# Patient Record
Sex: MALE | Race: WHITE | NOT HISPANIC OR LATINO | Employment: OTHER | URBAN - METROPOLITAN AREA
[De-identification: names, ages, dates, MRNs, and addresses within clinical notes are randomized per-mention and may not be internally consistent; named-entity substitution may affect disease eponyms.]

---

## 2024-10-26 ENCOUNTER — APPOINTMENT (OUTPATIENT)
Dept: GENERAL RADIOLOGY | Facility: HOSPITAL | Age: 74
DRG: 853 | End: 2024-10-26
Payer: MEDICARE

## 2024-10-26 ENCOUNTER — ANESTHESIA (OUTPATIENT)
Dept: PERIOP | Facility: HOSPITAL | Age: 74
End: 2024-10-26
Payer: MEDICARE

## 2024-10-26 ENCOUNTER — HOSPITAL ENCOUNTER (INPATIENT)
Facility: HOSPITAL | Age: 74
LOS: 35 days | Discharge: NURSING, REHAB OR LONG-TERM CARE | DRG: 853 | End: 2024-11-30
Attending: EMERGENCY MEDICINE | Admitting: INTERNAL MEDICINE
Payer: MEDICARE

## 2024-10-26 ENCOUNTER — APPOINTMENT (OUTPATIENT)
Dept: CT IMAGING | Facility: HOSPITAL | Age: 74
DRG: 853 | End: 2024-10-26
Payer: MEDICARE

## 2024-10-26 ENCOUNTER — ANESTHESIA EVENT (OUTPATIENT)
Dept: PERIOP | Facility: HOSPITAL | Age: 74
End: 2024-10-26
Payer: MEDICARE

## 2024-10-26 DIAGNOSIS — I95.9 HYPOTENSION, UNSPECIFIED HYPOTENSION TYPE: ICD-10-CM

## 2024-10-26 DIAGNOSIS — R79.89 INCREASED LACTIC ACID LEVEL: ICD-10-CM

## 2024-10-26 DIAGNOSIS — K63.1 PERFORATION OF SIGMOID COLON: Primary | ICD-10-CM

## 2024-10-26 DIAGNOSIS — K63.1 BOWEL PERFORATION: ICD-10-CM

## 2024-10-26 LAB
ALBUMIN SERPL-MCNC: 2.5 G/DL (ref 3.5–5.2)
ALBUMIN/GLOB SERPL: 0.7 G/DL
ALP SERPL-CCNC: 131 U/L (ref 39–117)
ALT SERPL W P-5'-P-CCNC: 16 U/L (ref 1–41)
ANION GAP SERPL CALCULATED.3IONS-SCNC: 16 MMOL/L (ref 5–15)
AST SERPL-CCNC: 15 U/L (ref 1–40)
BASOPHILS # BLD MANUAL: 0 10*3/MM3 (ref 0–0.2)
BASOPHILS NFR BLD MANUAL: 0 % (ref 0–1.5)
BILIRUB SERPL-MCNC: 1.2 MG/DL (ref 0–1.2)
BUN SERPL-MCNC: 14 MG/DL (ref 8–23)
BUN/CREAT SERPL: 15.7 (ref 7–25)
CALCIUM SPEC-SCNC: 8.4 MG/DL (ref 8.6–10.5)
CHLORIDE SERPL-SCNC: 98 MMOL/L (ref 98–107)
CO2 SERPL-SCNC: 21 MMOL/L (ref 22–29)
CREAT SERPL-MCNC: 0.89 MG/DL (ref 0.76–1.27)
D-LACTATE SERPL-SCNC: 5.8 MMOL/L (ref 0.5–2)
DEPRECATED RDW RBC AUTO: 59.9 FL (ref 37–54)
EGFRCR SERPLBLD CKD-EPI 2021: 91.1 ML/MIN/1.73
EOSINOPHIL # BLD MANUAL: 0 10*3/MM3 (ref 0–0.4)
EOSINOPHIL NFR BLD MANUAL: 0 % (ref 0.3–6.2)
ERYTHROCYTE [DISTWIDTH] IN BLOOD BY AUTOMATED COUNT: 19.5 % (ref 12.3–15.4)
GLOBULIN UR ELPH-MCNC: 3.5 GM/DL
GLUCOSE BLDC GLUCOMTR-MCNC: 223 MG/DL (ref 70–130)
GLUCOSE SERPL-MCNC: 239 MG/DL (ref 65–99)
HBA1C MFR BLD: 6.6 % (ref 4.8–5.6)
HCT VFR BLD AUTO: 43.2 % (ref 37.5–51)
HGB BLD-MCNC: 13.7 G/DL (ref 13–17.7)
INR PPP: 1.14 (ref 0.9–1.1)
LIPASE SERPL-CCNC: 6 U/L (ref 13–60)
LYMPHOCYTES # BLD MANUAL: 0.5 10*3/MM3 (ref 0.7–3.1)
LYMPHOCYTES NFR BLD MANUAL: 11.1 % (ref 5–12)
MAGNESIUM SERPL-MCNC: 1.8 MG/DL (ref 1.6–2.4)
MCH RBC QN AUTO: 27.5 PG (ref 26.6–33)
MCHC RBC AUTO-ENTMCNC: 31.7 G/DL (ref 31.5–35.7)
MCV RBC AUTO: 86.6 FL (ref 79–97)
MONOCYTES # BLD: 1.08 10*3/MM3 (ref 0.1–0.9)
NEUTROPHILS # BLD AUTO: 8.15 10*3/MM3 (ref 1.7–7)
NEUTROPHILS NFR BLD MANUAL: 83.8 % (ref 42.7–76)
NRBC BLD AUTO-RTO: 0 /100 WBC (ref 0–0.2)
PLAT MORPH BLD: NORMAL
PLATELET # BLD AUTO: 549 10*3/MM3 (ref 140–450)
PMV BLD AUTO: 8.1 FL (ref 6–12)
POLYCHROMASIA BLD QL SMEAR: ABNORMAL
POTASSIUM SERPL-SCNC: 3.8 MMOL/L (ref 3.5–5.2)
PROCALCITONIN SERPL-MCNC: 9.58 NG/ML (ref 0–0.25)
PROT SERPL-MCNC: 6 G/DL (ref 6–8.5)
PROTHROMBIN TIME: 14.9 SECONDS (ref 11.7–14.2)
RBC # BLD AUTO: 4.99 10*6/MM3 (ref 4.14–5.8)
SODIUM SERPL-SCNC: 135 MMOL/L (ref 136–145)
TROPONIN T SERPL HS-MCNC: 18 NG/L
TSH SERPL DL<=0.05 MIU/L-ACNC: 3.27 UIU/ML (ref 0.27–4.2)
VARIANT LYMPHS NFR BLD MANUAL: 5.1 % (ref 19.6–45.3)
WBC MORPH BLD: NORMAL
WBC NRBC COR # BLD AUTO: 9.72 10*3/MM3 (ref 3.4–10.8)

## 2024-10-26 PROCEDURE — 44143 PARTIAL REMOVAL OF COLON: CPT | Performed by: SURGERY

## 2024-10-26 PROCEDURE — 25010000002 LIDOCAINE PF 2% 2 % SOLUTION: Performed by: STUDENT IN AN ORGANIZED HEALTH CARE EDUCATION/TRAINING PROGRAM

## 2024-10-26 PROCEDURE — 84145 PROCALCITONIN (PCT): CPT | Performed by: EMERGENCY MEDICINE

## 2024-10-26 PROCEDURE — 85018 HEMOGLOBIN: CPT

## 2024-10-26 PROCEDURE — 80053 COMPREHEN METABOLIC PANEL: CPT | Performed by: EMERGENCY MEDICINE

## 2024-10-26 PROCEDURE — 87075 CULTR BACTERIA EXCEPT BLOOD: CPT | Performed by: SURGERY

## 2024-10-26 PROCEDURE — 87205 SMEAR GRAM STAIN: CPT | Performed by: SURGERY

## 2024-10-26 PROCEDURE — 25010000002 VASOPRESSIN 20 UNIT/ML SOLUTION: Performed by: STUDENT IN AN ORGANIZED HEALTH CARE EDUCATION/TRAINING PROGRAM

## 2024-10-26 PROCEDURE — 84443 ASSAY THYROID STIM HORMONE: CPT | Performed by: INTERNAL MEDICINE

## 2024-10-26 PROCEDURE — 25810000003 LACTATED RINGERS PER 1000 ML: Performed by: STUDENT IN AN ORGANIZED HEALTH CARE EDUCATION/TRAINING PROGRAM

## 2024-10-26 PROCEDURE — 25510000001 IOPAMIDOL 61 % SOLUTION: Performed by: EMERGENCY MEDICINE

## 2024-10-26 PROCEDURE — 85025 COMPLETE CBC W/AUTO DIFF WBC: CPT | Performed by: EMERGENCY MEDICINE

## 2024-10-26 PROCEDURE — 87102 FUNGUS ISOLATION CULTURE: CPT | Performed by: SURGERY

## 2024-10-26 PROCEDURE — 99223 1ST HOSP IP/OBS HIGH 75: CPT | Performed by: SURGERY

## 2024-10-26 PROCEDURE — 99291 CRITICAL CARE FIRST HOUR: CPT

## 2024-10-26 PROCEDURE — 44139 MOBILIZATION OF COLON: CPT | Performed by: PHYSICIAN ASSISTANT

## 2024-10-26 PROCEDURE — 82803 BLOOD GASES ANY COMBINATION: CPT

## 2024-10-26 PROCEDURE — 82948 REAGENT STRIP/BLOOD GLUCOSE: CPT

## 2024-10-26 PROCEDURE — 87186 SC STD MICRODIL/AGAR DIL: CPT | Performed by: SURGERY

## 2024-10-26 PROCEDURE — 71045 X-RAY EXAM CHEST 1 VIEW: CPT

## 2024-10-26 PROCEDURE — 0D1M0Z4 BYPASS DESCENDING COLON TO CUTANEOUS, OPEN APPROACH: ICD-10-PCS | Performed by: SURGERY

## 2024-10-26 PROCEDURE — 25810000003 SODIUM CHLORIDE 0.9 % SOLUTION: Performed by: EMERGENCY MEDICINE

## 2024-10-26 PROCEDURE — 25010000002 SUCCINYLCHOLINE PER 20 MG: Performed by: STUDENT IN AN ORGANIZED HEALTH CARE EDUCATION/TRAINING PROGRAM

## 2024-10-26 PROCEDURE — 85610 PROTHROMBIN TIME: CPT | Performed by: EMERGENCY MEDICINE

## 2024-10-26 PROCEDURE — 25010000002 ALBUMIN HUMAN 5% PER 50 ML: Performed by: STUDENT IN AN ORGANIZED HEALTH CARE EDUCATION/TRAINING PROGRAM

## 2024-10-26 PROCEDURE — 84484 ASSAY OF TROPONIN QUANT: CPT | Performed by: EMERGENCY MEDICINE

## 2024-10-26 PROCEDURE — 25010000002 PROPOFOL 200 MG/20ML EMULSION: Performed by: STUDENT IN AN ORGANIZED HEALTH CARE EDUCATION/TRAINING PROGRAM

## 2024-10-26 PROCEDURE — P9041 ALBUMIN (HUMAN),5%, 50ML: HCPCS | Performed by: STUDENT IN AN ORGANIZED HEALTH CARE EDUCATION/TRAINING PROGRAM

## 2024-10-26 PROCEDURE — 63710000001 INSULIN LISPRO (HUMAN) PER 5 UNITS: Performed by: SURGERY

## 2024-10-26 PROCEDURE — 87076 CULTURE ANAEROBE IDENT EACH: CPT | Performed by: EMERGENCY MEDICINE

## 2024-10-26 PROCEDURE — 83735 ASSAY OF MAGNESIUM: CPT | Performed by: EMERGENCY MEDICINE

## 2024-10-26 PROCEDURE — 88309 TISSUE EXAM BY PATHOLOGIST: CPT | Performed by: SURGERY

## 2024-10-26 PROCEDURE — 36415 COLL VENOUS BLD VENIPUNCTURE: CPT

## 2024-10-26 PROCEDURE — 83036 HEMOGLOBIN GLYCOSYLATED A1C: CPT | Performed by: INTERNAL MEDICINE

## 2024-10-26 PROCEDURE — 25010000002 PHENYLEPHRINE 10 MG/ML SOLUTION: Performed by: STUDENT IN AN ORGANIZED HEALTH CARE EDUCATION/TRAINING PROGRAM

## 2024-10-26 PROCEDURE — 25010000002 PIPERACILLIN SOD-TAZOBACTAM PER 1 G: Performed by: INTERNAL MEDICINE

## 2024-10-26 PROCEDURE — 44139 MOBILIZATION OF COLON: CPT | Performed by: SURGERY

## 2024-10-26 PROCEDURE — 87040 BLOOD CULTURE FOR BACTERIA: CPT | Performed by: EMERGENCY MEDICINE

## 2024-10-26 PROCEDURE — 87185 SC STD ENZYME DETCJ PER NZM: CPT | Performed by: EMERGENCY MEDICINE

## 2024-10-26 PROCEDURE — 82947 ASSAY GLUCOSE BLOOD QUANT: CPT

## 2024-10-26 PROCEDURE — 83690 ASSAY OF LIPASE: CPT | Performed by: EMERGENCY MEDICINE

## 2024-10-26 PROCEDURE — 83605 ASSAY OF LACTIC ACID: CPT | Performed by: EMERGENCY MEDICINE

## 2024-10-26 PROCEDURE — 25010000002 HYDROMORPHONE 1 MG/ML SOLUTION: Performed by: SURGERY

## 2024-10-26 PROCEDURE — 0DTG0ZZ RESECTION OF LEFT LARGE INTESTINE, OPEN APPROACH: ICD-10-PCS | Performed by: SURGERY

## 2024-10-26 PROCEDURE — 85014 HEMATOCRIT: CPT

## 2024-10-26 PROCEDURE — 87077 CULTURE AEROBIC IDENTIFY: CPT | Performed by: SURGERY

## 2024-10-26 PROCEDURE — 25010000002 PIPERACILLIN SOD-TAZOBACTAM PER 1 G: Performed by: EMERGENCY MEDICINE

## 2024-10-26 PROCEDURE — 93005 ELECTROCARDIOGRAM TRACING: CPT | Performed by: EMERGENCY MEDICINE

## 2024-10-26 PROCEDURE — 93010 ELECTROCARDIOGRAM REPORT: CPT | Performed by: INTERNAL MEDICINE

## 2024-10-26 PROCEDURE — 85007 BL SMEAR W/DIFF WBC COUNT: CPT | Performed by: EMERGENCY MEDICINE

## 2024-10-26 PROCEDURE — 74177 CT ABD & PELVIS W/CONTRAST: CPT

## 2024-10-26 PROCEDURE — 87070 CULTURE OTHR SPECIMN AEROBIC: CPT | Performed by: SURGERY

## 2024-10-26 DEVICE — ARISTA AH ABSORBABLE HEMOSTATIC PARTICLES, 3G BELLOWS CONTAINER
Type: IMPLANTABLE DEVICE | Site: ABDOMEN | Status: FUNCTIONAL
Brand: ARISTA

## 2024-10-26 DEVICE — ENDOPATH ECHELON ENDOSCOPIC LINEAR CUTTER RELOADS, BLUE, 60MM
Type: IMPLANTABLE DEVICE | Site: ABDOMEN | Status: FUNCTIONAL
Brand: ECHELON ENDOPATH

## 2024-10-26 DEVICE — ECHELON CONTOUR W/ GREEN RELOAD
Type: IMPLANTABLE DEVICE | Site: ABDOMEN | Status: FUNCTIONAL
Brand: ECHELON

## 2024-10-26 RX ORDER — ROCURONIUM BROMIDE 10 MG/ML
INJECTION, SOLUTION INTRAVENOUS AS NEEDED
Status: DISCONTINUED | OUTPATIENT
Start: 2024-10-26 | End: 2024-10-27 | Stop reason: SURG

## 2024-10-26 RX ORDER — LORAZEPAM 2 MG/ML
2 INJECTION INTRAMUSCULAR EVERY 6 HOURS
Status: DISPENSED | OUTPATIENT
Start: 2024-10-26 | End: 2024-10-27

## 2024-10-26 RX ORDER — SODIUM CHLORIDE 9 MG/ML
40 INJECTION, SOLUTION INTRAVENOUS AS NEEDED
Status: ACTIVE | OUTPATIENT
Start: 2024-10-26 | End: 2024-11-02

## 2024-10-26 RX ORDER — THIAMINE HYDROCHLORIDE 100 MG/ML
200 INJECTION, SOLUTION INTRAMUSCULAR; INTRAVENOUS EVERY 8 HOURS SCHEDULED
Status: COMPLETED | OUTPATIENT
Start: 2024-10-29 | End: 2024-11-02

## 2024-10-26 RX ORDER — LORAZEPAM 2 MG/ML
2 INJECTION INTRAMUSCULAR
Status: ACTIVE | OUTPATIENT
Start: 2024-10-26 | End: 2024-11-04

## 2024-10-26 RX ORDER — BISACODYL 10 MG
10 SUPPOSITORY, RECTAL RECTAL DAILY PRN
Status: DISCONTINUED | OUTPATIENT
Start: 2024-10-26 | End: 2024-10-26

## 2024-10-26 RX ORDER — POLYETHYLENE GLYCOL 3350 17 G/17G
17 POWDER, FOR SOLUTION ORAL DAILY PRN
Status: DISCONTINUED | OUTPATIENT
Start: 2024-10-26 | End: 2024-10-26

## 2024-10-26 RX ORDER — SODIUM CHLORIDE 0.9 % (FLUSH) 0.9 %
10 SYRINGE (ML) INJECTION EVERY 12 HOURS SCHEDULED
Status: DISCONTINUED | OUTPATIENT
Start: 2024-10-26 | End: 2024-11-30 | Stop reason: HOSPADM

## 2024-10-26 RX ORDER — PHENYLEPHRINE HYDROCHLORIDE 10 MG/ML
INJECTION INTRAVENOUS AS NEEDED
Status: DISCONTINUED | OUTPATIENT
Start: 2024-10-26 | End: 2024-10-27 | Stop reason: SURG

## 2024-10-26 RX ORDER — DEXTROSE MONOHYDRATE 25 G/50ML
25 INJECTION, SOLUTION INTRAVENOUS
Status: DISCONTINUED | OUTPATIENT
Start: 2024-10-26 | End: 2024-11-25

## 2024-10-26 RX ORDER — FENTANYL CITRATE 50 UG/ML
50 INJECTION, SOLUTION INTRAMUSCULAR; INTRAVENOUS ONCE AS NEEDED
Status: DISCONTINUED | OUTPATIENT
Start: 2024-10-26 | End: 2024-10-26 | Stop reason: HOSPADM

## 2024-10-26 RX ORDER — INSULIN LISPRO 100 [IU]/ML
10 INJECTION, SOLUTION INTRAVENOUS; SUBCUTANEOUS ONCE
Status: COMPLETED | OUTPATIENT
Start: 2024-10-26 | End: 2024-10-26

## 2024-10-26 RX ORDER — LORAZEPAM 2 MG/ML
1 INJECTION INTRAMUSCULAR EVERY 6 HOURS
Status: DISPENSED | OUTPATIENT
Start: 2024-10-27 | End: 2024-10-29

## 2024-10-26 RX ORDER — LABETALOL HYDROCHLORIDE 5 MG/ML
20 INJECTION, SOLUTION INTRAVENOUS
Status: DISCONTINUED | OUTPATIENT
Start: 2024-10-26 | End: 2024-11-30 | Stop reason: HOSPADM

## 2024-10-26 RX ORDER — SODIUM CHLORIDE 9 MG/ML
200 INJECTION, SOLUTION INTRAVENOUS CONTINUOUS
Status: ACTIVE | OUTPATIENT
Start: 2024-10-26 | End: 2024-10-28

## 2024-10-26 RX ORDER — HYDROMORPHONE HYDROCHLORIDE 1 MG/ML
0.5 INJECTION, SOLUTION INTRAMUSCULAR; INTRAVENOUS; SUBCUTANEOUS
Status: DISCONTINUED | OUTPATIENT
Start: 2024-10-26 | End: 2024-10-27

## 2024-10-26 RX ORDER — FENTANYL CITRATE 50 UG/ML
50 INJECTION, SOLUTION INTRAMUSCULAR; INTRAVENOUS
Status: DISCONTINUED | OUTPATIENT
Start: 2024-10-26 | End: 2024-10-27

## 2024-10-26 RX ORDER — PROPOFOL 10 MG/ML
INJECTION, EMULSION INTRAVENOUS AS NEEDED
Status: DISCONTINUED | OUTPATIENT
Start: 2024-10-26 | End: 2024-10-27 | Stop reason: SURG

## 2024-10-26 RX ORDER — ALBUMIN, HUMAN INJ 5% 5 %
SOLUTION INTRAVENOUS CONTINUOUS PRN
Status: DISCONTINUED | OUTPATIENT
Start: 2024-10-26 | End: 2024-10-27 | Stop reason: SURG

## 2024-10-26 RX ORDER — ONDANSETRON 2 MG/ML
4 INJECTION INTRAMUSCULAR; INTRAVENOUS EVERY 6 HOURS PRN
Status: DISCONTINUED | OUTPATIENT
Start: 2024-10-26 | End: 2024-11-30 | Stop reason: HOSPADM

## 2024-10-26 RX ORDER — FAMOTIDINE 10 MG/ML
20 INJECTION, SOLUTION INTRAVENOUS ONCE
Status: COMPLETED | OUTPATIENT
Start: 2024-10-26 | End: 2024-10-26

## 2024-10-26 RX ORDER — NICOTINE POLACRILEX 4 MG
15 LOZENGE BUCCAL
Status: DISCONTINUED | OUTPATIENT
Start: 2024-10-26 | End: 2024-11-25

## 2024-10-26 RX ORDER — IOPAMIDOL 612 MG/ML
100 INJECTION, SOLUTION INTRAVASCULAR
Status: COMPLETED | OUTPATIENT
Start: 2024-10-26 | End: 2024-10-26

## 2024-10-26 RX ORDER — LIDOCAINE HYDROCHLORIDE 10 MG/ML
0.5 INJECTION, SOLUTION INFILTRATION; PERINEURAL ONCE AS NEEDED
Status: DISCONTINUED | OUTPATIENT
Start: 2024-10-26 | End: 2024-10-26 | Stop reason: HOSPADM

## 2024-10-26 RX ORDER — NITROGLYCERIN 0.4 MG/1
0.4 TABLET SUBLINGUAL
Status: DISCONTINUED | OUTPATIENT
Start: 2024-10-26 | End: 2024-11-30 | Stop reason: HOSPADM

## 2024-10-26 RX ORDER — LORAZEPAM 1 MG/1
4 TABLET ORAL
Status: ACTIVE | OUTPATIENT
Start: 2024-10-26 | End: 2024-11-04

## 2024-10-26 RX ORDER — LORAZEPAM 1 MG/1
1 TABLET ORAL
Status: ACTIVE | OUTPATIENT
Start: 2024-10-26 | End: 2024-11-04

## 2024-10-26 RX ORDER — LORAZEPAM 2 MG/ML
4 INJECTION INTRAMUSCULAR
Status: ACTIVE | OUTPATIENT
Start: 2024-10-26 | End: 2024-11-04

## 2024-10-26 RX ORDER — ACETAMINOPHEN 325 MG/1
650 TABLET ORAL EVERY 4 HOURS PRN
Status: DISCONTINUED | OUTPATIENT
Start: 2024-10-26 | End: 2024-11-13

## 2024-10-26 RX ORDER — SODIUM CHLORIDE, SODIUM LACTATE, POTASSIUM CHLORIDE, CALCIUM CHLORIDE 600; 310; 30; 20 MG/100ML; MG/100ML; MG/100ML; MG/100ML
9 INJECTION, SOLUTION INTRAVENOUS CONTINUOUS
Status: ACTIVE | OUTPATIENT
Start: 2024-10-26 | End: 2024-10-27

## 2024-10-26 RX ORDER — ACETAMINOPHEN 650 MG/1
650 SUPPOSITORY RECTAL EVERY 4 HOURS PRN
Status: DISCONTINUED | OUTPATIENT
Start: 2024-10-26 | End: 2024-11-13

## 2024-10-26 RX ORDER — NOREPINEPHRINE BITARTRATE 0.03 MG/ML
.02-.3 INJECTION, SOLUTION INTRAVENOUS
Status: DISCONTINUED | OUTPATIENT
Start: 2024-10-26 | End: 2024-10-30

## 2024-10-26 RX ORDER — LORAZEPAM 2 MG/ML
1 INJECTION INTRAMUSCULAR
Status: DISPENSED | OUTPATIENT
Start: 2024-10-26 | End: 2024-11-04

## 2024-10-26 RX ORDER — AMOXICILLIN 250 MG
2 CAPSULE ORAL 2 TIMES DAILY
Status: DISCONTINUED | OUTPATIENT
Start: 2024-10-26 | End: 2024-10-26

## 2024-10-26 RX ORDER — LORAZEPAM 2 MG/ML
2 INJECTION INTRAMUSCULAR
Status: DISPENSED | OUTPATIENT
Start: 2024-10-26 | End: 2024-11-04

## 2024-10-26 RX ORDER — LORAZEPAM 1 MG/1
2 TABLET ORAL
Status: ACTIVE | OUTPATIENT
Start: 2024-10-26 | End: 2024-11-04

## 2024-10-26 RX ORDER — VASOPRESSIN 20 U/ML
INJECTION PARENTERAL AS NEEDED
Status: DISCONTINUED | OUTPATIENT
Start: 2024-10-26 | End: 2024-10-27 | Stop reason: SURG

## 2024-10-26 RX ORDER — SODIUM CHLORIDE 0.9 % (FLUSH) 0.9 %
10 SYRINGE (ML) INJECTION AS NEEDED
Status: DISCONTINUED | OUTPATIENT
Start: 2024-10-26 | End: 2024-11-30 | Stop reason: HOSPADM

## 2024-10-26 RX ORDER — LIDOCAINE HYDROCHLORIDE 20 MG/ML
INJECTION, SOLUTION EPIDURAL; INFILTRATION; INTRACAUDAL; PERINEURAL AS NEEDED
Status: DISCONTINUED | OUTPATIENT
Start: 2024-10-26 | End: 2024-10-27 | Stop reason: SURG

## 2024-10-26 RX ORDER — IBUPROFEN 600 MG/1
1 TABLET ORAL
Status: DISCONTINUED | OUTPATIENT
Start: 2024-10-26 | End: 2024-11-25

## 2024-10-26 RX ORDER — SUCCINYLCHOLINE CHLORIDE 20 MG/ML
INJECTION INTRAMUSCULAR; INTRAVENOUS AS NEEDED
Status: DISCONTINUED | OUTPATIENT
Start: 2024-10-26 | End: 2024-10-27 | Stop reason: SURG

## 2024-10-26 RX ORDER — SODIUM CHLORIDE, SODIUM LACTATE, POTASSIUM CHLORIDE, CALCIUM CHLORIDE 600; 310; 30; 20 MG/100ML; MG/100ML; MG/100ML; MG/100ML
INJECTION, SOLUTION INTRAVENOUS CONTINUOUS PRN
Status: DISCONTINUED | OUTPATIENT
Start: 2024-10-26 | End: 2024-10-27 | Stop reason: SURG

## 2024-10-26 RX ORDER — SODIUM CHLORIDE 0.9 % (FLUSH) 0.9 %
3-10 SYRINGE (ML) INJECTION AS NEEDED
Status: DISCONTINUED | OUTPATIENT
Start: 2024-10-26 | End: 2024-10-26 | Stop reason: HOSPADM

## 2024-10-26 RX ORDER — BISACODYL 5 MG/1
5 TABLET, DELAYED RELEASE ORAL DAILY PRN
Status: DISCONTINUED | OUTPATIENT
Start: 2024-10-26 | End: 2024-10-26

## 2024-10-26 RX ORDER — SODIUM CHLORIDE 0.9 % (FLUSH) 0.9 %
3 SYRINGE (ML) INJECTION EVERY 12 HOURS SCHEDULED
Status: DISCONTINUED | OUTPATIENT
Start: 2024-10-26 | End: 2024-10-26 | Stop reason: HOSPADM

## 2024-10-26 RX ADMIN — SODIUM CHLORIDE, POTASSIUM CHLORIDE, SODIUM LACTATE AND CALCIUM CHLORIDE: 600; 310; 30; 20 INJECTION, SOLUTION INTRAVENOUS at 22:52

## 2024-10-26 RX ADMIN — ALBUMIN (HUMAN): 12.5 INJECTION, SOLUTION INTRAVENOUS at 22:43

## 2024-10-26 RX ADMIN — SODIUM CHLORIDE, POTASSIUM CHLORIDE, SODIUM LACTATE AND CALCIUM CHLORIDE 9 ML/HR: 600; 310; 30; 20 INJECTION, SOLUTION INTRAVENOUS at 22:08

## 2024-10-26 RX ADMIN — PHENYLEPHRINE HYDROCHLORIDE 200 MCG: 10 INJECTION INTRAVENOUS at 22:57

## 2024-10-26 RX ADMIN — PHENYLEPHRINE HYDROCHLORIDE 200 MCG: 10 INJECTION INTRAVENOUS at 22:58

## 2024-10-26 RX ADMIN — VASOPRESSIN 1 UNITS: 20 INJECTION INTRAVENOUS at 23:02

## 2024-10-26 RX ADMIN — PROPOFOL 150 MG: 10 INJECTION, EMULSION INTRAVENOUS at 22:37

## 2024-10-26 RX ADMIN — PHENYLEPHRINE HYDROCHLORIDE 400 MCG: 10 INJECTION INTRAVENOUS at 22:40

## 2024-10-26 RX ADMIN — NOREPINEPHRINE BITARTRATE 0.1 MCG/KG/MIN: 32 SOLUTION INTRAVENOUS at 22:40

## 2024-10-26 RX ADMIN — FAMOTIDINE 20 MG: 10 INJECTION INTRAVENOUS at 22:15

## 2024-10-26 RX ADMIN — PIPERACILLIN AND TAZOBACTAM 3.38 G: 3; .375 INJECTION, POWDER, FOR SOLUTION INTRAVENOUS at 20:40

## 2024-10-26 RX ADMIN — Medication 3 ML: at 22:08

## 2024-10-26 RX ADMIN — NOREPINEPHRINE BITARTRATE 0.04 MCG/KG/MIN: 32 SOLUTION INTRAVENOUS at 22:29

## 2024-10-26 RX ADMIN — PHENYLEPHRINE HYDROCHLORIDE 200 MCG: 10 INJECTION INTRAVENOUS at 22:54

## 2024-10-26 RX ADMIN — INSULIN LISPRO 10 UNITS: 100 INJECTION, SOLUTION INTRAVENOUS; SUBCUTANEOUS at 21:55

## 2024-10-26 RX ADMIN — SODIUM CHLORIDE, POTASSIUM CHLORIDE, SODIUM LACTATE AND CALCIUM CHLORIDE: 600; 310; 30; 20 INJECTION, SOLUTION INTRAVENOUS at 22:29

## 2024-10-26 RX ADMIN — SODIUM CHLORIDE 2490 ML: 9 INJECTION, SOLUTION INTRAVENOUS at 19:46

## 2024-10-26 RX ADMIN — PIPERACILLIN AND TAZOBACTAM 3.38 G: 3; .375 INJECTION, POWDER, FOR SOLUTION INTRAVENOUS at 23:24

## 2024-10-26 RX ADMIN — ALBUMIN (HUMAN): 12.5 INJECTION, SOLUTION INTRAVENOUS at 23:39

## 2024-10-26 RX ADMIN — LIDOCAINE HYDROCHLORIDE 60 MG: 20 INJECTION, SOLUTION EPIDURAL; INFILTRATION; INTRACAUDAL; PERINEURAL at 22:36

## 2024-10-26 RX ADMIN — ROCURONIUM BROMIDE 50 MG: 10 INJECTION, SOLUTION INTRAVENOUS at 22:47

## 2024-10-26 RX ADMIN — ROCURONIUM BROMIDE 15 MG: 10 INJECTION, SOLUTION INTRAVENOUS at 23:38

## 2024-10-26 RX ADMIN — SODIUM CHLORIDE, POTASSIUM CHLORIDE, SODIUM LACTATE AND CALCIUM CHLORIDE: 600; 310; 30; 20 INJECTION, SOLUTION INTRAVENOUS at 23:32

## 2024-10-26 RX ADMIN — IOPAMIDOL 85 ML: 612 INJECTION, SOLUTION INTRAVENOUS at 19:58

## 2024-10-26 RX ADMIN — HYDROMORPHONE HYDROCHLORIDE 0.5 MG: 1 INJECTION, SOLUTION INTRAMUSCULAR; INTRAVENOUS; SUBCUTANEOUS at 21:45

## 2024-10-26 RX ADMIN — PHENYLEPHRINE HYDROCHLORIDE 200 MCG: 10 INJECTION INTRAVENOUS at 23:00

## 2024-10-26 RX ADMIN — PHENYLEPHRINE HYDROCHLORIDE 200 MCG: 10 INJECTION INTRAVENOUS at 22:36

## 2024-10-26 RX ADMIN — SUCCINYLCHOLINE CHLORIDE 160 MG: 20 INJECTION, SOLUTION INTRAMUSCULAR; INTRAVENOUS; PARENTERAL at 22:38

## 2024-10-26 RX ADMIN — ALBUMIN (HUMAN): 12.5 INJECTION, SOLUTION INTRAVENOUS at 22:49

## 2024-10-26 NOTE — ED NOTES
Pt arrived via [facility] ems from home w/ c/o diarrhea x2 months w/ abdominal pain x4 days worsening today. Per ems pt has been hypotensive 80s/40's. No IV access established prior to arrival. Pt reports 7/10 abd. Pain.

## 2024-10-27 ENCOUNTER — APPOINTMENT (OUTPATIENT)
Dept: GENERAL RADIOLOGY | Facility: HOSPITAL | Age: 74
DRG: 853 | End: 2024-10-27
Payer: MEDICARE

## 2024-10-27 PROBLEM — C18.9 COLON CANCER: Status: ACTIVE | Noted: 2024-10-27

## 2024-10-27 PROBLEM — K63.1 PERFORATION OF SIGMOID COLON: Status: RESOLVED | Noted: 2024-10-26 | Resolved: 2024-10-27

## 2024-10-27 LAB
ABO GROUP BLD: NORMAL
ALBUMIN SERPL-MCNC: 2.1 G/DL (ref 3.5–5.2)
ALBUMIN/GLOB SERPL: 1.1 G/DL
ALP SERPL-CCNC: 52 U/L (ref 39–117)
ALT SERPL W P-5'-P-CCNC: 8 U/L (ref 1–41)
ANION GAP SERPL CALCULATED.3IONS-SCNC: 12.3 MMOL/L (ref 5–15)
ANISOCYTOSIS BLD QL: ABNORMAL
ARTERIAL PATENCY WRIST A: ABNORMAL
ARTERIAL PATENCY WRIST A: ABNORMAL
AST SERPL-CCNC: <5 U/L (ref 1–40)
ATMOSPHERIC PRESS: 757 MMHG
ATMOSPHERIC PRESS: 757.6 MMHG
BACTERIA UR QL AUTO: ABNORMAL /HPF
BASE EXCESS BLDA CALC-SCNC: -6.7 MMOL/L (ref 0–2)
BASE EXCESS BLDA CALC-SCNC: -7 MMOL/L (ref 0–2)
BASOPHILS # BLD MANUAL: 0 10*3/MM3 (ref 0–0.2)
BASOPHILS NFR BLD MANUAL: 0 % (ref 0–1.5)
BDY SITE: ABNORMAL
BDY SITE: ABNORMAL
BILIRUB SERPL-MCNC: 1.1 MG/DL (ref 0–1.2)
BILIRUB UR QL STRIP: NEGATIVE
BLD GP AB SCN SERPL QL: NEGATIVE
BUN SERPL-MCNC: 12 MG/DL (ref 8–23)
BUN/CREAT SERPL: 17.1 (ref 7–25)
BURR CELLS BLD QL SMEAR: ABNORMAL
CA-I SERPL ISE-MCNC: 1.18 MMOL/L (ref 1.15–1.35)
CALCIUM SPEC-SCNC: 7 MG/DL (ref 8.6–10.5)
CHLORIDE SERPL-SCNC: 110 MMOL/L (ref 98–107)
CLARITY UR: ABNORMAL
CO2 BLDA-SCNC: 18.8 MMOL/L (ref 23–27)
CO2 BLDA-SCNC: 20.8 MMOL/L (ref 23–27)
CO2 SERPL-SCNC: 17.7 MMOL/L (ref 22–29)
COLOR UR: ABNORMAL
CREAT SERPL-MCNC: 0.7 MG/DL (ref 0.76–1.27)
D-LACTATE SERPL-SCNC: 2.2 MMOL/L (ref 0.5–2)
D-LACTATE SERPL-SCNC: 2.3 MMOL/L (ref 0.5–2)
D-LACTATE SERPL-SCNC: 3.2 MMOL/L (ref 0.5–2)
D-LACTATE SERPL-SCNC: 3.6 MMOL/L (ref 0.5–2)
D-LACTATE SERPL-SCNC: 4 MMOL/L (ref 0.5–2)
DEPRECATED RDW RBC AUTO: 58.5 FL (ref 37–54)
DEPRECATED RDW RBC AUTO: 60.9 FL (ref 37–54)
DEVICE COMMENT: ABNORMAL
DEVICE COMMENT: ABNORMAL
EGFRCR SERPLBLD CKD-EPI 2021: 97.9 ML/MIN/1.73
EOSINOPHIL # BLD MANUAL: 0 10*3/MM3 (ref 0–0.4)
EOSINOPHIL NFR BLD MANUAL: 0 % (ref 0.3–6.2)
ERYTHROCYTE [DISTWIDTH] IN BLOOD BY AUTOMATED COUNT: 18.9 % (ref 12.3–15.4)
ERYTHROCYTE [DISTWIDTH] IN BLOOD BY AUTOMATED COUNT: 19.1 % (ref 12.3–15.4)
ETHANOL BLD-MCNC: <10 MG/DL (ref 0–10)
ETHANOL UR QL: <0.01 %
GEN 5 2HR TROPONIN T REFLEX: 20 NG/L
GLOBULIN UR ELPH-MCNC: 2 GM/DL
GLUCOSE BLDC GLUCOMTR-MCNC: 123 MG/DL (ref 70–130)
GLUCOSE BLDC GLUCOMTR-MCNC: 223 MG/DL (ref 70–130)
GLUCOSE BLDC GLUCOMTR-MCNC: 230 MG/DL (ref 70–130)
GLUCOSE BLDC GLUCOMTR-MCNC: 230 MG/DL (ref 70–130)
GLUCOSE BLDC GLUCOMTR-MCNC: 251 MG/DL (ref 70–130)
GLUCOSE BLDC GLUCOMTR-MCNC: 68 MG/DL (ref 70–130)
GLUCOSE SERPL-MCNC: 251 MG/DL (ref 65–99)
GLUCOSE UR STRIP-MCNC: NEGATIVE MG/DL
HCO3 BLDA-SCNC: 17.8 MMOL/L (ref 22–28)
HCO3 BLDA-SCNC: 19.5 MMOL/L (ref 22–28)
HCT VFR BLD AUTO: 20.4 % (ref 37.5–51)
HCT VFR BLD AUTO: 30.6 % (ref 37.5–51)
HEMODILUTION: NO
HEMODILUTION: NO
HGB BLD-MCNC: 6.5 G/DL (ref 13–17.7)
HGB BLD-MCNC: 9.8 G/DL (ref 13–17.7)
HGB UR QL STRIP.AUTO: ABNORMAL
HYALINE CASTS UR QL AUTO: ABNORMAL /LPF
INHALED O2 CONCENTRATION: 100 %
INHALED O2 CONCENTRATION: 50 %
KETONES UR QL STRIP: NEGATIVE
LEUKOCYTE ESTERASE UR QL STRIP.AUTO: ABNORMAL
LYMPHOCYTES # BLD MANUAL: 0.42 10*3/MM3 (ref 0.7–3.1)
LYMPHOCYTES NFR BLD MANUAL: 6.1 % (ref 5–12)
Lab: ABNORMAL
MAGNESIUM SERPL-MCNC: 1.4 MG/DL (ref 1.6–2.4)
MCH RBC QN AUTO: 27.6 PG (ref 26.6–33)
MCH RBC QN AUTO: 27.9 PG (ref 26.6–33)
MCHC RBC AUTO-ENTMCNC: 31.9 G/DL (ref 31.5–35.7)
MCHC RBC AUTO-ENTMCNC: 32 G/DL (ref 31.5–35.7)
MCV RBC AUTO: 86.2 FL (ref 79–97)
MCV RBC AUTO: 87.6 FL (ref 79–97)
MICROCYTES BLD QL: ABNORMAL
MODALITY: ABNORMAL
MODALITY: ABNORMAL
MONOCYTES # BLD: 0.42 10*3/MM3 (ref 0.1–0.9)
NEUTROPHILS # BLD AUTO: 6.1 10*3/MM3 (ref 1.7–7)
NEUTROPHILS NFR BLD MANUAL: 87.9 % (ref 42.7–76)
NITRITE UR QL STRIP: NEGATIVE
NOTIFIED WHO: ABNORMAL
O2 A-A PPRESDIFF RESPIRATORY: 0.3 MMHG
O2 A-A PPRESDIFF RESPIRATORY: 0.5 MMHG
PCO2 BLDA: 32.4 MM HG (ref 35–45)
PCO2 BLDA: 41 MM HG (ref 35–45)
PEEP RESPIRATORY: 5 CM[H2O]
PEEP RESPIRATORY: 5 CM[H2O]
PH BLDA: 7.29 PH UNITS (ref 7.35–7.45)
PH BLDA: 7.35 PH UNITS (ref 7.35–7.45)
PH UR STRIP.AUTO: <=5 [PH] (ref 5–8)
PHOSPHATE SERPL-MCNC: 2.4 MG/DL (ref 2.5–4.5)
PLAT MORPH BLD: NORMAL
PLATELET # BLD AUTO: 262 10*3/MM3 (ref 140–450)
PLATELET # BLD AUTO: 483 10*3/MM3 (ref 140–450)
PMV BLD AUTO: 8.5 FL (ref 6–12)
PMV BLD AUTO: 8.6 FL (ref 6–12)
PO2 BLD: 178 MM[HG] (ref 0–500)
PO2 BLD: 384 MM[HG] (ref 0–500)
PO2 BLDA: 384.3 MM HG (ref 80–100)
PO2 BLDA: 89 MM HG (ref 80–100)
POIKILOCYTOSIS BLD QL SMEAR: ABNORMAL
POLYCHROMASIA BLD QL SMEAR: ABNORMAL
POTASSIUM SERPL-SCNC: 3 MMOL/L (ref 3.5–5.2)
POTASSIUM SERPL-SCNC: 3.7 MMOL/L (ref 3.5–5.2)
PROT SERPL-MCNC: 4.1 G/DL (ref 6–8.5)
PROT UR QL STRIP: ABNORMAL
QT INTERVAL: 391 MS
QTC INTERVAL: 484 MS
RBC # BLD AUTO: 2.33 10*6/MM3 (ref 4.14–5.8)
RBC # BLD AUTO: 3.55 10*6/MM3 (ref 4.14–5.8)
RBC # UR STRIP: ABNORMAL /HPF
READ BACK: YES
REF LAB TEST METHOD: ABNORMAL
RH BLD: NEGATIVE
SAO2 % BLDCOA: 96.5 % (ref 92–98.5)
SAO2 % BLDCOA: 99.9 % (ref 92–98.5)
SET MECH RESP RATE: 20
SET MECH RESP RATE: 22
SODIUM SERPL-SCNC: 140 MMOL/L (ref 136–145)
SP GR UR STRIP: >1.03 (ref 1–1.03)
SPHEROCYTES BLD QL SMEAR: ABNORMAL
SQUAMOUS #/AREA URNS HPF: ABNORMAL /HPF
T&S EXPIRATION DATE: NORMAL
TOTAL RATE: 20 BREATHS/MINUTE
TOTAL RATE: 22 BREATHS/MINUTE
TROPONIN T DELTA: 2 NG/L
UROBILINOGEN UR QL STRIP: ABNORMAL
VARIANT LYMPHS NFR BLD MANUAL: 6.1 % (ref 19.6–45.3)
VENTILATOR MODE: AC
VENTILATOR MODE: AC
VT ON VENT VENT: 550 ML
VT ON VENT VENT: 550 ML
WBC # UR STRIP: ABNORMAL /HPF
WBC MORPH BLD: NORMAL
WBC NRBC COR # BLD AUTO: 2.92 10*3/MM3 (ref 3.4–10.8)
WBC NRBC COR # BLD AUTO: 6.94 10*3/MM3 (ref 3.4–10.8)

## 2024-10-27 PROCEDURE — 86900 BLOOD TYPING SEROLOGIC ABO: CPT | Performed by: SURGERY

## 2024-10-27 PROCEDURE — 25010000002 FENTANYL CITRATE (PF) 2500 MCG/50ML SOLUTION: Performed by: INTERNAL MEDICINE

## 2024-10-27 PROCEDURE — 44143 PARTIAL REMOVAL OF COLON: CPT | Performed by: PHYSICIAN ASSISTANT

## 2024-10-27 PROCEDURE — 94002 VENT MGMT INPAT INIT DAY: CPT

## 2024-10-27 PROCEDURE — 83735 ASSAY OF MAGNESIUM: CPT | Performed by: SURGERY

## 2024-10-27 PROCEDURE — 84100 ASSAY OF PHOSPHORUS: CPT | Performed by: SURGERY

## 2024-10-27 PROCEDURE — 25010000002 DIGOXIN PER 500 MCG

## 2024-10-27 PROCEDURE — 94799 UNLISTED PULMONARY SVC/PX: CPT

## 2024-10-27 PROCEDURE — 25010000002 FLUCONAZOLE PER 200 MG: Performed by: SURGERY

## 2024-10-27 PROCEDURE — 25010000002 THIAMINE HCL 200 MG/2ML SOLUTION 2 ML VIAL: Performed by: SURGERY

## 2024-10-27 PROCEDURE — 94761 N-INVAS EAR/PLS OXIMETRY MLT: CPT

## 2024-10-27 PROCEDURE — 86901 BLOOD TYPING SEROLOGIC RH(D): CPT | Performed by: SURGERY

## 2024-10-27 PROCEDURE — 25010000002 PIPERACILLIN SOD-TAZOBACTAM PER 1 G: Performed by: SURGERY

## 2024-10-27 PROCEDURE — 63710000001 INSULIN REGULAR HUMAN PER 5 UNITS: Performed by: SURGERY

## 2024-10-27 PROCEDURE — 81001 URINALYSIS AUTO W/SCOPE: CPT | Performed by: SURGERY

## 2024-10-27 PROCEDURE — 25810000003 SODIUM CHLORIDE 0.9 % SOLUTION

## 2024-10-27 PROCEDURE — 25010000002 PIPERACILLIN SOD-TAZOBACTAM PER 1 G: Performed by: INTERNAL MEDICINE

## 2024-10-27 PROCEDURE — 71045 X-RAY EXAM CHEST 1 VIEW: CPT

## 2024-10-27 PROCEDURE — 85027 COMPLETE CBC AUTOMATED: CPT | Performed by: SURGERY

## 2024-10-27 PROCEDURE — 02HV33Z INSERTION OF INFUSION DEVICE INTO SUPERIOR VENA CAVA, PERCUTANEOUS APPROACH: ICD-10-PCS | Performed by: INTERNAL MEDICINE

## 2024-10-27 PROCEDURE — 82803 BLOOD GASES ANY COMBINATION: CPT

## 2024-10-27 PROCEDURE — 83605 ASSAY OF LACTIC ACID: CPT | Performed by: EMERGENCY MEDICINE

## 2024-10-27 PROCEDURE — 85025 COMPLETE CBC W/AUTO DIFF WBC: CPT

## 2024-10-27 PROCEDURE — 85007 BL SMEAR W/DIFF WBC COUNT: CPT

## 2024-10-27 PROCEDURE — 25810000003 SODIUM CHLORIDE 0.9 % SOLUTION: Performed by: INTERNAL MEDICINE

## 2024-10-27 PROCEDURE — 80053 COMPREHEN METABOLIC PANEL: CPT | Performed by: SURGERY

## 2024-10-27 PROCEDURE — 25010000002 FENTANYL CITRATE (PF) 50 MCG/ML SOLUTION: Performed by: SURGERY

## 2024-10-27 PROCEDURE — 84484 ASSAY OF TROPONIN QUANT: CPT | Performed by: SURGERY

## 2024-10-27 PROCEDURE — 25010000002 POTASSIUM CHLORIDE PER 2 MEQ: Performed by: INTERNAL MEDICINE

## 2024-10-27 PROCEDURE — 93010 ELECTROCARDIOGRAM REPORT: CPT | Performed by: INTERNAL MEDICINE

## 2024-10-27 PROCEDURE — 86850 RBC ANTIBODY SCREEN: CPT | Performed by: SURGERY

## 2024-10-27 PROCEDURE — 82948 REAGENT STRIP/BLOOD GLUCOSE: CPT

## 2024-10-27 PROCEDURE — 25010000002 SUGAMMADEX 200 MG/2ML SOLUTION: Performed by: NURSE ANESTHETIST, CERTIFIED REGISTERED

## 2024-10-27 PROCEDURE — 25010000002 VASOPRESSIN 20 UNIT/ML SOLUTION

## 2024-10-27 PROCEDURE — 25010000002 PROPOFOL 10 MG/ML EMULSION: Performed by: NURSE ANESTHETIST, CERTIFIED REGISTERED

## 2024-10-27 PROCEDURE — 25010000002 ENOXAPARIN PER 10 MG: Performed by: INTERNAL MEDICINE

## 2024-10-27 PROCEDURE — 82330 ASSAY OF CALCIUM: CPT | Performed by: SURGERY

## 2024-10-27 PROCEDURE — 25010000002 MAGNESIUM SULFATE 2 GM/50ML SOLUTION: Performed by: INTERNAL MEDICINE

## 2024-10-27 PROCEDURE — 94003 VENT MGMT INPAT SUBQ DAY: CPT

## 2024-10-27 PROCEDURE — 82077 ASSAY SPEC XCP UR&BREATH IA: CPT | Performed by: SURGERY

## 2024-10-27 PROCEDURE — 25010000002 EPINEPHRINE 1 MG/ML SOLUTION 30 ML VIAL

## 2024-10-27 PROCEDURE — 93005 ELECTROCARDIOGRAM TRACING: CPT

## 2024-10-27 PROCEDURE — 25010000002 LORAZEPAM PER 2 MG: Performed by: SURGERY

## 2024-10-27 PROCEDURE — 25810000003 SODIUM CHLORIDE 0.9 % SOLUTION: Performed by: SURGERY

## 2024-10-27 PROCEDURE — 99024 POSTOP FOLLOW-UP VISIT: CPT | Performed by: SURGERY

## 2024-10-27 PROCEDURE — 84132 ASSAY OF SERUM POTASSIUM: CPT | Performed by: INTERNAL MEDICINE

## 2024-10-27 RX ORDER — NALOXONE HCL 0.4 MG/ML
0.2 VIAL (ML) INJECTION AS NEEDED
Status: DISCONTINUED | OUTPATIENT
Start: 2024-10-27 | End: 2024-10-27 | Stop reason: HOSPADM

## 2024-10-27 RX ORDER — DEXMEDETOMIDINE HYDROCHLORIDE 4 UG/ML
.2-1.5 INJECTION, SOLUTION INTRAVENOUS
Status: DISCONTINUED | OUTPATIENT
Start: 2024-10-27 | End: 2024-10-30

## 2024-10-27 RX ORDER — DROPERIDOL 2.5 MG/ML
0.62 INJECTION, SOLUTION INTRAMUSCULAR; INTRAVENOUS
Status: DISCONTINUED | OUTPATIENT
Start: 2024-10-27 | End: 2024-10-27 | Stop reason: HOSPADM

## 2024-10-27 RX ORDER — HYDROCODONE BITARTRATE AND ACETAMINOPHEN 7.5; 325 MG/1; MG/1
1 TABLET ORAL EVERY 4 HOURS PRN
Status: DISCONTINUED | OUTPATIENT
Start: 2024-10-27 | End: 2024-10-27 | Stop reason: HOSPADM

## 2024-10-27 RX ORDER — HYDROMORPHONE HYDROCHLORIDE 1 MG/ML
0.25 INJECTION, SOLUTION INTRAMUSCULAR; INTRAVENOUS; SUBCUTANEOUS
Status: DISCONTINUED | OUTPATIENT
Start: 2024-10-27 | End: 2024-10-27 | Stop reason: HOSPADM

## 2024-10-27 RX ORDER — CHLORHEXIDINE GLUCONATE ORAL RINSE 1.2 MG/ML
15 SOLUTION DENTAL EVERY 12 HOURS SCHEDULED
Status: DISCONTINUED | OUTPATIENT
Start: 2024-10-27 | End: 2024-11-30 | Stop reason: HOSPADM

## 2024-10-27 RX ORDER — HYDROCODONE BITARTRATE AND ACETAMINOPHEN 5; 325 MG/1; MG/1
1 TABLET ORAL ONCE AS NEEDED
Status: DISCONTINUED | OUTPATIENT
Start: 2024-10-27 | End: 2024-10-27 | Stop reason: HOSPADM

## 2024-10-27 RX ORDER — DIGOXIN 0.25 MG/ML
500 INJECTION INTRAMUSCULAR; INTRAVENOUS ONCE
Status: COMPLETED | OUTPATIENT
Start: 2024-10-27 | End: 2024-10-27

## 2024-10-27 RX ORDER — MAGNESIUM SULFATE HEPTAHYDRATE 40 MG/ML
2 INJECTION, SOLUTION INTRAVENOUS
Status: COMPLETED | OUTPATIENT
Start: 2024-10-27 | End: 2024-10-27

## 2024-10-27 RX ORDER — FLUCONAZOLE 2 MG/ML
400 INJECTION, SOLUTION INTRAVENOUS EVERY 24 HOURS
Status: DISCONTINUED | OUTPATIENT
Start: 2024-10-27 | End: 2024-10-29

## 2024-10-27 RX ORDER — PROMETHAZINE HYDROCHLORIDE 25 MG/1
25 SUPPOSITORY RECTAL ONCE AS NEEDED
Status: DISCONTINUED | OUTPATIENT
Start: 2024-10-27 | End: 2024-10-27 | Stop reason: HOSPADM

## 2024-10-27 RX ORDER — FLUMAZENIL 0.1 MG/ML
0.2 INJECTION INTRAVENOUS AS NEEDED
Status: DISCONTINUED | OUTPATIENT
Start: 2024-10-27 | End: 2024-10-27 | Stop reason: HOSPADM

## 2024-10-27 RX ORDER — DIPHENHYDRAMINE HYDROCHLORIDE 50 MG/ML
12.5 INJECTION INTRAMUSCULAR; INTRAVENOUS
Status: DISCONTINUED | OUTPATIENT
Start: 2024-10-27 | End: 2024-10-27 | Stop reason: HOSPADM

## 2024-10-27 RX ORDER — HYDRALAZINE HYDROCHLORIDE 20 MG/ML
5 INJECTION INTRAMUSCULAR; INTRAVENOUS
Status: DISCONTINUED | OUTPATIENT
Start: 2024-10-27 | End: 2024-10-27 | Stop reason: HOSPADM

## 2024-10-27 RX ORDER — SODIUM CHLORIDE 0.9 % (FLUSH) 0.9 %
10 SYRINGE (ML) INJECTION EVERY 12 HOURS SCHEDULED
Status: DISCONTINUED | OUTPATIENT
Start: 2024-10-27 | End: 2024-11-02

## 2024-10-27 RX ORDER — EPHEDRINE SULFATE 50 MG/ML
5 INJECTION, SOLUTION INTRAVENOUS ONCE AS NEEDED
Status: DISCONTINUED | OUTPATIENT
Start: 2024-10-27 | End: 2024-10-27 | Stop reason: HOSPADM

## 2024-10-27 RX ORDER — FENTANYL CITRATE 50 UG/ML
25 INJECTION, SOLUTION INTRAMUSCULAR; INTRAVENOUS
Status: DISCONTINUED | OUTPATIENT
Start: 2024-10-27 | End: 2024-10-27 | Stop reason: HOSPADM

## 2024-10-27 RX ORDER — POTASSIUM CHLORIDE 29.8 MG/ML
20 INJECTION INTRAVENOUS
Status: DISPENSED | OUTPATIENT
Start: 2024-10-27 | End: 2024-10-27

## 2024-10-27 RX ORDER — MAGNESIUM HYDROXIDE 1200 MG/15ML
LIQUID ORAL AS NEEDED
Status: DISCONTINUED | OUTPATIENT
Start: 2024-10-27 | End: 2024-10-27 | Stop reason: HOSPADM

## 2024-10-27 RX ORDER — LABETALOL HYDROCHLORIDE 5 MG/ML
5 INJECTION, SOLUTION INTRAVENOUS
Status: DISCONTINUED | OUTPATIENT
Start: 2024-10-27 | End: 2024-10-27 | Stop reason: HOSPADM

## 2024-10-27 RX ORDER — ONDANSETRON 2 MG/ML
4 INJECTION INTRAMUSCULAR; INTRAVENOUS ONCE AS NEEDED
Status: DISCONTINUED | OUTPATIENT
Start: 2024-10-27 | End: 2024-10-27 | Stop reason: HOSPADM

## 2024-10-27 RX ORDER — SODIUM CHLORIDE 0.9 % (FLUSH) 0.9 %
20 SYRINGE (ML) INJECTION AS NEEDED
Status: DISCONTINUED | OUTPATIENT
Start: 2024-10-27 | End: 2024-11-30 | Stop reason: HOSPADM

## 2024-10-27 RX ORDER — BUPIVACAINE HYDROCHLORIDE AND EPINEPHRINE 5; 5 MG/ML; UG/ML
INJECTION, SOLUTION EPIDURAL; INTRACAUDAL; PERINEURAL AS NEEDED
Status: DISCONTINUED | OUTPATIENT
Start: 2024-10-27 | End: 2024-10-27 | Stop reason: HOSPADM

## 2024-10-27 RX ORDER — SODIUM CHLORIDE 9 MG/ML
125 INJECTION, SOLUTION INTRAVENOUS CONTINUOUS
Status: ACTIVE | OUTPATIENT
Start: 2024-10-27 | End: 2024-10-27

## 2024-10-27 RX ORDER — PANTOPRAZOLE SODIUM 40 MG/10ML
40 INJECTION, POWDER, LYOPHILIZED, FOR SOLUTION INTRAVENOUS
Status: DISCONTINUED | OUTPATIENT
Start: 2024-10-27 | End: 2024-11-01

## 2024-10-27 RX ORDER — PROMETHAZINE HYDROCHLORIDE 25 MG/1
25 TABLET ORAL ONCE AS NEEDED
Status: DISCONTINUED | OUTPATIENT
Start: 2024-10-27 | End: 2024-10-27 | Stop reason: HOSPADM

## 2024-10-27 RX ORDER — ENOXAPARIN SODIUM 100 MG/ML
40 INJECTION SUBCUTANEOUS EVERY 24 HOURS
Status: DISCONTINUED | OUTPATIENT
Start: 2024-10-27 | End: 2024-11-05

## 2024-10-27 RX ORDER — FENTANYL CITRATE-0.9 % NACL/PF 10 MCG/ML
50-300 PLASTIC BAG, INJECTION (ML) INTRAVENOUS
Status: DISCONTINUED | OUTPATIENT
Start: 2024-10-27 | End: 2024-10-29

## 2024-10-27 RX ORDER — IPRATROPIUM BROMIDE AND ALBUTEROL SULFATE 2.5; .5 MG/3ML; MG/3ML
3 SOLUTION RESPIRATORY (INHALATION) ONCE AS NEEDED
Status: DISCONTINUED | OUTPATIENT
Start: 2024-10-27 | End: 2024-10-27 | Stop reason: HOSPADM

## 2024-10-27 RX ORDER — SODIUM CHLORIDE 0.9 % (FLUSH) 0.9 %
10 SYRINGE (ML) INJECTION AS NEEDED
Status: DISCONTINUED | OUTPATIENT
Start: 2024-10-27 | End: 2024-11-30 | Stop reason: HOSPADM

## 2024-10-27 RX ADMIN — SODIUM CHLORIDE 200 ML/HR: 9 INJECTION, SOLUTION INTRAVENOUS at 03:28

## 2024-10-27 RX ADMIN — PIPERACILLIN AND TAZOBACTAM 3.38 G: 3; .375 INJECTION, POWDER, FOR SOLUTION INTRAVENOUS at 03:49

## 2024-10-27 RX ADMIN — THIAMINE HYDROCHLORIDE 500 MG: 100 INJECTION, SOLUTION INTRAMUSCULAR; INTRAVENOUS at 08:42

## 2024-10-27 RX ADMIN — VASOPRESSIN 0.03 UNITS/MIN: 20 INJECTION, SOLUTION INTRAVENOUS at 02:37

## 2024-10-27 RX ADMIN — LORAZEPAM 1 MG: 2 INJECTION INTRAMUSCULAR; INTRAVENOUS at 22:27

## 2024-10-27 RX ADMIN — SUGAMMADEX 200 MG: 100 INJECTION, SOLUTION INTRAVENOUS at 00:53

## 2024-10-27 RX ADMIN — SODIUM CHLORIDE 1000 ML: 9 INJECTION, SOLUTION INTRAVENOUS at 04:43

## 2024-10-27 RX ADMIN — Medication 10 ML: at 10:34

## 2024-10-27 RX ADMIN — MAGNESIUM SULFATE HEPTAHYDRATE 2 G: 40 INJECTION, SOLUTION INTRAVENOUS at 05:35

## 2024-10-27 RX ADMIN — Medication 10 ML: at 20:49

## 2024-10-27 RX ADMIN — THIAMINE HYDROCHLORIDE 500 MG: 100 INJECTION, SOLUTION INTRAMUSCULAR; INTRAVENOUS at 05:21

## 2024-10-27 RX ADMIN — Medication 10 ML: at 08:59

## 2024-10-27 RX ADMIN — SODIUM CHLORIDE 200 ML/HR: 9 INJECTION, SOLUTION INTRAVENOUS at 13:52

## 2024-10-27 RX ADMIN — DEXMEDETOMIDINE HYDROCHLORIDE 0.2 MCG/KG/HR: 4 INJECTION, SOLUTION INTRAVENOUS at 05:10

## 2024-10-27 RX ADMIN — DIGOXIN 500 MCG: 0.25 INJECTION INTRAMUSCULAR; INTRAVENOUS at 22:48

## 2024-10-27 RX ADMIN — EPINEPHRINE 0.12 MCG/KG/MIN: 1 INJECTION INTRAMUSCULAR; INTRAVENOUS; SUBCUTANEOUS at 12:39

## 2024-10-27 RX ADMIN — THIAMINE HYDROCHLORIDE 500 MG: 100 INJECTION, SOLUTION INTRAMUSCULAR; INTRAVENOUS at 13:53

## 2024-10-27 RX ADMIN — DEXMEDETOMIDINE HYDROCHLORIDE 0.7 MCG/KG/HR: 4 INJECTION, SOLUTION INTRAVENOUS at 23:26

## 2024-10-27 RX ADMIN — Medication 10 ML: at 03:19

## 2024-10-27 RX ADMIN — LORAZEPAM 2 MG: 2 INJECTION INTRAMUSCULAR; INTRAVENOUS at 15:59

## 2024-10-27 RX ADMIN — FENTANYL CITRATE 50 MCG/HR: 0.05 INJECTION, SOLUTION INTRAMUSCULAR; INTRAVENOUS at 05:10

## 2024-10-27 RX ADMIN — FENTANYL CITRATE 150 MCG/HR: 0.05 INJECTION, SOLUTION INTRAMUSCULAR; INTRAVENOUS at 11:26

## 2024-10-27 RX ADMIN — Medication 10 ML: at 20:50

## 2024-10-27 RX ADMIN — PANTOPRAZOLE SODIUM 40 MG: 40 INJECTION, POWDER, FOR SOLUTION INTRAVENOUS at 08:46

## 2024-10-27 RX ADMIN — PIPERACILLIN AND TAZOBACTAM 4.5 G: 4; .5 INJECTION, POWDER, FOR SOLUTION INTRAVENOUS at 10:34

## 2024-10-27 RX ADMIN — PIPERACILLIN AND TAZOBACTAM 4.5 G: 4; .5 INJECTION, POWDER, FOR SOLUTION INTRAVENOUS at 18:11

## 2024-10-27 RX ADMIN — NOREPINEPHRINE BITARTRATE 0.3 MCG/KG/MIN: 32 SOLUTION INTRAVENOUS at 15:59

## 2024-10-27 RX ADMIN — CHLORHEXIDINE GLUCONATE 15 ML: 1.2 RINSE ORAL at 08:42

## 2024-10-27 RX ADMIN — NOREPINEPHRINE BITARTRATE 0.3 MCG/KG/MIN: 32 SOLUTION INTRAVENOUS at 10:55

## 2024-10-27 RX ADMIN — MAGNESIUM SULFATE HEPTAHYDRATE 2 G: 40 INJECTION, SOLUTION INTRAVENOUS at 10:25

## 2024-10-27 RX ADMIN — FENTANYL CITRATE 50 MCG: 50 INJECTION, SOLUTION INTRAMUSCULAR; INTRAVENOUS at 03:49

## 2024-10-27 RX ADMIN — INSULIN HUMAN 12 UNITS: 100 INJECTION, SOLUTION PARENTERAL at 12:42

## 2024-10-27 RX ADMIN — LORAZEPAM 2 MG: 2 INJECTION INTRAMUSCULAR; INTRAVENOUS at 04:42

## 2024-10-27 RX ADMIN — NOREPINEPHRINE BITARTRATE 0.3 MCG/KG/MIN: 32 SOLUTION INTRAVENOUS at 21:25

## 2024-10-27 RX ADMIN — VASOPRESSIN 0.03 UNITS/MIN: 20 INJECTION, SOLUTION INTRAVENOUS at 12:39

## 2024-10-27 RX ADMIN — MUPIROCIN 1 APPLICATION: 20 OINTMENT TOPICAL at 05:09

## 2024-10-27 RX ADMIN — SODIUM CHLORIDE 200 ML/HR: 9 INJECTION, SOLUTION INTRAVENOUS at 23:25

## 2024-10-27 RX ADMIN — VASOPRESSIN 0.03 UNITS/MIN: 20 INJECTION, SOLUTION INTRAVENOUS at 23:25

## 2024-10-27 RX ADMIN — INSULIN HUMAN 8 UNITS: 100 INJECTION, SOLUTION PARENTERAL at 08:41

## 2024-10-27 RX ADMIN — THIAMINE HYDROCHLORIDE 500 MG: 100 INJECTION, SOLUTION INTRAMUSCULAR; INTRAVENOUS at 22:26

## 2024-10-27 RX ADMIN — FOLIC ACID 1 MG: 5 INJECTION, SOLUTION INTRAMUSCULAR; INTRAVENOUS; SUBCUTANEOUS at 08:43

## 2024-10-27 RX ADMIN — POTASSIUM CHLORIDE 20 MEQ: 29.8 INJECTION, SOLUTION INTRAVENOUS at 10:24

## 2024-10-27 RX ADMIN — EPINEPHRINE 0.02 MCG/KG/MIN: 1 INJECTION INTRAMUSCULAR; INTRAVENOUS; SUBCUTANEOUS at 03:27

## 2024-10-27 RX ADMIN — DEXMEDETOMIDINE HYDROCHLORIDE 0.5 MCG/KG/HR: 4 INJECTION, SOLUTION INTRAVENOUS at 14:09

## 2024-10-27 RX ADMIN — NOREPINEPHRINE BITARTRATE 0.3 MCG/KG/MIN: 32 SOLUTION INTRAVENOUS at 22:25

## 2024-10-27 RX ADMIN — POTASSIUM CHLORIDE 20 MEQ: 29.8 INJECTION, SOLUTION INTRAVENOUS at 08:41

## 2024-10-27 RX ADMIN — INSULIN HUMAN 8 UNITS: 100 INJECTION, SOLUTION PARENTERAL at 15:59

## 2024-10-27 RX ADMIN — MAGNESIUM SULFATE HEPTAHYDRATE 2 G: 40 INJECTION, SOLUTION INTRAVENOUS at 08:55

## 2024-10-27 RX ADMIN — SODIUM CHLORIDE 200 ML/HR: 9 INJECTION, SOLUTION INTRAVENOUS at 18:30

## 2024-10-27 RX ADMIN — INSULIN HUMAN 8 UNITS: 100 INJECTION, SOLUTION PARENTERAL at 04:33

## 2024-10-27 RX ADMIN — SODIUM CHLORIDE 200 ML/HR: 9 INJECTION, SOLUTION INTRAVENOUS at 09:18

## 2024-10-27 RX ADMIN — LORAZEPAM 2 MG: 2 INJECTION INTRAMUSCULAR; INTRAVENOUS at 10:25

## 2024-10-27 RX ADMIN — FENTANYL CITRATE 100 MCG/HR: 0.05 INJECTION, SOLUTION INTRAMUSCULAR; INTRAVENOUS at 23:26

## 2024-10-27 RX ADMIN — MUPIROCIN 1 APPLICATION: 20 OINTMENT TOPICAL at 20:48

## 2024-10-27 RX ADMIN — ENOXAPARIN SODIUM 40 MG: 100 INJECTION SUBCUTANEOUS at 10:24

## 2024-10-27 RX ADMIN — CHLORHEXIDINE GLUCONATE 15 ML: 1.2 RINSE ORAL at 20:48

## 2024-10-27 RX ADMIN — NOREPINEPHRINE BITARTRATE 0.3 MCG/KG/MIN: 32 SOLUTION INTRAVENOUS at 04:42

## 2024-10-27 RX ADMIN — PROPOFOL INJECTABLE EMULSION 30 MCG/KG/MIN: 10 INJECTION, EMULSION INTRAVENOUS at 01:45

## 2024-10-27 RX ADMIN — FLUCONAZOLE 400 MG: 2 INJECTION, SOLUTION INTRAVENOUS at 05:34

## 2024-10-27 NOTE — ED PROVIDER NOTES
EMERGENCY DEPARTMENT ENCOUNTER    Room Number:  3009/1  Date of encounter:  10/26/2024  PCP: Provider, No Known  Historian: Patient initially.  Spouse just arrived (20:21 EDT)    Relevant information and history provided by sources other than the patient will be included below and in the ED Course.  Review of pertinent past medical records may also be included in record below and ED Course.    HPI:  Chief Complaint: Abdominal pain  A complete HPI/ROS/PMH/PSH/SH/FH are unobtainable due to: Not applicable  Context: Arsh Haynes is a 74 y.o. male who presents to the ED c/o this is a patient whose had abdominal pain for about 4 to 5 days.  Pain then became much worse today.  Started with some fevers and chills today as well.  He has had decreased bowel movements.  He feels as if his belly is distended as well.  He reports no chest pain, shortness of breath.  He has never had any problems like this before.  He feels weak and tired and has no energy.  He has generalized weak with this no focal motor or sensory changes no headache.        Previous Episodes: No  Current Symptoms: See above    MEDICAL HISTORY REVIEWED  He sees a physician once a year.  He has a history of hypertension and is on hypertensive medicine.  He is not on any blood thinning medicine.      PAST MEDICAL HISTORY  Active Ambulatory Problems     Diagnosis Date Noted    No Active Ambulatory Problems     Resolved Ambulatory Problems     Diagnosis Date Noted    No Resolved Ambulatory Problems     No Additional Past Medical History         PAST SURGICAL HISTORY  History reviewed. No pertinent surgical history.      FAMILY HISTORY  History reviewed. No pertinent family history.      SOCIAL HISTORY  Social History     Socioeconomic History    Marital status:          ALLERGIES  Patient has no known allergies.        REVIEW OF SYSTEMS  Review of Systems     All systems reviewed and negative except for those discussed in HPI.       PHYSICAL EXAM    I  have reviewed the triage vital signs and nursing notes.    ED Triage Vitals   Temp Heart Rate Resp BP SpO2   10/26/24 1939 10/26/24 1933 10/26/24 1933 10/26/24 1933 10/26/24 1933   96.2 °F (35.7 °C) 101 18 (!) 74/45 98 %      Temp src Heart Rate Source Patient Position BP Location FiO2 (%)   10/26/24 1939 10/26/24 1933 10/26/24 1933 10/26/24 1933 --   Tympanic Monitor Lying Right arm        GENERAL: This is an elderly male that looks ill.  Appears weak and tired.  He is very ill-appearing.  Vital signs on my initial evaluation he is hypotension with initial blood pressure of 70 systolic.  HENT: nares patent  Head/neck/ face are symmetric without gross deformity, signs of trauma, or swelling  EYES: no scleral icterus, no conjunctival pallor.  NECK: Supple, no meningismus  CV: regular rhythm, regular rate with intact distal pulses.  He is not tachycardic.  RESPIRATORY: normal effort and no respiratory distress.  To auscultation  ABDOMEN: Belly is obese.  It appears distended.  Has pain in his lower abdomen bilaterally left a little bit greater than right.  He has guarding.  Decreased bowel sounds.  He has intact femoral pulses that are equal strong and symmetric.  MUSCULOSKELETAL: no deformity.  He has 1+ pulses to bilateral lower extremities.  No obvious coolness or cyanosis.  Has intact pulses to upper extremities.  NEURO: alert and appropriate, moves all extremities, follows commands.  No focal motor or sensory changes  SKIN: warm, dry    Vital signs and nursing notes reviewed.        LAB RESULTS  Recent Results (from the past 24 hours)   Comprehensive Metabolic Panel    Collection Time: 10/26/24  7:44 PM    Specimen: Blood   Result Value Ref Range    Glucose 239 (H) 65 - 99 mg/dL    BUN 14 8 - 23 mg/dL    Creatinine 0.89 0.76 - 1.27 mg/dL    Sodium 135 (L) 136 - 145 mmol/L    Potassium 3.8 3.5 - 5.2 mmol/L    Chloride 98 98 - 107 mmol/L    CO2 21.0 (L) 22.0 - 29.0 mmol/L    Calcium 8.4 (L) 8.6 - 10.5 mg/dL     Total Protein 6.0 6.0 - 8.5 g/dL    Albumin 2.5 (L) 3.5 - 5.2 g/dL    ALT (SGPT) 16 1 - 41 U/L    AST (SGOT) 15 1 - 40 U/L    Alkaline Phosphatase 131 (H) 39 - 117 U/L    Total Bilirubin 1.2 0.0 - 1.2 mg/dL    Globulin 3.5 gm/dL    A/G Ratio 0.7 g/dL    BUN/Creatinine Ratio 15.7 7.0 - 25.0    Anion Gap 16.0 (H) 5.0 - 15.0 mmol/L    eGFR 91.1 >60.0 mL/min/1.73   Protime-INR    Collection Time: 10/26/24  7:44 PM    Specimen: Blood   Result Value Ref Range    Protime 14.9 (H) 11.7 - 14.2 Seconds    INR 1.14 (H) 0.90 - 1.10   High Sensitivity Troponin T    Collection Time: 10/26/24  7:44 PM    Specimen: Blood   Result Value Ref Range    HS Troponin T 18 <22 ng/L   Lactic Acid, Plasma    Collection Time: 10/26/24  7:44 PM    Specimen: Blood   Result Value Ref Range    Lactate 5.8 (C) 0.5 - 2.0 mmol/L   Procalcitonin    Collection Time: 10/26/24  7:44 PM    Specimen: Blood   Result Value Ref Range    Procalcitonin 9.58 (H) 0.00 - 0.25 ng/mL   Lipase    Collection Time: 10/26/24  7:44 PM    Specimen: Blood   Result Value Ref Range    Lipase 6 (L) 13 - 60 U/L   Magnesium    Collection Time: 10/26/24  7:44 PM    Specimen: Blood   Result Value Ref Range    Magnesium 1.8 1.6 - 2.4 mg/dL   CBC Auto Differential    Collection Time: 10/26/24  7:44 PM    Specimen: Blood   Result Value Ref Range    WBC 9.72 3.40 - 10.80 10*3/mm3    RBC 4.99 4.14 - 5.80 10*6/mm3    Hemoglobin 13.7 13.0 - 17.7 g/dL    Hematocrit 43.2 37.5 - 51.0 %    MCV 86.6 79.0 - 97.0 fL    MCH 27.5 26.6 - 33.0 pg    MCHC 31.7 31.5 - 35.7 g/dL    RDW 19.5 (H) 12.3 - 15.4 %    RDW-SD 59.9 (H) 37.0 - 54.0 fl    MPV 8.1 6.0 - 12.0 fL    Platelets 549 (H) 140 - 450 10*3/mm3    nRBC 0.0 0.0 - 0.2 /100 WBC   Manual Differential    Collection Time: 10/26/24  7:44 PM    Specimen: Blood   Result Value Ref Range    Neutrophil % 83.8 (H) 42.7 - 76.0 %    Lymphocyte % 5.1 (L) 19.6 - 45.3 %    Monocyte % 11.1 5.0 - 12.0 %    Eosinophil % 0.0 (L) 0.3 - 6.2 %    Basophil % 0.0  0.0 - 1.5 %    Neutrophils Absolute 8.15 (H) 1.70 - 7.00 10*3/mm3    Lymphocytes Absolute 0.50 (L) 0.70 - 3.10 10*3/mm3    Monocytes Absolute 1.08 (H) 0.10 - 0.90 10*3/mm3    Eosinophils Absolute 0.00 0.00 - 0.40 10*3/mm3    Basophils Absolute 0.00 0.00 - 0.20 10*3/mm3    Polychromasia Slight/1+ None Seen    WBC Morphology Normal Normal    Platelet Morphology Normal Normal   TSH Rfx On Abnormal To Free T4    Collection Time: 10/26/24  7:44 PM    Specimen: Blood   Result Value Ref Range    TSH 3.270 0.270 - 4.200 uIU/mL   ECG 12 Lead Altered Mental Status    Collection Time: 10/26/24  7:44 PM   Result Value Ref Range    QT Interval 391 ms    QTC Interval 484 ms   POC Glucose Once    Collection Time: 10/26/24  9:19 PM    Specimen: Blood   Result Value Ref Range    Glucose 223 (H) 70 - 130 mg/dL       Ordered the above labs and independently reviewed the results.        RADIOLOGY  XR Chest 1 View    Result Date: 10/26/2024  SINGLE VIEW OF THE CHEST  HISTORY: Hypotension and fever  COMPARISON: None available.  FINDINGS: Heart size is within normal limits, given patient rotation and portable technique. No pneumothorax or pleural effusion is seen. There is some scarring versus atelectasis noted at the lung bases. No focal infiltrates are seen. There are degenerative changes of the shoulders, right greater than left.      Mild scarring versus atelectasis noted at the lung bases.  This report was finalized on 10/26/2024 8:44 PM by Dr. Lori Belle M.D on Workstation: [id number]      CT Abdomen Pelvis With Contrast    Result Date: 10/26/2024  CT OF THE ABDOMEN PELVIS WITH CONTRAST  HISTORY: Abdominal pain  COMPARISON: Lower abdominal  TECHNIQUE: Axial CT imaging was obtained through the abdomen and pelvis. No IV contrast was administered.  FINDINGS: Images through the lung bases demonstrate scarring versus atelectasis. No suspicious hepatic lesions are seen. Stomach appears mildly distended. The duodenum,, spleen, and  gallbladder all appear normal. The pancreas is atrophic. The adrenal glands are mildly nodular in appearance. The kidneys enhance symmetrically. No hydronephrosis is seen. The patient has a heterogeneously enhancing lesion involving the right kidney, which measures up to 3.4 x 3.1 cm. Appearance is highly worrisome for renal malignancy. There is a 5 mm nonobstructing stone within the inferior pole of the left kidney. Tiny low-attenuation lesions are seen within the left kidney, favored to reflect cysts. There are aortoiliac calcifications. Prostate gland contains dystrophic calcifications. There is colonic diverticulosis. There is diffuse wall thickening involving the distal descending colon/proximal sigmoid colon, along with more focal masslike areas associated with the distal descending colon and proximal sigmoid colon. The area within the proximal sigmoid colon measures up to 4.8 x 4.7 cm. The area within the distal descending colon measures at least 3.7 x 3.1 cm. There is widespread pneumoperitoneum. This is favored to be secondary to perforation at the descending colon/sigmoid colon junction. The patient is noted to have some loculated fluid adjacent to the distal descending colon/proximal sigmoid colon. This may represent reactive fluid, although abscess is not excluded. Further free fluid is noted tracking along the paracolic gutters bilaterally, and extending into the pelvis. Urinary bladder appears thick walled. Correlation with urinalysis and urine cultures is recommended. The appendix is normal. No acute osseous abnormalities are seen.       1. The patient has widespread pneumoperitoneum, which is favored to be secondary to a perforation at the junction of the distal descending colon and proximal sigmoid colon. The patient does have diverticular disease, and the appearance may be related to perforated diverticulitis. There is also relatively increased stool burden within the sigmoid colon, and stercoral  colitis would be another consideration. However, there are more masslike areas of wall thickening noted within the distal descending colon and proximal sigmoid colon. The area involving the proximal sigmoid colon in particular is low in attenuation. These areas may reflect areas of phlegmonous involvement and/or retained stool of the wall of the colon. However, neoplasm is not excluded. There is free fluid which is noted tracking along the paracolic gutters, and within the pelvis, with more loculated fluid adjacent to the presumed area of perforation. These areas may reflect reactive fluid, although abscess is not excluded. Urgent General surgical consultation is recommended. 2. Heterogeneously enhancing mass arising from the right kidney, suspicious for renal neoplasm. 3. Thick-walled appearance to the urinary bladder. Correlation with urinalysis and urine cultures is recommended.  Preliminary findings were discussed with Dr. Johnson at 8:13 p.m.  Radiation dose reduction techniques were utilized, including automated exposure control and exposure modulation based on body size.   This report was finalized on 10/26/2024 8:42 PM by Dr. Lori Belle M.D on Workstation: [id number]       I ordered the above noted radiological studies. Reviewed by me and discussed with radiologist.  See dictation for official radiology interpretation.      PROCEDURES    Critical Care    Performed by: James Johnson MD  Authorized by: James Johnson MD    Critical care provider statement:     Critical care time (minutes): 45.    Critical care time was exclusive of:  Separately billable procedures and treating other patients    Critical care was necessary to treat or prevent imminent or life-threatening deterioration of the following conditions:  Shock and circulatory failure (Perforation of sigmoid colon.  Hypotensive)    Critical care was time spent personally by me on the following activities:  Blood draw for specimens,  development of treatment plan with patient or surrogate, discussions with consultants, discussions with primary provider, evaluation of patient's response to treatment, examination of patient, obtaining history from patient or surrogate, pulse oximetry, re-evaluation of patient's condition, review of old charts, ordering and review of radiographic studies, ordering and review of laboratory studies and ordering and performing treatments and interventions    I assumed direction of critical care for this patient from another provider in my specialty: no      Care discussed with: admitting provider          MEDICATIONS GIVEN IN ER    Medications   sodium chloride 0.9 % flush 10 mL (has no administration in time range)   nitroglycerin (NITROSTAT) SL tablet 0.4 mg (has no administration in time range)   sodium chloride 0.9 % flush 10 mL (has no administration in time range)   sodium chloride 0.9 % flush 10 mL (has no administration in time range)   sodium chloride 0.9 % infusion 40 mL (has no administration in time range)   mupirocin (BACTROBAN) 2 % nasal ointment 1 Application (has no administration in time range)   sennosides-docusate (PERICOLACE) 8.6-50 MG per tablet 2 tablet (has no administration in time range)     And   polyethylene glycol (MIRALAX) packet 17 g (has no administration in time range)     And   bisacodyl (DULCOLAX) EC tablet 5 mg (has no administration in time range)     And   bisacodyl (DULCOLAX) suppository 10 mg (has no administration in time range)   dextrose (GLUTOSE) oral gel 15 g (has no administration in time range)   dextrose (D50W) (25 g/50 mL) IV injection 25 g (has no administration in time range)   glucagon (GLUCAGEN) injection 1 mg (has no administration in time range)   insulin regular (humuLIN R,novoLIN R) injection 4-24 Units (has no administration in time range)   Potassium Replacement - Follow Nurse / BPA Driven Protocol (has no administration in time range)   Magnesium Standard Dose  Replacement - Follow Nurse / BPA Driven Protocol (has no administration in time range)   Phosphorus Replacement - Follow Nurse / BPA Driven Protocol (has no administration in time range)   Calcium Replacement - Follow Nurse / BPA Driven Protocol (has no administration in time range)   sodium chloride 0.9 % infusion (has no administration in time range)   acetaminophen (TYLENOL) tablet 650 mg (has no administration in time range)     Or   acetaminophen (TYLENOL) suppository 650 mg (has no administration in time range)   labetalol (NORMODYNE,TRANDATE) injection 20 mg (has no administration in time range)   ondansetron (ZOFRAN) injection 4 mg (has no administration in time range)   fentaNYL citrate (PF) (SUBLIMAZE) injection 50 mcg (has no administration in time range)   Pharmacy to Dose Zosyn (has no administration in time range)   thiamine (B-1) 500 mg in sodium chloride 0.9 % 100 mL IVPB (has no administration in time range)     Followed by   thiamine (B-1) injection 200 mg (has no administration in time range)     Followed by   thiamine (VITAMIN B-1) tablet 100 mg (has no administration in time range)   folic acid 1 mg in sodium chloride 0.9 % 50 mL IVPB (has no administration in time range)   LORazepam (ATIVAN) injection 2 mg (has no administration in time range)     Followed by   LORazepam (ATIVAN) injection 1 mg (has no administration in time range)   LORazepam (ATIVAN) tablet 1 mg (has no administration in time range)     Or   LORazepam (ATIVAN) injection 1 mg (has no administration in time range)     Or   LORazepam (ATIVAN) tablet 2 mg (has no administration in time range)     Or   LORazepam (ATIVAN) injection 2 mg (has no administration in time range)     Or   LORazepam (ATIVAN) injection 2 mg (has no administration in time range)     Or   LORazepam (ATIVAN) injection 2 mg (has no administration in time range)     Or   LORazepam (ATIVAN) tablet 4 mg (has no administration in time range)     Or   LORazepam  (ATIVAN) injection 4 mg (has no administration in time range)   sodium chloride 0.9 % flush 3 mL (has no administration in time range)   sodium chloride 0.9 % flush 3-10 mL (has no administration in time range)   lidocaine (XYLOCAINE) 1 % injection 0.5 mL (has no administration in time range)   lactated ringers infusion (has no administration in time range)   fentaNYL citrate (PF) (SUBLIMAZE) injection 50 mcg (has no administration in time range)   famotidine (PEPCID) injection 20 mg (has no administration in time range)   piperacillin-tazobactam (ZOSYN) 3.375 g IVPB in 100 mL NS MBP (CD) (has no administration in time range)   Insulin Lispro (humaLOG) injection 10 Units (has no administration in time range)   HYDROmorphone (DILAUDID) injection 0.5 mg (has no administration in time range)   norepinephrine (LEVOPHED) 8 mg in 250 mL NS infusion (premix) (has no administration in time range)   sodium chloride 0.9 % bolus 2,490 mL ( Intravenous Currently Infusing 10/26/24 2043)   iopamidol (ISOVUE-300) 61 % injection 100 mL (85 mL Intravenous Given by Other 10/26/24 1958)   piperacillin-tazobactam (ZOSYN) 3.375 g IVPB in 100 mL NS MBP (CD) (3.375 g Intravenous New Bag 10/26/24 2040)         All labs have been independently reviewed by me.  All radiology studies have been reviewed by me and I discussed with radiologist dictating the report when indicated below.  All EKG's independently viewed and interpreted by me.  Discussion below represents my analysis of pertinent findings related to patient's condition, differential diagnosis, treatment plan and final disposition.        PROGRESS, DATA ANALYSIS, CONSULTS, AND MEDICAL DECISION MAKING    Differential diagnosis includes   - hepatobiliary pathology such as cholecystitis, cholangitis, and symptomatic cholelithiasis  -PUD  -Mesenteric ischemia  - Pancreatitis  - Dyspepsia  - Small bowel or large bowel obstruction  - Appendicitis  - Diverticulitis  - UTI including  pyelonephritis  - Ureteral stone  - Zoster  - Colitis, including infectious and ischemic  - Atypical ACS  This is an ill gentleman who is hypertensive with abdominal pain.  Clinically I do not think he has a ruptured aneurysm.  I did do a quick bedside ultrasound.  He had a lot of gas on this bedside ultrasound but I did not see anything definitive about any abdominal aneurysm or dilatation.  We went ahead and did a stat CT scan of his abdomen pelvis.  We started him on some IV fluids.  We did have improvement of his blood pressure to the low 90s systolic.  He is never remained tachycardic he is always had a normal sinus rhythm with a rate 80s to 90s.  Informed him of my concerns and his serious condition and the test that we will order.      ED Course as of 10/26/24 2152   Sat Oct 26, 2024   1956 Started IV fluids blood pressure is up to 93/59.  This gentleman has strong equal femoral pulses.  I do not clinically suspect that he has a ruptured aneurysm.  He has never had a CT scan of his abdomen pelvis before he has no known history of aneurysm. [MM]   2016 Lactate(!!): 5.8 [MM]   2016 I have looked at the CT scan of the abdomen pelvis.  I am concerned that he is got a process in his sigmoid colon with an abnormality may be some inflammation and I am concerned that he has a perforation with some free air.  His aorta looks normal I do not see any signs of ruptured aortic aneurysm. [MM]   2017 I have reevaluated this patient.  His blood pressure is improved it is in the 130s and 140s.  On the monitor it is double counting.  His heart rate his in the 80s to 90s.  It is a sinus rhythm.  He has never been tachycardic so far here. [MM]   2018 I discussed the results of the CT scan of the abdomen pelvis with Dr. Belle the radiologist.  She mentions he has a sigmoid mass and perforation with free air.  She cannot rule out any other inflammatory process.  Please see her complete dictated report. [MM]   2020 With  patient and spouse at length who is arrived inform them of the results of the CT scan and lab work.  He has never had a CT scan is never had a colonoscopy in the past. [MM]   2020 I did discuss the case with Dr. Guillaume on for general surgery.  He would like the patient admitted to either medicine or ICU.  He will come and see the patient and then decide if the patient needs surgery.  He wants IV fluids and antibiotics. [MM]   2036 I did discuss the case with Dr. Lynch who is on for the intensive care unit.  Informed of my conversation with Dr. Guillaume as well as this gentleman's presenting symptoms results of test.  Agrees to admit to the ICU. [MM]   2037 This gentleman currently has 2 IV that are flowing well. [MM]   2041 I did talk with Dr. Belle the radiologist again.  Patient also has the appearance of a renal cell cancer or mass.  There is diverticuli as well.  And again she did clarify that this gentleman has widespread bowel perforation. [MM]   2044 My own independent or potation of the EKG that was done at 7:44 PM reveals a rate of 92 it is sinus rhythm it is narrow complex low voltage in the extremity leads I do not see any definitive acute injury pattern there are some nonspecific ST and T wave changes  No old EKG to compare with [MM]      ED Course User Index  [MM] James Johnson MD       AS OF 21:52 EDT VITALS:    BP - 128/93  HR - 86  TEMP - 96.2 °F (35.7 °C) (Tympanic)  02 SATS - 94%    SOCIAL DETERMINANTS OF HEALTH THAT IMPACT OR LIMIT CARE (For example..Homelessness,safe discharge, inability to obtain care, follow up, or prescriptions):      DIAGNOSIS  Final diagnoses:   Perforation of sigmoid colon   Hypotension, unspecified hypotension type   Increased lactic acid level         DISPOSITION  Patient will be admitted to the intensive care unit.          DICTATED UTILIZING DRAGON DICTATION    Note Disclaimer: At [facility], we believe that sharing information builds trust and better  relationships. You are receiving this note because you recently visited [facility]. It is possible you will see health information before a provider has talked with you about it. This kind of information can be easy to misunderstand. To help you fully understand what it means for your health, we urge you to discuss this note with your provider.       James Johnson MD  10/26/24 6971

## 2024-10-27 NOTE — ED NOTES
Nursing report ED to floor  Arsh Haynes  74 y.o.  male   Arsh Haynes is a 74 y.o. male who presents to the ED c/o this is a patient whose had abdominal pain for about 4 to 5 days.  Pain then became much worse today.  Started with some fevers and chills today as well.  He has had decreased bowel movements.  He feels as if his belly is distended as well.  He reports no chest pain, shortness of breath.  He has never had any problems like this before.  He feels weak and tired and has no energy.  He has generalized weak with this no focal motor or sensory changes no headache.     HPI :  HPI  Stated Reason for Visit: Pt reports abd. pain x4 days w/ increasing pain starting today    Chief Complaint  Chief Complaint   Patient presents with    Abdominal Pain    Hypotension       Admitting doctor:   Arnulfo Lynch Jr., MD    Admitting diagnosis:   The primary encounter diagnosis was Perforation of sigmoid colon. Diagnoses of Hypotension, unspecified hypotension type and Increased lactic acid level were also pertinent to this visit.    Code status:   Current Code Status       Date Active Code Status Order ID Comments User Context       Not on file            Allergies:   Patient has no known allergies.    Isolation:   No active isolations    Intake and Output  No intake or output data in the 24 hours ending 10/26/24 2047    Weight:       10/26/24  1933   Weight: 83 kg (183 lb)       Most recent vitals:   Vitals:    10/26/24 2006 10/26/24 2007 10/26/24 2011 10/26/24 2041   BP: (!) 144/127 (!) 170/125 (!) 152/113 137/87   BP Location:       Patient Position:       Pulse:    86   Resp:       Temp:       TempSrc:       SpO2: 95% 98% 97% 96%   Weight:       Height:           Active LDAs/IV Access:   Lines, Drains & Airways       Active LDAs       Name Placement date Placement time Site Days    Peripheral IV 10/26/24 1938 Left Antecubital 10/26/24  1938  Antecubital  less than 1    Peripheral IV 10/26/24 1938 Right  "Antecubital 10/26/24  1938  Antecubital  less than 1                    Labs (abnormal labs have a star):   Labs Reviewed   COMPREHENSIVE METABOLIC PANEL - Abnormal; Notable for the following components:       Result Value    Glucose 239 (*)     Sodium 135 (*)     CO2 21.0 (*)     Calcium 8.4 (*)     Albumin 2.5 (*)     Alkaline Phosphatase 131 (*)     Anion Gap 16.0 (*)     All other components within normal limits    Narrative:     GFR Normal >60  Chronic Kidney Disease <60  Kidney Failure <15    The GFR formula is only valid for adults with stable renal function between ages 18 and 70.   PROTIME-INR - Abnormal; Notable for the following components:    Protime 14.9 (*)     INR 1.14 (*)     All other components within normal limits   LACTIC ACID, PLASMA - Abnormal; Notable for the following components:    Lactate 5.8 (*)     All other components within normal limits   PROCALCITONIN - Abnormal; Notable for the following components:    Procalcitonin 9.58 (*)     All other components within normal limits    Narrative:     As a Marker for Sepsis (Non-Neonates):    1. <0.5 ng/mL represents a low risk of severe sepsis and/or septic shock.  2. >2 ng/mL represents a high risk of severe sepsis and/or septic shock.    As a Marker for Lower Respiratory Tract Infections that require antibiotic therapy:    PCT on Admission    Antibiotic Therapy       6-12 Hrs later    >0.5                Strongly Recommended  >0.25 - <0.5        Recommended   0.1 - 0.25          Discouraged              Remeasure/reassess PCT  <0.1                Strongly Discouraged     Remeasure/reassess PCT    As 28 day mortality risk marker: \"Change in Procalcitonin Result\" (>80% or <=80%) if Day 0 (or Day 1) and Day 4 values are available. Refer to http://www.[employer]-pct-calculator.com    Change in PCT <=80%  A decrease of PCT levels below or equal to 80% defines a positive change in PCT test result representing a higher risk for 28-day all-cause mortality " of patients diagnosed with severe sepsis for septic shock.    Change in PCT >80%  A decrease of PCT levels of more than 80% defines a negative change in PCT result representing a lower risk for 28-day all-cause mortality of patients diagnosed with severe sepsis or septic shock.      LIPASE - Abnormal; Notable for the following components:    Lipase 6 (*)     All other components within normal limits   CBC WITH AUTO DIFFERENTIAL - Abnormal; Notable for the following components:    RDW 19.5 (*)     RDW-SD 59.9 (*)     Platelets 549 (*)     All other components within normal limits   MANUAL DIFFERENTIAL - Abnormal; Notable for the following components:    Neutrophil % 83.8 (*)     Lymphocyte % 5.1 (*)     Eosinophil % 0.0 (*)     Neutrophils Absolute 8.15 (*)     Lymphocytes Absolute 0.50 (*)     Monocytes Absolute 1.08 (*)     All other components within normal limits   TROPONIN - Normal    Narrative:     High Sensitive Troponin T Reference Range:  <14.0 ng/L- Negative Female for AMI  <22.0 ng/L- Negative Male for AMI  >=14 - Abnormal Female indicating possible myocardial injury.  >=22 - Abnormal Male indicating possible myocardial injury.   Clinicians would have to utilize clinical acumen, EKG, Troponin, and serial changes to determine if it is an Acute Myocardial Infarction or myocardial injury due to an underlying chronic condition.        MAGNESIUM - Normal   BLOOD CULTURE   BLOOD CULTURE   URINALYSIS W/ MICROSCOPIC IF INDICATED (NO CULTURE)   LACTIC ACID, REFLEX   HIGH SENSITIVITIY TROPONIN T 2HR   CBC AND DIFFERENTIAL    Narrative:     The following orders were created for panel order CBC & Differential.  Procedure                               Abnormality         Status                     ---------                               -----------         ------                     CBC Auto Differential[[id number]]        Abnormal            Final result                 Please view results for these tests on the  individual orders.       EKG:   ECG 12 Lead Altered Mental Status   Preliminary Result   HEART RATE=92  bpm   RR Hhnsimqa=956  ms   NV Fidpiqhc=393  ms   P Horizontal Axis=34  deg   P Front Axis=43  deg   QRSD Interval=89  ms   QT Dauxtfkn=719  ms   AZgJ=153  ms   QRS Axis=10  deg   T Wave Axis=48  deg   - BORDERLINE ECG -   Sinus rhythm   Low voltage, extremity leads   Borderline prolonged QT interval   Date and Time of Study:2024-10-26 19:44:45      ECG 12 Lead Other; Lower abdominal pain with hypotension.    (Results Pending)       Meds given in ED:   Medications   sodium chloride 0.9 % flush 10 mL (has no administration in time range)   piperacillin-tazobactam (ZOSYN) 3.375 g IVPB in 100 mL NS MBP (CD) (3.375 g Intravenous New Bag 10/26/24 2040)   sodium chloride 0.9 % bolus 2,490 mL ( Intravenous Currently Infusing 10/26/24 2043)   iopamidol (ISOVUE-300) 61 % injection 100 mL (85 mL Intravenous Given by Other 10/26/24 1958)       Imaging results:  CT Abdomen Pelvis With Contrast    Result Date: 10/26/2024   1. The patient has widespread pneumoperitoneum, which is favored to be secondary to a perforation at the junction of the distal descending colon and proximal sigmoid colon. The patient does have diverticular disease, and the appearance may be related to perforated diverticulitis. There is also relatively increased stool burden within the sigmoid colon, and stercoral colitis would be another consideration. However, there are more masslike areas of wall thickening noted within the distal descending colon and proximal sigmoid colon. The area involving the proximal sigmoid colon in particular is low in attenuation. These areas may reflect areas of phlegmonous involvement and/or retained stool of the wall of the colon. However, neoplasm is not excluded. There is free fluid which is noted tracking along the paracolic gutters, and within the pelvis, with more loculated fluid adjacent to the presumed area of  perforation. These areas may reflect reactive fluid, although abscess is not excluded. Urgent General surgical consultation is recommended. 2. Heterogeneously enhancing mass arising from the right kidney, suspicious for renal neoplasm. 3. Thick-walled appearance to the urinary bladder. Correlation with urinalysis and urine cultures is recommended.  Preliminary findings were discussed with Dr. Johnson at 8:13 p.m.  Radiation dose reduction techniques were utilized, including automated exposure control and exposure modulation based on body size.   This report was finalized on 10/26/2024 8:42 PM by Dr. Lori Belle M.D on Workstation: [id number]         Social issues:   Social History     Socioeconomic History    Marital status:        Peripheral Neurovascular  Peripheral Neurovascular (Adult)  Peripheral Neurovascular WDL: WDL    Neuro Cognitive  Neuro Cognitive (Adult)  Cognitive/Neuro/Behavioral WDL: WDL    Learning  Learning Assessment  Learning Readiness and Ability: no barriers identified  Education Provided  Person Taught: patient, spouse  Teaching Method: verbal instruction  Teaching Focus: symptom/problem overview, diagnostic test, medication administration  Education Outcome Evaluation: verbalizes understanding    Respiratory  Respiratory WDL  Respiratory WDL: WDL    Abdominal Pain       Pain Assessments  Pain (Adult)  (0-10) Pain Rating: Rest: 7  (0-10) Pain Rating: Activity: 7  Pain Location: abdomen  Pain Side/Orientation: generalized  Pain Onset/Duration: x4 days  Pain Description: constant  Pain Assessment Additional Detail  Pain Onset/Duration: x4 days    NIH Stroke Scale       Josefina Faye RN  10/26/24 20:47 EDT

## 2024-10-27 NOTE — PROGRESS NOTES
Status post left hemicolectomy postop day 0    S: Patient is septic on 3 pressors, started to make some urine.  Continues to be sedated and intubated.    O:   Vitals:    10/27/24 0915 10/27/24 0930 10/27/24 0945 10/27/24 1000   BP: 99/60 103/60 105/63 105/63   BP Location:       Patient Position:       Pulse: (!) 129 (!) 131 (!) 130 (!) 129   Resp:       Temp:       TempSrc:       SpO2: 99% 99% 99% 99%   Weight:       Height:          NG tube 25 cc dark brown  Drain #1 60 cc  Drain #2 120 cc  Drain #3 70 cc  Drain serosanguineous  Intubated, not following commands  Breathing comfortable  Soft, does not seem to be tender, ostomy congested but viable.  There is stool in the ostomy bag    Lactate 4  White blood cell count 6.9, hemoglobin 9.8    Labs reviewed    Assessment and plan    74-year-old male with colonic perforation due to cancer and ischemia.  He had fecal peritonitis and has been having diarrhea for now 2 months.  He is a daily alcohol user.  He started to make urine but likely be having on IV fluids.    Continue aggressive hydration  Continue pressors as needed  Continue Protonix IV twice daily  Continue NG to suction, flush NG as needed  SCDs and Lovenox  Continue Blankenship catheter  Wound ostomy nurse consultation for tomorrow  Start dressing changes tomorrow    Mc Guillaume MD, FACS  General, Minimally Invasive and Endoscopic Surgery  [facility]    [address], [address]  [place], [place], [address]  P: [phone]  F: [phone]

## 2024-10-27 NOTE — ANESTHESIA POSTPROCEDURE EVALUATION
Patient: Arsh Haynes    Procedure Summary       Date: 10/26/24 Room / Location: [facility] OR [address] [place] [address] MAIN OR    Anesthesia Start: 2229 Anesthesia Stop: 10/27/24 0124    Procedures:       LAPAROTOMY EXPLORATORY, LEFT HEMICOLECTOMY (Abdomen)      LOW ANTERIOR COLON RESECTION SIGMOID, COLOSTOMY, SPLENIC FLEXURE MOBILIZATION (Abdomen) Diagnosis:     Surgeons: Mc Guillaume MD Provider: Leonardo Rivera MD    Anesthesia Type: general ASA Status: 3 - Emergent            Anesthesia Type: general    Vitals  Vitals Value Taken Time   BP 94/62 10/27/24 0211   Temp 36.4 °C (97.5 °F) 10/27/24 0215   Pulse 123 10/27/24 0215   Resp 18 10/27/24 0115   SpO2 99 % 10/27/24 0215   Vitals shown include unfiled device data.        Anesthesia Post Evaluation

## 2024-10-27 NOTE — ANESTHESIA PREPROCEDURE EVALUATION
Anesthesia Evaluation     Patient summary reviewed and Nursing notes reviewed   no history of anesthetic complications:   NPO Solid Status: Waived due to emergency  NPO Liquid Status: Waived due to emergency           Airway   Mallampati: II  TM distance: >3 FB  Neck ROM: full  Dental      Pulmonary    Cardiovascular     ECG reviewed      ROS comment: EKG unremarkable    Neuro/Psych  GI/Hepatic/Renal/Endo      ROS Comment: Pneumoperitoneum    Musculoskeletal     Abdominal    Substance History      OB/GYN          Other                    Anesthesia Plan    ASA 3 - emergent     general   Rapid sequence  intravenous induction     Anesthetic plan, risks, benefits, and alternatives have been provided, discussed and informed consent has been obtained with: patient.    CODE STATUS:    Code Status (Patient has no pulse and is not breathing): CPR (Attempt to Resuscitate)  Medical Interventions (Patient has pulse or is breathing): Full Support

## 2024-10-27 NOTE — POST-PROCEDURE NOTE
Procedure: Right IJ central venous catheter  Indication: Septic shock requiring multiple vasopressors and needing central access for vasopressors also multiple phlebotomies needed will provide for this as well.  Consent: Informed consent obtained from the patient's wife is patient is intubated and sedated unable to provide consent.  Procedure: Ultrasound right IJ extremely collapsible head the patient patient in maximum Trendelenburg position and even with this just the the pressure of the needle going through the skin totally collapsed the vessel on ultrasound.  Patient was ChloraPrep x 2 maximal barrier precautions aseptic technique 5 cc 1% lidocaine subcutaneously at the procedure site.  With ultrasound guidance the right IJ was cannulated on the first pass guidewire was placed the guidewire was stuck in the needle catheter assembly I was afraid to pull too hard so I pulled the whole needle assembly guidewire out.  I was able to pull the guidewire through the other direction and there was not any bur spurs anything on the guidewire to explain it seemed to be in the plastic, the protective syringe that it was catching on.  I restart the patient and did not use that protective syringe advanced the guidewire without any difficulties and using modified Seldinger technique a Quad-Lumen cath was placed to 16 cm insertion length secured with two 2-0 silk sutures all ports aspirated well and were flushed with saline.  Post procedure chest x-ray shows good placement in the distal superior vena cava no pneumothorax.

## 2024-10-27 NOTE — PROGRESS NOTES
Pulmonary/critical care    Discussed with Dr. Guillaume perforation at the transverse descending colon with extensive feculent peritonitis stool throughout abdomen also several tumors in the sigmoid colon that appeared to be malignant also evidence of ischemic colon patient underwent resection of affected colon and colostomy.  Patient required vasopressors intraoperatively and has required increasing vasopressors postoperatively where now up to 3 vasopressors norepinephrine, epinephrine, vasopressin to support blood pressure.  He is on a ventilator sedated currently with propofol at 30 but he is waking up and looking around.  His chest sounds pretty good breath sounds are okay and ET tube looks okay on the chest x-ray.  On I was placing his central line even in deep Trendelenburg his vessel was collapsing almost completely with each respiration therefore I ultrasounded his IVC actually had an excellent ultrasound graphic tracing and there was well over 50% collapse of his IVC with respiration.  I think he still intravascularly dry he is probably third spaced a lot of fluid with his surgery he had been decreased p.o. intake the last 24 hours and has had diarrhea for several months with very low albumin on presentation I think again he is third spaced a lot of fluid and is dry and I am giving him another liter bolus of fluids him to keep his IV fluids up a little bit in the 200 cc an hour range see if we can get his blood pressure to stabilize, will really getting to maximum of vasopressors quickly.  Follow-up labs pending.  Patient did come out of OR with a right radial A-line although waveform does not look really good and is not matching the cuff pressure well we are getting some blood return I do not think the A-line is accurate I have asked nursing to use the cuff for pressure, if we do not need the A-line for labs and blood gases we may be able to get rid of it soon since it does not appear to be functioning very  well / accurate.    Outside of procedures I have spent over 44 minutes in critical care time I also discussed with the patient's wife and son in detail at bedside, tried to answer all of their questions they obviously are very concerned and have every reason to be concerned his survival is still very much in doubt

## 2024-10-27 NOTE — PLAN OF CARE
Goal Outcome Evaluation:      Remains on vent. Does not follow commands, but does withdraw from pain. Epi weaned off, remains on levophed and vaso. Fent decreased, remains on precedex. K and mag replaced. Dressings clean and dry, no new drainage this shift. 560ml UOP. BRONWYN drain output charted in flowsheets. Several family members at bedside, updated on patient status and plan of care.

## 2024-10-27 NOTE — PROGRESS NOTES
[facility]     Mar/chart reviewed  Follow up septic shock from perforated bowel with likely underlying malignancy  Patient sedated on vent, unable to provide subjective    Vital Sign Min/Max for last 24 hours  Temp  Min: 96.2 °F (35.7 °C)  Max: 99 °F (37.2 °C)   BP  Min: 74/45  Max: 170/125   Pulse  Min: 84  Max: 180   Resp  Min: 18  Max: 30   SpO2  Min: 94 %  Max: 100 %   No data recorded   Weight  Min: 83 kg (183 lb)  Max: 83.4 kg (183 lb 13.8 oz)   3275/1025  Appears ill, sedated on vent  perrl, normal sclera  mmm, no jvd, trachea midline, neck supple,  chest cta bilaterally, no crackles, no wheezes,   Tachy, regular  soft, midly distended, dereased bs  no c/c/ e  Skin warm, dry no rashes      Labs: 10/27: reviewed:  Lactate 4  Glucose 251  Bun 12  Cr 0.7  Na 140  K 3  Bicarb 17.7  Wbc 6.9  Hgb 9.8  Plts 483  10/27: CXR: reviewed, lines, tubes in place    A/P:  Severe sepsis with septic shock 2/2 peritonitis from perforated bowel due to likely  malignancy s/p OR -- continue antibiotics, supportive care, pressors  Recent weight loss  Alcohol abuse - continue thiamine, folate, watch for withdrawal  Hyperglcyemia -- ssi  Right renal mass  Lactic acidosis  Anemia      Add pharmacologic dvt prophylaxis, gi prophylaxis

## 2024-10-27 NOTE — PLAN OF CARE
Goal Outcome Evaluation:   Patient s/p colectomy, with LLQ ostomy and 3 BRONWYN drains (#1-40cc output, #2-100cc output, #3- 50cc output. Hypotensive, on levophed, epinephrine, and vasopression. Returned from OR on ventilator, sedated with fentanyl and precedex. NGT hooked up to low-intermittent suction. 275cc simone urine out of vazquez catheter. Artertial line in and positional. Central line placed.

## 2024-10-27 NOTE — ANESTHESIA PROCEDURE NOTES
Arterial Line      Patient reassessed immediately prior to procedure    Patient location during procedure: pre-op  Start time: 10/26/2024 10:19 PM  Stop Time:10/26/2024 10:20 PM       Line placed for hemodynamic monitoring and ABGs/Labs/ISTAT.  Performed By   Anesthesiologist: Leonardo Rivera MD   Preanesthetic Checklist  Completed: patient identified, risks and benefits discussed, pre-op evaluation and timeout performed  Arterial Line Prep    Sterile Tech: cap, sterile barriers and mask  Prep: ChloraPrep  Patient monitoring: blood pressure monitoring, continuous pulse oximetry and EKG  Arterial Line Procedure   Laterality:right  Location:  radial artery  Catheter size: 20 G   Guidance: ultrasound guided  PROCEDURE NOTE/ULTRASOUND INTERPRETATION.  Using ultrasound guidance the potential vascular sites for insertion of the catheter were visualized to determine the patency of the vessel to be used for vascular access.  After selecting the appropriate site for insertion, the needle was visualized under ultrasound being inserted into the radial artery, followed by ultrasound confirmation of wire and catheter placement. There were no abnormalities seen on ultrasound; an image was taken; and the patient tolerated the procedure with no complications.   Number of attempts: 1  Successful placement: yes Images: still images not obtained  Post Assessment   Dressing Type: occlusive dressing applied and secured with tape.   Complications no  Circ/Move/Sens Assessment: normal.   Patient Tolerance: patient tolerated the procedure well with no apparent complications

## 2024-10-27 NOTE — OP NOTE
Date of procedure: 10/27/2024    Primary Surgeon: Dr. Guillaume    Assistant: Virgil Claire PA-C dosing charge for retraction, exposure, closing wounds, placing dressings that was essential for success of the case    Procedure performed:   -Open left hemicolectomy with Juliana pouch and end colostomy  -Splenic flexure mobilization    Anesthesia: General    EBL: 300 cc    Complications: None    Findings:   -Fecal peritonitis  -Rectosigmoid colon cancer  -Ischemic colon with perforation at the descending sigmoid colon junction    Specimens:   -Rectosigmoid colon marked with a stitch at the distal end  -Descending and sigmoid colon marked with a stitch at the proximal and    Condition of patient: guarded, to recovery    Indications: 74-year-old male who presented to the hospital with abdominal pain.  He was found to have free intra-abdominal perforation and findings concerning for left colon cancer.  We discussed with the patient about treatment options and I recommend that he undergoes exploratory laparotomy, sigmoid colectomy with end colostomy.  Risk and benefits of procedure including bleeding, infection, wound complications, ureteral injury, stump leak discussed in detail with the patient and his wife that verbalized understanding and agreed with the plan    Description: Patient was taken to operative room and placed in the OR table in supine position.  Preoperative antibiotics were given and SCDs were placed.  Patient underwent general endotracheal anesthesia.  Patient abdomen was then prepped and draped in usual sterile fashion.  Timeout was performed the patient was correctly identified.  Midline incision was performed with scalpel from the supraumbilical to the infraumbilical area.  Abdominal wall fascia was transected.  Upon entering the abdominal cavity there was large amount of succus entericus.  This was irrigated.  The fascia was opened superiorly and inferiorly.  Wound protector was placed and mobile  retractor was placed.  We explored the abdominal cavity and there was evidence of ischemia, thickening as well as perforation of the distal descending and proximal sigmoid colon junction.  There was an area on the junction of the descending and sigmoid colon concerning for colon cancer.  I then continue aspiration inferiorly and there was another mass in the upper rectum concerning for cancer.  The remaining of the colon did not have any pathology.  Started procedure by mobilizing lateral to medial of the sigmoid and descending colon all the way up to the splenic flexure.  Ureter was recognized and preserved.  Mobilization continue to the splenic flexure where the splenic colonic ligament was transected.  The omentum was then transected from the transverse colon.  The splenic flexure of the colon was mobilized from the retroperitoneal attachments.  I then proceeded to transect the proximal descending colon with Bringhurst blue load stapler, the distal and was approximately at the mid sigmoid colon.  This was transected with Bringhurst blue load stapler.  Mesentery was then transected with Enseal device that included the EHSAN.  Several sutures of 2-0 silk were placed on the mesentery that was extremely thick and friable.  I then marked the proximal end of the specimen with a 2-0 silk suture.  Specimen was sent for pathological analysis.  I then continued with the dissection of the distal sigmoid and rectal sigmoid and upper rectum.  Dissection of the mesentery of the rectum was circumferentially dissected down to the pelvis and the peritoneal reflection.  Mesorectal dissection was performed circumferentially up to the mid rectum.  There was a mass at the rectosigmoid junction.  The right and left  ureters were recognized and preserved.  The mid rectum was transected with a contour green load..  The specimen was marked with suture at the distal end.  Mesentery was transected with Enseal device.  The rectal stump was marked  with 2 different 2-0 Prolene sutures.  I then proceeded to irrigate the abdominal cavity thoroughly with several liters of warm saline.  We then cleansed the blood supply of the proximal descending colon and this was found to be adequate.  We then selected a sided abdominal wall to bring out the colostomy.  Circular incision at the mid level in between the left anterior superior iliac spine and umbilicus was performed with Bovie electrocautery.  Dissection was carried down to the rectus fascia was transected in a cruciate fashion.  Rectus muscle was split and posterior rectus sheath was entered.  The colon was brought out through the abdominal wall opening.  We then proceeded to finish irrigation of the abdominal cavity.  We placed 3 different drains in the right lower quadrant that goes up to the right colic gutter and the liver, left lower quadrant that goes through the left paracolic gutter all the way up to the spleen and another right upper quadrant drain that goes down through the right paracolic gutter down to the pelvis.  Is a hemostatic agent was placed at the pelvis.  There was no evidence of bleeding.  All drains were sutured with 3-0 nylon.  Happy with the procedure with we will proceed to close abdominal wall with running #1 PDS suture from superior inferior portion of the wound.  Small umbilical hernia was repaired when closed the abdominal wall.  The wound was covered with Betadine gauze.  The ostomy was matured in a Beatriz fashion with interrupted 3-0 Vicryl suture after the staple line was transected with Bovie electrocautery.  The ostomy was pink and viable with small patches of mucosal necrosis.  Ostomy appliance were placed.  Dressings were placed.  The patient tolerated procedure well and he was sent to recovery area in guarded condition.  Instrument and sponge count were correct    Mc Guillaume MD, FACS  General, Minimally Invasive and Endoscopic Surgery  [facility]    [address], [address]  [place], [place], [address]  P: [phone]  F: [phone]

## 2024-10-27 NOTE — PROGRESS NOTES
[facility] Clinical Pharmacy Services: Piperacillin-Tazobactam Consult    Pt Name: Arsh Haynes   : 1950    Indication: Intra-Abdominal Infection    Relevant clinical data and objective history reviewed:    History reviewed. No pertinent past medical history.  Creatinine   Date Value Ref Range Status   10/27/2024 0.70 (L) 0.76 - 1.27 mg/dL Final   10/26/2024 0.89 0.76 - 1.27 mg/dL Final     BUN   Date Value Ref Range Status   10/27/2024 12 8 - 23 mg/dL Final     Estimated Creatinine Clearance: 112.5 mL/min (A) (by C-G formula based on SCr of 0.7 mg/dL (L)).    Lab Results   Component Value Date    WBC 6.94 10/27/2024     Temp Readings from Last 3 Encounters:   10/27/24 99 °F (37.2 °C) (Oral)      Assessment/Plan  Estimated CrCl >20 mL/min at this time; BMI 24.82 kg/m2  Due to severity of illness (on 2 pressors and in CICU, intubated and post-op), will increase zosyn to 4.5 g iv q8.     Pharmacy will continue to follow daily while on piperacillin-tazobactam and adjust as needed. Thank you for this consult.    Ayush Escobar [facility]  Clinical Pharmacist

## 2024-10-27 NOTE — H&P
Patient Care Team:  Provider, No Known as PCP - General      Subjective     Patient is a 74 y.o. male.  Asked to admit to the ICU patient who presented to the emergency room with several days of worsening abdominal pain CT scan consistent with sigmoid perforation.  Dr. Guillaume surgery has requested patient be admitted to the ICU and he is reportedly en route to see the patient.  Patient has been ill for about 2 months with diarrhea it has been somewhat black apparently he is lost significant amount of weight they do not know exactly how much but his wife says he is really lost a lot of weight he has had a little bit of abdominal pain and then became very severe today left lower quadrant.  No history of any GI problems he has never had a colonoscopy he is really been healthy he sees his doctor once a year he takes a blood pressure medicine he does not remember exactly what.  He has had fevers and shaking chills today though started this morning.  He had a little bit of breakfast this morning nothing to eat or drink since then.  He does drink alcohol regularly he says 4 beers a night his wife shaking her head and indicating its higher he says he cannot remember when he went 3 or more days without alcohol.  He has never had problems with alcohol withdrawal.  He does not smoke or use illicit drugs.    Review of Systems:  No history of seizures or strokes no recent headaches or visual changes no difficulty swallowing no bad heartburn or indigestion no liver disease hepatitis or yellow jaundice no heart disease chest pain or palpitations.  No shortness of breath or cough.  No dysuria or hematuria no history of blood clots easy bleeding or bruising no recent lower extremity pain or swelling.  No polyuria polydipsia heat or cold intolerances      History  Of a lot of cancer in the family but no colon cancer that he is aware of his brother had kidney cancer.  No past medical history on file.  No past surgical  "history on file.  Social History     Socioeconomic History    Marital status:      No family history on file.      Allergies:  Patient has no known allergies.    Medications:  Prior to Admission medications    Not on File     piperacillin-tazobactam, 3.375 g, Intravenous, Once  sodium chloride, 30 mL/kg, Intravenous, Once           Objective     Vital Signs  Vital Sign Min/Max for last 24 hours  Temp  Min: 96.2 °F (35.7 °C)  Max: 96.2 °F (35.7 °C)   BP  Min: 74/45  Max: 170/125   Pulse  Min: 84  Max: 101   Resp  Min: 18  Max: 18   SpO2  Min: 95 %  Max: 98 %   No data recorded   Weight  Min: 83 kg (183 lb)  Max: 83 kg (183 lb)     No intake or output data in the 24 hours ending 10/26/24 2037  No intake/output data recorded.  Last Weight and Admission Weight        10/26/24  1933   Weight: 83 kg (183 lb)     Flowsheet Rows      Flowsheet Row First Filed Value   Admission Height 182.9 cm (72\") Documented at 10/26/2024 1933   Admission Weight 83 kg (183 lb) Documented at 10/26/2024 1933            Body mass index is 24.82 kg/m².           Physical Exam:  General Appearance: Well-developed white male he has the appearance of acute illness but also may be chronically ill.  Having shaking rigors right now  Eyes: Conjunctiva are clear and anicteric pupils are equal and reactive  ENT: Mucous membranes are pretty dry both oral and nasal Mallampati type II airway nasal septum midline  Neck: No adenopathy or thyromegaly no jugular venous distention and trachea midline  Lungs: Clear nonlabored symmetric expansion he is a little tachypneic respiratory rates in the 20s he is on room air saturating at 100%  Cardiac: He may be a little tachycardic he is hard to tell he is get so much tremor it is hard to tell on his pulse it is hard to tell on the EKG I do not hear a murmur  Abdomen: Distended with tenderness particularly in the left lower quadrant with rebound tenderness.  This distention makes it difficult to assess liver " or spleen size  : Not examined  Musculoskeletal: Grossly normal  Skin: Warm and dry no jaundice no petechiae no mottling  Neuro: He is alert and cooperative he is following commands he is grossly intact he is moving all extremities to command  Extremities/P Vascular: No clubbing no cyanosis no edema he does have palpable radial and dorsalis pedis pulses it is hard to separate out he is get so much shaking  MSE: He is appropriate he looks and is acting scared.      Labs:  Results from last 7 days   Lab Units 10/26/24  1944   GLUCOSE mg/dL 239*   SODIUM mmol/L 135*   POTASSIUM mmol/L 3.8   MAGNESIUM mg/dL 1.8   CO2 mmol/L 21.0*   CHLORIDE mmol/L 98   ANION GAP mmol/L 16.0*   CREATININE mg/dL 0.89   BUN mg/dL 14   BUN / CREAT RATIO  15.7   CALCIUM mg/dL 8.4*   ALK PHOS U/L 131*   TOTAL PROTEIN g/dL 6.0   ALT (SGPT) U/L 16   AST (SGOT) U/L 15   BILIRUBIN mg/dL 1.2   ALBUMIN g/dL 2.5*   GLOBULIN gm/dL 3.5     Estimated Creatinine Clearance: 88.1 mL/min (by C-G formula based on SCr of 0.89 mg/dL).      Results from last 7 days   Lab Units 10/26/24  1944   WBC 10*3/mm3 9.72   RBC 10*6/mm3 4.99   HEMOGLOBIN g/dL 13.7   HEMATOCRIT % 43.2   MCV fL 86.6   MCH pg 27.5   MCHC g/dL 31.7   RDW % 19.5*   RDW-SD fl 59.9*   MPV fL 8.1   PLATELETS 10*3/mm3 549*   NEUTROS ABS 10*3/mm3 8.15*   EOS ABS 10*3/mm3 0.00   BASOS ABS 10*3/mm3 0.00   NRBC /100 WBC 0.0         Results from last 7 days   Lab Units 10/26/24  1944   HSTROP T ng/L 18             Results from last 7 days   Lab Units 10/26/24  1944   LACTATE mmol/L 5.8*   PROCALCITONIN ng/mL 9.58*     Results from last 7 days   Lab Units 10/26/24  1944   INR  1.14*     Microbiology Results (last 10 days)       ** No results found for the last 240 hours. **              Diagnostics:  No results found.    EKG normal sinus rhythm  Chest x-ray no acute process noted in the chest  I did review the CT abdomen and the radiology report looks like there is a perforation probably at the  sigmoid transverse colon junction there is free fluid and probable abscess there is some concerning masslike areas distally to this.  Also noted that the urinary bladder wall is thickened also right renal mass    Assessment & Plan     Acute abdomen with probable perforation upper sigmoid colon surgery has already been consulted Dr. Johnson's is Dr. Guillaume told him he would be in to evaluate this evening.  Looks like patient probably needs to go the OR tonight but of course I will defer to the surgeries expertise on this.  He is complaining of a lot of abdominal pain we will try and get him some pain medication.  Zosyn started in the emergency department will continue  Severe sepsis secondary #1 fluid bolus IV fluids antibiotics and treat the cause of course  2 months significant weight loss with the CT scan particularly concerned there may be an underlying colonic malignancy he has never had colonoscopy.  Alcoholism he drinks regularly 4+ beers a day and cannot remember when he last went more than 3 days without an alcohol drink.  Get him on thiamine folate alcohol withdrawal protocol  Hyperglycemia whether this is stress response or whether he has some diabetes I do not know he has no history of diabetes we will check hemoglobin A1c put him on sliding scale insulin  Right renal mass concerning for malignancy.  Need to be followed up on after acute issue resolved  Bladder wall thickening UA pending  Lactic acidosis secondary to severe sepsis monitor      Patient's laboratory and vital signs I do not think do justice as to how sick the patient looks.    Arnulfo Lynch Jr, MD  10/26/24  20:37 EDT    Time: Critical care time 48 minutes thus far

## 2024-10-27 NOTE — PROGRESS NOTES
[facility] Clinical Pharmacy Services: Piperacillin-Tazobactam Consult    Pt Name: Arsh Haynes   : 1950    Indication: Intra-Abdominal Infection    Relevant clinical data and objective history reviewed:    History reviewed. No pertinent past medical history.  Creatinine   Date Value Ref Range Status   10/26/2024 0.89 0.76 - 1.27 mg/dL Final     BUN   Date Value Ref Range Status   10/26/2024 14 8 - 23 mg/dL Final     Estimated Creatinine Clearance: 88.1 mL/min (by C-G formula based on SCr of 0.89 mg/dL).    Lab Results   Component Value Date    WBC 9.72 10/26/2024     Temp Readings from Last 3 Encounters:   10/26/24 96.2 °F (35.7 °C) (Tympanic)      Assessment/Plan  Estimated CrCl >20 mL/min at this time; BMI 24.82 kg/m2  Will start piperacillin-tazobactam 3.375 g IV every 8   hours     Pharmacy will continue to follow daily while on piperacillin-tazobactam and adjust as needed. Thank you for this consult.    Pat Jefferson, [facility]  Clinical Pharmacist

## 2024-10-27 NOTE — ANESTHESIA PROCEDURE NOTES
Airway  Urgency: elective    Date/Time: 10/26/2024 10:39 PM  End Time:10/26/2024 10:39 PM  Airway not difficult    General Information and Staff    Patient location during procedure: OR  Anesthesiologist: Leonardo Rivera MD    Indications and Patient Condition  Indications for airway management: airway protection    Preoxygenated: yes  Mask difficulty assessment: 0 - not attempted    Final Airway Details  Final airway type: endotracheal airway      Successful airway: ETT  Cuffed: yes   Successful intubation technique: video laryngoscopy and RSI  Facilitating devices/methods: intubating stylet  Endotracheal tube insertion site: oral  Blade: Charles  Blade size: 3  ETT size (mm): 7.5  Cormack-Lehane Classification: grade I - full view of glottis  Placement verified by: capnometry and palpation of cuff   Number of attempts at approach: 1

## 2024-10-27 NOTE — CONSULTS
Consultation note    Referring physician: Dr. Johnson    Consulting Physician: Mc Guillaume MD    Reason for consultation: Free intra-abdominal air, colonic perforation    Chief Complaint   Patient presents with    Abdominal Pain    Hypotension       HPI:   The patient is a very pleasant 74 y.o. years old male that presented to the hospital with abdominal pain.  The patient reports abdominal pain that started this morning.  The pain is located all throughout the abdomen but more focalized to the left lower quadrant.  It is 8 out of 10 in intensity.  Has been constant.  He reports  has been having diarrhea that consists of 4 loose bowel movements for the past 2 months.  He reports associated weight loss.  He reports no nausea or vomiting.  Patient never had a colonoscopy.  Denies any rectal bleeding.  Drinks 4 beers a day has been doing this for several years.  He presented to the emergency room for further evaluation.  He was found to have large amount of free air as well as 2 areas in the distal descending and proximal sigmoid colon concerning for mass versus stercoral colitis.  He was found to have right renal mass.    Past medical history: Daily alcohol user, hypertension    History reviewed. No pertinent surgical history.      Current Facility-Administered Medications:     acetaminophen (TYLENOL) tablet 650 mg, 650 mg, Oral, Q4H PRN **OR** acetaminophen (TYLENOL) suppository 650 mg, 650 mg, Rectal, Q4H PRN, Arnulfo Lynch Jr., MD    sennosides-docusate (PERICOLACE) 8.6-50 MG per tablet 2 tablet, 2 tablet, Oral, BID **AND** polyethylene glycol (MIRALAX) packet 17 g, 17 g, Oral, Daily PRN **AND** bisacodyl (DULCOLAX) EC tablet 5 mg, 5 mg, Oral, Daily PRN **AND** bisacodyl (DULCOLAX) suppository 10 mg, 10 mg, Rectal, Daily PRN, Arnulfo Lynch Jr., MD    Calcium Replacement - Follow Nurse / BPA Driven Protocol, , Does not apply, PRN, Arnulfo Lynch Jr., MD    dextrose (D50W) (25 g/50 mL) IV  injection 25 g, 25 g, Intravenous, Q15 Min PRN, Arnulfo Lynch Jr., MD    dextrose (GLUTOSE) oral gel 15 g, 15 g, Oral, Q15 Min PRN, Arnulfo Lynch Jr., MD    fentaNYL citrate (PF) (SUBLIMAZE) injection 50 mcg, 50 mcg, Intravenous, Q30 Min PRN, Arnulfo Lynch Jr., MD    [START ON 10/27/2024] folic acid 1 mg in sodium chloride 0.9 % 50 mL IVPB, 1 mg, Intravenous, Daily, Arnulfo Lynch Jr., MD    glucagon (GLUCAGEN) injection 1 mg, 1 mg, Intramuscular, Q15 Min PRN, Arnulfo Lynch Jr., MD    HYDROmorphone (DILAUDID) injection 0.5 mg, 0.5 mg, Intravenous, Q2H PRN, Mc Guillaume MD    Insulin Lispro (humaLOG) injection 10 Units, 10 Units, Intravenous, Once, Mc Guillaume MD    insulin regular (humuLIN R,novoLIN R) injection 4-24 Units, 4-24 Units, Subcutaneous, Q4H, Arnulfo Lynch Jr., MD    labetalol (NORMODYNE,TRANDATE) injection 20 mg, 20 mg, Intravenous, Q10 Min PRN, Arnulfo Lynch Jr., MD    LORazepam (ATIVAN) injection 2 mg, 2 mg, Intravenous, Q6H **FOLLOWED BY** [START ON 10/27/2024] LORazepam (ATIVAN) injection 1 mg, 1 mg, Intravenous, Q6H, Arnulfo Lynch Jr., MD    LORazepam (ATIVAN) tablet 1 mg, 1 mg, Oral, Q1H PRN **OR** LORazepam (ATIVAN) injection 1 mg, 1 mg, Intravenous, Q1H PRN **OR** LORazepam (ATIVAN) tablet 2 mg, 2 mg, Oral, Q1H PRN **OR** LORazepam (ATIVAN) injection 2 mg, 2 mg, Intravenous, Q1H PRN **OR** LORazepam (ATIVAN) injection 2 mg, 2 mg, Intravenous, Q15 Min PRN **OR** LORazepam (ATIVAN) injection 2 mg, 2 mg, Intramuscular, Q15 Min PRN **OR** LORazepam (ATIVAN) tablet 4 mg, 4 mg, Oral, Q1H PRN **OR** LORazepam (ATIVAN) injection 4 mg, 4 mg, Intravenous, Q1H PRN, Arnulfo Lynch Jr., MD    Magnesium Standard Dose Replacement - Follow Nurse / BPA Driven Protocol, , Does not apply, PRN, Arnulfo Lynch Jr., MD    mupirocin (BACTROBAN) 2 % nasal ointment 1 Application, 1 Application, Each Nare, BID, Arnulfo Lynch Jr.,  MD    nitroglycerin (NITROSTAT) SL tablet 0.4 mg, 0.4 mg, Sublingual, Q5 Min PRN, Arnulfo Lynch Jr., MD    norepinephrine (LEVOPHED) 8 mg in 250 mL NS infusion (premix), 0.02-0.3 mcg/kg/min, Intravenous, Titrated, Mc Guillaume MD    ondansetron (ZOFRAN) injection 4 mg, 4 mg, Intravenous, Q6H PRN, Arnulfo Lynch Jr., MD    Pharmacy to Dose Zosyn, , Does not apply, Continuous PRN, Arnulfo Lynch Jr., MD    Phosphorus Replacement - Follow Nurse / BPA Driven Protocol, , Does not apply, Syed SERNA Harold Dale Jr., MD    [START ON 10/27/2024] piperacillin-tazobactam (ZOSYN) 3.375 g IVPB in 100 mL NS MBP (CD), 3.375 g, Intravenous, Q8H, Arnulfo Lynch Jr., MD    Potassium Replacement - Follow Nurse / BPA Driven Protocol, , Does not apply, Syed SERNA Harold [place] Jr., MD    [COMPLETED] Insert Peripheral IV, , , Once **AND** sodium chloride 0.9 % flush 10 mL, 10 mL, Intravenous, PRN, James Johnson MD    sodium chloride 0.9 % flush 10 mL, 10 mL, Intravenous, Q12H, Arnulfo Lynch Jr., MD    sodium chloride 0.9 % flush 10 mL, 10 mL, Intravenous, PRNSyed Harold Dale Jr., MD    sodium chloride 0.9 % infusion 40 mL, 40 mL, Intravenous, PRN, Arnulfo Lynch Jr., MD    sodium chloride 0.9 % infusion, 200 mL/hr, Intravenous, Continuous, Arnulfo Lynch Jr., MD    thiamine (B-1) 500 mg in sodium chloride 0.9 % 100 mL IVPB, 500 mg, Intravenous, Q8H **FOLLOWED BY** [START ON 10/29/2024] thiamine (B-1) injection 200 mg, 200 mg, Intravenous, Q8H **FOLLOWED BY** [START ON 11/3/2024] thiamine (VITAMIN B-1) tablet 100 mg, 100 mg, Oral, Daily, Arnulfo Lynch Jr., MD    No Known Allergies    Social History     Socioeconomic History    Marital status:        History reviewed. No pertinent family history.    ROS:   As per HPI    Physical Exam:   Vitals:    10/26/24 2007 10/26/24 2011 10/26/24 2041 10/26/24 2051   BP: (!) 170/125 (!) 152/113 137/87 128/93   BP Location:        Patient Position:       Pulse:   86    Resp:       Temp:       TempSrc:       SpO2: 98% 97% 96% 94%   Weight:       Height:            GENERAL:oriented to person, place, and time and ill appearing  HEENT: normochephalic, atraumatic, no scleral icterus moist mucous membranes.  NECK: Supple   CHEST: Breathing comfortable  CARDIAC: regular rate and rhythm    ABDOMEN: Abdomen distended, tense, generalized peritoneal irritation throughout the abdomen.  Small easily reducible umbilical hernia  EXTREMITIES: no cyanosis, clubbing or edema    NEURO: alert and oriented, normal speech, cranial nerves 2-12 grossly intact, no focal deficits   SKIN: Moist, warm, no rashes.    Diagnostic workup:   CT scan of the abdomen and pelvis that was performed today showed evidence of free intra-abdominal air.  There were 2 different areas of solid masses at the proximal sigmoid and distal descending colon concerning for masses versus solid 5 stool.  Right renal mass.    Glucose 223  Calcium 8.4  Sodium 135, CO2 21, lactate 5.8, procalcitonin 9.5, INR 1.1, white blood cell count 9.7, hemoglobin 13.7, platelets 549    Assessment and plan:   The patient is a very pleasant 74 y.o. years old male with colonic perforation either from cancer or stercoral colitis.  Right renal mass likely malignant will need to have further workup after he gets over this acute phase.    Hyperglycemia, not known to be diabetic, hemoglobin A1c ordered by Dr. Lynch  Alcohol use, 4 beers per day, high risk for alcohol withdrawal, CIWA protocol will be ordered  Discussed with the patient about further step.  I recommend that he undergoes exploratory laparotomy with colon resection and ostomy formation.  Risk and benefits of procedure including bleeding, infection, ureteral injury, abscess formation, stump leak discussed in detail with the patient and his wife that verbalized understanding and agreed with the plan    N.p.o.  IV fluids  Type and screen  10 units of  insulin IV preop  IV antibiotics    Case was discussed with Dr. Johnson, patient wife and patient.    Risk and benefits of the current recommended treatment were explained to the patient that had time to ask questions that where answered, verbalized understanding and agreed with the plan.     Mc Guillaume MD  General, Minimally Invasive and Endoscopic Surgery  [facility]    [address], [address]  [place], [place], [address]  P: [phone]  F: [phone]

## 2024-10-28 ENCOUNTER — APPOINTMENT (OUTPATIENT)
Dept: GENERAL RADIOLOGY | Facility: HOSPITAL | Age: 74
DRG: 853 | End: 2024-10-28
Payer: MEDICARE

## 2024-10-28 LAB
ALBUMIN SERPL-MCNC: 2 G/DL (ref 3.5–5.2)
ALBUMIN/GLOB SERPL: 0.9 G/DL
ALP SERPL-CCNC: 65 U/L (ref 39–117)
ALT SERPL W P-5'-P-CCNC: 11 U/L (ref 1–41)
ANION GAP SERPL CALCULATED.3IONS-SCNC: 7 MMOL/L (ref 5–15)
ARTERIAL PATENCY WRIST A: ABNORMAL
AST SERPL-CCNC: 18 U/L (ref 1–40)
ATMOSPHERIC PRESS: 754.6 MMHG
BASE EXCESS BLDA CALC-SCNC: -14 MMOL/L (ref -5–5)
BASE EXCESS BLDA CALC-SCNC: -9 MMOL/L (ref 0–2)
BASOPHILS # BLD MANUAL: 0 10*3/MM3 (ref 0–0.2)
BASOPHILS NFR BLD MANUAL: 0 % (ref 0–1.5)
BDY SITE: ABNORMAL
BILIRUB SERPL-MCNC: 0.9 MG/DL (ref 0–1.2)
BUN SERPL-MCNC: 14 MG/DL (ref 8–23)
BUN/CREAT SERPL: 19.4 (ref 7–25)
BURR CELLS BLD QL SMEAR: ABNORMAL
CALCIUM SPEC-SCNC: 6.5 MG/DL (ref 8.6–10.5)
CHLORIDE SERPL-SCNC: 119 MMOL/L (ref 98–107)
CO2 BLDA-SCNC: 16 MMOL/L (ref 24–29)
CO2 BLDA-SCNC: 17.1 MMOL/L (ref 23–27)
CO2 SERPL-SCNC: 17 MMOL/L (ref 22–29)
CREAT SERPL-MCNC: 0.72 MG/DL (ref 0.76–1.27)
DEPRECATED RDW RBC AUTO: 55.5 FL (ref 37–54)
DEVICE COMMENT: ABNORMAL
EGFRCR SERPLBLD CKD-EPI 2021: 97.1 ML/MIN/1.73
EOSINOPHIL # BLD MANUAL: 0 10*3/MM3 (ref 0–0.4)
EOSINOPHIL NFR BLD MANUAL: 0 % (ref 0.3–6.2)
ERYTHROCYTE [DISTWIDTH] IN BLOOD BY AUTOMATED COUNT: 18.5 % (ref 12.3–15.4)
GLOBULIN UR ELPH-MCNC: 2.3 GM/DL
GLUCOSE BLDC GLUCOMTR-MCNC: 117 MG/DL (ref 70–130)
GLUCOSE BLDC GLUCOMTR-MCNC: 117 MG/DL (ref 70–130)
GLUCOSE BLDC GLUCOMTR-MCNC: 124 MG/DL (ref 70–130)
GLUCOSE BLDC GLUCOMTR-MCNC: 135 MG/DL (ref 70–130)
GLUCOSE BLDC GLUCOMTR-MCNC: 154 MG/DL (ref 70–130)
GLUCOSE BLDC GLUCOMTR-MCNC: 179 MG/DL (ref 70–130)
GLUCOSE BLDC GLUCOMTR-MCNC: 68 MG/DL (ref 70–130)
GLUCOSE BLDC GLUCOMTR-MCNC: 75 MG/DL (ref 70–130)
GLUCOSE BLDC GLUCOMTR-MCNC: 85 MG/DL (ref 70–130)
GLUCOSE BLDC GLUCOMTR-MCNC: 92 MG/DL (ref 70–130)
GLUCOSE SERPL-MCNC: 90 MG/DL (ref 65–99)
HCO3 BLDA-SCNC: 14.7 MMOL/L (ref 22–26)
HCO3 BLDA-SCNC: 16.1 MMOL/L (ref 22–28)
HCT VFR BLD AUTO: 29.5 % (ref 37.5–51)
HCT VFR BLDA CALC: 32 % (ref 38–51)
HEMODILUTION: NO
HGB BLD-MCNC: 9.6 G/DL (ref 13–17.7)
HGB BLDA-MCNC: 10.9 G/DL (ref 12–17)
HYPOCHROMIA BLD QL: ABNORMAL
INHALED O2 CONCENTRATION: 40 %
LYMPHOCYTES # BLD MANUAL: 0.89 10*3/MM3 (ref 0.7–3.1)
LYMPHOCYTES NFR BLD MANUAL: 4.1 % (ref 5–12)
MCH RBC QN AUTO: 27.1 PG (ref 26.6–33)
MCHC RBC AUTO-ENTMCNC: 32.5 G/DL (ref 31.5–35.7)
MCV RBC AUTO: 83.3 FL (ref 79–97)
MODALITY: ABNORMAL
MONOCYTES # BLD: 0.89 10*3/MM3 (ref 0.1–0.9)
NEUTROPHILS # BLD AUTO: 20.02 10*3/MM3 (ref 1.7–7)
NEUTROPHILS NFR BLD MANUAL: 91.8 % (ref 42.7–76)
NRBC BLD AUTO-RTO: 0 /100 WBC (ref 0–0.2)
O2 A-A PPRESDIFF RESPIRATORY: 0.4 MMHG
PCO2 BLDA: 31.2 MM HG (ref 35–45)
PCO2 BLDA: 40.2 MM HG (ref 35–45)
PEEP RESPIRATORY: 5 CM[H2O]
PH BLDA: 7.17 PH UNITS (ref 7.35–7.6)
PH BLDA: 7.32 PH UNITS (ref 7.35–7.45)
PLAT MORPH BLD: NORMAL
PLATELET # BLD AUTO: 334 10*3/MM3 (ref 140–450)
PMV BLD AUTO: 8.4 FL (ref 6–12)
PO2 BLD: 268 MM[HG] (ref 0–500)
PO2 BLDA: 107.2 MM HG (ref 80–100)
PO2 BLDA: 455 MMHG (ref 80–105)
POIKILOCYTOSIS BLD QL SMEAR: ABNORMAL
POLYCHROMASIA BLD QL SMEAR: ABNORMAL
POTASSIUM BLDA-SCNC: 3.5 MMOL/L (ref 3.5–4.9)
POTASSIUM SERPL-SCNC: 4.4 MMOL/L (ref 3.5–5.2)
PROT SERPL-MCNC: 4.3 G/DL (ref 6–8.5)
QT INTERVAL: 304 MS
QTC INTERVAL: 500 MS
RBC # BLD AUTO: 3.54 10*6/MM3 (ref 4.14–5.8)
SAO2 % BLDA: 100 % (ref 95–98)
SAO2 % BLDCOA: 97.8 % (ref 92–98.5)
SET MECH RESP RATE: 22
SODIUM SERPL-SCNC: 143 MMOL/L (ref 136–145)
SPHEROCYTES BLD QL SMEAR: ABNORMAL
TOTAL RATE: 22 BREATHS/MINUTE
VARIANT LYMPHS NFR BLD MANUAL: 4.1 % (ref 19.6–45.3)
VENTILATOR MODE: AC
VT ON VENT VENT: 550 ML
WBC MORPH BLD: NORMAL
WBC NRBC COR # BLD AUTO: 21.81 10*3/MM3 (ref 3.4–10.8)

## 2024-10-28 PROCEDURE — 25010000002 LORAZEPAM PER 2 MG: Performed by: SURGERY

## 2024-10-28 PROCEDURE — 25010000002 FENTANYL CITRATE (PF) 2500 MCG/50ML SOLUTION: Performed by: INTERNAL MEDICINE

## 2024-10-28 PROCEDURE — 94761 N-INVAS EAR/PLS OXIMETRY MLT: CPT

## 2024-10-28 PROCEDURE — 94760 N-INVAS EAR/PLS OXIMETRY 1: CPT

## 2024-10-28 PROCEDURE — 25010000002 ENOXAPARIN PER 10 MG: Performed by: INTERNAL MEDICINE

## 2024-10-28 PROCEDURE — 85007 BL SMEAR W/DIFF WBC COUNT: CPT | Performed by: INTERNAL MEDICINE

## 2024-10-28 PROCEDURE — 36600 WITHDRAWAL OF ARTERIAL BLOOD: CPT

## 2024-10-28 PROCEDURE — 25010000002 THIAMINE HCL 200 MG/2ML SOLUTION 2 ML VIAL: Performed by: SURGERY

## 2024-10-28 PROCEDURE — 80053 COMPREHEN METABOLIC PANEL: CPT | Performed by: INTERNAL MEDICINE

## 2024-10-28 PROCEDURE — 82803 BLOOD GASES ANY COMBINATION: CPT

## 2024-10-28 PROCEDURE — 99024 POSTOP FOLLOW-UP VISIT: CPT | Performed by: SURGERY

## 2024-10-28 PROCEDURE — 94799 UNLISTED PULMONARY SVC/PX: CPT

## 2024-10-28 PROCEDURE — 25010000002 PIPERACILLIN SOD-TAZOBACTAM PER 1 G: Performed by: INTERNAL MEDICINE

## 2024-10-28 PROCEDURE — 25810000003 SODIUM CHLORIDE 0.9 % SOLUTION

## 2024-10-28 PROCEDURE — 85025 COMPLETE CBC W/AUTO DIFF WBC: CPT | Performed by: INTERNAL MEDICINE

## 2024-10-28 PROCEDURE — 71045 X-RAY EXAM CHEST 1 VIEW: CPT

## 2024-10-28 PROCEDURE — 82948 REAGENT STRIP/BLOOD GLUCOSE: CPT

## 2024-10-28 PROCEDURE — 94003 VENT MGMT INPAT SUBQ DAY: CPT

## 2024-10-28 PROCEDURE — 25010000002 FLUCONAZOLE PER 200 MG: Performed by: SURGERY

## 2024-10-28 RX ORDER — DEXTROSE MONOHYDRATE AND SODIUM CHLORIDE 5; .45 G/100ML; G/100ML
100 INJECTION, SOLUTION INTRAVENOUS CONTINUOUS
Status: ACTIVE | OUTPATIENT
Start: 2024-10-28 | End: 2024-10-28

## 2024-10-28 RX ORDER — DEXTROSE MONOHYDRATE AND SODIUM CHLORIDE 5; .45 G/100ML; G/100ML
50 INJECTION, SOLUTION INTRAVENOUS CONTINUOUS
Status: DISCONTINUED | OUTPATIENT
Start: 2024-10-28 | End: 2024-10-30

## 2024-10-28 RX ORDER — SODIUM CHLORIDE 9 MG/ML
200 INJECTION, SOLUTION INTRAVENOUS CONTINUOUS
Status: DISCONTINUED | OUTPATIENT
Start: 2024-10-28 | End: 2024-10-28

## 2024-10-28 RX ADMIN — DEXTROSE AND SODIUM CHLORIDE 100 ML/HR: 5; 450 INJECTION, SOLUTION INTRAVENOUS at 08:09

## 2024-10-28 RX ADMIN — PIPERACILLIN AND TAZOBACTAM 4.5 G: 4; .5 INJECTION, POWDER, FOR SOLUTION INTRAVENOUS at 03:06

## 2024-10-28 RX ADMIN — THIAMINE HYDROCHLORIDE 500 MG: 100 INJECTION, SOLUTION INTRAMUSCULAR; INTRAVENOUS at 21:42

## 2024-10-28 RX ADMIN — CHLORHEXIDINE GLUCONATE 15 ML: 1.2 RINSE ORAL at 20:22

## 2024-10-28 RX ADMIN — FENTANYL CITRATE 75 MCG/HR: 0.05 INJECTION, SOLUTION INTRAMUSCULAR; INTRAVENOUS at 10:51

## 2024-10-28 RX ADMIN — Medication 10 ML: at 09:18

## 2024-10-28 RX ADMIN — LORAZEPAM 1 MG: 2 INJECTION INTRAMUSCULAR; INTRAVENOUS at 16:39

## 2024-10-28 RX ADMIN — Medication 10 ML: at 20:23

## 2024-10-28 RX ADMIN — DEXMEDETOMIDINE HYDROCHLORIDE 0.4 MCG/KG/HR: 4 INJECTION, SOLUTION INTRAVENOUS at 14:32

## 2024-10-28 RX ADMIN — MUPIROCIN 1 APPLICATION: 20 OINTMENT TOPICAL at 09:17

## 2024-10-28 RX ADMIN — DEXTROSE MONOHYDRATE 25 G: 25 INJECTION, SOLUTION INTRAVENOUS at 00:25

## 2024-10-28 RX ADMIN — MUPIROCIN 1 APPLICATION: 20 OINTMENT TOPICAL at 20:22

## 2024-10-28 RX ADMIN — FOLIC ACID 1 MG: 5 INJECTION, SOLUTION INTRAMUSCULAR; INTRAVENOUS; SUBCUTANEOUS at 09:17

## 2024-10-28 RX ADMIN — PANTOPRAZOLE SODIUM 40 MG: 40 INJECTION, POWDER, FOR SOLUTION INTRAVENOUS at 09:17

## 2024-10-28 RX ADMIN — ENOXAPARIN SODIUM 40 MG: 100 INJECTION SUBCUTANEOUS at 09:17

## 2024-10-28 RX ADMIN — FLUCONAZOLE 400 MG: 2 INJECTION, SOLUTION INTRAVENOUS at 03:05

## 2024-10-28 RX ADMIN — NOREPINEPHRINE BITARTRATE 0.24 MCG/KG/MIN: 32 SOLUTION INTRAVENOUS at 09:17

## 2024-10-28 RX ADMIN — LORAZEPAM 1 MG: 2 INJECTION INTRAMUSCULAR; INTRAVENOUS at 03:11

## 2024-10-28 RX ADMIN — PIPERACILLIN AND TAZOBACTAM 4.5 G: 4; .5 INJECTION, POWDER, FOR SOLUTION INTRAVENOUS at 10:46

## 2024-10-28 RX ADMIN — DEXTROSE AND SODIUM CHLORIDE 100 ML/HR: 5; 450 INJECTION, SOLUTION INTRAVENOUS at 16:44

## 2024-10-28 RX ADMIN — LORAZEPAM 1 MG: 2 INJECTION INTRAMUSCULAR; INTRAVENOUS at 09:17

## 2024-10-28 RX ADMIN — SODIUM CHLORIDE 200 ML/HR: 9 INJECTION, SOLUTION INTRAVENOUS at 05:34

## 2024-10-28 RX ADMIN — DEXMEDETOMIDINE HYDROCHLORIDE 0.7 MCG/KG/HR: 4 INJECTION, SOLUTION INTRAVENOUS at 06:43

## 2024-10-28 RX ADMIN — CHLORHEXIDINE GLUCONATE 15 ML: 1.2 RINSE ORAL at 09:17

## 2024-10-28 RX ADMIN — PIPERACILLIN AND TAZOBACTAM 4.5 G: 4; .5 INJECTION, POWDER, FOR SOLUTION INTRAVENOUS at 18:02

## 2024-10-28 RX ADMIN — THIAMINE HYDROCHLORIDE 500 MG: 100 INJECTION, SOLUTION INTRAMUSCULAR; INTRAVENOUS at 06:24

## 2024-10-28 RX ADMIN — THIAMINE HYDROCHLORIDE 500 MG: 100 INJECTION, SOLUTION INTRAMUSCULAR; INTRAVENOUS at 14:32

## 2024-10-28 RX ADMIN — NOREPINEPHRINE BITARTRATE 0.08 MCG/KG/MIN: 32 SOLUTION INTRAVENOUS at 18:02

## 2024-10-28 RX ADMIN — LORAZEPAM 1 MG: 2 INJECTION INTRAMUSCULAR; INTRAVENOUS at 21:42

## 2024-10-28 RX ADMIN — NOREPINEPHRINE BITARTRATE 0.3 MCG/KG/MIN: 32 SOLUTION INTRAVENOUS at 03:49

## 2024-10-28 RX ADMIN — Medication 10 ML: at 09:17

## 2024-10-28 NOTE — PROGRESS NOTES
[facility]    Mar/chart reviewed  Follow up septic shock from perforated bowel with likely underlying malignancy  Patient sedated on vent, unable to provide subjective  Had hypoglycemia earlier today    Vital Sign Min/Max for last 24 hours  Temp  Min: 98.2 °F (36.8 °C)  Max: 100.1 °F (37.8 °C)   BP  Min: 76/54  Max: 132/65   Pulse  Min: 109  Max: 179   Resp  Min: 22  Max: 24   SpO2  Min: 98 %  Max: 100 %   No data recorded   Weight  Min: 90.2 kg (198 lb 13.7 oz)  Max: 90.2 kg (198 lb 13.7 oz)   8174/2290  Appears ill, sedated on vent  perrl, normal sclera  mmm, no jvd, trachea midline, neck supple,  chest cta bilaterally, no crackles, no wheezes,   Tachy, regular  soft, midly distended, dereased bs  no c/c/ e  Skin warm, dry no rashes    Labs: 10/28: reviewed:  Glucose 90  Bun 14   cr 0.72  Bicarb 27  Wbc 21  Hgb 9.6  Plts 334  7.32/31/107  10/28: cXr: reviewed, unremarkable    Micro: +blood culture    A/P:  Severe sepsis with septic shock 2/2 peritonitis from perforated bowel due to likely  malignancy s/p OR -- continue antibiotics, supportive care, pressors  Recent weight loss  Alcohol abuse - continue thiamine, folate, watch for withdrawal  Hyperglcyemia -- ssi  Right renal mass  Lactic acidosis  Anemia  Bacteremia -- continue antibiotics  9. Hypoglycemia - switch iv fluids, decrease rate  10. SVT -- s/p dig  11. Post operative ventilator management -- not wake enough for SBT, can move towards that as able.     CC 35 mins.

## 2024-10-28 NOTE — NURSING NOTE
"   10/28/24 0801   Colostomy LUQ   Placement date: If unknown, DO NOT use \"Add Comment\" note/Placement time: If unknown, DO NOT use \"Add Comment\" note: 10/27/24 0051   Inserted by: DR. BECK  Location: LUQ   Stomal Appliance 2 piece;Clean;Dry;Intact;Leaking;Drainable;Changed   Stoma Appearance swollen;moist;red  (oval)   Peristomal Assessment Clean;Intact   Accessories/Skin Care cleansed with water;skin barrier ring   Stoma Function stool   Stool Color brown   Stool Consistency creamy   Treatment Bag change;Site care   Output (mL) 50 mL     WOCN: ostomy appliance changed. Patient is on vent, wife asleep. No Ostomy education.  Will plan to see tomorrow.   "

## 2024-10-28 NOTE — PLAN OF CARE
Goal Outcome Evaluation:  Plan of Care Reviewed With: patient, spouse        Progress: no change     Pt remains in CICU, on the vent not following commands but withdraws from pain. Vaso weaned off, weaning levo as able to maintain MAP above 65. Had an episode of SVT with rate in the 180s, digoxin 500mcg ordered and given was effective in bring HR back down to 130s. Gave D50 x1 for hypoglycemia. BRONWYN drain charted in flowsheet, had 300 UOP. Spouse updated at bedside, will continue to monitor.

## 2024-10-28 NOTE — PAYOR COMM NOTE
"Arsh Haynes (74 y.o. Male)     ATTN: NURSE REVIEWER  RE: INITIAL INPT AUTH CLINICALS   REF# [id number]  PLS REPLY TO HAMIDA MAYEN [phone] OR FAX# [phone]      Date of Birth   1950    Social Security Number       Address   [id number] [facility] [address]    Home Phone       MRN   [id number]       Episcopal   None    Marital Status                               Admission Date   10/26/24    Admission Type   Emergency    Admitting Provider   Arnulfo Lynch Jr., MD    Attending Provider   Arnulfo Lynch Jr., MD    Department, Room/Bed   [facility] CARDIAC INTENSIVE CARE, 3009/1       Discharge Date       Discharge Disposition       Discharge Destination                                 Attending Provider: Arnulfo Lynch Jr., MD    Allergies: No Known Allergies    Isolation: None   Infection: None   Code Status: CPR    Ht: 182.9 cm (72\")   Wt: 83.4 kg (183 lb 13.8 oz)    Admission Cmt: None   Principal Problem: Perforation of sigmoid colon [K63.1]                   Active Insurance as of 10/26/2024       Primary Coverage       Payor Plan Insurance Group Employer/Plan Group    ANTHEM MEDICARE REPLACEMENT ANTHEM MEDICARE ADVANTAGE [id number]       Payor Plan Address Payor Plan Phone Number Payor Plan Fax Number Effective Dates    [address] [phone]  1/1/2024 - None Entered    [facility] [id number]         Subscriber Name Subscriber Birth Date Member ID       ARSH HAYNES 1950 [id number]                     Emergency Contacts        (Rel.) Home Phone Work Phone Mobile Phone    CHANTE HAYNES (Spouse) -- -- [phone]                 History & Physical        Arnulfo Lynch Jr., MD at 10/26/24 2037                        Patient Care Team:  Provider, No Known as PCP - General      Subjective    Patient is a 74 y.o. male.  Asked to admit to the ICU patient who presented to the emergency room with several days of " worsening abdominal pain CT scan consistent with sigmoid perforation.  Dr. Guillaume surgery has requested patient be admitted to the ICU and he is reportedly en route to see the patient.  Patient has been ill for about 2 months with diarrhea it has been somewhat black apparently he is lost significant amount of weight they do not know exactly how much but his wife says he is really lost a lot of weight he has had a little bit of abdominal pain and then became very severe today left lower quadrant.  No history of any GI problems he has never had a colonoscopy he is really been healthy he sees his doctor once a year he takes a blood pressure medicine he does not remember exactly what.  He has had fevers and shaking chills today though started this morning.  He had a little bit of breakfast this morning nothing to eat or drink since then.  He does drink alcohol regularly he says 4 beers a night his wife shaking her head and indicating its higher he says he cannot remember when he went 3 or more days without alcohol.  He has never had problems with alcohol withdrawal.  He does not smoke or use illicit drugs.    Review of Systems:  No history of seizures or strokes no recent headaches or visual changes no difficulty swallowing no bad heartburn or indigestion no liver disease hepatitis or yellow jaundice no heart disease chest pain or palpitations.  No shortness of breath or cough.  No dysuria or hematuria no history of blood clots easy bleeding or bruising no recent lower extremity pain or swelling.  No polyuria polydipsia heat or cold intolerances      History  Of a lot of cancer in the family but no colon cancer that he is aware of his brother had kidney cancer.  No past medical history on file.  No past surgical history on file.  Social History     Socioeconomic History    Marital status:      No family history on file.      Allergies:  Patient has no known allergies.    Medications:  Prior to Admission  "medications    Not on File     piperacillin-tazobactam, 3.375 g, Intravenous, Once  sodium chloride, 30 mL/kg, Intravenous, Once           Objective    Vital Signs  Vital Sign Min/Max for last 24 hours  Temp  Min: 96.2 °F (35.7 °C)  Max: 96.2 °F (35.7 °C)   BP  Min: 74/45  Max: 170/125   Pulse  Min: 84  Max: 101   Resp  Min: 18  Max: 18   SpO2  Min: 95 %  Max: 98 %   No data recorded   Weight  Min: 83 kg (183 lb)  Max: 83 kg (183 lb)     No intake or output data in the 24 hours ending 10/26/24 2037  No intake/output data recorded.  Last Weight and Admission Weight        10/26/24  1933   Weight: 83 kg (183 lb)     Flowsheet Rows      Flowsheet Row First Filed Value   Admission Height 182.9 cm (72\") Documented at 10/26/2024 1933   Admission Weight 83 kg (183 lb) Documented at 10/26/2024 1933            Body mass index is 24.82 kg/m².           Physical Exam:  General Appearance: Well-developed white male he has the appearance of acute illness but also may be chronically ill.  Having shaking rigors right now  Eyes: Conjunctiva are clear and anicteric pupils are equal and reactive  ENT: Mucous membranes are pretty dry both oral and nasal Mallampati type II airway nasal septum midline  Neck: No adenopathy or thyromegaly no jugular venous distention and trachea midline  Lungs: Clear nonlabored symmetric expansion he is a little tachypneic respiratory rates in the 20s he is on room air saturating at 100%  Cardiac: He may be a little tachycardic he is hard to tell he is get so much tremor it is hard to tell on his pulse it is hard to tell on the EKG I do not hear a murmur  Abdomen: Distended with tenderness particularly in the left lower quadrant with rebound tenderness.  This distention makes it difficult to assess liver or spleen size  : Not examined  Musculoskeletal: Grossly normal  Skin: Warm and dry no jaundice no petechiae no mottling  Neuro: He is alert and cooperative he is following commands he is grossly intact " he is moving all extremities to command  Extremities/P Vascular: No clubbing no cyanosis no edema he does have palpable radial and dorsalis pedis pulses it is hard to separate out he is get so much shaking  MSE: He is appropriate he looks and is acting scared.      Labs:  Results from last 7 days   Lab Units 10/26/24  1944   GLUCOSE mg/dL 239*   SODIUM mmol/L 135*   POTASSIUM mmol/L 3.8   MAGNESIUM mg/dL 1.8   CO2 mmol/L 21.0*   CHLORIDE mmol/L 98   ANION GAP mmol/L 16.0*   CREATININE mg/dL 0.89   BUN mg/dL 14   BUN / CREAT RATIO  15.7   CALCIUM mg/dL 8.4*   ALK PHOS U/L 131*   TOTAL PROTEIN g/dL 6.0   ALT (SGPT) U/L 16   AST (SGOT) U/L 15   BILIRUBIN mg/dL 1.2   ALBUMIN g/dL 2.5*   GLOBULIN gm/dL 3.5     Estimated Creatinine Clearance: 88.1 mL/min (by C-G formula based on SCr of 0.89 mg/dL).      Results from last 7 days   Lab Units 10/26/24  1944   WBC 10*3/mm3 9.72   RBC 10*6/mm3 4.99   HEMOGLOBIN g/dL 13.7   HEMATOCRIT % 43.2   MCV fL 86.6   MCH pg 27.5   MCHC g/dL 31.7   RDW % 19.5*   RDW-SD fl 59.9*   MPV fL 8.1   PLATELETS 10*3/mm3 549*   NEUTROS ABS 10*3/mm3 8.15*   EOS ABS 10*3/mm3 0.00   BASOS ABS 10*3/mm3 0.00   NRBC /100 WBC 0.0         Results from last 7 days   Lab Units 10/26/24  1944   HSTROP T ng/L 18             Results from last 7 days   Lab Units 10/26/24  1944   LACTATE mmol/L 5.8*   PROCALCITONIN ng/mL 9.58*     Results from last 7 days   Lab Units 10/26/24  1944   INR  1.14*     Microbiology Results (last 10 days)       ** No results found for the last 240 hours. **              Diagnostics:  No results found.    EKG normal sinus rhythm  Chest x-ray no acute process noted in the chest  I did review the CT abdomen and the radiology report looks like there is a perforation probably at the sigmoid transverse colon junction there is free fluid and probable abscess there is some concerning masslike areas distally to this.  Also noted that the urinary bladder wall is thickened also right renal  mass    Assessment & Plan    Acute abdomen with probable perforation upper sigmoid colon surgery has already been consulted Dr. Johnson's is Dr. Guillaume told him he would be in to evaluate this evening.  Looks like patient probably needs to go the OR tonight but of course I will defer to the surgeries expertise on this.  He is complaining of a lot of abdominal pain we will try and get him some pain medication.  Zosyn started in the emergency department will continue  Severe sepsis secondary #1 fluid bolus IV fluids antibiotics and treat the cause of course  2 months significant weight loss with the CT scan particularly concerned there may be an underlying colonic malignancy he has never had colonoscopy.  Alcoholism he drinks regularly 4+ beers a day and cannot remember when he last went more than 3 days without an alcohol drink.  Get him on thiamine folate alcohol withdrawal protocol  Hyperglycemia whether this is stress response or whether he has some diabetes I do not know he has no history of diabetes we will check hemoglobin A1c put him on sliding scale insulin  Right renal mass concerning for malignancy.  Need to be followed up on after acute issue resolved  Bladder wall thickening UA pending  Lactic acidosis secondary to severe sepsis monitor      Patient's laboratory and vital signs I do not think do justice as to how sick the patient looks.    Arnulfo Lynch Jr, MD  10/26/24  20:37 EDT    Time: Critical care time 48 minutes thus far    Electronically signed by Arnulfo Lynch Jr., MD at 10/26/24 2334       Facility-Administered Medications as of 10/27/2024   Medication Dose Route Frequency Provider Last Rate Last Admin    acetaminophen (TYLENOL) tablet 650 mg  650 mg Oral Q4H PRN Mc Guillaume MD        Or    acetaminophen (TYLENOL) suppository 650 mg  650 mg Rectal Q4H PRN Mc Guillaume MD        Calcium Replacement - Follow Nurse / BPA Driven Protocol   Does not apply PRN  Mc Guillaume MD        chlorhexidine (PERIDEX) 0.12 % solution 15 mL  15 mL Mouth/Throat Q12H Alberto Lerner MD   15 mL at 10/27/24 2048    dexmedetomidine (PRECEDEX) 400 mcg in 100 mL NS infusion  0.2-1.5 mcg/kg/hr Intravenous Titrated Arnulfo Lynch Jr., MD 10.4 mL/hr at 10/27/24 1409 0.5 mcg/kg/hr at 10/27/24 1409    dextrose (D50W) (25 g/50 mL) IV injection 25 g  25 g Intravenous Q15 Min PRN Mc Guillaume MD        dextrose (GLUTOSE) oral gel 15 g  15 g Oral Q15 Min PRN Mc Guillaume MD        Enoxaparin Sodium (LOVENOX) syringe 40 mg  40 mg Subcutaneous Q24H Alberto Lerner MD   40 mg at 10/27/24 1024    EPINEPHrine 5 mg in 250 mL NS infusion  0.02-0.3 mcg/kg/min Intravenous Titrated Salena Peacock APRN   Stopped at 10/27/24 1734    [COMPLETED] famotidine (PEPCID) injection 20 mg  20 mg Intravenous Once Leonardo Rivera MD   20 mg at 10/26/24 2215    fentaNYL (SUBLIMAZE) bolus from bag 10 mcg/mL injection 50 mcg  50 mcg Intravenous Q30 Min PRN Arnulfo Lynch Jr., MD        fentaNYL 1000 mcg in 100 mL NS infusion   mcg/hr Intravenous Titrated Arnulfo Lynch Jr., MD 7.5 mL/hr at 10/27/24 1410 75 mcg/hr at 10/27/24 1410    fluconazole (DIFLUCAN) IVPB 400 mg  400 mg Intravenous Q24H Mc Guillaume  mL/hr at 10/27/24 0534 400 mg at 10/27/24 0534    folic acid 1 mg in sodium chloride 0.9 % 50 mL IVPB  1 mg Intravenous Daily Mc Guillaume  mL/hr at 10/27/24 0843 1 mg at 10/27/24 0843    glucagon (GLUCAGEN) injection 1 mg  1 mg Intramuscular Q15 Min PRN Mc Guillaume MD        [COMPLETED] Insulin Lispro (humaLOG) injection 10 Units  10 Units Intravenous Once Mc Guillaume MD   10 Units at 10/26/24 2155    insulin regular (humuLIN R,novoLIN R) injection 4-24 Units  4-24 Units Subcutaneous Q4H Mc Guillaume MD   8 Units at 10/27/24 1559    [COMPLETED] iopamidol (ISOVUE-300) 61 % injection  100 mL  100 mL Intravenous Once in imaging James Johnson MD   85 mL at 10/26/24 1958    labetalol (NORMODYNE,TRANDATE) injection 20 mg  20 mg Intravenous Q10 Min PRN Mc Guillaume MD        lactated ringers infusion  9 mL/hr Intravenous Continuous Mc Guillaume MD 9 mL/hr at 10/26/24 2208 9 mL/hr at 10/26/24 2208    LORazepam (ATIVAN) injection 2 mg  2 mg Intravenous Q6H Mc Guillaume MD   2 mg at 10/27/24 1559    Followed by    LORazepam (ATIVAN) injection 1 mg  1 mg Intravenous Q6H Mc Guillaume MD        LORazepam (ATIVAN) tablet 1 mg  1 mg Oral Q1H PRN Mc Guillaume MD        Or    LORazepam (ATIVAN) injection 1 mg  1 mg Intravenous Q1H PRN Mc Guillaume MD        Or    LORazepam (ATIVAN) tablet 2 mg  2 mg Oral Q1H PRN Mc Guillaume MD        Or    LORazepam (ATIVAN) injection 2 mg  2 mg Intravenous Q1H PRN Mc Guillaume MD        Or    LORazepam (ATIVAN) injection 2 mg  2 mg Intravenous Q15 Min PRN Mc Guillaume MD        Or    LORazepam (ATIVAN) injection 2 mg  2 mg Intramuscular Q15 Min PRN Mc Guillaume MD        Or    LORazepam (ATIVAN) tablet 4 mg  4 mg Oral Q1H PRN Mc Guillaume MD        Or    LORazepam (ATIVAN) injection 4 mg  4 mg Intravenous Q1H PRN Mc Guillaume MD        Magnesium Standard Dose Replacement - Follow Nurse / BPA Driven Protocol   Does not apply PRN Mc Guillaume MD        [COMPLETED] magnesium sulfate 2g/50 mL (PREMIX) infusion  2 g Intravenous Q2H Arnulfo Lynch Jr., MD   2 g at 10/27/24 1025    mupirocin (BACTROBAN) 2 % nasal ointment 1 Application  1 Application Each Nare BID Mc Guillaume MD   1 Application at 10/27/24 2048    nitroglycerin (NITROSTAT) SL tablet 0.4 mg  0.4 mg Sublingual Q5 Min PRN Mc Guillaume MD        norepinephrine (LEVOPHED) 8 mg in 250 mL NS infusion (premix)  0.02-0.3  mcg/kg/min Intravenous Titrated Mc Guillaume MD 46.7 mL/hr at 10/27/24 1559 0.3 mcg/kg/min at 10/27/24 1559    ondansetron (ZOFRAN) injection 4 mg  4 mg Intravenous Q6H PRN Mc Guillaume MD        pantoprazole (PROTONIX) injection 40 mg  40 mg Intravenous Q24H Alberto Lerner MD   40 mg at 10/27/24 0846    Pharmacy to Dose Zosyn   Does not apply Continuous PRN Mc Guillaume MD        Phosphorus Replacement - Follow Nurse / BPA Driven Protocol   Does not apply PRN Mc Guillaume MD        [COMPLETED] piperacillin-tazobactam (ZOSYN) 3.375 g IVPB in 100 mL NS MBP (CD)  3.375 g Intravenous Once James Johnson MD   3.375 g at 10/26/24 2040    piperacillin-tazobactam (ZOSYN) 4.5 g IVPB in 100 mL NS MBP (CD)  4.5 g Intravenous Q8H Arnulfo Lynch Jr., MD   4.5 g at 10/27/24 1811    [] potassium chloride 20 mEq in 50 mL IVPB  20 mEq Intravenous Q1H Arnulfo Lynch Jr., MD 50 mL/hr at 10/27/24 1024 20 mEq at 10/27/24 1024    Potassium Replacement - Follow Nurse / BPA Driven Protocol   Does not apply PRN Mc Guillaume MD        propofol (DIPRIVAN) infusion 10 mg/mL 100 mL  5-50 mcg/kg/min Intravenous Titrated Amanuel Gibbs CRNA   Stopped at 10/27/24 0600    [COMPLETED] sodium chloride 0.9 % bolus 1,000 mL  1,000 mL Intravenous Once Arnulfo Lynch Jr., MD 4,000 mL/hr at 10/27/24 0443 1,000 mL at 10/27/24 0443    [COMPLETED] sodium chloride 0.9 % bolus 2,490 mL  30 mL/kg Intravenous Once James Johnson MD 2,490 mL/hr at 10/26/24 2043 Currently Infusing at 10/26/24 2043    sodium chloride 0.9 % flush 10 mL  10 mL Intravenous PRN Mc Guillaume MD        sodium chloride 0.9 % flush 10 mL  10 mL Intravenous Q12H Mc Guillaume MD   10 mL at 10/27/24 2050    sodium chloride 0.9 % flush 10 mL  10 mL Intravenous PRN Mc Guillaume MD        sodium chloride 0.9 % flush 10 mL  10 mL Intravenous Q12H Arnulfo Lynch  MD Elsy   10 mL at 10/27/24 2050    sodium chloride 0.9 % flush 10 mL  10 mL Intravenous Q12H Arnulfo Lynch Jr., MD   10 mL at 10/27/24 2049    sodium chloride 0.9 % flush 10 mL  10 mL Intravenous Q12H Arnulfo Lynch Jr., MD   10 mL at 10/27/24 2049    sodium chloride 0.9 % flush 10 mL  10 mL Intravenous Q12H Arnulfo Lynch Jr., MD   10 mL at 10/27/24 2049    sodium chloride 0.9 % flush 10 mL  10 mL Intravenous PRN Arnulfo Lynch Jr., MD        sodium chloride 0.9 % flush 20 mL  20 mL Intravenous PRN Arnulfo Lynch Jr., MD        sodium chloride 0.9 % infusion 40 mL  40 mL Intravenous PRN Mc Guillaume MD        sodium chloride 0.9 % infusion  200 mL/hr Intravenous Continuous Mc Guillaume  mL/hr at 10/27/24 1830 200 mL/hr at 10/27/24 1830    [] sodium chloride 0.9 % infusion  125 mL/hr Intravenous Continuous Mc Guillaume MD        thiamine (B-1) 500 mg in sodium chloride 0.9 % 100 mL IVPB  500 mg Intravenous Q8H Mc Guillaume  mL/hr at 10/27/24 1353 500 mg at 10/27/24 1353    Followed by    [START ON 10/29/2024] thiamine (B-1) injection 200 mg  200 mg Intravenous Q8H Mc Guillaume MD        Followed by    [START ON 11/3/2024] thiamine (VITAMIN B-1) tablet 100 mg  100 mg Oral Daily Mc Guillaume MD        vasopressin (PITRESSIN) 20 units in 100 mL NS infusion  0.03 Units/min Intravenous Continuous Salena Peacock APRN 9 mL/hr at 10/27/24 1239 0.03 Units/min at 10/27/24 1239     Lab Results (last 24 hours)       Procedure Component Value Units Date/Time    Blood Culture - Blood, Arm, Right [[id number]]  (Normal) Collected: 10/26/24 2027    Specimen: Blood from Arm, Right Updated: 10/27/24 2046     Blood Culture No growth at 24 hours    Blood Culture - Blood, Arm, Left [[id number]]  (Normal) Collected: 10/26/24 2030    Specimen: Blood from Arm, Left Updated: 10/27/24 2046     Blood Culture No growth at  24 hours    POC Glucose Once [[id number]]  (Normal) Collected: 10/1950    Specimen: Blood Updated: 10/27/24 1953     Glucose 123 mg/dL     STAT Lactic Acid, Reflex [[id number]]  (Abnormal) Collected: 10/27/24 1811    Specimen: Blood Updated: 10/27/24 1912     Lactate 2.2 mmol/L     POC Glucose Once [[id number]]  (Abnormal) Collected: 10/27/24 1507    Specimen: Blood Updated: 10/27/24 1508     Glucose 223 mg/dL     Potassium [[id number]]  (Normal) Collected: 10/27/24 1356    Specimen: Blood Updated: 10/27/24 1455     Potassium 3.7 mmol/L      Comment: Slight hemolysis detected by analyzer. Result may be falsely elevated.       STAT Lactic Acid, Reflex [[id number]]  (Abnormal) Collected: 10/27/24 1242    Specimen: Blood Updated: 10/27/24 1319     Lactate 2.3 mmol/L     Body Fluid Culture - Body Fluid, Abdominal Wall [[id number]] Collected: 10/26/24 2259    Specimen: Body Fluid from Abdominal Wall Updated: 10/27/24 1248     Body Fluid Culture Abnormal Stain     Gram Stain Moderate (3+) Gram negative bacilli      Moderate (3+) Gram positive cocci      Moderate (3+) WBCs seen    POC Glucose Once [[id number]]  (Abnormal) Collected: 10/27/24 1045    Specimen: Blood Updated: 10/27/24 1046     Glucose 251 mg/dL     STAT Lactic Acid, Reflex [[id number]]  (Abnormal) Collected: 10/27/24 0702    Specimen: Blood Updated: 10/27/24 0736     Lactate 4.0 mmol/L     POC Glucose Once [[id number]]  (Abnormal) Collected: 10/27/24 0732    Specimen: Blood Updated: 10/27/24 0733     Glucose 230 mg/dL     Manual Differential [[id number]]  (Abnormal) Collected: 10/27/24 0432    Specimen: Blood Updated: 10/27/24 0601     Neutrophil % 87.9 %      Lymphocyte % 6.1 %      Monocyte % 6.1 %      Eosinophil % 0.0 %      Basophil % 0.0 %      Neutrophils Absolute 6.10 10*3/mm3      Lymphocytes Absolute 0.42 10*3/mm3      Monocytes Absolute 0.42 10*3/mm3      Eosinophils Absolute 0.00 10*3/mm3      Basophils Absolute 0.00 10*3/mm3      Anisocytosis  Slight/1+     Beulah Cells Mod/2+     Microcytes Slight/1+     Poikilocytes Mod/2+     Polychromasia Slight/1+     Spherocytes Mod/2+     WBC Morphology Normal     Platelet Morphology Normal    CBC & Differential [[id number]]  (Abnormal) Collected: 10/27/24 0432    Specimen: Blood Updated: 10/27/24 0550    Narrative:      The following orders were created for panel order CBC & Differential.  Procedure                               Abnormality         Status                     ---------                               -----------         ------                     CBC Auto Differential[[id number]]        Abnormal            Final result                 Please view results for these tests on the individual orders.    CBC Auto Differential [[id number]]  (Abnormal) Collected: 10/27/24 0432    Specimen: Blood Updated: 10/27/24 0550     WBC 6.94 10*3/mm3      RBC 3.55 10*6/mm3      Hemoglobin 9.8 g/dL      Hematocrit 30.6 %      MCV 86.2 fL      MCH 27.6 pg      MCHC 32.0 g/dL      RDW 18.9 %      RDW-SD 58.5 fl      MPV 8.6 fL      Platelets 483 10*3/mm3     Comprehensive Metabolic Panel [[id number]]  (Abnormal) Collected: 10/27/24 0432    Specimen: Blood Updated: 10/27/24 0515     Glucose 251 mg/dL      BUN 12 mg/dL      Creatinine 0.70 mg/dL      Sodium 140 mmol/L      Potassium 3.0 mmol/L      Chloride 110 mmol/L      CO2 17.7 mmol/L      Calcium 7.0 mg/dL      Total Protein 4.1 g/dL      Albumin 2.1 g/dL      ALT (SGPT) 8 U/L      AST (SGOT) <5 U/L      Alkaline Phosphatase 52 U/L      Total Bilirubin 1.1 mg/dL      Globulin 2.0 gm/dL      A/G Ratio 1.1 g/dL      BUN/Creatinine Ratio 17.1     Anion Gap 12.3 mmol/L      eGFR 97.9 mL/min/1.73     Narrative:      GFR Normal >60  Chronic Kidney Disease <60  Kidney Failure <15    The GFR formula is only valid for adults with stable renal function between ages 18 and 70.    Magnesium [[id number]]  (Abnormal) Collected: 10/27/24 0432    Specimen: Blood Updated: 10/27/24 0515      Magnesium 1.4 mg/dL     STAT Lactic Acid, Reflex [[id number]]  (Abnormal) Collected: 10/27/24 0432    Specimen: Blood Updated: 10/27/24 0515     Lactate 3.6 mmol/L     Phosphorus [[id number]]  (Abnormal) Collected: 10/27/24 0432    Specimen: Blood Updated: 10/27/24 0512     Phosphorus 2.4 mg/dL     CBC (No Diff) [[id number]]  (Abnormal) Collected: 10/27/24 0254    Specimen: Blood Updated: 10/27/24 0405     WBC 2.92 10*3/mm3      RBC 2.33 10*6/mm3      Hemoglobin 6.5 g/dL      Hematocrit 20.4 %      MCV 87.6 fL      MCH 27.9 pg      MCHC 31.9 g/dL      RDW 19.1 %      RDW-SD 60.9 fl      MPV 8.5 fL      Platelets 262 10*3/mm3     Calcium, Ionized [[id number]]  (Normal) Collected: 10/27/24 0254    Specimen: Blood Updated: 10/27/24 0348     Ionized Calcium 1.18 mmol/L     Narrative:      This test was developed, its performance characteristics determined and judged suitable for clinical purposes by [facility].  The specimen type utilized for this test has not been cleared or approved by the FDA.  The laboratory is regulated under CLIA as qualified to perform high-complexity testing.    Urinalysis With Microscopic If Indicated (No Culture) - Urine, Clean Catch [[id number]]  (Abnormal) Collected: 10/27/24 0256    Specimen: Urine, Clean Catch Updated: 10/27/24 0344     Color, UA Dark Yellow     Appearance, UA Cloudy     pH, UA <=5.0     Specific Gravity, UA >1.030     Glucose, UA Negative     Ketones, UA Negative     Bilirubin, UA Negative     Blood, UA Moderate (2+)     Protein, UA Trace     Leuk Esterase, UA Trace     Nitrite, UA Negative     Urobilinogen, UA 1.0 E.U./dL    Urinalysis, Microscopic Only - Urine, Clean Catch [[id number]]  (Abnormal) Collected: 10/27/24 0256    Specimen: Urine, Clean Catch Updated: 10/27/24 0342     RBC, UA 21-50 /HPF      WBC, UA 0-2 /HPF      Bacteria, UA None Seen /HPF      Squamous Epithelial Cells, UA 0-2 /HPF      Hyaline Casts, UA 7-12 /LPF      Methodology  Automated Microscopy    High Sensitivity Troponin T 2Hr [[id number]]  (Normal) Collected: 10/27/24 0254    Specimen: Blood Updated: 10/27/24 0340     HS Troponin T 20 ng/L      Troponin T Delta 2 ng/L     Narrative:      High Sensitive Troponin T Reference Range:  <14.0 ng/L- Negative Female for AMI  <22.0 ng/L- Negative Male for AMI  >=14 - Abnormal Female indicating possible myocardial injury.  >=22 - Abnormal Male indicating possible myocardial injury.   Clinicians would have to utilize clinical acumen, EKG, Troponin, and serial changes to determine if it is an Acute Myocardial Infarction or myocardial injury due to an underlying chronic condition.         Ethanol [[id number]] Collected: 10/27/24 0254    Specimen: Blood Updated: 10/27/24 0340     Ethanol <10 mg/dL      Ethanol % <0.010 %     STAT Lactic Acid, Reflex [[id number]]  (Abnormal) Collected: 10/27/24 0254    Specimen: Blood Updated: 10/27/24 0337     Lactate 3.2 mmol/L     POC Glucose Once [[id number]]  (Abnormal) Collected: 10/27/24 0329    Specimen: Blood Updated: 10/27/24 0331     Glucose 230 mg/dL     Blood Gas, Arterial - [[id number]]  (Abnormal) Collected: 10/27/24 0237    Specimen: Arterial Blood Updated: 10/27/24 0240     Site Arterial Line     Jatinder's Test N/A     pH, Arterial 7.348 pH units      pCO2, Arterial 32.4 mm Hg      pO2, Arterial 89.0 mm Hg      HCO3, Arterial 17.8 mmol/L      Base Excess, Arterial -7.0 mmol/L      Comment: Serial Number: [id number]:  [id number]        O2 Saturation, Arterial 96.5 %      A-a DO2 0.3 mmHg      CO2 Content 18.8 mmol/L      Barometric Pressure for Blood Gas 757.0000 mmHg      Modality Adult Vent     FIO2 50 %      Ventilator Mode AC     Set Tidal Volume 550     Set Mech Resp Rate 22     Rate 22 Breaths/minute      PEEP 5     Hemodilution No     Device Comment sat - 99     PO2/FIO2 178    Anaerobic Culture - Body Fluid, Abdominal Wall [[id number]] Collected: 10/26/24 2259    Specimen: Body Fluid from Abdominal  Wall Updated: 10/27/24 0139    Fungus Culture - Body Fluid, Abdominal Wall [[id number]] Collected: 10/26/24 2259    Specimen: Body Fluid from Abdominal Wall Updated: 10/27/24 0139    Blood Gas, Arterial - [[id number]]  (Abnormal) Collected: 10/27/24 0129    Specimen: Arterial Blood Updated: 10/27/24 0137     Site Arterial Line     Jatinder's Test N/A     pH, Arterial 7.286 pH units      pCO2, Arterial 41.0 mm Hg      pO2, Arterial 384.3 mm Hg      HCO3, Arterial 19.5 mmol/L      Base Excess, Arterial -6.7 mmol/L      Comment: Serial Number: [id number]:  [id number]        O2 Saturation, Arterial 99.9 %      A-a DO2 0.5 mmHg      CO2 Content 20.8 mmol/L      Barometric Pressure for Blood Gas 757.6000 mmHg      Modality Adult Vent     FIO2 100 %      Ventilator Mode AC     Set Tidal Volume 550     Set Mech Resp Rate 20     Rate 20 Breaths/minute      PEEP 5     Notified Who Salena GUZMAN     Read Back Yes     Notified Time --     Hemodilution No     Device Comment sat - 100     PO2/FIO2 384    Hemoglobin A1c [[id number]]  (Abnormal) Collected: 10/26/24 1944    Specimen: Blood Updated: 10/26/24 2154     Hemoglobin A1C 6.60 %     Narrative:      Hemoglobin A1C Ranges:    Increased Risk for Diabetes  5.7% to 6.4%  Diabetes                     >= 6.5%  Diabetic Goal                < 7.0%    TSH Rfx On Abnormal To Free T4 [[id number]]  (Normal) Collected: 10/26/24 1944    Specimen: Blood Updated: 10/26/24 2140     TSH 3.270 uIU/mL     POC Glucose Once [[id number]]  (Abnormal) Collected: 10/26/24 2119    Specimen: Blood Updated: 10/26/24 2121     Glucose 223 mg/dL           Imaging Results (Last 24 Hours)       Procedure Component Value Units Date/Time    XR Chest 1 View [[id number]] Collected: 10/27/24 0919     Updated: 10/27/24 0925    Narrative:      ONE-VIEW PORTABLE CHEST AT 9:16 A.M.     HISTORY: Respiratory failure. ET tube placement.     FINDINGS: The lungs are moderately expanded and clear and unchanged from  5 hours ago.  The heart size is normal.     A right jugular catheter ends in the SVC. An NG tube ends in the mid  stomach. An ET tube ends approximately 5 cm above the la.     This report was finalized on 10/27/2024 9:22 AM by Dr. James Moya M.D on Workstation: [id number]       XR Chest 1 View [[id number]] Collected: 10/27/24 0437     Updated: 10/27/24 0441    Narrative:      SINGLE VIEW OF THE CHEST     HISTORY: Line placement     COMPARISON: October 27, 2024     FINDINGS:  Right internal jugular vein central venous line appears to terminate  within superior vena cava. No pneumothorax is seen. Remaining tubes and  lines are stable. There is bibasilar atelectasis. There may be trace  bilateral effusions.       Impression:      Right internal jugular vein central venous line appears to terminate  within superior vena cava. No pneumothorax is seen.     This report was finalized on 10/27/2024 4:38 AM by Dr. Lori Belle M.D on Workstation: [id number]       XR Chest 1 View [[id number]] Collected: 10/27/24 0210     Updated: 10/27/24 0215    Narrative:      SINGLE VIEW OF THE CHEST     HISTORY: Intubation     COMPARISON: October 26, 2024     FINDINGS:  Endotracheal tube terminates in satisfactory position. Nasogastric tube  appears to be positioned within the proximal stomach. There is  cardiomegaly. There is no vascular congestion. Bibasilar atelectasis is  suspected.       Impression:      Endotracheal tube terminates in satisfactory position.     This report was finalized on 10/27/2024 2:12 AM by Dr. Lori Belle M.D on Workstation: [id number]             ECG/EMG Results (last 24 hours)       Procedure Component Value Units Date/Time    ECG 12 Lead Altered Mental Status [[id number]] Collected: 10/26/24 1944     Updated: 10/27/24 0801     QT Interval 391 ms      QTC Interval 484 ms     Narrative:      HEART RATE=92  bpm  RR Ffxhkjai=782  ms  IA Kbpsozde=639  ms  P Horizontal Axis=34  deg  P Front Axis=43   "deg  QRSD Interval=89  ms  QT Qdupumyk=132  ms  SApY=449  ms  QRS Axis=10  deg  T Wave Axis=48  deg  - BORDERLINE ECG -  Sinus rhythm  Low voltage, extremity leads  Borderline  prolonged QT interval  No Prior Tracing for Comparison  Electronically Signed By: Jigar SwansonELHAM) ([facility]) 2024-10-27 08:01:13  Date and Time of Study:2024-10-26 19:44:45    Telemetry Scan [[id number]] Resulted: 10/26/24     Updated: 10/27/24 1937          Orders (last 24 hrs)        Start     Ordered    11/03/24 0900  thiamine (VITAMIN B-1) tablet 100 mg  Daily        Placed in \"Followed by\" Linked Group    10/26/24 2114    11/03/24 0900  Change Transparent Dressing & Needleless Connectors Every 7 Days  Weekly        Comments: Per [facility] Policy    10/27/24 0957    10/29/24 2200  thiamine (B-1) injection 200 mg  Every 8 Hours Scheduled        Placed in \"Followed by\" Linked Group    10/26/24 2114    10/28/24 0600  CBC & Differential  Daily       10/27/24 0824    10/28/24 0600  Comprehensive Metabolic Panel  Daily       10/27/24 0824    10/28/24 0600  Blood Gas, Arterial -  Daily       10/27/24 0824    10/28/24 0600  Daily CHG Bath While Central Line in Place  Daily       10/27/24 0957    10/27/24 2200  LORazepam (ATIVAN) injection 1 mg  Every 6 Hours        Placed in \"Followed by\" Linked Group    10/26/24 2114    10/27/24 1954  POC Glucose Once  PROCEDURE ONCE        Comments: Complete no more than 45 minutes prior to patient eating      10/1952    10/27/24 1842  STAT Lactic Acid, Reflex  PROCEDURE ONCE         10/27/24 1319    10/27/24 1509  POC Glucose Once  PROCEDURE ONCE        Comments: Complete no more than 45 minutes prior to patient eating      10/27/24 1507    10/27/24 1429  Potassium  Timed         10/27/24 0529    10/27/24 1302  STAT Lactic Acid, Reflex  PROCEDURE ONCE         10/27/24 0736    10/27/24 1200  Oral Care & Teeth Brushing - Intubated Patient  Every 4 Hours      Comments: [place] Teeth at Least 2x/day    10/27/24 " "0827    10/27/24 1159  D / C Arterial Line  Once         10/27/24 1158    10/27/24 1100  piperacillin-tazobactam (ZOSYN) 4.5 g IVPB in 100 mL NS MBP (CD)  Every 8 Hours         10/27/24 0759    10/27/24 1047  POC Glucose Once  PROCEDURE ONCE        Comments: Complete no more than 45 minutes prior to patient eating      10/27/24 1045    10/27/24 1045  sodium chloride 0.9 % flush 10 mL  Every 12 Hours Scheduled         10/27/24 0957    10/27/24 1045  sodium chloride 0.9 % flush 10 mL  Every 12 Hours Scheduled         10/27/24 0957    10/27/24 1045  sodium chloride 0.9 % flush 10 mL  Every 12 Hours Scheduled         10/27/24 0957    10/27/24 1045  sodium chloride 0.9 % flush 10 mL  Every 12 Hours Scheduled         10/27/24 0957    10/27/24 1008  Insert Indwelling Urinary Catheter  Once        Comments: Follow Protocol As Outlined in Process Instructions (Open Order Report to View Full Instructions)   Placed in \"And\" Linked Group    10/27/24 1008    10/27/24 1008  Assess Need for Indwelling Urinary Catheter - Follow Removal Protocol  Continuous        Comments: Follow Protocol As Outlined in Process Instructions (Open Order Report to View Full Instructions)   Placed in \"And\" Linked Group    10/27/24 1008    10/27/24 1008  Urinary Catheter Care  Every Shift      Placed in \"And\" Linked Group    10/27/24 1008    10/27/24 0958  Connectors / Hubs Must Be Scrubbed 15 Seconds Using 70% Alcohol Before Access - Allow to Dry Before Accessing Line  Continuous         10/27/24 0957    10/27/24 0957  sodium chloride 0.9 % flush 10 mL  As Needed         10/27/24 0957    10/27/24 0957  sodium chloride 0.9 % flush 20 mL  As Needed         10/27/24 0957    10/27/24 0915  Enoxaparin Sodium (LOVENOX) syringe 40 mg  Every 24 Hours        Note to Pharmacy: If ok with surgery    10/27/24 0825    10/27/24 0915  chlorhexidine (PERIDEX) 0.12 % solution 15 mL  Every 12 Hours Scheduled         10/27/24 0827    10/27/24 0915  pantoprazole " (PROTONIX) injection 40 mg  Every 24 Hours Scheduled         10/27/24 0827    10/27/24 0900  folic acid 1 mg in sodium chloride 0.9 % 50 mL IVPB  Daily         10/26/24 2114    10/27/24 0827  Elevate HOB  Continuous         10/27/24 0827    10/27/24 0827  Subglottic Suctioning Must Be Done Every 6 Hours  Per Order Details        Comments: At Least Every 6 Hours    10/27/24 0827    10/27/24 0827  RN May Place Order For ABG As Needed for Respiratory Distress  Continuous         10/27/24 0827    10/27/24 0825  XR Chest 1 View  Daily       10/27/24 0824    10/27/24 0734  POC Glucose Once  PROCEDURE ONCE        Comments: Complete no more than 45 minutes prior to patient eating      10/27/24 0732    10/27/24 0732  STAT Lactic Acid, Reflex  PROCEDURE ONCE         10/27/24 0515    10/27/24 0630  Initiation Sequence for Vasoactive Infusions  Every Shift Nursing      Comments: Initiation Sequence for Vasoactive Infusions    10/27/24 0442    10/27/24 0630  Weaning Sequence for Vasoactive Infusions  Every Shift Nursing      Comments: Weaning Sequence for Vasoactive Infusions    10/27/24 0442    10/27/24 0615  magnesium sulfate 2g/50 mL (PREMIX) infusion  Every 2 Hours         10/27/24 0529    10/27/24 0615  potassium chloride 20 mEq in 50 mL IVPB  Every 1 Hour         10/27/24 0529    10/27/24 0600  Calcium, Ionized  Morning Draw         10/26/24 2112    10/27/24 0600  Comprehensive Metabolic Panel  Morning Draw         10/26/24 2112    10/27/24 0600  Lactic Acid, Plasma  Morning Draw,   Status:  Canceled         10/26/24 2112    10/27/24 0600  Magnesium  Morning Draw         10/26/24 2112    10/27/24 0600  Phosphorus  Morning Draw         10/26/24 2112    10/27/24 0600  CBC (No Diff)  Morning Draw         10/26/24 2112    10/27/24 0600  Magnesium  Morning Draw         10/27/24 0529    10/27/24 0554  STAT Lactic Acid, Reflex  PROCEDURE ONCE         10/27/24 0337    10/27/24 0530  dexmedetomidine (PRECEDEX) 400 mcg in 100 mL NS  infusion  Titrated         10/27/24 0441    10/27/24 0530  fentaNYL 1000 mcg in 100 mL NS infusion  Titrated         10/27/24 0441    10/27/24 0504  Clinical [place] Withdrawal Assessment (CIWA-Ar)  Per Hospital Policy         10/27/24 0504    10/27/24 0500  sodium chloride 0.9 % bolus 1,000 mL  Once         10/27/24 0407    10/27/24 0456  Manual Differential  Once         10/27/24 0455    10/27/24 0442  Target Arousal Level RASS -1 to -2  Continuous         10/27/24 0441    10/27/24 0440  fentaNYL (SUBLIMAZE) bolus from bag 10 mcg/mL injection 50 mcg  Every 30 Minutes PRN         10/27/24 0441    10/27/24 0420  CBC & Differential  Once         10/27/24 0422    10/27/24 0420  CBC Auto Differential  PROCEDURE ONCE         10/27/24 0422    10/27/24 0413  XR Chest 1 View  1 Time Imaging         10/27/24 0412    10/27/24 0407  Restraints Non-Violent or Non-Self Destructive  Calendar Day         10/27/24 0406    10/27/24 0406  CBC & Differential  Once,   Status:  Canceled         10/27/24 0406    10/27/24 0404  Initiate & Follow Hypercapnic Monitoring Guideline for Opioid Administration via EtCO2 and / or SpO2  Continuous        Comments: Follow Hypercapnic Monitoring Guideline As Outlined in Process Instructions (Open Order Report to View Full Instructions)    10/27/24 0404    10/27/24 0404  Opioid Administration - Document EtCO2 and / or SpO2 With Each Set of Vitals & Any Change in Patient Status  Per Order Details        Comments: With Each Set of Vitals & Any Change in Patient Status    10/27/24 0404    10/27/24 0404  Opioid Administration - Notify Provider Hypercapnic Monitoring  Continuous        Comments: Open Order Report to View Parameters Requiring Provider Notification    10/27/24 0404    10/27/24 0404  Opioid Administration - Continuous Pulse Oximetry (SpO2)  Continuous         10/27/24 0404    10/27/24 0345  EPINEPHrine 5 mg in 250 mL NS infusion  Titrated         10/27/24 0247    10/27/24 0332  POC  Glucose Once  PROCEDURE ONCE        Comments: Complete no more than 45 minutes prior to patient eating      10/27/24 0329    10/27/24 0332  Wound Ostomy Eval & Treat  Once         10/27/24 0332    10/27/24 0330  sodium chloride 0.9 % infusion  Continuous         10/27/24 0232    10/27/24 0330  fluconazole (DIFLUCAN) IVPB 400 mg  Every 24 Hours         10/27/24 0232    10/27/24 0330  vasopressin (PITRESSIN) 20 units in 100 mL NS infusion  Continuous         10/27/24 0233    10/27/24 0330  Urinalysis, Microscopic Only - Urine, Clean Catch  Once         10/27/24 0329    10/27/24 0317  Ventilator - Vent Mode: AC/VC; Rate: Other; Rate: 22; FiO2: Titrate Per SpO2; Titrate Oxygen for SpO2: 90% or Greater; PEEP: 5; Tidal Volume: mL; TV: 550  Continuous         10/27/24 0316    10/27/24 0300  piperacillin-tazobactam (ZOSYN) 3.375 g IVPB in 100 mL NS MBP (CD)  Every 8 Hours,   Status:  Discontinued         10/26/24 2121    10/27/24 0241  Blood Gas, Arterial -  PROCEDURE ONCE         10/27/24 0237    10/27/24 0233  Nasogastric Tube Maintenance  Continuous         10/27/24 0232    10/27/24 0233  Drain Care  Every Shift       10/27/24 0232    10/27/24 0233  Wound Ostomy Eval & Treat  Once         10/27/24 0232    10/27/24 0156  Ventilator - Vent Mode: AC/VC; Rate: Other; Rate: 22; FiO2: Titrate Per SpO2; Titrate Oxygen for SpO2: 90 - 95%; PEEP: 5; Tidal Volume: mL; TV: 550  Continuous,   Status:  Canceled         10/27/24 0156    10/27/24 0138  Blood Gas, Arterial -  PROCEDURE ONCE         10/27/24 0129    10/27/24 0132  XR Chest 1 View  1 Time Imaging         10/27/24 0132    10/27/24 0130  propofol (DIPRIVAN) infusion 10 mg/mL 100 mL  Titrated         10/27/24 0128    10/27/24 0122  POC Glucose Once  Once        Comments: Post op glucose check on all diabetic patients...Notify Anesthesia if blood sugar is less than 80 mg/dL or greater than 220 mg/dL.      10/27/24 0121    10/27/24 0122  Vital signs every 5 minutes for 15  "minutes, every 15 minutes thereafter.  Once,   Status:  Canceled         10/27/24 0121    10/27/24 0122  Call Anesthesiologist for additional IV Fluid bolus for Hypotension/Tachycardia  Until Discontinued,   Status:  Canceled         10/27/24 0121    10/27/24 0122  Notify Anesthesia of Any Acute Changes in Patient Condition  Until Discontinued,   Status:  Canceled         10/27/24 0121    10/27/24 0122  Notify Anesthesia for Unrelieved Pain  Until Discontinued,   Status:  Canceled         10/27/24 0121    10/27/24 0122  Once DC criteria to floor met, follow surgeon's orders.  Until Discontinued,   Status:  Canceled         10/27/24 0121    10/27/24 0122  Discharge patient from PACU when discharge criteria is met.  Until Discontinued,   Status:  Canceled         10/27/24 0121    10/27/24 0122  Ventilator - Vent Mode: AC/VC; Rate: 12; FiO2: 50%; PEEP: 5  Continuous,   Status:  Canceled         10/27/24 0121    10/27/24 0121  HYDROcodone-acetaminophen (NORCO) 5-325 MG per tablet 1 tablet  Once As Needed,   Status:  Discontinued         10/27/24 0121    10/27/24 0121  HYDROmorphone (DILAUDID) injection 0.25 mg  Every 5 Minutes PRN,   Status:  Discontinued         10/27/24 0121    10/27/24 0121  HYDROcodone-acetaminophen (NORCO) 7.5-325 MG per tablet 1 tablet  Every 4 Hours PRN,   Status:  Discontinued         10/27/24 0121    10/27/24 0121  fentaNYL citrate (PF) (SUBLIMAZE) injection 25 mcg  Every 5 Minutes PRN,   Status:  Discontinued         10/27/24 0121    10/27/24 0121  naloxone (NARCAN) injection 0.2 mg  As Needed,   Status:  Discontinued         10/27/24 0121    10/27/24 0121  flumazenil (ROMAZICON) injection 0.2 mg  As Needed,   Status:  Discontinued         10/27/24 0121    10/27/24 0121  ondansetron (ZOFRAN) injection 4 mg  Once As Needed,   Status:  Discontinued         10/27/24 0121    10/27/24 0121  droperidol (INAPSINE) injection 0.625 mg  Every 20 Minutes PRN,   Status:  Discontinued        Placed in \"Or\" " "Linked Group    10/27/24 0121    10/27/24 0121  droperidol (INAPSINE) injection 0.625 mg  Every 20 Minutes PRN,   Status:  Discontinued        Placed in \"Or\" Linked Group    10/27/24 0121    10/27/24 0121  promethazine (PHENERGAN) suppository 25 mg  Once As Needed,   Status:  Discontinued        Placed in \"Or\" Linked Group    10/27/24 0121    10/27/24 0121  promethazine (PHENERGAN) tablet 25 mg  Once As Needed,   Status:  Discontinued        Placed in \"Or\" Linked Group    10/27/24 0121    10/27/24 0121  labetalol (NORMODYNE,TRANDATE) injection 5 mg  Every 5 Minutes PRN,   Status:  Discontinued         10/27/24 0121    10/27/24 0121  hydrALAZINE (APRESOLINE) injection 5 mg  Every 10 Minutes PRN,   Status:  Discontinued         10/27/24 0121    10/27/24 0121  ePHEDrine injection 5 mg  Once As Needed,   Status:  Discontinued         10/27/24 0121    10/27/24 0121  diphenhydrAMINE (BENADRYL) injection 12.5 mg  Every 15 Minutes PRN,   Status:  Discontinued         10/27/24 0121    10/27/24 0121  ipratropium-albuterol (DUO-NEB) nebulizer solution 3 mL  Once As Needed,   Status:  Discontinued         10/27/24 0121    10/27/24 0023  bupivacaine-EPINEPHrine PF (MARCAINE w/EPI) 0.5% -1:446721 injection  As Needed,   Status:  Discontinued         10/27/24 0023    10/27/24 0022  sodium chloride (NS) irrigation solution  As Needed,   Status:  Discontinued         10/27/24 0022    10/27/24 0000  POC Glucose Q4H  Every 4 Hours      Comments: Complete no more than 45 minutes prior to patient eating      10/26/24 2112    10/26/24 2330  Tissue Pathology Exam  RELEASE UPON ORDERING        Comments: Specimen A: STITCH AT PROXIMAL END OF DESCENDING COLON    Specimen B: STITCH MARKS DISTAL END OF SIGMOID COLON      10/26/24 2330    10/26/24 2305  Anaerobic Culture - Body Fluid, Abdominal Wall  RELEASE UPON ORDERING         10/26/24 2305    10/26/24 2305  Fungus Culture - Body Fluid, Abdominal Wall  RELEASE UPON ORDERING         10/26/24 " "2305    10/26/24 2305  Body Fluid Culture - Body Fluid, Abdominal Wall  RELEASE UPON ORDERING         10/26/24 2305    10/26/24 2244  STAT Lactic Acid, Reflex  PROCEDURE ONCE         10/26/24 2014    10/26/24 2230  sodium chloride 0.9 % flush 3 mL  Every 12 Hours Scheduled,   Status:  Discontinued         10/26/24 2139    10/26/24 2230  lactated ringers infusion  Continuous         10/26/24 2139    10/26/24 2230  famotidine (PEPCID) injection 20 mg  Once         10/26/24 2139    10/26/24 2230  Insulin Lispro (humaLOG) injection 10 Units  Once         10/26/24 2132    10/26/24 2230  norepinephrine (LEVOPHED) 8 mg in 250 mL NS infusion (premix)  Titrated         10/26/24 2136    10/26/24 2200  Vital Signs Every Hour and Per Hospital Policy Based on Patient Condition  Every Hour       10/26/24 2112    10/26/24 2200  Intake & Output  Every Hour       10/26/24 2112    10/26/24 2200  thiamine (B-1) 500 mg in sodium chloride 0.9 % 100 mL IVPB  Every 8 Hours Scheduled        Placed in \"Followed by\" Linked Group    10/26/24 2114    10/26/24 2200  LORazepam (ATIVAN) injection 2 mg  Every 6 Hours        Placed in \"Followed by\" Linked Group    10/26/24 2114    10/26/24 2144  High Sensitivity Troponin T 2Hr  PROCEDURE ONCE         10/26/24 2022    10/26/24 2140  Continuous Pulse Oximetry  Continuous,   Status:  Canceled         10/26/24 2139    10/26/24 2140  Insert Peripheral IV  Once,   Status:  Canceled         10/26/24 2139    10/26/24 2140  Saline Lock & Maintain IV Access  Continuous,   Status:  Canceled         10/26/24 2139    10/26/24 2140  May take Beta Blocker from home with sip of water.  Once,   Status:  Canceled        Comments: Only applicable to patients on home Beta Blocker    10/26/24 2139    10/26/24 2139  Vital Signs - Per Anesthesia Protocol  As Needed,   Status:  Canceled       10/26/24 2139    10/26/24 2139  POC Glucose PRN  As Needed,   Status:  Canceled      Comments: For all diabetic patients. Notify " "Anesthesiologist for blood sugar > 180.      10/26/24 2139    10/26/24 2139  sodium chloride 0.9 % flush 3-10 mL  As Needed,   Status:  Discontinued         10/26/24 2139    10/26/24 2139  lidocaine (XYLOCAINE) 1 % injection 0.5 mL  Once As Needed,   Status:  Discontinued         10/26/24 2139    10/26/24 2139  fentaNYL citrate (PF) (SUBLIMAZE) injection 50 mcg  Once As Needed,   Status:  Discontinued         10/26/24 2139    10/26/24 2132  HYDROmorphone (DILAUDID) injection 0.5 mg  Every 2 Hours PRN,   Status:  Discontinued         10/26/24 2132    10/26/24 2132  Type & Screen  Once         10/26/24 2132    10/26/24 2128  sodium chloride 0.9 % flush 10 mL  Every 12 Hours Scheduled         10/26/24 2112    10/26/24 2128  mupirocin (BACTROBAN) 2 % nasal ointment 1 Application  2 Times Daily         10/26/24 2112    10/26/24 2128  sennosides-docusate (PERICOLACE) 8.6-50 MG per tablet 2 tablet  2 Times Daily,   Status:  Discontinued        Placed in \"And\" Linked Group    10/26/24 2112    10/26/24 2128  insulin regular (humuLIN R,novoLIN R) injection 4-24 Units  Every 4 Hours         10/26/24 2112    10/26/24 2128  sodium chloride 0.9 % infusion  Continuous         10/26/24 2112    10/26/24 2122  POC Glucose Once  PROCEDURE ONCE        Comments: Complete no more than 45 minutes prior to patient eating      10/26/24 2119    10/26/24 2116  Please keep at least 2 large-bore IVs functional at all times  Physician Communication Order  Once        Comments: Please keep at least 2 large-bore IVs functional at all times    10/26/24 2116    10/26/24 2114  LORazepam (ATIVAN) tablet 1 mg  Every 1 Hour PRN        Placed in \"Or\" Linked Group    10/26/24 2114    10/26/24 2114  LORazepam (ATIVAN) injection 1 mg  Every 1 Hour PRN        Placed in \"Or\" Linked Group    10/26/24 2114    10/26/24 2114  LORazepam (ATIVAN) tablet 2 mg  Every 1 Hour PRN        Placed in \"Or\" Linked Group    10/26/24 2114    10/26/24 2114  LORazepam (ATIVAN) " "injection 2 mg  Every 1 Hour PRN        Placed in \"Or\" Linked Group    10/26/24 2114    10/26/24 2114  LORazepam (ATIVAN) injection 2 mg  Every 15 Minutes PRN        Placed in \"Or\" Linked Group    10/26/24 2114    10/26/24 2114  LORazepam (ATIVAN) injection 2 mg  Every 15 Minutes PRN        Placed in \"Or\" Linked Group    10/26/24 2114    10/26/24 2114  LORazepam (ATIVAN) tablet 4 mg  Every 1 Hour PRN        Placed in \"Or\" Linked Group    10/26/24 2114    10/26/24 2114  LORazepam (ATIVAN) injection 4 mg  Every 1 Hour PRN        Placed in \"Or\" Linked Group    10/26/24 2114    10/26/24 2114  Ethanol  Once         10/26/24 2114    10/26/24 2114  Initiate Alcohol Withdrawal Protocol  Once         10/26/24 2114    10/26/24 2114  Vital Signs  Per Hospital Policy         10/26/24 2114    10/26/24 2114  Continuous Pulse Oximetry  Continuous,   Status:  Canceled         10/26/24 2114    10/26/24 2114  Obtain Baseline Clinical [place] Withdrawal Assessment - Ar (CIWA-Ar), Sedation Scale & Vital Signs  Once        Comments: Follow CIWA Scoring Reference Guide    10/26/24 2114    10/26/24 2114  Clinical [place] Withdrawal Assessment (CIWA-Ar)  Per Hospital Policy         10/26/24 2114    10/26/24 2114  If CIWA-Ar Score Less Than 8 For 3 Consecutive Assessments, Monitor Every 4 Hours & Discontinue Assessment When CIWA-Ar Less Than 8 for 24 Hours  Per Hospital Policy        Comments: Contact Provider to Restart Protocol if Any Symptoms Reappear    10/26/24 2114    10/26/24 2114  Seizure Precautions  Continuous         10/26/24 2114    10/26/24 2114  Notify Provider - Withdrawal  Continuous        Comments: Open Order Report to View Parameters Requiring Provider Notification    10/26/24 2114    10/26/24 2114  Notify Provider of Abnormal Lab Results  Continuous        Comments: Open Order Report to View Parameters Requiring Provider Notification    10/26/24 2114    10/26/24 2114  Notify Provider - Vitals  Continuous      " "  Comments: Open Order Report to View Parameters Requiring Provider Notification    10/26/24 2114    10/26/24 2114  Safety Precautions  Continuous         10/26/24 2114    10/26/24 2114  Fall Precautions  Continuous         10/26/24 2114    10/26/24 2113  Daily Weights  Daily       10/26/24 2112    10/26/24 2113  Daily CHG Bath While in Critical Care  Daily      Comments: Use for admission bath and length of critical care stay.    10/26/24 2112    10/26/24 2112  Pharmacy to Dose Zosyn  Continuous PRN         10/26/24 2112    10/26/24 2111  fentaNYL citrate (PF) (SUBLIMAZE) injection 50 mcg  Every 30 Minutes PRN,   Status:  Discontinued         10/26/24 2112    10/26/24 2111  ondansetron (ZOFRAN) injection 4 mg  Every 6 Hours PRN         10/26/24 2112    10/26/24 2110  labetalol (NORMODYNE,TRANDATE) injection 20 mg  Every 10 Minutes PRN         10/26/24 2112    10/26/24 2110  acetaminophen (TYLENOL) tablet 650 mg  Every 4 Hours PRN        Placed in \"Or\" Linked Group    10/26/24 2112    10/26/24 2110  acetaminophen (TYLENOL) suppository 650 mg  Every 4 Hours PRN        Placed in \"Or\" Linked Group    10/26/24 2112    10/26/24 2109  Potassium Replacement - Follow Nurse / BPA Driven Protocol  As Needed         10/26/24 2112    10/26/24 2109  Magnesium Standard Dose Replacement - Follow Nurse / BPA Driven Protocol  As Needed         10/26/24 2112    10/26/24 2109  Phosphorus Replacement - Follow Nurse / BPA Driven Protocol  As Needed         10/26/24 2112    10/26/24 2109  Calcium Replacement - Follow Nurse / BPA Driven Protocol  As Needed         10/26/24 2112    10/26/24 2109  Urinalysis With Culture If Indicated - Indwelling Urethral Catheter  Once,   Status:  Canceled        Comments: Patient is septic probably related to bowel perforation but he has a very thickened urinary bladder wall on CT scan need to rule out cystitis as well      10/26/24 2112    10/26/24 2108  Hemoglobin A1c  Once         10/26/24 2112    " 10/26/24 2108  TSH Rfx On Abnormal To Free T4  Once         10/26/24 2112    10/26/24 2107  Code Status and Medical Interventions: CPR (Attempt to Resuscitate); Full Support  Continuous         10/26/24 2112    10/26/24 2107  Place Sequential Compression Device  Once         10/26/24 2112    10/26/24 2107  Maintain Sequential Compression Device  Continuous         10/26/24 2112    10/26/24 2107  Maintain IV Access  Continuous,   Status:  Canceled         10/26/24 2112    10/26/24 2107  ICU / CCU - Place Order Consult Intensivist For Critical Care Management (If Patient Not Admitted to Cardiology for Primary Cardiology Condition)  Continuous         10/26/24 2112    10/26/24 2107  ICU / CCU - Notify All Physicians When Patient is Transferred  Continuous        Comments: Open Order Report to View Parameters Requiring Provider Notification    10/26/24 2112    10/26/24 2107  NPO Diet NPO Type: Strict NPO  Diet Effective Now         10/26/24 2112    10/26/24 2107  Inpatient General Surgery Consult  STAT        Specialty:  General Surgery  Provider:  Mc Guillaume MD    10/26/24 2112    10/26/24 2106  Oxygen Therapy- Nasal Cannula; Titrate 1-6 LPM Per SpO2; 90 - 95%  Continuous PRN,   Status:  Canceled       10/26/24 2112    10/26/24 2106  Continuous Cardiac Monitoring  Continuous        Comments: Follow Standing Orders As Outlined in Process Instructions (Open Order Report to View Full Instructions)    10/26/24 2112    10/26/24 2106  Maintain IV Access  Continuous,   Status:  Canceled         10/26/24 2112    10/26/24 2106  Telemetry - Place Orders & Notify Provider of Results When Patient Experiences Acute Chest Pain, Dysrhythmia or Respiratory Distress  Continuous        Comments: Open Order Report to View Parameters Requiring Provider Notification    10/26/24 2112    10/26/24 2106  Continuous Pulse Oximetry  Continuous,   Status:  Canceled         10/26/24 2112    10/26/24 2106  Height & Weight  Once       "   10/26/24 2112    10/26/24 2106  Oral Care - Patient Not on NPPV & Not Intubated  Every Shift       10/26/24 2112    10/26/24 2106  Target Arousal Level RASS 0 to -2  Continuous,   Status:  Canceled         10/26/24 2112    10/26/24 2106  Use Mobility Guidelines for Advancement of Activity  Continuous         10/26/24 2112    10/26/24 2106  Insert Peripheral IV  Once         10/26/24 2112    10/26/24 2106  Saline Lock & Maintain IV Access  Continuous,   Status:  Canceled         10/26/24 2112    10/26/24 2105  dextrose (GLUTOSE) oral gel 15 g  Every 15 Minutes PRN         10/26/24 2112    10/26/24 2105  dextrose (D50W) (25 g/50 mL) IV injection 25 g  Every 15 Minutes PRN         10/26/24 2112    10/26/24 2105  glucagon (GLUCAGEN) injection 1 mg  Every 15 Minutes PRN         10/26/24 2112    10/26/24 2105  nitroglycerin (NITROSTAT) SL tablet 0.4 mg  Every 5 Minutes PRN         10/26/24 2112    10/26/24 2105  sodium chloride 0.9 % flush 10 mL  As Needed         10/26/24 2112    10/26/24 2105  sodium chloride 0.9 % infusion 40 mL  As Needed         10/26/24 2112    10/26/24 2105  polyethylene glycol (MIRALAX) packet 17 g  Daily PRN,   Status:  Discontinued        Placed in \"And\" Linked Group    10/26/24 2112    10/26/24 2105  bisacodyl (DULCOLAX) EC tablet 5 mg  Daily PRN,   Status:  Discontinued        Placed in \"And\" Linked Group    10/26/24 2112    10/26/24 2105  bisacodyl (DULCOLAX) suppository 10 mg  Daily PRN,   Status:  Discontinued        Placed in \"And\" Linked Group    10/26/24 2112    10/26/24 2023  piperacillin-tazobactam (ZOSYN) 3.375 g IVPB in 100 mL NS MBP (CD)  Once         10/26/24 2007    10/26/24 1942  sodium chloride 0.9 % flush 10 mL  As Needed        Placed in \"And\" Linked Group    10/26/24 4773    Unscheduled  Follow Hypoglycemia Standing Orders For Blood Glucose <70 & Notify Provider of Treatment  As Needed      Comments: Follow Hypoglycemia Orders As Outlined in Process Instructions (Open " Order Report to View Full Instructions)  Notify Provider Any Time Hypoglycemia Treatment is Administered    10/26/24 2112    Unscheduled  Obtain Pre & Post Sedation Scores With Every Sedative Dose - Hold For POSS Greater Than 2 or RASS of -3 or Less  As Needed       10/26/24 2114    Unscheduled  Oxygen Therapy- Nasal Cannula; Titrate 1-6 LPM Per SpO2; 90 - 95%  Continuous PRN       10/26/24 2139    Unscheduled  Spontaneous Awakening Trial  Daily - SAT       10/27/24 0827    Unscheduled  Spontaneous Breathing Trial  Daily - SBT       10/27/24 0827    Unscheduled  Change Dressing to IV Site As Needed When Damp, Loose or Soiled  As Needed       10/27/24 0957    Unscheduled  Change Needleless Connectors  As Needed      Comments: Change Needleless Connectors When:  - Administration Set Changed  - Dressing Changed  - Removed For Any Reason  - Residual Blood or Debris Within Connector  - Prior to Drawing Blood Cultures  - Contamination of Connector  - After Administration of Blood or Blood Components    10/27/24 0957    Signed and Held  Oxygen Therapy- Nasal Cannula; Titrate 1-6 LPM Per SpO2; 90 - 95%  Continuous PRN         Signed and Held    Signed and Held  Continuous Pulse Oximetry  Continuous         Signed and Held                     Operative/Procedure Notes (last 24 hours)        Mc Guillaume MD at 10/26/24 2301          Date of procedure: 10/27/2024    Primary Surgeon: Dr. Guillaume    Assistant: Virgil Claire PA-C dosing charge for retraction, exposure, closing wounds, placing dressings that was essential for success of the case    Procedure performed:   -Open left hemicolectomy with Juliana pouch and end colostomy  -Splenic flexure mobilization    Anesthesia: General    EBL: 300 cc    Complications: None    Findings:   -Fecal peritonitis  -Rectosigmoid colon cancer  -Ischemic colon with perforation at the descending sigmoid colon junction    Specimens:   -Rectosigmoid colon marked with a stitch at  the distal end  -Descending and sigmoid colon marked with a stitch at the proximal and    Condition of patient: guarded, to recovery    Indications: 74-year-old male who presented to the hospital with abdominal pain.  He was found to have free intra-abdominal perforation and findings concerning for left colon cancer.  We discussed with the patient about treatment options and I recommend that he undergoes exploratory laparotomy, sigmoid colectomy with end colostomy.  Risk and benefits of procedure including bleeding, infection, wound complications, ureteral injury, stump leak discussed in detail with the patient and his wife that verbalized understanding and agreed with the plan    Description: Patient was taken to operative room and placed in the OR table in supine position.  Preoperative antibiotics were given and SCDs were placed.  Patient underwent general endotracheal anesthesia.  Patient abdomen was then prepped and draped in usual sterile fashion.  Timeout was performed the patient was correctly identified.  Midline incision was performed with scalpel from the supraumbilical to the infraumbilical area.  Abdominal wall fascia was transected.  Upon entering the abdominal cavity there was large amount of succus entericus.  This was irrigated.  The fascia was opened superiorly and inferiorly.  Wound protector was placed and mobile retractor was placed.  We explored the abdominal cavity and there was evidence of ischemia, thickening as well as perforation of the distal descending and proximal sigmoid colon junction.  There was an area on the junction of the descending and sigmoid colon concerning for colon cancer.  I then continue aspiration inferiorly and there was another mass in the upper rectum concerning for cancer.  The remaining of the colon did not have any pathology.  Started procedure by mobilizing lateral to medial of the sigmoid and descending colon all the way up to the splenic flexure.  Ureter was  recognized and preserved.  Mobilization continue to the splenic flexure where the splenic colonic ligament was transected.  The omentum was then transected from the transverse colon.  The splenic flexure of the colon was mobilized from the retroperitoneal attachments.  I then proceeded to transect the proximal descending colon with Kenmare blue load stapler, the distal and was approximately at the mid sigmoid colon.  This was transected with Kenmare blue load stapler.  Mesentery was then transected with Enseal device that included the EHSAN.  Several sutures of 2-0 silk were placed on the mesentery that was extremely thick and friable.  I then marked the proximal end of the specimen with a 2-0 silk suture.  Specimen was sent for pathological analysis.  I then continued with the dissection of the distal sigmoid and rectal sigmoid and upper rectum.  Dissection of the mesentery of the rectum was circumferentially dissected down to the pelvis and the peritoneal reflection.  Mesorectal dissection was performed circumferentially up to the mid rectum.  There was a mass at the rectosigmoid junction.  The right and left  ureters were recognized and preserved.  The mid rectum was transected with a contour green load..  The specimen was marked with suture at the distal end.  Mesentery was transected with Enseal device.  The rectal stump was marked with 2 different 2-0 Prolene sutures.  I then proceeded to irrigate the abdominal cavity thoroughly with several liters of warm saline.  We then cleansed the blood supply of the proximal descending colon and this was found to be adequate.  We then selected a sided abdominal wall to bring out the colostomy.  Circular incision at the mid level in between the left anterior superior iliac spine and umbilicus was performed with Bovie electrocautery.  Dissection was carried down to the rectus fascia was transected in a cruciate fashion.  Rectus muscle was split and posterior rectus sheath  was entered.  The colon was brought out through the abdominal wall opening.  We then proceeded to finish irrigation of the abdominal cavity.  We placed 3 different drains in the right lower quadrant that goes up to the right colic gutter and the liver, left lower quadrant that goes through the left paracolic gutter all the way up to the spleen and another right upper quadrant drain that goes down through the right paracolic gutter down to the pelvis.  Is a hemostatic agent was placed at the pelvis.  There was no evidence of bleeding.  All drains were sutured with 3-0 nylon.  Happy with the procedure with we will proceed to close abdominal wall with running #1 PDS suture from superior inferior portion of the wound.  Small umbilical hernia was repaired when closed the abdominal wall.  The wound was covered with Betadine gauze.  The ostomy was matured in a Beatriz fashion with interrupted 3-0 Vicryl suture after the staple line was transected with Bovie electrocautery.  The ostomy was pink and viable with small patches of mucosal necrosis.  Ostomy appliance were placed.  Dressings were placed.  The patient tolerated procedure well and he was sent to recovery area in guarded condition.  Instrument and sponge count were correct    Mc Guillaume MD, FACS  General, Minimally Invasive and Endoscopic Surgery  [facility] [place], operated by [facility]    [address], [address]  [place], [place], [facility]  P: [phone]  F: [phone]      Electronically signed by Mc Guillaume MD at 10/27/24 0123          Physician Progress Notes (last 24 hours)        Mc Guillaume MD at 10/27/24 1027          Status post left hemicolectomy postop day 0    S: Patient is septic on 3 pressors, started to make some urine.  Continues to be sedated and intubated.    O:   Vitals:    10/27/24 0915 10/27/24 0930 10/27/24 0945 10/27/24 1000   BP: 99/60 103/60 105/63 105/63   BP Location:       Patient Position:       Pulse: (!) 129  (!) 131 (!) 130 (!) 129   Resp:       Temp:       TempSrc:       SpO2: 99% 99% 99% 99%   Weight:       Height:          NG tube 25 cc dark brown  Drain #1 60 cc  Drain #2 120 cc  Drain #3 70 cc  Drain serosanguineous  Intubated, not following commands  Breathing comfortable  Soft, does not seem to be tender, ostomy congested but viable.  There is stool in the ostomy bag    Lactate 4  White blood cell count 6.9, hemoglobin 9.8    Labs reviewed    Assessment and plan    74-year-old male with colonic perforation due to cancer and ischemia.  He had fecal peritonitis and has been having diarrhea for now 2 months.  He is a daily alcohol user.  He started to make urine but likely be having on IV fluids.    Continue aggressive hydration  Continue pressors as needed  Continue Protonix IV twice daily  Continue NG to suction, flush NG as needed  SCDs and Lovenox  Continue Blankenship catheter  Wound ostomy nurse consultation for tomorrow  Start dressing changes tomorrow    Mc Guillaume MD, FACS  General, Minimally Invasive and Endoscopic Surgery  [facility]    [address], [address]  [place], [place], [address]  P: [phone]  F: [phone]        Electronically signed by Mc Guillaume MD at 10/27/24 1033       Alberto Lerner MD at 10/27/24 0816          [facility]     Mar/chart reviewed  Follow up septic shock from perforated bowel with likely underlying malignancy  Patient sedated on vent, unable to provide subjective    Vital Sign Min/Max for last 24 hours  Temp  Min: 96.2 °F (35.7 °C)  Max: 99 °F (37.2 °C)   BP  Min: 74/45  Max: 170/125   Pulse  Min: 84  Max: 180   Resp  Min: 18  Max: 30   SpO2  Min: 94 %  Max: 100 %   No data recorded   Weight  Min: 83 kg (183 lb)  Max: 83.4 kg (183 lb 13.8 oz)   3275/1025  Appears ill, sedated on vent  perrl, normal sclera  mmm, no jvd, trachea midline, neck supple,  chest cta bilaterally, no crackles, no wheezes,   Tachy, regular  soft,  midly distended, dereased bs  no c/c/ e  Skin warm, dry no rashes      Labs: 10/27: reviewed:  Lactate 4  Glucose 251  Bun 12  Cr 0.7  Na 140  K 3  Bicarb 17.7  Wbc 6.9  Hgb 9.8  Plts 483  10/27: CXR: reviewed, lines, tubes in place    A/P:  Severe sepsis with septic shock 2/2 peritonitis from perforated bowel due to likely  malignancy s/p OR -- continue antibiotics, supportive care, pressors  Recent weight loss  Alcohol abuse - continue thiamine, folate, watch for withdrawal  Hyperglcyemia -- ssi  Right renal mass  Lactic acidosis  Anemia      Add pharmacologic dvt prophylaxis, gi prophylaxis            Electronically signed by Alberto Lerner MD at 10/27/24 0820       Arnulfo Lynch Jr., MD at 10/27/24 9347          Pulmonary/critical care    Discussed with Dr. Guillaume perforation at the transverse descending colon with extensive feculent peritonitis stool throughout abdomen also several tumors in the sigmoid colon that appeared to be malignant also evidence of ischemic colon patient underwent resection of affected colon and colostomy.  Patient required vasopressors intraoperatively and has required increasing vasopressors postoperatively where now up to 3 vasopressors norepinephrine, epinephrine, vasopressin to support blood pressure.  He is on a ventilator sedated currently with propofol at 30 but he is waking up and looking around.  His chest sounds pretty good breath sounds are okay and ET tube looks okay on the chest x-ray.  On I was placing his central line even in deep Trendelenburg his vessel was collapsing almost completely with each respiration therefore I ultrasounded his IVC actually had an excellent ultrasound graphic tracing and there was well over 50% collapse of his IVC with respiration.  I think he still intravascularly dry he is probably third spaced a lot of fluid with his surgery he had been decreased p.o. intake the last 24 hours and has had diarrhea for several months with very low  albumin on presentation I think again he is third spaced a lot of fluid and is dry and I am giving him another liter bolus of fluids him to keep his IV fluids up a little bit in the 200 cc an hour range see if we can get his blood pressure to stabilize, will really getting to maximum of vasopressors quickly.  Follow-up labs pending.  Patient did come out of OR with a right radial A-line although waveform does not look really good and is not matching the cuff pressure well we are getting some blood return I do not think the A-line is accurate I have asked nursing to use the cuff for pressure, if we do not need the A-line for labs and blood gases we may be able to get rid of it soon since it does not appear to be functioning very well / accurate.    Outside of procedures I have spent over 44 minutes in critical care time I also discussed with the patient's wife and son in detail at bedside, tried to answer all of their questions they obviously are very concerned and have every reason to be concerned his survival is still very much in doubt    Electronically signed by Arnulfo Lynch Jr., MD at 10/27/24 0456          Consult Notes (last 24 hours)        Mc Guillaume MD at 10/26/24 2138        Consult Orders    1. Inpatient General Surgery Consult [[id number]] ordered by Arnulfo Lynch Jr., MD at 10/26/24 2112    2. Surgery (on-call MD unless specified) [[id number]] ordered by James Johnson MD at 10/26/24 2016                 Consultation note    Referring physician: Dr. Johnson    Consulting Physician: Mc Guillaume MD    Reason for consultation: Free intra-abdominal air, colonic perforation    Chief Complaint   Patient presents with    Abdominal Pain    Hypotension       HPI:   The patient is a very pleasant 74 y.o. years old male that presented to the hospital with abdominal pain.  The patient reports abdominal pain that started this morning.  The pain is located all throughout the abdomen  but more focalized to the left lower quadrant.  It is 8 out of 10 in intensity.  Has been constant.  He reports  has been having diarrhea that consists of 4 loose bowel movements for the past 2 months.  He reports associated weight loss.  He reports no nausea or vomiting.  Patient never had a colonoscopy.  Denies any rectal bleeding.  Drinks 4 beers a day has been doing this for several years.  He presented to the emergency room for further evaluation.  He was found to have large amount of free air as well as 2 areas in the distal descending and proximal sigmoid colon concerning for mass versus stercoral colitis.  He was found to have right renal mass.    Past medical history: Daily alcohol user, hypertension    History reviewed. No pertinent surgical history.      Current Facility-Administered Medications:     acetaminophen (TYLENOL) tablet 650 mg, 650 mg, Oral, Q4H PRN **OR** acetaminophen (TYLENOL) suppository 650 mg, 650 mg, Rectal, Q4H PRN, Arnulfo Lynch Jr., MD    sennosides-docusate (PERICOLACE) 8.6-50 MG per tablet 2 tablet, 2 tablet, Oral, BID **AND** polyethylene glycol (MIRALAX) packet 17 g, 17 g, Oral, Daily PRN **AND** bisacodyl (DULCOLAX) EC tablet 5 mg, 5 mg, Oral, Daily PRN **AND** bisacodyl (DULCOLAX) suppository 10 mg, 10 mg, Rectal, Daily PRN, Arnulfo Lynch Jr., MD    Calcium Replacement - Follow Nurse / BPA Driven Protocol, , Does not apply, PRN, Arnulfo Lynch Jr., MD    dextrose (D50W) (25 g/50 mL) IV injection 25 g, 25 g, Intravenous, Q15 Min PRN, Arnulfo Lynch Jr., MD    dextrose (GLUTOSE) oral gel 15 g, 15 g, Oral, Q15 Min PRN, Arnulfo Lynch Jr., MD    fentaNYL citrate (PF) (SUBLIMAZE) injection 50 mcg, 50 mcg, Intravenous, Q30 Min PRN, Arnulfo Lynch Jr., MD    [START ON 10/27/2024] folic acid 1 mg in sodium chloride 0.9 % 50 mL IVPB, 1 mg, Intravenous, Daily, Arnulfo Lynch Jr., MD    glucagon (GLUCAGEN) injection 1 mg, 1 mg, Intramuscular, Q15 Min  PRN, Arnulfo Lynch Jr., MD    HYDROmorphone (DILAUDID) injection 0.5 mg, 0.5 mg, Intravenous, Q2H PRN, Mc Guillaume MD    Insulin Lispro (humaLOG) injection 10 Units, 10 Units, Intravenous, Once, Mc Guillaume MD    insulin regular (humuLIN R,novoLIN R) injection 4-24 Units, 4-24 Units, Subcutaneous, Q4H, Arnulfo Lynch Jr., MD    labetalol (NORMODYNE,TRANDATE) injection 20 mg, 20 mg, Intravenous, Q10 Min PRN, Arnulfo Lynch Jr., MD    LORazepam (ATIVAN) injection 2 mg, 2 mg, Intravenous, Q6H **FOLLOWED BY** [START ON 10/27/2024] LORazepam (ATIVAN) injection 1 mg, 1 mg, Intravenous, Q6H, Arnulfo Lynch Jr., MD    LORazepam (ATIVAN) tablet 1 mg, 1 mg, Oral, Q1H PRN **OR** LORazepam (ATIVAN) injection 1 mg, 1 mg, Intravenous, Q1H PRN **OR** LORazepam (ATIVAN) tablet 2 mg, 2 mg, Oral, Q1H PRN **OR** LORazepam (ATIVAN) injection 2 mg, 2 mg, Intravenous, Q1H PRN **OR** LORazepam (ATIVAN) injection 2 mg, 2 mg, Intravenous, Q15 Min PRN **OR** LORazepam (ATIVAN) injection 2 mg, 2 mg, Intramuscular, Q15 Min PRN **OR** LORazepam (ATIVAN) tablet 4 mg, 4 mg, Oral, Q1H PRN **OR** LORazepam (ATIVAN) injection 4 mg, 4 mg, Intravenous, Q1H PRN, Arnulfo Lynch Jr., MD    Magnesium Standard Dose Replacement - Follow Nurse / BPA Driven Protocol, , Does not apply, PRN, Arnulfo Lynch Jr., MD    mupirocin (BACTROBAN) 2 % nasal ointment 1 Application, 1 Application, Each Nare, BID, Arnulfo Lynch Jr., MD    nitroglycerin (NITROSTAT) SL tablet 0.4 mg, 0.4 mg, Sublingual, Q5 Min PRN, Arnulfo Lynch Jr., MD    norepinephrine (LEVOPHED) 8 mg in 250 mL NS infusion (premix), 0.02-0.3 mcg/kg/min, Intravenous, Titrated, Mc Guillaume MD    ondansetron (ZOFRAN) injection 4 mg, 4 mg, Intravenous, Q6H PRN, Arnulfo Lynch Jr., MD    Pharmacy to Dose Zosyn, , Does not apply, Continuous PRN, Arnulfo Lynch Jr., MD    Phosphorus Replacement - Follow Nurse / BPA  Driven Protocol, , Does not apply, Syed SERNA Harold Dale Jr., MD    [START ON 10/27/2024] piperacillin-tazobactam (ZOSYN) 3.375 g IVPB in 100 mL NS MBP (CD), 3.375 g, Intravenous, Q8H, Arnulfo Lynch Jr., MD    Potassium Replacement - Follow Nurse / BPA Driven Protocol, , Does not apply, Syed SERNA Harold Dale Jr., MD    [COMPLETED] Insert Peripheral IV, , , Once **AND** sodium chloride 0.9 % flush 10 mL, 10 mL, Intravenous, PRN, James Johnson MD    sodium chloride 0.9 % flush 10 mL, 10 mL, Intravenous, Q12H, Arnulfo Lynch Jr., MD    sodium chloride 0.9 % flush 10 mL, 10 mL, Intravenous, PRN, Arnulfo Lynch Jr., MD    sodium chloride 0.9 % infusion 40 mL, 40 mL, Intravenous, PRN, Arnulfo Lynch Jr., MD    sodium chloride 0.9 % infusion, 200 mL/hr, Intravenous, Continuous, Arnulfo Lynch Jr., MD    thiamine (B-1) 500 mg in sodium chloride 0.9 % 100 mL IVPB, 500 mg, Intravenous, Q8H **FOLLOWED BY** [START ON 10/29/2024] thiamine (B-1) injection 200 mg, 200 mg, Intravenous, Q8H **FOLLOWED BY** [START ON 11/3/2024] thiamine (VITAMIN B-1) tablet 100 mg, 100 mg, Oral, Daily, Arnulfo Lynch Jr., MD    No Known Allergies    Social History     Socioeconomic History    Marital status:        History reviewed. No pertinent family history.    ROS:   As per HPI    Physical Exam:   Vitals:    10/26/24 2007 10/26/24 2011 10/26/24 2041 10/26/24 2051   BP: (!) 170/125 (!) 152/113 137/87 128/93   BP Location:       Patient Position:       Pulse:   86    Resp:       Temp:       TempSrc:       SpO2: 98% 97% 96% 94%   Weight:       Height:            GENERAL:oriented to person, place, and time and ill appearing  HEENT: normochephalic, atraumatic, no scleral icterus moist mucous membranes.  NECK: Supple   CHEST: Breathing comfortable  CARDIAC: regular rate and rhythm    ABDOMEN: Abdomen distended, tense, generalized peritoneal irritation throughout the abdomen.  Small easily reducible  umbilical hernia  EXTREMITIES: no cyanosis, clubbing or edema    NEURO: alert and oriented, normal speech, cranial nerves 2-12 grossly intact, no focal deficits   SKIN: Moist, warm, no rashes.    Diagnostic workup:   CT scan of the abdomen and pelvis that was performed today showed evidence of free intra-abdominal air.  There were 2 different areas of solid masses at the proximal sigmoid and distal descending colon concerning for masses versus solid 5 stool.  Right renal mass.    Glucose 223  Calcium 8.4  Sodium 135, CO2 21, lactate 5.8, procalcitonin 9.5, INR 1.1, white blood cell count 9.7, hemoglobin 13.7, platelets 549    Assessment and plan:   The patient is a very pleasant 74 y.o. years old male with colonic perforation either from cancer or stercoral colitis.  Right renal mass likely malignant will need to have further workup after he gets over this acute phase.    Hyperglycemia, not known to be diabetic, hemoglobin A1c ordered by Dr. Lynch  Alcohol use, 4 beers per day, high risk for alcohol withdrawal, CIWA protocol will be ordered  Discussed with the patient about further step.  I recommend that he undergoes exploratory laparotomy with colon resection and ostomy formation.  Risk and benefits of procedure including bleeding, infection, ureteral injury, abscess formation, stump leak discussed in detail with the patient and his wife that verbalized understanding and agreed with the plan    N.p.o.  IV fluids  Type and screen  10 units of insulin IV preop  IV antibiotics    Case was discussed with Dr. Johnson, patient wife and patient.    Risk and benefits of the current recommended treatment were explained to the patient that had time to ask questions that where answered, verbalized understanding and agreed with the plan.     Mc Guillaume MD  General, Minimally Invasive and Endoscopic Surgery  [facility]    [address], [address]  [place], [place], [address]  P: [phone]  F:  [phone]      Electronically signed by cM Guillaume MD at 10/26/24 0700

## 2024-10-28 NOTE — PROGRESS NOTES
Postoperative day 1 status post left hemicolectomy    S: He developed supraventricular tachycardia.  Otherwise pressors has been weaned slowly.  He continues to be on Levophed.  He continues to be intubated and sedated.    O:   Vitals:    10/28/24 0645 10/28/24 0653 10/28/24 0731 10/28/24 0917   BP: 111/62   110/65   Pulse: (!) 137 (!) 138     Resp:  22     Temp:   99.9 °F (37.7 °C)    TempSrc:   Oral    SpO2: 100% 100%     Weight:       Height:          All Semaj drains with serous fluid  NG tube with dark fluid  Intubated, does not follow commands  Breathing comfortable on the vent  Abdomen soft, does not seem to be tender nondistended, midline incision is open with clean tissue at the base.  Ostomy pink and viable with stool    pH 7.3, CO2 31, base deficit 9 bicarb 17, creatinine 0.7, calcium 6.5, phosphorus 2.4, magnesium 1.4  Lactate 2.2, downtrending    White blood cell count 21.8, hemoglobin 9.6, platelets 334    Culture growing gram-positive and gram-negative bacteria    Assessment and plan    Postoperative day 1 status post left hemicolectomy for colonic perforation likely secondary to cancer.  He continues to be septic requiring less pressor supports.  Leukocytosis is suspected.  Supraventricular tachycardia being treated  On broad-spectrum antibiotics for intra-abdominal contamination  Continue n.p.o., IV fluids  Wet-to-dry dressing changes with saline and gauze 3 times a day to midline wound  Keep dressings in place  Keep NG tube in place  Hopefully we can start feeding him tomorrow

## 2024-10-28 NOTE — CONSULTS
Chap visited pt and family at bedside.  Pt's family said right now, it is touch and go and they are a little stressed.  Pt's wife has a support network and knows the  is available for support as well.  Chap remains available.

## 2024-10-28 NOTE — PLAN OF CARE
Goal Outcome Evaluation: pt pressors being weaned. Ostomy changed and abd dressing changed. IVF changed to d5 1/2ns for hypoglycemia.

## 2024-10-28 NOTE — CASE MANAGEMENT/SOCIAL WORK
Continued Stay Note  [facility]     Patient Name: Arsh Haynes  MRN: [id number]  Today's Date: 10/28/2024    Admit Date: 10/26/2024        Discharge Plan       Row Name 10/28/24 145       Plan    Plan Comments Pt discussed in multidisciplinary rounds. Clinicals reviewed. Pt not yet medically ready for DC; CCP will follow. DC plan pending pt's progress during hospitalization. DC barriers: intubated on 10/27, NGT, restraints, central venous catheter, IV folic acid, IV thiamine, precedex gtt, fentanyl gtt, & levophed gtt. CCP notes  notes family reporting it is touch & go, feeling stressed. Full screen to follow.  ANGEL Waldron/CCP           Awilda Sorto RN

## 2024-10-29 ENCOUNTER — APPOINTMENT (OUTPATIENT)
Dept: GENERAL RADIOLOGY | Facility: HOSPITAL | Age: 74
DRG: 853 | End: 2024-10-29
Payer: MEDICARE

## 2024-10-29 LAB
ALBUMIN SERPL-MCNC: 1.2 G/DL (ref 3.5–5.2)
ALBUMIN/GLOB SERPL: 0.4 G/DL
ALP SERPL-CCNC: 77 U/L (ref 39–117)
ALT SERPL W P-5'-P-CCNC: 8 U/L (ref 1–41)
ANION GAP SERPL CALCULATED.3IONS-SCNC: 8.5 MMOL/L (ref 5–15)
ANISOCYTOSIS BLD QL: ABNORMAL
ARTERIAL PATENCY WRIST A: POSITIVE
AST SERPL-CCNC: 14 U/L (ref 1–40)
ATMOSPHERIC PRESS: 752.5 MMHG
BASE EXCESS BLDA CALC-SCNC: -7.8 MMOL/L (ref 0–2)
BASOPHILS # BLD MANUAL: 0 10*3/MM3 (ref 0–0.2)
BASOPHILS NFR BLD MANUAL: 0 % (ref 0–1.5)
BDY SITE: ABNORMAL
BILIRUB SERPL-MCNC: 1.1 MG/DL (ref 0–1.2)
BUN SERPL-MCNC: 12 MG/DL (ref 8–23)
BUN/CREAT SERPL: 14.1 (ref 7–25)
BURR CELLS BLD QL SMEAR: ABNORMAL
CALCIUM SPEC-SCNC: 6.7 MG/DL (ref 8.6–10.5)
CHLORIDE SERPL-SCNC: 118 MMOL/L (ref 98–107)
CO2 BLDA-SCNC: 17.1 MMOL/L (ref 23–27)
CO2 SERPL-SCNC: 16.5 MMOL/L (ref 22–29)
CREAT SERPL-MCNC: 0.85 MG/DL (ref 0.76–1.27)
DACRYOCYTES BLD QL SMEAR: ABNORMAL
DEPRECATED RDW RBC AUTO: 61.2 FL (ref 37–54)
EGFRCR SERPLBLD CKD-EPI 2021: 92.3 ML/MIN/1.73
EOSINOPHIL # BLD MANUAL: 0 10*3/MM3 (ref 0–0.4)
EOSINOPHIL NFR BLD MANUAL: 0 % (ref 0.3–6.2)
ERYTHROCYTE [DISTWIDTH] IN BLOOD BY AUTOMATED COUNT: 19.3 % (ref 12.3–15.4)
GLOBULIN UR ELPH-MCNC: 3 GM/DL
GLUCOSE BLDC GLUCOMTR-MCNC: 108 MG/DL (ref 70–130)
GLUCOSE BLDC GLUCOMTR-MCNC: 112 MG/DL (ref 70–130)
GLUCOSE BLDC GLUCOMTR-MCNC: 150 MG/DL (ref 70–130)
GLUCOSE BLDC GLUCOMTR-MCNC: 165 MG/DL (ref 70–130)
GLUCOSE BLDC GLUCOMTR-MCNC: 99 MG/DL (ref 70–130)
GLUCOSE SERPL-MCNC: 158 MG/DL (ref 65–99)
HCO3 BLDA-SCNC: 16.3 MMOL/L (ref 22–28)
HCT VFR BLD AUTO: 27.9 % (ref 37.5–51)
HEMODILUTION: NO
HGB BLD-MCNC: 8.6 G/DL (ref 13–17.7)
INHALED O2 CONCENTRATION: 21 %
LYMPHOCYTES # BLD MANUAL: 0.2 10*3/MM3 (ref 0.7–3.1)
LYMPHOCYTES NFR BLD MANUAL: 1 % (ref 5–12)
MACROCYTES BLD QL SMEAR: ABNORMAL
MAGNESIUM SERPL-MCNC: 2.3 MG/DL (ref 1.6–2.4)
MCH RBC QN AUTO: 27.1 PG (ref 26.6–33)
MCHC RBC AUTO-ENTMCNC: 30.8 G/DL (ref 31.5–35.7)
MCV RBC AUTO: 88 FL (ref 79–97)
MODALITY: ABNORMAL
MONOCYTES # BLD: 0.2 10*3/MM3 (ref 0.1–0.9)
NEUTROPHILS # BLD AUTO: 19.79 10*3/MM3 (ref 1.7–7)
NEUTROPHILS NFR BLD MANUAL: 98 % (ref 42.7–76)
NRBC BLD AUTO-RTO: 0 /100 WBC (ref 0–0.2)
O2 A-A PPRESDIFF RESPIRATORY: 0.6 MMHG
PCO2 BLDA: 28 MM HG (ref 35–45)
PEEP RESPIRATORY: 5 CM[H2O]
PH BLDA: 7.37 PH UNITS (ref 7.35–7.45)
PLAT MORPH BLD: NORMAL
PLATELET # BLD AUTO: 236 10*3/MM3 (ref 140–450)
PMV BLD AUTO: 8.6 FL (ref 6–12)
PO2 BLD: 364 MM[HG] (ref 0–500)
PO2 BLDA: 76.5 MM HG (ref 80–100)
POIKILOCYTOSIS BLD QL SMEAR: ABNORMAL
POTASSIUM SERPL-SCNC: 4.1 MMOL/L (ref 3.5–5.2)
PROT SERPL-MCNC: 4.2 G/DL (ref 6–8.5)
RBC # BLD AUTO: 3.17 10*6/MM3 (ref 4.14–5.8)
SAO2 % BLDCOA: 95.1 % (ref 92–98.5)
SET MECH RESP RATE: 22
SODIUM SERPL-SCNC: 143 MMOL/L (ref 136–145)
SPHEROCYTES BLD QL SMEAR: ABNORMAL
TOTAL RATE: 23 BREATHS/MINUTE
VARIANT LYMPHS NFR BLD MANUAL: 1 % (ref 19.6–45.3)
VENTILATOR MODE: AC
VT ON VENT VENT: 550 ML
WBC MORPH BLD: NORMAL
WBC NRBC COR # BLD AUTO: 20.19 10*3/MM3 (ref 3.4–10.8)

## 2024-10-29 PROCEDURE — 85025 COMPLETE CBC W/AUTO DIFF WBC: CPT | Performed by: INTERNAL MEDICINE

## 2024-10-29 PROCEDURE — 80053 COMPREHEN METABOLIC PANEL: CPT | Performed by: INTERNAL MEDICINE

## 2024-10-29 PROCEDURE — 94799 UNLISTED PULMONARY SVC/PX: CPT

## 2024-10-29 PROCEDURE — 85007 BL SMEAR W/DIFF WBC COUNT: CPT | Performed by: INTERNAL MEDICINE

## 2024-10-29 PROCEDURE — 87040 BLOOD CULTURE FOR BACTERIA: CPT | Performed by: INTERNAL MEDICINE

## 2024-10-29 PROCEDURE — 25010000002 FLUCONAZOLE PER 200 MG: Performed by: SURGERY

## 2024-10-29 PROCEDURE — 25010000002 THIAMINE HCL 200 MG/2ML SOLUTION 2 ML VIAL: Performed by: SURGERY

## 2024-10-29 PROCEDURE — 94761 N-INVAS EAR/PLS OXIMETRY MLT: CPT

## 2024-10-29 PROCEDURE — 25010000002 ENOXAPARIN PER 10 MG: Performed by: INTERNAL MEDICINE

## 2024-10-29 PROCEDURE — 99024 POSTOP FOLLOW-UP VISIT: CPT | Performed by: SURGERY

## 2024-10-29 PROCEDURE — 36600 WITHDRAWAL OF ARTERIAL BLOOD: CPT

## 2024-10-29 PROCEDURE — 25010000002 THIAMINE HCL 200 MG/2ML SOLUTION: Performed by: SURGERY

## 2024-10-29 PROCEDURE — 94003 VENT MGMT INPAT SUBQ DAY: CPT

## 2024-10-29 PROCEDURE — 25010000002 FENTANYL CITRATE (PF) 50 MCG/ML SOLUTION: Performed by: INTERNAL MEDICINE

## 2024-10-29 PROCEDURE — 71045 X-RAY EXAM CHEST 1 VIEW: CPT

## 2024-10-29 PROCEDURE — 25010000002 LORAZEPAM PER 2 MG: Performed by: SURGERY

## 2024-10-29 PROCEDURE — 63710000001 INSULIN REGULAR HUMAN PER 5 UNITS: Performed by: INTERNAL MEDICINE

## 2024-10-29 PROCEDURE — 82948 REAGENT STRIP/BLOOD GLUCOSE: CPT

## 2024-10-29 PROCEDURE — 25010000002 PIPERACILLIN SOD-TAZOBACTAM PER 1 G: Performed by: INTERNAL MEDICINE

## 2024-10-29 PROCEDURE — 83735 ASSAY OF MAGNESIUM: CPT | Performed by: INTERNAL MEDICINE

## 2024-10-29 PROCEDURE — 82803 BLOOD GASES ANY COMBINATION: CPT

## 2024-10-29 RX ORDER — FENTANYL CITRATE 50 UG/ML
50 INJECTION, SOLUTION INTRAMUSCULAR; INTRAVENOUS
Status: DISCONTINUED | OUTPATIENT
Start: 2024-10-29 | End: 2024-10-30

## 2024-10-29 RX ADMIN — THIAMINE HYDROCHLORIDE 500 MG: 100 INJECTION, SOLUTION INTRAMUSCULAR; INTRAVENOUS at 05:45

## 2024-10-29 RX ADMIN — CHLORHEXIDINE GLUCONATE 15 ML: 1.2 RINSE ORAL at 20:09

## 2024-10-29 RX ADMIN — FENTANYL CITRATE 50 MCG: 50 INJECTION, SOLUTION INTRAMUSCULAR; INTRAVENOUS at 17:48

## 2024-10-29 RX ADMIN — MUPIROCIN 1 APPLICATION: 20 OINTMENT TOPICAL at 08:48

## 2024-10-29 RX ADMIN — Medication 10 ML: at 20:09

## 2024-10-29 RX ADMIN — Medication 10 ML: at 08:49

## 2024-10-29 RX ADMIN — THIAMINE HYDROCHLORIDE 200 MG: 100 INJECTION, SOLUTION INTRAMUSCULAR; INTRAVENOUS at 22:24

## 2024-10-29 RX ADMIN — THIAMINE HYDROCHLORIDE 500 MG: 100 INJECTION, SOLUTION INTRAMUSCULAR; INTRAVENOUS at 13:20

## 2024-10-29 RX ADMIN — INSULIN HUMAN 2 UNITS: 100 INJECTION, SOLUTION PARENTERAL at 03:25

## 2024-10-29 RX ADMIN — CHLORHEXIDINE GLUCONATE 15 ML: 1.2 RINSE ORAL at 08:48

## 2024-10-29 RX ADMIN — FOLIC ACID 1 MG: 5 INJECTION, SOLUTION INTRAMUSCULAR; INTRAVENOUS; SUBCUTANEOUS at 13:20

## 2024-10-29 RX ADMIN — MUPIROCIN 1 APPLICATION: 20 OINTMENT TOPICAL at 20:09

## 2024-10-29 RX ADMIN — INSULIN HUMAN 2 UNITS: 100 INJECTION, SOLUTION PARENTERAL at 08:48

## 2024-10-29 RX ADMIN — FLUCONAZOLE 400 MG: 2 INJECTION, SOLUTION INTRAVENOUS at 02:47

## 2024-10-29 RX ADMIN — PIPERACILLIN AND TAZOBACTAM 4.5 G: 4; .5 INJECTION, POWDER, FOR SOLUTION INTRAVENOUS at 17:49

## 2024-10-29 RX ADMIN — LORAZEPAM 1 MG: 2 INJECTION INTRAMUSCULAR; INTRAVENOUS at 09:56

## 2024-10-29 RX ADMIN — ACETAMINOPHEN 325MG 650 MG: 325 TABLET ORAL at 22:32

## 2024-10-29 RX ADMIN — PIPERACILLIN AND TAZOBACTAM 4.5 G: 4; .5 INJECTION, POWDER, FOR SOLUTION INTRAVENOUS at 02:47

## 2024-10-29 RX ADMIN — PIPERACILLIN AND TAZOBACTAM 4.5 G: 4; .5 INJECTION, POWDER, FOR SOLUTION INTRAVENOUS at 11:26

## 2024-10-29 RX ADMIN — LORAZEPAM 1 MG: 2 INJECTION INTRAMUSCULAR; INTRAVENOUS at 03:25

## 2024-10-29 RX ADMIN — ENOXAPARIN SODIUM 40 MG: 100 INJECTION SUBCUTANEOUS at 08:48

## 2024-10-29 RX ADMIN — PANTOPRAZOLE SODIUM 40 MG: 40 INJECTION, POWDER, FOR SOLUTION INTRAVENOUS at 08:48

## 2024-10-29 NOTE — PROGRESS NOTES
[facility]     Mar/chart reviewed  Follow up septic shock from perforated bowel with likely underlying malignancy  Patient sedated on vent, unable to provide subjective  Pressor requirement improving, hypoglycemia improved    Vital Sign Min/Max for last 24 hours  Temp  Min: 98 °F (36.7 °C)  Max: 100.6 °F (38.1 °C)   BP  Min: 83/52  Max: 127/68   Pulse  Min: 76  Max: 137   Resp  Min: 22  Max: 23   SpO2  Min: 96 %  Max: 100 %   No data recorded   Weight  Min: 92.5 kg (203 lb 14.8 oz)  Max: 92.5 kg (203 lb 14.8 oz)   4577/1985    Appears ill, sedated on vent  perrl, normal sclera  mmm, no jvd, trachea midline, neck supple,  chest cta bilaterally, no crackles, no wheezes,   Tachy, regular  soft, midly distended, dereased bs  no c/c/ e  Skin warm, dry no rashes    Labs: 10/29: reviewed:  Glucose 158  Bun 12  Cr 0.85  Bicarb 16.5  Total protein 4.2  Albumin 1.2  Wbc 20  Hgb 8.6  Plts 236  7.37/28/76  10/29: CXR: reviewed, fairly unremarkable  Micro: reviewed gram neg bacilli    A/P:  Severe sepsis with septic shock 2/2 peritonitis from perforated bowel due to likely  malignancy s/p OR -- continue antibiotics, supportive care, pressors  Recent weight loss  Alcohol abuse - continue thiamine, folate, watch for withdrawal  Hyperglcyemia -- ssi  Right renal mass  Lactic acidosis  Anemia  Bacteremia -- continue antibiotics  9. Hypoglycemia - better, decrease rate, start tube feeds at 20  10. SVT -- s/p dig  11. Post operative ventilator management -- see if we can work towards SBT    D/w Dr. Moy and RN, try PS possible extubation? Today  D/w family at bedside    CC 36 mins.

## 2024-10-29 NOTE — PROGRESS NOTES
IMPRESSION & PLAN:  Postoperative day 2 from left hemicolectomy.  Almost off norepinephrine.  Minimal NG tube output.  Will start tube feeds at 20 cc/h today.  If tolerates can start advancing by 10 cc/h every 6 hours starting 10/30/2024.      Continue packing midline wound wet to dry 3 times daily.      Continue SCDs and Lovenox for VTE prophylaxis.    Body fluid cultures with gram-negative bacilli, gram-positive cocci.  No evidence of fungal infection, will stop Diflucan.  Continue Zosyn. Discussed with Dr. Lerner.      CC:    Chief Complaint   Patient presents with    Abdominal Pain    Hypotension         HPI: Coming down on pressor requirements.  Sedation being weaned.    PE:    VS:   Vitals:    10/29/24 0727   BP:    Pulse:    Resp:    Temp: 98.5 °F (36.9 °C)   SpO2:           Intake/Output Summary (Last 24 hours) at 10/29/2024 0742  Last data filed at 10/29/2024 0500  Gross per 24 hour   Intake 4577.5 ml   Output 1985 ml   Net 2592.5 ml        CONST: Sedated, critically ill  LUNGS: Synchronized with ventilator  Abdomen: Soft, midline wound is pink and healthy in appearance, ostomy is well-perfused with small amount of bowel sweat in the appliance, no gas, BRONWYN drain x 3 with serous fluid      LABS:  Results from last 7 days   Lab Units 10/29/24  0334 10/28/24  0326 10/27/24  0432 10/27/24  0254 10/26/24  2312 10/26/24  1944   WBC 10*3/mm3 20.19* 21.81* 6.94 2.92*  --  9.72   HEMOGLOBIN g/dL 8.6* 9.6* 9.8* 6.5*  --  13.7   HEMOGLOBIN, POC   --   --   --   --    < >  --    HEMATOCRIT % 27.9* 29.5* 30.6* 20.4*  --  43.2   HEMATOCRIT POC   --   --   --   --    < >  --    PLATELETS 10*3/mm3 236 334 483* 262  --  549*    < > = values in this interval not displayed.     Results from last 7 days   Lab Units 10/29/24  0334 10/28/24  0326 10/27/24  1356 10/27/24  0432   SODIUM mmol/L 143 143  --  140   POTASSIUM mmol/L 4.1 4.4 3.7 3.0*   CHLORIDE mmol/L 118* 119*  --  110*   CO2 mmol/L 16.5* 17.0*  --  17.7*   BUN mg/dL  12 14  --  12   CREATININE mg/dL 0.85 0.72*  --  0.70*   CALCIUM mg/dL 6.7* 6.5*  --  7.0*   BILIRUBIN mg/dL 1.1 0.9  --  1.1   ALK PHOS U/L 77 65  --  52   ALT (SGPT) U/L 8 11  --  8   AST (SGOT) U/L 14 18  --  <5   GLUCOSE mg/dL 158* 90  --  251*

## 2024-10-29 NOTE — PLAN OF CARE
Goal Outcome Evaluation: pt extubated and off pressors. Tube feeds started per orders. Dressing changes TID continued. Pt very weak with movement and voice. He whispers. I encouraged him to use voice to strengthen vocal cords. Decrease in BRONWYN output. VSS

## 2024-10-29 NOTE — CASE MANAGEMENT/SOCIAL WORK
Discharge Planning Assessment  [facility]     Patient Name: Arsh Haynes  MRN: [id number]  Today's Date: 10/29/2024    Admit Date: 10/26/2024    Plan: Extubated today; follow for PT/OT evals   Discharge Needs Assessment       Row Name 10/29/24 1441       Living Environment    People in Home spouse    Current Living Arrangements home    Potentially Unsafe Housing Conditions none    Primary Care Provided by self    Provides Primary Care For no one    Family Caregiver if Needed spouse    Quality of Family Relationships supportive;involved;helpful    Able to Return to Prior Arrangements yes       Resource/Environmental Concerns    Resource/Environmental Concerns none    Transportation Concerns none       Transition Planning    Patient/Family Anticipates Transition to home with family;inpatient rehabilitation facility    Patient/Family Anticipated Services at Transition home health care;skilled nursing       Discharge Needs Assessment    Readmission Within the Last 30 Days no previous admission in last 30 days    Current Outpatient/Agency/Support Group homecare agency;skilled nursing facility    Equipment Currently Used at Home none    Concerns to be Addressed care coordination/care conferences    Anticipated Changes Related to Illness none    Outpatient/Agency/Support Group Needs homecare agency;skilled nursing facility    Discharge Facility/Level of Care Needs home with home health;nursing facility, skilled                   Discharge Plan       Row Name 10/29/24 1442       Plan    Plan Extubated today; follow for PT/OT evals    Plan Comments CCP met with patient's wife and daughter in law at bedside. CCP role explained and discharge planning discussed. Face sheet verified and IMM noted. Patient's wife states he has a new PCP but she could not recall who it was. Patient lives with his wife, with 2-3 steps to the entrance of the home. Patient's bedroom and bathroom are on the main level of the home. Patient has grab  bars present in the bathroom. Patient was independent with his ADLs prior to admission. Patient has not used home health or been to SNF. Patient's wife is hopeful patient can return home at d/c. Patient's wife declined any HH referrals at this time. Patient extubated today. CCP will follow for PT/OT evals and assist as needed. Vibha ISSA                  Continued Care and Services - Admitted Since 10/26/2024    No active coordination exists for this encounter.          Demographic Summary       Row Name 10/29/24 1437       General Information    Admission Type inpatient    Arrived From emergency department    Required Notices Provided Important Message from Medicare    Referral Source admission list    Reason for Consult discharge planning    Preferred Language English                   Functional Status       Row Name 10/29/24 1441       Functional Status    Usual Activity Tolerance good    Current Activity Tolerance moderate       Functional Status, IADL    Medications independent    Meal Preparation independent    Housekeeping independent    Laundry independent    Shopping independent       Mental Status    General Appearance WDL WDL       Mental Status Summary    Recent Changes in Mental Status/Cognitive Functioning no changes                   Psychosocial    No documentation.                  Abuse/Neglect    No documentation.                  Legal    No documentation.                  Substance Abuse    No documentation.                  Patient Forms    No documentation.                     LUIS MANUEL Montgomery

## 2024-10-29 NOTE — NURSING NOTE
"   10/29/24 0846   Colostomy LUQ   Placement date: If unknown, DO NOT use \"Add Comment\" note/Placement time: If unknown, DO NOT use \"Add Comment\" note: 10/27/24 0051   Inserted by: DR. BECK  Location: LUQ   Stomal Appliance 2 piece;Clean;Dry;Intact;Drainable   Stoma Appearance moist;red   Peristomal Assessment CARLOS EDUARDO   Stool Color yellow  (serous)   Stool Consistency liquid     WOCN: Patient continues on vent. Unable to teach. Serous output from stoma. Pouch intact.  Daughter in law at bedside. Educational materials left in room for family to review. Placed yesterday in 2 1/4 2 piece pedro with ring. Plan to continue to follow  "

## 2024-10-29 NOTE — CONSULTS
Nutrition Services    Patient Name:  Arsh Haynes  YOB: 1950  MRN: [id number]  Admit Date:  10/26/2024    Assessment Date:  10/29/24    Summary: Nutrition consult for TF assessment (per surgeon order)    74 y.o. male admitted with worsening abdominal pain x several days.  Diarrhea x 2 months, somewhat black.  Reports of weight loss per chart review, but family unsure how much.  Per chart review, current weight noted at 203 lb (edema noted) and patient was 202 lb in September of 2023.  History of ETOH abuse.    Sigmoid perforation.  S/p ex lap, open L hemicolectomy with Juliana pouch and end colostomy 10/26.  Several tumors found in sigmoid colon, likely malignant.    Patient extubated this am.  NGT in place, surgeon wishes to trickle TFs today via NGT @ 20 mL/hr.  Will begin slowly advancing TFs tomorrow as long as patient tolerates today.  Recommend starting TFs of Nutren 1.5 @ 20 mL/hr.  Do not advance today.  30 mL q 4 hour free water flushes for now.    Initial Goal:  *initial goal conservative d/t risk of RFS     Nutren 1.5 at 20 mL/hr + 30 mL q 4 hr water flush (do not advance today)     End Goal:    Nutren 1.5 at 75 mL/hr + 30 mL q 4 hr water flush      Calories  2475 kcals (100%)    Protein  112 g (100%)    Free water  1254 mL   Flushes  180 mL     The above end goal rate is for 22 hrs/day to assume interruptions for ADLs. Water flushes adjusted based on clinical picture + Rx flushes/IV fluids      Labs reviewed: Na 143, K 4.1, Cl 118, Gluc 158/150/108, Alb 1.2  Meds reviewed: lovenox, folic acid, insulin, protonix, zosyn, thiamine    Plans/Recommendations:  Start TFs of Nutren 1.5 @ 20 mL/hr.  Do not advance today.  30 mL q 4 hour free water flushes for now.  If patient tolerates TFs today, start to slowly advance by 10 mL q 6 hours tomorrow.  Follow for tolerance.    RD to follow closely.    CLINICAL NUTRITION ASSESSMENT      Reason for Assessment Physician Consult, TF Assessment    "  Diagnosis/Problem   Sigmoid perforation  S/p ex lap, open L hemicolectomy 10/26  Extubated this am  TFs to begin via NGT     Medical/Surgical History History reviewed. No pertinent past medical history.    Past Surgical History:   Procedure Laterality Date    COLON RESECTION N/A 10/26/2024    Procedure: LOW ANTERIOR COLON RESECTION SIGMOID, COLOSTOMY, SPLENIC FLEXURE MOBILIZATION;  Surgeon: Mc Guillaume MD;  Location: [facility] OR;  Service: General;  Laterality: N/A;    EXPLORATORY LAPAROTOMY N/A 10/26/2024    Procedure: LAPAROTOMY EXPLORATORY, LEFT HEMICOLECTOMY;  Surgeon: Mc Guillaume MD;  Location: [facility];  Service: General;  Laterality: N/A;        Anthropometrics        Current Height  Current Weight  BMI kg/m2 Height: 182.9 cm (72\")  Weight: 92.5 kg (203 lb 14.8 oz) (10/29/24 0431)  Body mass index is 27.66 kg/m².   Adjusted BMI (if applicable)    BMI Category Overweight (25 - 29.9)   Ideal Body Weight (IBW) 178 lb (80.9 kg)   Usual Body Weight (UBW) 202 lb (9/2023)   Weight Trend Loss, Unknown   Weight History Wt Readings from Last 30 Encounters:   10/29/24 0431 92.5 kg (203 lb 14.8 oz)   10/28/24 0436 90.2 kg (198 lb 13.7 oz)   10/27/24 0404 83.4 kg (183 lb 13.8 oz)   10/26/24 1933 83 kg (183 lb)      --  Estimated/Assessed Needs        Current Weight  Weight: 92.5 kg (203 lb 14.8 oz) (10/29/24 0431)       Energy Requirements    Weight for Calculation 178 lb (80.9 kg) IBW   Method for Estimation  30-35 kcal/kg   EST Needs (kcal/day) 0598-9049       Protein Requirements    Weight for Calculation 178 lb (80.9 kg) IBW   EST Protein Needs (g/kg) 1.2 - 1.5 gm/kg   EST Daily Needs (g/day)        Fluid Requirements     Method for Estimation 1 mL/kcal    EST Needs (mL/day)      Labs       Pertinent Labs    Results from last 7 days   Lab Units 10/29/24  0334 10/28/24  0326 10/27/24  1356 10/27/24  0432   SODIUM mmol/L 143 143  --  140   POTASSIUM mmol/L 4.1 4.4 3.7 3.0* " "  CHLORIDE mmol/L 118* 119*  --  110*   CO2 mmol/L 16.5* 17.0*  --  17.7*   BUN mg/dL 12 14  --  12   CREATININE mg/dL 0.85 0.72*  --  0.70*   CALCIUM mg/dL 6.7* 6.5*  --  7.0*   BILIRUBIN mg/dL 1.1 0.9  --  1.1   ALK PHOS U/L 77 65  --  52   ALT (SGPT) U/L 8 11  --  8   AST (SGOT) U/L 14 18  --  <5   GLUCOSE mg/dL 158* 90  --  251*     Results from last 7 days   Lab Units 10/29/24  0334 10/28/24  0326 10/27/24  0432 10/26/24  2312 10/26/24  1944   MAGNESIUM mg/dL  --   --  1.4*  --  1.8   PHOSPHORUS mg/dL  --   --  2.4*  --   --    HEMOGLOBIN g/dL 8.6*   < > 9.8*   < > 13.7   HEMOGLOBIN, POC   --   --   --    < >  --    HEMATOCRIT % 27.9*   < > 30.6*   < > 43.2   HEMATOCRIT POC   --   --   --    < >  --    WBC 10*3/mm3 20.19*   < > 6.94   < > 9.72   ALBUMIN g/dL 1.2*   < > 2.1*  --  2.5*    < > = values in this interval not displayed.     Results from last 7 days   Lab Units 10/29/24  0334 10/28/24  0326 10/27/24  0432 10/27/24  0254 10/26/24  1944   INR   --   --   --   --  1.14*   PLATELETS 10*3/mm3 236 334 483* 262 549*     No results found for: \"COVID19\"  Lab Results   Component Value Date    HGBA1C 6.60 (H) 10/26/2024          Medications           Scheduled Medications chlorhexidine, 15 mL, Mouth/Throat, Q12H  enoxaparin, 40 mg, Subcutaneous, Q24H  folic acid 1 mg in sodium chloride 0.9 % 50 mL IVPB, 1 mg, Intravenous, Daily  insulin regular, 2-9 Units, Subcutaneous, Q4H  LORazepam, 1 mg, Intravenous, Q6H  mupirocin, 1 Application, Each Nare, BID  pantoprazole, 40 mg, Intravenous, Q24H  piperacillin-tazobactam, 4.5 g, Intravenous, Q8H  sodium chloride, 10 mL, Intravenous, Q12H  sodium chloride, 10 mL, Intravenous, Q12H  sodium chloride, 10 mL, Intravenous, Q12H  sodium chloride, 10 mL, Intravenous, Q12H  sodium chloride, 10 mL, Intravenous, Q12H  thiamine (B-1) IV, 500 mg, Intravenous, Q8H   Followed by  thiamine (B-1) IV, 200 mg, Intravenous, Q8H   Followed by  [START ON 11/3/2024] thiamine, 100 mg, Oral, " Daily       Infusions dexmedetomidine, 0.2-1.5 mcg/kg/hr, Last Rate: Stopped (10/29/24 0931)  dextrose 5 % and sodium chloride 0.45 %, 50 mL/hr, Last Rate: 50 mL/hr (10/29/24 0746)  fentanyl 10 mcg/mL,  mcg/hr, Last Rate: Stopped (10/29/24 0436)  norepinephrine, 0.02-0.3 mcg/kg/min, Last Rate: 0.01 mcg/kg/min (10/29/24 0931)       PRN Medications   acetaminophen **OR** acetaminophen    Calcium Replacement - Follow Nurse / BPA Driven Protocol    dextrose    dextrose    fentaNYL    glucagon (human recombinant)    labetalol    LORazepam **OR** LORazepam **OR** LORazepam **OR** LORazepam **OR** LORazepam **OR** LORazepam **OR** LORazepam **OR** LORazepam    Magnesium Standard Dose Replacement - Follow Nurse / BPA Driven Protocol    nitroglycerin    ondansetron    Phosphorus Replacement - Follow Nurse / BPA Driven Protocol    Potassium Replacement - Follow Nurse / BPA Driven Protocol    [COMPLETED] Insert Peripheral IV **AND** sodium chloride    sodium chloride    sodium chloride    sodium chloride    sodium chloride     Physical Findings          General Findings alert, overweight, on oxygen therapy, responds/arouses to voice   Oral/Mouth Cavity tooth or teeth missing   Edema  generalized, upper extremity, 1+ (trace), 2+ (mild)   Gastrointestinal abdominal distension, hypoactive bowel sounds, last bowel movement: 10/28   Skin  MASD, surgical incision: midline abdomen   Tubes/Drains/Lines colostomy, drain, R abdomen bulb, LLQ bulb, RUQ bulb, NG tube, other:CVC quadruple lumen   NFPE Other: follow up to perform   --  Current Nutrition Orders & Evaluation of Intake       Oral Nutrition     Food Allergies NKFA   Current PO Diet NPO Diet NPO Type: Strict NPO   Supplement n/a   PO Evaluation     % PO Intake N/a    Factors Affecting Intake: altered GI function, altered respiratory status   --  PES STATEMENT / NUTRITION DIAGNOSIS      Nutrition Dx Problem  Problem: Inadequate Oral Intake  Etiology: Medical Diagnosis -  sigmoid perforation, just extubated this am, NGT in place    Signs/Symptoms: NPO and Report/Observation     NUTRITION INTERVENTION / PLAN OF CARE      Intervention Goal(s) Maintain nutrition status, Reduce/improve symptoms, Meet estimated needs, Disease management/therapy, Initiate TF/PN, Tolerate TF/PN at goal, Transition TF to PO, and No significant weight loss         RD Intervention/Action Await initiation of EN/PN, Continue to monitor, Care plan reviewed, and Recommend/order: EN   --      Prescription/Orders:       PO Diet       Supplements       Enteral Nutrition    Enteral Prescription:     Enteral Route NG    TF Delivery Method Continuous    Enteral Product Nutren 1.5    Modular None    Propofol Rate/Kcal     TF Start Rate  20 mL/hr (do not advance today)    TF Goal Rate  75 mL/hr    Free Water Flush 30 mL Q 4 hr    Provision at Goal:          Calories 2475 kcal, meets 100% needs         Protein  112 gm protein, meets 100% needs         Fluid (mL) 1261 mL free water + 180 mL in flushes         Parenteral Nutrition    New Prescription Ordered? Yes   --      Monitor/Evaluation Per protocol, I&O, Pertinent labs, EN delivery/tolerance, Weight, Skin status, GI status, Symptoms, Swallow function   Discharge Plan/Needs Pending clinical course   --    RD to follow per protocol.      Electronically signed by:  Maryann Franklin RD  10/29/24 11:16 EDT

## 2024-10-29 NOTE — NURSING NOTE
Pt remains in CICU on ventilator. Levo weaned to low dose. Fent weaned off. Dex and D5 1/2 NS remain. Pt now following commands. FiO2 weaned to 21%. NG to low wall with 50 out. JP1 - 65 out, JP2 - 160 out, JP3 - 85 out. F/C 300 UOP. Colostomy with minimal output. Given 2 units insulin for BG 160s.

## 2024-10-30 ENCOUNTER — APPOINTMENT (OUTPATIENT)
Dept: GENERAL RADIOLOGY | Facility: HOSPITAL | Age: 74
DRG: 853 | End: 2024-10-30
Payer: MEDICARE

## 2024-10-30 LAB
ALBUMIN SERPL-MCNC: 1.1 G/DL (ref 3.5–5.2)
ALBUMIN/GLOB SERPL: 0.4 G/DL
ALP SERPL-CCNC: 125 U/L (ref 39–117)
ALT SERPL W P-5'-P-CCNC: 13 U/L (ref 1–41)
ANION GAP SERPL CALCULATED.3IONS-SCNC: 7.2 MMOL/L (ref 5–15)
ARTERIAL PATENCY WRIST A: POSITIVE
AST SERPL-CCNC: 24 U/L (ref 1–40)
ATMOSPHERIC PRESS: 755.4 MMHG
BACTERIA SPEC AEROBE CULT: ABNORMAL
BASE EXCESS BLDA CALC-SCNC: -3.4 MMOL/L (ref 0–2)
BASOPHILS # BLD AUTO: 0.03 10*3/MM3 (ref 0–0.2)
BASOPHILS NFR BLD AUTO: 0.1 % (ref 0–1.5)
BDY SITE: ABNORMAL
BILIRUB SERPL-MCNC: 1.2 MG/DL (ref 0–1.2)
BUN SERPL-MCNC: 9 MG/DL (ref 8–23)
BUN/CREAT SERPL: 15.8 (ref 7–25)
CA-I SERPL ISE-MCNC: 0.73 MMOL/L (ref 1.15–1.35)
CALCIUM SPEC-SCNC: 6.2 MG/DL (ref 8.6–10.5)
CHLORIDE SERPL-SCNC: 114 MMOL/L (ref 98–107)
CO2 BLDA-SCNC: 20.7 MMOL/L (ref 23–27)
CO2 SERPL-SCNC: 15.8 MMOL/L (ref 22–29)
CREAT SERPL-MCNC: 0.57 MG/DL (ref 0.76–1.27)
DEPRECATED RDW RBC AUTO: 57.6 FL (ref 37–54)
DEVICE COMMENT: ABNORMAL
EGFRCR SERPLBLD CKD-EPI 2021: 104.2 ML/MIN/1.73
EOSINOPHIL # BLD AUTO: 0.1 10*3/MM3 (ref 0–0.4)
EOSINOPHIL NFR BLD AUTO: 0.5 % (ref 0.3–6.2)
ERYTHROCYTE [DISTWIDTH] IN BLOOD BY AUTOMATED COUNT: 18.2 % (ref 12.3–15.4)
GAS FLOW AIRWAY: 6 LPM
GLOBULIN UR ELPH-MCNC: 2.7 GM/DL
GLUCOSE BLDC GLUCOMTR-MCNC: 104 MG/DL (ref 70–130)
GLUCOSE BLDC GLUCOMTR-MCNC: 108 MG/DL (ref 70–130)
GLUCOSE BLDC GLUCOMTR-MCNC: 115 MG/DL (ref 70–130)
GLUCOSE BLDC GLUCOMTR-MCNC: 116 MG/DL (ref 70–130)
GLUCOSE BLDC GLUCOMTR-MCNC: 117 MG/DL (ref 70–130)
GLUCOSE BLDC GLUCOMTR-MCNC: 121 MG/DL (ref 70–130)
GLUCOSE BLDC GLUCOMTR-MCNC: 123 MG/DL (ref 70–130)
GLUCOSE BLDC GLUCOMTR-MCNC: 125 MG/DL (ref 70–130)
GLUCOSE SERPL-MCNC: 377 MG/DL (ref 65–99)
GRAM STN SPEC: ABNORMAL
HCO3 BLDA-SCNC: 19.8 MMOL/L (ref 22–28)
HCT VFR BLD AUTO: 26.7 % (ref 37.5–51)
HEMODILUTION: NO
HGB BLD-MCNC: 8.3 G/DL (ref 13–17.7)
IMM GRANULOCYTES # BLD AUTO: 0.21 10*3/MM3 (ref 0–0.05)
IMM GRANULOCYTES NFR BLD AUTO: 1 % (ref 0–0.5)
INHALED O2 CONCENTRATION: 44 %
ISOLATED FROM: ABNORMAL
LYMPHOCYTES # BLD AUTO: 0.91 10*3/MM3 (ref 0.7–3.1)
LYMPHOCYTES NFR BLD AUTO: 4.5 % (ref 19.6–45.3)
MAGNESIUM SERPL-MCNC: 1.9 MG/DL (ref 1.6–2.4)
MCH RBC QN AUTO: 27.1 PG (ref 26.6–33)
MCHC RBC AUTO-ENTMCNC: 31.1 G/DL (ref 31.5–35.7)
MCV RBC AUTO: 87.3 FL (ref 79–97)
MODALITY: ABNORMAL
MONOCYTES # BLD AUTO: 0.67 10*3/MM3 (ref 0.1–0.9)
MONOCYTES NFR BLD AUTO: 3.3 % (ref 5–12)
NEUTROPHILS NFR BLD AUTO: 18.13 10*3/MM3 (ref 1.7–7)
NEUTROPHILS NFR BLD AUTO: 90.6 % (ref 42.7–76)
O2 A-A PPRESDIFF RESPIRATORY: 0.2 MMHG
PCO2 BLDA: 28.8 MM HG (ref 35–45)
PH BLDA: 7.45 PH UNITS (ref 7.35–7.45)
PLATELET # BLD AUTO: 198 10*3/MM3 (ref 140–450)
PMV BLD AUTO: 8.7 FL (ref 6–12)
PO2 BLD: 130 MM[HG] (ref 0–500)
PO2 BLDA: 57.3 MM HG (ref 80–100)
POTASSIUM SERPL-SCNC: 3.1 MMOL/L (ref 3.5–5.2)
POTASSIUM SERPL-SCNC: 4.2 MMOL/L (ref 3.5–5.2)
PROT SERPL-MCNC: 3.8 G/DL (ref 6–8.5)
QT INTERVAL: 312 MS
QT INTERVAL: 316 MS
QTC INTERVAL: 445 MS
QTC INTERVAL: 448 MS
RBC # BLD AUTO: 3.06 10*6/MM3 (ref 4.14–5.8)
SAO2 % BLDCOA: 91 % (ref 92–98.5)
SET MECH RESP RATE: 34
SODIUM SERPL-SCNC: 137 MMOL/L (ref 136–145)
WBC NRBC COR # BLD AUTO: 20.05 10*3/MM3 (ref 3.4–10.8)

## 2024-10-30 PROCEDURE — 25010000002 THIAMINE HCL 200 MG/2ML SOLUTION: Performed by: SURGERY

## 2024-10-30 PROCEDURE — 94664 DEMO&/EVAL PT USE INHALER: CPT

## 2024-10-30 PROCEDURE — 94667 MNPJ CHEST WALL 1ST: CPT

## 2024-10-30 PROCEDURE — 25010000002 POTASSIUM CHLORIDE PER 2 MEQ

## 2024-10-30 PROCEDURE — 94799 UNLISTED PULMONARY SVC/PX: CPT

## 2024-10-30 PROCEDURE — 71045 X-RAY EXAM CHEST 1 VIEW: CPT

## 2024-10-30 PROCEDURE — 25010000002 FLUCONAZOLE PER 200 MG: Performed by: SURGERY

## 2024-10-30 PROCEDURE — 25010000002 HYDROMORPHONE PER 4 MG: Performed by: INTERNAL MEDICINE

## 2024-10-30 PROCEDURE — 80053 COMPREHEN METABOLIC PANEL: CPT | Performed by: INTERNAL MEDICINE

## 2024-10-30 PROCEDURE — 99024 POSTOP FOLLOW-UP VISIT: CPT | Performed by: SURGERY

## 2024-10-30 PROCEDURE — 82330 ASSAY OF CALCIUM: CPT

## 2024-10-30 PROCEDURE — 36600 WITHDRAWAL OF ARTERIAL BLOOD: CPT | Performed by: INTERNAL MEDICINE

## 2024-10-30 PROCEDURE — 25010000002 FENTANYL CITRATE (PF) 50 MCG/ML SOLUTION: Performed by: INTERNAL MEDICINE

## 2024-10-30 PROCEDURE — 93010 ELECTROCARDIOGRAM REPORT: CPT | Performed by: INTERNAL MEDICINE

## 2024-10-30 PROCEDURE — 82803 BLOOD GASES ANY COMBINATION: CPT | Performed by: INTERNAL MEDICINE

## 2024-10-30 PROCEDURE — 94761 N-INVAS EAR/PLS OXIMETRY MLT: CPT

## 2024-10-30 PROCEDURE — 74018 RADEX ABDOMEN 1 VIEW: CPT

## 2024-10-30 PROCEDURE — 84132 ASSAY OF SERUM POTASSIUM: CPT

## 2024-10-30 PROCEDURE — 25010000002 PIPERACILLIN SOD-TAZOBACTAM PER 1 G: Performed by: INTERNAL MEDICINE

## 2024-10-30 PROCEDURE — 82948 REAGENT STRIP/BLOOD GLUCOSE: CPT

## 2024-10-30 PROCEDURE — 83735 ASSAY OF MAGNESIUM: CPT

## 2024-10-30 PROCEDURE — 94640 AIRWAY INHALATION TREATMENT: CPT

## 2024-10-30 PROCEDURE — 25010000002 LORAZEPAM PER 2 MG: Performed by: SURGERY

## 2024-10-30 PROCEDURE — 25010000002 ENOXAPARIN PER 10 MG: Performed by: INTERNAL MEDICINE

## 2024-10-30 PROCEDURE — 85025 COMPLETE CBC W/AUTO DIFF WBC: CPT | Performed by: INTERNAL MEDICINE

## 2024-10-30 PROCEDURE — 93005 ELECTROCARDIOGRAM TRACING: CPT

## 2024-10-30 RX ORDER — IPRATROPIUM BROMIDE AND ALBUTEROL SULFATE 2.5; .5 MG/3ML; MG/3ML
3 SOLUTION RESPIRATORY (INHALATION)
Status: DISCONTINUED | OUTPATIENT
Start: 2024-10-30 | End: 2024-11-05

## 2024-10-30 RX ORDER — HYDROCODONE BITARTRATE AND ACETAMINOPHEN 5; 325 MG/1; MG/1
1 TABLET ORAL EVERY 6 HOURS PRN
Status: DISPENSED | OUTPATIENT
Start: 2024-10-30 | End: 2024-11-04

## 2024-10-30 RX ORDER — HYDROMORPHONE HYDROCHLORIDE 1 MG/ML
0.5 INJECTION, SOLUTION INTRAMUSCULAR; INTRAVENOUS; SUBCUTANEOUS
Status: DISPENSED | OUTPATIENT
Start: 2024-10-30 | End: 2024-11-04

## 2024-10-30 RX ORDER — POTASSIUM CHLORIDE 1.5 G/1.58G
40 POWDER, FOR SOLUTION ORAL EVERY 4 HOURS
Status: DISCONTINUED | OUTPATIENT
Start: 2024-10-30 | End: 2024-10-30

## 2024-10-30 RX ORDER — AMLODIPINE AND BENAZEPRIL HYDROCHLORIDE 10; 20 MG/1; MG/1
1 CAPSULE ORAL DAILY
COMMUNITY
End: 2024-11-30 | Stop reason: HOSPADM

## 2024-10-30 RX ORDER — POTASSIUM CHLORIDE 29.8 MG/ML
20 INJECTION INTRAVENOUS
Status: COMPLETED | OUTPATIENT
Start: 2024-10-30 | End: 2024-10-30

## 2024-10-30 RX ORDER — FLUCONAZOLE 2 MG/ML
400 INJECTION, SOLUTION INTRAVENOUS EVERY 24 HOURS
Status: DISCONTINUED | OUTPATIENT
Start: 2024-10-30 | End: 2024-11-03

## 2024-10-30 RX ORDER — METOPROLOL SUCCINATE 100 MG/1
100 TABLET, EXTENDED RELEASE ORAL DAILY
COMMUNITY
End: 2024-11-30 | Stop reason: HOSPADM

## 2024-10-30 RX ADMIN — HYDROMORPHONE HYDROCHLORIDE 0.5 MG: 1 INJECTION, SOLUTION INTRAMUSCULAR; INTRAVENOUS; SUBCUTANEOUS at 17:34

## 2024-10-30 RX ADMIN — FLUCONAZOLE 400 MG: 2 INJECTION, SOLUTION INTRAVENOUS at 17:49

## 2024-10-30 RX ADMIN — PIPERACILLIN AND TAZOBACTAM 4.5 G: 4; .5 INJECTION, POWDER, FOR SOLUTION INTRAVENOUS at 03:16

## 2024-10-30 RX ADMIN — PIPERACILLIN AND TAZOBACTAM 4.5 G: 4; .5 INJECTION, POWDER, FOR SOLUTION INTRAVENOUS at 18:18

## 2024-10-30 RX ADMIN — POTASSIUM CHLORIDE 20 MEQ: 29.8 INJECTION, SOLUTION INTRAVENOUS at 11:23

## 2024-10-30 RX ADMIN — FOLIC ACID 1 MG: 5 INJECTION, SOLUTION INTRAMUSCULAR; INTRAVENOUS; SUBCUTANEOUS at 08:32

## 2024-10-30 RX ADMIN — METOPROLOL TARTRATE 5 MG: 1 INJECTION, SOLUTION INTRAVENOUS at 17:34

## 2024-10-30 RX ADMIN — Medication 10 ML: at 08:05

## 2024-10-30 RX ADMIN — MUPIROCIN 1 APPLICATION: 20 OINTMENT TOPICAL at 20:01

## 2024-10-30 RX ADMIN — Medication 10 ML: at 20:01

## 2024-10-30 RX ADMIN — METOPROLOL TARTRATE 5 MG: 1 INJECTION, SOLUTION INTRAVENOUS at 23:41

## 2024-10-30 RX ADMIN — MUPIROCIN 1 APPLICATION: 20 OINTMENT TOPICAL at 08:31

## 2024-10-30 RX ADMIN — POTASSIUM CHLORIDE 20 MEQ: 29.8 INJECTION, SOLUTION INTRAVENOUS at 09:12

## 2024-10-30 RX ADMIN — POTASSIUM CHLORIDE 40 MEQ: 1.5 FOR SOLUTION ORAL at 08:48

## 2024-10-30 RX ADMIN — CHLORHEXIDINE GLUCONATE 15 ML: 1.2 RINSE ORAL at 08:31

## 2024-10-30 RX ADMIN — Medication 10 ML: at 08:06

## 2024-10-30 RX ADMIN — PANTOPRAZOLE SODIUM 40 MG: 40 INJECTION, POWDER, FOR SOLUTION INTRAVENOUS at 08:31

## 2024-10-30 RX ADMIN — HYDROMORPHONE HYDROCHLORIDE 0.5 MG: 1 INJECTION, SOLUTION INTRAMUSCULAR; INTRAVENOUS; SUBCUTANEOUS at 08:45

## 2024-10-30 RX ADMIN — PIPERACILLIN AND TAZOBACTAM 4.5 G: 4; .5 INJECTION, POWDER, FOR SOLUTION INTRAVENOUS at 11:23

## 2024-10-30 RX ADMIN — HYDROMORPHONE HYDROCHLORIDE 0.5 MG: 1 INJECTION, SOLUTION INTRAMUSCULAR; INTRAVENOUS; SUBCUTANEOUS at 11:36

## 2024-10-30 RX ADMIN — POTASSIUM CHLORIDE 40 MEQ: 1.5 FOR SOLUTION ORAL at 05:04

## 2024-10-30 RX ADMIN — THIAMINE HYDROCHLORIDE 200 MG: 100 INJECTION, SOLUTION INTRAMUSCULAR; INTRAVENOUS at 14:36

## 2024-10-30 RX ADMIN — ENOXAPARIN SODIUM 40 MG: 100 INJECTION SUBCUTANEOUS at 08:31

## 2024-10-30 RX ADMIN — HYDROCODONE BITARTRATE AND ACETAMINOPHEN 1 TABLET: 5; 325 TABLET ORAL at 16:59

## 2024-10-30 RX ADMIN — THIAMINE HYDROCHLORIDE 200 MG: 100 INJECTION, SOLUTION INTRAMUSCULAR; INTRAVENOUS at 21:10

## 2024-10-30 RX ADMIN — FENTANYL CITRATE 50 MCG: 50 INJECTION, SOLUTION INTRAMUSCULAR; INTRAVENOUS at 07:40

## 2024-10-30 RX ADMIN — LORAZEPAM 1 MG: 2 INJECTION INTRAMUSCULAR; INTRAVENOUS at 07:57

## 2024-10-30 RX ADMIN — LORAZEPAM 2 MG: 2 INJECTION INTRAMUSCULAR; INTRAVENOUS at 21:10

## 2024-10-30 RX ADMIN — IPRATROPIUM BROMIDE AND ALBUTEROL SULFATE 3 ML: 2.5; .5 SOLUTION RESPIRATORY (INHALATION) at 13:35

## 2024-10-30 RX ADMIN — THIAMINE HYDROCHLORIDE 200 MG: 100 INJECTION, SOLUTION INTRAMUSCULAR; INTRAVENOUS at 05:04

## 2024-10-30 RX ADMIN — IPRATROPIUM BROMIDE AND ALBUTEROL SULFATE 3 ML: 2.5; .5 SOLUTION RESPIRATORY (INHALATION) at 20:25

## 2024-10-30 RX ADMIN — CHLORHEXIDINE GLUCONATE 15 ML: 1.2 RINSE ORAL at 20:01

## 2024-10-30 RX ADMIN — METOPROLOL TARTRATE 5 MG: 1 INJECTION, SOLUTION INTRAVENOUS at 11:35

## 2024-10-30 NOTE — NURSING NOTE
"   10/30/24 1331   Colostomy LUQ   Placement date: If unknown, DO NOT use \"Add Comment\" note/Placement time: If unknown, DO NOT use \"Add Comment\" note: 10/27/24 0051   Inserted by: DR. BCEK  Location: LUQ   Stomal Appliance 2 piece;Clean;Dry;Intact;Changed;Drainable   Stoma Appearance moist;red  (oval)   Peristomal Assessment Clean;Intact   Accessories/Skin Care cleansed with water;skin barrier ring   Stool Color clear;yellow   Treatment Bag change;Site care     WOCN:   Ostomy education provided to the patient/family:    [x]Pouch changed   []Pt observed   []Pt participated    []CG participated   []Pouch emptied and cleaned   []Pt observed   []Pt participated   []CG participated   [x]Teaching packet/handouts provided/reviewed  [x]Return to ADL's  [x]Peristomal skin care  []Diet  [x]Use of closed end verses a drainable pouch  []Supplies at bedside  []Samples ordered    Current Supplies: Placed in 2 1/4 2 piece pedro with adapt ring    WOC Team follow up plan: Wife present and observed pouch change. Reviewed education booklet with wife. She states she will have to learn and help him. Discussed rehab will continue to work with them and then will have HH to continue education. Plan to see patient Friday.     "

## 2024-10-30 NOTE — PROGRESS NOTES
Postoperative day 3 from left hemicolectomy    S: No events overnight.  He started to be confused and weak.  Reports abdominal pain.  Denies nausea or vomiting.  NG tube was pulled.  He was tolerating tube feeds.  He was having minimal ostomy function    O:   Vitals:    10/30/24 1430 10/30/24 1500 10/30/24 1517 10/30/24 1625   BP: 130/62 132/63     Pulse: 119 119 115    Resp:       Temp:    98.5 °F (36.9 °C)   TempSrc:    Oral   SpO2: 95% 93% 95%    Weight:       Height:          Drain #1 135 cc serous  Drain #2 and 3 235 cc each serous  Ostomy output 90 cc  Alert, ill-appearing, no distress  Breathing comfortable  Abdomen soft, appropriate tender, nondistended, incision open with clean tissue at the base  Ostomy pink and viable with bowel sweat  All drains are serous    White blood cell count 20,000, stable, hemoglobin 8.3, albumin 1.1, alk phos 125, potassium 3.1, chloride 114, creatinine 0.5    Pathology report:   Clinical Information    STITCH AT PROXIMAL END OF DESCENDING COLON   Final Diagnosis     1.  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               -Tumor sizes: 4.8 cm and 6.0 cm.               -Tumor extent: Both tumors invade through the muscularis propria into pericolorectal adipose.               -All margins are free of tumor (closest 2.5 cm from the mesenteric margin).               -Uncomplicated diverticulosis.               -No lymphovascular nor perineural invasion identified.               -16 negative lymph nodes (0/16).               -Pathologic stage: mpT3, N0.     2.  Sigmoid colon, sigmoidectomy: INVASIVE MODERATELY DIFFERENTIATED ADENOCARCINOMA.               -Tumor size: 5.5 cm.               -Tumor extent: Tumor invades muscularis propria.               -All margins are free of tumor (closest 1.0 cm from the distal mucosal margin).               -16 negative lymph nodes (0/16).               -Pathologic stage: mpT3,  N0.     SWM   Electronically signed by Izzy Merritt MD on 10/30/2024 at 0954   Synoptic Checklist   COLON AND RECTUM: Resection   8th Edition - Protocol posted: 6/19/2024COLON AND RECTUM: RESECTION - 1  SPECIMEN   Procedure  Left hemicolectomy   TUMOR   Tumor Site  Descending colon   Histologic Type  Mucinous adenocarcinoma   Histologic Grade  G2, moderately differentiated   Tumor Size  Greatest dimension (Centimeters): 6.0 cm   Multiple Primary Sites  Present: sigmoid colon   Tumor Extent  Invades through muscularis propria into the pericolonic or perirectal tissue   Macroscopic Tumor Perforation  Not identified   Lymphatic and / or Vascular Invasion  Not identified   Perineural Invasion  Not identified   Tumor Budding Score  Low (0-4)   Treatment Effect  No known presurgical therapy   MARGINS   Margin Status for Invasive Carcinoma  All margins negative for invasive carcinoma   Closest Margin(s) to Invasive Carcinoma  Mesenteric   Margin Status for Non-Invasive Tumor  All margins negative for high-grade dysplasia / intramucosal carcinoma and low-grade dysplasia   REGIONAL LYMPH NODES   Regional Lymph Node Status  All regional lymph nodes negative for tumor   Number of Lymph Nodes Examined  16   Tumor Deposits  Not identified   pTNM CLASSIFICATION (AJCC 8th Edition)   Reporting of pT, pN, and (when applicable) pM categories is based on information available to the pathologist at the time the report is issued. As per the AJCC (Chapter 1, 8th Ed.) it is the managing physician's responsibility to establish the final pathologic stage based upon all pertinent information, including but potentially not limited to this pathology report.   pT Category  pT3   T Suffix  (m)   pN Category  pN0   ADDITIONAL FINDINGS   Additional Findings  Diverticulosis   .   COLON AND RECTUM: Resection   8th Edition - Protocol posted: 6/19/2024COLON AND RECTUM: RESECTION - 2  SPECIMEN   Procedure  Sigmoidectomy   TUMOR   Tumor Site   Sigmoid colon   Histologic Type  Adenocarcinoma   Histologic Grade  G2, moderately differentiated   Tumor Size  Greatest dimension (Centimeters): 5.5 cm   Multiple Primary Sites  Present: descending colon (see template #1)   Tumor Extent  Invades into muscularis propria   Macroscopic Tumor Perforation  Not identified   Lymphatic and / or Vascular Invasion  Not identified   Perineural Invasion  Not identified   Tumor Budding Score  Low (0-4)   Treatment Effect  No known presurgical therapy   MARGINS   Margin Status for Invasive Carcinoma  All margins negative for invasive carcinoma   Closest Margin(s) to Invasive Carcinoma  Distal   Distance from Invasive Carcinoma to Closest Margin  1.0 cm   Margin Status for Non-Invasive Tumor  All margins negative for high-grade dysplasia / intramucosal carcinoma and low-grade dysplasia   REGIONAL LYMPH NODES   Regional Lymph Node Status  All regional lymph nodes negative for tumor   Number of Lymph Nodes Examined  16   Tumor Deposits  Not identified   pTNM CLASSIFICATION (AJCC 8th Edition)   Reporting of pT, pN, and (when applicable) pM categories is based on information available to the pathologist at the time the report is issued. As per the AJCC (Chapter 1, 8th Ed.) it is the managing physician's responsibility to establish the final pathologic stage based upon all pertinent information, including but potentially not limited to this pathology report.   pT Category  pT3   T Suffix  (m)   pN Category  pN0   .      Comment    Representative slides from parts 1 and 2 are reviewed internally with Dr. Leroy, who concurs.  While the tumor in the sigmoid colon only invades the muscularis, the overall pathologic stage is reflective of the most invasive tumor from specimen 1.  Results of MSI studies will be performed on all tumors and reported in an addendum.     Body Fluid Culture Scant growth (1+) Escherichia coli Abnormal    Scant growth (1+) Pseudomonas aeruginosa Abnormal        Organism under investigation.            Gram Stain Moderate (3+) Gram negative bacilli   Moderate (3+) Gram positive cocci   Moderate (3+) WBCs seen           Resulting Agency: [facility]     Susceptibility       Escherichia coli Pseudomonas aeruginosa    PRISCILA PRISCILA    Amoxicillin + Clavulanate 16 ug/ml Intermediate      Ampicillin >=32 ug/ml Resistant      Ampicillin + Sulbactam >=32 ug/ml Resistant      Cefepime <=1 ug/ml Susceptible <=1 ug/ml Susceptible    Ceftazidime <=1 ug/ml Susceptible <=1 ug/ml Susceptible    Ceftriaxone <=1 ug/ml Susceptible      Ciprofloxacin   <=0.25 ug/ml Susceptible    Gentamicin <=1 ug/ml Susceptible      Levofloxacin <=0.12 ug/ml Susceptible 0.25 ug/ml Susceptible    Piperacillin + Tazobactam <=4 ug/ml Susceptible <=4 ug/ml Susceptible    Trimethoprim + Sulfamethoxazole <=20 ug/ml Susceptible            Assessment and plan    Postoperative day 3 status post open left hemicolectomy, slowly recovering and getting better.  Continue to have leukocytosis but he is having bowel function and does not have any signs of ongoing infection.  He is very weak and would like to take several weeks until he goes back to his baseline.  Pathology report was discussed with patient and family that show 3 different tumors and stage II colon cancer.  As per the pathology report there is no lymph node involvement so he will not need to have any type of chemotherapy.  He will need to have colonoscopy whenever he recovers.    Continue core track and tube feeds  Physical therapy  Continue p.o. pain control  CIWA protocol  Continue Zosyn and Diflucan for total of 10 days  SCDs and Lovenox  Wet-to-dry dressing changes with saline and gauze 3 times a day    Mc Guillaume MD, FACS  General, Minimally Invasive and Endoscopic Surgery  [facility]    [address], [address]  [place], [place], [address]  P: [phone]  F: [phone]

## 2024-10-30 NOTE — PROGRESS NOTES
[facility]     Mar/chart reviewed  Follow up septic shock from perforated bowel with likely underlying malignancy  Extubated yesterday, has abdominal pain  Was seeing bugs earlier    Vital Sign Min/Max for last 24 hours  Temp  Min: 98.6 °F (37 °C)  Max: 100.3 °F (37.9 °C)   BP  Min: 95/50  Max: 149/69   Pulse  Min: 87  Max: 145   Resp  Min: 23  Max: 23   SpO2  Min: 97 %  Max: 100 %   Flow (L/min) (Oxygen Therapy)  Min: 2  Max: 2   Weight  Min: 98.4 kg (216 lb 14.9 oz)  Max: 98.4 kg (216 lb 14.9 oz)     Appears ill, arouses,   perrl, eomi, normal sclera  mmm, no jvd, trachea midline, neck supple,  chest a little coarse bilaterally, no crackles, + wheezes,   Tachy, regular  soft,slightly distended, sligthly tender, decreased bowel sounds  no c/c/e  Skin warm, dry no rashes    Labs: 10/30: reviewed:  Glucose 377  Bun 9  Cr 0.57  Bicarb 15    Micro: ecoli and pseudomonas        A/P:  Severe sepsis with septic shock 2/2 peritonitis from perforated bowel due to likely  malignancy s/p OR -- continue antibiotics, supportive care, pressors  Recent weight loss  Alcohol abuse - continue thiamine, folate, watch for withdrawal  Hyperglcyemia -- ssi --stop d5 may need to add lantus  Right renal mass  Lactic acidosis  Anemia  Bacteremia -- continue antibiotics  9. Hypoglycemia - resolved, now with hyperglycemia again  10. SVT -- s/p dig  11. Post operative ventilator management -- extubated 10/29    Now dealing with some alcohol withdrawal and with withdrawal from precedex most likely.  Will need some assistance here  with pulmonary toilet.  Remains at risk for re-intubation.      CC 36 mins excluding any future billable procedures.     NAGMA -- suspect from GI loss? Check gas.

## 2024-10-30 NOTE — CONSULTS
Nutrition Services    Patient Name:  Arsh Haynes  YOB: 1950  MRN: [id number]  Admit Date:  10/26/2024    Assessment Date:  10/30/24    Summary: TF follow up     74 y.o. male admitted with worsening abdominal pain x several days.  Diarrhea x 2 months, somewhat black.  Reports of weight loss per chart review, but family unsure how much.  Per chart review, current weight noted at 203 lb (edema noted) and patient was 202 lb in September of 2023.  History of ETOH abuse.    Sigmoid perforation.  S/p ex lap, open L hemicolectomy with Juliana pouch and end colostomy 10/26.  Several tumors found in sigmoid colon, likely malignant.    Patient extubated yesterday. Trickle feeds were started yesterday via NG tube and got up to 30ml/hr, but pt pulled out NG this morning. TF on hold d/t no EN acces. CXR pending to verify no fluid in the lungs. Will need NG tube replaced if ok per general surgery.     Initial Goal:  *initial goal conservative d/t risk of RFS     Nutren 1.5 at 20 mL/hr + 30 mL q 4 hr water flush (do not advance today)     End Goal:    Nutren 1.5 at 75 mL/hr + 30 mL q 4 hr water flush      Calories  2475 kcals (100%)    Protein  112 g (100%)    Free water  1254 mL   Flushes  180 mL     The above end goal rate is for 22 hrs/day to assume interruptions for ADLs. Water flushes adjusted based on clinical picture + Rx flushes/IV fluids      Labs reviewed: Na 137, K 3.1, Cl 114, Gluc 115-377, Alb 1.1  Meds reviewed: lovenox, folic acid, insulin, protonix, zosyn, KCl, thiamine    Plans/Recommendations:  Once able to re initiate TF- Start TFs of Nutren 1.5 @ 20 mL/hr and advance 10ml q6hr as tolerated to goal rate of 75ml/hr.  30 mL q 4 hour free water flushes for now.  Replace electrolytes per protocol  Follow for tolerance.    RD to follow closely.    CLINICAL NUTRITION ASSESSMENT      Reason for Assessment Physician Consult, TF Assessment     Diagnosis/Problem   Sigmoid perforation  S/p ex lap, open L  "hemicolectomy 10/26  Extubated yesterday   NG pulled, no EN access currently   CXR pending   Replacing potassium     Medical/Surgical History Past Medical History:   Diagnosis Date    HTN (hypertension)        Past Surgical History:   Procedure Laterality Date    COLON RESECTION N/A 10/26/2024    Procedure: LOW ANTERIOR COLON RESECTION SIGMOID, COLOSTOMY, SPLENIC FLEXURE MOBILIZATION;  Surgeon: Mc Guillaume MD;  Location: [facility] OR;  Service: General;  Laterality: N/A;    EXPLORATORY LAPAROTOMY N/A 10/26/2024    Procedure: LAPAROTOMY EXPLORATORY, LEFT HEMICOLECTOMY;  Surgeon: Mc Guillaume MD;  Location: [facility] OR;  Service: General;  Laterality: N/A;        Anthropometrics        Current Height  Current Weight  BMI kg/m2 Height: 182.9 cm (72\")  Weight: 98.4 kg (216 lb 14.9 oz) (10/30/24 0300)  Body mass index is 29.42 kg/m².   Adjusted BMI (if applicable)    BMI Category Overweight (25 - 29.9)   Ideal Body Weight (IBW) 178 lb (80.9 kg)   Usual Body Weight (UBW) 202 lb (9/2023)   Weight Trend Loss, Unknown   Weight History Wt Readings from Last 30 Encounters:   10/30/24 0300 98.4 kg (216 lb 14.9 oz)   10/29/24 0431 92.5 kg (203 lb 14.8 oz)   10/28/24 0436 90.2 kg (198 lb 13.7 oz)   10/27/24 0404 83.4 kg (183 lb 13.8 oz)   10/26/24 1933 83 kg (183 lb)      --  Estimated/Assessed Needs        Current Weight  Weight: 98.4 kg (216 lb 14.9 oz) (10/30/24 0300)       Energy Requirements    Weight for Calculation 178 lb (80.9 kg) IBW   Method for Estimation  30-35 kcal/kg   EST Needs (kcal/day) 8302-3129       Protein Requirements    Weight for Calculation 178 lb (80.9 kg) IBW   EST Protein Needs (g/kg) 1.2 - 1.5 gm/kg   EST Daily Needs (g/day)        Fluid Requirements     Method for Estimation 1 mL/kcal    EST Needs (mL/day)      Labs       Pertinent Labs    Results from last 7 days   Lab Units 10/30/24  0345 10/29/24  0334 10/28/24  0326   SODIUM mmol/L 137 143 143   POTASSIUM " "mmol/L 3.1* 4.1 4.4   CHLORIDE mmol/L 114* 118* 119*   CO2 mmol/L 15.8* 16.5* 17.0*   BUN mg/dL 9 12 14   CREATININE mg/dL 0.57* 0.85 0.72*   CALCIUM mg/dL 6.2* 6.7* 6.5*   BILIRUBIN mg/dL 1.2 1.1 0.9   ALK PHOS U/L 125* 77 65   ALT (SGPT) U/L 13 8 11   AST (SGOT) U/L 24 14 18   GLUCOSE mg/dL 377* 158* 90     Results from last 7 days   Lab Units 10/30/24  0345 10/29/24  0334 10/28/24  0326 10/27/24  0432   MAGNESIUM mg/dL 1.9 2.3  --  1.4*   PHOSPHORUS mg/dL  --   --   --  2.4*   HEMOGLOBIN g/dL 8.3* 8.6*   < > 9.8*   HEMATOCRIT % 26.7* 27.9*   < > 30.6*   WBC 10*3/mm3 20.05* 20.19*   < > 6.94   ALBUMIN g/dL 1.1* 1.2*   < > 2.1*    < > = values in this interval not displayed.     Results from last 7 days   Lab Units 10/30/24  0345 10/29/24  0334 10/28/24  0326 10/27/24  0432 10/27/24  0254 10/26/24  1944   INR   --   --   --   --   --  1.14*   PLATELETS 10*3/mm3 198 236 334 483* 262 549*     No results found for: \"COVID19\"  Lab Results   Component Value Date    HGBA1C 6.60 (H) 10/26/2024          Medications           Scheduled Medications chlorhexidine, 15 mL, Mouth/Throat, Q12H  enoxaparin, 40 mg, Subcutaneous, Q24H  folic acid 1 mg in sodium chloride 0.9 % 50 mL IVPB, 1 mg, Intravenous, Daily  insulin regular, 2-9 Units, Subcutaneous, Q4H  ipratropium-albuterol, 3 mL, Nebulization, 4x Daily - RT  mupirocin, 1 Application, Each Nare, BID  pantoprazole, 40 mg, Intravenous, Q24H  piperacillin-tazobactam, 4.5 g, Intravenous, Q8H  potassium chloride, 20 mEq, Intravenous, Q1H  sodium chloride, 10 mL, Intravenous, Q12H  sodium chloride, 10 mL, Intravenous, Q12H  sodium chloride, 10 mL, Intravenous, Q12H  sodium chloride, 10 mL, Intravenous, Q12H  sodium chloride, 10 mL, Intravenous, Q12H  thiamine (B-1) IV, 200 mg, Intravenous, Q8H   Followed by  [START ON 11/3/2024] thiamine, 100 mg, Oral, Daily       Infusions       PRN Medications   acetaminophen **OR** acetaminophen    Calcium Replacement - Follow Nurse / BPA Driven " Protocol    dextrose    dextrose    glucagon (human recombinant)    HYDROcodone-acetaminophen    HYDROmorphone    labetalol    LORazepam **OR** LORazepam **OR** LORazepam **OR** LORazepam **OR** LORazepam **OR** LORazepam **OR** LORazepam **OR** LORazepam    Magnesium Standard Dose Replacement - Follow Nurse / BPA Driven Protocol    nitroglycerin    ondansetron    Phosphorus Replacement - Follow Nurse / BPA Driven Protocol    Potassium Replacement - Follow Nurse / BPA Driven Protocol    [COMPLETED] Insert Peripheral IV **AND** sodium chloride    sodium chloride    sodium chloride    sodium chloride    sodium chloride     Physical Findings          General Findings alert, overweight, on oxygen therapy, responds/arouses to voice   Oral/Mouth Cavity tooth or teeth missing   Edema  generalized, upper extremity, 1+ (trace), 2+ (mild)   Gastrointestinal abdominal distension, hypoactive bowel sounds, last bowel movement: 10/28   Skin  MASD, surgical incision: midline abdomen   Tubes/Drains/Lines colostomy, drain, R abdomen bulb, LLQ bulb, RUQ bulb, NG tube, other:CVC quadruple lumen   NFPE Other: follow up to perform   --  Current Nutrition Orders & Evaluation of Intake       Oral Nutrition     Food Allergies NKFA   Current PO Diet NPO Diet NPO Type: Tube Feeding   Supplement n/a   PO Evaluation     % PO Intake N/a    Factors Affecting Intake: altered GI function, altered respiratory status   --  PES STATEMENT / NUTRITION DIAGNOSIS      Nutrition Dx Problem  Problem: Inadequate Oral Intake  Etiology: Medical Diagnosis - sigmoid perforation, just extubated this am, NGT in place    Signs/Symptoms: NPO and Report/Observation     NUTRITION INTERVENTION / PLAN OF CARE      Intervention Goal(s) Maintain nutrition status, Reduce/improve symptoms, Meet estimated needs, Disease management/therapy, Initiate TF/PN, Tolerate TF/PN at goal, Transition TF to PO, and No significant weight loss         RD Intervention/Action Await  initiation of EN/PN, Continue to monitor, Care plan reviewed, and Recommend/order: EN   --      Prescription/Orders:       PO Diet       Supplements       Enteral Nutrition    Enteral Prescription:     Enteral Route NG    TF Delivery Method Continuous    Enteral Product Nutren 1.5    Modular None    Propofol Rate/Kcal     TF Start Rate  20 mL/hr (do not advance today)    TF Goal Rate  75 mL/hr    Free Water Flush 30 mL Q 4 hr    Provision at Goal:          Calories 2475 kcal, meets 100% needs         Protein  112 gm protein, meets 100% needs         Fluid (mL) 1261 mL free water + 180 mL in flushes         Parenteral Nutrition    New Prescription Ordered? No, Recommended, tube feeds on hold d/t no EN currently    --      Monitor/Evaluation Per protocol, I&O, Pertinent labs, EN delivery/tolerance, Weight, Skin status, GI status, Symptoms, Swallow function   Discharge Plan/Needs Pending clinical course   --    RD to follow per protocol.      Electronically signed by:  Roxanna Celestin RD  10/30/24 11:22 EDT

## 2024-10-30 NOTE — PLAN OF CARE
"  Problem: Adult Inpatient Plan of Care  Goal: Plan of Care Review  Outcome: Adequate for Care Transition  Flowsheets  Taken 10/30/2024 1652 by Shayna Arevalo, RN  Outcome Evaluation: Pt HR elevated w/ increased b/p. Scheduled lopressor added. CIWA scores around 8-9, Pt claiming, \"seeing little bugs on ceiling,\" People across the room\", who are not there.Pt removed NG tube, cortrk placed per Dr. Guillaume, by charge RN.  PRN pain medication given. Wound care x3. Famiy updated at bedside.  Plan of Care Reviewed With:   spouse   child  Taken 10/29/2024 0504 by Richard Braun, Nursing Student  Progress: improving                 "

## 2024-10-30 NOTE — PLAN OF CARE
Goal Outcome Evaluation:                                          Pt on 2L NC overnight. No c/o pain. Midline incision dressing changed. Pt HR elevated in 120s upon beginning of shift. Shanda GUZMAN made aware, no new orders. EKG obtained.

## 2024-10-31 ENCOUNTER — APPOINTMENT (OUTPATIENT)
Dept: GENERAL RADIOLOGY | Facility: HOSPITAL | Age: 74
DRG: 853 | End: 2024-10-31
Payer: MEDICARE

## 2024-10-31 LAB
ALBUMIN SERPL-MCNC: 1.3 G/DL (ref 3.5–5.2)
ALBUMIN/GLOB SERPL: 0.4 G/DL
ALP SERPL-CCNC: 163 U/L (ref 39–117)
ALT SERPL W P-5'-P-CCNC: 17 U/L (ref 1–41)
ANION GAP SERPL CALCULATED.3IONS-SCNC: 7.8 MMOL/L (ref 5–15)
AST SERPL-CCNC: 20 U/L (ref 1–40)
BACTERIA SPEC AEROBE CULT: NORMAL
BASOPHILS # BLD AUTO: 0.03 10*3/MM3 (ref 0–0.2)
BASOPHILS NFR BLD AUTO: 0.2 % (ref 0–1.5)
BILIRUB SERPL-MCNC: 1.1 MG/DL (ref 0–1.2)
BUN SERPL-MCNC: 7 MG/DL (ref 8–23)
BUN/CREAT SERPL: 11.1 (ref 7–25)
CALCIUM SPEC-SCNC: 7.2 MG/DL (ref 8.6–10.5)
CHLORIDE SERPL-SCNC: 114 MMOL/L (ref 98–107)
CO2 SERPL-SCNC: 20.2 MMOL/L (ref 22–29)
CREAT SERPL-MCNC: 0.63 MG/DL (ref 0.76–1.27)
DEPRECATED RDW RBC AUTO: 59.3 FL (ref 37–54)
EGFRCR SERPLBLD CKD-EPI 2021: 101.1 ML/MIN/1.73
EOSINOPHIL # BLD AUTO: 0.18 10*3/MM3 (ref 0–0.4)
EOSINOPHIL NFR BLD AUTO: 1.1 % (ref 0.3–6.2)
ERYTHROCYTE [DISTWIDTH] IN BLOOD BY AUTOMATED COUNT: 18.9 % (ref 12.3–15.4)
GLOBULIN UR ELPH-MCNC: 3.1 GM/DL
GLUCOSE BLDC GLUCOMTR-MCNC: 114 MG/DL (ref 70–130)
GLUCOSE BLDC GLUCOMTR-MCNC: 121 MG/DL (ref 70–130)
GLUCOSE BLDC GLUCOMTR-MCNC: 131 MG/DL (ref 70–130)
GLUCOSE BLDC GLUCOMTR-MCNC: 133 MG/DL (ref 70–130)
GLUCOSE BLDC GLUCOMTR-MCNC: 136 MG/DL (ref 70–130)
GLUCOSE BLDC GLUCOMTR-MCNC: 138 MG/DL (ref 70–130)
GLUCOSE SERPL-MCNC: 117 MG/DL (ref 65–99)
HCT VFR BLD AUTO: 28.1 % (ref 37.5–51)
HGB BLD-MCNC: 9.1 G/DL (ref 13–17.7)
IMM GRANULOCYTES # BLD AUTO: 0.29 10*3/MM3 (ref 0–0.05)
IMM GRANULOCYTES NFR BLD AUTO: 1.8 % (ref 0–0.5)
LYMPHOCYTES # BLD AUTO: 1.08 10*3/MM3 (ref 0.7–3.1)
LYMPHOCYTES NFR BLD AUTO: 6.5 % (ref 19.6–45.3)
MCH RBC QN AUTO: 28.3 PG (ref 26.6–33)
MCHC RBC AUTO-ENTMCNC: 32.4 G/DL (ref 31.5–35.7)
MCV RBC AUTO: 87.3 FL (ref 79–97)
MONOCYTES # BLD AUTO: 1.28 10*3/MM3 (ref 0.1–0.9)
MONOCYTES NFR BLD AUTO: 7.7 % (ref 5–12)
NEUTROPHILS NFR BLD AUTO: 13.69 10*3/MM3 (ref 1.7–7)
NEUTROPHILS NFR BLD AUTO: 82.7 % (ref 42.7–76)
NRBC BLD AUTO-RTO: 0 /100 WBC (ref 0–0.2)
PLATELET # BLD AUTO: 222 10*3/MM3 (ref 140–450)
PMV BLD AUTO: 9.1 FL (ref 6–12)
POTASSIUM SERPL-SCNC: 4.3 MMOL/L (ref 3.5–5.2)
PROT SERPL-MCNC: 4.4 G/DL (ref 6–8.5)
RBC # BLD AUTO: 3.22 10*6/MM3 (ref 4.14–5.8)
SODIUM SERPL-SCNC: 142 MMOL/L (ref 136–145)
WBC NRBC COR # BLD AUTO: 16.55 10*3/MM3 (ref 3.4–10.8)

## 2024-10-31 PROCEDURE — 25010000002 ENOXAPARIN PER 10 MG: Performed by: INTERNAL MEDICINE

## 2024-10-31 PROCEDURE — 99024 POSTOP FOLLOW-UP VISIT: CPT | Performed by: SURGERY

## 2024-10-31 PROCEDURE — 80053 COMPREHEN METABOLIC PANEL: CPT | Performed by: INTERNAL MEDICINE

## 2024-10-31 PROCEDURE — 94664 DEMO&/EVAL PT USE INHALER: CPT

## 2024-10-31 PROCEDURE — 94761 N-INVAS EAR/PLS OXIMETRY MLT: CPT

## 2024-10-31 PROCEDURE — 25010000002 PIPERACILLIN SOD-TAZOBACTAM PER 1 G: Performed by: INTERNAL MEDICINE

## 2024-10-31 PROCEDURE — 85025 COMPLETE CBC W/AUTO DIFF WBC: CPT | Performed by: INTERNAL MEDICINE

## 2024-10-31 PROCEDURE — 25010000002 THIAMINE HCL 200 MG/2ML SOLUTION: Performed by: SURGERY

## 2024-10-31 PROCEDURE — 94668 MNPJ CHEST WALL SBSQ: CPT

## 2024-10-31 PROCEDURE — 94799 UNLISTED PULMONARY SVC/PX: CPT

## 2024-10-31 PROCEDURE — 25010000002 FLUCONAZOLE PER 200 MG: Performed by: SURGERY

## 2024-10-31 PROCEDURE — 82948 REAGENT STRIP/BLOOD GLUCOSE: CPT

## 2024-10-31 PROCEDURE — 25010000002 LORAZEPAM PER 2 MG: Performed by: INTERNAL MEDICINE

## 2024-10-31 PROCEDURE — 94760 N-INVAS EAR/PLS OXIMETRY 1: CPT

## 2024-10-31 PROCEDURE — 25010000002 LORAZEPAM PER 2 MG: Performed by: SURGERY

## 2024-10-31 RX ADMIN — Medication 10 ML: at 09:35

## 2024-10-31 RX ADMIN — CHLORHEXIDINE GLUCONATE 15 ML: 1.2 RINSE ORAL at 21:18

## 2024-10-31 RX ADMIN — PIPERACILLIN AND TAZOBACTAM 4.5 G: 4; .5 INJECTION, POWDER, FOR SOLUTION INTRAVENOUS at 18:34

## 2024-10-31 RX ADMIN — PANTOPRAZOLE SODIUM 40 MG: 40 INJECTION, POWDER, FOR SOLUTION INTRAVENOUS at 09:35

## 2024-10-31 RX ADMIN — FLUCONAZOLE 400 MG: 2 INJECTION, SOLUTION INTRAVENOUS at 18:31

## 2024-10-31 RX ADMIN — THIAMINE HYDROCHLORIDE 200 MG: 100 INJECTION, SOLUTION INTRAMUSCULAR; INTRAVENOUS at 21:18

## 2024-10-31 RX ADMIN — Medication 10 ML: at 21:19

## 2024-10-31 RX ADMIN — PIPERACILLIN AND TAZOBACTAM 4.5 G: 4; .5 INJECTION, POWDER, FOR SOLUTION INTRAVENOUS at 12:17

## 2024-10-31 RX ADMIN — METOPROLOL TARTRATE 7.5 MG: 1 INJECTION, SOLUTION INTRAVENOUS at 12:17

## 2024-10-31 RX ADMIN — IPRATROPIUM BROMIDE AND ALBUTEROL SULFATE 3 ML: 2.5; .5 SOLUTION RESPIRATORY (INHALATION) at 19:58

## 2024-10-31 RX ADMIN — METOPROLOL TARTRATE 7.5 MG: 1 INJECTION, SOLUTION INTRAVENOUS at 18:31

## 2024-10-31 RX ADMIN — ENOXAPARIN SODIUM 40 MG: 100 INJECTION SUBCUTANEOUS at 09:35

## 2024-10-31 RX ADMIN — LORAZEPAM 1 MG: 2 INJECTION INTRAMUSCULAR; INTRAVENOUS at 09:36

## 2024-10-31 RX ADMIN — IPRATROPIUM BROMIDE AND ALBUTEROL SULFATE 3 ML: 2.5; .5 SOLUTION RESPIRATORY (INHALATION) at 07:27

## 2024-10-31 RX ADMIN — Medication 10 ML: at 09:36

## 2024-10-31 RX ADMIN — IPRATROPIUM BROMIDE AND ALBUTEROL SULFATE 3 ML: 2.5; .5 SOLUTION RESPIRATORY (INHALATION) at 11:32

## 2024-10-31 RX ADMIN — LORAZEPAM 1 MG: 2 INJECTION INTRAMUSCULAR; INTRAVENOUS at 03:32

## 2024-10-31 RX ADMIN — LORAZEPAM 1 MG: 2 INJECTION INTRAMUSCULAR; INTRAVENOUS at 15:14

## 2024-10-31 RX ADMIN — FOLIC ACID 1 MG: 5 INJECTION, SOLUTION INTRAMUSCULAR; INTRAVENOUS; SUBCUTANEOUS at 09:35

## 2024-10-31 RX ADMIN — THIAMINE HYDROCHLORIDE 200 MG: 100 INJECTION, SOLUTION INTRAMUSCULAR; INTRAVENOUS at 05:05

## 2024-10-31 RX ADMIN — PIPERACILLIN AND TAZOBACTAM 4.5 G: 4; .5 INJECTION, POWDER, FOR SOLUTION INTRAVENOUS at 03:32

## 2024-10-31 RX ADMIN — IPRATROPIUM BROMIDE AND ALBUTEROL SULFATE 3 ML: 2.5; .5 SOLUTION RESPIRATORY (INHALATION) at 17:02

## 2024-10-31 RX ADMIN — MUPIROCIN 1 APPLICATION: 20 OINTMENT TOPICAL at 09:35

## 2024-10-31 RX ADMIN — THIAMINE HYDROCHLORIDE 200 MG: 100 INJECTION, SOLUTION INTRAMUSCULAR; INTRAVENOUS at 13:26

## 2024-10-31 RX ADMIN — CHLORHEXIDINE GLUCONATE 15 ML: 1.2 RINSE ORAL at 09:35

## 2024-10-31 RX ADMIN — METOPROLOL TARTRATE 5 MG: 1 INJECTION, SOLUTION INTRAVENOUS at 05:05

## 2024-10-31 NOTE — CASE MANAGEMENT/SOCIAL WORK
Continued Stay Note  [facility]     Patient Name: Arsh Haynes  MRN: [id number]  Today's Date: 10/31/2024    Admit Date: 10/26/2024    Plan: PT/OT evals pending when medically appropriate   Discharge Plan       Row Name 10/31/24 1009       Plan    Plan PT/OT evals pending when medically appropriate    Plan Comments Pt resting quietly with eyes closed. No visitors. Chart reviewed. Pt is on hi-flow O2 and O2 is being weaned. Pt has not worked with PT/OT. CCP will continue to follow.......JW                   Discharge Codes    No documentation.                 Expected Discharge Date and Time       Expected Discharge Date Expected Discharge Time    Nov 4, 2024               Melody Davison RN

## 2024-10-31 NOTE — PLAN OF CARE
Goal Outcome Evaluation:                                          Pt still very lethargic and confused, ativan given prn x2. Still on 6L HF.

## 2024-10-31 NOTE — PLAN OF CARE
Goal Outcome Evaluation:  Pt remains in CICU on 3L NC. AO x1-2. CIWA protocol; PRN ativan x2. ST on tele with -140s; lopressor IV. Rajat remains with tube feeds at goal. Ostomy with little output. BRONWYN drains remain with serous drainage - BRONWYN 1: 80 mL output, BRONWYN 2: 95 mL output, BRONWYN 3: 45 mL output. Abd dressing change completed per orders. UOP 1275mL - F/C removed. RIJ removed per orders. Wife updated at bedside.

## 2024-10-31 NOTE — PROGRESS NOTES
"[facility]    Mar/chart reviewed  Follow up septic shock from perforated bowel with likely underlying malignancy  Extubated 10/29, has abdominal pain  Has confusion    Vital Sign Min/Max for last 24 hours  Temp  Min: 97.2 °F (36.2 °C)  Max: 98.7 °F (37.1 °C)   BP  Min: 105/59  Max: 157/67   Pulse  Min: 100  Max: 136   Resp  Min: 24  Max: 37   SpO2  Min: 91 %  Max: 97 %   Flow (L/min) (Oxygen Therapy)  Min: 4  Max: 6   Weight  Min: 96 kg (211 lb 10.3 oz)  Max: 96 kg (211 lb 10.3 oz)   2072/3795    Appears ill, arouses, able to give \"hospital: and \"24\"  perrl, eomi, normal sclera  mmm, no jvd, trachea midline, neck supple,  chest a little coarse bilaterally, no crackles, + wheezes,   Tachy, regular  soft,slightly distended, sligthly tender, decreased bowel sounds  no c/c/e  Skin warm, dry no rashes    Labs: 10/31: reviewed:  Glucose 117  Bun 7  Cr 0.63  Bicarb 20  Wbc 16  Hgb 9.1  Plts 222    Micro: reviewed: e coli; psuedomonas    A/P:  Severe sepsis with septic shock 2/2 peritonitis from perforated bowel due to likely  malignancy s/p OR -- continue antibiotics, supportive care, pressors if needed  Recent weight loss  Alcohol abuse with dependence and withdrawal - continue thiamine, folate, ativan as per protocol  Hyperglcyemia -- ssi --accuchecks ok  Right renal mass  Lactic acidosis  Anemia  Bacteremia -- continue antibiotics  9. Hypoglycemia - resolved,   10. SVT -- s/p dig  11. Post operative ventilator management -- extubated 10/29  12. Colon cancer -- stage II -- will need colonoscopy when he recovers and oncology consultation    Mental status still not great, rr a bit high, think he should continue to stay in ICU today and tonight    D/w wife at bedside      "

## 2024-10-31 NOTE — PROGRESS NOTES
Postoperative day 4 status post open left hemicolectomy with colostomy    S: No events overnight.  Continues to be extremely weak.  Tolerating tube feeds.  No nausea mild vomiting, minimal ostomy output    O:   Vitals:    10/31/24 1128 10/31/24 1134 10/31/24 1200 10/31/24 1300   BP:   144/63 135/66   Pulse: (!) 121 (!) 123 (!) 135 110   Resp:  26     Temp: 98.8 °F (37.1 °C)      TempSrc: Oral      SpO2: 97% 97% 96% 97%   Weight:       Height:          All drains serosanguineous draining more than 150 cc  Ostomy output minimal bowel sweat  Alert, ill-appearing, no distress  Breathing comfortable  Abdomen soft, nontender, midline incision healing well with open wound    White blood cell count 16,000, downtrending, hemoglobin 9.1, all other labs reviewed,    Assessment and plan    Postoperative day 4 status post open left hemicolectomy with colostomy.  He is slowly getting better but has a very long road ahead of him.  Very weak, malnutrition secondary to chronic alcohol use and acute illness    -Continue wet-to-dry dressing changes with saline and gauze  -Continue tube feeds, advance to goal  -Will keep drain in place for now  -Start physical therapy  -SCDs and Lovenox    Mc Guillaume MD, FACS  General, Minimally Invasive and Endoscopic Surgery  [facility]    [address], [address]  [place], [place], [address]  P: [phone]  F: [phone]

## 2024-11-01 LAB
ALBUMIN SERPL-MCNC: 1.8 G/DL (ref 3.5–5.2)
ALBUMIN/GLOB SERPL: 0.7 G/DL
ALP SERPL-CCNC: 203 U/L (ref 39–117)
ALT SERPL W P-5'-P-CCNC: 18 U/L (ref 1–41)
ANION GAP SERPL CALCULATED.3IONS-SCNC: 9 MMOL/L (ref 5–15)
ARTERIAL PATENCY WRIST A: POSITIVE
AST SERPL-CCNC: 23 U/L (ref 1–40)
ATMOSPHERIC PRESS: 754.8 MMHG
BACTERIA FLD CULT: ABNORMAL
BACTERIA FLD CULT: ABNORMAL
BACTERIA SPEC ANAEROBE CULT: ABNORMAL
BASE EXCESS BLDA CALC-SCNC: -0.6 MMOL/L (ref 0–2)
BASOPHILS # BLD AUTO: 0.03 10*3/MM3 (ref 0–0.2)
BASOPHILS NFR BLD AUTO: 0.2 % (ref 0–1.5)
BDY SITE: ABNORMAL
BILIRUB SERPL-MCNC: 1 MG/DL (ref 0–1.2)
BUN SERPL-MCNC: 9 MG/DL (ref 8–23)
BUN/CREAT SERPL: 12.9 (ref 7–25)
CALCIUM SPEC-SCNC: 7.4 MG/DL (ref 8.6–10.5)
CHLORIDE SERPL-SCNC: 111 MMOL/L (ref 98–107)
CO2 BLDA-SCNC: 23 MMOL/L (ref 23–27)
CO2 SERPL-SCNC: 18 MMOL/L (ref 22–29)
CREAT SERPL-MCNC: 0.7 MG/DL (ref 0.76–1.27)
DEPRECATED RDW RBC AUTO: 60.3 FL (ref 37–54)
DEVICE COMMENT: ABNORMAL
EGFRCR SERPLBLD CKD-EPI 2021: 97.9 ML/MIN/1.73
EOSINOPHIL # BLD AUTO: 0.24 10*3/MM3 (ref 0–0.4)
EOSINOPHIL NFR BLD AUTO: 1.7 % (ref 0.3–6.2)
ERYTHROCYTE [DISTWIDTH] IN BLOOD BY AUTOMATED COUNT: 19.2 % (ref 12.3–15.4)
GAS FLOW AIRWAY: 2 LPM
GLOBULIN UR ELPH-MCNC: 2.6 GM/DL
GLUCOSE BLDC GLUCOMTR-MCNC: 113 MG/DL (ref 70–130)
GLUCOSE BLDC GLUCOMTR-MCNC: 126 MG/DL (ref 70–130)
GLUCOSE BLDC GLUCOMTR-MCNC: 127 MG/DL (ref 70–130)
GLUCOSE BLDC GLUCOMTR-MCNC: 141 MG/DL (ref 70–130)
GLUCOSE BLDC GLUCOMTR-MCNC: 156 MG/DL (ref 70–130)
GLUCOSE SERPL-MCNC: 126 MG/DL (ref 65–99)
GRAM STN SPEC: ABNORMAL
HCO3 BLDA-SCNC: 22.1 MMOL/L (ref 22–28)
HCT VFR BLD AUTO: 27.2 % (ref 37.5–51)
HEMODILUTION: NO
HGB BLD-MCNC: 8.7 G/DL (ref 13–17.7)
IMM GRANULOCYTES # BLD AUTO: 0.34 10*3/MM3 (ref 0–0.05)
IMM GRANULOCYTES NFR BLD AUTO: 2.4 % (ref 0–0.5)
LYMPHOCYTES # BLD AUTO: 1.16 10*3/MM3 (ref 0.7–3.1)
LYMPHOCYTES NFR BLD AUTO: 8.1 % (ref 19.6–45.3)
MAGNESIUM SERPL-MCNC: 2.1 MG/DL (ref 1.6–2.4)
MCH RBC QN AUTO: 27.9 PG (ref 26.6–33)
MCHC RBC AUTO-ENTMCNC: 32 G/DL (ref 31.5–35.7)
MCV RBC AUTO: 87.2 FL (ref 79–97)
MODALITY: ABNORMAL
MONOCYTES # BLD AUTO: 1.46 10*3/MM3 (ref 0.1–0.9)
MONOCYTES NFR BLD AUTO: 10.2 % (ref 5–12)
NEUTROPHILS NFR BLD AUTO: 11.03 10*3/MM3 (ref 1.7–7)
NEUTROPHILS NFR BLD AUTO: 77.4 % (ref 42.7–76)
NRBC BLD AUTO-RTO: 0 /100 WBC (ref 0–0.2)
PCO2 BLDA: 28.5 MM HG (ref 35–45)
PH BLDA: 7.5 PH UNITS (ref 7.35–7.45)
PLATELET # BLD AUTO: 354 10*3/MM3 (ref 140–450)
PMV BLD AUTO: 9.2 FL (ref 6–12)
PO2 BLDA: 73.9 MM HG (ref 80–100)
POTASSIUM SERPL-SCNC: 4.8 MMOL/L (ref 3.5–5.2)
PROT SERPL-MCNC: 4.4 G/DL (ref 6–8.5)
QT INTERVAL: 329 MS
QTC INTERVAL: 449 MS
RBC # BLD AUTO: 3.12 10*6/MM3 (ref 4.14–5.8)
SAO2 % BLDCOA: 96.2 % (ref 92–98.5)
SODIUM SERPL-SCNC: 138 MMOL/L (ref 136–145)
TOTAL RATE: 32 BREATHS/MINUTE
WBC NRBC COR # BLD AUTO: 14.26 10*3/MM3 (ref 3.4–10.8)

## 2024-11-01 PROCEDURE — 82803 BLOOD GASES ANY COMBINATION: CPT

## 2024-11-01 PROCEDURE — 82948 REAGENT STRIP/BLOOD GLUCOSE: CPT

## 2024-11-01 PROCEDURE — 83735 ASSAY OF MAGNESIUM: CPT | Performed by: INTERNAL MEDICINE

## 2024-11-01 PROCEDURE — 63710000001 INSULIN REGULAR HUMAN PER 5 UNITS: Performed by: INTERNAL MEDICINE

## 2024-11-01 PROCEDURE — 94761 N-INVAS EAR/PLS OXIMETRY MLT: CPT

## 2024-11-01 PROCEDURE — 80053 COMPREHEN METABOLIC PANEL: CPT | Performed by: INTERNAL MEDICINE

## 2024-11-01 PROCEDURE — 93005 ELECTROCARDIOGRAM TRACING: CPT | Performed by: INTERNAL MEDICINE

## 2024-11-01 PROCEDURE — 94799 UNLISTED PULMONARY SVC/PX: CPT

## 2024-11-01 PROCEDURE — 93010 ELECTROCARDIOGRAM REPORT: CPT | Performed by: INTERNAL MEDICINE

## 2024-11-01 PROCEDURE — 25010000002 LORAZEPAM PER 2 MG: Performed by: INTERNAL MEDICINE

## 2024-11-01 PROCEDURE — 36600 WITHDRAWAL OF ARTERIAL BLOOD: CPT

## 2024-11-01 PROCEDURE — 25010000002 FLUCONAZOLE PER 200 MG: Performed by: SURGERY

## 2024-11-01 PROCEDURE — 94668 MNPJ CHEST WALL SBSQ: CPT

## 2024-11-01 PROCEDURE — 94760 N-INVAS EAR/PLS OXIMETRY 1: CPT

## 2024-11-01 PROCEDURE — 25010000002 PIPERACILLIN SOD-TAZOBACTAM PER 1 G: Performed by: INTERNAL MEDICINE

## 2024-11-01 PROCEDURE — 25010000002 ENOXAPARIN PER 10 MG: Performed by: INTERNAL MEDICINE

## 2024-11-01 PROCEDURE — 99024 POSTOP FOLLOW-UP VISIT: CPT | Performed by: SURGERY

## 2024-11-01 PROCEDURE — 94664 DEMO&/EVAL PT USE INHALER: CPT

## 2024-11-01 PROCEDURE — 85025 COMPLETE CBC W/AUTO DIFF WBC: CPT | Performed by: INTERNAL MEDICINE

## 2024-11-01 PROCEDURE — 25010000002 THIAMINE HCL 200 MG/2ML SOLUTION: Performed by: SURGERY

## 2024-11-01 PROCEDURE — 25010000002 HYDROMORPHONE PER 4 MG: Performed by: INTERNAL MEDICINE

## 2024-11-01 RX ORDER — FOLIC ACID 1 MG/1
1 TABLET ORAL DAILY
Status: DISCONTINUED | OUTPATIENT
Start: 2024-11-02 | End: 2024-11-21

## 2024-11-01 RX ADMIN — THIAMINE HYDROCHLORIDE 200 MG: 100 INJECTION, SOLUTION INTRAMUSCULAR; INTRAVENOUS at 21:01

## 2024-11-01 RX ADMIN — METOPROLOL TARTRATE 7.5 MG: 1 INJECTION, SOLUTION INTRAVENOUS at 05:48

## 2024-11-01 RX ADMIN — METOPROLOL TARTRATE 7.5 MG: 1 INJECTION, SOLUTION INTRAVENOUS at 23:55

## 2024-11-01 RX ADMIN — METOPROLOL TARTRATE 7.5 MG: 1 INJECTION, SOLUTION INTRAVENOUS at 18:24

## 2024-11-01 RX ADMIN — Medication 10 ML: at 09:39

## 2024-11-01 RX ADMIN — LORAZEPAM 2 MG: 2 INJECTION INTRAMUSCULAR; INTRAVENOUS at 09:40

## 2024-11-01 RX ADMIN — Medication 10 ML: at 20:56

## 2024-11-01 RX ADMIN — ACETAMINOPHEN 325MG 650 MG: 325 TABLET ORAL at 00:10

## 2024-11-01 RX ADMIN — CHLORHEXIDINE GLUCONATE 15 ML: 1.2 RINSE ORAL at 20:55

## 2024-11-01 RX ADMIN — Medication 10 ML: at 20:55

## 2024-11-01 RX ADMIN — PIPERACILLIN AND TAZOBACTAM 4.5 G: 4; .5 INJECTION, POWDER, FOR SOLUTION INTRAVENOUS at 18:34

## 2024-11-01 RX ADMIN — METOPROLOL TARTRATE 7.5 MG: 1 INJECTION, SOLUTION INTRAVENOUS at 12:56

## 2024-11-01 RX ADMIN — IPRATROPIUM BROMIDE AND ALBUTEROL SULFATE 3 ML: 2.5; .5 SOLUTION RESPIRATORY (INHALATION) at 11:45

## 2024-11-01 RX ADMIN — METOPROLOL TARTRATE 7.5 MG: 1 INJECTION, SOLUTION INTRAVENOUS at 00:10

## 2024-11-01 RX ADMIN — IPRATROPIUM BROMIDE AND ALBUTEROL SULFATE 3 ML: 2.5; .5 SOLUTION RESPIRATORY (INHALATION) at 19:23

## 2024-11-01 RX ADMIN — FLUCONAZOLE 400 MG: 2 INJECTION, SOLUTION INTRAVENOUS at 17:18

## 2024-11-01 RX ADMIN — THIAMINE HYDROCHLORIDE 200 MG: 100 INJECTION, SOLUTION INTRAMUSCULAR; INTRAVENOUS at 05:48

## 2024-11-01 RX ADMIN — IPRATROPIUM BROMIDE AND ALBUTEROL SULFATE 3 ML: 2.5; .5 SOLUTION RESPIRATORY (INHALATION) at 08:10

## 2024-11-01 RX ADMIN — INSULIN HUMAN 2 UNITS: 100 INJECTION, SOLUTION PARENTERAL at 12:57

## 2024-11-01 RX ADMIN — PANTOPRAZOLE SODIUM 40 MG: 40 INJECTION, POWDER, FOR SOLUTION INTRAVENOUS at 09:38

## 2024-11-01 RX ADMIN — IPRATROPIUM BROMIDE AND ALBUTEROL SULFATE 3 ML: 2.5; .5 SOLUTION RESPIRATORY (INHALATION) at 15:21

## 2024-11-01 RX ADMIN — THIAMINE HYDROCHLORIDE 200 MG: 100 INJECTION, SOLUTION INTRAMUSCULAR; INTRAVENOUS at 13:29

## 2024-11-01 RX ADMIN — PIPERACILLIN AND TAZOBACTAM 4.5 G: 4; .5 INJECTION, POWDER, FOR SOLUTION INTRAVENOUS at 02:48

## 2024-11-01 RX ADMIN — CHLORHEXIDINE GLUCONATE 15 ML: 1.2 RINSE ORAL at 09:39

## 2024-11-01 RX ADMIN — HYDROMORPHONE HYDROCHLORIDE 0.5 MG: 1 INJECTION, SOLUTION INTRAMUSCULAR; INTRAVENOUS; SUBCUTANEOUS at 18:33

## 2024-11-01 RX ADMIN — ENOXAPARIN SODIUM 40 MG: 100 INJECTION SUBCUTANEOUS at 09:38

## 2024-11-01 RX ADMIN — FOLIC ACID 1 MG: 5 INJECTION, SOLUTION INTRAMUSCULAR; INTRAVENOUS; SUBCUTANEOUS at 09:39

## 2024-11-01 RX ADMIN — PIPERACILLIN AND TAZOBACTAM 4.5 G: 4; .5 INJECTION, POWDER, FOR SOLUTION INTRAVENOUS at 11:10

## 2024-11-01 NOTE — CONSULTS
Nutrition Services    Patient Name:  Arsh Haynes  YOB: 1950  MRN: [id number]  Admit Date:  10/26/2024    Assessment Date:  11/01/24    Summary: TF follow up     74 y.o. male admitted with worsening abdominal pain x several days.  Diarrhea x 2 months, somewhat black.  Reports of weight loss per chart review, but family unsure how much.  Per chart review, current weight noted at 203 lb (edema noted) and patient was 202 lb in September of 2023.  History of ETOH abuse.    Sigmoid perforation.  S/p ex lap, open L hemicolectomy with Juliana pouch and end colostomy 10/26.  Several tumors found in sigmoid colon, likely malignant.    NG was replaced on 10/30 after pt pulled NG tube out. He is tolerating tube feeds @20ml/hr. Will work on increasing to goal rate of 75ml/hr as tolerated.     Initial Goal:  *initial goal conservative d/t risk of RFS     Nutren 1.5 at 20 mL/hr + 30 mL q 4 hr water flush      End Goal:    Nutren 1.5 at 75 mL/hr + 30 mL q 4 hr water flush      Calories  2475 kcals (100%)    Protein  112 g (100%)    Free water  1254 mL   Flushes  180 mL     The above end goal rate is for 22 hrs/day to assume interruptions for ADLs. Water flushes adjusted based on clinical picture + Rx flushes/IV fluids      Labs reviewed: Na 137, K 3.1, Cl 114, Gluc 115-377, Alb 1.1  Meds reviewed: lovenox, folic acid, insulin, protonix, zosyn, KCl, thiamine    Plans/Recommendations:  Continue increasing TFs of Nutren 1.5 @ 20 mL/hr and advance 10ml q6hr as tolerated to goal rate of 75ml/hr.  30 mL q 4 hour free water flushes for now.  Replace electrolytes per protocol  Follow for tolerance.    RD to follow closely.    CLINICAL NUTRITION ASSESSMENT      Reason for Assessment Physician Consult, TF Assessment     Diagnosis/Problem   Sigmoid perforation  S/p ex lap, open L hemicolectomy 10/26  Extubated yesterday   Replacing potassium  Tolerating tube feeds      Medical/Surgical History Past Medical History:  "  Diagnosis Date    HTN (hypertension)        Past Surgical History:   Procedure Laterality Date    COLON RESECTION N/A 10/26/2024    Procedure: LOW ANTERIOR COLON RESECTION SIGMOID, COLOSTOMY, SPLENIC FLEXURE MOBILIZATION;  Surgeon: Mc Guillaume MD;  Location: [facility];  Service: General;  Laterality: N/A;    EXPLORATORY LAPAROTOMY N/A 10/26/2024    Procedure: LAPAROTOMY EXPLORATORY, LEFT HEMICOLECTOMY;  Surgeon: Mc Guillaume MD;  Location: [facility];  Service: General;  Laterality: N/A;        Anthropometrics        Current Height  Current Weight  BMI kg/m2 Height: 182.9 cm (72\")  Weight: 92.6 kg (204 lb 2.3 oz) (11/01/24 0548)  Body mass index is 27.69 kg/m².   Adjusted BMI (if applicable)    BMI Category Overweight (25 - 29.9)   Ideal Body Weight (IBW) 178 lb (80.9 kg)   Usual Body Weight (UBW) 202 lb (9/2023)   Weight Trend Loss, Unknown   Weight History Wt Readings from Last 30 Encounters:   11/01/24 0548 92.6 kg (204 lb 2.3 oz)   10/31/24 0448 96 kg (211 lb 10.3 oz)   10/30/24 0300 98.4 kg (216 lb 14.9 oz)   10/29/24 0431 92.5 kg (203 lb 14.8 oz)   10/28/24 0436 90.2 kg (198 lb 13.7 oz)   10/27/24 0404 83.4 kg (183 lb 13.8 oz)   10/26/24 1933 83 kg (183 lb)      --  Estimated/Assessed Needs        Current Weight  Weight: 92.6 kg (204 lb 2.3 oz) (11/01/24 0548)       Energy Requirements    Weight for Calculation 178 lb (80.9 kg) IBW   Method for Estimation  30-35 kcal/kg   EST Needs (kcal/day) 5761-2157       Protein Requirements    Weight for Calculation 178 lb (80.9 kg) IBW   EST Protein Needs (g/kg) 1.2 - 1.5 gm/kg   EST Daily Needs (g/day)        Fluid Requirements     Method for Estimation 1 mL/kcal    EST Needs (mL/day)      Labs       Pertinent Labs    Results from last 7 days   Lab Units 11/01/24  0522 10/31/24  0503 10/30/24  1737 10/30/24  0345   SODIUM mmol/L 138 142  --  137   POTASSIUM mmol/L 4.8 4.3 4.2 3.1*   CHLORIDE mmol/L 111* 114*  --  114*   CO2 " "mmol/L 18.0* 20.2*  --  15.8*   BUN mg/dL 9 7*  --  9   CREATININE mg/dL 0.70* 0.63*  --  0.57*   CALCIUM mg/dL 7.4* 7.2*  --  6.2*   BILIRUBIN mg/dL 1.0 1.1  --  1.2   ALK PHOS U/L 203* 163*  --  125*   ALT (SGPT) U/L 18 17  --  13   AST (SGOT) U/L 23 20  --  24   GLUCOSE mg/dL 126* 117*  --  377*     Results from last 7 days   Lab Units 11/01/24  1209 11/01/24  0601 11/01/24  0522 10/31/24  0503 10/30/24  0345 10/29/24  0334 10/28/24  0326 10/27/24  0432   MAGNESIUM mg/dL 2.1  --   --   --  1.9 2.3  --  1.4*   PHOSPHORUS mg/dL  --   --   --   --   --   --   --  2.4*   HEMOGLOBIN g/dL  --  8.7*  --    < > 8.3* 8.6*   < > 9.8*   HEMATOCRIT %  --  27.2*  --    < > 26.7* 27.9*   < > 30.6*   WBC 10*3/mm3  --  14.26*  --    < > 20.05* 20.19*   < > 6.94   ALBUMIN g/dL  --   --  1.8*   < > 1.1* 1.2*   < > 2.1*    < > = values in this interval not displayed.     Results from last 7 days   Lab Units 11/01/24  0601 10/31/24  0503 10/30/24  0345 10/29/24  0334 10/28/24  0326 10/27/24  0254 10/26/24  1944   INR   --   --   --   --   --   --  1.14*   PLATELETS 10*3/mm3 354 222 198 236 334   < > 549*    < > = values in this interval not displayed.     No results found for: \"COVID19\"  Lab Results   Component Value Date    HGBA1C 6.60 (H) 10/26/2024          Medications           Scheduled Medications chlorhexidine, 15 mL, Mouth/Throat, Q12H  enoxaparin, 40 mg, Subcutaneous, Q24H  fluconazole, 400 mg, Intravenous, Q24H  [START ON 11/2/2024] folic acid, 1 mg, Nasogastric, Daily  insulin regular, 2-9 Units, Subcutaneous, Q4H  ipratropium-albuterol, 3 mL, Nebulization, 4x Daily - RT  [START ON 11/2/2024] lansoprazole, 30 mg, Nasogastric, Q AM  metoprolol tartrate, 7.5 mg, Intravenous, Q6H  piperacillin-tazobactam, 4.5 g, Intravenous, Q8H  sodium chloride, 10 mL, Intravenous, Q12H  sodium chloride, 10 mL, Intravenous, Q12H  sodium chloride, 10 mL, Intravenous, Q12H  sodium chloride, 10 mL, Intravenous, Q12H  sodium chloride, 10 mL, " Intravenous, Q12H  thiamine (B-1) IV, 200 mg, Intravenous, Q8H   Followed by  [START ON 11/3/2024] thiamine, 100 mg, Oral, Daily       Infusions       PRN Medications   acetaminophen **OR** acetaminophen    Calcium Replacement - Follow Nurse / BPA Driven Protocol    dextrose    dextrose    glucagon (human recombinant)    HYDROcodone-acetaminophen    HYDROmorphone    labetalol    LORazepam **OR** LORazepam **OR** LORazepam **OR** LORazepam **OR** LORazepam **OR** LORazepam **OR** LORazepam **OR** LORazepam    Magnesium Standard Dose Replacement - Follow Nurse / BPA Driven Protocol    nitroglycerin    ondansetron    Phosphorus Replacement - Follow Nurse / BPA Driven Protocol    Potassium Replacement - Follow Nurse / BPA Driven Protocol    [COMPLETED] Insert Peripheral IV **AND** sodium chloride    sodium chloride    sodium chloride    sodium chloride    sodium chloride     Physical Findings          General Findings alert, overweight, on oxygen therapy, responds/arouses to voice   Oral/Mouth Cavity tooth or teeth missing   Edema  generalized, upper extremity, 1+ (trace), 2+ (mild)   Gastrointestinal abdominal distension, hypoactive bowel sounds, last bowel movement: 10/30   Skin  MASD, surgical incision: midline abdomen   Tubes/Drains/Lines colostomy, drain, R abdomen bulb, LLQ bulb, RUQ bulb, NG tube, other:CVC quadruple lumen   NFPE Other: follow up to perform   --  Current Nutrition Orders & Evaluation of Intake       Oral Nutrition     Food Allergies NKFA   Current PO Diet NPO Diet NPO Type: Tube Feeding   Supplement n/a   PO Evaluation     % PO Intake N/a    Factors Affecting Intake: altered GI function, altered respiratory status   --  PES STATEMENT / NUTRITION DIAGNOSIS      Nutrition Dx Problem  Problem: Inadequate Oral Intake  Etiology: Medical Diagnosis - sigmoid perforation, just extubated this am, NGT in place    Signs/Symptoms: NPO and Report/Observation     NUTRITION INTERVENTION / PLAN OF CARE       Intervention Goal(s) Maintain nutrition status, Reduce/improve symptoms, Meet estimated needs, Disease management/therapy, Initiate TF/PN, Tolerate TF/PN at goal, Transition TF to PO, and No significant weight loss         RD Intervention/Action Await initiation of EN/PN, Continue to monitor, Care plan reviewed, and Recommend/order: EN   --      Prescription/Orders:       PO Diet       Supplements       Enteral Nutrition    Enteral Prescription:     Enteral Route NG    TF Delivery Method Continuous    Enteral Product Nutren 1.5    Modular None    Propofol Rate/Kcal     TF Start Rate  20 mL/hr     TF Goal Rate  75 mL/hr    Free Water Flush 30 mL Q 4 hr    Provision at Goal:          Calories 2475 kcal, meets 100% needs         Protein  112 gm protein, meets 100% needs         Fluid (mL) 1261 mL free water + 180 mL in flushes         Parenteral Nutrition    New Prescription Ordered? Yes   --      Monitor/Evaluation Per protocol, I&O, Pertinent labs, EN delivery/tolerance, Weight, Skin status, GI status, Symptoms, Swallow function   Discharge Plan/Needs Pending clinical course   --    RD to follow per protocol.      Electronically signed by:  Roxanna Celestin RD  11/01/24 17:04 EDT

## 2024-11-01 NOTE — PLAN OF CARE
Goal Outcome Evaluation:  Pt remains in CICU. AO x2. CIWA protocol - Ativan x1. ST with frequent PACs and PVCs. 5 beat run of VT today - Dr. LETI Lerner notified. Requires frequent suctioning and oral care. Weak cough. 1L NC. Tube feeds increased per orders. Ostomy with 50mL output. Straight cath this AM with 950mL output. Bladder scan Q4. Abdominal dressing changed per orders. BRONWYN drains remain with serous output. BRONWYN #1: 70mL; BRNOWYN #2: 30mL; BRONWYN #3: 55mL. Pain treated per MAR. Family updated at bedside.

## 2024-11-01 NOTE — NURSING NOTE
"   11/01/24 0856   Colostomy LUQ   Placement date: If unknown, DO NOT use \"Add Comment\" note/Placement time: If unknown, DO NOT use \"Add Comment\" note: 10/27/24 0051   Inserted by: DR. BECK  Location: LUQ   Stomal Appliance 2 piece;Clean;Dry;Intact;Changed;Drainable   Stoma Appearance irregular;moist;red  (oval)   Peristomal Assessment Clean;Intact   Accessories/Skin Care cleansed with water;skin barrier ring   Stool Color clear;yellow   Stool Consistency liquid   Treatment Bag change;Site care   Output (mL) 50 mL     WOCN: ostomy appliance changed, no stool or flatus . He is not teachable at this point.   No family members at bedside. Will plan to change pouch 2 times per week. Xtra supplies left in room  Placed in 2 1/4 2 piece pedro with adapt ring.   "

## 2024-11-01 NOTE — SIGNIFICANT NOTE
11/01/24 1145   OTHER   Discipline physical therapist   Rehab Time/Intention   Session Not Performed other (see comments)  (Attempted PT eval but pt unable to open eyes or follow commands despite techniques to increase alertness. RN at bedside and aware. PT will f/u tomorrow if medically appropriate.)   Therapy Assessment/Plan (PT)   Criteria for Skilled Interventions Met (PT) yes   Recommendation   PT - Next Appointment 11/02/24

## 2024-11-01 NOTE — SIGNIFICANT NOTE
11/01/24 1250   OTHER   Discipline occupational therapist   Rehab Time/Intention   Session Not Performed other (see comments)  (Pt not appropriate, unable to open eyes or follow commands despite techniques to increase alertness. RN at bedside and aware. OT will f/u tomorrow if medically appropriate.)   Therapy Assessment/Plan (PT)   Criteria for Skilled Interventions Met (PT) yes   Recommendation   OT - Next Appointment 11/02/24

## 2024-11-01 NOTE — PLAN OF CARE
Goal Outcome Evaluation:   Pt remains in CICU on 2L NC. AO x1-2. ST with PVCs on the monitor. Temp up 101.2, tylenol given and ice packs were placed under his armpits and groin area. No UOP this shift. Bladder scan x2 this shift-- 428 cc on last measurement. New PIV placed by IV therapy.

## 2024-11-01 NOTE — PROGRESS NOTES
"  Daily Progress Note.   [facility] CARDIAC INTENSIVE CARE  11/1/2024    Patient:  Name:  Arsh Haynes  MRN:  [id number]  1950  74 y.o.  male         CC: Follow up septic shock from perforated bowel with likely underlying malignancy     Interval History:  Febrile tachycardia overnight remains on 2lnc  Bp stable  Ill appearing but awake, answers questions slowly but seemingly appropriate  States abd feels\"okay\"  Breathing is \"okay\"  Chart reviewed no acute events  All issues new to me today.  Prior hospital course, labs and imaging reviewed.      Physical Exam:  /56   Pulse 100   Temp 98.9 °F (37.2 °C) (Axillary)   Resp 28   Ht 182.9 cm (72\")   Wt 92.6 kg (204 lb 2.3 oz)   SpO2 97%   BMI 27.69 kg/m²   Body mass index is 27.69 kg/m².    Intake/Output Summary (Last 24 hours) at 11/1/2024 0712  Last data filed at 11/1/2024 0548  Gross per 24 hour   Intake 1701 ml   Output 1665 ml   Net 36 ml   ng  General appearance: ill but conversant   Eyes: anicteric sclerae, moist conjunctivae; no lidlag;    HENT: Atraumatic; oropharynx clear with moist mucous membranes    Neck: Trachea midline;  supple   Lungs: yosi, with poor respiratory effort and no intercostal retractions  CV: tachy irreg, no rub   Abdomen: incision bandaged, ostomy llq, janeen drains+  Extremities: +ble trace peripheral edema    Skin: WWP   Psych/neuro: calm affect, alert slowly weak voice but interacts and answers questions appropriately.      Data Review:  Notable Labs:  Results from last 7 days   Lab Units 11/01/24  0601 10/31/24  0503 10/30/24  0345 10/29/24  0334 10/28/24  0326 10/27/24  0432 10/27/24  0254   WBC 10*3/mm3 14.26* 16.55* 20.05* 20.19* 21.81* 6.94 2.92*   HEMOGLOBIN g/dL 8.7* 9.1* 8.3* 8.6* 9.6* 9.8* 6.5*   PLATELETS 10*3/mm3 354 222 198 236 334 483* 262     Results from last 7 days   Lab Units 11/01/24  0522 10/31/24  0503 10/30/24  1737 10/30/24  0345 10/29/24  0334 10/28/24  0326 10/27/24  1356 10/27/24  0432 " 10/26/24  1944   SODIUM mmol/L 138 142  --  137 143 143  --  140 135*   POTASSIUM mmol/L 4.8 4.3 4.2 3.1* 4.1 4.4 3.7 3.0* 3.8   CHLORIDE mmol/L 111* 114*  --  114* 118* 119*  --  110* 98   CO2 mmol/L 18.0* 20.2*  --  15.8* 16.5* 17.0*  --  17.7* 21.0*   BUN mg/dL 9 7*  --  9 12 14  --  12 14   CREATININE mg/dL 0.70* 0.63*  --  0.57* 0.85 0.72*  --  0.70* 0.89   GLUCOSE mg/dL 126* 117*  --  377* 158* 90  --  251* 239*   CALCIUM mg/dL 7.4* 7.2*  --  6.2* 6.7* 6.5*  --  7.0* 8.4*   MAGNESIUM mg/dL  --   --   --  1.9 2.3  --   --  1.4* 1.8   PHOSPHORUS mg/dL  --   --   --   --   --   --   --  2.4*  --    Estimated Creatinine Clearance: 124.9 mL/min (A) (by C-G formula based on SCr of 0.7 mg/dL (L)).    Results from last 7 days   Lab Units 11/01/24  0601 11/01/24  0522 10/31/24  0503 10/30/24  0345 10/28/24  0326 10/27/24  1811 10/27/24  1242 10/27/24  0702 10/27/24  0254 10/26/24  1944   AST (SGOT) U/L  --  23 20 24   < >  --   --   --    < > 15   ALT (SGPT) U/L  --  18 17 13   < >  --   --   --    < > 16   PROCALCITONIN ng/mL  --   --   --   --   --   --   --   --   --  9.58*   LACTATE mmol/L  --   --   --   --   --  2.2* 2.3* 4.0*   < > 5.8*   PLATELETS 10*3/mm3 354  --  222 198   < >  --   --   --    < > 549*    < > = values in this interval not displayed.       Results from last 7 days   Lab Units 10/30/24  1131 10/29/24  0315 10/28/24  0321 10/27/24  0237 10/27/24  0129 10/26/24  2312   PH, ARTERIAL pH units 7.445 7.372 7.321* 7.348* 7.286* 7.1750*   PCO2, ARTERIAL mm Hg 28.8* 28.0* 31.2* 32.4* 41.0  --    PO2 ART mm Hg 57.3* 76.5* 107.2* 89.0 384.3*  --    HCO3 ART mmol/L 19.8* 16.3* 16.1* 17.8* 19.5*  --        Imaging:  Reviewed chest images personally from past 3 days    ASSESSMENT  /  PLAN:  Severe sepsis with septic shock secondary to perforated bowel and peritonitis with likely malignancy s/p status post open left hemicolectomy with colostomy   ETOH abuse and withdrawal  Hyperglycemia  Right renal  mass  Lactic acidosis  Anemia  Bacteremia  SVT status post digoxin  Postop respiratory insufficiency and ventilator management status post extubation 10/29  Colon cancer stage II further workup upon recovery.    Continue to treat and monitor withdrawal symptoms.    Cont zosyn  Cont fluconazole  Monitor fever curve and white count    Metoprolol 7.5mg    Folic acid  Thiamine      Ssi  Lovenox     Out of icu soon?  Plan of care and patient course was reviewed with multidisciplinary team in morning rounds.      Electronically signed by Darek Lerner MD, 11/01/24, 7:12 AM EDT.  [facility], [place]

## 2024-11-01 NOTE — PROGRESS NOTES
Postoperative day 5 status post open left hemicolectomy with colostomy     S: No events overnight.  Continues to be extremely weak.  Patient nurse report overall improvement.  Minimal ostomy output, tolerating tube feeds.  Continues to be febrile    O:   Vitals:    11/01/24 1400 11/01/24 1500 11/01/24 1521 11/01/24 1525   BP: 114/55 128/67     Pulse: 104 113 111 109   Resp:   (!) 29 (!) 30   Temp:       TempSrc:       SpO2: 98% 97% 97% 100%   Weight:       Height:          Drain #1 95 cc serous  Drain # 2 75 cc serous   Drain #3 70 cc serous  Alert, weak appearing, lethargic, no distress  Breathing comfortable  Tachycardic, regular rhythm  Abdomen soft, does not seem to be tender or distended, incisions open with clean base  Ostomy pink and viable with bowel sweat    White blood cell 14,000, downtrending, hemoglobin 8.7, stable, platelets stable  Albumin 1.8 CO2 18,  All other labs reviewed    Body fluid culture with E. coli and Pseudomonas sensitive to Zosyn    Assessment and plan    Postoperative day 5.  Still very weak, very mild progress.  He had poor control of his secretions.  He is not strong enough to cough.  He is tolerating tube feeds.  Ostomy with minimal output  Continue to be febrile    For now he should continue tube feeds  Wet-to-dry dressing changes 3 times a day  SCDs and Lovenox  Out of bed to chair as possible  Physical therapy  Continue antibiotics  Not ready to get out of the ICU.  High risk for aspiration due to inability to clear secretions

## 2024-11-02 LAB
ALBUMIN SERPL-MCNC: 1.9 G/DL (ref 3.5–5.2)
ALBUMIN/GLOB SERPL: 0.7 G/DL
ALP SERPL-CCNC: 225 U/L (ref 39–117)
ALT SERPL W P-5'-P-CCNC: 18 U/L (ref 1–41)
ANION GAP SERPL CALCULATED.3IONS-SCNC: 8.7 MMOL/L (ref 5–15)
AST SERPL-CCNC: 18 U/L (ref 1–40)
BASOPHILS # BLD AUTO: 0.04 10*3/MM3 (ref 0–0.2)
BASOPHILS NFR BLD AUTO: 0.3 % (ref 0–1.5)
BILIRUB SERPL-MCNC: 0.8 MG/DL (ref 0–1.2)
BUN SERPL-MCNC: 9 MG/DL (ref 8–23)
BUN/CREAT SERPL: 16.1 (ref 7–25)
CALCIUM SPEC-SCNC: 8 MG/DL (ref 8.6–10.5)
CHLORIDE SERPL-SCNC: 105 MMOL/L (ref 98–107)
CO2 SERPL-SCNC: 21.3 MMOL/L (ref 22–29)
CREAT SERPL-MCNC: 0.56 MG/DL (ref 0.76–1.27)
DEPRECATED RDW RBC AUTO: 58.2 FL (ref 37–54)
EGFRCR SERPLBLD CKD-EPI 2021: 104.7 ML/MIN/1.73
EOSINOPHIL # BLD AUTO: 0.26 10*3/MM3 (ref 0–0.4)
EOSINOPHIL NFR BLD AUTO: 1.7 % (ref 0.3–6.2)
ERYTHROCYTE [DISTWIDTH] IN BLOOD BY AUTOMATED COUNT: 18.9 % (ref 12.3–15.4)
GLOBULIN UR ELPH-MCNC: 2.9 GM/DL
GLUCOSE BLDC GLUCOMTR-MCNC: 154 MG/DL (ref 70–130)
GLUCOSE BLDC GLUCOMTR-MCNC: 172 MG/DL (ref 70–130)
GLUCOSE BLDC GLUCOMTR-MCNC: 174 MG/DL (ref 70–130)
GLUCOSE BLDC GLUCOMTR-MCNC: 184 MG/DL (ref 70–130)
GLUCOSE BLDC GLUCOMTR-MCNC: 220 MG/DL (ref 70–130)
GLUCOSE BLDC GLUCOMTR-MCNC: 230 MG/DL (ref 70–130)
GLUCOSE BLDC GLUCOMTR-MCNC: 230 MG/DL (ref 70–130)
GLUCOSE SERPL-MCNC: 173 MG/DL (ref 65–99)
HCT VFR BLD AUTO: 26 % (ref 37.5–51)
HGB BLD-MCNC: 8.5 G/DL (ref 13–17.7)
IMM GRANULOCYTES # BLD AUTO: 0.46 10*3/MM3 (ref 0–0.05)
IMM GRANULOCYTES NFR BLD AUTO: 3 % (ref 0–0.5)
LYMPHOCYTES # BLD AUTO: 1.04 10*3/MM3 (ref 0.7–3.1)
LYMPHOCYTES NFR BLD AUTO: 6.7 % (ref 19.6–45.3)
MCH RBC QN AUTO: 28.3 PG (ref 26.6–33)
MCHC RBC AUTO-ENTMCNC: 32.7 G/DL (ref 31.5–35.7)
MCV RBC AUTO: 86.7 FL (ref 79–97)
MONOCYTES # BLD AUTO: 1.4 10*3/MM3 (ref 0.1–0.9)
MONOCYTES NFR BLD AUTO: 9.1 % (ref 5–12)
NEUTROPHILS NFR BLD AUTO: 12.26 10*3/MM3 (ref 1.7–7)
NEUTROPHILS NFR BLD AUTO: 79.2 % (ref 42.7–76)
NRBC BLD AUTO-RTO: 0.1 /100 WBC (ref 0–0.2)
PLATELET # BLD AUTO: 426 10*3/MM3 (ref 140–450)
PMV BLD AUTO: 9.1 FL (ref 6–12)
POTASSIUM SERPL-SCNC: 3.9 MMOL/L (ref 3.5–5.2)
PROT SERPL-MCNC: 4.8 G/DL (ref 6–8.5)
RBC # BLD AUTO: 3 10*6/MM3 (ref 4.14–5.8)
SODIUM SERPL-SCNC: 135 MMOL/L (ref 136–145)
WBC NRBC COR # BLD AUTO: 15.46 10*3/MM3 (ref 3.4–10.8)

## 2024-11-02 PROCEDURE — 97162 PT EVAL MOD COMPLEX 30 MIN: CPT

## 2024-11-02 PROCEDURE — 97530 THERAPEUTIC ACTIVITIES: CPT

## 2024-11-02 PROCEDURE — 93005 ELECTROCARDIOGRAM TRACING: CPT | Performed by: INTERNAL MEDICINE

## 2024-11-02 PROCEDURE — 94799 UNLISTED PULMONARY SVC/PX: CPT

## 2024-11-02 PROCEDURE — 63710000001 INSULIN REGULAR HUMAN PER 5 UNITS: Performed by: INTERNAL MEDICINE

## 2024-11-02 PROCEDURE — 25010000002 HYDROMORPHONE PER 4 MG: Performed by: INTERNAL MEDICINE

## 2024-11-02 PROCEDURE — 25010000002 ENOXAPARIN PER 10 MG: Performed by: INTERNAL MEDICINE

## 2024-11-02 PROCEDURE — 25010000002 FLUCONAZOLE PER 200 MG: Performed by: SURGERY

## 2024-11-02 PROCEDURE — 80053 COMPREHEN METABOLIC PANEL: CPT | Performed by: INTERNAL MEDICINE

## 2024-11-02 PROCEDURE — 82948 REAGENT STRIP/BLOOD GLUCOSE: CPT

## 2024-11-02 PROCEDURE — 25010000002 THIAMINE HCL 200 MG/2ML SOLUTION: Performed by: SURGERY

## 2024-11-02 PROCEDURE — 25010000002 LORAZEPAM PER 2 MG: Performed by: INTERNAL MEDICINE

## 2024-11-02 PROCEDURE — 94664 DEMO&/EVAL PT USE INHALER: CPT

## 2024-11-02 PROCEDURE — 94668 MNPJ CHEST WALL SBSQ: CPT

## 2024-11-02 PROCEDURE — 99024 POSTOP FOLLOW-UP VISIT: CPT | Performed by: SURGERY

## 2024-11-02 PROCEDURE — 97166 OT EVAL MOD COMPLEX 45 MIN: CPT

## 2024-11-02 PROCEDURE — 94761 N-INVAS EAR/PLS OXIMETRY MLT: CPT

## 2024-11-02 PROCEDURE — 85025 COMPLETE CBC W/AUTO DIFF WBC: CPT | Performed by: INTERNAL MEDICINE

## 2024-11-02 PROCEDURE — 25010000002 PIPERACILLIN SOD-TAZOBACTAM PER 1 G: Performed by: INTERNAL MEDICINE

## 2024-11-02 PROCEDURE — 93010 ELECTROCARDIOGRAM REPORT: CPT | Performed by: INTERNAL MEDICINE

## 2024-11-02 PROCEDURE — 94669 MECHANICAL CHEST WALL OSCILL: CPT

## 2024-11-02 PROCEDURE — 94760 N-INVAS EAR/PLS OXIMETRY 1: CPT

## 2024-11-02 RX ADMIN — Medication 10 ML: at 22:09

## 2024-11-02 RX ADMIN — METOPROLOL TARTRATE 7.5 MG: 1 INJECTION, SOLUTION INTRAVENOUS at 05:53

## 2024-11-02 RX ADMIN — INSULIN HUMAN 4 UNITS: 100 INJECTION, SOLUTION PARENTERAL at 08:41

## 2024-11-02 RX ADMIN — METOPROLOL TARTRATE 7.5 MG: 1 INJECTION, SOLUTION INTRAVENOUS at 23:03

## 2024-11-02 RX ADMIN — PIPERACILLIN AND TAZOBACTAM 4.5 G: 4; .5 INJECTION, POWDER, FOR SOLUTION INTRAVENOUS at 10:35

## 2024-11-02 RX ADMIN — Medication 10 ML: at 08:41

## 2024-11-02 RX ADMIN — INSULIN HUMAN 4 UNITS: 100 INJECTION, SOLUTION PARENTERAL at 23:26

## 2024-11-02 RX ADMIN — ENOXAPARIN SODIUM 40 MG: 100 INJECTION SUBCUTANEOUS at 08:41

## 2024-11-02 RX ADMIN — HYDROMORPHONE HYDROCHLORIDE 0.5 MG: 1 INJECTION, SOLUTION INTRAMUSCULAR; INTRAVENOUS; SUBCUTANEOUS at 22:17

## 2024-11-02 RX ADMIN — METOPROLOL TARTRATE 7.5 MG: 1 INJECTION, SOLUTION INTRAVENOUS at 12:15

## 2024-11-02 RX ADMIN — IPRATROPIUM BROMIDE AND ALBUTEROL SULFATE 3 ML: 2.5; .5 SOLUTION RESPIRATORY (INHALATION) at 07:57

## 2024-11-02 RX ADMIN — HYDROMORPHONE HYDROCHLORIDE 0.5 MG: 1 INJECTION, SOLUTION INTRAMUSCULAR; INTRAVENOUS; SUBCUTANEOUS at 19:50

## 2024-11-02 RX ADMIN — CHLORHEXIDINE GLUCONATE 15 ML: 1.2 RINSE ORAL at 22:08

## 2024-11-02 RX ADMIN — LORAZEPAM 2 MG: 2 INJECTION INTRAMUSCULAR; INTRAVENOUS at 12:07

## 2024-11-02 RX ADMIN — HYDROMORPHONE HYDROCHLORIDE 0.5 MG: 1 INJECTION, SOLUTION INTRAMUSCULAR; INTRAVENOUS; SUBCUTANEOUS at 10:35

## 2024-11-02 RX ADMIN — FLUCONAZOLE 400 MG: 2 INJECTION, SOLUTION INTRAVENOUS at 17:41

## 2024-11-02 RX ADMIN — IPRATROPIUM BROMIDE AND ALBUTEROL SULFATE 3 ML: 2.5; .5 SOLUTION RESPIRATORY (INHALATION) at 15:28

## 2024-11-02 RX ADMIN — HYDROMORPHONE HYDROCHLORIDE 0.5 MG: 1 INJECTION, SOLUTION INTRAMUSCULAR; INTRAVENOUS; SUBCUTANEOUS at 05:53

## 2024-11-02 RX ADMIN — PIPERACILLIN AND TAZOBACTAM 4.5 G: 4; .5 INJECTION, POWDER, FOR SOLUTION INTRAVENOUS at 02:37

## 2024-11-02 RX ADMIN — METOPROLOL TARTRATE 7.5 MG: 1 INJECTION, SOLUTION INTRAVENOUS at 17:41

## 2024-11-02 RX ADMIN — FOLIC ACID 1 MG: 1 TABLET ORAL at 08:41

## 2024-11-02 RX ADMIN — INSULIN HUMAN 2 UNITS: 100 INJECTION, SOLUTION PARENTERAL at 04:23

## 2024-11-02 RX ADMIN — INSULIN HUMAN 2 UNITS: 100 INJECTION, SOLUTION PARENTERAL at 00:41

## 2024-11-02 RX ADMIN — IPRATROPIUM BROMIDE AND ALBUTEROL SULFATE 3 ML: 2.5; .5 SOLUTION RESPIRATORY (INHALATION) at 19:56

## 2024-11-02 RX ADMIN — THIAMINE HYDROCHLORIDE 200 MG: 100 INJECTION, SOLUTION INTRAMUSCULAR; INTRAVENOUS at 14:52

## 2024-11-02 RX ADMIN — INSULIN HUMAN 4 UNITS: 100 INJECTION, SOLUTION PARENTERAL at 16:40

## 2024-11-02 RX ADMIN — THIAMINE HYDROCHLORIDE 200 MG: 100 INJECTION, SOLUTION INTRAMUSCULAR; INTRAVENOUS at 05:53

## 2024-11-02 RX ADMIN — LANSOPRAZOLE 30 MG: 15 TABLET, ORALLY DISINTEGRATING ORAL at 05:53

## 2024-11-02 RX ADMIN — CHLORHEXIDINE GLUCONATE 15 ML: 1.2 RINSE ORAL at 08:41

## 2024-11-02 RX ADMIN — IPRATROPIUM BROMIDE AND ALBUTEROL SULFATE 3 ML: 2.5; .5 SOLUTION RESPIRATORY (INHALATION) at 11:13

## 2024-11-02 NOTE — THERAPY EVALUATION
Patient Name: Arsh Haynes  : 1950    MRN: [id number]                              Today's Date: 2024       Admit Date: 10/26/2024    Visit Dx:     ICD-10-CM ICD-9-CM   1. Perforation of sigmoid colon  K63.1 569.83   2. Hypotension, unspecified hypotension type  I95.9 458.9   3. Increased lactic acid level  R79.89 276.2   4. Bowel perforation  K63.1 569.83     Patient Active Problem List   Diagnosis    Colon cancer     Past Medical History:   Diagnosis Date    HTN (hypertension)      Past Surgical History:   Procedure Laterality Date    COLON RESECTION N/A 10/26/2024    Procedure: LOW ANTERIOR COLON RESECTION SIGMOID, COLOSTOMY, SPLENIC FLEXURE MOBILIZATION;  Surgeon: Mc Guillaume MD;  Location: [facility];  Service: General;  Laterality: N/A;    EXPLORATORY LAPAROTOMY N/A 10/26/2024    Procedure: LAPAROTOMY EXPLORATORY, LEFT HEMICOLECTOMY;  Surgeon: Mc Guillaume MD;  Location: [facility];  Service: General;  Laterality: N/A;      General Information       Row Name 24 1612          Physical Therapy Time and Intention    Document Type evaluation  -CW     Mode of Treatment co-treatment;physical therapy;occupational therapy  -CW       Row Name 24 1612          General Information    Patient Profile Reviewed yes  -CW     Prior Level of Function independent:;transfer;all household mobility  -CW     Existing Precautions/Restrictions fall;oxygen therapy device and L/min  -CW     Barriers to Rehab medically complex  -CW       Row Name 24 1612          Living Environment    People in Home spouse  -CW       Row Name 24 1612          Home Main Entrance    Number of Stairs, Main Entrance two  -CW       Row Name 24 1612          Stairs Within Home, Primary    Number of Stairs, Within Home, Primary none  -CW       Row Name 24 1612          Cognition    Orientation Status (Cognition) oriented to;person  softly responds with 1 word answers, mostly  yes/no  -CW       Row Name 11/02/24 1612          Safety Issues/Impairments Affecting Functional Mobility    Safety Issues Affecting Function (Mobility) insight into deficits/self-awareness;awareness of need for assistance;sequencing abilities;safety precaution awareness;problem-solving;judgment  -CW     Impairments Affecting Function (Mobility) strength;balance;endurance/activity tolerance;coordination;grasp;pain;postural/trunk control;range of motion (ROM)  -CW               User Key  (r) = Recorded By, (t) = Taken By, (c) = Cosigned By      Initials Name Provider Type    Lisa Amaro PT Physical Therapist                   Mobility       Row Name 11/02/24 1614          Bed Mobility    Bed Mobility supine-sit;sit-supine  -CW     Supine-Sit [place] (Bed Mobility) maximum assist (25% patient effort);dependent (less than 25% patient effort);2 person assist;verbal cues  -CW     Sit-Supine [place] (Bed Mobility) dependent (less than 25% patient effort);2 person assist;verbal cues  -CW     Assistive Device (Bed Mobility) head of bed elevated;bed rails;leg ;repositioning sheet  -CW     Comment, (Bed Mobility) Increased time required, little initiation noted, HR elevated with activity- RN aware  -CW       Row Name 11/02/24 1614          Transfers    Comment, (Transfers) Not appropriate to attempt at this time  -CW               User Key  (r) = Recorded By, (t) = Taken By, (c) = Cosigned By      Initials Name Provider Type    Lisa Amaro PT Physical Therapist                   Obj/Interventions       Row Name 11/02/24 1616          Range of Motion Comprehensive    General Range of Motion bilateral lower extremity ROM WFL  -CW       Row Name 11/02/24 1616          Strength Comprehensive (MMT)    Comment, General Manual Muscle Testing (MMT) Assessment Significant weakness BLE  -CW       Row Name 11/02/24 1616          Balance    Balance Assessment sitting static balance  -CW     Static  Sitting Balance minimal assist;moderate assist;1-person assist;verbal cues  -CW     Position, Sitting Balance sitting edge of bed  -CW     Comment, Balance R lateral lean noted, forward flexed head  -CW               User Key  (r) = Recorded By, (t) = Taken By, (c) = Cosigned By      Initials Name Provider Type    Lisa Amaro PT Physical Therapist                   Goals/Plan       Row Name 11/02/24 1617          Bed Mobility Goal 1 (PT)    Activity/Assistive Device (Bed Mobility Goal 1, PT) bed mobility activities, all  -CW     [place] Level/Cues Needed (Bed Mobility Goal 1, PT) minimum assist (75% or more patient effort)  -CW     Time Frame (Bed Mobility Goal 1, PT) 2 weeks  -CW       Row Name 11/02/24 1617          Transfer Goal 1 (PT)    Activity/Assistive Device (Transfer Goal 1, PT) sit-to-stand/stand-to-sit;bed-to-chair/chair-to-bed  -CW     [place] Level/Cues Needed (Transfer Goal 1, PT) minimum assist (75% or more patient effort)  -CW     Time Frame (Transfer Goal 1, PT) 2 weeks  -CW       Row Name 11/02/24 1617          Gait Training Goal 1 (PT)    Activity/Assistive Device (Gait Training Goal 1, PT) gait (walking locomotion)  -CW     [place] Level (Gait Training Goal 1, PT) minimum assist (75% or more patient effort)  -CW     Distance (Gait Training Goal 1, PT) 25'  -CW     Time Frame (Gait Training Goal 1, PT) 2 weeks  -CW       Row Name 11/02/24 1617          Therapy Assessment/Plan (PT)    Planned Therapy Interventions (PT) balance training;bed mobility training;gait training;postural re-education;transfer training;strengthening;patient/family education;ROM (range of motion)  -CW               User Key  (r) = Recorded By, (t) = Taken By, (c) = Cosigned By      Initials Name Provider Type    Lisa Amaro PT Physical Therapist                   Clinical Impression       Row Name 11/02/24 1622          Pain    Pretreatment Pain Rating 0/10 - no pain  -CW     Posttreatment  Pain Rating 0/10 - no pain  -CW     Pain Side/Orientation generalized  -CW     Pain Management Interventions exercise or physical activity utilized;positioning techniques utilized  -CW     Response to Pain Interventions activity participation with tolerable pain  -CW       Row Name 11/02/24 1622          Plan of Care Review    Plan of Care Reviewed With patient  -CW     Outcome Evaluation Pt is a 73 yo male seen POD 6 L hemicolectomy with colostomy. Pt extubated on 10/26. Per chart review, pt lives at home with spouse and is independent with functional mobility at baseline. Unable to obtain further PLOF from pt due to communication barrier - pt very soft spoken and only able to whisper one word replies. Upon exam, pt demo significant weakness, impaired sitting balance, impaired coordination and decreased activity tolerance below his baseline. Pt completed supine<>sit with max to dep A x2. He tolerated sitting EOB approx 5 mins with min to mod A. He demo R lateral lean and significantly flexed cervical posture. Pt may benefit from ongoing skilled PT services to address functional mobility deficits. Recommend IPR at this time.  PT will follow up monday.  -CW       Row Name 11/02/24 1622          Therapy Assessment/Plan (PT)    Rehab Potential (PT) good  -CW     Criteria for Skilled Interventions Met (PT) yes  -CW     Therapy Frequency (PT) 6 times/wk  -CW       Row Name 11/02/24 1622          Vital Signs    O2 Delivery Pre Treatment nasal cannula  -CW     O2 Delivery Intra Treatment nasal cannula  -CW     O2 Delivery Post Treatment nasal cannula  -CW       Row Name 11/02/24 1622          Positioning and Restraints    Pre-Treatment Position in bed  -CW     Post Treatment Position bed  -CW     In Bed notified nsg;call light within reach;encouraged to call for assist;exit alarm on;fowlers;side rails up x3;SCD pump applied;LUE elevated;RUE elevated  HOB >30, lines intact  -CW               User Key  (r) = Recorded By, (t)  = Taken By, (c) = Cosigned By      Initials Name Provider Type    Lisa Amaro PT Physical Therapist                   Outcome Measures       Row Name 11/02/24 1621          How much help from another person do you currently need...    Turning from your back to your side while in flat bed without using bedrails? 2  -CW     Moving from lying on back to sitting on the side of a flat bed without bedrails? 2  -CW     Moving to and from a bed to a chair (including a wheelchair)? 1  -CW     Standing up from a chair using your arms (e.g., wheelchair, bedside chair)? 1  -CW     Climbing 3-5 steps with a railing? 1  -CW     To walk in hospital room? 1  -CW     AM-PAC 6 Clicks Score (PT) 8  -CW     Highest Level of Mobility Goal 3 --> Sit at edge of bed  -CW       Row Name 11/02/24 1327          Modified [place] Scale    Modified [place] Scale 5 - Severe disability.  Bedridden, incontinent, and requiring constant nursing care and attention.  -MM       Row Name 11/02/24 1621 11/02/24 1327       Functional Assessment    Outcome Measure Options AM-PAC 6 Clicks Basic Mobility (PT)  -CW AM-PAC 6 Clicks Daily Activity (OT);Modified [place]  -MM              User Key  (r) = Recorded By, (t) = Taken By, (c) = Cosigned By      Initials Name Provider Type    Lisa Amaro PT Physical Therapist    MM Yu Chapman OT Occupational Therapist                                 Physical Therapy Education       Title: PT OT SLP Therapies (In Progress)       Topic: Physical Therapy (In Progress)       Point: Mobility training (Done)       Learning Progress Summary            Patient Acceptance, E, VU by CW at 11/2/2024 1622                      Point: Home exercise program (Not Started)       Learner Progress:  Not documented in this visit.              Point: Body mechanics (Done)       Learning Progress Summary            Patient Acceptance, E, VU by CW at 11/2/2024 1622                      Point: Precautions (Done)        Learning Progress Summary            Patient Acceptance, E, VU by CW at 11/2/2024 1622                                      User Key       Initials Effective Dates Name Provider Type Discipline    CW 12/13/22 -  Lisa Paulson, PT Physical Therapist PT                  PT Recommendation and Plan  Planned Therapy Interventions (PT): balance training, bed mobility training, gait training, postural re-education, transfer training, strengthening, patient/family education, ROM (range of motion)  Outcome Evaluation: Pt is a 75 yo male seen POD 6 L hemicolectomy with colostomy. Pt extubated on 10/26. Per chart review, pt lives at home with spouse and is independent with functional mobility at baseline. Unable to obtain further PLOF from pt due to communication barrier - pt very soft spoken and only able to whisper one word replies. Upon exam, pt demo significant weakness, impaired sitting balance, impaired coordination and decreased activity tolerance below his baseline. Pt completed supine<>sit with max to dep A x2. He tolerated sitting EOB approx 5 mins with min to mod A. He demo R lateral lean and significantly flexed cervical posture. Pt may benefit from ongoing skilled PT services to address functional mobility deficits. Recommend IPR at this time.  PT will follow up monday.     Time Calculation:         PT Charges       Row Name 11/02/24 1322             Time Calculation    Start Time 0948  -CW      Stop Time 1008  -CW      Time Calculation (min) 20 min  -CW      PT Received On 11/02/24  -CW      PT - Next Appointment 11/04/24  -CW      PT Goal Re-Cert Due Date 11/16/24  -CW         Time Calculation- PT    Total Timed Code Minutes- PT 10 minute(s)  -CW         Timed Charges    35872 - PT Therapeutic Activity Minutes 10  -CW         Total Minutes    Timed Charges Total Minutes 10  -CW       Total Minutes 10  -CW                User Key  (r) = Recorded By, (t) = Taken By, (c) = Cosigned By      Initials Name  Provider Type     Lisa Paulson, PT Physical Therapist                  Therapy Charges for Today       Code Description Service Date Service Provider Modifiers Qty    [id number]  PT EVAL MOD COMPLEXITY 3 11/2/2024 Lisa Paulson, PT GP 1    [id number]  PT THERAPEUTIC ACT EA 15 MIN 11/2/2024 Lisa Paulson, PT GP 1            PT G-Codes  Outcome Measure Options: AM-PAC 6 Clicks Basic Mobility (PT)  AM-PAC 6 Clicks Score (PT): 8  AM-PAC 6 Clicks Score (OT): 7  Modified [place] Scale: 5 - Severe disability.  Bedridden, incontinent, and requiring constant nursing care and attention.  PT Discharge Summary  Anticipated Discharge Disposition (PT): inpatient rehabilitation facility    Lisa Paulson PT  11/2/2024

## 2024-11-02 NOTE — THERAPY EVALUATION
Patient Name: Arsh Haynes  : 1950    MRN: [id number]                              Today's Date: 2024       Admit Date: 10/26/2024    Visit Dx:     ICD-10-CM ICD-9-CM   1. Perforation of sigmoid colon  K63.1 569.83   2. Hypotension, unspecified hypotension type  I95.9 458.9   3. Increased lactic acid level  R79.89 276.2   4. Bowel perforation  K63.1 569.83     Patient Active Problem List   Diagnosis    Colon cancer     Past Medical History:   Diagnosis Date    HTN (hypertension)      Past Surgical History:   Procedure Laterality Date    COLON RESECTION N/A 10/26/2024    Procedure: LOW ANTERIOR COLON RESECTION SIGMOID, COLOSTOMY, SPLENIC FLEXURE MOBILIZATION;  Surgeon: Mc Guillaume MD;  Location: [facility];  Service: General;  Laterality: N/A;    EXPLORATORY LAPAROTOMY N/A 10/26/2024    Procedure: LAPAROTOMY EXPLORATORY, LEFT HEMICOLECTOMY;  Surgeon: Mc Guillaume MD;  Location: [facility];  Service: General;  Laterality: N/A;      General Information       Row Name 24 1317          OT Time and Intention    Document Type evaluation  -MM     Mode of Treatment co-treatment;physical therapy;occupational therapy  -MM     Patient Effort adequate  -MM     Symptoms Noted During/After Treatment fatigue;increased pain  -MM       Row Name 24 1317          General Information    Patient Profile Reviewed yes  -MM     Prior Level of Function independent:;ADL's;all household mobility  -MM     Existing Precautions/Restrictions fall;oxygen therapy device and L/min  1L  -MM     Barriers to Rehab medically complex  -MM       Row Name 24 1317          Living Environment    People in Home spouse  -MM       Row Name 24 1317          Home Main Entrance    Number of Stairs, Main Entrance two  -MM       Row Name 24 1317          Cognition    Orientation Status (Cognition) oriented to;person  able to respond with one word answers approp  -MM       Row Name 24  1317          Safety Issues/Impairments Affecting Functional Mobility    Safety Issues Affecting Function (Mobility) problem-solving;sequencing abilities;judgment  -MM     Impairments Affecting Function (Mobility) strength;balance;endurance/activity tolerance;coordination;grasp;pain;postural/trunk control;range of motion (ROM)  -MM     Comment, Safety Issues/Impairments (Mobility) Co-treatment medically necessary and appropriate d/t pt's acuity level, decreased endurance and activity tolerance, and safety of patient and staff. Treatment focused on progression of care and goals established in POC. Gait belt and non-skid socks worn.  -MM               User Key  (r) = Recorded By, (t) = Taken By, (c) = Cosigned By      Initials Name Provider Type    MM Yu Chapman OT Occupational Therapist                     Mobility/ADL's       Row Name 11/02/24 1319          Bed Mobility    Bed Mobility supine-sit;sit-supine  -MM     Supine-Sit [place] (Bed Mobility) maximum assist (25% patient effort);dependent (less than 25% patient effort);2 person assist  -MM     Sit-Supine [place] (Bed Mobility) dependent (less than 25% patient effort);2 person assist  -MM     Assistive Device (Bed Mobility) head of bed elevated;bed rails;leg ;repositioning sheet  -MM     Comment, (Bed Mobility) increased time required, minimal initiation with BLE to bedside  -MM       Row Name 11/02/24 1319          Transfers    Comment, (Transfers) not approp to assess STS due to fatigue, waekness, and decreased sit balance at EOB, increased HR noted as well and RN aware  -MM       Row Name 11/02/24 1319          Activities of Daily Living    BADL Assessment/Intervention lower body dressing;feeding;toileting  -MM       Row Name 11/02/24 1319          Lower Body Dressing Assessment/Training    [place] Level (Lower Body Dressing) socks;dependent (less than 25% patient effort);don  -MM       Row Name 11/02/24 1319          Self-Feeding  Assessment/Training    [place] Level (Feeding) feeding skills;dependent (less than 25% patient effort)  -MM     Comment, (Feeding) feeding tube  -MM       Row Name 11/02/24 1319          Toileting Assessment/Training    [place] Level (Toileting) toileting skills;dependent (less than 25% patient effort)  -MM     Comment, (Toileting) bed level this date  -MM               User Key  (r) = Recorded By, (t) = Taken By, (c) = Cosigned By      Initials Name Provider Type    MM Yu Chapman OT Occupational Therapist                   Obj/Interventions       Row Name 11/02/24 1320          Sensory Assessment (Somatosensory)    Sensory Assessment (Somatosensory) unable/difficult to assess  -MM       Row Name 11/02/24 1320          Vision Assessment/Intervention    Visual Impairment/Limitations WFL  -MM       Row Name 11/02/24 1320          Range of Motion Comprehensive    General Range of Motion upper extremity range of motion deficits identified  -MM     Comment, General Range of Motion UE AROM very limited, diffiutly more with RUE due to pain, very poor initiation with hand to mouth with RUE  -MM       Row Name 11/02/24 1320          Strength Comprehensive (MMT)    General Manual Muscle Testing (MMT) Assessment upper extremity strength deficits identified  -MM     Comment, General Manual Muscle Testing (MMT) Assessment significant whole body weakness, BUE grossly 2/5  -MM       Row Name 11/02/24 1320          Motor Skills    Motor Skills functional endurance;coordination  -MM     Coordination fine motor deficit;gross motor deficit;bilateral;upper extremity;moderate impairment  -MM     Functional Endurance poor  -MM       Row Name 11/02/24 1320          Balance    Balance Assessment sitting static balance  -MM     Static Sitting Balance minimal assist;moderate assist  R leaning noted  -MM     Position, Sitting Balance sitting edge of bed;supported  -MM     Comment, Balance R leaning noted, min-mod A, pt unable to  bring neck to midline due to weakness  -MM               User Key  (r) = Recorded By, (t) = Taken By, (c) = Cosigned By      Initials Name Provider Type    Yu Kiran OT Occupational Therapist                   Goals/Plan       Row Name 11/02/24 1327          Bed Mobility Goal 1 (OT)    Activity/Assistive Device (Bed Mobility Goal 1, OT) sit to supine;supine to sit  -MM     [place] Level/Cues Needed (Bed Mobility Goal 1, OT) moderate assist (50-74% patient effort)  -MM     Time Frame (Bed Mobility Goal 1, OT) short term goal (STG);2 weeks  -MM     Progress/Outcomes (Bed Mobility Goal 1, OT) goal ongoing  -MM       Row Name 11/02/24 1327          Transfer Goal 1 (OT)    Activity/Assistive Device (Transfer Goal 1, OT) sit-to-stand/stand-to-sit  -MM     [place] Level/Cues Needed (Transfer Goal 1, OT) maximum assist (25-49% patient effort)  -MM     Time Frame (Transfer Goal 1, OT) short term goal (STG);2 weeks  -MM     Progress/Outcome (Transfer Goal 1, OT) goal ongoing  -MM       Row Name 11/02/24 1327          Bathing Goal 1 (OT)    Activity/Device (Bathing Goal 1, OT) upper body bathing  -MM     [place] Level/Cues Needed (Bathing Goal 1, OT) moderate assist (50-74% patient effort)  -MM     Time Frame (Bathing Goal 1, OT) short term goal (STG);2 weeks  -MM     Progress/Outcomes (Bathing Goal 1, OT) goal ongoing  -MM       Row Name 11/02/24 1327          Toileting Goal 1 (OT)    Activity/Device (Toileting Goal 1, OT) toileting skills, all  -MM     [place] Level/Cues Needed (Toileting Goal 1, OT) maximum assist (25-49% patient effort)  -MM     Time Frame (Toileting Goal 1, OT) short term goal (STG);2 weeks  -MM     Progress/Outcome (Toileting Goal 1, OT) goal ongoing  -MM       Row Name 11/02/24 1327          Grooming Goal 1 (OT)    Activity/Device (Grooming Goal 1, OT) grooming skills, all  -MM     [place] (Grooming Goal 1, OT) minimum assist (75% or more patient effort)  -MM     Time  Frame (Grooming Goal 1, OT) short term goal (STG);2 weeks  -MM     Progress/Outcome (Grooming Goal 1, OT) goal ongoing  -MM       Row Name 11/02/24 1327          Self-Feeding Goal 1 (OT)    Activity/Device (Self-Feeding Goal 1, OT) self-feeding skills, all;built-up handle utensils  -MM     [place] Level/Cues Needed (Self-Feeding Goal 1, OT) minimum assist (75% or more patient effort)  -MM     Time Frame (Self-Feeding Goal 1, OT) short term goal (STG);2 weeks  -MM     Progress/Outcomes (Self-Feeding Goal 1, OT) goal ongoing  -MM       Row Name 11/02/24 1327          ROM Goal 1 (OT)    ROM Goal 1 (OT) increased ROM of BUE to 50% end range to increase (I) with ADLs  -MM     Time Frame (ROM Goal 1, OT) short term goal (STG);2 weeks  -MM     Progress/Outcome (ROM Goal 1, OT) goal ongoing  -MM       Row Name 11/02/24 1327          Therapy Assessment/Plan (OT)    Planned Therapy Interventions (OT) activity tolerance training;adaptive equipment training;BADL retraining;cognitive/visual perception retraining;neuromuscular control/coordination retraining;patient/caregiver education/training;transfer/mobility retraining;strengthening exercise;ROM/therapeutic exercise;occupation/activity based interventions;functional balance retraining  -MM               User Key  (r) = Recorded By, (t) = Taken By, (c) = Cosigned By      Initials Name Provider Type    MM Yu Chapman OT Occupational Therapist                   Clinical Impression       Row Name 11/02/24 1322          Pain Assessment    Pain Location abdomen  -MM     Pain Side/Orientation generalized  -MM     Pre/Posttreatment Pain Comment did not give pain rating  -MM       Row Name 11/02/24 1322          Plan of Care Review    Plan of Care Reviewed With patient  -MM     Progress no change  -MM     Outcome Evaluation Pt is a 75 yo male now seen postoperative day 6 status post open left hemicolectomy with colostomy, extubated on 10/26. He is seen this date for OT hortensia,  alert to self, able to voice one word answers approp however difficult to understand due to poor voice projection. Per chart, pt (I) at baseline with ADLs and mobility, lives at home with spouse. He presents this date well below his baseline with significant weakness, decreased activity tolerance, balance, decreased strength and coordination in BUE, increased pain and overall decreased (I) with ADL paricipation. Tube feeds in place, x3 drains as well. He requires max/depx2 for bed mobility, dep for LBD and toileting this date, min-mod A for sit balance due to R lean, unable to hold neck at midline, ~5 mins at EOB before fatigue, increased abd pain with activity as well as HR and RN aware of this.  He will continue to benefit from skilled OT to address noted functional deficits and progress toward improved (I) and work toward prior level of (I). Rec IRF at this time due to high PLOF.  -MM       Row Name 11/02/24 1322          Therapy Assessment/Plan (OT)    Rehab Potential (OT) good  -MM     Criteria for Skilled Therapeutic Interventions Met (OT) yes;meets criteria;skilled treatment is necessary  -MM     Therapy Frequency (OT) 5 times/wk  -MM       Row Name 11/02/24 1322          Therapy Plan Review/Discharge Plan (OT)    Anticipated Discharge Disposition (OT) inpatient rehabilitation facility  -MM       Row Name 11/02/24 1322          Vital Signs    Intratreatment Heart Rate (beats/min) 157  -MM     O2 Delivery Pre Treatment supplemental O2  -MM     O2 Delivery Intra Treatment supplemental O2  -MM     O2 Delivery Post Treatment supplemental O2  -MM       Row Name 11/02/24 1322          Positioning and Restraints    Pre-Treatment Position in bed  -MM     Post Treatment Position bed  -MM     In Bed call light within reach;notified nsg;supine;fowlers;encouraged to call for assist;patient within staff view;exit alarm on;legs elevated;SCD pump applied;side rails up x3;RUE elevated;LUE elevated  -MM               User Key   (r) = Recorded By, (t) = Taken By, (c) = Cosigned By      Initials Name Provider Type    Yu Kiran OT Occupational Therapist                   Outcome Measures       Row Name 11/02/24 1327          How much help from another is currently needed...    Putting on and taking off regular lower body clothing? 1  -MM     Bathing (including washing, rinsing, and drying) 1  -MM     Toileting (which includes using toilet bed pan or urinal) 1  -MM     Putting on and taking off regular upper body clothing 1  -MM     Taking care of personal grooming (such as brushing teeth) 2  -MM     Eating meals 1  -MM     AM-PAC 6 Clicks Score (OT) 7  -MM       Row Name 11/02/24 1327          Modified Fabian Scale    Modified Fabian Scale 5 - Severe disability.  Bedridden, incontinent, and requiring constant nursing care and attention.  -MM       Row Name 11/02/24 1327          Functional Assessment    Outcome Measure Options AM-PAC 6 Clicks Daily Activity (OT);Modified [place]  -MM               User Key  (r) = Recorded By, (t) = Taken By, (c) = Cosigned By      Initials Name Provider Type    Yu Kiran OT Occupational Therapist                    Occupational Therapy Education       Title: PT OT SLP Therapies (Done)       Topic: Occupational Therapy (Done)       Point: ADL training (Done)       Description:   Instruct learner(s) on proper safety adaptation and remediation techniques during self care or transfers.   Instruct in proper use of assistive devices.                  Learning Progress Summary            Patient Acceptance, E, VU,NR by ELLIE at 11/2/2024 1328    Comment: role of OT, goals, d/c rec                      Point: Precautions (Done)       Description:   Instruct learner(s) on prescribed precautions during self-care and functional transfers.                  Learning Progress Summary            Patient Acceptance, E, VU,NR by ELLIE at 11/2/2024 1328    Comment: role of OT, goals, d/c rec                       Point: Body mechanics (Done)       Description:   Instruct learner(s) on proper positioning and spine alignment during self-care, functional mobility activities and/or exercises.                  Learning Progress Summary            Patient Acceptance, E, VU,NR by ELLIE at 11/2/2024 2568    Comment: role of OT, goals, d/c rec                                      User Key       Initials Effective Dates Name Provider Type Discipline    ELLIE 05/31/24 -  Yu Chapman OT Occupational Therapist OT                  OT Recommendation and Plan  Planned Therapy Interventions (OT): activity tolerance training, adaptive equipment training, BADL retraining, cognitive/visual perception retraining, neuromuscular control/coordination retraining, patient/caregiver education/training, transfer/mobility retraining, strengthening exercise, ROM/therapeutic exercise, occupation/activity based interventions, functional balance retraining  Therapy Frequency (OT): 5 times/wk  Plan of Care Review  Plan of Care Reviewed With: patient  Progress: no change  Outcome Evaluation: Pt is a 73 yo male now seen postoperative day 6 status post open left hemicolectomy with colostomy, extubated on 10/26. He is seen this date for OT eval, alert to self, able to voice one word answers approp however difficult to understand due to poor voice projection. Per chart, pt (I) at baseline with ADLs and mobility, lives at home with spouse. He presents this date well below his baseline with significant weakness, decreased activity tolerance, balance, decreased strength and coordination in BUE, increased pain and overall decreased (I) with ADL paricipation. Tube feeds in place, x3 drains as well. He requires max/depx2 for bed mobility, dep for LBD and toileting this date, min-mod A for sit balance due to R lean, unable to hold neck at midline, ~5 mins at EOB before fatigue, increased abd pain with activity as well as HR and RN aware of this.  He will continue to  benefit from skilled OT to address noted functional deficits and progress toward improved (I) and work toward prior level of (I). Rec IRF at this time due to high PLOF.     Time Calculation:   Evaluation Complexity (OT)  Review Occupational Profile/Medical/Therapy History Complexity: expanded/moderate complexity  Assessment, Occupational Performance/Identification of Deficit Complexity: 5 or more performance deficits  Clinical Decision Making Complexity (OT): detailed assessment/moderate complexity  Overall Complexity of Evaluation (OT): moderate complexity     Time Calculation- OT       Row Name 11/02/24 1328             Time Calculation- OT    OT Start Time 0950  -MM      OT Stop Time 1009  -MM      OT Time Calculation (min) 19 min  -MM      Total Timed Code Minutes- OT 10 minute(s)  -MM      OT Received On 11/02/24  -MM      OT - Next Appointment 11/04/24  -MM      OT Goal Re-Cert Due Date 11/16/24  -MM         Timed Charges    23352 - OT Therapeutic Activity Minutes 10  -MM         Untimed Charges    OT Eval/Re-eval Minutes 9  -MM         Total Minutes    Timed Charges Total Minutes 10  -MM      Untimed Charges Total Minutes 9  -MM       Total Minutes 19  -MM                User Key  (r) = Recorded By, (t) = Taken By, (c) = Cosigned By      Initials Name Provider Type    MM Yu Chapman OT Occupational Therapist                  Therapy Charges for Today       Code Description Service Date Service Provider Modifiers Qty    [id number]  OT THERAPEUTIC ACT EA 15 MIN 11/2/2024 Yu Chapman OT GO 1    15634343053 HC OT EVAL MOD COMPLEXITY 3 11/2/2024 Yu Chapman OT GO 1                 Yu Chapman OT  11/2/2024

## 2024-11-02 NOTE — PLAN OF CARE
Goal Outcome Evaluation:  Plan of Care Reviewed With: patient           Outcome Evaluation: Pt is a 75 yo male seen POD 6 L hemicolectomy with colostomy. Pt extubated on 10/26. Per chart review, pt lives at home with spouse and is independent with functional mobility at baseline. Unable to obtain further PLOF from pt due to communication barrier - pt very soft spoken and only able to whisper one word replies. Upon exam, pt demo significant weakness, impaired sitting balance, impaired coordination and decreased activity tolerance below his baseline. Pt completed supine<>sit with max to dep A x2. He tolerated sitting EOB approx 5 mins with min to mod A. He demo R lateral lean and significantly flexed cervical posture. Pt may benefit from ongoing skilled PT services to address functional mobility deficits. Recommend IPR at this time.  PT will follow up monday.    Anticipated Discharge Disposition (PT): inpatient rehabilitation facility

## 2024-11-02 NOTE — PROGRESS NOTES
"  Daily Progress Note.   [facility] CARDIAC INTENSIVE CARE  11/2/2024    Patient:  Name:  Arsh Haynes  MRN:  [id number]  1950  74 y.o.  male         CC: Follow up septic shock from perforated bowel with likely underlying malignancy     Interval History:   Afebrile int tachy on 1lnc  Ill-appearing answers questions slowly.  No acute events overnight.  No significant cough      Physical Exam:  /73   Pulse (!) 132   Temp 97.6 °F (36.4 °C) (Oral)   Resp 20   Ht 182.9 cm (72\")   Wt 89.8 kg (197 lb 15.6 oz)   SpO2 94%   BMI 26.85 kg/m²   Body mass index is 26.85 kg/m².    Intake/Output Summary (Last 24 hours) at 11/2/2024 1341  Last data filed at 11/2/2024 1306  Gross per 24 hour   Intake 1476 ml   Output 1370 ml   Net 106 ml   ng  General appearance: ill very weak appearing but conversant slow  Eyes: anicteric sclerae, moist conjunctivae; no lidlag;    HENT: Atraumatic; oropharynx clear with moist mucous membranes    Neck: Trachea midline;  supple   Lungs: yosi, no rhonchi no wheeze with poor respiratory effort and no intercostal retractions  CV: tachy irreg, no rub   Abdomen: incision bandaged, ostomy llq, janeen drains+  Extremities: +ble trace peripheral edema    Skin: WWP   Psych/neuro: calm affect, alert slowly weak voice but interacts and answers questions appropriately.      Data Review:  Notable Labs:  Results from last 7 days   Lab Units 11/02/24  0348 11/01/24  0601 10/31/24  0503 10/30/24  0345 10/29/24  0334 10/28/24  0326 10/27/24  0432   WBC 10*3/mm3 15.46* 14.26* 16.55* 20.05* 20.19* 21.81* 6.94   HEMOGLOBIN g/dL 8.5* 8.7* 9.1* 8.3* 8.6* 9.6* 9.8*   PLATELETS 10*3/mm3 426 354 222 198 236 334 483*     Results from last 7 days   Lab Units 11/02/24  0348 11/01/24  1209 11/01/24  0522 10/31/24  0503 10/30/24  1737 10/30/24  0345 10/29/24  0334 10/28/24  0326 10/27/24  1356 10/27/24  0432 10/26/24  1944   SODIUM mmol/L 135*  --  138 142  --  137 143 143  --  140 135*   POTASSIUM " mmol/L 3.9  --  4.8 4.3 4.2 3.1* 4.1 4.4   < > 3.0* 3.8   CHLORIDE mmol/L 105  --  111* 114*  --  114* 118* 119*  --  110* 98   CO2 mmol/L 21.3*  --  18.0* 20.2*  --  15.8* 16.5* 17.0*  --  17.7* 21.0*   BUN mg/dL 9  --  9 7*  --  9 12 14  --  12 14   CREATININE mg/dL 0.56*  --  0.70* 0.63*  --  0.57* 0.85 0.72*  --  0.70* 0.89   GLUCOSE mg/dL 173*  --  126* 117*  --  377* 158* 90  --  251* 239*   CALCIUM mg/dL 8.0*  --  7.4* 7.2*  --  6.2* 6.7* 6.5*  --  7.0* 8.4*   MAGNESIUM mg/dL  --  2.1  --   --   --  1.9 2.3  --   --  1.4* 1.8   PHOSPHORUS mg/dL  --   --   --   --   --   --   --   --   --  2.4*  --     < > = values in this interval not displayed.   Estimated Creatinine Clearance: 151.4 mL/min (A) (by C-G formula based on SCr of 0.56 mg/dL (L)).    Results from last 7 days   Lab Units 11/02/24  0348 11/01/24  0601 11/01/24  0522 10/31/24  0503 10/28/24  0326 10/27/24  1811 10/27/24  1242 10/27/24  0702 10/27/24  0254 10/26/24  1944   AST (SGOT) U/L 18  --  23 20   < >  --   --   --    < > 15   ALT (SGPT) U/L 18  --  18 17   < >  --   --   --    < > 16   PROCALCITONIN ng/mL  --   --   --   --   --   --   --   --   --  9.58*   LACTATE mmol/L  --   --   --   --   --  2.2* 2.3* 4.0*   < > 5.8*   PLATELETS 10*3/mm3 426 354  --  222   < >  --   --   --    < > 549*    < > = values in this interval not displayed.       Results from last 7 days   Lab Units 11/01/24  0816 10/30/24  1131 10/29/24  0315 10/28/24  0321 10/27/24  0237 10/27/24  0129 10/26/24  2312   PH, ARTERIAL pH units 7.499* 7.445 7.372 7.321* 7.348* 7.286* 7.1750*   PCO2, ARTERIAL mm Hg 28.5* 28.8* 28.0* 31.2* 32.4* 41.0  --    PO2 ART mm Hg 73.9* 57.3* 76.5* 107.2* 89.0 384.3*  --    HCO3 ART mmol/L 22.1 19.8* 16.3* 16.1* 17.8* 19.5*  --        Imaging:  Reviewed chest images personally from past 3 days    ASSESSMENT  /  PLAN:  Severe sepsis with septic shock secondary to perforated bowel and peritonitis with likely malignancy s/p status post open left  hemicolectomy with colostomy   ETOH abuse and withdrawal  Hyperglycemia  Right renal mass  Lactic acidosis  Anemia  Bacteremia  SVT status post digoxin  Postop respiratory insufficiency and ventilator management status post extubation 10/29  Colon cancer stage II further workup upon recovery.    Continue to treat and monitor withdrawal symptoms.    Cont zosyn  Cont fluconazole  Monitor fever curve and white count    Metoprolol 7.5mg    Folic acid  Thiamine      Ssi  Lovenox     Will need to maintain in ICU very weak poor airway clearance.  Would like to see him a little bit stronger despite improvement objectively on lab work.    Maintain in ICU.  Discussed with bedside nursing.  Electronically signed by Darek Lerner MD, 11/02/24, 4:36 PM EDT.

## 2024-11-02 NOTE — PLAN OF CARE
Goal Outcome Evaluation:  Plan of Care Reviewed With: patient        Progress: no change  Outcome Evaluation: Pt is a 73 yo male now seen postoperative day 6 status post open left hemicolectomy with colostomy, extubated on 10/26. He is seen this date for OT eval, alert to self, able to voice one word answers approp however difficult to understand due to poor voice projection. Per chart, pt (I) at baseline with ADLs and mobility, lives at home with spouse. He presents this date well below his baseline with significant weakness, decreased activity tolerance, balance, decreased strength and coordination in BUE, increased pain and overall decreased (I) with ADL paricipation. Tube feeds in place, x3 drains as well. He requires max/depx2 for bed mobility, dep for LBD and toileting this date, min-mod A for sit balance due to R lean, unable to hold neck at midline, ~5 mins at EOB before fatigue, increased abd pain with activity as well as HR and RN aware of this.  He will continue to benefit from skilled OT to address noted functional deficits and progress toward improved (I) and work toward prior level of (I). Rec IRF at this time due to high PLOF.    Anticipated Discharge Disposition (OT): inpatient rehabilitation facility

## 2024-11-02 NOTE — PROGRESS NOTES
Postop day 6 after left hemicolectomy and colostomy    Subjective:  No new complaints.  Tolerating tube feeds at 60 cc an hour.  Stoma is functioning.    Objective:  AFVSS  General: Awake and alert, not very responsive  Abdomen: Soft and appropriately tender, incision open, base is clean    Drains are serous    Blood cell count 15 from 14 yesterday, hemoglobin 8.5.      Assessment and plan:  -Postop day 6 after colon resection with colostomy for perforation secondary to cancer stoma output improving  -Tolerating tube feeds  -Remains extremely weak  -Physical therapy to work  -Reasonable progress to this point    Devon Mendez MD  General and Endoscopic Surgery  [facility]    [address], [address]  [place], [place], [address]  P: [phone]  F: [phone]

## 2024-11-02 NOTE — PLAN OF CARE
Goal Outcome Evaluation:      Remains in CICU on 1L NC. AO x2. Frequent oral care and suctioning provided. Encouraged pt to cough. Bladder scan with > 500 cc, straight cath- 800 UOP. Pt voided on his own with 225 UOP this morning. HR up 150s with frequent PVCs/PACs, HR improved with scheduled lopressor. X1 dilaudid given for pain. Advancing TF per order.

## 2024-11-03 ENCOUNTER — APPOINTMENT (OUTPATIENT)
Dept: CT IMAGING | Facility: HOSPITAL | Age: 74
DRG: 853 | End: 2024-11-03
Payer: MEDICARE

## 2024-11-03 LAB
ALBUMIN SERPL-MCNC: 1.8 G/DL (ref 3.5–5.2)
ALBUMIN/GLOB SERPL: 0.5 G/DL
ALP SERPL-CCNC: 251 U/L (ref 39–117)
ALT SERPL W P-5'-P-CCNC: 19 U/L (ref 1–41)
ANION GAP SERPL CALCULATED.3IONS-SCNC: 7 MMOL/L (ref 5–15)
AST SERPL-CCNC: 19 U/L (ref 1–40)
BACTERIA SPEC AEROBE CULT: NORMAL
BACTERIA SPEC AEROBE CULT: NORMAL
BACTERIA UR QL AUTO: ABNORMAL /HPF
BACTERIA UR QL AUTO: ABNORMAL /HPF
BASOPHILS # BLD AUTO: 0.06 10*3/MM3 (ref 0–0.2)
BASOPHILS NFR BLD AUTO: 0.4 % (ref 0–1.5)
BILIRUB SERPL-MCNC: 0.5 MG/DL (ref 0–1.2)
BILIRUB UR QL STRIP: NEGATIVE
BILIRUB UR QL STRIP: NEGATIVE
BUN SERPL-MCNC: 9 MG/DL (ref 8–23)
BUN/CREAT SERPL: 17.3 (ref 7–25)
CALCIUM SPEC-SCNC: 7.7 MG/DL (ref 8.6–10.5)
CHLORIDE SERPL-SCNC: 103 MMOL/L (ref 98–107)
CLARITY UR: CLEAR
CLARITY UR: CLEAR
CO2 SERPL-SCNC: 23 MMOL/L (ref 22–29)
COLOR UR: ABNORMAL
COLOR UR: YELLOW
CREAT SERPL-MCNC: 0.52 MG/DL (ref 0.76–1.27)
DEPRECATED RDW RBC AUTO: 61.1 FL (ref 37–54)
EGFRCR SERPLBLD CKD-EPI 2021: 107.1 ML/MIN/1.73
EOSINOPHIL # BLD AUTO: 0.32 10*3/MM3 (ref 0–0.4)
EOSINOPHIL NFR BLD AUTO: 1.9 % (ref 0.3–6.2)
ERYTHROCYTE [DISTWIDTH] IN BLOOD BY AUTOMATED COUNT: 18.7 % (ref 12.3–15.4)
GLOBULIN UR ELPH-MCNC: 3.3 GM/DL
GLUCOSE BLDC GLUCOMTR-MCNC: 116 MG/DL (ref 70–130)
GLUCOSE BLDC GLUCOMTR-MCNC: 171 MG/DL (ref 70–130)
GLUCOSE BLDC GLUCOMTR-MCNC: 245 MG/DL (ref 70–130)
GLUCOSE BLDC GLUCOMTR-MCNC: 250 MG/DL (ref 70–130)
GLUCOSE SERPL-MCNC: 230 MG/DL (ref 65–99)
GLUCOSE UR STRIP-MCNC: ABNORMAL MG/DL
GLUCOSE UR STRIP-MCNC: ABNORMAL MG/DL
HCT VFR BLD AUTO: 27.9 % (ref 37.5–51)
HGB BLD-MCNC: 8.4 G/DL (ref 13–17.7)
HGB UR QL STRIP.AUTO: ABNORMAL
HGB UR QL STRIP.AUTO: ABNORMAL
HYALINE CASTS UR QL AUTO: ABNORMAL /LPF
HYALINE CASTS UR QL AUTO: ABNORMAL /LPF
IMM GRANULOCYTES # BLD AUTO: 0.44 10*3/MM3 (ref 0–0.05)
IMM GRANULOCYTES NFR BLD AUTO: 2.6 % (ref 0–0.5)
KETONES UR QL STRIP: NEGATIVE
KETONES UR QL STRIP: NEGATIVE
LEUKOCYTE ESTERASE UR QL STRIP.AUTO: NEGATIVE
LEUKOCYTE ESTERASE UR QL STRIP.AUTO: NEGATIVE
LYMPHOCYTES # BLD AUTO: 1.17 10*3/MM3 (ref 0.7–3.1)
LYMPHOCYTES NFR BLD AUTO: 6.9 % (ref 19.6–45.3)
MCH RBC QN AUTO: 26.9 PG (ref 26.6–33)
MCHC RBC AUTO-ENTMCNC: 30.1 G/DL (ref 31.5–35.7)
MCV RBC AUTO: 89.4 FL (ref 79–97)
MONOCYTES # BLD AUTO: 1.4 10*3/MM3 (ref 0.1–0.9)
MONOCYTES NFR BLD AUTO: 8.2 % (ref 5–12)
NEUTROPHILS NFR BLD AUTO: 13.67 10*3/MM3 (ref 1.7–7)
NEUTROPHILS NFR BLD AUTO: 80 % (ref 42.7–76)
NITRITE UR QL STRIP: NEGATIVE
NITRITE UR QL STRIP: NEGATIVE
NRBC BLD AUTO-RTO: 0 /100 WBC (ref 0–0.2)
PH UR STRIP.AUTO: 8 [PH] (ref 5–8)
PH UR STRIP.AUTO: 8 [PH] (ref 5–8)
PLATELET # BLD AUTO: 547 10*3/MM3 (ref 140–450)
PMV BLD AUTO: 9.1 FL (ref 6–12)
POTASSIUM SERPL-SCNC: 4.2 MMOL/L (ref 3.5–5.2)
PROT SERPL-MCNC: 5.1 G/DL (ref 6–8.5)
PROT UR QL STRIP: ABNORMAL
PROT UR QL STRIP: ABNORMAL
QT INTERVAL: 333 MS
QTC INTERVAL: 436 MS
RBC # BLD AUTO: 3.12 10*6/MM3 (ref 4.14–5.8)
RBC # UR STRIP: ABNORMAL /HPF
RBC # UR STRIP: ABNORMAL /HPF
REF LAB TEST METHOD: ABNORMAL
REF LAB TEST METHOD: ABNORMAL
SODIUM SERPL-SCNC: 133 MMOL/L (ref 136–145)
SP GR UR STRIP: 1.02 (ref 1–1.03)
SP GR UR STRIP: 1.02 (ref 1–1.03)
SQUAMOUS #/AREA URNS HPF: ABNORMAL /HPF
SQUAMOUS #/AREA URNS HPF: ABNORMAL /HPF
UROBILINOGEN UR QL STRIP: ABNORMAL
UROBILINOGEN UR QL STRIP: ABNORMAL
WBC # UR STRIP: ABNORMAL /HPF
WBC # UR STRIP: ABNORMAL /HPF
WBC NRBC COR # BLD AUTO: 17.06 10*3/MM3 (ref 3.4–10.8)

## 2024-11-03 PROCEDURE — 81001 URINALYSIS AUTO W/SCOPE: CPT | Performed by: NURSE PRACTITIONER

## 2024-11-03 PROCEDURE — 25010000002 LORAZEPAM PER 2 MG: Performed by: INTERNAL MEDICINE

## 2024-11-03 PROCEDURE — 80053 COMPREHEN METABOLIC PANEL: CPT | Performed by: INTERNAL MEDICINE

## 2024-11-03 PROCEDURE — 94761 N-INVAS EAR/PLS OXIMETRY MLT: CPT

## 2024-11-03 PROCEDURE — 94668 MNPJ CHEST WALL SBSQ: CPT

## 2024-11-03 PROCEDURE — 85025 COMPLETE CBC W/AUTO DIFF WBC: CPT | Performed by: INTERNAL MEDICINE

## 2024-11-03 PROCEDURE — 99024 POSTOP FOLLOW-UP VISIT: CPT | Performed by: SURGERY

## 2024-11-03 PROCEDURE — 81001 URINALYSIS AUTO W/SCOPE: CPT

## 2024-11-03 PROCEDURE — 25010000002 HYDROMORPHONE PER 4 MG: Performed by: INTERNAL MEDICINE

## 2024-11-03 PROCEDURE — 25010000002 HYDROMORPHONE 1 MG/ML SOLUTION: Performed by: INTERNAL MEDICINE

## 2024-11-03 PROCEDURE — 94799 UNLISTED PULMONARY SVC/PX: CPT

## 2024-11-03 PROCEDURE — 94664 DEMO&/EVAL PT USE INHALER: CPT

## 2024-11-03 PROCEDURE — 25010000002 ENOXAPARIN PER 10 MG: Performed by: INTERNAL MEDICINE

## 2024-11-03 PROCEDURE — 63710000001 INSULIN REGULAR HUMAN PER 5 UNITS: Performed by: INTERNAL MEDICINE

## 2024-11-03 PROCEDURE — 99223 1ST HOSP IP/OBS HIGH 75: CPT | Performed by: INTERNAL MEDICINE

## 2024-11-03 PROCEDURE — 25010000002 LABETALOL 5 MG/ML SOLUTION: Performed by: SURGERY

## 2024-11-03 PROCEDURE — 82948 REAGENT STRIP/BLOOD GLUCOSE: CPT

## 2024-11-03 RX ORDER — FLUCONAZOLE 200 MG/1
400 TABLET ORAL EVERY 24 HOURS
Status: DISCONTINUED | OUTPATIENT
Start: 2024-11-03 | End: 2024-11-05

## 2024-11-03 RX ORDER — TAMSULOSIN HYDROCHLORIDE 0.4 MG/1
0.4 CAPSULE ORAL DAILY
Status: DISCONTINUED | OUTPATIENT
Start: 2024-11-03 | End: 2024-11-05

## 2024-11-03 RX ADMIN — Medication 10 ML: at 08:24

## 2024-11-03 RX ADMIN — INSULIN HUMAN 6 UNITS: 100 INJECTION, SOLUTION PARENTERAL at 12:07

## 2024-11-03 RX ADMIN — FLUCONAZOLE 400 MG: 200 TABLET ORAL at 18:44

## 2024-11-03 RX ADMIN — Medication 100 MG: at 08:51

## 2024-11-03 RX ADMIN — CHLORHEXIDINE GLUCONATE 15 ML: 1.2 RINSE ORAL at 08:24

## 2024-11-03 RX ADMIN — MUPIROCIN 1 APPLICATION: 20 OINTMENT TOPICAL at 21:30

## 2024-11-03 RX ADMIN — LANSOPRAZOLE 30 MG: 15 TABLET, ORALLY DISINTEGRATING ORAL at 06:12

## 2024-11-03 RX ADMIN — ENOXAPARIN SODIUM 40 MG: 100 INJECTION SUBCUTANEOUS at 08:51

## 2024-11-03 RX ADMIN — METOPROLOL TARTRATE 7.5 MG: 1 INJECTION, SOLUTION INTRAVENOUS at 05:58

## 2024-11-03 RX ADMIN — IPRATROPIUM BROMIDE AND ALBUTEROL SULFATE 3 ML: 2.5; .5 SOLUTION RESPIRATORY (INHALATION) at 11:14

## 2024-11-03 RX ADMIN — HYDROMORPHONE HYDROCHLORIDE 0.5 MG: 1 INJECTION, SOLUTION INTRAMUSCULAR; INTRAVENOUS; SUBCUTANEOUS at 01:48

## 2024-11-03 RX ADMIN — Medication 10 ML: at 21:31

## 2024-11-03 RX ADMIN — MUPIROCIN 1 APPLICATION: 20 OINTMENT TOPICAL at 08:24

## 2024-11-03 RX ADMIN — HYDROMORPHONE HYDROCHLORIDE 0.5 MG: 1 INJECTION, SOLUTION INTRAMUSCULAR; INTRAVENOUS; SUBCUTANEOUS at 12:09

## 2024-11-03 RX ADMIN — LABETALOL HYDROCHLORIDE 20 MG: 5 INJECTION, SOLUTION INTRAVENOUS at 22:17

## 2024-11-03 RX ADMIN — IPRATROPIUM BROMIDE AND ALBUTEROL SULFATE 3 ML: 2.5; .5 SOLUTION RESPIRATORY (INHALATION) at 15:06

## 2024-11-03 RX ADMIN — IPRATROPIUM BROMIDE AND ALBUTEROL SULFATE 3 ML: 2.5; .5 SOLUTION RESPIRATORY (INHALATION) at 07:20

## 2024-11-03 RX ADMIN — HYDROCODONE BITARTRATE AND ACETAMINOPHEN 1 TABLET: 5; 325 TABLET ORAL at 03:59

## 2024-11-03 RX ADMIN — LORAZEPAM 1 MG: 2 INJECTION INTRAMUSCULAR; INTRAVENOUS at 03:58

## 2024-11-03 RX ADMIN — HYDROMORPHONE HYDROCHLORIDE 0.5 MG: 1 INJECTION, SOLUTION INTRAMUSCULAR; INTRAVENOUS; SUBCUTANEOUS at 19:28

## 2024-11-03 RX ADMIN — HYDROMORPHONE HYDROCHLORIDE 0.5 MG: 1 INJECTION, SOLUTION INTRAMUSCULAR; INTRAVENOUS; SUBCUTANEOUS at 22:19

## 2024-11-03 RX ADMIN — CHLORHEXIDINE GLUCONATE 15 ML: 1.2 RINSE ORAL at 21:30

## 2024-11-03 RX ADMIN — METOPROLOL TARTRATE 7.5 MG: 1 INJECTION, SOLUTION INTRAVENOUS at 12:07

## 2024-11-03 RX ADMIN — INSULIN HUMAN 4 UNITS: 100 INJECTION, SOLUTION PARENTERAL at 06:00

## 2024-11-03 RX ADMIN — MUPIROCIN 1 APPLICATION: 20 OINTMENT TOPICAL at 05:58

## 2024-11-03 RX ADMIN — FOLIC ACID 1 MG: 1 TABLET ORAL at 08:51

## 2024-11-03 RX ADMIN — METOPROLOL TARTRATE 7.5 MG: 1 INJECTION, SOLUTION INTRAVENOUS at 18:44

## 2024-11-03 RX ADMIN — HYDROMORPHONE HYDROCHLORIDE 0.5 MG: 1 INJECTION, SOLUTION INTRAMUSCULAR; INTRAVENOUS; SUBCUTANEOUS at 15:28

## 2024-11-03 RX ADMIN — INSULIN HUMAN 2 UNITS: 100 INJECTION, SOLUTION PARENTERAL at 21:30

## 2024-11-03 RX ADMIN — IPRATROPIUM BROMIDE AND ALBUTEROL SULFATE 3 ML: 2.5; .5 SOLUTION RESPIRATORY (INHALATION) at 19:23

## 2024-11-03 NOTE — PLAN OF CARE
Problem: Restraint, Nonviolent  Goal: Absence of Harm or Injury  Intervention: Implement Least Restrictive Safety Strategies  Recent Flowsheet Documentation  Taken 11/3/2024 0400 by Miguel Christianson RN  Medical Device Protection: tubing secured  Taken 11/3/2024 0200 by Miguel Christianson, RN  Medical Device Protection: tubing secured  Taken 11/3/2024 0000 by Miguel Christianson, RN  Medical Device Protection: tubing secured     Problem: Restraint, Nonviolent  Goal: Absence of Harm or Injury  Intervention: Protect Dignity, Rights and Personal Wellbeing  Recent Flowsheet Documentation  Taken 11/2/2024 2000 by Miguel Christianson RN  Trust Relationship/Rapport:   care explained   questions answered   questions encouraged   reassurance provided   Goal Outcome Evaluation:

## 2024-11-03 NOTE — PROGRESS NOTES
Postop day #7 after left Hemicolectomy and colostomy for perforated colon cancer    Roughly stable.  Tolerating tube feeds.  No further fever.  Stoma is functioning.    Objective:  Afebrile yesterday, vital stable  General: Wakes up, opens eyes, does not respond very much.  Shakes his head no when asked about nausea.  Pain seems to be adequately controlled.  Eyes: No icterus  Abdomen: Soft, appropriate tenderness, stoma viable with liquid brown stool in the appliance.  Wound is clean.    Labs are reviewed.  White blood cell count 17 today, was 15 yesterday.  Hemoglobin stable at 8.4.  Chemistries show an albumin of 1.8.    Assessment and plan:  -Status post colon resection with colostomy for perforation secondary to colon cancer  -Tolerating tube feeds and having stoma output  -Remains extremely weak, physical therapy to work with patient  -Drains remain serous, white count a bit elevated and noted plans for CT which seems reasonable  -Overall seems to be making good progress.    Devon Mendez MD  General and Endoscopic Surgery  [facility]    [address], [address]  [place], [place], [address]  P: [phone]  F: [phone]

## 2024-11-03 NOTE — PLAN OF CARE
Problem: Adult Inpatient Plan of Care  Goal: Absence of Hospital-Acquired Illness or Injury  Intervention: Identify and Manage Fall Risk  Recent Flowsheet Documentation  Taken 11/2/2024 2200 by Miguel Christianson RN  Safety Promotion/Fall Prevention:   safety round/check completed   fall prevention program maintained  Taken 11/2/2024 2000 by Miguel Christianson RN  Safety Promotion/Fall Prevention: fall prevention program maintained     Problem: Adult Inpatient Plan of Care  Goal: Optimal Comfort and Wellbeing  Intervention: Monitor Pain and Promote Comfort  Recent Flowsheet Documentation  Taken 11/2/2024 2000 by Miguel Christianson RN  Pain Management Interventions: pain medication given     Problem: Adult Inpatient Plan of Care  Goal: Optimal Comfort and Wellbeing  Intervention: Provide Person-Centered Care  Recent Flowsheet Documentation  Taken 11/2/2024 2000 by Miguel Christianson RN  Trust Relationship/Rapport:   care explained   questions answered   questions encouraged   reassurance provided   Goal Outcome Evaluation:

## 2024-11-03 NOTE — CONSULTS
Referring Provider: Dr LETI Lerner      Subjective   History of present illness: This is a 74-year-old who came to the ER on 10/26/2024 with abdominal pain.  Imaging showed free intra-abdominal air and findings were concerning for left colon cancer.  He was taken emergently to the operating room by Dr. Guillaume and underwent exploratory laparotomy with sigmoid colectomy and end colostomy.  Wound cultures grew Pseudomonas and E. 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  Patient had received fluconazole and Zosyn.  The first dose of Zosyn was upon admission and the last dose was yesterday.  Fluconazole has also been resumed.    Patient says he continues to have abdominal pain and feels very weak.  Wife is also noted that he has some pain with urination.  Afebrile.        Physical Exam:   Vital Signs   Temp:  [97.6 °F (36.4 °C)-98.7 °F (37.1 °C)] 97.9 °F (36.6 °C)  Heart Rate:  [100-151] 110  Resp:  [16-20] 18  BP: (124-166)/(57-92) 135/68    GENERAL: Awake and alert, in no acute distress.   HEENT: Oropharynx is clear. Hearing is grossly normal.   EYES: . No conjunctival injection. No lid lag.   LUNGS:normal respiratory effort.   SKIN: no cutaneous eruptions in exposed areas  PSYCHIATRIC: Appropriate mood, affect, insight, and judgment.     Results Review:  White count 17.1, hemoglobin 8.4, platelet 547  Creatinine 0.52  Liver function test normal apart from an alk phos of 251  White count 1 72 through 230  Urinalysis with 0-2 white blood cells, urobilinogen 4.0  10/29 blood culture 1/2 Bifidobacterium spp  10/26 abdominal fluid cultures E. coli, Pseudomonas and mixed anaerobes  10/2 blood cultures 2/2 no growth to date  Chest x-ray independently interpreted shows moderate bilateral effusions    A/p  Septic shock, shock resolved  Feculent peritonitis from perforated colon cancer status post ex lap, washout hemicolectomy and end colostomy by Dr. Guillaume on 10/26  Colon  cancer    Asked to see for leukocytosis.  Wide differential including intra-abdominal abscess or stress reaction/leukemoid reaction from surgery/malignancy  Zosyn was stopped yesterday but I will continue at least until we get results of the CT back.  No pyuria to suggest UTI.    Discussed with Dr. Mendez at bedside today    Thank you for this consult.  We will continue to follow along and tailor antibiotics as the patient's clinical course evolves.

## 2024-11-03 NOTE — CONSULTS
Urology Consult Note    Patient:Arsh Haynes :1950  Room:[facility]  Admit Date10/  Age:74 y.o.     SEX:male     DOS:11/3/2024     MR:[id number]     Visit:[id number]       Attending: Arnulfo Lynch Jr.,*  Referring Provider: THOMAS Retana   Reason for Consultation: Renal Mass     Patient Care Team:  Provider, No Known as PCP - General    Chief complaint Dysuria     Subjective .     History of present illness:    Patient with history of abdominal pain noted to have sigmoid perforation on CT s/p colon resection POD 7 with colostomy, pathology -invasive moderately differentiated mucinous adenocarcinoma.  Remains in ICU with poor airway clearance.  CT Abd with contrast 10/26 also noted a right renal mass, 3.4x 3.1cm.  Patient complaining of pain with urination. PVR elevated per nursing. Has been using external catheter.       Review of Systems - Unable to obtain from patient  Per wife - pain with urination     History  Past Medical History:   Diagnosis Date    HTN (hypertension)      Past Surgical History:   Procedure Laterality Date    COLON RESECTION N/A 10/26/2024    Procedure: LOW ANTERIOR COLON RESECTION SIGMOID, COLOSTOMY, SPLENIC FLEXURE MOBILIZATION;  Surgeon: Mc Guillaume MD;  Location: [facility];  Service: General;  Laterality: N/A;    EXPLORATORY LAPAROTOMY N/A 10/26/2024    Procedure: LAPAROTOMY EXPLORATORY, LEFT HEMICOLECTOMY;  Surgeon: Mc Guillaume MD;  Location: [facility];  Service: General;  Laterality: N/A;     Social History     Socioeconomic History    Marital status:    Tobacco Use    Smoking status: Never    Smokeless tobacco: Never   Vaping Use    Vaping status: Never Used   Substance and Sexual Activity    Alcohol use: Yes     Alcohol/week: 42.0 standard drinks of alcohol     Types: 42 Cans of beer per week     Comment: 6 beer daily    Drug use: Never    Sexual activity: Defer     History reviewed. No pertinent family  "history.  No Known Allergies  Prior to Admission medications    Medication Sig Start Date End Date Taking? Authorizing Provider   amLODIPine-benazepril (LOTREL) 10-20 MG per capsule Take 1 capsule by mouth Daily.   Yes No Barth MD   metoprolol succinate XL (TOPROL-XL) 100 MG 24 hr tablet Take 1 tablet by mouth Daily.   Yes No Barth MD         Objective     tMax 24 hours:  Temp (24hrs), Av.2 °F (36.8 °C), Min:97.6 °F (36.4 °C), Max:98.7 °F (37.1 °C)    Vital Sign Ranges:  Temp:  [97.6 °F (36.4 °C)-98.7 °F (37.1 °C)] 97.9 °F (36.6 °C)  Heart Rate:  [100-151] 110  Resp:  [16-20] 18  BP: (120-166)/(57-92) 135/68  Intake and Output Last 3 Shifts:  I/O last 3 completed shifts:  In: 0 [I.V.:568; Other:165; NG/GT:1177]  Out: 0 [Urine:1025; Drains:345; Stool:100]      Physical Exam:     Vital signs: /68   Pulse 110   Temp 97.9 °F (36.6 °C) (Oral)   Resp 18   Ht 182.9 cm (72\")   Wt 89.8 kg (197 lb 15.6 oz)   SpO2 94%   BMI 26.85 kg/m²   94%     General: Appears stated age. No apparent distress.    HEENT: Moist mucous membranes of the oral mucosa & nasal mucosa.    Lips are not cyanotic.    Extraocular movements intact    Neck:  Trachea position is midline.     Respiratory: Respiratory rhythm & depth: Normal.    Respiratory effort:  Normal.      GI:  Colostomy    Skin:  Inspection: No rash.    Palpation:  Warm, dry. No induration.     Extremities: No clubbing & no cyanosis.    No edema    :  Phallus and scrotum wnl           Results Review:     Lab Results (last 24 hours)       Procedure Component Value Units Date/Time    Blood Culture - Blood, Blood, Central Line [[id number]]  (Normal) Collected: 10/29/24 0936    Specimen: Blood, Central Line Updated: 24 09     Blood Culture No growth at 5 days    Blood Culture - Blood, Arm, Left [[id number]]  (Normal) Collected: 10/29/24 0937    Specimen: Blood from Arm, Left Updated: 24     Blood Culture No growth at 5 days "    Comprehensive Metabolic Panel [[id number]]  (Abnormal) Collected: 11/03/24 0626    Specimen: Blood Updated: 11/03/24 0702     Glucose 230 mg/dL      BUN 9 mg/dL      Creatinine 0.52 mg/dL      Sodium 133 mmol/L      Potassium 4.2 mmol/L      Chloride 103 mmol/L      CO2 23.0 mmol/L      Calcium 7.7 mg/dL      Total Protein 5.1 g/dL      Albumin 1.8 g/dL      ALT (SGPT) 19 U/L      AST (SGOT) 19 U/L      Alkaline Phosphatase 251 U/L      Total Bilirubin 0.5 mg/dL      Globulin 3.3 gm/dL      A/G Ratio 0.5 g/dL      BUN/Creatinine Ratio 17.3     Anion Gap 7.0 mmol/L      eGFR 107.1 mL/min/1.73     Narrative:      GFR Normal >60  Chronic Kidney Disease <60  Kidney Failure <15    The GFR formula is only valid for adults with stable renal function between ages 18 and 70.    CBC & Differential [[id number]]  (Abnormal) Collected: 11/03/24 0626    Specimen: Blood Updated: 11/03/24 0655    Narrative:      The following orders were created for panel order CBC & Differential.  Procedure                               Abnormality         Status                     ---------                               -----------         ------                     CBC Auto Differential[[id number]]        Abnormal            Final result                 Please view results for these tests on the individual orders.    CBC Auto Differential [[id number]]  (Abnormal) Collected: 11/03/24 0626    Specimen: Blood Updated: 11/03/24 0655     WBC 17.06 10*3/mm3      RBC 3.12 10*6/mm3      Hemoglobin 8.4 g/dL      Hematocrit 27.9 %      MCV 89.4 fL      MCH 26.9 pg      MCHC 30.1 g/dL      RDW 18.7 %      RDW-SD 61.1 fl      MPV 9.1 fL      Platelets 547 10*3/mm3      Neutrophil % 80.0 %      Lymphocyte % 6.9 %      Monocyte % 8.2 %      Eosinophil % 1.9 %      Basophil % 0.4 %      Immature Grans % 2.6 %      Neutrophils, Absolute 13.67 10*3/mm3      Lymphocytes, Absolute 1.17 10*3/mm3      Monocytes, Absolute 1.40 10*3/mm3      Eosinophils, Absolute 0.32  10*3/mm3      Basophils, Absolute 0.06 10*3/mm3      Immature Grans, Absolute 0.44 10*3/mm3      nRBC 0.0 /100 WBC     POC Glucose Once [[id number]]  (Abnormal) Collected: 11/03/24 0546    Specimen: Blood Updated: 11/03/24 0547     Glucose 245 mg/dL     Fungus Culture - Body Fluid, Abdominal Wall [[id number]] Collected: 10/26/24 2259    Specimen: Body Fluid from Abdominal Wall Updated: 11/03/24 0145     Fungus Culture No fungus isolated at 1 week    Urinalysis With Culture If Indicated - Urine, Clean Catch [[id number]]  (Abnormal) Collected: 11/03/24 0059    Specimen: Urine, Clean Catch Updated: 11/03/24 0118     Color, UA Yellow     Appearance, UA Clear     pH, UA 8.0     Specific Gravity, UA 1.018     Glucose,  mg/dL (1+)     Ketones, UA Negative     Bilirubin, UA Negative     Blood, UA Small (1+)     Protein, UA 30 mg/dL (1+)     Leuk Esterase, UA Negative     Nitrite, UA Negative     Urobilinogen, UA 4.0 E.U./dL    Narrative:      In absence of clinical symptoms, the presence of pyuria, bacteria, and/or nitrites on the urinalysis result does not correlate with infection.    Urinalysis, Microscopic Only - Urine, Clean Catch [[id number]]  (Abnormal) Collected: 11/03/24 0059    Specimen: Urine, Clean Catch Updated: 11/03/24 0118     RBC, UA 6-10 /HPF      WBC, UA 0-2 /HPF      Comment: Urine culture not indicated.        Bacteria, UA None Seen /HPF      Squamous Epithelial Cells, UA 0-2 /HPF      Hyaline Casts, UA None Seen /LPF      Methodology Automated Microscopy    POC Glucose Once [[id number]]  (Abnormal) Collected: 11/02/24 2321    Specimen: Blood Updated: 11/02/24 2323     Glucose 230 mg/dL     POC Glucose Once [[id number]]  (Abnormal) Collected: 11/02/24 2038    Specimen: Blood Updated: 11/02/24 2040     Glucose 220 mg/dL     POC Glucose Once [[id number]]  (Abnormal) Collected: 11/02/24 1722    Specimen: Blood Updated: 11/02/24 1723     Glucose 230 mg/dL     POC Glucose Once [[id number]]  (Abnormal)  "Collected: 11/02/24 1129    Specimen: Blood Updated: 11/02/24 1131     Glucose 172 mg/dL            No results found for: \"URINECX\"     CT Abdomen Pelvis With Contrast [[id number]] James as Reviewed   Order Status: Completed Collected: 10/26/24 2023    Updated: 10/26/24 2045   Narrative:     CT OF THE ABDOMEN PELVIS WITH CONTRAST 10/26/2024     HISTORY: Abdominal pain     COMPARISON: Lower abdominal     TECHNIQUE: Axial CT imaging was obtained through the abdomen and pelvis.  No IV contrast was administered.     FINDINGS:  Images through the lung bases demonstrate scarring versus atelectasis.  No suspicious hepatic lesions are seen. Stomach appears mildly  distended. The duodenum,, spleen, and gallbladder all appear normal. The  pancreas is atrophic. The adrenal glands are mildly nodular in  appearance. The kidneys enhance symmetrically. No hydronephrosis is  seen. The patient has a heterogeneously enhancing lesion involving the  right kidney, which measures up to 3.4 x 3.1 cm. Appearance is highly  worrisome for renal malignancy. There is a 5 mm nonobstructing stone  within the inferior pole of the left kidney. Tiny low-attenuation  lesions are seen within the left kidney, favored to reflect cysts. There  are aortoiliac calcifications. Prostate gland contains dystrophic  calcifications. There is colonic diverticulosis. There is diffuse wall  thickening involving the distal descending colon/proximal sigmoid colon,  along with more focal masslike areas associated with the distal  descending colon and proximal sigmoid colon. The area within the  proximal sigmoid colon measures up to 4.8 x 4.7 cm. The area within the  distal descending colon measures at least 3.7 x 3.1 cm. There is  widespread pneumoperitoneum. This is favored to be secondary to  perforation at the descending colon/sigmoid colon junction. The patient  is noted to have some loculated fluid adjacent to the distal descending  colon/proximal sigmoid colon. " This may represent reactive fluid,  although abscess is not excluded. Further free fluid is noted tracking  along the paracolic gutters bilaterally, and extending into the pelvis.  Urinary bladder appears thick walled. Correlation with urinalysis and  urine cultures is recommended. The appendix is normal. No acute osseous  abnormalities are seen.      Impression:        1. The patient has widespread pneumoperitoneum, which is favored to be  secondary to a perforation at the junction of the distal descending  colon and proximal sigmoid colon. The patient does have diverticular  disease, and the appearance may be related to perforated diverticulitis.  There is also relatively increased stool burden within the sigmoid  colon, and stercoral colitis would be another consideration. However,  there are more masslike areas of wall thickening noted within the distal  descending colon and proximal sigmoid colon. The area involving the  proximal sigmoid colon in particular is low in attenuation. These areas  may reflect areas of phlegmonous involvement and/or retained stool of  the wall of the colon. However, neoplasm is not excluded. There is free  fluid which is noted tracking along the paracolic gutters, and within  the pelvis, with more loculated fluid adjacent to the presumed area of  perforation. These areas may reflect reactive fluid, although abscess is  not excluded. Urgent General surgical consultation is recommended.  2. Heterogeneously enhancing mass arising from the right kidney,  suspicious for renal neoplasm.  3. Thick-walled appearance to the urinary bladder. Correlation with  urinalysis and urine cultures is recommended.     Preliminary findings were discussed with Dr. Johnson at 8:13 p.m.     Radiation dose reduction techniques were utilized, including automated  exposure control and exposure modulation based on body size.             Inpatient Meds:   Current Meds:         Scheduled Meds:chlorhexidine, 15  mL, Mouth/Throat, Q12H  enoxaparin, 40 mg, Subcutaneous, Q24H  fluconazole, 400 mg, Oral, Q24H  folic acid, 1 mg, Nasogastric, Daily  insulin regular, 2-9 Units, Subcutaneous, Q4H  ipratropium-albuterol, 3 mL, Nebulization, 4x Daily - RT  lansoprazole, 30 mg, Nasogastric, Q AM  metoprolol tartrate, 7.5 mg, Intravenous, Q6H  mupirocin, 1 Application, Each Nare, BID  sodium chloride, 10 mL, Intravenous, Q12H  thiamine, 100 mg, Oral, Daily       Continuous Infusions:    PRN Meds:.  acetaminophen **OR** acetaminophen    Calcium Replacement - Follow Nurse / BPA Driven Protocol    dextrose    dextrose    glucagon (human recombinant)    HYDROcodone-acetaminophen    HYDROmorphone    labetalol    LORazepam **OR** LORazepam **OR** LORazepam **OR** LORazepam **OR** LORazepam **OR** LORazepam **OR** LORazepam **OR** LORazepam    Magnesium Standard Dose Replacement - Follow Nurse / BPA Driven Protocol    nitroglycerin    ondansetron    Phosphorus Replacement - Follow Nurse / BPA Driven Protocol    Potassium Replacement - Follow Nurse / BPA Driven Protocol    [COMPLETED] Insert Peripheral IV **AND** sodium chloride    sodium chloride    sodium chloride    sodium chloride      Impression       Colon cancer    Right Renal Mass     Incomplete bladder emptying    Dysuria     Plan:  [place] 16 fr coude vazquez and obtain Urine for UA and UCS at time of catheter placement  Start Flomax 0.4mg qd  If continues to have pain despite vazquez draining, will consider repeating imaging    Will follow and will address plan for renal lesion once clinically improves    I discussed the patients findings and my recommendations with patient.    Mireille Srinivasan, APRN  11/03/24  10:49 EST

## 2024-11-03 NOTE — PROGRESS NOTES
"  Daily Progress Note.   [facility] CORONARY CARE  11/3/2024    Patient:  Name:  Arsh Haynes  MRN:  [id number]  1950  74 y.o.  male         CC: Follow up septic shock from perforated bowel with likely underlying malignancy     Interval History:  Still very weak appearing however on room air  Afebrile  Int tachycardia however less so than prior days  Bp stable    Tachy and diaphoretic overnight resolving with low dose ativan per wd protocol.    Dysuria and lower abd pressure, still with good uop  New complaint over past 24 hours    More alert and responding quicker today    Dw pts wife Melida at bedside.      Physical Exam:  /68   Pulse 116   Temp 98.6 °F (37 °C) (Oral)   Resp 16   Ht 182.9 cm (72\")   Wt 89.8 kg (197 lb 15.6 oz)   SpO2 94%   BMI 26.85 kg/m²   Body mass index is 26.85 kg/m².    Intake/Output Summary (Last 24 hours) at 11/3/2024 0721  Last data filed at 11/2/2024 2000  Gross per 24 hour   Intake 1060 ml   Output 345 ml   Net 715 ml   ng  General appearance: ill very weak appearing but conversant    Eyes: anicteric sclerae, moist conjunctivae; no lidlag;    HENT: Atraumatic; oropharynx clear with moist mucous membranes    Neck: Trachea midline;  supple   Lungs: yosi, no rhonchi no wheeze with poor respiratory effort and no intercostal retractions  CV: tachy irreg, no rub   Abdomen: incision bandaged, ostomy llq, janeen drains+  Extremities: +ble trace peripheral edema    Skin: WWP   Psych/neuro: calm affect, alert slowly weak voice but interacts and answers questions appropriately.      Data Review:  Notable Labs:  Results from last 7 days   Lab Units 11/03/24  0626 11/02/24  0348 11/01/24  0601 10/31/24  0503 10/30/24  0345 10/29/24  0334 10/28/24  0326   WBC 10*3/mm3 17.06* 15.46* 14.26* 16.55* 20.05* 20.19* 21.81*   HEMOGLOBIN g/dL 8.4* 8.5* 8.7* 9.1* 8.3* 8.6* 9.6*   PLATELETS 10*3/mm3 547* 426 354 222 198 236 334     Results from last 7 days   Lab Units 11/03/24  0626 " 11/02/24  0348 11/01/24  1209 11/01/24  0522 10/31/24  0503 10/30/24  1737 10/30/24  0345 10/29/24  0334 10/28/24  0326   SODIUM mmol/L 133* 135*  --  138 142  --  137 143 143   POTASSIUM mmol/L 4.2 3.9  --  4.8 4.3 4.2 3.1* 4.1 4.4   CHLORIDE mmol/L 103 105  --  111* 114*  --  114* 118* 119*   CO2 mmol/L 23.0 21.3*  --  18.0* 20.2*  --  15.8* 16.5* 17.0*   BUN mg/dL 9 9  --  9 7*  --  9 12 14   CREATININE mg/dL 0.52* 0.56*  --  0.70* 0.63*  --  0.57* 0.85 0.72*   GLUCOSE mg/dL 230* 173*  --  126* 117*  --  377* 158* 90   CALCIUM mg/dL 7.7* 8.0*  --  7.4* 7.2*  --  6.2* 6.7* 6.5*   MAGNESIUM mg/dL  --   --  2.1  --   --   --  1.9 2.3  --    Estimated Creatinine Clearance: 163.1 mL/min (A) (by C-G formula based on SCr of 0.52 mg/dL (L)).    Results from last 7 days   Lab Units 11/03/24  0626 11/02/24  0348 11/01/24  0601 11/01/24  0522 10/28/24  0326 10/27/24  1811 10/27/24  1242   AST (SGOT) U/L 19 18  --  23   < >  --   --    ALT (SGPT) U/L 19 18  --  18   < >  --   --    LACTATE mmol/L  --   --   --   --   --  2.2* 2.3*   PLATELETS 10*3/mm3 547* 426 354  --    < >  --   --     < > = values in this interval not displayed.       Results from last 7 days   Lab Units 11/01/24  0816 10/30/24  1131 10/29/24  0315 10/28/24  0321   PH, ARTERIAL pH units 7.499* 7.445 7.372 7.321*   PCO2, ARTERIAL mm Hg 28.5* 28.8* 28.0* 31.2*   PO2 ART mm Hg 73.9* 57.3* 76.5* 107.2*   HCO3 ART mmol/L 22.1 19.8* 16.3* 16.1*       Imaging:  Reviewed chest images personally from past 3 days    ASSESSMENT  /  PLAN:  Severe sepsis with septic shock secondary to perforated bowel and peritonitis with likely malignancy s/p status post open left hemicolectomy with colostomy   INVASIVE MODERATELY DIFFERENTIATED MUCINOUS ADENOCARCINOMA.   ETOH abuse and withdrawal  Hyperglycemia  Right renal mass  Lactic acidosis  Anemia  Bacteremia  SVT status post digoxin  Postop respiratory insufficiency and ventilator management status post extubation  10/29  Colon cancer stage II further workup upon recovery.    Continue to treat and monitor withdrawal symptoms.    Cont zosyn  Cont fluconazole  Monitor fever curve and white count    Metoprolol 7.5mg    Folic acid  Thiamine    Ct abd pelvis  Urology consult  Ua without soi    Ssi  Lovenox     Will need to maintain in ICU very weak poor airway clearance.  Would like to see him a little bit stronger despite improvement objectively on lab work and wd symptoms better controlled.    Maintain in ICU.  Discussed with bedside nursing.  Dw pts wife at bedside.       Electronically signed by Darek Lerner MD, 11/03/24, 8:46 AM EST.

## 2024-11-04 ENCOUNTER — APPOINTMENT (OUTPATIENT)
Dept: GENERAL RADIOLOGY | Facility: HOSPITAL | Age: 74
DRG: 853 | End: 2024-11-04
Payer: MEDICARE

## 2024-11-04 ENCOUNTER — APPOINTMENT (OUTPATIENT)
Dept: CT IMAGING | Facility: HOSPITAL | Age: 74
DRG: 853 | End: 2024-11-04
Payer: MEDICARE

## 2024-11-04 LAB
ALBUMIN SERPL-MCNC: 2.2 G/DL (ref 3.5–5.2)
ALBUMIN/GLOB SERPL: 0.7 G/DL
ALP SERPL-CCNC: 229 U/L (ref 39–117)
ALT SERPL W P-5'-P-CCNC: 23 U/L (ref 1–41)
ANION GAP SERPL CALCULATED.3IONS-SCNC: 7.4 MMOL/L (ref 5–15)
AST SERPL-CCNC: 19 U/L (ref 1–40)
BASOPHILS # BLD AUTO: 0.07 10*3/MM3 (ref 0–0.2)
BASOPHILS NFR BLD AUTO: 0.3 % (ref 0–1.5)
BILIRUB SERPL-MCNC: 0.5 MG/DL (ref 0–1.2)
BUN SERPL-MCNC: 10 MG/DL (ref 8–23)
BUN/CREAT SERPL: 20.8 (ref 7–25)
CALCIUM SPEC-SCNC: 7.9 MG/DL (ref 8.6–10.5)
CHLORIDE SERPL-SCNC: 99 MMOL/L (ref 98–107)
CO2 SERPL-SCNC: 23.6 MMOL/L (ref 22–29)
CREAT SERPL-MCNC: 0.48 MG/DL (ref 0.76–1.27)
DEPRECATED RDW RBC AUTO: 57 FL (ref 37–54)
EGFRCR SERPLBLD CKD-EPI 2021: 109.7 ML/MIN/1.73
EOSINOPHIL # BLD AUTO: 0.29 10*3/MM3 (ref 0–0.4)
EOSINOPHIL NFR BLD AUTO: 1.4 % (ref 0.3–6.2)
ERYTHROCYTE [DISTWIDTH] IN BLOOD BY AUTOMATED COUNT: 18.3 % (ref 12.3–15.4)
GLOBULIN UR ELPH-MCNC: 3.1 GM/DL
GLUCOSE BLDC GLUCOMTR-MCNC: 149 MG/DL (ref 70–130)
GLUCOSE BLDC GLUCOMTR-MCNC: 169 MG/DL (ref 70–130)
GLUCOSE BLDC GLUCOMTR-MCNC: 248 MG/DL (ref 70–130)
GLUCOSE BLDC GLUCOMTR-MCNC: 259 MG/DL (ref 70–130)
GLUCOSE BLDC GLUCOMTR-MCNC: 288 MG/DL (ref 70–130)
GLUCOSE BLDC GLUCOMTR-MCNC: 317 MG/DL (ref 70–130)
GLUCOSE SERPL-MCNC: 216 MG/DL (ref 65–99)
HCT VFR BLD AUTO: 24.9 % (ref 37.5–51)
HGB BLD-MCNC: 8 G/DL (ref 13–17.7)
IMM GRANULOCYTES # BLD AUTO: 0.73 10*3/MM3 (ref 0–0.05)
IMM GRANULOCYTES NFR BLD AUTO: 3.5 % (ref 0–0.5)
LYMPHOCYTES # BLD AUTO: 1.33 10*3/MM3 (ref 0.7–3.1)
LYMPHOCYTES NFR BLD AUTO: 6.4 % (ref 19.6–45.3)
MCH RBC QN AUTO: 27.6 PG (ref 26.6–33)
MCHC RBC AUTO-ENTMCNC: 32.1 G/DL (ref 31.5–35.7)
MCV RBC AUTO: 85.9 FL (ref 79–97)
MONOCYTES # BLD AUTO: 1.41 10*3/MM3 (ref 0.1–0.9)
MONOCYTES NFR BLD AUTO: 6.7 % (ref 5–12)
NEUTROPHILS NFR BLD AUTO: 17.07 10*3/MM3 (ref 1.7–7)
NEUTROPHILS NFR BLD AUTO: 81.7 % (ref 42.7–76)
NRBC BLD AUTO-RTO: 0 /100 WBC (ref 0–0.2)
PLATELET # BLD AUTO: 760 10*3/MM3 (ref 140–450)
PMV BLD AUTO: 8.7 FL (ref 6–12)
POTASSIUM SERPL-SCNC: 4.4 MMOL/L (ref 3.5–5.2)
PROT SERPL-MCNC: 5.3 G/DL (ref 6–8.5)
RBC # BLD AUTO: 2.9 10*6/MM3 (ref 4.14–5.8)
SODIUM SERPL-SCNC: 130 MMOL/L (ref 136–145)
WBC NRBC COR # BLD AUTO: 20.9 10*3/MM3 (ref 3.4–10.8)

## 2024-11-04 PROCEDURE — 85025 COMPLETE CBC W/AUTO DIFF WBC: CPT | Performed by: INTERNAL MEDICINE

## 2024-11-04 PROCEDURE — 99233 SBSQ HOSP IP/OBS HIGH 50: CPT | Performed by: INTERNAL MEDICINE

## 2024-11-04 PROCEDURE — 92610 EVALUATE SWALLOWING FUNCTION: CPT

## 2024-11-04 PROCEDURE — 97530 THERAPEUTIC ACTIVITIES: CPT

## 2024-11-04 PROCEDURE — 25010000002 ENOXAPARIN PER 10 MG: Performed by: INTERNAL MEDICINE

## 2024-11-04 PROCEDURE — 63710000001 INSULIN REGULAR HUMAN PER 5 UNITS: Performed by: INTERNAL MEDICINE

## 2024-11-04 PROCEDURE — 71045 X-RAY EXAM CHEST 1 VIEW: CPT

## 2024-11-04 PROCEDURE — 94664 DEMO&/EVAL PT USE INHALER: CPT

## 2024-11-04 PROCEDURE — 25010000002 PIPERACILLIN SOD-TAZOBACTAM PER 1 G: Performed by: INTERNAL MEDICINE

## 2024-11-04 PROCEDURE — 94799 UNLISTED PULMONARY SVC/PX: CPT

## 2024-11-04 PROCEDURE — 99024 POSTOP FOLLOW-UP VISIT: CPT | Performed by: SURGERY

## 2024-11-04 PROCEDURE — 25010000002 HYDROMORPHONE PER 4 MG: Performed by: INTERNAL MEDICINE

## 2024-11-04 PROCEDURE — 97110 THERAPEUTIC EXERCISES: CPT

## 2024-11-04 PROCEDURE — 80053 COMPREHEN METABOLIC PANEL: CPT | Performed by: INTERNAL MEDICINE

## 2024-11-04 PROCEDURE — 82948 REAGENT STRIP/BLOOD GLUCOSE: CPT

## 2024-11-04 PROCEDURE — 94761 N-INVAS EAR/PLS OXIMETRY MLT: CPT

## 2024-11-04 PROCEDURE — 74176 CT ABD & PELVIS W/O CONTRAST: CPT

## 2024-11-04 PROCEDURE — 94668 MNPJ CHEST WALL SBSQ: CPT

## 2024-11-04 RX ORDER — METOPROLOL TARTRATE 25 MG/1
25 TABLET, FILM COATED ORAL EVERY 12 HOURS SCHEDULED
Status: DISCONTINUED | OUTPATIENT
Start: 2024-11-04 | End: 2024-11-05

## 2024-11-04 RX ORDER — HYDROCODONE BITARTRATE AND ACETAMINOPHEN 5; 325 MG/1; MG/1
1 TABLET ORAL EVERY 6 HOURS PRN
Status: DISPENSED | OUTPATIENT
Start: 2024-11-04 | End: 2024-11-09

## 2024-11-04 RX ADMIN — Medication 10 ML: at 08:36

## 2024-11-04 RX ADMIN — METOPROLOL TARTRATE 7.5 MG: 1 INJECTION, SOLUTION INTRAVENOUS at 01:01

## 2024-11-04 RX ADMIN — HYDROCODONE BITARTRATE AND ACETAMINOPHEN 1 TABLET: 5; 325 TABLET ORAL at 08:35

## 2024-11-04 RX ADMIN — CHLORHEXIDINE GLUCONATE 15 ML: 1.2 RINSE ORAL at 20:51

## 2024-11-04 RX ADMIN — Medication 10 ML: at 20:52

## 2024-11-04 RX ADMIN — INSULIN HUMAN 6 UNITS: 100 INJECTION, SOLUTION PARENTERAL at 08:35

## 2024-11-04 RX ADMIN — INSULIN HUMAN 7 UNITS: 100 INJECTION, SOLUTION PARENTERAL at 16:56

## 2024-11-04 RX ADMIN — IPRATROPIUM BROMIDE AND ALBUTEROL SULFATE 3 ML: 2.5; .5 SOLUTION RESPIRATORY (INHALATION) at 11:34

## 2024-11-04 RX ADMIN — MUPIROCIN 1 APPLICATION: 20 OINTMENT TOPICAL at 08:39

## 2024-11-04 RX ADMIN — IPRATROPIUM BROMIDE AND ALBUTEROL SULFATE 3 ML: 2.5; .5 SOLUTION RESPIRATORY (INHALATION) at 19:20

## 2024-11-04 RX ADMIN — METOPROLOL TARTRATE 25 MG: 25 TABLET, FILM COATED ORAL at 20:51

## 2024-11-04 RX ADMIN — PIPERACILLIN AND TAZOBACTAM 3.38 G: 3; .375 INJECTION, POWDER, FOR SOLUTION INTRAVENOUS at 18:51

## 2024-11-04 RX ADMIN — IPRATROPIUM BROMIDE AND ALBUTEROL SULFATE 3 ML: 2.5; .5 SOLUTION RESPIRATORY (INHALATION) at 15:30

## 2024-11-04 RX ADMIN — INSULIN HUMAN 6 UNITS: 100 INJECTION, SOLUTION PARENTERAL at 20:51

## 2024-11-04 RX ADMIN — HYDROCODONE BITARTRATE AND ACETAMINOPHEN 1 TABLET: 5; 325 TABLET ORAL at 21:05

## 2024-11-04 RX ADMIN — CHLORHEXIDINE GLUCONATE 15 ML: 1.2 RINSE ORAL at 08:34

## 2024-11-04 RX ADMIN — HYDROCODONE BITARTRATE AND ACETAMINOPHEN 1 TABLET: 5; 325 TABLET ORAL at 15:05

## 2024-11-04 RX ADMIN — FLUCONAZOLE 400 MG: 200 TABLET ORAL at 16:56

## 2024-11-04 RX ADMIN — Medication 100 MG: at 08:35

## 2024-11-04 RX ADMIN — IPRATROPIUM BROMIDE AND ALBUTEROL SULFATE 3 ML: 2.5; .5 SOLUTION RESPIRATORY (INHALATION) at 07:08

## 2024-11-04 RX ADMIN — HYDROMORPHONE HYDROCHLORIDE 0.5 MG: 1 INJECTION, SOLUTION INTRAMUSCULAR; INTRAVENOUS; SUBCUTANEOUS at 01:02

## 2024-11-04 RX ADMIN — METOPROLOL TARTRATE 7.5 MG: 1 INJECTION, SOLUTION INTRAVENOUS at 13:30

## 2024-11-04 RX ADMIN — LANSOPRAZOLE 30 MG: 15 TABLET, ORALLY DISINTEGRATING ORAL at 05:39

## 2024-11-04 RX ADMIN — METOPROLOL TARTRATE 7.5 MG: 1 INJECTION, SOLUTION INTRAVENOUS at 05:39

## 2024-11-04 RX ADMIN — ENOXAPARIN SODIUM 40 MG: 100 INJECTION SUBCUTANEOUS at 08:34

## 2024-11-04 RX ADMIN — FOLIC ACID 1 MG: 1 TABLET ORAL at 08:35

## 2024-11-04 RX ADMIN — PIPERACILLIN AND TAZOBACTAM 3.38 G: 3; .375 INJECTION, POWDER, FOR SOLUTION INTRAVENOUS at 13:24

## 2024-11-04 RX ADMIN — INSULIN HUMAN 2 UNITS: 100 INJECTION, SOLUTION PARENTERAL at 01:21

## 2024-11-04 NOTE — PLAN OF CARE
Problem: Adult Inpatient Plan of Care  Goal: Plan of Care Review  Recent Flowsheet Documentation  Taken 11/3/2024 1913 by Jocelin Hernandez RN  Progress: no change  Plan of Care Reviewed With: patient   Goal Outcome Evaluation:  Plan of Care Reviewed With: patient  Progress: no change    Problem: Adult Inpatient Plan of Care  Goal: Plan of Care Review  Outcome: Not Progressing  Flowsheets (Taken 11/3/2024 1913)  Progress: no change  Plan of Care Reviewed With: patient     Problem: Adult Inpatient Plan of Care  Goal: Patient-Specific Goal (Individualized)  Outcome: Progressing     Problem: Adult Inpatient Plan of Care  Goal: Readiness for Transition of Care  Outcome: Not Progressing

## 2024-11-04 NOTE — CASE MANAGEMENT/SOCIAL WORK
Continued Stay Note  [facility]     Patient Name: Arsh Haynes  MRN: [id number]  Today's Date: 11/4/2024    Admit Date: 10/26/2024    Plan: Pending progress with therapy   Discharge Plan       Row Name 11/04/24 1222       Plan    Plan Pending progress with therapy    Roadmap to Recovery Yes    Patient/Family in Agreement with Plan yes    Plan Comments DC plan is pendnig progress with PT/OT/ST. Barriers to dc:IV zosyn , IVfluconazole  Monitoring fever curve and white count, CT abd pending                    Expected Discharge Date and Time       Expected Discharge Date Expected Discharge Time    Nov 8, 2024               Nargis Lugo RN

## 2024-11-04 NOTE — THERAPY EVALUATION
Acute Care - Speech Language Pathology   Swallow Initial Evaluation [facility]     Patient Name: Arsh Haynes  : 1950  MRN: [id number]  Today's Date: 2024               Admit Date: 10/26/2024    Visit Dx:     ICD-10-CM ICD-9-CM   1. Perforation of sigmoid colon  K63.1 569.83   2. Hypotension, unspecified hypotension type  I95.9 458.9   3. Increased lactic acid level  R79.89 276.2   4. Bowel perforation  K63.1 569.83     Patient Active Problem List   Diagnosis    Colon cancer     Past Medical History:   Diagnosis Date    HTN (hypertension)      Past Surgical History:   Procedure Laterality Date    COLON RESECTION N/A 10/26/2024    Procedure: LOW ANTERIOR COLON RESECTION SIGMOID, COLOSTOMY, SPLENIC FLEXURE MOBILIZATION;  Surgeon: Mc Guillaume MD;  Location: [facility];  Service: General;  Laterality: N/A;    EXPLORATORY LAPAROTOMY N/A 10/26/2024    Procedure: LAPAROTOMY EXPLORATORY, LEFT HEMICOLECTOMY;  Surgeon: Mc Guillaume MD;  Location: [facility];  Service: General;  Laterality: N/A;       SLP Recommendation and Plan  SLP Swallowing Diagnosis: R/O pharyngeal dysphagia, other (see comments) (feeding difficulties) (24)  SLP Diet Recommendation: clear liquid diet, temporary alternate methods of nutrition/hydration (24)  Recommended Precautions and Strategies: upright posture during/after eating, small bites of food and sips of liquid, 1:1 supervision, assist with feeding, general aspiration precautions, fatigue precautions (24)  SLP Rec. for Method of Medication Administration: meds via alternate route (24)     Monitor for Signs of Aspiration: notify SLP if any concerns (24)  Recommended Diagnostics: reassess via clinical swallow evaluation (24)  Swallow Criteria for Skilled Therapeutic Interventions Met: demonstrates skilled criteria (24)  Anticipated Discharge Disposition (SLP): unknown  "(11/04/24 1446)  Rehab Potential/Prognosis, Swallowing: good, to achieve stated therapy goals (11/04/24 1446)  Therapy Frequency (Swallow): PRN (11/04/24 1446)  Predicted Duration Therapy Intervention (Days): until discharge (11/04/24 1446)  Oral Care Recommendations: Oral Care BID/PRN, Toothbrush (11/04/24 1446)                                        Outcome Evaluation: Clinical swallow eval completed for patient admitted with \"severe sepsis with septic shock secondary to perforated bowel and peritonitis with likely malignancy s/p status post open left hemicolectomy with colostomy, INVASIVE MODERATELY DIFFERENTIATED MUCINOUS ADENOCARCINOMA.\" Reduced activity tolerance, but improving. Pt with NGT. Suggest consider clear liquid diet. Monitor for signs of aspiration / sequelae. Suggest continue with NGT and non-oral meds, for now. Will plan to re-eval 11/5 to assess for diet advance. SLP following.      SWALLOW EVALUATION (Last 72 Hours)       SLP Adult Swallow Evaluation       Row Name 11/04/24 1446       Rehab Evaluation    Document Type evaluation  -BB    Subjective Information no complaints  -BB    Patient Observations alert;cooperative;agree to therapy  -BB    Patient Effort adequate  -BB    Symptoms Noted During/After Treatment none  -BB       General Information    Patient Profile Reviewed yes  -BB    Pertinent History Of Current Problem 73 yo admitted w Severe sepsis with septic shock secondary to perforated bowel and peritonitis with likely malignancy s/p status post open left hemicolectomy with colostomy   INVASIVE MODERATELY DIFFERENTIATED MUCINOUS ADENOCARCINOMA. Pt extubated 10/29 (intubated 10/27). Pertinent negatives include (per patient report): neuro dx, active smoking, head/neck sx, GI disease beyond recent Ca dx. Pt with NGT. ST consult for swallowing.  -BB    Current Method of Nutrition NPO;nasogastric feedings  -BB    Precautions/Limitations, Vision WFL;for purposes of eval  -BB    " Precautions/Limitations, Hearing WFL;for purposes of eval  -BB    Prior Level of Function-Communication unknown  -BB    Prior Level of Function-Swallowing no diet consistency restrictions;regular textures;thin liquids  -BB    Plans/Goals Discussed with patient;agreed upon  -BB    Barriers to Rehab none identified  -BB    Patient's Goals for Discharge patient did not state  -BB       Pain    Pretreatment Pain Rating 6/10  -BB    Posttreatment Pain Rating 6/10  -BB    Pain Location back  -BB    Pain Side/Orientation generalized  -BB    Pain Management Interventions diversional activity provided;other (see comments)  pt declines intervention. He reports that he got meds earlier which helped. He reports that RN is aware of pain  -BB       Oral Motor Structure and Function    Dentition Assessment natural, present and adequate  -BB    Secretion Management WNL/WFL  -BB    Mucosal Quality moist, healthy;other (see comments)  slight white discoloration of lingual surface  -BB       General Eating/Swallowing Observations    Respiratory Support Currently in Use room air  -BB    Eating/Swallowing Skills fed by SLP  -BB    Positioning During Eating upright 90 degree  -BB    Utensils Used spoon;straw  -BB    Consistencies Trialed ice chips;pureed;thin liquids  -BB       Clinical Swallow Eval    Clinical Swallow Evaluation Summary Clinical swallow eval completed. No family present.     Cognition: A/Ox3-4. Pt able to follow basic multi-step commands.   Expressive communication: appropriate.   Voice: Vocal quality appears mildly breathy. Vocal loudness appears mildly reduced.   Cough: Not elicited.   Affect: Pleasant.   Speech: Mumbled but intelligible.   Bulbar mech exam: Integrity of cranial nerves V, VII, IX/X and XII appear grossly intact. Slight white discoloration of lingual surface.   Dysphagia symptoms: pt reports that food occasionally sticks in his chest at baseline.   RN reports pt tolerated ice chips wo observable  "issues.     Patient agreeable to PO trials.   Dysphagia signs: Throat clear x1 with thin liquids, otherwise no overt clinical signs of aspiration across trials.   Mason swallow protocol: pt reports that he is unable to complete continuous sips of liquids.      Assessment / Plan: Clinical swallow eval completed for patient admitted with \"severe sepsis with septic shock secondary to perforated bowel and peritonitis with likely malignancy s/p status post open left hemicolectomy with colostomy, INVASIVE MODERATELY DIFFERENTIATED MUCINOUS ADENOCARCINOMA.\" Reduced activity tolerance, but improving. Pt with NGT. Suggest consider clear liquid diet. Monitor for signs of aspiration / sequelae. Suggest continue with NGT and non-oral meds, for now. Will plan to re-eval 11/5 to assess for diet advance. SLP following.  -BB       SLP Evaluation Clinical Impression    SLP Swallowing Diagnosis R/O pharyngeal dysphagia;other (see comments)  feeding difficulties  -BB    Functional Impact risk of aspiration/pneumonia  -BB    Rehab Potential/Prognosis, Swallowing good, to achieve stated therapy goals  -BB    Swallow Criteria for Skilled Therapeutic Interventions Met demonstrates skilled criteria  -BB       Recommendations    Therapy Frequency (Swallow) PRN  -BB    Predicted Duration Therapy Intervention (Days) until discharge  -BB    SLP Diet Recommendation clear liquid diet;temporary alternate methods of nutrition/hydration  -BB    Recommended Diagnostics reassess via clinical swallow evaluation  -BB    Recommended Precautions and Strategies upright posture during/after eating;small bites of food and sips of liquid;1:1 supervision;assist with feeding;general aspiration precautions;fatigue precautions  -BB    Oral Care Recommendations Oral Care BID/PRN;Toothbrush  -BB    SLP Rec. for Method of Medication Administration meds via alternate route  -BB    Monitor for Signs of Aspiration notify SLP if any concerns  -BB    Anticipated " Discharge Disposition (SLP) unknown  -BB       Swallow Goals (SLP)    Swallow LTGs Swallow Long Term Goal (free text)  -BB       (LTG) Swallow    (LTG) Swallow Pt will advance from FOIS level 3 to FOIS level 6, without strategies, as assessed by SLP.  -BB    [place] (Swallow Long Term Goal) independently (over 90% accuracy)  -BB    Time Frame (Swallow Long Term Goal) by discharge  -BB    Progress/Outcomes (Swallow Long Term Goal) new goal  -BB              User Key  (r) = Recorded By, (t) = Taken By, (c) = Cosigned By      Initials Name Effective Dates    Maynor Tai SLP 02/19/23 -                     EDUCATION  The patient has been educated in the following areas:   Dysphagia (Swallowing Impairment).        SLP GOALS       Row Name 11/04/24 1446             (LTG) Swallow    (LTG) Swallow Pt will advance from FOIS level 3 to FOIS level 6, without strategies, as assessed by SLP.  -BB      [place] (Swallow Long Term Goal) independently (over 90% accuracy)  -BB      Time Frame (Swallow Long Term Goal) by discharge  -BB      Progress/Outcomes (Swallow Long Term Goal) new goal  -BB                User Key  (r) = Recorded By, (t) = Taken By, (c) = Cosigned By      Initials Name Provider Type    Maynor Tai SLP Speech and Language Pathologist                         Time Calculation:    Time Calculation- SLP       Row Name 11/04/24 1504             Time Calculation- SLP    SLP Start Time 1400  -BB      SLP Stop Time 1500  -BB      SLP Time Calculation (min) 60 min  -BB      SLP Received On 11/04/24  -BB         Untimed Charges    SLP Eval/Re-eval  ST Eval Oral Pharyng Swallow - 14450  -BB      13756-WW Eval Oral Pharyng Swallow Minutes 60  -BB         Total Minutes    Untimed Charges Total Minutes 60  -BB       Total Minutes 60  -BB                User Key  (r) = Recorded By, (t) = Taken By, (c) = Cosigned By      Initials Name Provider Type    Maynor Tai, SLP Speech and Language Pathologist                     Therapy Charges for Today       Code Description Service Date Service Provider Modifiers Qty    [id number]  ST EVAL ORAL PHARYNG SWALLOW 4 11/4/2024 Maynor Valerio, SLP GN 1                 Maynor Valerio, SLP  11/4/2024

## 2024-11-04 NOTE — THERAPY TREATMENT NOTE
Patient Name: Arsh Haynes  : 1950    MRN: [id number]                              Today's Date: 2024       Admit Date: 10/26/2024    Visit Dx:     ICD-10-CM ICD-9-CM   1. Perforation of sigmoid colon  K63.1 569.83   2. Hypotension, unspecified hypotension type  I95.9 458.9   3. Increased lactic acid level  R79.89 276.2   4. Bowel perforation  K63.1 569.83     Patient Active Problem List   Diagnosis    Colon cancer     Past Medical History:   Diagnosis Date    HTN (hypertension)      Past Surgical History:   Procedure Laterality Date    COLON RESECTION N/A 10/26/2024    Procedure: LOW ANTERIOR COLON RESECTION SIGMOID, COLOSTOMY, SPLENIC FLEXURE MOBILIZATION;  Surgeon: Mc Guillaume MD;  Location: [facility];  Service: General;  Laterality: N/A;    EXPLORATORY LAPAROTOMY N/A 10/26/2024    Procedure: LAPAROTOMY EXPLORATORY, LEFT HEMICOLECTOMY;  Surgeon: Mc Guillaume MD;  Location: [facility];  Service: General;  Laterality: N/A;      General Information       Row Name 24 1445          Physical Therapy Time and Intention    Document Type therapy note (daily note)  -EJ     Mode of Treatment co-treatment;occupational therapy;physical therapy;other (see comments)  -       Row Name 24 1445          General Information    Existing Precautions/Restrictions fall;oxygen therapy device and L/min;NPO  -       Row Name 24 1445          Safety Issues/Impairments Affecting Functional Mobility    Comment, Safety Issues/Impairments (Mobility) Co treatment medically appropriate and necessary due to patient acuity level, activity tolerance and safety of patient and staff. Treatment is focusing on progression of care and goals established in the POC.  -EJ               User Key  (r) = Recorded By, (t) = Taken By, (c) = Cosigned By      Initials Name Provider Type    EJ Yane Mayfield, PT Physical Therapist                   Mobility       Row Name 24 1443           Bed Mobility    Supine-Sit Hart (Bed Mobility) moderate assist (50% patient effort);maximum assist (25% patient effort);2 person assist;verbal cues;nonverbal cues (demo/gesture)  -EJ     Sit-Supine Hart (Bed Mobility) moderate assist (50% patient effort);maximum assist (25% patient effort);2 person assist;nonverbal cues (demo/gesture);verbal cues  -EJ     Assistive Device (Bed Mobility) head of bed elevated;bed rails;repositioning sheet  -EJ       Row Name 11/04/24 1446          Transfers    Comment, (Transfers) unable to assess 2' fatigue  -EJ               User Key  (r) = Recorded By, (t) = Taken By, (c) = Cosigned By      Initials Name Provider Type    Yane Valencia, PT Physical Therapist                   Obj/Interventions       Row Name 11/04/24 1447          Motor Skills    Therapeutic Exercise --  supine BLE ther ex x 5 reps  -EJ       Row Name 11/04/24 1447          Balance    Static Sitting Balance contact guard;minimal assist  -EJ     Position, Sitting Balance sitting edge of bed  -EJ     Comment, Balance leans left in sitting today  -EJ               User Key  (r) = Recorded By, (t) = Taken By, (c) = Cosigned By      Initials Name Provider Type    Yane Valencia, PT Physical Therapist                   Goals/Plan    No documentation.                  Clinical Impression       Row Name 11/04/24 1448          Pain    Pretreatment Pain Rating 0/10 - no pain  -EJ     Posttreatment Pain Rating 0/10 - no pain  -EJ       Row Name 11/04/24 1448          Plan of Care Review    Plan of Care Reviewed With patient  -EJ     Outcome Evaluation Pt seen for PT this afternoon. He is agreeable to therapy, reports his back is hurting. Pt w slow progress, but requiring slightly less assistance today w mobility. He was able to transition to EOB w mod/max A x 2. Pt leans to left at times requring CGA/min A for sitting balance. Pt declines attempt to stand today, reports fatigue and needing  to lay down. Pt assisted back to bed. He did participate w BLE exercises in bed. Recommend acute rehab at [place] pending progress. WIll continue to progress as tolerated.  -EJ       Row Name 11/04/24 1448          Positioning and Restraints    Pre-Treatment Position in bed  -EJ     Post Treatment Position bed  -EJ     In Bed notified nsg;supine;call light within reach;encouraged to call for assist;exit alarm on  -EJ               User Key  (r) = Recorded By, (t) = Taken By, (c) = Cosigned By      Initials Name Provider Type    Yane Valencia, PT Physical Therapist                   Outcome Measures       Row Name 11/04/24 1451 11/04/24 0800       How much help from another person do you currently need...    Turning from your back to your side while in flat bed without using bedrails? 2  -EJ 1  -BB    Moving from lying on back to sitting on the side of a flat bed without bedrails? 2  -EJ 1  -BB    Moving to and from a bed to a chair (including a wheelchair)? 1  -EJ 1  -BB    Standing up from a chair using your arms (e.g., wheelchair, bedside chair)? 1  -EJ 1  -BB    Climbing 3-5 steps with a railing? 1  -EJ 1  -BB    To walk in hospital room? 1  -EJ 1  -BB    AM-PAC 6 Clicks Score (PT) 8  -EJ 6  -BB    Highest Level of Mobility Goal 3 --> Sit at edge of bed  -EJ 2 --> Bed activities/dependent transfer  -BB      Row Name 11/04/24 1450          Functional Assessment    Outcome Measure Options AM-PAC 6 Clicks Daily Activity (OT)  -               User Key  (r) = Recorded By, (t) = Taken By, (c) = Cosigned By      Initials Name Provider Type    Zulema Kothari, ANGEL Registered Nurse    Yane Valencia, PT Physical Therapist    Sarah Chanel OT Occupational Therapist                                 Physical Therapy Education       Title: PT OT SLP Therapies (In Progress)       Topic: Physical Therapy (In Progress)       Point: Mobility training (Done)       Learning Progress Summary            Patient  Acceptance, E, VU by CW at 11/2/2024 1622                      Point: Home exercise program (Not Started)       Learner Progress:  Not documented in this visit.              Point: Body mechanics (Done)       Learning Progress Summary            Patient Acceptance, E, VU by CW at 11/2/2024 1622                      Point: Precautions (Done)       Learning Progress Summary            Patient Acceptance, E, VU by CW at 11/2/2024 1622                                      User Key       Initials Effective Dates Name Provider Type Discipline     12/13/22 -  Lisa Paulson, DOTTIE Physical Therapist PT                  PT Recommendation and Plan     Outcome Evaluation: Pt seen for PT this afternoon. He is agreeable to therapy, reports his back is hurting. Pt w slow progress, but requiring slightly less assistance today w mobility. He was able to transition to EOB w mod/max A x 2. Pt leans to left at times requring CGA/min A for sitting balance. Pt declines attempt to stand today, reports fatigue and needing to lay down. Pt assisted back to bed. He did participate w BLE exercises in bed. Recommend acute rehab at [place] pending progress. WIll continue to progress as tolerated.     Time Calculation:         PT Charges       Row Name 11/04/24 1451             Time Calculation    Start Time 1345  -EJ      Stop Time 1408  -EJ      Time Calculation (min) 23 min  -EJ      PT Received On 11/04/24  -EJ      PT - Next Appointment 11/05/24  -EJ         Timed Charges    83312 - PT Therapeutic Exercise Minutes 5  -EJ      03915 - PT Therapeutic Activity Minutes 18  -EJ         Total Minutes    Timed Charges Total Minutes 23  -EJ       Total Minutes 23  -EJ                User Key  (r) = Recorded By, (t) = Taken By, (c) = Cosigned By      Initials Name Provider Type    Yane Valencia, PT Physical Therapist                  Therapy Charges for Today       Code Description Service Date Service Provider Modifiers Qty    06405561790   PT THER PROC EA 15 MIN 11/4/2024 Yane Mayfield, PT GP 1    [id number]  PT THERAPEUTIC ACT EA 15 MIN 11/4/2024 Yane Mayfield, PT GP 1            PT G-Codes  Outcome Measure Options: AM-PAC 6 Clicks Daily Activity (OT)  AM-PAC 6 Clicks Score (PT): 8  AM-PAC 6 Clicks Score (OT): 7  Modified Fabian Scale: 5 - Severe disability.  Bedridden, incontinent, and requiring constant nursing care and attention.       Yane Mayfield, PT  11/4/2024

## 2024-11-04 NOTE — PROGRESS NOTES
ID note for sepsis  S: feels a little better. Urine sx improved. AF    Physical Exam:   Vital Signs   Temp:  [98 °F (36.7 °C)-99.1 °F (37.3 °C)] 98 °F (36.7 °C)  Heart Rate:  [102-122] 112  Resp:  [20-28] 24  BP: ()/(49-81) 149/81    GENERAL: Awake and alert, in no acute distress.   HEENT: Oropharynx is clear. Hearing is grossly normal.   EYES: . No conjunctival injection. No lid lag.   LUNGS:normal respiratory effort.   SKIN: no cutaneous eruptions in exposed areas  PSYCHIATRIC: Appropriate mood, affect, insight, and judgment.     Results Review:  White count 20.1  Creatinine 0.48  Liver function test normal apart from an alk phos of 229     10/29 blood culture 1/2 Bifidobacterium spp  10/26 abdominal fluid cultures E. coli, Pseudomonas and mixed anaerobes  10/2 blood cultures 2/2 no growth to date       A/p  Septic shock, shock resolved  Feculent peritonitis from perforated colon cancer status post ex lap, washout hemicolectomy and end colostomy by Dr. Guillaume on 10/26  Colon cancer    Asked to see for leukocytosis.  Wide differential including intra-abdominal abscess or stress reaction/leukemoid reaction from surgery/malignancy  Cont zosyn. Awaiting ct a/p   D/w Dr Guillaume re impression and plans

## 2024-11-04 NOTE — PLAN OF CARE
Goal Outcome Evaluation:   Patient confused orient to self follow simple commands VSS no changes noted

## 2024-11-04 NOTE — PLAN OF CARE
Goal Outcome Evaluation:  Plan of Care Reviewed With: patient           Outcome Evaluation: Pt seen for PT this afternoon. He is agreeable to therapy, reports his back is hurting. Pt w slow progress, but requiring slightly less assistance today w mobility. He was able to transition to EOB w mod/max A x 2. Pt leans to left at times requring CGA/min A for sitting balance. Pt declines attempt to stand today, reports fatigue and needing to lay down. Pt assisted back to bed. He did participate w BLE exercises in bed. Recommend acute rehab at [place] pending progress. WIll continue to progress as tolerated.

## 2024-11-04 NOTE — PROGRESS NOTES
"  Daily Progress Note.   [facility] CORONARY CARE  11/4/2024    Patient:  Name:  Arsh Haynes  MRN:  [id number]  1950  74 y.o.  male         CC: Follow up septic shock from perforated bowel with likely underlying malignancy     Interval History:  Ra  Stronger  Awake answer questions  No cp no soa no palpitatinos no sig cough    Physical Exam:  /68   Pulse 114   Temp 98 °F (36.7 °C) (Oral)   Resp 24   Ht 182.9 cm (72\")   Wt 87 kg (191 lb 12.8 oz)   SpO2 93%   BMI 26.01 kg/m²   Body mass index is 26.01 kg/m².    Intake/Output Summary (Last 24 hours) at 11/4/2024 0925  Last data filed at 11/4/2024 0400  Gross per 24 hour   Intake 2155 ml   Output 1285 ml   Net 870 ml   ng  General appearance: awake alert  conversant    Eyes: anicteric sclerae, moist conjunctivae; no lidlag;    HENT: Atraumatic; oropharynx clear with moist mucous membranes    Neck: Trachea midline;  supple   Lungs: NUHA, no rhonchi no wheeze with improved respiratory effort and no intercostal retractions  CV: tachy irreg, no rub   Abdomen: incision bandaged, ostomy llq, janeen drains+  Extremities: +ble trace peripheral edema    Skin: WWP   Psych/neuro: calm affect, alert stronger voice but interacts and answers questions appropriately.    Data Review:  Notable Labs:  Results from last 7 days   Lab Units 11/04/24  0649 11/03/24  0626 11/02/24  0348 11/01/24  0601 10/31/24  0503 10/30/24  0345 10/29/24  0334   WBC 10*3/mm3 20.90* 17.06* 15.46* 14.26* 16.55* 20.05* 20.19*   HEMOGLOBIN g/dL 8.0* 8.4* 8.5* 8.7* 9.1* 8.3* 8.6*   PLATELETS 10*3/mm3 760* 547* 426 354 222 198 236     Results from last 7 days   Lab Units 11/04/24  0649 11/03/24  0626 11/02/24  0348 11/01/24  1209 11/01/24  0522 10/31/24  0503 10/30/24  1737 10/30/24  0345 10/29/24  0334   SODIUM mmol/L 130* 133* 135*  --  138 142  --  137 143   POTASSIUM mmol/L 4.4 4.2 3.9  --  4.8 4.3 4.2 3.1* 4.1   CHLORIDE mmol/L 99 103 105  --  111* 114*  --  114* 118*   CO2 " mmol/L 23.6 23.0 21.3*  --  18.0* 20.2*  --  15.8* 16.5*   BUN mg/dL 10 9 9  --  9 7*  --  9 12   CREATININE mg/dL 0.48* 0.52* 0.56*  --  0.70* 0.63*  --  0.57* 0.85   GLUCOSE mg/dL 216* 230* 173*  --  126* 117*  --  377* 158*   CALCIUM mg/dL 7.9* 7.7* 8.0*  --  7.4* 7.2*  --  6.2* 6.7*   MAGNESIUM mg/dL  --   --   --  2.1  --   --   --  1.9 2.3   Estimated Creatinine Clearance: 171.2 mL/min (A) (by C-G formula based on SCr of 0.48 mg/dL (L)).    Results from last 7 days   Lab Units 11/04/24  0649 11/03/24  0626 11/02/24  0348   AST (SGOT) U/L 19 19 18   ALT (SGPT) U/L 23 19 18   PLATELETS 10*3/mm3 760* 547* 426       Results from last 7 days   Lab Units 11/01/24  0816 10/30/24  1131 10/29/24  0315   PH, ARTERIAL pH units 7.499* 7.445 7.372   PCO2, ARTERIAL mm Hg 28.5* 28.8* 28.0*   PO2 ART mm Hg 73.9* 57.3* 76.5*   HCO3 ART mmol/L 22.1 19.8* 16.3*       Imaging:  Reviewed chest images personally from past 3 days    ASSESSMENT  /  PLAN:  Severe sepsis with septic shock secondary to perforated bowel and peritonitis with likely malignancy s/p status post open left hemicolectomy with colostomy   INVASIVE MODERATELY DIFFERENTIATED MUCINOUS ADENOCARCINOMA.   ETOH abuse and withdrawal  Hyperglycemia  Right renal mass  Lactic acidosis  Anemia  Bacteremia  SVT status post digoxin  Postop respiratory insufficiency and ventilator management status post extubation 10/29  Colon cancer stage II further workup upon recovery.       Continue to treat and monitor withdrawal symptoms.  Zosyn fluconazole - Abx per id note recs.     Metoprolol PO home dose half today adjust as appropriate    Folic acid  Thiamine    Urology consulted recs appreicate, will need follow up for renal mass as well as urinary retention     Ssi adjust as speech evals       Plan of care and patient course was reviewed with multidisciplinary team in morning rounds.    Electronically signed by Darek Lerner MD, 11/04/24, 1:44 PM EST.

## 2024-11-04 NOTE — PLAN OF CARE
"Goal Outcome Evaluation:              Outcome Evaluation: Clinical swallow eval completed for patient admitted with \"severe sepsis with septic shock secondary to perforated bowel and peritonitis with likely malignancy s/p status post open left hemicolectomy with colostomy, INVASIVE MODERATELY DIFFERENTIATED MUCINOUS ADENOCARCINOMA.\"     Reduced activity tolerance, but improving. Pt with NGT.     Diet texture recommendation: Suggest consider clear liquid diet. Monitor for signs of aspiration / sequelae. Suggest continue with NGT, for now. Will plan to re-eval 11/5 to assess for diet advance. SLP following.    Meds: non-oral meds, for now.     Aspiration precautions: Fully upright for all oral intake. 1:1 supervision/assistance with RN staff    Anticipated Discharge Disposition (SLP): unknown          SLP Swallowing Diagnosis: R/O pharyngeal dysphagia, other (see comments) (feeding difficulties) (11/04/24 2626)             "

## 2024-11-04 NOTE — PROGRESS NOTES
"UROLOGY PROGRESS NOTE     Resting in bed. Some improvement in urinary discomfort.    Lab Results   Component Value Date    CREATININE 0.52 (L) 11/03/2024    WBC 17.06 (H) 11/03/2024    HGB 8.4 (L) 11/03/2024         Results for orders placed or performed during the hospital encounter of 10/26/24   Blood Culture - Blood, Arm, Left    Specimen: Arm, Left; Blood   Result Value Ref Range    Blood Culture No growth at 5 days      Results from last 7 days   Lab Units 11/03/24  1735   COLOR UA  Dark Yellow*   CLARITY UA  Clear   BILIRUBIN UA  Negative   KETONES UA  Negative   PH, URINE  8.0   UROBILINOGEN UA  4.0 E.U./dL*   LEUKOCYTES UA  Negative   NITRITE UA  Negative   BACTERIA UA /HPF None Seen       Intake/Output:    Intake/Output Summary (Last 24 hours) at 11/4/2024 0723  Last data filed at 11/4/2024 0400  Gross per 24 hour   Intake 2155 ml   Output 1285 ml   Net 870 ml       Exam:  /59   Pulse 116   Temp 99.1 °F (37.3 °C) (Axillary)   Resp 24   Ht 182.9 cm (72\")   Wt 87 kg (191 lb 12.8 oz)   SpO2 93%   BMI 26.01 kg/m²       HEENT: DhT in place   : vazquez in place clear urine      Assessment:    Colon cancer  Sigmoid perforation s/p hemicolectomy and colostomy  Right renal mass - CT 10/26/24 reviewed, concerning for RCC given enhancement.  Dysuria  Urinary retention    Plan:    -No acute urologic intervention  -UA negative for bacteria  -OK for void trial when closer to discharge. If patient going to rehab, can do VT at rehab.  -If unable to void prior to discharge, please alert urology and we will set patient up for voiding trial with [facility]  -Will schedule outpatient follow up in 4-6 weeks with repeat CT AP for renal mass to discuss options for treatment  -Urology available as needed    Mansi Jackson MD    "

## 2024-11-04 NOTE — NURSING NOTE
"   11/04/24 0816   Wound 11/04/24 Bilateral gluteal   Placement Date: 11/04/24   Side: Bilateral  Location: gluteal  Primary Wound Type: (c)    Dressing Appearance dry;intact   Base red   Periwound intact   Edges irregular   Drainage Amount none   Colostomy LUQ   Placement date: If unknown, DO NOT use \"Add Comment\" note/Placement time: If unknown, DO NOT use \"Add Comment\" note: 10/27/24 0051   Inserted by: DR. BECK  Location: LUQ   Stomal Appliance 2 piece;Clean;Dry;Intact;Changed;Drainable   Stoma Appearance irregular;moist;red  (oval)   Peristomal Assessment Clean;Intact   Accessories/Skin Care cleansed with water;skin barrier ring   Stoma Function flatus;stool   Stool Color brown   Stool Consistency loose   Treatment Bag change;Site care     WOCN: Patient is in unit on progressa mattress. He will need a pro plus when transferred out of unit. Shearing assessed to bilateral buttock. Silicone border dressing in placed and peeled back. It is not pressure is not over bony prominence. Ostomy pouch intact. Placed in 2 piece pedro 2 1/4 with adapt ring. There is no family members in room . Patient is not teachable at this point. He is having stool.   "

## 2024-11-04 NOTE — THERAPY TREATMENT NOTE
Patient Name: Arsh Haynes  : 1950    MRN: [id number]                              Today's Date: 2024       Admit Date: 10/26/2024    Visit Dx:     ICD-10-CM ICD-9-CM   1. Perforation of sigmoid colon  K63.1 569.83   2. Hypotension, unspecified hypotension type  I95.9 458.9   3. Increased lactic acid level  R79.89 276.2   4. Bowel perforation  K63.1 569.83     Patient Active Problem List   Diagnosis    Colon cancer     Past Medical History:   Diagnosis Date    HTN (hypertension)      Past Surgical History:   Procedure Laterality Date    COLON RESECTION N/A 10/26/2024    Procedure: LOW ANTERIOR COLON RESECTION SIGMOID, COLOSTOMY, SPLENIC FLEXURE MOBILIZATION;  Surgeon: Mc Guillaume MD;  Location: [facility];  Service: General;  Laterality: N/A;    EXPLORATORY LAPAROTOMY N/A 10/26/2024    Procedure: LAPAROTOMY EXPLORATORY, LEFT HEMICOLECTOMY;  Surgeon: Mc Guillaume MD;  Location: [facility];  Service: General;  Laterality: N/A;      General Information       Row Name 24 1440          OT Time and Intention    Document Type therapy note (daily note)  -SM     Mode of Treatment occupational therapy;co-treatment  -SM     Patient Effort adequate  -       Row Name 24 1440          General Information    Patient Profile Reviewed yes  -SM     Existing Precautions/Restrictions fall;oxygen therapy device and L/min;NPO  -SM       Row Name 24 1440          Cognition    Orientation Status (Cognition) oriented to;person;place  -       Row Name 24 1440          Safety Issues/Impairments Affecting Functional Mobility    Safety Issues Affecting Function (Mobility) awareness of need for assistance  -SM     Impairments Affecting Function (Mobility) strength;balance;endurance/activity tolerance;coordination;pain;postural/trunk control;range of motion (ROM);cognition  -SM     Comment, Safety Issues/Impairments (Mobility) PT/OT cotreatment medically appropriate and  necessary due to patient acuity level, to maximize therapeutic benefit due to impaired act tolerance, and for safety of patient and staff. Treatment focused on progression of care and goals established in POC.  -               User Key  (r) = Recorded By, (t) = Taken By, (c) = Cosigned By      Initials Name Provider Type     Sarah Abernathy OT Occupational Therapist                     Mobility/ADL's       Row Name 11/04/24 1445          Bed Mobility    Supine-Sit [place] (Bed Mobility) moderate assist (50% patient effort);maximum assist (25% patient effort);2 person assist  -SM     Sit-Supine [place] (Bed Mobility) moderate assist (50% patient effort);maximum assist (25% patient effort);2 person assist  -     Assistive Device (Bed Mobility) head of bed elevated;repositioning sheet  -       Row Name 11/04/24 1445          Transfers    Comment, (Transfers) unable to asses today 2/2 fatique  -       Row Name 11/04/24 1445          Lower Body Dressing Assessment/Training    [place] Level (Lower Body Dressing) socks;dependent (less than 25% patient effort);don  -SM     Position (Lower Body Dressing) supine  -       Row Name 11/04/24 1444          Toileting Assessment/Training    [place] Level (Toileting) dependent (less than 25% patient effort)  -     Position (Toileting) supine  -               User Key  (r) = Recorded By, (t) = Taken By, (c) = Cosigned By      Initials Name Provider Type     Sarah Abernathy OT Occupational Therapist                   Obj/Interventions       Row Name 11/04/24 1446          Shoulder (Therapeutic Exercise)    Shoulder (Therapeutic Exercise) AROM (active range of motion)  -     Shoulder AROM (Therapeutic Exercise) bilateral;flexion;extension;horizontal aBduction/aDduction;5 repetitions;supine;scapular retraction  -       Row Name 11/04/24 1444          Motor Skills    Functional Endurance poor  -     Therapeutic Exercise shoulder  -        [facility] Name 11/04/24 1446          Balance    Balance Assessment sitting dynamic balance  -     Static Sitting Balance minimal assist;contact guard  -SM     Dynamic Sitting Balance minimal assist;moderate assist  -     Position, Sitting Balance sitting edge of bed  -     Comment, Balance forward flexed posture  -               User Key  (r) = Recorded By, (t) = Taken By, (c) = Cosigned By      Initials Name Provider Type     Sarah Abernathy OT Occupational Therapist                   Goals/Plan    No documentation.                  Clinical Impression       [facility] Name 11/04/24 1447          Pain Assessment    Response to Pain Interventions activity participation with tolerable pain  -     Pre/Posttreatment Pain Comment c/o of back pain  -     Additional Documentation Pain Scale: FACES Pre/Post-Treatment (Group)  -[facility] Name 11/04/24 1447          Pain Scale: FACES Pre/Post-Treatment    Pain: FACES Scale, Pretreatment 4-->hurts little more  -     Posttreatment Pain Rating 4-->hurts little more  -[facility] Name 11/04/24 1447          Plan of Care Review    Plan of Care Reviewed With patient  -     Outcome Evaluation Pt participated in OT today, co treat with PT as pt remains to have very poor activity tolerance. Pt was able to sit EOB for ~5 minutes but c/o of fatique. He requires assist of 2 for all mobility and repositioning in the bed. OT educated pt on a few UE exercises to complete in the bed. Pt remains dependent for most ADLs at present and has weak BUE/poor reach against gravity.  -[facility] Name 11/04/24 1447          Therapy Assessment/Plan (OT)    Rehab Potential (OT) good  -     Criteria for Skilled Therapeutic Interventions Met (OT) yes;meets criteria;skilled treatment is necessary  -     Therapy Frequency (OT) 5 times/wk  -[facility] Name 11/04/24 1447          Therapy Plan Review/Discharge Plan (OT)    Anticipated Discharge Disposition (OT) skilled nursing  facility;inpatient rehabilitation facility  -       Row Name 11/04/24 1447          Vital Signs    O2 Delivery Pre Treatment supplemental O2  -SM     Intra SpO2 (%) 89  -SM     O2 Delivery Intra Treatment supplemental O2  -SM     Post SpO2 (%) 93  -     O2 Delivery Post Treatment supplemental O2  -       Row Name 11/04/24 1447          Positioning and Restraints    Pre-Treatment Position in bed  -SM     Post Treatment Position bed  -SM     In Bed fowlers;encouraged to call for assist;exit alarm on;notified nsg;call light within reach  -               User Key  (r) = Recorded By, (t) = Taken By, (c) = Cosigned By      Initials Name Provider Type    Sarah Chanel, OT Occupational Therapist                   Outcome Measures       Row Name 11/04/24 1450          How much help from another is currently needed...    Putting on and taking off regular lower body clothing? 1  -SM     Bathing (including washing, rinsing, and drying) 1  -SM     Toileting (which includes using toilet bed pan or urinal) 1  -SM     Putting on and taking off regular upper body clothing 1  -SM     Taking care of personal grooming (such as brushing teeth) 2  -SM     Eating meals 1  -SM     AM-PAC 6 Clicks Score (OT) 7  -SM       Row Name 11/04/24 0800          How much help from another person do you currently need...    Turning from your back to your side while in flat bed without using bedrails? 1  -BB     Moving from lying on back to sitting on the side of a flat bed without bedrails? 1  -BB     Moving to and from a bed to a chair (including a wheelchair)? 1  -BB     Standing up from a chair using your arms (e.g., wheelchair, bedside chair)? 1  -BB     Climbing 3-5 steps with a railing? 1  -BB     To walk in hospital room? 1  -BB     AM-PAC 6 Clicks Score (PT) 6  -BB     Highest Level of Mobility Goal 2 --> Bed activities/dependent transfer  -BB       Row Name 11/04/24 145          Functional Assessment    Outcome Measure Options  AM-PAC 6 Clicks Daily Activity (OT)  -               User Key  (r) = Recorded By, (t) = Taken By, (c) = Cosigned By      Initials Name Provider Type    BB Zulema Martin, RN Registered Nurse    Sarah Chanel OT Occupational Therapist                    Occupational Therapy Education       Title: PT OT SLP Therapies (In Progress)       Topic: Occupational Therapy (Done)       Point: ADL training (Done)       Description:   Instruct learner(s) on proper safety adaptation and remediation techniques during self care or transfers.   Instruct in proper use of assistive devices.                  Learning Progress Summary            Patient Acceptance, E, VU,NR by  at 11/2/2024 1328    Comment: role of OT, goals, d/c rec                      Point: Precautions (Done)       Description:   Instruct learner(s) on prescribed precautions during self-care and functional transfers.                  Learning Progress Summary            Patient Acceptance, E, VU,NR by MM at 11/2/2024 1328    Comment: role of OT, goals, d/c rec                      Point: Body mechanics (Done)       Description:   Instruct learner(s) on proper positioning and spine alignment during self-care, functional mobility activities and/or exercises.                  Learning Progress Summary            Patient Acceptance, E, VU,NR by MM at 11/2/2024 1328    Comment: role of OT, goals, d/c rec                                      User Key       Initials Effective Dates Name Provider Type Discipline     05/31/24 -  Yu Chapman OT Occupational Therapist OT                  OT Recommendation and Plan  Therapy Frequency (OT): 5 times/wk  Plan of Care Review  Plan of Care Reviewed With: patient  Outcome Evaluation: Pt participated in OT today, co treat with PT as pt remains to have very poor activity tolerance. Pt was able to sit EOB for ~5 minutes but c/o of fatique. He requires assist of 2 for all mobility and repositioning in the bed. OT  educated pt on a few UE exercises to complete in the bed. Pt remains dependent for most ADLs at present and has weak BUE/poor reach against gravity.     Time Calculation:         Time Calculation- OT       Row Name 11/04/24 1450             Time Calculation- OT    OT Start Time 1353  -      OT Stop Time 1408  -SM      OT Time Calculation (min) 15 min  -SM      OT Received On 11/04/24  -      OT - Next Appointment 11/05/24  -         Timed Charges    89194 - OT Therapeutic Activity Minutes 15  -SM         Total Minutes    Timed Charges Total Minutes 15  -SM       Total Minutes 15  -SM                User Key  (r) = Recorded By, (t) = Taken By, (c) = Cosigned By      Initials Name Provider Type     Sarah Abernathy OT Occupational Therapist                  Therapy Charges for Today       Code Description Service Date Service Provider Modifiers Qty    [id number]  OT THERAPEUTIC ACT EA 15 MIN 11/4/2024 Sarah Abernathy OT GO 1                 Sarah Abernathy OT  11/4/2024

## 2024-11-04 NOTE — PROGRESS NOTES
Post operative day 8 status post left hemicolectomy with colostomy    S: No events overnight.  Worsening leukocytosis.  Underwent CT scan of the abdomen and pelvis that did not show any complication from the procedure    O:   Vitals:    11/04/24 1517 11/04/24 1530 11/04/24 1533 11/04/24 1600   BP:    138/75   Pulse:  113 112 111   Resp:  20     Temp: 97.9 °F (36.6 °C)      TempSrc: Oral      SpO2:  97% 99% 95%   Weight:       Height:          Drains around 80 cc each, all serous  Alert, ill-appearing, no distress  Breathing comfortable  Abdomen soft, nontender to palpation, ostomy pink and viable with stool, midline with open wound clean base    White blood cell count 20,000 from 17,000, hemoglobin 8  All other labs reviewed    CT scan of the abdomen and pelvis that was performed today show postoperative changes.  No complication from the procedure neither abscess formation  Right-sided pleural effusion with atelectasis  Right kidney mass    Assessment and plan    74-year-old male s/p open left hemicolectomy with colostomy.  Elevated white blood cell count likely related to left pleural effusion and atelectasis.  No evidence of complication from the procedure.  Right renal mass that will need to be assessed by urology    Continue tube feeds, start liquids as tolerated when cleared by speech  Wet-to-dry dressing changes with saline and gauze  Continue to biotics as per ID  Will continue to follow    Mc Guillaume MD, FACS  General, Minimally Invasive and Endoscopic Surgery  [facility]    [address], [address]  [place], [place], [address]  P: [phone]  F: [phone]

## 2024-11-04 NOTE — PLAN OF CARE
Goal Outcome Evaluation:  Plan of Care Reviewed With: patient           Outcome Evaluation: Pt participated in OT today, co treat with PT as pt remains to have very poor activity tolerance. Pt was able to sit EOB for ~5 minutes but c/o of fatique. He requires assist of 2 for all mobility and repositioning in the bed. OT educated pt on a few UE exercises to complete in the bed. Pt remains dependent for most ADLs at present and has weak BUE/poor reach against gravity.    Anticipated Discharge Disposition (OT): skilled nursing facility, inpatient rehabilitation facility

## 2024-11-04 NOTE — PROGRESS NOTES
Nutrition Services    Patient Name: Arsh Haynes  YOB: 1950  MRN: [id number]  Admission date: 10/26/2024    PROGRESS NOTE      Encounter Information: Checking in on TF tolerance.        PO Diet: NPO Diet NPO Type: Tube Feeding   PO Supplements: -   PO Intake:  -       Current nutrition support: Nutren 1.5 @ 75 mL/hr with 30 mL q4 FWF   Nutrition support review: Infusing as ordered per documentation        Labs (reviewed below): Hyperglycemia - will trial lower CHO formula  Hyponatremia - will trial nutrient dense, low volume formula        GI Function:  Liquid BM 11/3       Nutrition Intervention Updates: Change TF order to Novasource Renal at 55 mL/hr + 30 mL q4 water flush        Enteral Prescription   End Goal:    Novasource Renal at 55 mL/hr + 30 mL q4 water flush      Calories  2420 kcals (100% lower end of range)    Protein  110 g (within range)    Free water  859 mL   Flushes  180 mL     The above end goal rate is for 22 hrs/day to assume interruptions for ADLs. Water flushes adjusted based on clinical picture + Rx flushes/IV fluids     Specialized formula chosen r/t hyponatremia and hyperglycemia.          TPN Prescription        Results from last 7 days   Lab Units 11/04/24  0649 11/03/24  0626 11/02/24  0348   SODIUM mmol/L 130* 133* 135*   POTASSIUM mmol/L 4.4 4.2 3.9   CHLORIDE mmol/L 99 103 105   CO2 mmol/L 23.6 23.0 21.3*   BUN mg/dL 10 9 9   CREATININE mg/dL 0.48* 0.52* 0.56*   CALCIUM mg/dL 7.9* 7.7* 8.0*   BILIRUBIN mg/dL 0.5 0.5 0.8   ALK PHOS U/L 229* 251* 225*   ALT (SGPT) U/L 23 19 18   AST (SGOT) U/L 19 19 18   GLUCOSE mg/dL 216* 230* 173*     Results from last 7 days   Lab Units 11/04/24  0649 11/02/24  0348 11/01/24  1209 10/31/24  0503 10/30/24  0345 10/29/24  0334   MAGNESIUM mg/dL  --   --  2.1  --  1.9 2.3   HEMOGLOBIN g/dL 8.0*   < >  --    < > 8.3* 8.6*   HEMATOCRIT % 24.9*   < >  --    < > 26.7* 27.9*    < > = values in this interval not displayed.     No results found  "for: \"COVID19\"  Lab Results   Component Value Date    HGBA1C 6.60 (H) 10/26/2024       Wt Readings from Last 10 Encounters:   11/04/24 0415 87 kg (191 lb 12.8 oz)   11/04/24 0152 87 kg (191 lb 12.8 oz)   11/02/24 0543 89.8 kg (197 lb 15.6 oz)   11/01/24 0548 92.6 kg (204 lb 2.3 oz)   10/31/24 0448 96 kg (211 lb 10.3 oz)   10/30/24 0300 98.4 kg (216 lb 14.9 oz)   10/29/24 0431 92.5 kg (203 lb 14.8 oz)   10/28/24 0436 90.2 kg (198 lb 13.7 oz)   10/27/24 0404 83.4 kg (183 lb 13.8 oz)   10/26/24 1933 83 kg (183 lb)       RD to follow up per protocol.    Electronically signed by:  Aga King RD  11/04/24 14:17 EST    "

## 2024-11-05 ENCOUNTER — APPOINTMENT (OUTPATIENT)
Dept: CARDIOLOGY | Facility: HOSPITAL | Age: 74
DRG: 853 | End: 2024-11-05
Payer: MEDICARE

## 2024-11-05 LAB
ALBUMIN SERPL-MCNC: 2.1 G/DL (ref 3.5–5.2)
ALBUMIN/GLOB SERPL: 0.6 G/DL
ALP SERPL-CCNC: 257 U/L (ref 39–117)
ALT SERPL W P-5'-P-CCNC: 27 U/L (ref 1–41)
ANION GAP SERPL CALCULATED.3IONS-SCNC: 9.8 MMOL/L (ref 5–15)
AST SERPL-CCNC: 24 U/L (ref 1–40)
BASOPHILS # BLD AUTO: 0.13 10*3/MM3 (ref 0–0.2)
BASOPHILS NFR BLD AUTO: 0.7 % (ref 0–1.5)
BH CV LOW VAS RIGHT GASTRONEMIUS VESSEL: 1
BH CV LOW VAS RIGHT SOLEAL VESSEL: 1
BH CV LOWER VASCULAR LEFT COMMON FEMORAL AUGMENT: NORMAL
BH CV LOWER VASCULAR LEFT COMMON FEMORAL COMPETENT: NORMAL
BH CV LOWER VASCULAR LEFT COMMON FEMORAL COMPRESS: NORMAL
BH CV LOWER VASCULAR LEFT COMMON FEMORAL PHASIC: NORMAL
BH CV LOWER VASCULAR LEFT COMMON FEMORAL SPONT: NORMAL
BH CV LOWER VASCULAR LEFT DISTAL FEMORAL COMPRESS: NORMAL
BH CV LOWER VASCULAR LEFT GASTRONEMIUS COMPRESS: NORMAL
BH CV LOWER VASCULAR LEFT GREATER SAPH AK COMPRESS: NORMAL
BH CV LOWER VASCULAR LEFT GREATER SAPH BK COMPRESS: NORMAL
BH CV LOWER VASCULAR LEFT LESSER SAPH COMPRESS: NORMAL
BH CV LOWER VASCULAR LEFT MID FEMORAL AUGMENT: NORMAL
BH CV LOWER VASCULAR LEFT MID FEMORAL COMPETENT: NORMAL
BH CV LOWER VASCULAR LEFT MID FEMORAL COMPRESS: NORMAL
BH CV LOWER VASCULAR LEFT MID FEMORAL PHASIC: NORMAL
BH CV LOWER VASCULAR LEFT MID FEMORAL SPONT: NORMAL
BH CV LOWER VASCULAR LEFT PERONEAL COMPRESS: NORMAL
BH CV LOWER VASCULAR LEFT POPLITEAL AUGMENT: NORMAL
BH CV LOWER VASCULAR LEFT POPLITEAL COMPETENT: NORMAL
BH CV LOWER VASCULAR LEFT POPLITEAL COMPRESS: NORMAL
BH CV LOWER VASCULAR LEFT POPLITEAL PHASIC: NORMAL
BH CV LOWER VASCULAR LEFT POPLITEAL SPONT: NORMAL
BH CV LOWER VASCULAR LEFT POSTERIOR TIBIAL COMPRESS: NORMAL
BH CV LOWER VASCULAR LEFT PROFUNDA FEMORAL COMPRESS: NORMAL
BH CV LOWER VASCULAR LEFT PROXIMAL FEMORAL COMPRESS: NORMAL
BH CV LOWER VASCULAR LEFT SAPHENOFEMORAL JUNCTION COMPRESS: NORMAL
BH CV LOWER VASCULAR RIGHT COMMON FEMORAL AUGMENT: NORMAL
BH CV LOWER VASCULAR RIGHT COMMON FEMORAL COMPETENT: NORMAL
BH CV LOWER VASCULAR RIGHT COMMON FEMORAL COMPRESS: NORMAL
BH CV LOWER VASCULAR RIGHT COMMON FEMORAL PHASIC: NORMAL
BH CV LOWER VASCULAR RIGHT COMMON FEMORAL SPONT: NORMAL
BH CV LOWER VASCULAR RIGHT DISTAL FEMORAL COMPRESS: NORMAL
BH CV LOWER VASCULAR RIGHT GASTRONEMIUS COMPRESS: NORMAL
BH CV LOWER VASCULAR RIGHT GASTRONEMIUS THROMBUS: NORMAL
BH CV LOWER VASCULAR RIGHT GREATER SAPH AK COMPRESS: NORMAL
BH CV LOWER VASCULAR RIGHT GREATER SAPH BK COMPRESS: NORMAL
BH CV LOWER VASCULAR RIGHT LESSER SAPH COMPRESS: NORMAL
BH CV LOWER VASCULAR RIGHT MID FEMORAL AUGMENT: NORMAL
BH CV LOWER VASCULAR RIGHT MID FEMORAL COMPETENT: NORMAL
BH CV LOWER VASCULAR RIGHT MID FEMORAL COMPRESS: NORMAL
BH CV LOWER VASCULAR RIGHT MID FEMORAL PHASIC: NORMAL
BH CV LOWER VASCULAR RIGHT MID FEMORAL SPONT: NORMAL
BH CV LOWER VASCULAR RIGHT PERONEAL COMPRESS: NORMAL
BH CV LOWER VASCULAR RIGHT POPLITEAL AUGMENT: NORMAL
BH CV LOWER VASCULAR RIGHT POPLITEAL COMPETENT: NORMAL
BH CV LOWER VASCULAR RIGHT POPLITEAL COMPRESS: NORMAL
BH CV LOWER VASCULAR RIGHT POPLITEAL PHASIC: NORMAL
BH CV LOWER VASCULAR RIGHT POPLITEAL SPONT: NORMAL
BH CV LOWER VASCULAR RIGHT POSTERIOR TIBIAL COMPRESS: NORMAL
BH CV LOWER VASCULAR RIGHT PROFUNDA FEMORAL COMPRESS: NORMAL
BH CV LOWER VASCULAR RIGHT PROXIMAL FEMORAL COMPRESS: NORMAL
BH CV LOWER VASCULAR RIGHT SAPHENOFEMORAL JUNCTION COMPRESS: NORMAL
BH CV LOWER VASCULAR RIGHT SOLEAL COMPRESS: NORMAL
BH CV LOWER VASCULAR RIGHT SOLEAL THROMBUS: NORMAL
BH CV VAS PRELIMINARY FINDINGS SCRIPTING: 1
BILIRUB SERPL-MCNC: 0.5 MG/DL (ref 0–1.2)
BUN SERPL-MCNC: 9 MG/DL (ref 8–23)
BUN/CREAT SERPL: 17.3 (ref 7–25)
CALCIUM SPEC-SCNC: 7.9 MG/DL (ref 8.6–10.5)
CHLORIDE SERPL-SCNC: 98 MMOL/L (ref 98–107)
CO2 SERPL-SCNC: 22.2 MMOL/L (ref 22–29)
CREAT SERPL-MCNC: 0.52 MG/DL (ref 0.76–1.27)
DEPRECATED RDW RBC AUTO: 56.2 FL (ref 37–54)
EGFRCR SERPLBLD CKD-EPI 2021: 107.1 ML/MIN/1.73
EOSINOPHIL # BLD AUTO: 0.25 10*3/MM3 (ref 0–0.4)
EOSINOPHIL NFR BLD AUTO: 1.3 % (ref 0.3–6.2)
ERYTHROCYTE [DISTWIDTH] IN BLOOD BY AUTOMATED COUNT: 18.1 % (ref 12.3–15.4)
GLOBULIN UR ELPH-MCNC: 3.7 GM/DL
GLUCOSE BLDC GLUCOMTR-MCNC: 132 MG/DL (ref 70–130)
GLUCOSE BLDC GLUCOMTR-MCNC: 197 MG/DL (ref 70–130)
GLUCOSE BLDC GLUCOMTR-MCNC: 237 MG/DL (ref 70–130)
GLUCOSE BLDC GLUCOMTR-MCNC: 245 MG/DL (ref 70–130)
GLUCOSE BLDC GLUCOMTR-MCNC: 270 MG/DL (ref 70–130)
GLUCOSE BLDC GLUCOMTR-MCNC: 291 MG/DL (ref 70–130)
GLUCOSE SERPL-MCNC: 250 MG/DL (ref 65–99)
HCT VFR BLD AUTO: 25.9 % (ref 37.5–51)
HGB BLD-MCNC: 8.2 G/DL (ref 13–17.7)
IMM GRANULOCYTES # BLD AUTO: 0.68 10*3/MM3 (ref 0–0.05)
IMM GRANULOCYTES NFR BLD AUTO: 3.5 % (ref 0–0.5)
LYMPHOCYTES # BLD AUTO: 1.13 10*3/MM3 (ref 0.7–3.1)
LYMPHOCYTES NFR BLD AUTO: 5.8 % (ref 19.6–45.3)
MCH RBC QN AUTO: 27.2 PG (ref 26.6–33)
MCHC RBC AUTO-ENTMCNC: 31.7 G/DL (ref 31.5–35.7)
MCV RBC AUTO: 86 FL (ref 79–97)
MONOCYTES # BLD AUTO: 1.31 10*3/MM3 (ref 0.1–0.9)
MONOCYTES NFR BLD AUTO: 6.7 % (ref 5–12)
NEUTROPHILS NFR BLD AUTO: 16.1 10*3/MM3 (ref 1.7–7)
NEUTROPHILS NFR BLD AUTO: 82 % (ref 42.7–76)
NRBC BLD AUTO-RTO: 0 /100 WBC (ref 0–0.2)
PLATELET # BLD AUTO: 915 10*3/MM3 (ref 140–450)
PMV BLD AUTO: 8.8 FL (ref 6–12)
POTASSIUM SERPL-SCNC: 4.3 MMOL/L (ref 3.5–5.2)
PROT SERPL-MCNC: 5.8 G/DL (ref 6–8.5)
RBC # BLD AUTO: 3.01 10*6/MM3 (ref 4.14–5.8)
SODIUM SERPL-SCNC: 130 MMOL/L (ref 136–145)
SODIUM UR-SCNC: 163 MMOL/L
WBC NRBC COR # BLD AUTO: 19.6 10*3/MM3 (ref 3.4–10.8)

## 2024-11-05 PROCEDURE — 92526 ORAL FUNCTION THERAPY: CPT

## 2024-11-05 PROCEDURE — 94761 N-INVAS EAR/PLS OXIMETRY MLT: CPT

## 2024-11-05 PROCEDURE — 94799 UNLISTED PULMONARY SVC/PX: CPT

## 2024-11-05 PROCEDURE — 63710000001 INSULIN REGULAR HUMAN PER 5 UNITS: Performed by: HOSPITALIST

## 2024-11-05 PROCEDURE — 83935 ASSAY OF URINE OSMOLALITY: CPT | Performed by: HOSPITALIST

## 2024-11-05 PROCEDURE — 25010000002 ENOXAPARIN PER 10 MG: Performed by: INTERNAL MEDICINE

## 2024-11-05 PROCEDURE — 63710000001 INSULIN REGULAR HUMAN PER 5 UNITS: Performed by: INTERNAL MEDICINE

## 2024-11-05 PROCEDURE — 94668 MNPJ CHEST WALL SBSQ: CPT

## 2024-11-05 PROCEDURE — 82948 REAGENT STRIP/BLOOD GLUCOSE: CPT

## 2024-11-05 PROCEDURE — 97530 THERAPEUTIC ACTIVITIES: CPT

## 2024-11-05 PROCEDURE — 25010000002 PIPERACILLIN SOD-TAZOBACTAM PER 1 G: Performed by: INTERNAL MEDICINE

## 2024-11-05 PROCEDURE — 85025 COMPLETE CBC W/AUTO DIFF WBC: CPT | Performed by: INTERNAL MEDICINE

## 2024-11-05 PROCEDURE — 93970 EXTREMITY STUDY: CPT | Performed by: SURGERY

## 2024-11-05 PROCEDURE — 63710000001 INSULIN GLARGINE PER 5 UNITS: Performed by: INTERNAL MEDICINE

## 2024-11-05 PROCEDURE — 97110 THERAPEUTIC EXERCISES: CPT

## 2024-11-05 PROCEDURE — 94664 DEMO&/EVAL PT USE INHALER: CPT

## 2024-11-05 PROCEDURE — 93970 EXTREMITY STUDY: CPT

## 2024-11-05 PROCEDURE — 84300 ASSAY OF URINE SODIUM: CPT | Performed by: HOSPITALIST

## 2024-11-05 PROCEDURE — 99233 SBSQ HOSP IP/OBS HIGH 50: CPT | Performed by: INTERNAL MEDICINE

## 2024-11-05 PROCEDURE — 80053 COMPREHEN METABOLIC PANEL: CPT | Performed by: INTERNAL MEDICINE

## 2024-11-05 PROCEDURE — 99024 POSTOP FOLLOW-UP VISIT: CPT | Performed by: SURGERY

## 2024-11-05 RX ORDER — ENOXAPARIN SODIUM 100 MG/ML
1 INJECTION SUBCUTANEOUS EVERY 12 HOURS
Status: DISCONTINUED | OUTPATIENT
Start: 2024-11-05 | End: 2024-11-30 | Stop reason: HOSPADM

## 2024-11-05 RX ORDER — TERAZOSIN 2 MG/1
2 CAPSULE ORAL DAILY
Status: DISCONTINUED | OUTPATIENT
Start: 2024-11-06 | End: 2024-11-18

## 2024-11-05 RX ORDER — METOPROLOL TARTRATE 50 MG
50 TABLET ORAL EVERY 12 HOURS SCHEDULED
Status: DISCONTINUED | OUTPATIENT
Start: 2024-11-05 | End: 2024-11-06

## 2024-11-05 RX ORDER — METOPROLOL TARTRATE 25 MG/1
25 TABLET, FILM COATED ORAL EVERY 12 HOURS SCHEDULED
Status: DISCONTINUED | OUTPATIENT
Start: 2024-11-05 | End: 2024-11-05

## 2024-11-05 RX ADMIN — METOPROLOL TARTRATE 50 MG: 50 TABLET, FILM COATED ORAL at 21:21

## 2024-11-05 RX ADMIN — IPRATROPIUM BROMIDE AND ALBUTEROL SULFATE 3 ML: 2.5; .5 SOLUTION RESPIRATORY (INHALATION) at 11:53

## 2024-11-05 RX ADMIN — PIPERACILLIN AND TAZOBACTAM 3.38 G: 3; .375 INJECTION, POWDER, FOR SOLUTION INTRAVENOUS at 01:34

## 2024-11-05 RX ADMIN — INSULIN HUMAN 4 UNITS: 100 INJECTION, SOLUTION PARENTERAL at 01:34

## 2024-11-05 RX ADMIN — CHLORHEXIDINE GLUCONATE 15 ML: 1.2 RINSE ORAL at 08:47

## 2024-11-05 RX ADMIN — CHLORHEXIDINE GLUCONATE 15 ML: 1.2 RINSE ORAL at 21:22

## 2024-11-05 RX ADMIN — MUPIROCIN 1 APPLICATION: 20 OINTMENT TOPICAL at 21:22

## 2024-11-05 RX ADMIN — INSULIN GLARGINE 10 UNITS: 100 INJECTION, SOLUTION SUBCUTANEOUS at 21:21

## 2024-11-05 RX ADMIN — ENOXAPARIN SODIUM 40 MG: 100 INJECTION SUBCUTANEOUS at 11:15

## 2024-11-05 RX ADMIN — FOLIC ACID 1 MG: 1 TABLET ORAL at 08:47

## 2024-11-05 RX ADMIN — METOPROLOL TARTRATE 25 MG: 25 TABLET, FILM COATED ORAL at 08:47

## 2024-11-05 RX ADMIN — ENOXAPARIN SODIUM 90 MG: 100 INJECTION SUBCUTANEOUS at 17:45

## 2024-11-05 RX ADMIN — TAMSULOSIN HYDROCHLORIDE 0.4 MG: 0.4 CAPSULE ORAL at 08:47

## 2024-11-05 RX ADMIN — INSULIN HUMAN 2 UNITS: 100 INJECTION, SOLUTION PARENTERAL at 08:47

## 2024-11-05 RX ADMIN — INSULIN HUMAN 6 UNITS: 100 INJECTION, SOLUTION PARENTERAL at 12:53

## 2024-11-05 RX ADMIN — Medication 10 ML: at 21:22

## 2024-11-05 RX ADMIN — Medication 10 ML: at 08:48

## 2024-11-05 RX ADMIN — LANSOPRAZOLE 30 MG: 15 TABLET, ORALLY DISINTEGRATING ORAL at 05:14

## 2024-11-05 RX ADMIN — INSULIN HUMAN 4 UNITS: 100 INJECTION, SOLUTION PARENTERAL at 05:14

## 2024-11-05 RX ADMIN — HYDROCODONE BITARTRATE AND ACETAMINOPHEN 1 TABLET: 5; 325 TABLET ORAL at 12:57

## 2024-11-05 RX ADMIN — INSULIN HUMAN 6 UNITS: 100 INJECTION, SOLUTION PARENTERAL at 17:45

## 2024-11-05 RX ADMIN — MUPIROCIN 1 APPLICATION: 20 OINTMENT TOPICAL at 08:48

## 2024-11-05 RX ADMIN — IPRATROPIUM BROMIDE AND ALBUTEROL SULFATE 3 ML: 2.5; .5 SOLUTION RESPIRATORY (INHALATION) at 07:23

## 2024-11-05 NOTE — PROGRESS NOTES
Post operative day 9 status post left hemicolectomy with colostomy     S: No events overnight.  Tolerating tube feeds.     O:   Vitals:    11/05/24 0731 11/05/24 0739 11/05/24 0740 11/05/24 1127   BP:       BP Location:       Patient Position:       Pulse:  (!) 121 (!) 121    Resp:  20     Temp: 98.3 °F (36.8 °C)   98.1 °F (36.7 °C)   TempSrc: Oral   Oral   SpO2:  96% 94%    Weight:       Height:         Multiple drains with minimal output that are all serous  Alert, chronically appearing  Abdomen soft and nontender  Midline incision open with dressing in place  Ostomy pink and viable with stool    White blood cell count 19.6, hemoglobin 8.2, platelets 115  All other labs were reviewed    CT scan of the abdomen pelvis did not show any intra-abdominal abnormality that would explain patient's leukocytosis    Assessment and plan    Postoperative day 9 status post left hemicolectomy with colostomy for colon cancer.  Right kidney mass.  Patient with slowly recovery due to chronic neglect of his health plus alcohol abuse.    Diet as tolerated  Continue tube feeds  Continue wet-to-dry dressing changes  Remove drain #1 and #3    Mc Guillaume MD, FACS  General, Minimally Invasive and Endoscopic Surgery  [facility]    [address], [address]  [place], [place], [address]  P: [phone]  F: [phone]

## 2024-11-05 NOTE — PLAN OF CARE
Goal Outcome Evaluation:  VSS, RA day 2L at night. Patient diet progressed to puree with pt eating about 25% of lunch and dinner. Pt removed Cortrack. BRONWYN 1 and 3 removed. Pt working with PT. F/C with good UOP. Colostomy productive with brown stool. CTM.

## 2024-11-05 NOTE — PLAN OF CARE
Goal Outcome Evaluation:  Pt remains in CCU under critical care. Pt seemed confused over night, tried to slide out of the bed multiple times. Changed pts abdominal wound dressing and mepilex on his bottom. Wife remains at bedside. Will continue to monitor.

## 2024-11-05 NOTE — PROGRESS NOTES
"DAILY PROGRESS NOTE  [facility]    Patient Identification:  Name: Arsh Haynes  Age: 74 y.o.  Sex: male  :  1950  MRN: [id number]         Primary Care Physician: Provider, No Known      Subjective  74-year-old male presents emergency room with abdominal pain on 10/26/2024 secondary to perforated viscus.  He underwent an emergent hemicolectomy with colostomy.  He had fecal peritonitis and a rectosigmoid mass positive for adenocarcinoma.  In addition there was ischemic colon with perforation at the descending sigmoid colon.  1 of 2 blood cultures was positive for Bifidobacterium species.  Peritoneal fluid was positive for E. coli as well as Pseudomonas aeruginosa.  He underwent fluid resuscitation for septic shock and is presently completed a course of Zosyn.  He was intubated postop due to postoperative respiratory failure and extubated on 10/29/2024.  He has been tolerating tube feedings and recently started on puréed diet in addition to the tube feedings.  Presently no acute complaints.    Objective:  General Appearance:  Comfortable, in no acute distress and not in pain.    Vital signs: (most recent): Blood pressure 133/68, pulse 118, temperature 98 °F (36.7 °C), temperature source Oral, resp. rate 22, height 182.9 cm (72\"), weight 87 kg (191 lb 12.8 oz), SpO2 93%.    HEENT: (Feeding tube in place.)    Lungs:  Normal effort and normal respiratory rate.  He is not in respiratory distress.  No stridor.  There are rhonchi.  No rales, decreased breath sounds or wheezes.  (Scattered rhonchi.)  Heart: Tachycardia.  Regular rhythm.    Abdomen: Abdomen is soft and non-distended.  Hypoactive bowel sounds.   There is no abdominal tenderness.     Extremities: There is no dependent edema.    Neurological: Patient is alert and oriented to person, place and time.    Skin:  Warm and dry.                  Vital signs in last 24 hours:  Temp:  [98.1 °F (36.7 °C)-98.6 °F (37 °C)] 98.1 °F (36.7 °C)  Heart " Rate:  [111-121] 118  Resp:  [20-25] 20  BP: (129-146)/() 133/81    Intake/Output:    Intake/Output Summary (Last 24 hours) at 11/5/2024 1521  Last data filed at 11/5/2024 0500  Gross per 24 hour   Intake --   Output 1240 ml   Net -1240 ml         Results from last 7 days   Lab Units 11/05/24  0602 11/04/24  0649 11/03/24  0626 11/02/24  0348 11/01/24  0601 10/31/24  0503 10/30/24  0345   WBC 10*3/mm3 19.60* 20.90* 17.06* 15.46* 14.26* 16.55* 20.05*   HEMOGLOBIN g/dL 8.2* 8.0* 8.4* 8.5* 8.7* 9.1* 8.3*   PLATELETS 10*3/mm3 915* 760* 547* 426 354 222 198     Results from last 7 days   Lab Units 11/05/24  0602 11/04/24  0649 11/03/24  0626 11/02/24  0348 11/01/24  0522 10/31/24  0503 10/30/24  1737 10/30/24  0345   SODIUM mmol/L 130* 130* 133* 135* 138 142  --  137   POTASSIUM mmol/L 4.3 4.4 4.2 3.9 4.8 4.3 4.2 3.1*   CHLORIDE mmol/L 98 99 103 105 111* 114*  --  114*   CO2 mmol/L 22.2 23.6 23.0 21.3* 18.0* 20.2*  --  15.8*   BUN mg/dL 9 10 9 9 9 7*  --  9   CREATININE mg/dL 0.52* 0.48* 0.52* 0.56* 0.70* 0.63*  --  0.57*   GLUCOSE mg/dL 250* 216* 230* 173* 126* 117*  --  377*   Estimated Creatinine Clearance: 158 mL/min (A) (by C-G formula based on SCr of 0.52 mg/dL (L)).  Results from last 7 days   Lab Units 11/05/24  0602 11/04/24  0649 11/03/24  0626 11/02/24  0348 11/01/24  1209 11/01/24  0522 10/31/24  0503 10/30/24  0345   CALCIUM mg/dL 7.9* 7.9* 7.7* 8.0*  --  7.4* 7.2* 6.2*   ALBUMIN g/dL 2.1* 2.2* 1.8* 1.9*  --  1.8* 1.3* 1.1*   MAGNESIUM mg/dL  --   --   --   --  2.1  --   --  1.9     Results from last 7 days   Lab Units 11/05/24  0602 11/04/24  0649 11/03/24  0626 11/02/24  0348 11/01/24  0522 10/31/24  0503 10/30/24  0345   ALBUMIN g/dL 2.1* 2.2* 1.8* 1.9* 1.8* 1.3* 1.1*   BILIRUBIN mg/dL 0.5 0.5 0.5 0.8 1.0 1.1 1.2   ALK PHOS U/L 257* 229* 251* 225* 203* 163* 125*   AST (SGOT) U/L 24 19 19 18 23 20 24   ALT (SGPT) U/L 27 23 19 18 18 17 13       Assessment:  Sepsis/septic shock: Secondary to below.   Zosyn completed.  Peritonitis secondary to perforated viscus: Secondary below.  Rectosigmoid adenocarcinoma stage II: Status post emergent hemicolectomy with colostomy 10/26/2024.  Diabetes type 2: Previously undiagnosed.  Hyperglycemia with admitting A1c of 6.6.  Add Lantus.  Change Accu-Cheks to before meals and at bedtime.  Renal mass: Urology input appreciated.  Follow-up with urology.  Anemia: Likely multifactorial.  Check iron, B12, folate.... Monitor.  RLE DVT: On full dose Lovenox.  Alcohol abuse: No signs of withdrawal.  Patient received thiamine....   Hyponatremia: Monitor.  Avoid hypotonic fluids.  Check urine electrolytes.  Hypocalcemia: Recheck ionized calcium.  Replete if necessary.  Check phosphate, vitamin D and PTH.  Monitor.  SVT: Continue metoprolol.    All problems new to this examiner.    Plan:  Please see above.  Discussed with patient.      Coy Fagan MD  11/5/2024  15:21 EST

## 2024-11-05 NOTE — PLAN OF CARE
Goal Outcome Evaluation:  Plan of Care Reviewed With: patient           Outcome Evaluation: Pt agreeable to PT this afternoon. Pt attempting to get OOB upon entry and therapist assisted pt with repositioning in bed. Pt requiring mod/max A x 2 to transition to EOB.  He demos left lean in sitting requiring min A for support. He was able to complete several BLE exercises at EOB, but fatigues easily. Unable to attempt standing this date. Pt requested to lay back down requiring mod/max A x 2 to return to supine. Will continue to progress activity as tolerated.

## 2024-11-05 NOTE — PLAN OF CARE
Goal Outcome Evaluation:  Plan of Care Reviewed With: patient           Outcome Evaluation: Patient seen for clinical swallow assessment. Intake poor, but tolerating water. No overt s/s of aspiration with single drinks thins via cup/straw and puree. Increased bolus transit. No voice change post swallow. Multiple swallows with mech soft. Question of slight voice change with mixed. SLP recs upgrade to puree, continue single drinks thins. Meds via cortrak or crushed with puee. Will continue to follow for diet upgrade.

## 2024-11-05 NOTE — THERAPY RE-EVALUATION
Acute Care - Speech Language Pathology   Swallow Re-Evaluation [facility]     Patient Name: Arsh Haynes  : 1950  MRN: [id number]  Today's Date: 2024               Admit Date: 10/26/2024    Visit Dx:     ICD-10-CM ICD-9-CM   1. Perforation of sigmoid colon  K63.1 569.83   2. Hypotension, unspecified hypotension type  I95.9 458.9   3. Increased lactic acid level  R79.89 276.2   4. Bowel perforation  K63.1 569.83     Patient Active Problem List   Diagnosis    Colon cancer     Past Medical History:   Diagnosis Date    HTN (hypertension)      Past Surgical History:   Procedure Laterality Date    COLON RESECTION N/A 10/26/2024    Procedure: LOW ANTERIOR COLON RESECTION SIGMOID, COLOSTOMY, SPLENIC FLEXURE MOBILIZATION;  Surgeon: Mc Guillaume MD;  Location: [facility];  Service: General;  Laterality: N/A;    EXPLORATORY LAPAROTOMY N/A 10/26/2024    Procedure: LAPAROTOMY EXPLORATORY, LEFT HEMICOLECTOMY;  Surgeon: Mc Guillaume MD;  Location: [facility];  Service: General;  Laterality: N/A;       SLP Recommendation and Plan                                                                               Outcome Evaluation: Patient seen for clinical swallow assessment. Intake poor, but tolerating water. No overt s/s of aspiration with single drinks thins via cup/straw and puree. Increased bolus transit. No voice change post swallow. Multiple swallows with mech soft. Question of slight voice change with mixed. SLP recs upgrade to puree, continue single drinks thins. Meds via cortrak or crushed with puee. Will continue to follow for diet upgrade.      SWALLOW EVALUATION (Last 72 Hours)       SLP Adult Swallow Evaluation       Row Name 24 0900       Rehab Evaluation    Document Type re-evaluation  -       General Information    Patient Profile Reviewed yes  -SH       Pain    Pretreatment Pain Rating 0/10 - no pain  -SH    Posttreatment Pain Rating 0/10 - no pain  -SH    Pain  Location --    Pain Side/Orientation generalized  -    Pain Management Interventions --       Oral Motor Structure and Function              User Key  (r) = Recorded By, (t) = Taken By, (c) = Cosigned By      Initials Name Effective Dates    Sarah Rosales, YESICA 01/05/24 -     Maynor Tai, YESICA 02/19/23 -                     EDUCATION  The patient has been educated in the following areas:   Dysphagia (Swallowing Impairment).        SLP GOALS       Row Name 11/05/24 0900 11/04/24 1446          (LTG) Swallow    (LTG) Swallow Pt will advance from FOIS level 3 to FOIS level 6, without strategies, as assessed by SLP.  -SH Pt will advance from FOIS level 3 to FOIS level 6, without strategies, as assessed by SLP.  -BB     [place] (Swallow Long Term Goal) independently (over 90% accuracy)  - independently (over 90% accuracy)  -BB     Time Frame (Swallow Long Term Goal) by discharge  - by discharge  -BB     Progress/Outcomes (Swallow Long Term Goal) goal ongoing  - new goal  -BB     Comment (Swallow Long Term Goal) Surgery approved solid food trials. Patient seen for clinical swallow assessment. Intake poor, but tolerating water. No overt s/s of aspiration with single drinks thins via cup/straw and puree. Increased bolus transit. No voice change post swallow. Multiple swallows with mech soft. Question of slight voice change with mixed. SLP recs upgrade to puree, continue single drinks thins. Meds via cortrak or crushed with puee. Will continue to follow for diet upgrade.  - --               User Key  (r) = Recorded By, (t) = Taken By, (c) = Cosigned By      Initials Name Provider Type    Sarah Rosales, SLP Speech and Language Pathologist    Maynor Tai SLP Speech and Language Pathologist                         Time Calculation:    Time Calculation- SLP       Row Name 11/05/24 0957 11/05/24 0956          Time Calculation- SLP    SLP Start Time 0745  - 0800  -     SLP Received On -- 11/05/24   -               User Key  (r) = Recorded By, (t) = Taken By, (c) = Cosigned By      Initials Name Provider Type     Sarah Huff SLP Speech and Language Pathologist                    Therapy Charges for Today       Code Description Service Date Service Provider Modifiers Qty    09015004268  ST TREATMENT SWALLOW 4 11/5/2024 Sarah Huff SLP GN 1                 YESICA Briceno  11/5/2024

## 2024-11-05 NOTE — CASE MANAGEMENT/SOCIAL WORK
Continued Stay Note  [facility]     Patient Name: Arsh Haynes  MRN: [id number]  Today's Date: 11/5/2024    Admit Date: 10/26/2024    Plan: PT recommending IRF. Need choices   Discharge Plan       Row Name 11/05/24 1603       Plan    Plan PT recommending IRF. Need choices    Roadmap to Recovery Yes    Plan Comments Spoke with patient at bedside. Patient asks this ccp to call spouse. Call placed with no answer and left voicemail.                    Expected Discharge Date and Time       Expected Discharge Date Expected Discharge Time    Nov 8, 2024               Nargis Lugo RN

## 2024-11-05 NOTE — PROGRESS NOTES
ID note for sepsis  S: confused. Restless overnight per wife. af    Physical Exam:   Vital Signs   Temp:  [97.9 °F (36.6 °C)-98.6 °F (37 °C)] 98.3 °F (36.8 °C)  Heart Rate:  [110-123] 121  Resp:  [20-25] 20  BP: (137-158)/() 137/110    GENERAL: Awake and alert, ill  HEENT: Oropharynx is clear. Hearing is grossly normal.   EYES: . No conjunctival injection. No lid lag.   LUNGS:normal respiratory effort.   SKIN: no cutaneous eruptions in exposed areas  PSYCHIATRIC: confused    Results Review:  White count 19.6  Creatinine 0.52  Liver function test normal apart from an alk phos of 257  CT abdomen pelvis with no intra-abdominal abscess.  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  Independently interpreted lung windows and extensive atelectasis and pleural effusions  10/29 blood culture 1/2 Bifidobacterium spp  10/26 abdominal fluid cultures E. coli, Pseudomonas and mixed anaerobes  10/2 blood cultures 2/2 no growth to date       A/p  Septic shock, shock resolved  Feculent peritonitis from perforated colon cancer status post ex lap, washout hemicolectomy and end colostomy by Dr. Guillaume on 10/26  Colon cancer  Atelectasis  Pleural effusions    White count remains elevated.  I think this is likely secondary to the pulmonary findings with effusions and atelectasis.  Less likely pneumonia.  Remains afebrile.  I will go ahead and stop his antibiotics.  Another consideration would be for DVT consideration given his immobility and malignancy albeit he is receiving prophylactic enoxaparin.  I will go ahead and check lower extremity duplex venography for completeness sake.

## 2024-11-05 NOTE — THERAPY TREATMENT NOTE
Patient Name: Arsh Haynes  : 1950    MRN: [id number]                              Today's Date: 2024       Admit Date: 10/26/2024    Visit Dx:     ICD-10-CM ICD-9-CM   1. Perforation of sigmoid colon  K63.1 569.83   2. Hypotension, unspecified hypotension type  I95.9 458.9   3. Increased lactic acid level  R79.89 276.2   4. Bowel perforation  K63.1 569.83     Patient Active Problem List   Diagnosis    Colon cancer     Past Medical History:   Diagnosis Date    HTN (hypertension)      Past Surgical History:   Procedure Laterality Date    COLON RESECTION N/A 10/26/2024    Procedure: LOW ANTERIOR COLON RESECTION SIGMOID, COLOSTOMY, SPLENIC FLEXURE MOBILIZATION;  Surgeon: Mc Guillaume MD;  Location: [facility];  Service: General;  Laterality: N/A;    EXPLORATORY LAPAROTOMY N/A 10/26/2024    Procedure: LAPAROTOMY EXPLORATORY, LEFT HEMICOLECTOMY;  Surgeon: Mc Guillaume MD;  Location: [facility];  Service: General;  Laterality: N/A;      General Information       Row Name 24 1539          Physical Therapy Time and Intention    Document Type therapy note (daily note)  -EJ     Mode of Treatment physical therapy  -       Row Name 24 1539          General Information    Existing Precautions/Restrictions fall;oxygen therapy device and L/min;NPO  -EJ     Barriers to Rehab medically complex;cognitive status  -EJ               User Key  (r) = Recorded By, (t) = Taken By, (c) = Cosigned By      Initials Name Provider Type    EJ Yane Mayfield, PT Physical Therapist                   Mobility       Row Name 24 1539          Bed Mobility    Supine-Sit [place] (Bed Mobility) moderate assist (50% patient effort);maximum assist (25% patient effort);2 person assist;verbal cues;nonverbal cues (demo/gesture)  -EJ     Sit-Supine [place] (Bed Mobility) moderate assist (50% patient effort);maximum assist (25% patient effort);2 person assist;nonverbal cues  (demo/gesture);verbal cues  -EJ     Assistive Device (Bed Mobility) head of bed elevated;bed rails;repositioning sheet  -EJ     Comment, (Bed Mobility) leans left, limited by fatigue  -EJ       Row Name 11/05/24 1539          Transfers    Comment, (Transfers) unable to assess 2' fatigue, weakness  -EJ               User Key  (r) = Recorded By, (t) = Taken By, (c) = Cosigned By      Initials Name Provider Type    Yane Valencia, PT Physical Therapist                   Obj/Interventions       Row Name 11/05/24 1540          Motor Skills    Therapeutic Exercise --  seated LAQ and AP x 5 reps  -EJ       Row Name 11/05/24 1540          Balance    Static Sitting Balance minimal assist  -EJ     Position, Sitting Balance sitting edge of bed  -EJ               User Key  (r) = Recorded By, (t) = Taken By, (c) = Cosigned By      Initials Name Provider Type    Yane Valencia, PT Physical Therapist                   Goals/Plan    No documentation.                  Clinical Impression       Row Name 11/05/24 1541          Pain    Pretreatment Pain Rating 5/10  -EJ     Posttreatment Pain Rating 5/10  -EJ     Pain Location back  -EJ       Row Name 11/05/24 1541          Plan of Care Review    Plan of Care Reviewed With patient  -EJ     Outcome Evaluation Pt agreeable to PT this afternoon. Pt attempting to get OOB upon entry and therapist assisted pt with repositioning in bed. Pt requiring mod/max A x 2 to transition to EOB.  He demos left lean in sitting requiring min A for support. He was able to complete several BLE exercises at EOB, but fatigues easily. Unable to attempt standing this date. Pt requested to lay back down requiring mod/max A x 2 to return to supine. Will continue to progress activity as tolerated.  -EJ       Row Name 11/05/24 1541          Positioning and Restraints    Pre-Treatment Position in bed  -EJ     Post Treatment Position bed  -EJ     In Bed notified nsg;supine;call light within  reach;encouraged to call for assist;exit alarm on  -EJ               User Key  (r) = Recorded By, (t) = Taken By, (c) = Cosigned By      Initials Name Provider Type    Yane Valencia PT Physical Therapist                   Outcome Measures       Row Name 11/05/24 9819          How much help from another person do you currently need...    Turning from your back to your side while in flat bed without using bedrails? 2  -EJ     Moving from lying on back to sitting on the side of a flat bed without bedrails? 2  -EJ     Moving to and from a bed to a chair (including a wheelchair)? 1  -EJ     Standing up from a chair using your arms (e.g., wheelchair, bedside chair)? 1  -EJ     Climbing 3-5 steps with a railing? 1  -EJ     To walk in hospital room? 1  -EJ     AM-PAC 6 Clicks Score (PT) 8  -EJ     Highest Level of Mobility Goal 3 --> Sit at edge of bed  -EJ               User Key  (r) = Recorded By, (t) = Taken By, (c) = Cosigned By      Initials Name Provider Type    Yane Valencia PT Physical Therapist                                 Physical Therapy Education       Title: PT OT SLP Therapies (In Progress)       Topic: Physical Therapy (In Progress)       Point: Mobility training (Done)       Learning Progress Summary            Patient Acceptance, E, VU by  at 11/2/2024 1622                      Point: Home exercise program (Not Started)       Learner Progress:  Not documented in this visit.              Point: Body mechanics (Done)       Learning Progress Summary            Patient Acceptance, E, VU by  at 11/2/2024 1622                      Point: Precautions (Done)       Learning Progress Summary            Patient Acceptance, E, VU by  at 11/2/2024 1622                                      User Key       Initials Effective Dates Name Provider Type Discipline     12/13/22 -  Lisa Paulson PT Physical Therapist PT                  PT Recommendation and Plan     Outcome Evaluation: Pt  agreeable to PT this afternoon. Pt attempting to get OOB upon entry and therapist assisted pt with repositioning in bed. Pt requiring mod/max A x 2 to transition to EOB.  He demos left lean in sitting requiring min A for support. He was able to complete several BLE exercises at EOB, but fatigues easily. Unable to attempt standing this date. Pt requested to lay back down requiring mod/max A x 2 to return to supine. Will continue to progress activity as tolerated.     Time Calculation:         PT Charges       Row Name 11/05/24 1546             Time Calculation    Start Time 1450  -EJ      Stop Time 1515  -EJ      Time Calculation (min) 25 min  -EJ      PT Received On 11/05/24  -EJ      PT - Next Appointment 11/06/24  -EJ                User Key  (r) = Recorded By, (t) = Taken By, (c) = Cosigned By      Initials Name Provider Type    Yane Valencia, PT Physical Therapist                  Therapy Charges for Today       Code Description Service Date Service Provider Modifiers Qty    59040965084 HC PT THER PROC EA 15 MIN 11/4/2024 Yane Mayfield, PT GP 1    56860885267 HC PT THERAPEUTIC ACT EA 15 MIN 11/4/2024 Yane Mayfield, PT GP 1    93072557785 HC PT THERAPEUTIC ACT EA 15 MIN 11/5/2024 Yane Mayfield, PT GP 1    66215366356 HC PT THER PROC EA 15 MIN 11/5/2024 Yane Mayfield, PT GP 1            PT G-Codes  Outcome Measure Options: AM-PAC 6 Clicks Daily Activity (OT)  AM-PAC 6 Clicks Score (PT): 8  AM-PAC 6 Clicks Score (OT): 7  Modified Young Scale: 5 - Severe disability.  Bedridden, incontinent, and requiring constant nursing care and attention.       Yane Mayfield PT  11/5/2024

## 2024-11-05 NOTE — PROGRESS NOTES
"            Daily Progress Note.   [facility] CORONARY CARE  11/4/2024     Patient:  Name:  Arsh Haynes  MRN:  [id number]  1950  74 y.o             C: Follow up septic shock from perforated bowel with likely underlying malignancy      Interval History:  On room air blood pressure stable afebrile overnight.  Intermittent tachycardia.  More alert more awake  Using flutter     Physical Exam:  /68   Pulse 114   Temp 98 °F (36.7 °C) (Oral)   Resp 24   Ht 182.9 cm (72\")   Wt 87 kg (191 lb 12.8 oz)   SpO2 93%   BMI 26.01 kg/m²   Body mass index is 26.01 kg/m².     Intake/Output Summary (Last 24 hours) at 11/4/2024 0925  Last data filed at 11/4/2024 0400      Gross per 24 hour   Intake 2155 ml   Output 1285 ml   Net 870 ml   ng  General appearance: awake alert  conversant    Eyes: anicteric sclerae, moist conjunctivae; no lidlag;    HENT: Atraumatic; oropharynx clear with moist mucous membranes    Neck: Trachea midline;  supple   Lungs: NUHA, no rhonchi no wheeze with improved respiratory effort and no intercostal retractions  CV: tachy irreg, no rub   Abdomen: incision bandaged, ostomy llq, janeen drains+  Extremities: +ble trace peripheral edema    Skin: WWP   Psych/neuro: calm affect, alert stronger voice but interacts and answers questions appropriately.     Data Review:  Notable Labs:             Results from last 7 days   Lab Units 11/04/24  0649 11/03/24  0626 11/02/24  0348 11/01/24  0601 10/31/24  0503 10/30/24  0345 10/29/24  0334   WBC 10*3/mm3 20.90* 17.06* 15.46* 14.26* 16.55* 20.05* 20.19*   HEMOGLOBIN g/dL 8.0* 8.4* 8.5* 8.7* 9.1* 8.3* 8.6*   PLATELETS 10*3/mm3 760* 547* 426 354 222 198 236                   Results from last 7 days   Lab Units 11/04/24  0649 11/03/24  0626 11/02/24  0348 11/01/24  1209 11/01/24  0522 10/31/24  0503 10/30/24  1737 10/30/24  0345 10/29/24  0334   SODIUM mmol/L 130* 133* 135*  --  138 142  --  137 143   POTASSIUM mmol/L 4.4 4.2 3.9  --  4.8 4.3 4.2 " 3.1* 4.1   CHLORIDE mmol/L 99 103 105  --  111* 114*  --  114* 118*   CO2 mmol/L 23.6 23.0 21.3*  --  18.0* 20.2*  --  15.8* 16.5*   BUN mg/dL 10 9 9  --  9 7*  --  9 12   CREATININE mg/dL 0.48* 0.52* 0.56*  --  0.70* 0.63*  --  0.57* 0.85   GLUCOSE mg/dL 216* 230* 173*  --  126* 117*  --  377* 158*   CALCIUM mg/dL 7.9* 7.7* 8.0*  --  7.4* 7.2*  --  6.2* 6.7*   MAGNESIUM mg/dL  --   --   --  2.1  --   --   --  1.9 2.3   Estimated Creatinine Clearance: 171.2 mL/min (A) (by C-G formula based on SCr of 0.48 mg/dL (L)).            Results from last 7 days   Lab Units 11/04/24  0649 11/03/24  0626 11/02/24  0348   AST (SGOT) U/L 19 19 18   ALT (SGPT) U/L 23 19 18   PLATELETS 10*3/mm3 760* 547* 426                Results from last 7 days   Lab Units 11/01/24  0816 10/30/24  1131 10/29/24  0315   PH, ARTERIAL pH units 7.499* 7.445 7.372   PCO2, ARTERIAL mm Hg 28.5* 28.8* 28.0*   PO2 ART mm Hg 73.9* 57.3* 76.5*   HCO3 ART mmol/L 22.1 19.8* 16.3*         Imaging:  Reviewed chest images personally from past 3 days     ASSESSMENT  /  PLAN:  Severe sepsis with septic shock secondary to perforated bowel and peritonitis with likely malignancy s/p status post open left hemicolectomy with colostomy   INVASIVE MODERATELY DIFFERENTIATED MUCINOUS ADENOCARCINOMA.   ETOH abuse and withdrawal  Hyperglycemia  Right renal mass  Lactic acidosis  Anemia  Bacteremia  SVT status post digoxin  Postop respiratory insufficiency and ventilator management status post extubation 10/29  Colon cancer stage II further workup upon recovery.  RLE gastroc dvt, high risk of propogation - Lovenox therapeutic if okay with gen surgery     Continue to treat and monitor withdrawal symptoms.  S/p cuorse of zosyn.  Abx per id note recs.     Metoprolol PO home dose     Folic acid  Thiamine     Urology consulted recs appreicate, will need follow up for renal mass as well as urinary retention     Ssi     Telemetry status     Medicine consult     Plan of care and  patient course was reviewed with multidisciplinary team in morning rounds.    Electronically signed by Darek Lerner MD, 11/05/24, 1:28 PM EST.                     No (0)

## 2024-11-06 PROBLEM — R65.21 SHOCK, SEPTIC: Status: ACTIVE | Noted: 2024-11-06

## 2024-11-06 PROBLEM — A41.9 SHOCK, SEPTIC: Status: ACTIVE | Noted: 2024-11-06

## 2024-11-06 PROBLEM — D64.9 ANEMIA: Status: ACTIVE | Noted: 2024-11-06

## 2024-11-06 PROBLEM — N28.89 RENAL MASS: Status: ACTIVE | Noted: 2024-11-06

## 2024-11-06 PROBLEM — E87.1 HYPONATREMIA: Status: ACTIVE | Noted: 2024-11-06

## 2024-11-06 PROBLEM — D75.839 THROMBOCYTOSIS: Status: ACTIVE | Noted: 2024-11-06

## 2024-11-06 PROBLEM — I82.409 ACUTE DVT (DEEP VENOUS THROMBOSIS): Status: ACTIVE | Noted: 2024-11-06

## 2024-11-06 PROBLEM — E44.0 MODERATE PROTEIN-CALORIE MALNUTRITION: Status: ACTIVE | Noted: 2024-11-06

## 2024-11-06 PROBLEM — I10 HTN (HYPERTENSION): Status: ACTIVE | Noted: 2024-11-06

## 2024-11-06 PROBLEM — K65.9 PERITONITIS: Status: ACTIVE | Noted: 2024-11-06

## 2024-11-06 LAB
ALBUMIN SERPL-MCNC: 1.8 G/DL (ref 3.5–5.2)
ALBUMIN/GLOB SERPL: 0.4 G/DL
ALP SERPL-CCNC: 238 U/L (ref 39–117)
ALT SERPL W P-5'-P-CCNC: 31 U/L (ref 1–41)
ANION GAP SERPL CALCULATED.3IONS-SCNC: 10.3 MMOL/L (ref 5–15)
AST SERPL-CCNC: 28 U/L (ref 1–40)
BASOPHILS # BLD AUTO: 0.09 10*3/MM3 (ref 0–0.2)
BASOPHILS NFR BLD AUTO: 0.5 % (ref 0–1.5)
BILIRUB SERPL-MCNC: 0.6 MG/DL (ref 0–1.2)
BUN SERPL-MCNC: 8 MG/DL (ref 8–23)
BUN/CREAT SERPL: 16.7 (ref 7–25)
CA-I SERPL ISE-MCNC: 1.18 MMOL/L (ref 1.15–1.35)
CALCIUM SPEC-SCNC: 8.1 MG/DL (ref 8.6–10.5)
CHLORIDE SERPL-SCNC: 99 MMOL/L (ref 98–107)
CO2 SERPL-SCNC: 21.7 MMOL/L (ref 22–29)
CREAT SERPL-MCNC: 0.48 MG/DL (ref 0.76–1.27)
DEPRECATED RDW RBC AUTO: 56.2 FL (ref 37–54)
EGFRCR SERPLBLD CKD-EPI 2021: 109.7 ML/MIN/1.73
EOSINOPHIL # BLD AUTO: 0.36 10*3/MM3 (ref 0–0.4)
EOSINOPHIL NFR BLD AUTO: 1.9 % (ref 0.3–6.2)
ERYTHROCYTE [DISTWIDTH] IN BLOOD BY AUTOMATED COUNT: 18.4 % (ref 12.3–15.4)
FERRITIN SERPL-MCNC: 230 NG/ML (ref 30–400)
FOLATE SERPL-MCNC: 11.7 NG/ML (ref 4.78–24.2)
GLOBULIN UR ELPH-MCNC: 4.3 GM/DL
GLUCOSE BLDC GLUCOMTR-MCNC: 112 MG/DL (ref 70–130)
GLUCOSE BLDC GLUCOMTR-MCNC: 142 MG/DL (ref 70–130)
GLUCOSE BLDC GLUCOMTR-MCNC: 146 MG/DL (ref 70–130)
GLUCOSE BLDC GLUCOMTR-MCNC: 179 MG/DL (ref 70–130)
GLUCOSE SERPL-MCNC: 98 MG/DL (ref 65–99)
HCT VFR BLD AUTO: 25 % (ref 37.5–51)
HGB BLD-MCNC: 8 G/DL (ref 13–17.7)
HGB RETIC QN AUTO: 23.1 PG (ref 29.8–36.1)
IMM GRANULOCYTES # BLD AUTO: 0.85 10*3/MM3 (ref 0–0.05)
IMM GRANULOCYTES NFR BLD AUTO: 4.4 % (ref 0–0.5)
IMM RETICS NFR: 38.5 % (ref 3–15.8)
IRON 24H UR-MRATE: 20 MCG/DL (ref 59–158)
IRON SATN MFR SERPL: 13 % (ref 20–50)
LDH SERPL-CCNC: 293 U/L (ref 135–225)
LYMPHOCYTES # BLD AUTO: 1.68 10*3/MM3 (ref 0.7–3.1)
LYMPHOCYTES NFR BLD AUTO: 8.8 % (ref 19.6–45.3)
MAGNESIUM SERPL-MCNC: 2.1 MG/DL (ref 1.6–2.4)
MCH RBC QN AUTO: 27.2 PG (ref 26.6–33)
MCHC RBC AUTO-ENTMCNC: 32 G/DL (ref 31.5–35.7)
MCV RBC AUTO: 85 FL (ref 79–97)
MONOCYTES # BLD AUTO: 1.55 10*3/MM3 (ref 0.1–0.9)
MONOCYTES NFR BLD AUTO: 8.1 % (ref 5–12)
NEUTROPHILS NFR BLD AUTO: 14.62 10*3/MM3 (ref 1.7–7)
NEUTROPHILS NFR BLD AUTO: 76.3 % (ref 42.7–76)
OSMOLALITY SERPL: 269 MOSM/KG (ref 280–301)
OSMOLALITY UR: 535 MOSM/KG
PHOSPHATE SERPL-MCNC: 3 MG/DL (ref 2.5–4.5)
PLATELET # BLD AUTO: 1122 10*3/MM3 (ref 140–450)
PMV BLD AUTO: 8.6 FL (ref 6–12)
POTASSIUM SERPL-SCNC: 4.1 MMOL/L (ref 3.5–5.2)
PROT SERPL-MCNC: 6.1 G/DL (ref 6–8.5)
PTH-INTACT SERPL-MCNC: 9.3 PG/ML (ref 15–65)
RBC # BLD AUTO: 2.94 10*6/MM3 (ref 4.14–5.8)
RETICS # AUTO: 0.05 10*6/MM3 (ref 0.02–0.13)
RETICS/RBC NFR AUTO: 1.51 % (ref 0.7–1.9)
SODIUM SERPL-SCNC: 131 MMOL/L (ref 136–145)
TIBC SERPL-MCNC: 158 MCG/DL (ref 298–536)
TRANSFERRIN SERPL-MCNC: 106 MG/DL (ref 200–360)
VIT B12 BLD-MCNC: 824 PG/ML (ref 211–946)
WBC NRBC COR # BLD AUTO: 19.15 10*3/MM3 (ref 3.4–10.8)

## 2024-11-06 PROCEDURE — 85025 COMPLETE CBC W/AUTO DIFF WBC: CPT | Performed by: INTERNAL MEDICINE

## 2024-11-06 PROCEDURE — 83970 ASSAY OF PARATHORMONE: CPT | Performed by: HOSPITALIST

## 2024-11-06 PROCEDURE — 82652 VIT D 1 25-DIHYDROXY: CPT | Performed by: HOSPITALIST

## 2024-11-06 PROCEDURE — 82948 REAGENT STRIP/BLOOD GLUCOSE: CPT

## 2024-11-06 PROCEDURE — 97530 THERAPEUTIC ACTIVITIES: CPT

## 2024-11-06 PROCEDURE — 80053 COMPREHEN METABOLIC PANEL: CPT | Performed by: INTERNAL MEDICINE

## 2024-11-06 PROCEDURE — 84100 ASSAY OF PHOSPHORUS: CPT | Performed by: HOSPITALIST

## 2024-11-06 PROCEDURE — 99223 1ST HOSP IP/OBS HIGH 75: CPT | Performed by: INTERNAL MEDICINE

## 2024-11-06 PROCEDURE — 94761 N-INVAS EAR/PLS OXIMETRY MLT: CPT

## 2024-11-06 PROCEDURE — 83930 ASSAY OF BLOOD OSMOLALITY: CPT | Performed by: HOSPITALIST

## 2024-11-06 PROCEDURE — 85046 RETICYTE/HGB CONCENTRATE: CPT | Performed by: INTERNAL MEDICINE

## 2024-11-06 PROCEDURE — 83615 LACTATE (LD) (LDH) ENZYME: CPT | Performed by: INTERNAL MEDICINE

## 2024-11-06 PROCEDURE — 94668 MNPJ CHEST WALL SBSQ: CPT

## 2024-11-06 PROCEDURE — 82607 VITAMIN B-12: CPT | Performed by: INTERNAL MEDICINE

## 2024-11-06 PROCEDURE — 83540 ASSAY OF IRON: CPT | Performed by: INTERNAL MEDICINE

## 2024-11-06 PROCEDURE — 63710000001 INSULIN REGULAR HUMAN PER 5 UNITS: Performed by: HOSPITALIST

## 2024-11-06 PROCEDURE — 82728 ASSAY OF FERRITIN: CPT | Performed by: INTERNAL MEDICINE

## 2024-11-06 PROCEDURE — 25010000002 ENOXAPARIN PER 10 MG: Performed by: INTERNAL MEDICINE

## 2024-11-06 PROCEDURE — 99233 SBSQ HOSP IP/OBS HIGH 50: CPT | Performed by: INTERNAL MEDICINE

## 2024-11-06 PROCEDURE — 82330 ASSAY OF CALCIUM: CPT | Performed by: HOSPITALIST

## 2024-11-06 PROCEDURE — 94799 UNLISTED PULMONARY SVC/PX: CPT

## 2024-11-06 PROCEDURE — 83735 ASSAY OF MAGNESIUM: CPT | Performed by: HOSPITALIST

## 2024-11-06 PROCEDURE — 63710000001 INSULIN GLARGINE PER 5 UNITS: Performed by: INTERNAL MEDICINE

## 2024-11-06 PROCEDURE — 84466 ASSAY OF TRANSFERRIN: CPT | Performed by: INTERNAL MEDICINE

## 2024-11-06 PROCEDURE — 82746 ASSAY OF FOLIC ACID SERUM: CPT | Performed by: INTERNAL MEDICINE

## 2024-11-06 PROCEDURE — 99024 POSTOP FOLLOW-UP VISIT: CPT

## 2024-11-06 RX ORDER — METOPROLOL TARTRATE 50 MG
50 TABLET ORAL EVERY 12 HOURS SCHEDULED
Status: DISCONTINUED | OUTPATIENT
Start: 2024-11-06 | End: 2024-11-11

## 2024-11-06 RX ADMIN — CHLORHEXIDINE GLUCONATE 15 ML: 1.2 RINSE ORAL at 21:02

## 2024-11-06 RX ADMIN — INSULIN GLARGINE 10 UNITS: 100 INJECTION, SOLUTION SUBCUTANEOUS at 20:58

## 2024-11-06 RX ADMIN — METOPROLOL TARTRATE 50 MG: 50 TABLET, FILM COATED ORAL at 21:01

## 2024-11-06 RX ADMIN — INSULIN HUMAN 2 UNITS: 100 INJECTION, SOLUTION PARENTERAL at 20:59

## 2024-11-06 RX ADMIN — Medication 10 ML: at 21:00

## 2024-11-06 RX ADMIN — TERAZOSIN 2 MG: 2 CAPSULE ORAL at 09:37

## 2024-11-06 RX ADMIN — LANSOPRAZOLE 30 MG: 15 TABLET, ORALLY DISINTEGRATING ORAL at 05:20

## 2024-11-06 RX ADMIN — MUPIROCIN 1 APPLICATION: 20 OINTMENT TOPICAL at 09:37

## 2024-11-06 RX ADMIN — MUPIROCIN 1 APPLICATION: 20 OINTMENT TOPICAL at 20:57

## 2024-11-06 RX ADMIN — ENOXAPARIN SODIUM 90 MG: 100 INJECTION SUBCUTANEOUS at 17:20

## 2024-11-06 RX ADMIN — Medication 100 MG: at 09:37

## 2024-11-06 RX ADMIN — FOLIC ACID 1 MG: 1 TABLET ORAL at 09:36

## 2024-11-06 RX ADMIN — METOPROLOL TARTRATE 50 MG: 50 TABLET, FILM COATED ORAL at 09:36

## 2024-11-06 RX ADMIN — CHLORHEXIDINE GLUCONATE 15 ML: 1.2 RINSE ORAL at 09:36

## 2024-11-06 RX ADMIN — Medication 10 ML: at 09:42

## 2024-11-06 RX ADMIN — ENOXAPARIN SODIUM 90 MG: 100 INJECTION SUBCUTANEOUS at 05:20

## 2024-11-06 NOTE — DISCHARGE PLACEMENT REQUEST
"Arsh Haynes (74 y.o. Male)       Date of Birth   1950    Social Security Number       Address   [address] [facility] [address]    Home Phone       MRN   [id number]       Rastafari   None    Marital Status                               Admission Date   10/26/24    Admission Type   Emergency    Admitting Provider   Arnulfo Lynch Jr., MD    Attending Provider   Emiliano Gonzalez MD    Department, Room/Bed   [facility], N338/1       Discharge Date       Discharge Disposition       Discharge Destination                                 Attending Provider: Emiliano Gonzalez MD    Allergies: No Known Allergies    Isolation: None   Infection: None   Code Status: CPR    Ht: 182.9 cm (72\")   Wt: 85.1 kg (187 lb 9.8 oz)    Admission Cmt: None   Principal Problem: Peritonitis [K65.9]                   Active Insurance as of 10/26/2024       Primary Coverage       Payor Plan Insurance Group Employer/Plan Group    ANTHEM MEDICARE REPLACEMENT ANTHEM MEDICARE ADVANTAGE [id number]       Payor Plan Address Payor Plan Phone Number Payor Plan Fax Number Effective Dates     [phone]  1/1/2024 - None Entered    [facility] [id number]         Subscriber Name Subscriber Birth Date Member ID       ARSH HAYNES 1950 [id number]                     Emergency Contacts        (Rel.) Home Phone Work Phone Mobile Phone    CHANTE HAYNES (Spouse) -- -- [phone]                "

## 2024-11-06 NOTE — PROGRESS NOTES
Pulm Update Note    Oncology, infectious disease, and surgery are following along with hospitalist service. Patient has been on room air or intermittently on minimal oxygen at 2 L nasal cannula.  Pulmonary will sign off at this time.  If there are any questions or concerns, please reconsult us.

## 2024-11-06 NOTE — PLAN OF CARE
"Goal Outcome Evaluation:  Plan of Care Reviewed With: patient        Progress: no change  Outcome Evaluation: Pt continues to have very poor activity tolerance. He was able to sit EOB for 5 minutes with encouragement. He c/o of feeling dizzy while up as well as very fqatiqued. BP taken EOB WNL. Pt returned to bed as he reports \"I can't do anymore\". Poor tolerance for ADLs at present. OT encouraged nsg to sit him up in the chair position in ICU bed. OT recomemnds SNF [place].    Anticipated Discharge Disposition (OT): skilled nursing facility                        "

## 2024-11-06 NOTE — THERAPY RE-EVALUATION
Acute Care - Speech Language Pathology   Swallow Re-Evaluation [facility]     Patient Name: Arsh Haynes  : 1950  MRN: [id number]  Today's Date: 2024               Admit Date: 10/26/2024    Visit Dx:     ICD-10-CM ICD-9-CM   1. Perforation of sigmoid colon  K63.1 569.83   2. Hypotension, unspecified hypotension type  I95.9 458.9   3. Increased lactic acid level  R79.89 276.2   4. Bowel perforation  K63.1 569.83     Patient Active Problem List   Diagnosis    Colon cancer    HTN (hypertension)    Thrombocytosis    Acute DVT (deep venous thrombosis)    Renal mass    Hyponatremia    Anemia    Peritonitis     Past Medical History:   Diagnosis Date    HTN (hypertension)      Past Surgical History:   Procedure Laterality Date    COLON RESECTION N/A 10/26/2024    Procedure: LOW ANTERIOR COLON RESECTION SIGMOID, COLOSTOMY, SPLENIC FLEXURE MOBILIZATION;  Surgeon: Mc Guillaume MD;  Location: [facility];  Service: General;  Laterality: N/A;    EXPLORATORY LAPAROTOMY N/A 10/26/2024    Procedure: LAPAROTOMY EXPLORATORY, LEFT HEMICOLECTOMY;  Surgeon: Mc Guillaume MD;  Location: [facility];  Service: General;  Laterality: N/A;       SLP Recommendation and Plan                                                                               Outcome Evaluation: Patient seen for clinical swallow re evaluation. No overt s/s of aspiration with single drinks thins via cup/straw. Soft throat clear and question of voice change with consecutive drinks thins via straw. No overt s/s of aspiration with puree. Double swallow and wet voice with mixed. No overt s/s of aspiration with strained [facility] soft. SLP recs upgrade to [facility] soft, no mixed, and thins, single drinks. Meds with puree. Need to further assess with VFSS d/t wet voice with thins at times and persistent      SWALLOW EVALUATION (Last 72 Hours)       SLP Adult Swallow Evaluation       Row Name 24 1000       Rehab Evaluation     Document Type re-evaluation  -    Subjective Information --    Patient Observations --    Patient Effort --    Symptoms Noted During/After Treatment --       General Information    Patient Profile Reviewed yes  -    Pain    Pretreatment Pain Rating 0/10 - no pain  -    Posttreatment Pain Rating 0/10 - no pain  -    Pain Location --              User Key  (r) = Recorded By, (t) = Taken By, (c) = Cosigned By      Initials Name Effective Dates     Sarah Huff, YESICA 01/05/24 -     BB Maynor Valerio SLP 02/19/23 -                     EDUCATION  The patient has been educated in the following areas:   Dysphagia (Swallowing Impairment).        SLP GOALS       Row Name 11/06/24 1000 11/05/24 0900 11/04/24 1446       (LTG) Swallow    (LTG) Swallow Pt will advance from FOIS level 3 to FOIS level 6, without strategies, as assessed by SLP.  -SH Pt will advance from FOIS level 3 to FOIS level 6, without strategies, as assessed by SLP.  -SH Pt will advance from FOIS level 3 to FOIS level 6, without strategies, as assessed by SLP.  -BB    [place] (Swallow Long Term Goal) independently (over 90% accuracy)  - independently (over 90% accuracy)  - independently (over 90% accuracy)  -BB    Time Frame (Swallow Long Term Goal) by discharge  - by discharge  - by discharge  -BB    Progress/Outcomes (Swallow Long Term Goal) -- goal ongoing  - new goal  -BB    Comment (Swallow Long Term Goal) Patient seen for clinical swallow re evaluation. Pt pulled cortrak. No overt s/s of aspiration with single drinks thins via cup/straw. Soft throat clear and question of voice change with consecutive drinks thins via straw. No overt s/s of aspiration with puree. Double swallow and wet voice with mixed. No overt s/s of aspiration with strained mech soft. SLP recs upgrade to mech soft, no mixed, and thins, single drinks. Meds with puree. Patient's voice is stronger and wet quality is more obvious at times. Need to further assess  with VFSS d/t wet voice with mixed/consecutive drinks thins and persistent elevated WBC. Surgery approved barium use during VFSS. Of note, patient was able to repeat back instructions of throat clear with wet voice and single drinks.Results discussed with RN.  - Surgery okayed solid food trials. Patient seen for clinical swallow assessment. Intake poor, but tolerating water. No overt s/s of aspiration with single drinks thins via cup/straw and puree. Increased bolus transit. No voice change post swallow. Multiple swallows with mech soft. Question of slight voice change with mixed. SLP recs upgrade to puree, continue single drinks thins. Meds via cortrak or crushed with puee. Will continue to follow for diet upgrade.  - --              User Key  (r) = Recorded By, (t) = Taken By, (c) = Cosigned By      Initials Name Provider Type     Sarah Huff, SLP Speech and Language Pathologist    Maynor Tai, SLP Speech and Language Pathologist                         Time Calculation:       Therapy Charges for Today       Code Description Service Date Service Provider Modifiers Qty    [id number] [facility] TREATMENT SWALLOW 4 11/5/2024 Sarah Huff SLP GN 1                 YESICA Briceno  11/6/2024

## 2024-11-06 NOTE — PLAN OF CARE
VSS on 2L at night. Pt able to tolerate oral meds. BRONWYN drain with minimal output. F/C with good UOP. Colostomy productive with brown stool. CHG bath completed. Wound dressing completed as ordered. Will continue to monitor during the remainder of this RN's shift.

## 2024-11-06 NOTE — THERAPY TREATMENT NOTE
Patient Name: Arsh Haynes  : 1950    MRN: [id number]                              Today's Date: 2024       Admit Date: 10/26/2024    Visit Dx:     ICD-10-CM ICD-9-CM   1. Perforation of sigmoid colon  K63.1 569.83   2. Hypotension, unspecified hypotension type  I95.9 458.9   3. Increased lactic acid level  R79.89 276.2   4. Bowel perforation  K63.1 569.83     Patient Active Problem List   Diagnosis    Colon cancer    HTN (hypertension)    Thrombocytosis    Acute DVT (deep venous thrombosis)    Renal mass    Hyponatremia    Anemia    Peritonitis     Past Medical History:   Diagnosis Date    HTN (hypertension)      Past Surgical History:   Procedure Laterality Date    COLON RESECTION N/A 10/26/2024    Procedure: LOW ANTERIOR COLON RESECTION SIGMOID, COLOSTOMY, SPLENIC FLEXURE MOBILIZATION;  Surgeon: Mc Guillaume MD;  Location: [facility];  Service: General;  Laterality: N/A;    EXPLORATORY LAPAROTOMY N/A 10/26/2024    Procedure: LAPAROTOMY EXPLORATORY, LEFT HEMICOLECTOMY;  Surgeon: Mc Guillaume MD;  Location: [facility];  Service: General;  Laterality: N/A;      General Information       Row Name 24 1212          OT Time and Intention    Subjective Information no complaints;fatigue  -SM     Document Type therapy note (daily note)  -SM     Mode of Treatment occupational therapy;co-treatment  -SM     Patient Effort fair  -SM     Symptoms Noted During/After Treatment dizziness;fatigue  -SM       Row Name 24 1212          General Information    Patient Profile Reviewed yes  -SM     Existing Precautions/Restrictions fall  -SM       Row Name 24 1212          Cognition    Orientation Status (Cognition) oriented to;person;place  -SM       Row Name 24 1212          Safety Issues/Impairments Affecting Functional Mobility    Safety Issues Affecting Function (Mobility) awareness of need for assistance;insight into deficits/self-awareness  -SM     Impairments  Affecting Function (Mobility) strength;balance;endurance/activity tolerance;coordination;pain;postural/trunk control;range of motion (ROM);cognition  -     Comment, Safety Issues/Impairments (Mobility) PT/OT cotreatment medically appropriate and necessary due to patient acuity level, to maximize therapeutic benefit due to impaired act tolerance, and for safety of patient and staff. Treatment focused on progression of care and goals established in POC.  -               User Key  (r) = Recorded By, (t) = Taken By, (c) = Cosigned By      Initials Name Provider Type     Sarah Abernathy, MARU Occupational Therapist                     Mobility/ADL's       Row Name 11/06/24 1213          Bed Mobility    Supine-Sit [place] (Bed Mobility) moderate assist (50% patient effort);maximum assist (25% patient effort);2 person assist;verbal cues;nonverbal cues (demo/gesture)  -     Sit-Supine [place] (Bed Mobility) maximum assist (25% patient effort);2 person assist;nonverbal cues (demo/gesture);verbal cues  -     Assistive Device (Bed Mobility) head of bed elevated;bed rails;repositioning sheet  -       Row Name 11/06/24 1213          Transfers    Comment, (Transfers) too fatiqued to attempt  -       Row Name 11/06/24 1213          Lower Body Dressing Assessment/Training    [place] Level (Lower Body Dressing) socks;dependent (less than 25% patient effort);don  -     Position (Lower Body Dressing) supine  -       Row Name 11/06/24 1213          Self-Feeding Assessment/Training    [place] Level (Feeding) set up  -     Comment, (Feeding) Pt reports he is now able to fed self sitting up in the bed  -       Row Name 11/06/24 1213          Toileting Assessment/Training    [place] Level (Toileting) dependent (less than 25% patient effort)  -     Position (Toileting) supine  -               User Key  (r) = Recorded By, (t) = Taken By, (c) = Cosigned By      Initials Name Provider Type     "Sarah Chanel OT Occupational Therapist                   Obj/Interventions       Row Name 11/06/24 1213          Motor Skills    Functional Endurance poor  -       Row Name 11/06/24 1213          Balance    Static Sitting Balance minimal assist;contact guard  -     Position, Sitting Balance sitting edge of bed  -               User Key  (r) = Recorded By, (t) = Taken By, (c) = Cosigned By      Initials Name Provider Type    Sarah Chanel OT Occupational Therapist                   Goals/Plan    No documentation.                  Clinical Impression       Row Name 11/06/24 1214          Pain Assessment    Pre/Posttreatment Pain Comment no c/o of pain  -       Row Name 11/06/24 1214          Plan of Care Review    Plan of Care Reviewed With patient  -     Progress no change  -     Outcome Evaluation Pt continues to have very poor activity tolerance. He was able to sit EOB for 5 minutes with encouragement. He c/o of feeling dizzy while up as well as very fqatiqued. BP taken EOB WNL. Pt returned to bed as he reports \"I can't do anymore\". Poor tolerance for ADLs at present. OT encouraged nsg to sit him up in the chair position in ICU bed. OT recomemnds SNF at [place].  -       Row Name 11/06/24 1214          Therapy Plan Review/Discharge Plan (OT)    Anticipated Discharge Disposition (OT) skilled nursing facility  -       Row Name 11/06/24 1214          Vital Signs    Pretreatment Heart Rate (beats/min) 101  -     Intratreatment Heart Rate (beats/min) 123  -       Row Name 11/06/24 1214          Positioning and Restraints    Pre-Treatment Position in bed  -     Post Treatment Position bed  -     In Bed with nsg;fowlers  -               User Key  (r) = Recorded By, (t) = Taken By, (c) = Cosigned By      Initials Name Provider Type    Sarah Chanel OT Occupational Therapist                   Outcome Measures       Row Name 11/06/24 1217          How much help from another is " currently needed...    Putting on and taking off regular lower body clothing? 1  -SM     Bathing (including washing, rinsing, and drying) 1  -SM     Toileting (which includes using toilet bed pan or urinal) 1  -SM     Putting on and taking off regular upper body clothing 2  -SM     Taking care of personal grooming (such as brushing teeth) 2  -SM     Eating meals 3  -SM     AM-PAC 6 Clicks Score (OT) 10  -SM       Row Name 11/06/24 1204          How much help from another person do you currently need...    Turning from your back to your side while in flat bed without using bedrails? 2  -EJ     Moving from lying on back to sitting on the side of a flat bed without bedrails? 2  -EJ     Moving to and from a bed to a chair (including a wheelchair)? 1  -EJ     Standing up from a chair using your arms (e.g., wheelchair, bedside chair)? 1  -EJ     Climbing 3-5 steps with a railing? 1  -EJ     To walk in hospital room? 1  -EJ     AM-PAC 6 Clicks Score (PT) 8  -EJ     Highest Level of Mobility Goal 3 --> Sit at edge of bed  -EJ       Row Name 11/06/24 1217          Functional Assessment    Outcome Measure Options AM-PAC 6 Clicks Daily Activity (OT)  -               User Key  (r) = Recorded By, (t) = Taken By, (c) = Cosigned By      Initials Name Provider Type    Yane Valencia, PT Physical Therapist    Sarah Chanel, OT Occupational Therapist                    Occupational Therapy Education       Title: PT OT SLP Therapies (In Progress)       Topic: Occupational Therapy (Done)       Point: ADL training (Done)       Description:   Instruct learner(s) on proper safety adaptation and remediation techniques during self care or transfers.   Instruct in proper use of assistive devices.                  Learning Progress Summary            Patient Acceptance, E, VU,NR by MM at 11/2/2024 2462    Comment: role of OT, goals, d/c rec                      Point: Precautions (Done)       Description:   Instruct learner(s)  "on prescribed precautions during self-care and functional transfers.                  Learning Progress Summary            Patient Acceptance, E, VU,NR by  at 11/2/2024 1328    Comment: role of OT, goals, d/c rec                      Point: Body mechanics (Done)       Description:   Instruct learner(s) on proper positioning and spine alignment during self-care, functional mobility activities and/or exercises.                  Learning Progress Summary            Patient Acceptance, E, VU,NR by  at 11/2/2024 1328    Comment: role of OT, goals, d/c rec                                      User Key       Initials Effective Dates Name Provider Type Discipline     05/31/24 -  Yu Chapman OT Occupational Therapist OT                  OT Recommendation and Plan  Therapy Frequency (OT): 5 times/wk  Plan of Care Review  Plan of Care Reviewed With: patient  Progress: no change  Outcome Evaluation: Pt continues to have very poor activity tolerance. He was able to sit EOB for 5 minutes with encouragement. He c/o of feeling dizzy while up as well as very fqatiqued. BP taken EOB WNL. Pt returned to bed as he reports \"I can't do anymore\". Poor tolerance for ADLs at present. OT encouraged nsg to sit him up in the chair position in ICU bed. OT recomemnds SNF at [place].     Time Calculation:         Time Calculation- OT       Row Name 11/06/24 1217             Time Calculation- OT    OT Start Time 1039  -      OT Stop Time 1053  -      OT Time Calculation (min) 14 min  -SM      Total Timed Code Minutes- OT 14 minute(s)  -SM      OT Received On 11/06/24  -      OT - Next Appointment 11/07/24  -         Timed Charges    66154 - OT Therapeutic Activity Minutes 14  -SM         Total Minutes    Timed Charges Total Minutes 14  -SM       Total Minutes 14  -SM                User Key  (r) = Recorded By, (t) = Taken By, (c) = Cosigned By      Initials Name Provider Type     Sarah Abernathy OT Occupational Therapist         "          Therapy Charges for Today       Code Description Service Date Service Provider Modifiers Qty    44465226807  OT THERAPEUTIC ACT EA 15 MIN 11/6/2024 Sarah Abernathy OT GO 1                 Sarah Abernathy OT  11/6/2024

## 2024-11-06 NOTE — THERAPY TREATMENT NOTE
Patient Name: Arsh Haynes  : 1950    MRN: [id number]                              Today's Date: 2024       Admit Date: 10/26/2024    Visit Dx:     ICD-10-CM ICD-9-CM   1. Perforation of sigmoid colon  K63.1 569.83   2. Hypotension, unspecified hypotension type  I95.9 458.9   3. Increased lactic acid level  R79.89 276.2   4. Bowel perforation  K63.1 569.83     Patient Active Problem List   Diagnosis    Colon cancer    HTN (hypertension)    Thrombocytosis    Acute DVT (deep venous thrombosis)    Renal mass    Hyponatremia    Anemia    Peritonitis     Past Medical History:   Diagnosis Date    HTN (hypertension)      Past Surgical History:   Procedure Laterality Date    COLON RESECTION N/A 10/26/2024    Procedure: LOW ANTERIOR COLON RESECTION SIGMOID, COLOSTOMY, SPLENIC FLEXURE MOBILIZATION;  Surgeon: Mc Guillaume MD;  Location: [facility];  Service: General;  Laterality: N/A;    EXPLORATORY LAPAROTOMY N/A 10/26/2024    Procedure: LAPAROTOMY EXPLORATORY, LEFT HEMICOLECTOMY;  Surgeon: Mc Guillaume MD;  Location: [facility];  Service: General;  Laterality: N/A;      General Information       Row Name 24 1157          Physical Therapy Time and Intention    Document Type therapy note (daily note)  -EJ     Mode of Treatment co-treatment;occupational therapy;physical therapy;other (see comments)  -EJ       Row Name 24 1157          General Information    Existing Precautions/Restrictions fall;oxygen therapy device and L/min  -EJ     Barriers to Rehab medically complex  -EJ       Row Name 24 1157          Safety Issues/Impairments Affecting Functional Mobility    Comment, Safety Issues/Impairments (Mobility) Co treatment medically appropriate and necessary due to patient acuity level, activity tolerance and safety of patient and staff. Treatment is focusing on progression of care and goals established in the POC.  -EJ               User Key  (r) = Recorded By,  (t) = Taken By, (c) = Cosigned By      Initials Name Provider Type    Yane Valencia, PT Physical Therapist                   Mobility       Row Name 11/06/24 1157          Bed Mobility    Supine-Sit [place] (Bed Mobility) moderate assist (50% patient effort);maximum assist (25% patient effort);2 person assist;verbal cues;nonverbal cues (demo/gesture)  -EJ     Sit-Supine [place] (Bed Mobility) maximum assist (25% patient effort);2 person assist;nonverbal cues (demo/gesture);verbal cues  -EJ     Assistive Device (Bed Mobility) head of bed elevated;bed rails;repositioning sheet  -EJ     Comment, (Bed Mobility) left lean, limited by fatigue  -EJ       Row Name 11/06/24 1157          Transfers    Comment, (Transfers) limited by fatigue, pt declines attempt to stand  -EJ               User Key  (r) = Recorded By, (t) = Taken By, (c) = Cosigned By      Initials Name Provider Type    Yane Valencia, PT Physical Therapist                   Obj/Interventions       Row Name 11/06/24 1158          Balance    Static Sitting Balance contact guard;minimal assist  -EJ     Comment, Balance sat EOB for approx 5 minutes, reports dizziness, fatigues quickly  -EJ               User Key  (r) = Recorded By, (t) = Taken By, (c) = Cosigned By      Initials Name Provider Type    Yane Valencia, PT Physical Therapist                   Goals/Plan    No documentation.                  Clinical Impression       Row Name 11/06/24 1200          Pain    Pretreatment Pain Rating 0/10 - no pain  -EJ     Posttreatment Pain Rating 0/10 - no pain  -EJ       Row Name 11/06/24 1200          Plan of Care Review    Plan of Care Reviewed With patient  -EJ     Outcome Evaluation Pt seen for PT/OT this AM. He is agreeable to therapy, but reports fatigue and weakness. Pt requiring mod/max A x 2 to transition to EOB. He demos slight left lean in sitting but requiring just CGA/min A for sitting balance. Pt tolerated sitting at EOB  for approx 5 minutes. Pt declines attempt to stand, continues to report dizziness and fatigue. BP taken while sitting EOB and it is in normal range. Pt requesting to lay back down.  Slow progress at this time. Recommend SNU at [place] pending progress w therapy. Will continue to progress as tolerated.  -EJ       Row Name 11/06/24 1200          Positioning and Restraints    Pre-Treatment Position in bed  -EJ     Post Treatment Position bed  -EJ     In Bed notified nsg;supine;call light within reach;encouraged to call for assist;exit alarm on  -EJ               User Key  (r) = Recorded By, (t) = Taken By, (c) = Cosigned By      Initials Name Provider Type    Yane Valencia, PT Physical Therapist                   Outcome Measures       Row Name 11/06/24 1204          How much help from another person do you currently need...    Turning from your back to your side while in flat bed without using bedrails? 2  -EJ     Moving from lying on back to sitting on the side of a flat bed without bedrails? 2  -EJ     Moving to and from a bed to a chair (including a wheelchair)? 1  -EJ     Standing up from a chair using your arms (e.g., wheelchair, bedside chair)? 1  -EJ     Climbing 3-5 steps with a railing? 1  -EJ     To walk in hospital room? 1  -EJ     AM-PAC 6 Clicks Score (PT) 8  -EJ     Highest Level of Mobility Goal 3 --> Sit at edge of bed  -EJ               User Key  (r) = Recorded By, (t) = Taken By, (c) = Cosigned By      Initials Name Provider Type    Yane Valencia, PT Physical Therapist                                 Physical Therapy Education       Title: PT OT SLP Therapies (In Progress)       Topic: Physical Therapy (In Progress)       Point: Mobility training (Done)       Learning Progress Summary            Patient Acceptance, E, VU by LUCERO at 11/2/2024 1622                      Point: Home exercise program (Not Started)       Learner Progress:  Not documented in this visit.              Point: Body  mechanics (Done)       Learning Progress Summary            Patient Acceptance, E, VU by CW at 11/2/2024 1622                      Point: Precautions (Done)       Learning Progress Summary            Patient Acceptance, E, VU by CW at 11/2/2024 1622                                      User Key       Initials Effective Dates Name Provider Type Discipline     12/13/22 -  Lisa Paulson, PT Physical Therapist PT                  PT Recommendation and Plan     Outcome Evaluation: Pt seen for PT/OT this AM. He is agreeable to therapy, but reports fatigue and weakness. Pt requiring mod/max A x 2 to transition to EOB. He demos slight left lean in sitting but requiring just CGA/min A for sitting balance. Pt tolerated sitting at EOB for approx 5 minutes. Pt declines attempt to stand, continues to report dizziness and fatigue. BP taken while sitting EOB and it is in normal range. Pt requesting to lay back down.  Slow progress at this time. Recommend SNU at [place] pending progress w therapy. Will continue to progress as tolerated.     Time Calculation:         PT Charges       Row Name 11/06/24 1204             Time Calculation    Start Time 1039  -EJ      Stop Time 1053  -EJ      Time Calculation (min) 14 min  -EJ      PT Received On 11/06/24  -EJ      PT - Next Appointment 11/07/24  -EJ                User Key  (r) = Recorded By, (t) = Taken By, (c) = Cosigned By      Initials Name Provider Type    EJ Yane Mayfield, PT Physical Therapist                  Therapy Charges for Today       Code Description Service Date Service Provider Modifiers Qty    43913538253 HC PT THERAPEUTIC ACT EA 15 MIN 11/5/2024 Yane Mayfield, PT GP 1    11867577630 HC PT THER PROC EA 15 MIN 11/5/2024 Yane Mayfield, PT GP 1    [id number] HC PT THERAPEUTIC ACT EA 15 MIN 11/6/2024 Yane Mayfield, PT GP 1            PT G-Codes  Outcome Measure Options: AM-PAC 6 Clicks Daily Activity (OT)  AM-PAC 6 Clicks Score (PT): 8  AM-PAC 6  Clicks Score (OT): 7  Modified [place] Scale: 5 - Severe disability.  Bedridden, incontinent, and requiring constant nursing care and attention.  PT Discharge Summary  Anticipated Discharge Disposition (PT): skilled nursing facility, inpatient rehabilitation facility    Yane Mayfield, PT  11/6/2024

## 2024-11-06 NOTE — CASE MANAGEMENT/SOCIAL WORK
Continued Stay Note  [facility]     Patient Name: Arsh Haynes  MRN: [id number]  Today's Date: 11/6/2024    Admit Date: 10/26/2024    Plan: Referral to [facility] pending acceptance and will need precert.   Discharge Plan       Row Name 11/06/24 1253       Plan    Plan Referral to [facility] pending acceptance and will need precert.    Roadmap to Recovery Yes    Patient/Family in Agreement with Plan yes    Provided Post Acute Provider List? Yes    Post Acute Provider List Nursing Home    Provided Post Acute Provider Quality & Resource List? Yes    Post Acute Provider Quality and Resource List Nursing Home    Delivered To Support Person    Support Person Melida-spouse    Method of Delivery Telephone    Plan Comments Spoke with spouse over the phone. Spouse, Melida given cms compare and snf choice list. Melida asks for referal to [facility]. Referral sent to [facility]. DCP sent.                                  Expected Discharge Date and Time       Expected Discharge Date Expected Discharge Time    Nov 8, 2024               Nargis Lugo RN

## 2024-11-06 NOTE — PLAN OF CARE
Goal Outcome Evaluation:  Plan of Care Reviewed With: patient           Outcome Evaluation: Patient seen for clinical swallow re evaluation. Pt pulled cortrak. No overt s/s of aspiration with single drinks thins via cup/straw. Soft throat clear and question of voice change with consecutive drinks thins via straw. No overt s/s of aspiration with puree. Double swallow and wet voice with mixed. No overt s/s of aspiration with strained mech soft. SLP recs upgrade to mech soft, no mixed, and thins, single drinks. Meds with puree. Slow rate, small bites. Assist with PO. Patient's voice is stronger and SLP suspects wet quality is more obvious at times. Need to further assess with VFSS d/t wet voice with mixed/consecutive drinks thins and persistent elevated WBC. Surgery approved barium use during VFSS. Of note, patient was able to repeat back instructions of throat clear with wet voice and single drinks.Results discussed with RN.

## 2024-11-06 NOTE — PROGRESS NOTES
Nutrition Services    Patient Name: Arsh Haynes  YOB: 1950  MRN: [id number]  Admission date: 10/26/2024    PROGRESS NOTE      Encounter Information: Checking in on PO intake. SLP assessed pt 11/5, recommending initiation of pureed/thins. Cortrak removed 11/5. SLP reassessed pt 11/6, diet advanced to mechanical ground, no mixed consistencies. Pt reports he was eating poorly PTA with a recent wt loss of ~20#. Pt says he has not tried ONS but he is willing to try these this admission - either flavor. NFPE completed, consistent with nutrition diagnosis of malnutrition using AND/ASPEN criteria. See MSA below.     2+ edema: generalized, arms, wrists, hands, legs, ankles, feet  3+ scrotum       PO Diet: Diet: Regular/House; No Mixed Consistencies; Texture: Mechanical Ground (NDD 2); Fluid Consistency: Thin (IDDSI 0)   PO Supplements: -   PO Intake:  25% x 1 meal       Current nutrition support: -   Nutrition support review: -       Labs (reviewed below): Hyperglycemia - levels improving        GI Function:  Liquid BM 11/3       Nutrition Intervention Updates: Addition of Boost Glucose Control TID (Provides 570 kcals, 48 g protein if consumed)        Nutrition Diagnosis            Nutrition Dx Problem 1 Moderate chronic disease related malnutrition related to synchronous colon cancer as evidenced by moderate muscle wasting per NFPE, moderate fluid accumulation, and PO intake meeting less than 75% of estimated energy requirement for greater than or equal to 1 month.        Nutrition Dx Problem 2          Results from last 7 days   Lab Units 11/06/24  0624 11/05/24  0602 11/04/24  0649   SODIUM mmol/L 131* 130* 130*   POTASSIUM mmol/L 4.1 4.3 4.4   CHLORIDE mmol/L 99 98 99   CO2 mmol/L 21.7* 22.2 23.6   BUN mg/dL 8 9 10   CREATININE mg/dL 0.48* 0.52* 0.48*   CALCIUM mg/dL 8.1* 7.9* 7.9*   BILIRUBIN mg/dL 0.6 0.5 0.5   ALK PHOS U/L 238* 257* 229*   ALT (SGPT) U/L 31 27 23   AST (SGOT) U/L 28 24 19   GLUCOSE  "mg/dL 98 250* 216*     Results from last 7 days   Lab Units 11/06/24  0624 11/02/24  0348 11/01/24  1209   MAGNESIUM mg/dL 2.1  --  2.1   PHOSPHORUS mg/dL 3.0  --   --    HEMOGLOBIN g/dL 8.0*   < >  --    HEMATOCRIT % 25.0*   < >  --     < > = values in this interval not displayed.     No results found for: \"COVID19\"  Lab Results   Component Value Date    HGBA1C 6.60 (H) 10/26/2024       Wt Readings from Last 10 Encounters:   11/06/24 0600 85.1 kg (187 lb 9.8 oz)   11/06/24 0100 84.2 kg (185 lb 10 oz)   11/04/24 0415 87 kg (191 lb 12.8 oz)   11/04/24 0152 87 kg (191 lb 12.8 oz)   11/02/24 0543 89.8 kg (197 lb 15.6 oz)   11/01/24 0548 92.6 kg (204 lb 2.3 oz)   10/31/24 0448 96 kg (211 lb 10.3 oz)   10/30/24 0300 98.4 kg (216 lb 14.9 oz)   10/29/24 0431 92.5 kg (203 lb 14.8 oz)   10/28/24 0436 90.2 kg (198 lb 13.7 oz)   10/27/24 0404 83.4 kg (183 lb 13.8 oz)   10/26/24 1933 83 kg (183 lb)     Malnutrition Severity Assessment      Patient meets criteria for : Moderate (non-severe) Malnutrition  Malnutrition Type (Last 8 Hours)       Malnutrition Severity Assessment       Row Name 11/06/24 1255       Malnutrition Severity Assessment    Malnutrition Type Chronic Disease - Related Malnutrition      Row Name 11/06/24 1255       Insufficient Energy Intake     Insufficient Energy Intake Findings Moderate    Insufficient Energy Intake  <75% of est. energy requirement for > or equal to 1 month      Row Name 11/06/24 1255       Unintentional Weight Loss     Unintentional Weight Loss Findings --  reported significant wt loss although limited wt hx available to support this      Row Name 11/06/24 1255       Muscle Loss    Loss of Muscle Mass Findings Moderate    Jew Region Moderate - slight depression    Patellar Region Moderate - patella more prominent, less muscle definition around patella    Anterior Thigh Region Moderate - mild depression on inner thigh    Posterior Calf Region Moderate - some roundness, slight firmness "      Row Name 11/06/24 1255       Fluid Accumulation (Edema)    Fluid Acumulation Findings Moderate    Fluid Accumulation  Moderate equals 2+ pitting edema      Row Name 11/06/24 1251       Criteria Met (Must meet criteria for severity in at least 2 of these categories: M Wasting, Fat Loss, Fluid, Secondary Signs, Wt. Status, Intake)    Patient meets criteria for  Moderate (non-severe) Malnutrition                       RD to follow up per protocol.    Electronically signed by:  Aga King RD  11/06/24 10:17 EST

## 2024-11-06 NOTE — PROGRESS NOTES
Acute Care General Surgery Progress Note    Patient: Arsh Haynes  YOB: 1950  MRN: [id number]      Assessment  Arsh Haynes is a 74 y.o. male who is POD 10 left hemicolectomy with colostomy. Colostomy pink and functioning. BRONWYN intact with 200cc of serous output. Leukocytosis, abx per ID. AVSS.     Plan  Continue NS WTD dressing changes TID  Encourage IS and mobilization  Prn pain meds  On Lovenox    Subjective  Denies nausea, vomiting.     Objective    Vitals:    11/06/24 1131   BP: 110/59   Pulse: 110   Resp: 23   Temp: 97.6 °F (36.4 °C)   SpO2: 95%           Physical Exam  Constitutional: alert, in no acute distress  Respiratory: Normal work of breathing, Symmetric excursion  Cardiovascular: Well pefused, no jugular venous distention evident   Abdominal: soft, expected post op tenderness, midline incision is clean,dressing in place  Skin: warm ,dry      Laboratory Results  Results from last 7 days   Lab Units 11/06/24  0624 11/05/24  0602 11/04/24  0649 11/03/24  0626 11/02/24  0348 11/01/24  0601 10/31/24  0503   WBC 10*3/mm3 19.15* 19.60* 20.90* 17.06* 15.46* 14.26* 16.55*   HEMOGLOBIN g/dL 8.0* 8.2* 8.0* 8.4* 8.5* 8.7* 9.1*   HEMATOCRIT % 25.0* 25.9* 24.9* 27.9* 26.0* 27.2* 28.1*   PLATELETS 10*3/mm3 1,122* 915* 760* 547* 426 354 222     Results from last 7 days   Lab Units 11/06/24  0624 11/05/24  0602 11/04/24  0649   SODIUM mmol/L 131* 130* 130*   POTASSIUM mmol/L 4.1 4.3 4.4   CHLORIDE mmol/L 99 98 99   CO2 mmol/L 21.7* 22.2 23.6   BUN mg/dL 8 9 10   CREATININE mg/dL 0.48* 0.52* 0.48*   CALCIUM mg/dL 8.1* 7.9* 7.9*   BILIRUBIN mg/dL 0.6 0.5 0.5   ALK PHOS U/L 238* 257* 229*   ALT (SGPT) U/L 31 27 23   AST (SGOT) U/L 28 24 19   GLUCOSE mg/dL 98 250* 216*     I have personally reviewed 11/6 labs        THOMAS Dolan  Acute Care General Surgery  [facility]    [address], [address]  [place], [place], [address]  P: [phone]  F: [phone]

## 2024-11-06 NOTE — PROGRESS NOTES
ID note for sepsis  S: still weak. Sore in abdomen. Responding appropriately this am    Physical Exam:   Vital Signs   Temp:  [97.8 °F (36.6 °C)-98.1 °F (36.7 °C)] 97.8 °F (36.6 °C)  Heart Rate:  [] 102  Resp:  [17-26] 17  BP: (123-151)/() 125/62    GENERAL: Awake and alert, ill  HEENT: Oropharynx is clear. Hearing is grossly normal.   EYES: . No conjunctival injection. No lid lag.   LUNGS:normal respiratory effort.   SKIN: no cutaneous eruptions in exposed areas  PSYCHIATRIC: slow to respond    Results Review:  White count 19.15  Creatinine 0.48  Liver function test normal apart from an alk phos of 238  CT abdomen pelvis with no intra-abdominal abscess.  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  Independently interpreted lung windows and extensive atelectasis and pleural effusions  10/29 blood culture 1/2 Bifidobacterium spp  10/26 abdominal fluid cultures E. coli, Pseudomonas and mixed anaerobes  10/2 blood cultures 2/2 no growth     A/p  Septic shock, shock resolved  Feculent peritonitis from perforated colon cancer status post ex lap, washout hemicolectomy and end colostomy by Dr. Guillaume on 10/26  Colon cancer  Atelectasis  Pleural effusions  Acute right lower extremity deep vein thrombosis noted in the gastrocnemius and soleal.     WBC up but likely reactive  Has  new Acute right lower extremity deep vein thrombosis noted in the gastrocnemius and soleal for which Ac started  Cont to hold abx. No clear evidence of infection  D/w Dr Serra this am re impression and plans

## 2024-11-06 NOTE — PROGRESS NOTES
" LOS: 11 days     Name: Arsh Haynes  Age: 74 y.o.  Sex: male  :  1950  MRN: [id number]         Primary Care Physician: Provider, No Known    Subjective   Subjective  No acute overnight events.  Patient reports back pain today.  Found to have DVT in his leg yesterday and started on full dose Lovenox.    Objective   Vital Signs  Temp:  [97.8 °F (36.6 °C)-98.1 °F (36.7 °C)] 97.8 °F (36.6 °C)  Heart Rate:  [] 107  Resp:  [17-26] 17  BP: (123-151)/() 130/59  Body mass index is 25.44 kg/m².    Objective:  General Appearance:  Comfortable, in no acute distress and ill-appearing.    Vital signs: (most recent): Blood pressure 130/59, pulse 107, temperature 97.8 °F (36.6 °C), temperature source Oral, resp. rate 17, height 182.9 cm (72\"), weight 85.1 kg (187 lb 9.8 oz), SpO2 100%.    Lungs:  Normal effort and normal respiratory rate.  He is not in respiratory distress.  There are decreased breath sounds.    Heart: Normal rate.  Regular rhythm.    Abdomen: Abdomen is soft.  Bowel sounds are normal.   There is no abdominal tenderness.     Extremities: There is no dependent edema or local swelling.    Neurological: Patient is alert and oriented to person, place and time.    Skin:  Warm and dry.                Results Review:       I reviewed the patient's new clinical results.    Results from last 7 days   Lab Units 24  0602 24  0649 24  0626 24  0348 24  0601 10/31/24  0503   WBC 10*3/mm3 19.15* 19.60* 20.90* 17.06* 15.46* 14.26* 16.55*   HEMOGLOBIN g/dL 8.0* 8.2* 8.0* 8.4* 8.5* 8.7* 9.1*   PLATELETS 10*3/mm3 1,122* 915* 760* 547* 426 354 222     Results from last 7 days   Lab Units 24  0624 24  0602 24  0649 24  0626 24  0348 24  0522 10/31/24  0503   SODIUM mmol/L 131* 130* 130* 133* 135* 138 142   POTASSIUM mmol/L 4.1 4.3 4.4 4.2 3.9 4.8 4.3   CHLORIDE mmol/L 99 98 99 103 105 111* 114*   CO2 mmol/L 21.7* 22.2 23.6 23.0 21.3* " 18.0* 20.2*   BUN mg/dL 8 9 10 9 9 9 7*   CREATININE mg/dL 0.48* 0.52* 0.48* 0.52* 0.56* 0.70* 0.63*   CALCIUM mg/dL 8.1* 7.9* 7.9* 7.7* 8.0* 7.4* 7.2*   GLUCOSE mg/dL 98 250* 216* 230* 173* 126* 117*                 Scheduled Meds:   chlorhexidine, 15 mL, Mouth/Throat, Q12H  enoxaparin, 1 mg/kg, Subcutaneous, Q12H  folic acid, 1 mg, Nasogastric, Daily  insulin glargine, 10 Units, Subcutaneous, Nightly  insulin regular, 2-9 Units, Subcutaneous, 4x Daily AC & at Bedtime  lansoprazole, 30 mg, Nasogastric, Q AM  metoprolol tartrate, 50 mg, Nasogastric, Q12H  mupirocin, 1 Application, Each Nare, BID  sodium chloride, 10 mL, Intravenous, Q12H  terazosin, 2 mg, Nasogastric, Daily  thiamine, 100 mg, Nasogastric, Daily      PRN Meds:     acetaminophen **OR** acetaminophen    Calcium Replacement - Follow Nurse / BPA Driven Protocol    dextrose    dextrose    glucagon (human recombinant)    HYDROcodone-acetaminophen    labetalol    Magnesium Standard Dose Replacement - Follow Nurse / BPA Driven Protocol    nitroglycerin    ondansetron    Phosphorus Replacement - Follow Nurse / BPA Driven Protocol    Potassium Replacement - Follow Nurse / BPA Driven Protocol    [COMPLETED] Insert Peripheral IV **AND** sodium chloride    sodium chloride    sodium chloride    sodium chloride  Continuous Infusions:       Assessment & Plan   Active Hospital Problems    Diagnosis  POA    **Peritonitis [K65.9]  Unknown    HTN (hypertension) [I10]  Unknown    Thrombocytosis [D75.839]  Unknown    Acute DVT (deep venous thrombosis) [I82.409]  Unknown    Renal mass [N28.89]  Unknown    Hyponatremia [E87.1]  Unknown    Anemia [D64.9]  Unknown    Colon cancer [C18.9]  No      Resolved Hospital Problems    Diagnosis Date Resolved POA    Perforation of sigmoid colon [K63.1] 10/27/2024 Yes       Assessment & Plan    - POD 10 from emergent left hemicolectomy with colostomy and washout for perforated rectosigmoid adenocarcinoma with peritonitis and septic  shock  -Now off antibiotics as per infectious disease.  Leukocytosis unchanged  -Right lower extremity DVT found on 11/5.  He has a severe thrombocytosis and in addition to his anemia as well as the colon cancer I will ask hematology/oncology to evaluate.  Continue full dose Lovenox  -Urology evaluated the renal mass and urinary retention.  Blankenship catheter in place.  Continue Flomax.  Voiding trial when closer to discharge.  He will follow-up in 4 to 6 weeks with repeat CT for the renal mass  -Hyponatremia appears stable.  Monitor for now  -Blood sugars better controlled today.  Continue present regimen.  -Currently on puréed diet.  He pulled out his cortrak last night.  Speech therapy following.    Dispo  To be determined    Expected Discharge Date: 11/8/2024; Expected Discharge Time:      Emiliano Gonzalez MD  [facility]  11/06/24  09:25 EST

## 2024-11-06 NOTE — CONSULTS
REASON FOR CONSULTATION: Thrombocytosis, DVT, colon cancer      History of Present Illness   The patient is a 74-year-old male who presented to the ER 10/26/2024 with worsening abdominal pain.  His scans at that point consistent with sigmoid perforation.  He had been ill for several months developing weight loss associated with diarrhea.  Additional findings were consistent with septicemia with lactic acidosis, alcoholism, hyperglycemia and a right renal mass.    Admitting H&H were 13.7 and 43.2 with white count of 9720 and platelet count of 549,000 and a procalcitonin of 9.58.    The patient went to surgery 10/27 undergoing an open left hemicolectomy with Larson pouch and end colostomy, splenic flexure mobilization.  Findings were of fecal peritonitis, rectosigmoid colon cancer, ischemic colon with perforation at the descending sigmoid colon junction.  Pathology concerning the malignancy included 2 foci of invasive mildly differentiated mucinous adenocarcinoma with tumor sizes of 4.8 cm and 6.0 cm both tumors invading through muscularis propria into pericolonic adipose tissue,, descending colon cancer with no lymphovascular nor peritoneal invasion identified, 0 of 16 lymph nodes with pathologic stage -mpT3, N0.  Additional sigmoid colon cancer moderately differentiated with tumor invading muscularis propria, 0 16 nodes negative and again pathologic stage  -Pathologic stage: mpT3, N0.      Postop the patient continued therapy for septic shock from perforated bowel and underlying malignancy quiring pressors.  Studies revealed gram-negative bacilli, gram-positive cocci with IV antibiotic therapies directed at findings.  10/29 H&H of 8.6 and 27.9 with white count of 20,190, platelet count of 236,000.  He was able to be weaned off pressors and extubated 10/29/2024.  Tube feeds started.      Patient seen by infectious disease 11/3/2024 with Zosyn continued.  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  Patient with further confusion    Review 11/5/2024 with some general improvement though further anemia and thrombocytosis.  Doppler examination 11/5 with acute right lower extremity DVT in the gastrocnemius and soleal.      Interval history:  11/6/2024  Discussed findings with patient at bedside  T97.8, pulse 102, respirations 18, /62  H&H of 8.0 and 25.0, white count of 19,150, platelet count of 1,112,000      Past Medical History:   Diagnosis Date    HTN (hypertension)         Past Surgical History:   Procedure Laterality Date    COLON RESECTION N/A 10/26/2024    Procedure: LOW ANTERIOR COLON RESECTION SIGMOID, COLOSTOMY, SPLENIC FLEXURE MOBILIZATION;  Surgeon: Mc Guillaume MD;  Location: [facility];  Service: General;  Laterality: N/A;    EXPLORATORY LAPAROTOMY N/A 10/26/2024    Procedure: LAPAROTOMY EXPLORATORY, LEFT HEMICOLECTOMY;  Surgeon: Mc Guillaume MD;  Location: [facility];  Service: General;  Laterality: N/A;        No current facility-administered medications on file prior to encounter.     Current Outpatient Medications on File Prior to Encounter   Medication Sig Dispense Refill    amLODIPine-benazepril (LOTREL) 10-20 MG per capsule Take 1 capsule by mouth Daily.      metoprolol succinate XL (TOPROL-XL) 100 MG 24 hr tablet Take 1 tablet by mouth Daily.          ALLERGIES:  No Known Allergies     Social History     Socioeconomic History    Marital status:    Tobacco Use    Smoking status: Never    Smokeless tobacco: Never   Vaping Use    Vaping status: Never Used   Substance and Sexual Activity    Alcohol use: Yes     Alcohol/week: 42.0 standard drinks of alcohol     Types: 42 Cans of beer per week     Comment: 6 beer daily     Drug use: Never    Sexual activity: Defer        History reviewed. No pertinent family history.     Review of Systems   See history of present illness  Objective     Vitals:    11/06/24 0600 11/06/24 0726 11/06/24 0844 11/06/24 0936   BP: 130/59   125/62   Pulse: 107  107 102   Resp:  18 17    Temp:  97.8 °F (36.6 °C)     TempSrc:  Oral     SpO2: 95%  100%    Weight: 85.1 kg (187 lb 9.8 oz)      Height:              No data to display                Physical Exam  Constitutional:       Appearance: He is obese.      Comments: Appearing male, alert   HENT:      Nose: Nose normal.      Mouth/Throat:      Mouth: Mucous membranes are moist.      Pharynx: Oropharynx is clear.   Eyes:      Extraocular Movements: Extraocular movements intact.      Conjunctiva/sclera: Conjunctivae normal.      Pupils: Pupils are equal, round, and reactive to light.   Cardiovascular:      Rate and Rhythm: Regular rhythm. Tachycardia present.      Pulses: Normal pulses.      Heart sounds: Normal heart sounds.   Pulmonary:      Effort: Pulmonary effort is normal.      Breath sounds: Normal breath sounds.   Abdominal:      General: There is distension.      Palpations: There is no mass.      Tenderness: There is abdominal tenderness. There is no guarding or rebound.      Hernia: No hernia is present.      Comments: Signs reduced, ostomy intact and functioning   Musculoskeletal:         General: Normal range of motion.      Cervical back: Normal range of motion and neck supple.      Right lower leg: No edema (Trace, no palpable cord).      Left lower leg: No edema.   Neurological:      General: No focal deficit present.      Mental Status: He is oriented to person, place, and time.   Psychiatric:         Mood and Affect: Mood normal.           RECENT LABS:  Hematology WBC   Date Value Ref Range Status   11/06/2024 19.15 (H) 3.40 - 10.80 10*3/mm3 Final     RBC   Date Value Ref Range Status   11/06/2024 2.94 (L) 4.14 - 5.80 10*6/mm3 Final      Hemoglobin   Date Value Ref Range Status   11/06/2024 8.0 (L) 13.0 - 17.7 g/dL Final     Hematocrit   Date Value Ref Range Status   11/06/2024 25.0 (L) 37.5 - 51.0 % Final     Platelets   Date Value Ref Range Status   11/06/2024 1,122 (C) 140 - 450 10*3/mm3 Final          Assessment & Plan   *Septic shock from perforated bowel from underlying malignancy  Subsequent abdominal cultures positive for Pseudomonas, E. coli and mixed anaerobes, negative blood cultures  Patient now more likely, completed antibiotic therapies    *Synchronous colon cancers  Both malignancies are T3 N0-stage IIA-and as we consider his concurrent renal mass and performance status, adjuvant chemotherapy must be considered particularly with invasion of the descending colon mass into pericolonic and perirectal tissue-apparently the the site of perforation.  These issues discussed with general surgery.  MSI status-negative by IHC, PCR-microsatellite stable.  This is particularly useful considering the patient's family history and findings of synchronous colon cancer.    *Anemia  Suspected multifactorial anemia, additional laboratory studies to be obtained    *Leukocytosis  Agree that this is multifactorial but notedly right shifted with predominant neutrophilia and no immature forms.  This is a reactive leukocytosis usually related to concurrent physiologic stress and infection.    *Thrombocytosis  This is also reactive developing gradually postoperative and does not need to be addressed with cytoreductive medications.  Additional laboratory studies per iron deficiency component to be assessed      *Urinary retention, findings of right renal mass  Seen by urology, voiding trial, outpatient scans for follow-up of renal mass      *Acute right lower extremity DVT in gastrocnemius and soleal  Patient now on anticoagulation with Lovenox appropriately.  We will follow and transition to DOAC therapy as he further recovers.

## 2024-11-06 NOTE — PLAN OF CARE
Goal Outcome Evaluation:  Plan of Care Reviewed With: patient           Outcome Evaluation: Pt seen for PT/OT this AM. He is agreeable to therapy, but reports fatigue and weakness. Pt requiring mod/max A x 2 to transition to EOB. He demos slight left lean in sitting but requiring just CGA/min A for sitting balance. Pt tolerated sitting at EOB for approx 5 minutes. Pt declines attempt to stand, continues to report dizziness and fatigue. BP taken while sitting EOB and it is in normal range. Pt requesting to lay back down.  Slow progress at this time. Recommend SNU at [facility] pending progress w therapy. Will continue to progress as tolerated.    Anticipated Discharge Disposition (PT): skilled nursing facility, inpatient rehabilitation facility

## 2024-11-07 ENCOUNTER — APPOINTMENT (OUTPATIENT)
Dept: GENERAL RADIOLOGY | Facility: HOSPITAL | Age: 74
DRG: 853 | End: 2024-11-07
Payer: MEDICARE

## 2024-11-07 LAB
ALBUMIN SERPL-MCNC: 2.5 G/DL (ref 3.5–5.2)
ALBUMIN/GLOB SERPL: 0.7 G/DL
ALP SERPL-CCNC: 250 U/L (ref 39–117)
ALT SERPL W P-5'-P-CCNC: 36 U/L (ref 1–41)
ANION GAP SERPL CALCULATED.3IONS-SCNC: 9.3 MMOL/L (ref 5–15)
AST SERPL-CCNC: 28 U/L (ref 1–40)
BASOPHILS # BLD AUTO: 0.14 10*3/MM3 (ref 0–0.2)
BASOPHILS NFR BLD AUTO: 0.8 % (ref 0–1.5)
BILIRUB SERPL-MCNC: 0.5 MG/DL (ref 0–1.2)
BUN SERPL-MCNC: 9 MG/DL (ref 8–23)
BUN/CREAT SERPL: 17 (ref 7–25)
CALCIUM SPEC-SCNC: 8 MG/DL (ref 8.6–10.5)
CHLORIDE SERPL-SCNC: 97 MMOL/L (ref 98–107)
CO2 SERPL-SCNC: 22.7 MMOL/L (ref 22–29)
CREAT SERPL-MCNC: 0.53 MG/DL (ref 0.76–1.27)
CYTO UR: NORMAL
DEPRECATED RDW RBC AUTO: 58.3 FL (ref 37–54)
EGFRCR SERPLBLD CKD-EPI 2021: 106.5 ML/MIN/1.73
EOSINOPHIL # BLD AUTO: 0.3 10*3/MM3 (ref 0–0.4)
EOSINOPHIL NFR BLD AUTO: 1.7 % (ref 0.3–6.2)
ERYTHROCYTE [DISTWIDTH] IN BLOOD BY AUTOMATED COUNT: 18.5 % (ref 12.3–15.4)
GLOBULIN UR ELPH-MCNC: 3.5 GM/DL
GLUCOSE BLDC GLUCOMTR-MCNC: 116 MG/DL (ref 70–130)
GLUCOSE BLDC GLUCOMTR-MCNC: 129 MG/DL (ref 70–130)
GLUCOSE BLDC GLUCOMTR-MCNC: 130 MG/DL (ref 70–130)
GLUCOSE BLDC GLUCOMTR-MCNC: 143 MG/DL (ref 70–130)
GLUCOSE SERPL-MCNC: 114 MG/DL (ref 65–99)
HCT VFR BLD AUTO: 25.8 % (ref 37.5–51)
HGB BLD-MCNC: 8.1 G/DL (ref 13–17.7)
IMM GRANULOCYTES # BLD AUTO: 0.79 10*3/MM3 (ref 0–0.05)
IMM GRANULOCYTES NFR BLD AUTO: 4.4 % (ref 0–0.5)
LAB AP CASE REPORT: NORMAL
LAB AP CLINICAL INFORMATION: NORMAL
LAB AP DIAGNOSIS COMMENT: NORMAL
LAB AP SYNOPTIC CHECKLIST: NORMAL
LYMPHOCYTES # BLD AUTO: 1.54 10*3/MM3 (ref 0.7–3.1)
LYMPHOCYTES NFR BLD AUTO: 8.6 % (ref 19.6–45.3)
Lab: NORMAL
MAGNESIUM SERPL-MCNC: 2.1 MG/DL (ref 1.6–2.4)
MCH RBC QN AUTO: 27.3 PG (ref 26.6–33)
MCHC RBC AUTO-ENTMCNC: 31.4 G/DL (ref 31.5–35.7)
MCV RBC AUTO: 86.9 FL (ref 79–97)
MONOCYTES # BLD AUTO: 1.49 10*3/MM3 (ref 0.1–0.9)
MONOCYTES NFR BLD AUTO: 8.4 % (ref 5–12)
NEUTROPHILS NFR BLD AUTO: 13.57 10*3/MM3 (ref 1.7–7)
NEUTROPHILS NFR BLD AUTO: 76.1 % (ref 42.7–76)
PATH REPORT.ADDENDUM SPEC: NORMAL
PATH REPORT.FINAL DX SPEC: NORMAL
PATH REPORT.GROSS SPEC: NORMAL
PHOSPHATE SERPL-MCNC: 3.2 MG/DL (ref 2.5–4.5)
PLATELET # BLD AUTO: 1392 10*3/MM3 (ref 140–450)
PMV BLD AUTO: 8.6 FL (ref 6–12)
POTASSIUM SERPL-SCNC: 3.7 MMOL/L (ref 3.5–5.2)
PROT SERPL-MCNC: 6 G/DL (ref 6–8.5)
RBC # BLD AUTO: 2.97 10*6/MM3 (ref 4.14–5.8)
SODIUM SERPL-SCNC: 129 MMOL/L (ref 136–145)
WBC NRBC COR # BLD AUTO: 17.83 10*3/MM3 (ref 3.4–10.8)

## 2024-11-07 PROCEDURE — 25810000003 SODIUM CHLORIDE 0.9 % SOLUTION: Performed by: STUDENT IN AN ORGANIZED HEALTH CARE EDUCATION/TRAINING PROGRAM

## 2024-11-07 PROCEDURE — 94799 UNLISTED PULMONARY SVC/PX: CPT

## 2024-11-07 PROCEDURE — 83735 ASSAY OF MAGNESIUM: CPT | Performed by: INTERNAL MEDICINE

## 2024-11-07 PROCEDURE — 84100 ASSAY OF PHOSPHORUS: CPT | Performed by: INTERNAL MEDICINE

## 2024-11-07 PROCEDURE — 92611 MOTION FLUOROSCOPY/SWALLOW: CPT

## 2024-11-07 PROCEDURE — 85025 COMPLETE CBC W/AUTO DIFF WBC: CPT | Performed by: INTERNAL MEDICINE

## 2024-11-07 PROCEDURE — 99232 SBSQ HOSP IP/OBS MODERATE 35: CPT | Performed by: INTERNAL MEDICINE

## 2024-11-07 PROCEDURE — 99231 SBSQ HOSP IP/OBS SF/LOW 25: CPT | Performed by: INTERNAL MEDICINE

## 2024-11-07 PROCEDURE — 74230 X-RAY XM SWLNG FUNCJ C+: CPT

## 2024-11-07 PROCEDURE — 63710000001 INSULIN GLARGINE PER 5 UNITS: Performed by: STUDENT IN AN ORGANIZED HEALTH CARE EDUCATION/TRAINING PROGRAM

## 2024-11-07 PROCEDURE — 82948 REAGENT STRIP/BLOOD GLUCOSE: CPT

## 2024-11-07 PROCEDURE — 93010 ELECTROCARDIOGRAM REPORT: CPT | Performed by: INTERNAL MEDICINE

## 2024-11-07 PROCEDURE — 93005 ELECTROCARDIOGRAM TRACING: CPT | Performed by: STUDENT IN AN ORGANIZED HEALTH CARE EDUCATION/TRAINING PROGRAM

## 2024-11-07 PROCEDURE — 94668 MNPJ CHEST WALL SBSQ: CPT

## 2024-11-07 PROCEDURE — 99024 POSTOP FOLLOW-UP VISIT: CPT | Performed by: SURGERY

## 2024-11-07 PROCEDURE — 25010000002 ENOXAPARIN PER 10 MG: Performed by: INTERNAL MEDICINE

## 2024-11-07 PROCEDURE — 80053 COMPREHEN METABOLIC PANEL: CPT | Performed by: INTERNAL MEDICINE

## 2024-11-07 RX ORDER — SODIUM CHLORIDE 9 MG/ML
75 INJECTION, SOLUTION INTRAVENOUS CONTINUOUS
Status: ACTIVE | OUTPATIENT
Start: 2024-11-07 | End: 2024-11-08

## 2024-11-07 RX ADMIN — CHLORHEXIDINE GLUCONATE 15 ML: 1.2 RINSE ORAL at 21:13

## 2024-11-07 RX ADMIN — BARIUM SULFATE 50 ML: 400 SUSPENSION ORAL at 13:22

## 2024-11-07 RX ADMIN — HYDROCODONE BITARTRATE AND ACETAMINOPHEN 1 TABLET: 5; 325 TABLET ORAL at 00:47

## 2024-11-07 RX ADMIN — INSULIN GLARGINE 5 UNITS: 100 INJECTION, SOLUTION SUBCUTANEOUS at 21:13

## 2024-11-07 RX ADMIN — MUPIROCIN 1 APPLICATION: 20 OINTMENT TOPICAL at 08:37

## 2024-11-07 RX ADMIN — METOPROLOL TARTRATE 50 MG: 50 TABLET, FILM COATED ORAL at 21:13

## 2024-11-07 RX ADMIN — BARIUM SULFATE 5 ML: 400 PASTE ORAL at 13:22

## 2024-11-07 RX ADMIN — FOLIC ACID 1 MG: 1 TABLET ORAL at 08:36

## 2024-11-07 RX ADMIN — ENOXAPARIN SODIUM 90 MG: 100 INJECTION SUBCUTANEOUS at 06:28

## 2024-11-07 RX ADMIN — LANSOPRAZOLE 30 MG: 15 TABLET, ORALLY DISINTEGRATING ORAL at 06:27

## 2024-11-07 RX ADMIN — Medication 10 ML: at 21:14

## 2024-11-07 RX ADMIN — BARIUM SULFATE 55 ML: 0.81 POWDER, FOR SUSPENSION ORAL at 13:22

## 2024-11-07 RX ADMIN — Medication 100 MG: at 08:36

## 2024-11-07 RX ADMIN — HYDROCODONE BITARTRATE AND ACETAMINOPHEN 1 TABLET: 5; 325 TABLET ORAL at 21:56

## 2024-11-07 RX ADMIN — CHLORHEXIDINE GLUCONATE 15 ML: 1.2 RINSE ORAL at 08:36

## 2024-11-07 RX ADMIN — BARIUM SULFATE 4 ML: 980 POWDER, FOR SUSPENSION ORAL at 13:22

## 2024-11-07 RX ADMIN — METOPROLOL TARTRATE 50 MG: 50 TABLET, FILM COATED ORAL at 08:36

## 2024-11-07 RX ADMIN — SODIUM CHLORIDE 75 ML/HR: 9 INJECTION, SOLUTION INTRAVENOUS at 17:39

## 2024-11-07 RX ADMIN — ENOXAPARIN SODIUM 90 MG: 100 INJECTION SUBCUTANEOUS at 21:15

## 2024-11-07 RX ADMIN — TERAZOSIN 2 MG: 2 CAPSULE ORAL at 21:57

## 2024-11-07 RX ADMIN — HYDROCODONE BITARTRATE AND ACETAMINOPHEN 1 TABLET: 5; 325 TABLET ORAL at 08:36

## 2024-11-07 RX ADMIN — Medication 10 ML: at 08:36

## 2024-11-07 NOTE — SIGNIFICANT NOTE
11/07/24 1306   OTHER   Discipline physical therapist   Rehab Time/Intention   Session Not Performed patient unavailable for treatment  (pt off floor to swallow study at this time. will follow up.)   Recommendation   PT - Next Appointment 11/08/24

## 2024-11-07 NOTE — PLAN OF CARE
Goal Outcome Evaluation:  Plan of Care Reviewed With: patient        Progress: no change  Outcome Evaluation: vitals stable.  pt expressed pain.  meds given per orders.  turn q 2 hours.  vazquez catheter, BRONWYN drain, and colostomy in place.  skin breakdown noted.  wound consult placed.  dressing changes completed per orders.  CIWA scores noted.  seizure precautions.  will continue to monitor.

## 2024-11-07 NOTE — PLAN OF CARE
Goal Outcome Evaluation:  Plan of Care Reviewed With: patient           Outcome Evaluation: VFSS completed. Recommend NPO, meds alternate route. Allow water protocol- ice chips and sips of water 30 minutes s/p oral care. Patient demonstrated aspiration thin, penetration nectar thick, and unable to clear moderate pharngeal residue. High risk of aspiration with all PO secondary to pharyngeal residue. SLP to follow for re-eval as overall strength and endurance improve.

## 2024-11-07 NOTE — MBS/VFSS/FEES
Acute Care - Speech Language Pathology   Swallow Initial Evaluation [facility]     Patient Name: Arsh Haynes  : 1950  MRN: [id number]  Today's Date: 2024               Admit Date: 10/26/2024    Visit Dx:     ICD-10-CM ICD-9-CM   1. Perforation of sigmoid colon  K63.1 569.83   2. Hypotension, unspecified hypotension type  I95.9 458.9   3. Increased lactic acid level  R79.89 276.2   4. Bowel perforation  K63.1 569.83     Patient Active Problem List   Diagnosis    Colon cancer    HTN (hypertension)    Thrombocytosis    Acute DVT (deep venous thrombosis)    Renal mass    Hyponatremia    Anemia    Peritonitis    Moderate protein-calorie malnutrition     Past Medical History:   Diagnosis Date    HTN (hypertension)      Past Surgical History:   Procedure Laterality Date    COLON RESECTION N/A 10/26/2024    Procedure: LOW ANTERIOR COLON RESECTION SIGMOID, COLOSTOMY, SPLENIC FLEXURE MOBILIZATION;  Surgeon: Mc Guillaume MD;  Location: [facility];  Service: General;  Laterality: N/A;    EXPLORATORY LAPAROTOMY N/A 10/26/2024    Procedure: LAPAROTOMY EXPLORATORY, LEFT HEMICOLECTOMY;  Surgeon: Mc Guillaume MD;  Location: [facility];  Service: General;  Laterality: N/A;       SLP Recommendation and Plan  SLP Swallowing Diagnosis: moderate, oral dysphagia, pharyngeal dysphagia (24 123)  SLP Diet Recommendation: NPO, temporary alternate methods of nutrition/hydration (24 123)     SLP Rec. for Method of Medication Administration: meds via alternate route (24 123)     Monitor for Signs of Aspiration: notify SLP if any concerns (24 123)  Recommended Diagnostics: reassess via VFSS ([facility] [place], [place]), reassess via FEES (24 1230)  Swallow Criteria for Skilled Therapeutic Interventions Met: demonstrates skilled criteria (24 1230)  Anticipated Discharge Disposition (SLP): unknown (24 123)  Rehab Potential/Prognosis, Swallowing: good, to achieve stated  therapy goals (11/07/24 1230)  Therapy Frequency (Swallow): PRN (11/07/24 1230)  Predicted Duration Therapy Intervention (Days): until discharge (11/07/24 1230)  Oral Care Recommendations: Oral Care BID/PRN, Toothbrush (11/07/24 1230)                                        Outcome Evaluation: VFSS completed. Recommend NPO, meds alternate route. Allow water protocol- ice chips and sips of water 30 minutes s/p oral care. Patient demonstrated aspiration thin, penetration nectar thick, and unable to clear moderate pharngeal residue. High risk of aspiration with all PO secondary to pharyngeal residue. SLP to follow for re-eval as overall strength and endurance improve.      SWALLOW EVALUATION (Last 72 Hours)       SLP Adult Swallow Evaluation       Row Name 11/07/24 1230 11/06/24 1000 11/05/24 0900             Rehab Evaluation    Document Type evaluation  -OC re-evaluation  -SH re-evaluation  -SH      Subjective Information no complaints  -OC -- --      Patient Observations alert;cooperative;agree to therapy  -OC -- --      Patient/Family/Caregiver Comments/Observations reports fatigue  -OC -- --      Patient Effort adequate  -OC -- --      Symptoms Noted During/After Treatment none  -OC -- --         General Information    Patient Profile Reviewed yes  -OC yes  -SH yes  -SH      Current Method of Nutrition mechanical ground textures;thin liquids  -OC -- --      Precautions/Limitations, Vision WFL;for purposes of eval  -OC -- --      Precautions/Limitations, Hearing WFL;for purposes of eval  -OC -- --      Prior Level of Function-Communication unknown  -OC -- --      Prior Level of Function-Swallowing no diet consistency restrictions;regular textures;thin liquids  -OC -- --      Plans/Goals Discussed with patient;agreed upon  -OC -- --      Barriers to Rehab none identified  -OC -- --      Patient's Goals for Discharge patient did not state  -OC -- --         Pain    Pretreatment Pain Rating 0/10 - no pain  -OC 0/10 - no  pain  -SH 0/10 - no pain  -SH      Posttreatment Pain Rating 0/10 - no pain  -OC 0/10 - no pain  -SH 0/10 - no pain  -SH      Pain Side/Orientation -- -- generalized  -SH         Oral Motor Structure and Function    Dentition Assessment natural, present and adequate  -OC -- --      Secretion Management WNL/WFL  -OC -- --         MBS/VFSS Interpretation    VFSS Summary VFSS completed in conjunction with Carolina GUZMAN.  Patient presents with moderate oropharyngeal dysphagia characterized by generalized pharyngeal weakness, fatigue, timing, and decreased pharyngeal constriction.  Patient demonstrated silent aspiration thin, delayed spontaneous coughing and effective in clearing aspirated materials.  Eventual penetration of nectar thick liquids.  No penetration or aspiration with honey thick, purée, mechanical soft.  Patient unable to initiate swallow with dry solid, required expectoration.  Patient demonstrates moderate diffuse pharyngeal residue status post trials of purée.  -OC -- --         SLP Communication to Radiology    Summary Statement VFSS completed in conjunction with Carolina GUZMAN.  Patient presents with moderate oropharyngeal dysphagia characterized by generalized pharyngeal weakness, fatigue, timing, and decreased pharyngeal constriction.  Patient demonstrated silent aspiration thin, delayed spontaneous coughing and effective in clearing aspirated materials.  Eventual penetration of nectar thick liquids.  No penetration or aspiration with honey thick, purée, mechanical soft.  Patient unable to initiate swallow with dry solid, required expectoration.  Patient demonstrates moderate diffuse pharyngeal residue status post trials of purée.  -OC -- --         SLP Evaluation Clinical Impression    SLP Swallowing Diagnosis moderate;oral dysphagia;pharyngeal dysphagia  -OC -- --      Functional Impact risk of aspiration/pneumonia  -OC -- --      Rehab Potential/Prognosis, Swallowing good, to achieve stated  therapy goals  -OC -- --      Swallow Criteria for Skilled Therapeutic Interventions Met demonstrates skilled criteria  -OC -- --         Recommendations    Therapy Frequency (Swallow) PRN  -OC -- --      Predicted Duration Therapy Intervention (Days) until discharge  -OC -- --      SLP Diet Recommendation NPO;temporary alternate methods of nutrition/hydration  -OC -- --      Recommended Diagnostics reassess via VFSS (MBS);reassess via FEES  -OC -- --      Oral Care Recommendations Oral Care BID/PRN;Toothbrush  -OC -- --      SLP Rec. for Method of Medication Administration meds via alternate route  -OC -- --      Monitor for Signs of Aspiration notify SLP if any concerns  -OC -- --      Anticipated Discharge Disposition (SLP) unknown  -OC -- --                User Key  (r) = Recorded By, (t) = Taken By, (c) = Cosigned By      Initials Name Effective Dates    OC Alison Mays, SLP 08/28/23 -     SH Sarah Huff SLP 01/05/24 -                     EDUCATION  The patient has been educated in the following areas:   Dysphagia (Swallowing Impairment).        SLP GOALS       Row Name 11/06/24 1000 11/05/24 0900          (LTG) Swallow    (LTG) Swallow Pt will advance from FOIS level 3 to FOIS level 6, without strategies, as assessed by SLP.  -SH Pt will advance from FOIS level 3 to FOIS level 6, without strategies, as assessed by SLP.  -     [place] (Swallow Long Term Goal) independently (over 90% accuracy)  - independently (over 90% accuracy)  -     Time Frame (Swallow Long Term Goal) by discharge  - by discharge  -     Progress/Outcomes (Swallow Long Term Goal) -- goal ongoing  -     Comment (Swallow Long Term Goal) Patient seen for clinical swallow re evaluation. Pt pulled cortrak. No overt s/s of aspiration with single drinks thins via cup/straw. Soft throat clear and question of voice change with consecutive drinks thins via straw. No overt s/s of aspiration with puree. Double swallow and wet voice  with mixed. No overt s/s of aspiration with strained mech soft. SLP recs upgrade to mech soft, no mixed, and thins, single drinks. Meds with puree. Patient's voice is stronger and wet quality is more obvious at times. Need to further assess with VFSS d/t wet voice with mixed/consecutive drinks thins and persistent elevated WBC. Surgery approved barium use during VFSS. Of note, patient was able to repeat back instructions of throat clear with wet voice and single drinks.Results discussed with RN.  - Surgery okayed solid food trials. Patient seen for clinical swallow assessment. Intake poor, but tolerating water. No overt s/s of aspiration with single drinks thins via cup/straw and puree. Increased bolus transit. No voice change post swallow. Multiple swallows with mech soft. Question of slight voice change with mixed. SLP recs upgrade to puree, continue single drinks thins. Meds via cortrak or crushed with puee. Will continue to follow for diet upgrade.  -               User Key  (r) = Recorded By, (t) = Taken By, (c) = Cosigned By      Initials Name Provider Type     Sarah Huff SLP Speech and Language Pathologist                         Time Calculation:    Time Calculation- SLP       Row Name 11/07/24 1620             Time Calculation- SLP    SLP Start Time 1230  -OC      SLP Received On 11/07/24  -OC         Untimed Charges    SLP Eval/Re-eval  [facility] Fluoro Eval Swallow - 99201  -OC      04391-IF Motion Fluoro Eval Swallow Minutes 90  -OC         Total Minutes    Untimed Charges Total Minutes 90  -OC       Total Minutes 90  -OC                User Key  (r) = Recorded By, (t) = Taken By, (c) = Cosigned By      Initials Name Provider Type    Alison Isaacs SLP Speech and Language Pathologist                    Therapy Charges for Today       Code Description Service Date Service Provider Modifiers Qty    99858083506  [facility] FLUORO EVAL SWALLOW 6 11/7/2024 Alison Mays SLP GN 1                  Alison Mays, SLP  11/7/2024

## 2024-11-07 NOTE — PROGRESS NOTES
Name: Arsh Haynes ADMIT: 10/26/2024   : 1950  PCP: Provider, No Known    MRN: [id number] LOS: 12 days   AGE/SEX: 74 y.o. male  ROOM: [facility]     Subjective   Subjective   No acute events overnight.  Patient's daughter-in-law is at bedside.  Did work with SLP today and they are recommending n.p.o. at this time.  Patient denies any significant abdominal pain at my time of evaluation.    Objective   Objective     Vital Signs  Temp:  [97.2 °F (36.2 °C)-98 °F (36.7 °C)] 98 °F (36.7 °C)  Heart Rate:  [] 96  Resp:  [18-20] 18  BP: (111-137)/(51-66) 112/61  SpO2:  [94 %-98 %] 97 %  on   ;   Device (Oxygen Therapy): room air  Body mass index is 25.44 kg/m².    Physical Exam  General: Alert, no acute distress.  Lying in bed.  Chronically ill-appearing.  ENT: No conjunctival injection or scleral icterus. Moist mucous membranes.   Neuro: Eyes open and moving in all directions, generalized weakness non-distended, no focal deficits.   Lungs: Clear to auscultation bilaterally. No wheeze or crackles. No distress.   Heart: RRR, no murmurs. No edema.  Abdomen: Soft, nondistended.  BRONWYN drain in place.  Well-healing midline incision.  Ostomy with stool output.  Ext: Warm and well-perfused. No edema.   Skin: No rashes or lesions. IV site without swelling or erythema.     Results Review     I reviewed the patient's new clinical results:  Results from last 7 days   Lab Units 24  0703 24  0602 24  0649   WBC 10*3/mm3 17.83* 19.15* 19.60* 20.90*   HEMOGLOBIN g/dL 8.1* 8.0* 8.2* 8.0*   PLATELETS 10*3/mm3 1,392* 1,122* 915* 760*     Results from last 7 days   Lab Units 24  0704 24  0624 24  0602 24  0649   SODIUM mmol/L 129* 131* 130* 130*   POTASSIUM mmol/L 3.7 4.1 4.3 4.4   CHLORIDE mmol/L 97* 99 98 99   CO2 mmol/L 22.7 21.7* 22.2 23.6   BUN mg/dL 9 8 9 10   CREATININE mg/dL 0.53* 0.48* 0.52* 0.48*   GLUCOSE mg/dL 114* 98 250* 216*   EGFR mL/min/1.73 106.5 109.7  107.1 109.7     Results from last 7 days   Lab Units 11/07/24  0704 11/06/24  0624 11/05/24  0602 11/04/24  0649   ALBUMIN g/dL 2.5* 1.8* 2.1* 2.2*   BILIRUBIN mg/dL 0.5 0.6 0.5 0.5   ALK PHOS U/L 250* 238* 257* 229*   AST (SGOT) U/L 28 28 24 19   ALT (SGPT) U/L 36 31 27 23     Results from last 7 days   Lab Units 11/07/24  0704 11/06/24  0624 11/05/24  0602 11/04/24  0649 11/02/24  0348 11/01/24  1209   CALCIUM mg/dL 8.0* 8.1* 7.9* 7.9*   < >  --    ALBUMIN g/dL 2.5* 1.8* 2.1* 2.2*   < >  --    MAGNESIUM mg/dL 2.1 2.1  --   --   --  2.1   PHOSPHORUS mg/dL 3.2 3.0  --   --   --   --     < > = values in this interval not displayed.       Glucose   Date/Time Value Ref Range Status   11/07/2024 1034 116 70 - 130 mg/dL Final   11/07/2024 0623 129 70 - 130 mg/dL Final   11/06/2024 2014 179 (H) 70 - 130 mg/dL Final   11/06/2024 1604 142 (H) 70 - 130 mg/dL Final   11/06/2024 1134 146 (H) 70 - 130 mg/dL Final   11/06/2024 0657 112 70 - 130 mg/dL Final   11/05/2024 2105 132 (H) 70 - 130 mg/dL Final       No radiology results for the last day    I have personally reviewed all medications:  Scheduled Medications  chlorhexidine, 15 mL, Mouth/Throat, Q12H  enoxaparin, 1 mg/kg, Subcutaneous, Q12H  folic acid, 1 mg, Nasogastric, Daily  insulin glargine, 10 Units, Subcutaneous, Nightly  insulin regular, 2-9 Units, Subcutaneous, 4x Daily AC & at Bedtime  lansoprazole, 30 mg, Oral, Q AM  metoprolol tartrate, 50 mg, Oral, Q12H  mupirocin, 1 Application, Each Nare, BID  sodium chloride, 10 mL, Intravenous, Q12H  terazosin, 2 mg, Nasogastric, Daily  thiamine, 100 mg, Nasogastric, Daily    Infusions  sodium chloride, 75 mL/hr    Diet  Diet: Regular/House; No Mixed Consistencies; Texture: Mechanical Ground (NDD 2); Fluid Consistency: Thin (IDDSI 0)      Intake/Output Summary (Last 24 hours) at 11/7/2024 1522  Last data filed at 11/7/2024 0642  Gross per 24 hour   Intake 600 ml   Output 1785 ml   Net -1185 ml       Assessment/Plan      Active Hospital Problems    Diagnosis  POA    **Peritonitis [K65.9]  Unknown    HTN (hypertension) [I10]  Unknown    Thrombocytosis [D75.839]  Unknown    Acute DVT (deep venous thrombosis) [I82.409]  Unknown    Renal mass [N28.89]  Unknown    Hyponatremia [E87.1]  Unknown    Anemia [D64.9]  Unknown    Moderate protein-calorie malnutrition [E44.0]  Yes    Colon cancer [C18.9]  No      Resolved Hospital Problems    Diagnosis Date Resolved POA    Perforation of sigmoid colon [K63.1] 10/27/2024 Yes       74 y.o. male with Peritonitis.    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  Leukocytosis slightly improved today.  -Acute right lower extremity DVT found on 11/5.  He has a severe thrombocytosis and in addition to his anemia as well as the colon cancer.  Hematology/oncology following.  Continue therapeutic Lovenox.  -Hgb is low but stable.  No signs of active bleeding.  Hematology following.  Repeat CBC with morning labs, transfuse for Hgb less than 7.  -Urology evaluated the renal mass and urinary retention.  Blankenship catheter in place.  Continue Flomax.  Voiding trial when closer to discharge.  He will follow-up in 4 to 6 weeks with repeat CT for the renal mass  -Hyponatremia: Sodium further decreased to 129 today.  Has been taking in very little oral intake and now NPO.  Will start normal saline at 75 cc/hour.  Repeat BMP with morning labs.  If sodium does not improve or worsens, will need to pursue additional workup.  -Decrease Lantus to 5 units nightly with n.p.o. status.  Continue SSI.  Ongoing titration of insulin based on requirements.  -SLP evaluated today and recommending n.p.o. at this time.  Have reached out to general surgery to discuss placing core track and starting tube feeds.  Will start normal saline as above for now while awaiting their  response.  SLP to continue to follow.    Pharmacy to dose Lovenox for DVT prophylaxis.  Full code.  Discussed with patient, family, and nursing staff.  Anticipate discharge TBD      Sarah Segura MD  [facility]  11/07/24  15:22 EST

## 2024-11-07 NOTE — PROGRESS NOTES
ID note for sepsis  S: still weak. AF    Physical Exam:   Vital Signs   Temp:  [97.2 °F (36.2 °C)-97.6 °F (36.4 °C)] 97.2 °F (36.2 °C)  Heart Rate:  [] 105  Resp:  [18-23] 18  BP: (110-137)/(51-70) 116/51    GENERAL: Awake and alert, ill  HEENT: Oropharynx is clear. Hearing is grossly normal.   EYES: . No conjunctival injection. No lid lag.   LUNGS:normal respiratory effort.   SKIN: no cutaneous eruptions in exposed areas  PSYCHIATRIC: slow to respond    Results Review:  White count 17.8  Creatinine 0.53  Liver function test normal apart from an alk phos of 250    10/29 blood culture 1/2 Bifidobacterium spp  10/26 abdominal fluid cultures E. coli, Pseudomonas and mixed anaerobes  10/29 blood cultures 2/2 no growth     A/p  Septic shock, shock resolved  Feculent peritonitis from perforated colon cancer status post ex lap, washout hemicolectomy and end colostomy by Dr. Guillaume on 10/26  Colon cancer  Atelectasis  Pleural effusions  Acute right lower extremity deep vein thrombosis noted in the gastrocnemius and soleal.     WBC better.   We have observed off abx and he is stable  WBC likely due to dvt, reactive process. Repeat Micro negative  We will sign off but remain available if needed

## 2024-11-07 NOTE — CONSULTS
Nutrition Services    Patient Name:  Arsh Haynes  YOB: 1950  MRN: [id number]  Admit Date:  10/26/2024    RD consulted to place cortrak and provide tube feed recs. RD was able to place cortrak gastric to depth of 85 cm and secure with a nasal septal tie.     RECS:   Once appropriate, please start Novasource Renal at 10 ml/hr and advance 10 ml q 4 hrs to goal rate of 55 ml/hr. Goal rate to provide 2420 kcals, 110 g/protein, and 859 ml free water. Please flush 30 ml free water q 6 hrs.      Enteral Prescription:     Enteral Route NG    TF Delivery Method Continuous    Enteral Product Novasource Renal    Modular None    Propofol Rate/Kcal     TF Start Rate  10 mL/hr    TF Goal Rate  55 mL/hr    Free Water Flush 30 mL Q 6 hr    Provision at Goal:          Calories 2420 kcal, meets 100% needs         Protein  110 gm protein, meets 100% needs         Fluid (mL) 859 mL free water + 120 mL in flushes     Electronically signed by:  John Paul Arita RDN, LEFTY  11/07/24 17:25 EST    Gen: Alert, Well appearing. NAD    Head: NC, AT, PERRL, EOMI, normal lids/conjunctiva   ENT: Bilateral TM WNL, normal hearing, patent oropharynx without erythema/exudate, uvula midline  Neck: supple, no tenderness/meningismus/JVD   Pulm: Bilateral clear BS, normal resp effort, no wheeze/stridor/retractions  CV: RRR, no M/R/G, +dist pulses   Abd: soft, NT/ND, +BS, no guarding/rebound tenderness  Mskel: no edema/erythema/cyanosis   Skin: no rash   Neuro: AAOx3, no sensory/motor deficits, CN 2-12 intact

## 2024-11-07 NOTE — PROGRESS NOTES
POD 11 left hemicolectomy with colostomy.     S: Patient was, continues to be extremely weak.  Poor p.o. intake, good ostomy output.  No abdominal pain    O:   Vitals:    11/07/24 0500 11/07/24 0706 11/07/24 1016 11/07/24 1215   BP:  116/51 114/66 112/61   BP Location:  Right arm Right arm Right arm   Patient Position:   Lying Lying   Pulse:  105 96    Resp:  18 18 18   Temp:  97.2 °F (36.2 °C) 97.9 °F (36.6 °C) 98 °F (36.7 °C)   TempSrc:  Oral Oral Oral   SpO2:  98% 97%    Weight: 85.1 kg (187 lb 9.8 oz)      Height:          Minimal serous drainage from his drain  Alert, chronically ill-appearing  Abdomen soft and nontender  Midline incision healing well  Ostomy pink and viable    Sodium 129, chloride 97, creatinine 0.5  White blood cell count 17,000, improving, hemoglobin stable at 8.1    Assessment and plan    Postop day 11 status post left hemicolectomy with colostomy for synchronous perforated colon cancer.  Very slow progress but getting better.  Tolerating diet although poor p.o. intake  Having good ostomy output  White blood blood cell count is improving and there is no further signs of infection    Continue regular diet with dietary supplements  Full anticoagulation for DVT  Wet-to-dry dressing changes with saline and gauze  Remove Semaj drain     will continue to follow

## 2024-11-07 NOTE — PROGRESS NOTES
REASON FOR CONSULTATION: Thrombocytosis, DVT, colon cancer        History of Present Illness :    The patient is a 74-year-old male who presented to the ER 10/26/2024 with worsening abdominal pain.  His scans at that point consistent with sigmoid perforation.  He had been ill for several months developing weight loss associated with diarrhea.  Additional findings were consistent with septicemia with lactic acidosis, alcoholism, hyperglycemia and a right renal mass.     Admitting H&H were 13.7 and 43.2 with white count of 9720 and platelet count of 549,000 and a procalcitonin of 9.58.     The patient went to surgery 10/27 undergoing an open left hemicolectomy with Larson pouch and end colostomy, splenic flexure mobilization.  Findings were of fecal peritonitis, rectosigmoid colon cancer, ischemic colon with perforation at the descending sigmoid colon junction.  Pathology concerning the malignancy included 2 foci of invasive mildly differentiated mucinous adenocarcinoma with tumor sizes of 4.8 cm and 6.0 cm both tumors invading through muscularis propria into pericolonic adipose tissue,, descending colon cancer with no lymphovascular nor peritoneal invasion identified, 0 of 16 lymph nodes with pathologic stage -mpT3, N0.  Additional sigmoid colon cancer moderately differentiated with tumor invading muscularis propria, 0 16 nodes negative and again pathologic stage  -Pathologic stage: mpT3, N0.      Postop the patient continued therapy for septic shock from perforated bowel and underlying malignancy quiring pressors.  Studies revealed gram-negative bacilli, gram-positive cocci with IV antibiotic therapies directed at findings.  10/29 H&H of 8.6 and 27.9 with white count of 20,190, platelet count of 236,000.  He was able to be weaned off pressors and extubated 10/29/2024.  Tube feeds started.        Patient seen by infectious disease 11/3/2024 with Zosyn continued.  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  Patient with further confusion     Review 11/5/2024 with some general improvement though further anemia and thrombocytosis.  Doppler examination 11/5 with acute right lower extremity DVT in the gastrocnemius and soleal.        Interval history:  11/6/2024  Discussed findings with patient at bedside  T97.8, pulse 102, respirations 18, /62  H&H of 8.0 and 25.0, white count of 19,150, platelet count of 1,112,000    11/7/2024-postoperative day 11  T97.2, pulse 105, respirations 18, /50  3 resting, plans to move out of CCU today.  H&H 8.1 and 25.8 with white count of 17,830, platelet count of 1,392, 000  , B12 of 824, folate 11.7, ferritin of 230, iron profile consistent with anemia of chronic disease, , reticulocyte count 1.51 and reticulocyte hemoglobin 23.1    Past Medical History, Past Surgical History, Social History, Family History have been reviewed and are without significant changes except as mentioned.    Review of Systems   A comprehensive 14 point review of systems was performed and was negative except as mentioned.    Medications:  The current medication list was reviewed in the EMR    ALLERGIES:  No Known Allergies    Objective      Vitals:    11/07/24 0000 11/07/24 0300 11/07/24 0500 11/07/24 0706   BP: 113/57 137/61  116/51   BP Location:  Left arm  Right arm   Patient Position:  Lying     Pulse: 99 97  105   Resp:  18  18   Temp:  97.3 °F (36.3 °C)  97.2 °F (36.2 °C)   TempSrc:    Oral   SpO2: 94% 94%  98%   Weight:   85.1 kg (187 lb 9.8 oz)    Height:              No data to display                Physical Exam    Constitutional:       Appearance: He is obese.      Comments: Appearing male, alert   HENT:      Nose: Nose normal.       Mouth/Throat:      Mouth: Mucous membranes are moist.      Pharynx: Oropharynx is clear.   Eyes:      Extraocular Movements: Extraocular movements intact.      Conjunctiva/sclera: Conjunctivae normal.      Pupils: Pupils are equal, round, and reactive to light.   Cardiovascular:      Rate and Rhythm: Regular rhythm. Tachycardia present.      Pulses: Normal pulses.      Heart sounds: Normal heart sounds.   Pulmonary:      Effort: Pulmonary effort is normal.      Breath sounds: Normal breath sounds.   Abdominal:      General: There is distension.      Palpations: There is no mass.      Tenderness: There is abdominal tenderness. There is no guarding or rebound.      Hernia: No hernia is present.      Comments: Signs reduced, ostomy intact and functioning   Musculoskeletal:         General: Normal range of motion.      Cervical back: Normal range of motion and neck supple.      Right lower leg: No edema (Trace, no palpable cord).      Left lower leg: No edema.   Neurological:      General: No focal deficit present.      Mental Status: He is oriented to person, place, and time.   Psychiatric:         Mood and Affect: Mood normal.   RECENT LABS:  Hematology WBC   Date Value Ref Range Status   11/06/2024 19.15 (H) 3.40 - 10.80 10*3/mm3 Final     RBC   Date Value Ref Range Status   11/06/2024 2.94 (L) 4.14 - 5.80 10*6/mm3 Final     Hemoglobin   Date Value Ref Range Status   11/06/2024 8.0 (L) 13.0 - 17.7 g/dL Final     Hematocrit   Date Value Ref Range Status   11/06/2024 25.0 (L) 37.5 - 51.0 % Final     Platelets   Date Value Ref Range Status   11/06/2024 1,122 (C) 140 - 450 10*3/mm3 Final              Assessment & Plan     Septic shock from perforated bowel from underlying malignancy  Subsequent abdominal cultures positive for Pseudomonas, E. coli and mixed anaerobes, negative blood cultures  Patient now more likely, completed antibiotic therapies     *Synchronous colon cancers  Both malignancies are T3 N0-stage IIA-and  as we consider his concurrent renal mass and performance status, adjuvant chemotherapy must be considered particularly with invasion of the descending colon mass into pericolonic and perirectal tissue-apparently the the site of perforation.  These issues discussed with general surgery.  MSI status-negative by IHC, PCR-microsatellite stable.  This is particularly useful considering the patient's family history and findings of synchronous colon cancer.     *Anemia  Suspected multifactorial anemia, additional laboratory studies consistent with anemia of chronic disease     *Leukocytosis  Agree that this is multifactorial but notedly right shifted with predominant neutrophilia and no immature forms.  This is a reactive leukocytosis usually related to concurrent physiologic stress and infection.  Gradual improvement in degree of leukocytosis     *Thrombocytosis  This is also reactive developing gradually postoperative and does not need to be addressed with cytoreductive medications.  Additional laboratory studies per iron deficiency were completed and found to be negative  Continue to monitor        *Urinary retention, findings of right renal mass  Seen by urology, voiding trial, outpatient scans for follow-up of renal mass        *Acute right lower extremity DVT in gastrocnemius and soleal  Patient now on anticoagulation with Lovenox appropriately.  We will follow and transition to DOAC therapy as he further recovers.       11/7/2024

## 2024-11-07 NOTE — NURSING NOTE
CWOCN- consult for gluteal pressure injury. Assisted patient to turn and assessed skin. RN had placed a sacral dressing over the coccyx/buttocks. Skin is pink. There are areas where there was partial thickness skin loss prior but this appear dry, healing. Skin subject to friction and pressure. Skin is very pink but it is blanching. Recommend to continue current measures for pressure injury prevention. Add a specialty mattress, turn, off load heels, use the sacral dressing, etc.     *Ostomy supplies not present in room since xfer to [facility]. Will bring additional supplies today or tomorrow and change the pouch.        11/07/24 1557   Wound 11/04/24 Bilateral gluteal pressure injury   Placement Date: 11/04/24   Side: Bilateral  Location: gluteal  Primary Wound Type: (c)   Type: pressure injury  Present on Original Admission: No   Pressure Injury Stage   (skin is blanching, appears to be resurfacing)   Base pink   Periwound pink   Periwound Temperature warm   Periwound Skin Turgor soft   Edges irregular   Drainage Amount none   Dressing Care   (sacral dressing is intact- continue current plan)

## 2024-11-08 ENCOUNTER — APPOINTMENT (OUTPATIENT)
Dept: GENERAL RADIOLOGY | Facility: HOSPITAL | Age: 74
DRG: 853 | End: 2024-11-08
Payer: MEDICARE

## 2024-11-08 LAB
1,25(OH)2D SERPL-MCNC: 13.7 PG/ML (ref 24.8–81.5)
ALBUMIN SERPL-MCNC: 2.1 G/DL (ref 3.5–5.2)
ALBUMIN/GLOB SERPL: 0.6 G/DL
ALP SERPL-CCNC: 215 U/L (ref 39–117)
ALT SERPL W P-5'-P-CCNC: 32 U/L (ref 1–41)
ANION GAP SERPL CALCULATED.3IONS-SCNC: 12.4 MMOL/L (ref 5–15)
AST SERPL-CCNC: 21 U/L (ref 1–40)
BASOPHILS # BLD MANUAL: 0 10*3/MM3 (ref 0–0.2)
BASOPHILS NFR BLD MANUAL: 0 % (ref 0–1.5)
BILIRUB SERPL-MCNC: 0.4 MG/DL (ref 0–1.2)
BUN SERPL-MCNC: 8 MG/DL (ref 8–23)
BUN/CREAT SERPL: 13.8 (ref 7–25)
CALCIUM SPEC-SCNC: 7.5 MG/DL (ref 8.6–10.5)
CHLORIDE SERPL-SCNC: 102 MMOL/L (ref 98–107)
CO2 SERPL-SCNC: 18.6 MMOL/L (ref 22–29)
CREAT SERPL-MCNC: 0.58 MG/DL (ref 0.76–1.27)
CRP SERPL-MCNC: 22.95 MG/DL (ref 0–0.5)
DEPRECATED RDW RBC AUTO: 57.5 FL (ref 37–54)
EGFRCR SERPLBLD CKD-EPI 2021: 103.6 ML/MIN/1.73
EOSINOPHIL # BLD MANUAL: 0.19 10*3/MM3 (ref 0–0.4)
EOSINOPHIL NFR BLD MANUAL: 1 % (ref 0.3–6.2)
ERYTHROCYTE [DISTWIDTH] IN BLOOD BY AUTOMATED COUNT: 18.5 % (ref 12.3–15.4)
ERYTHROCYTE [SEDIMENTATION RATE] IN BLOOD: 65 MM/HR (ref 0–20)
GLOBULIN UR ELPH-MCNC: 3.7 GM/DL
GLUCOSE BLDC GLUCOMTR-MCNC: 118 MG/DL (ref 70–130)
GLUCOSE BLDC GLUCOMTR-MCNC: 124 MG/DL (ref 70–130)
GLUCOSE BLDC GLUCOMTR-MCNC: 160 MG/DL (ref 70–130)
GLUCOSE BLDC GLUCOMTR-MCNC: 164 MG/DL (ref 70–130)
GLUCOSE SERPL-MCNC: 112 MG/DL (ref 65–99)
HCT VFR BLD AUTO: 25.3 % (ref 37.5–51)
HGB BLD-MCNC: 7.8 G/DL (ref 13–17.7)
LYMPHOCYTES # BLD MANUAL: 1.72 10*3/MM3 (ref 0.7–3.1)
LYMPHOCYTES NFR BLD MANUAL: 7.1 % (ref 5–12)
MCH RBC QN AUTO: 26.7 PG (ref 26.6–33)
MCHC RBC AUTO-ENTMCNC: 30.8 G/DL (ref 31.5–35.7)
MCV RBC AUTO: 86.6 FL (ref 79–97)
MONOCYTES # BLD: 1.34 10*3/MM3 (ref 0.1–0.9)
NEUTROPHILS # BLD AUTO: 15.67 10*3/MM3 (ref 1.7–7)
NEUTROPHILS NFR BLD MANUAL: 82.8 % (ref 42.7–76)
NRBC BLD AUTO-RTO: 0.1 /100 WBC (ref 0–0.2)
PLAT MORPH BLD: NORMAL
PLATELET # BLD AUTO: 1244 10*3/MM3 (ref 140–450)
PMV BLD AUTO: 8.5 FL (ref 6–12)
POTASSIUM SERPL-SCNC: 3.6 MMOL/L (ref 3.5–5.2)
POTASSIUM SERPL-SCNC: 4.1 MMOL/L (ref 3.5–5.2)
PROT SERPL-MCNC: 5.8 G/DL (ref 6–8.5)
QT INTERVAL: 381 MS
QTC INTERVAL: 492 MS
RBC # BLD AUTO: 2.92 10*6/MM3 (ref 4.14–5.8)
RBC MORPH BLD: NORMAL
SODIUM SERPL-SCNC: 133 MMOL/L (ref 136–145)
URATE SERPL-MCNC: 5.4 MG/DL (ref 3.4–7)
VARIANT LYMPHS NFR BLD MANUAL: 2 % (ref 0–5)
VARIANT LYMPHS NFR BLD MANUAL: 7.1 % (ref 19.6–45.3)
WBC MORPH BLD: NORMAL
WBC NRBC COR # BLD AUTO: 18.92 10*3/MM3 (ref 3.4–10.8)

## 2024-11-08 PROCEDURE — 80053 COMPREHEN METABOLIC PANEL: CPT | Performed by: INTERNAL MEDICINE

## 2024-11-08 PROCEDURE — 82948 REAGENT STRIP/BLOOD GLUCOSE: CPT

## 2024-11-08 PROCEDURE — 84132 ASSAY OF SERUM POTASSIUM: CPT | Performed by: STUDENT IN AN ORGANIZED HEALTH CARE EDUCATION/TRAINING PROGRAM

## 2024-11-08 PROCEDURE — 85025 COMPLETE CBC W/AUTO DIFF WBC: CPT | Performed by: INTERNAL MEDICINE

## 2024-11-08 PROCEDURE — 99024 POSTOP FOLLOW-UP VISIT: CPT | Performed by: SURGERY

## 2024-11-08 PROCEDURE — 97530 THERAPEUTIC ACTIVITIES: CPT

## 2024-11-08 PROCEDURE — 25010000002 ENOXAPARIN PER 10 MG: Performed by: INTERNAL MEDICINE

## 2024-11-08 PROCEDURE — 73562 X-RAY EXAM OF KNEE 3: CPT

## 2024-11-08 PROCEDURE — 84550 ASSAY OF BLOOD/URIC ACID: CPT | Performed by: STUDENT IN AN ORGANIZED HEALTH CARE EDUCATION/TRAINING PROGRAM

## 2024-11-08 PROCEDURE — 99231 SBSQ HOSP IP/OBS SF/LOW 25: CPT | Performed by: INTERNAL MEDICINE

## 2024-11-08 PROCEDURE — 63710000001 INSULIN REGULAR HUMAN PER 5 UNITS: Performed by: HOSPITALIST

## 2024-11-08 PROCEDURE — 85652 RBC SED RATE AUTOMATED: CPT | Performed by: STUDENT IN AN ORGANIZED HEALTH CARE EDUCATION/TRAINING PROGRAM

## 2024-11-08 PROCEDURE — 25810000003 SODIUM CHLORIDE 0.9 % SOLUTION: Performed by: STUDENT IN AN ORGANIZED HEALTH CARE EDUCATION/TRAINING PROGRAM

## 2024-11-08 PROCEDURE — 63710000001 INSULIN GLARGINE PER 5 UNITS: Performed by: STUDENT IN AN ORGANIZED HEALTH CARE EDUCATION/TRAINING PROGRAM

## 2024-11-08 PROCEDURE — 86140 C-REACTIVE PROTEIN: CPT | Performed by: STUDENT IN AN ORGANIZED HEALTH CARE EDUCATION/TRAINING PROGRAM

## 2024-11-08 PROCEDURE — 85007 BL SMEAR W/DIFF WBC COUNT: CPT | Performed by: INTERNAL MEDICINE

## 2024-11-08 RX ORDER — SODIUM HYPOCHLORITE 1.25 MG/ML
SOLUTION TOPICAL 2 TIMES DAILY
Status: DISCONTINUED | OUTPATIENT
Start: 2024-11-08 | End: 2024-11-27

## 2024-11-08 RX ORDER — POTASSIUM CHLORIDE 1.5 G/1.58G
40 POWDER, FOR SOLUTION ORAL EVERY 4 HOURS
Status: COMPLETED | OUTPATIENT
Start: 2024-11-08 | End: 2024-11-08

## 2024-11-08 RX ADMIN — FOLIC ACID 1 MG: 1 TABLET ORAL at 09:10

## 2024-11-08 RX ADMIN — SODIUM CHLORIDE 75 ML/HR: 9 INJECTION, SOLUTION INTRAVENOUS at 07:20

## 2024-11-08 RX ADMIN — METOPROLOL TARTRATE 50 MG: 50 TABLET, FILM COATED ORAL at 20:21

## 2024-11-08 RX ADMIN — POTASSIUM CHLORIDE 40 MEQ: 1.5 POWDER, FOR SOLUTION ORAL at 09:10

## 2024-11-08 RX ADMIN — SODIUM CHLORIDE 500 ML: 9 INJECTION, SOLUTION INTRAVENOUS at 15:59

## 2024-11-08 RX ADMIN — Medication 100 MG: at 09:10

## 2024-11-08 RX ADMIN — ACETAMINOPHEN 325MG 650 MG: 325 TABLET ORAL at 15:36

## 2024-11-08 RX ADMIN — METOPROLOL TARTRATE 50 MG: 50 TABLET, FILM COATED ORAL at 09:10

## 2024-11-08 RX ADMIN — ENOXAPARIN SODIUM 90 MG: 100 INJECTION SUBCUTANEOUS at 07:20

## 2024-11-08 RX ADMIN — INSULIN HUMAN 2 UNITS: 100 INJECTION, SOLUTION PARENTERAL at 17:37

## 2024-11-08 RX ADMIN — Medication 10 ML: at 20:22

## 2024-11-08 RX ADMIN — ENOXAPARIN SODIUM 90 MG: 100 INJECTION SUBCUTANEOUS at 17:37

## 2024-11-08 RX ADMIN — INSULIN GLARGINE 5 UNITS: 100 INJECTION, SOLUTION SUBCUTANEOUS at 20:21

## 2024-11-08 RX ADMIN — POTASSIUM CHLORIDE 40 MEQ: 1.5 POWDER, FOR SOLUTION ORAL at 12:51

## 2024-11-08 RX ADMIN — SODIUM HYPOCHLORITE: 1.25 SOLUTION TOPICAL at 11:05

## 2024-11-08 RX ADMIN — Medication 10 ML: at 09:10

## 2024-11-08 RX ADMIN — LANSOPRAZOLE 30 MG: 15 TABLET, ORALLY DISINTEGRATING ORAL at 07:20

## 2024-11-08 RX ADMIN — TERAZOSIN 2 MG: 2 CAPSULE ORAL at 20:21

## 2024-11-08 RX ADMIN — CHLORHEXIDINE GLUCONATE 15 ML: 1.2 RINSE ORAL at 20:22

## 2024-11-08 NOTE — NURSING NOTE
"   11/08/24 0831   Wound 10/26/24 2301 midline abdomen   Placement Date/Time: 10/26/24 2301   Orientation: midline  Location: abdomen  Primary Wound Type: Incision   Dressing Appearance open to air   Base clean;moist;red   Periwound intact;dry   Edges open   Drainage Characteristics/Odor sanguineous   Drainage Amount scant   Dressing Care dressing applied  (moist saline gauze)   Colostomy LLQ   Placement date: If unknown, DO NOT use \"Add Comment\" note: 10/26/24   Inserted by: Dr Guillaume  Location: LLQ   Stomal Appliance 2 piece;Clean;Dry;Intact;Changed;Drainable   Stoma Appearance moist;red  (oval)   Peristomal Assessment Clean;Intact   Accessories/Skin Care cleansed with water;skin barrier ring   Stoma Function flatus;stool   Stool Color gerald colored   Stool Consistency liquid;loose   Treatment Bag change;Site care   Output (mL) 75 mL     WOCN: Discussed with unit RN , MD recently removed gauze dressing and it was blue color, etiology unknown. MD request perform moist dakins dressing. Saline dressing applied until dakins is available. Patient is feeling much better. Discussed frequency pouch change. Discussed his wife has watched pouch change previously. Placed in 2 1/4 2 piece pedro with adapt ring.  Discussed with unit RN to make sure patient is on pro plus mattress for pressure injury prevention.  Will continue to follow next week for ostomy education.    "

## 2024-11-08 NOTE — THERAPY TREATMENT NOTE
Patient Name: Arsh Haynes  : 1950    MRN: [id number]                              Today's Date: 2024       Admit Date: 10/26/2024    Visit Dx:     ICD-10-CM ICD-9-CM   1. Perforation of sigmoid colon  K63.1 569.83   2. Hypotension, unspecified hypotension type  I95.9 458.9   3. Increased lactic acid level  R79.89 276.2   4. Bowel perforation  K63.1 569.83     Patient Active Problem List   Diagnosis    Colon cancer    HTN (hypertension)    Thrombocytosis    Acute DVT (deep venous thrombosis)    Renal mass    Hyponatremia    Anemia    Peritonitis    Moderate protein-calorie malnutrition     Past Medical History:   Diagnosis Date    HTN (hypertension)      Past Surgical History:   Procedure Laterality Date    COLON RESECTION N/A 10/26/2024    Procedure: LOW ANTERIOR COLON RESECTION SIGMOID, COLOSTOMY, SPLENIC FLEXURE MOBILIZATION;  Surgeon: Mc Guillaume MD;  Location: [facility];  Service: General;  Laterality: N/A;    EXPLORATORY LAPAROTOMY N/A 10/26/2024    Procedure: LAPAROTOMY EXPLORATORY, LEFT HEMICOLECTOMY;  Surgeon: Mc Guillaume MD;  Location: [facility];  Service: General;  Laterality: N/A;      General Information       Row Name 24 1450          Physical Therapy Time and Intention    Document Type therapy note (daily note)  -MG     Mode of Treatment co-treatment;occupational therapy;physical therapy;other (see comments)  -MG       Row Name 24 1450          General Information    Patient Profile Reviewed yes  -MG     Existing Precautions/Restrictions fall;NPO  -MG     Barriers to Rehab medically complex  -MG       Row Name 24 1450          Cognition    Orientation Status (Cognition) oriented x 3  -MG       Row Name 24 1450          Safety Issues/Impairments Affecting Functional Mobility    Safety Issues Affecting Function (Mobility) awareness of need for assistance;insight into deficits/self-awareness;sequencing abilities  -MG      Impairments Affecting Function (Mobility) strength;balance;endurance/activity tolerance;coordination;pain;postural/trunk control;range of motion (ROM);cognition  -MG     Comment, Safety Issues/Impairments (Mobility) Co treatment medically appropriate and necessary due to patient acuity level, activity tolerance and safety of patient and staff. Treatment is focusing on progression of care and goals established in the POC. Gait belt and non-skid socks donned.  -MG               User Key  (r) = Recorded By, (t) = Taken By, (c) = Cosigned By      Initials Name Provider Type    MG Liv Cruz, PT Physical Therapist                   Mobility       Row Name 11/08/24 1450          Bed Mobility    Supine-Sit Prentiss (Bed Mobility) maximum assist (25% patient effort);2 person assist  -MG     Sit-Supine Prentiss (Bed Mobility) maximum assist (25% patient effort);2 person assist  -MG     Assistive Device (Bed Mobility) head of bed elevated;bed rails  -MG     Comment, (Bed Mobility) Cues for sequencing and hand placement.  -MG       Row Name 11/08/24 1450          Sit-Stand Transfer    Sit-Stand Prentiss (Transfers) maximum assist (25% patient effort);2 person assist  -MG     Comment, (Sit-Stand Transfer) x2 w/ B feet/knees blocked and pt holding onto back of PT/OT arms. First stand able to come to full upright standing, but only a partial stand on the second d/t fatigue.  -MG               User Key  (r) = Recorded By, (t) = Taken By, (c) = Cosigned By      Initials Name Provider Type    MG Liv Crzu, PT Physical Therapist                   Obj/Interventions       Row Name 11/08/24 1451          Motor Skills    Therapeutic Exercise other (see comments)  DARIUS TOLENTINO x10  -MG       Row Name 11/08/24 1451          Balance    Static Sitting Balance standby assist;contact guard  -MG     Dynamic Sitting Balance contact guard;minimal assist  -MG     Position, Sitting Balance sitting edge of bed  -MG     Static  Standing Balance maximum assist;2-person assist;verbal cues  -MG     Comment, Balance Tolerated EOB activities for 7 minutes. Reports dizziness after standing attempts and was quick to fatigue.  -MG               User Key  (r) = Recorded By, (t) = Taken By, (c) = Cosigned By      Initials Name Provider Type    MG Liv Cruz, PT Physical Therapist                   Goals/Plan    No documentation.                  Clinical Impression       Row Name 11/08/24 1453          Pain    Pretreatment Pain Rating 9/10  -MG     Posttreatment Pain Rating 9/10  -MG     Pain Location wrist  -MG     Pain Side/Orientation right  -MG     Pain Management Interventions positioning techniques utilized;activity modification encouraged;nursing notified  -MG     Response to Pain Interventions activity participation with tolerable pain  -MG       Row Name 11/08/24 1453          Plan of Care Review    Plan of Care Reviewed With patient  -MG     Progress improving  -MG     Outcome Evaluation Pt seen for PT/OT cotx this AM and tolerate the session well. Today, pt was MaxA x2 for bed mobility and two STS with his feet/knees blocked and pt holding onto the back of the PT/OT arms. Pt was able to come to full standing on the first stand, but only a partial stand on the second d/t dizziness and fatigue. Pt tolerated sitting EOB for 7 minutes where he also performed UE/LE ther-ex and reaching. Pt had c/o R wrist and shoulder pain throughout the session. KHLOE sanchez/ RN. PT will cont to follow.  -MG       Row Name 11/08/24 1453          Therapy Assessment/Plan (PT)    Rehab Potential (PT) good  -MG     Criteria for Skilled Interventions Met (PT) yes  -MG     Therapy Frequency (PT) 6 times/wk  -MG       Row Name 11/08/24 1453          Vital Signs    Pretreatment Heart Rate (beats/min) 105  -MG     Posttreatment Heart Rate (beats/min) 107  -MG       Row Name 11/08/24 1453          Positioning and Restraints    Pre-Treatment Position in bed  -MG     Post  Treatment Position bed  -MG     In Bed notified nsg;supine;fowlers;call light within reach;encouraged to call for assist;exit alarm on;side rails up x3;heels elevated;RUE elevated  -MG               User Key  (r) = Recorded By, (t) = Taken By, (c) = Cosigned By      Initials Name Provider Type    Liv Sandoval, PT Physical Therapist                   Outcome Measures       Row Name 11/08/24 1458          How much help from another person do you currently need...    Turning from your back to your side while in flat bed without using bedrails? 3  -MG     Moving from lying on back to sitting on the side of a flat bed without bedrails? 2  -MG     Moving to and from a bed to a chair (including a wheelchair)? 1  -MG     Standing up from a chair using your arms (e.g., wheelchair, bedside chair)? 2  -MG     Climbing 3-5 steps with a railing? 1  -MG     To walk in hospital room? 1  -MG     AM-PAC 6 Clicks Score (PT) 10  -MG     Highest Level of Mobility Goal 4 --> Transfer to chair/commode  -MG       Row Name 11/08/24 1225          Functional Assessment    Outcome Measure Options AM-PAC 6 Clicks Daily Activity (OT)  -SM               User Key  (r) = Recorded By, (t) = Taken By, (c) = Cosigned By      Initials Name Provider Type    Liv Sandoval, PT Physical Therapist     Sarah Abernathy, OT Occupational Therapist                                 Physical Therapy Education       Title: PT OT SLP Therapies (In Progress)       Topic: Physical Therapy (In Progress)       Point: Mobility training (Done)       Learning Progress Summary            Patient Acceptance, E,D, VU,NR by  at 11/8/2024 1459    Acceptance, E, VU by  at 11/2/2024 1622                      Point: Home exercise program (Not Started)       Learner Progress:  Not documented in this visit.              Point: Body mechanics (Done)       Learning Progress Summary            Patient Acceptance, E,D, VU,NR by  at 11/8/2024 1459    Acceptance, E, VU  by CW at 11/2/2024 1622                      Point: Precautions (Done)       Learning Progress Summary            Patient Acceptance, E,D, VU,NR by  at 11/8/2024 1459    Acceptance, E, VU by CW at 11/2/2024 1622                                      User Key       Initials Effective Dates Name Provider Type Discipline     05/24/22 -  Liv rCuz, PT Physical Therapist PT    CW 12/13/22 -  Lisa Paulson PT Physical Therapist PT                  PT Recommendation and Plan     Progress: improving  Outcome Evaluation: Pt seen for PT/OT cotx this AM and tolerate the session well. Today, pt was MaxA x2 for bed mobility and two STS with his feet/knees blocked and pt holding onto the back of the PT/OT arms. Pt was able to come to full standing on the first stand, but only a partial stand on the second d/t dizziness and fatigue. Pt tolerated sitting EOB for 7 minutes where he also performed UE/LE ther-ex and reaching. Pt had c/o R wrist and shoulder pain throughout the session. KHLOE w/ RN. PT will cont to follow.     Time Calculation:         PT Charges       Row Name 11/08/24 1459             Time Calculation    Start Time 0953  -MG      Stop Time 1011  -MG      Time Calculation (min) 18 min  -MG      PT Received On 11/08/24  -MG      PT - Next Appointment 11/09/24  -MG                User Key  (r) = Recorded By, (t) = Taken By, (c) = Cosigned By      Initials Name Provider Type     Liv Cruz, PT Physical Therapist                  Therapy Charges for Today       Code Description Service Date Service Provider Modifiers Qty    [id number]  PT THERAPEUTIC ACT EA 15 MIN 11/8/2024 Liv Cruz, PT GP 1            PT G-Codes  Outcome Measure Options: AM-PAC 6 Clicks Daily Activity (OT)  AM-PAC 6 Clicks Score (PT): 10  AM-PAC 6 Clicks Score (OT): 9  Modified [place] Scale: 5 - Severe disability.  Bedridden, incontinent, and requiring constant nursing care and attention.  PT Discharge Summary  Anticipated  Discharge Disposition (PT): skilled nursing facility    Liv Cruz, PT  11/8/2024

## 2024-11-08 NOTE — PLAN OF CARE
Goal Outcome Evaluation:  Plan of Care Reviewed With: patient        Progress: improving  Outcome Evaluation: Pt seen for PT/OT cotx this AM and tolerate the session well. Today, pt was MaxA x2 for bed mobility and two STS with his feet/knees blocked and pt holding onto the back of the PT/OT arms. Pt was able to come to full standing on the first stand, but only a partial stand on the second d/t dizziness and fatigue. Pt tolerated sitting EOB for 7 minutes where he also performed UE/LE ther-ex and reaching. Pt had c/o R wrist and shoulder pain throughout the session. KHLOE w/ RN. PT will cont to follow.    Anticipated Discharge Disposition (PT): skilled nursing facility

## 2024-11-08 NOTE — PLAN OF CARE
Goal Outcome Evaluation:  Plan of Care Reviewed With: patient        Progress: no change  Outcome Evaluation: Pt is able to progress slightly with therapy today. He sat EOB for 7 minutes and stood upright once with max AX2 for all bed mobility and standing assistance. Pt fatiques very quickly with activity. He c/o of R wrist and shoulder pain more today. RN notified. RUE very crepitus sounding during any active movement or weight bearing through RUE to assist with coming to a stand. Pt remains max/dep for all mobility related ADLs.    Anticipated Discharge Disposition (OT): skilled nursing facility

## 2024-11-08 NOTE — CASE MANAGEMENT/SOCIAL WORK
Continued Stay Note  [facility]     Patient Name: Arsh Haynes  MRN: [id number]  Today's Date: 11/8/2024    Admit Date: 10/26/2024    Plan: Plan [facility] if accepted- pre cert needed.  BENITA Clay RN   Discharge Plan       Row Name 11/08/24 0943       Plan    Plan Plan [facility] if accepted- pre cert needed.  BENITA Clay RN    Plan Comments Valentina  ( [phone]) is following for [facility].  [facility] following for PT progress. Plan [facility] if accepted- pre cert needed. BENITA Clay RN                   Discharge Codes    No documentation.                 Expected Discharge Date and Time       Expected Discharge Date Expected Discharge Time    Nov 11, 2024               Felecia Clay RN

## 2024-11-08 NOTE — PROGRESS NOTES
Name: Arsh Haynes ADMIT: 10/26/2024   : 1950  PCP: Provider, No Known    MRN: [id number] LOS: 13 days   AGE/SEX: 74 y.o. male  ROOM: [facility]     Subjective   Subjective   No acute events overnight.  No family at bedside today.  Patient tolerating tube feeds.  Denies chest pain or shortness of breath.  Only complaint today is some right knee pain which is new.  He does report history of gout.    Objective   Objective     Vital Signs  Temp:  [97.7 °F (36.5 °C)-98.4 °F (36.9 °C)] 97.7 °F (36.5 °C)  Heart Rate:  [104-121] 112  Resp:  [16] 16  BP: (110-135)/(53-71) 113/53  SpO2:  [96 %-98 %] 98 %  on   ;   Device (Oxygen Therapy): room air  Body mass index is 25.56 kg/m².    Physical Exam  General: Alert, no acute distress.  Lying in bed.  Chronically ill-appearing.  ENT: No conjunctival injection or scleral icterus. Moist mucous membranes.   Neuro: Eyes open and moving in all directions, generalized weakness non-distended, no focal deficits.   Lungs: Clear to auscultation bilaterally. No wheeze or crackles. No distress.   Heart: RRR, no murmurs. No edema.  Abdomen: Soft, nondistended.  BRONWYN drain in place.  Well-healing midline incision.  Ostomy with stool output.  Ext: Warm and well-perfused.  Right knee with swelling, tenderness to palpation.  Skin: No rashes or lesions. IV site without swelling or erythema.     Results Review     I reviewed the patient's new clinical results:  Results from last 7 days   Lab Units 24  0556 24  0703 24  06   WBC 10*3/mm3 18.92* 17.83* 19.15* 19.60*   HEMOGLOBIN g/dL 7.8* 8.1* 8.0* 8.2*   PLATELETS 10*3/mm3 1,244* 1,392* 1,122* 915*     Results from last 7 days   Lab Units 24  0556 24  0704 11/06/24  0624 11/05/24  0602   SODIUM mmol/L 133* 129* 131* 130*   POTASSIUM mmol/L 3.6 3.7 4.1 4.3   CHLORIDE mmol/L 102 97* 99 98   CO2 mmol/L 18.6* 22.7 21.7* 22.2   BUN mg/dL 8 9 8 9   CREATININE mg/dL 0.58* 0.53* 0.48* 0.52*   GLUCOSE  mg/dL 112* 114* 98 250*   EGFR mL/min/1.73 103.6 106.5 109.7 107.1     Results from last 7 days   Lab Units 11/08/24  0556 11/07/24  0704 11/06/24 0624 11/05/24  0602   ALBUMIN g/dL 2.1* 2.5* 1.8* 2.1*   BILIRUBIN mg/dL 0.4 0.5 0.6 0.5   ALK PHOS U/L 215* 250* 238* 257*   AST (SGOT) U/L 21 28 28 24   ALT (SGPT) U/L 32 36 31 27     Results from last 7 days   Lab Units 11/08/24  0556 11/07/24  0704 11/06/24 0624 11/05/24 0602   CALCIUM mg/dL 7.5* 8.0* 8.1* 7.9*   ALBUMIN g/dL 2.1* 2.5* 1.8* 2.1*   MAGNESIUM mg/dL  --  2.1 2.1  --    PHOSPHORUS mg/dL  --  3.2 3.0  --        Glucose   Date/Time Value Ref Range Status   11/08/2024 1042 124 70 - 130 mg/dL Final   11/08/2024 0623 118 70 - 130 mg/dL Final   11/07/2024 2221 130 70 - 130 mg/dL Final   11/07/2024 2010 143 (H) 70 - 130 mg/dL Final   11/07/2024 1034 116 70 - 130 mg/dL Final   11/07/2024 0623 129 70 - 130 mg/dL Final   11/06/2024 2014 179 (H) 70 - 130 mg/dL Final       No radiology results for the last day    I have personally reviewed all medications:  Scheduled Medications  chlorhexidine, 15 mL, Mouth/Throat, Q12H  enoxaparin, 1 mg/kg, Subcutaneous, Q12H  folic acid, 1 mg, Nasogastric, Daily  insulin glargine, 5 Units, Subcutaneous, Nightly  insulin regular, 2-9 Units, Subcutaneous, 4x Daily AC & at Bedtime  lansoprazole, 30 mg, Oral, Q AM  metoprolol tartrate, 50 mg, Oral, Q12H  sodium chloride, 10 mL, Intravenous, Q12H  sodium hypochlorite, , Topical, BID  terazosin, 2 mg, Nasogastric, Daily  thiamine, 100 mg, Nasogastric, Daily    Infusions  sodium chloride, 75 mL/hr, Last Rate: 75 mL/hr (11/08/24 0720)    Diet  NPO Diet NPO Type: Strict NPO      Intake/Output Summary (Last 24 hours) at 11/8/2024 1450  Last data filed at 11/8/2024 0831  Gross per 24 hour   Intake --   Output 475 ml   Net -475 ml       Assessment/Plan     Active Hospital Problems    Diagnosis  POA    **Peritonitis [K65.9]  Unknown    HTN (hypertension) [I10]  Unknown    Thrombocytosis  [D75.839]  Unknown    Acute DVT (deep venous thrombosis) [I82.409]  Unknown    Renal mass [N28.89]  Unknown    Hyponatremia [E87.1]  Unknown    Anemia [D64.9]  Unknown    Moderate protein-calorie malnutrition [E44.0]  Yes    Colon cancer [C18.9]  No      Resolved Hospital Problems    Diagnosis Date Resolved POA    Perforation of sigmoid colon [K63.1] 10/27/2024 Yes       74 y.o. male with Peritonitis.    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  Leukocytosis increased to 19K today.  -Acute right lower extremity DVT found on 11/5.  He has a severe thrombocytosis and in addition to his anemia as well as the colon cancer.  Hematology/oncology following.  Continue therapeutic Lovenox.  -Hgb is low but stable.  No signs of active bleeding.  Hematology following.  Repeat CBC with morning labs, transfuse for Hgb less than 7.  -Thrombocytosis persist, though improved to 1244K today.  Hematology following.  -Urology evaluated the renal mass and urinary retention.  Blankenship catheter in place.  Continue Flomax.  Voiding trial when closer to discharge.  He will follow-up in 4 to 6 weeks with repeat CT for the renal mass  -Hyponatremia: Sodium improved to 133 today.  Continue IV fluids.  Repeat BMP with morning labs.  If sodium continues to worsen, would need to consider additional workup.  -Continue Lantus 5 units nightly.  Continue SSI.  Ongoing titration of insulin based on requirements.  -SLP evaluated and recommending n.p.o. at this time.  Core track placed and tube feeds started.  Continue normal saline infusion for now, as patient remains a bit dry on my exam.  Going to also give an additional 500 cc bolus.  -Right knee pain: Does have effusion noted on exam.  Tenderness to palpation.  Has been afebrile.  Certainly could be gout.  Will consult orthopedic surgery for  arthrocentesis.    Pharmacy to dose Lovenox for DVT prophylaxis.  Full code.  Discussed with patient, family, and nursing staff.  Anticipate discharge TBD      Sarah Segura MD  [facility]  11/08/24  14:50 EST

## 2024-11-08 NOTE — PLAN OF CARE
Goal Outcome Evaluation:  Patient alert and oriented. Wound care provided. VSS. IV bolus given per MAR. Tylenol given for pain. Patient put on specialty mattress. Plan of care ongoing.

## 2024-11-08 NOTE — PROGRESS NOTES
REASON FOR CONSULTATION: Thrombocytosis, DVT, colon cancer        History of Present Illness :    The patient is a 74-year-old male who presented to the ER 10/26/2024 with worsening abdominal pain.  His scans at that point consistent with sigmoid perforation.  He had been ill for several months developing weight loss associated with diarrhea.  Additional findings were consistent with septicemia with lactic acidosis, alcoholism, hyperglycemia and a right renal mass.     Admitting H&H were 13.7 and 43.2 with white count of 9720 and platelet count of 549,000 and a procalcitonin of 9.58.     The patient went to surgery 10/27 undergoing an open left hemicolectomy with Larson pouch and end colostomy, splenic flexure mobilization.  Findings were of fecal peritonitis, rectosigmoid colon cancer, ischemic colon with perforation at the descending sigmoid colon junction.  Pathology concerning the malignancy included 2 foci of invasive mildly differentiated mucinous adenocarcinoma with tumor sizes of 4.8 cm and 6.0 cm both tumors invading through muscularis propria into pericolonic adipose tissue,, descending colon cancer with no lymphovascular nor peritoneal invasion identified, 0 of 16 lymph nodes with pathologic stage -mpT3, N0.  Additional sigmoid colon cancer moderately differentiated with tumor invading muscularis propria, 0 16 nodes negative and again pathologic stage  -Pathologic stage: mpT3, N0.      Postop the patient continued therapy for septic shock from perforated bowel and underlying malignancy quiring pressors.  Studies revealed gram-negative bacilli, gram-positive cocci with IV antibiotic therapies directed at findings.  10/29 H&H of 8.6 and 27.9 with white count of 20,190, platelet count of 236,000.  He was able to be weaned off pressors and extubated 10/29/2024.  Tube feeds started.        Patient seen by infectious disease 11/3/2024 with Zosyn continued.  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  Patient with further confusion     Review 11/5/2024 with some general improvement though further anemia and thrombocytosis.  Doppler examination 11/5 with acute right lower extremity DVT in the gastrocnemius and soleal.        Interval history:  11/6/2024  Discussed findings with patient at bedside  T97.8, pulse 102, respirations 18, /62  H&H of 8.0 and 25.0, white count of 19,150, platelet count of 1,112,000    11/7/2024-postoperative day 11  T97.2, pulse 105, respirations 18, /50  3 resting, plans to move out of CCU today.  H&H 8.1 and 25.8 with white count of 17,830, platelet count of 1,392, 000  , B12 of 824, folate 11.7, ferritin of 230, iron profile consistent with anemia of chronic disease, , reticulocyte count 1.51 and reticulocyte hemoglobin 23.1    11/8/2024 postoperative day 12  T98.4, pulse 120, respirate 16, /56  We have been following somewhat peripherally reviewing studies each day since he is not a candidate for any immediate therapy postoperative.  H&H 7.8 and 25.3 with white count of 18,009 or 20, platelet count of 1, 244,000, BUN/creatinine of 8 and 0.58, calcium 7.5, BUN 2.1, normal transaminases, total bilirubin of 0.4    Past Medical History, Past Surgical History, Social History, Family History have been reviewed and are without significant changes except as mentioned.    Review of Systems   A comprehensive 14 point review of systems was performed and was negative except as mentioned.    Medications:  The current medication list was reviewed in the EMR    ALLERGIES:  No Known Allergies    Objective      Vitals:    11/07/24 2301 11/08/24 0357 11/08/24 0701 11/08/24 0910   BP: 110/57  121/56    BP Location: Right arm  Left arm     Patient Position: Lying  Lying    Pulse: 104  118 (!) 121   Resp: 16  16    Temp: 98 °F (36.7 °C)  98.4 °F (36.9 °C)    TempSrc: Oral  Oral    SpO2: 96%  97%    Weight:  85.5 kg (188 lb 7.9 oz)     Height:              No data to display                Physical Exam    Constitutional:       Appearance: He is obese.      Comments: Appearing male, alert   HENT:      Nose: Nose normal.      Mouth/Throat:      Mouth: Mucous membranes are moist.      Pharynx: Oropharynx is clear.   Eyes:      Extraocular Movements: Extraocular movements intact.      Conjunctiva/sclera: Conjunctivae normal.      Pupils: Pupils are equal, round, and reactive to light.   Cardiovascular:      Rate and Rhythm: Regular rhythm. Tachycardia present.      Pulses: Normal pulses.      Heart sounds: Normal heart sounds.   Pulmonary:      Effort: Pulmonary effort is normal.      Breath sounds: Normal breath sounds.   Abdominal:      General: There is distension.      Palpations: There is no mass.      Tenderness: There is abdominal tenderness. There is no guarding or rebound.      Hernia: No hernia is present.      Comments: Signs reduced, ostomy intact and functioning   Musculoskeletal:         General: Normal range of motion.      Cervical back: Normal range of motion and neck supple.      Right lower leg: No edema (Trace, no palpable cord).      Left lower leg: No edema.   Neurological:      General: No focal deficit present.      Mental Status: He is oriented to person, place, and time.   Psychiatric:         Mood and Affect: Mood normal.   RECENT LABS:  Hematology WBC   Date Value Ref Range Status   11/08/2024 18.92 (H) 3.40 - 10.80 10*3/mm3 Final     RBC   Date Value Ref Range Status   11/08/2024 2.92 (L) 4.14 - 5.80 10*6/mm3 Final     Hemoglobin   Date Value Ref Range Status   11/08/2024 7.8 (L) 13.0 - 17.7 g/dL Final     Hematocrit   Date Value Ref Range Status   11/08/2024 25.3 (L) 37.5 - 51.0 % Final     Platelets   Date Value Ref Range  Status   11/08/2024 1,244 (C) 140 - 450 10*3/mm3 Final              Assessment & Plan     Septic shock from perforated bowel from underlying malignancy  Subsequent abdominal cultures positive for Pseudomonas, E. coli and mixed anaerobes, negative blood cultures  Patient now more likely, completed antibiotic therapies     *Synchronous colon cancers  Both malignancies are T3 N0-stage IIA-and as we consider his concurrent renal mass and performance status, adjuvant chemotherapy must be considered particularly with invasion of the descending colon mass into pericolonic and perirectal tissue-apparently the the site of perforation.  These issues discussed with general surgery.  MSI status-negative by IHC, PCR-microsatellite stable.  This is particularly useful considering the patient's family history and findings of synchronous colon cancer.     *Anemia  Suspected multifactorial anemia, additional laboratory studies consistent with anemia of chronic disease     *Leukocytosis  Agree that this is multifactorial but notedly right shifted with predominant neutrophilia and no immature forms.  This is a reactive leukocytosis usually related to concurrent physiologic stress and infection.  Gradual improvement in degree of leukocytosis     *Thrombocytosis  This is also reactive developing gradually postoperative and does not need to be addressed with cytoreductive medications.  Additional laboratory studies per iron deficiency were completed and found to be negative  Continue to monitor        *Urinary retention, findings of right renal mass  Seen by urology, voiding trial, outpatient scans for follow-up of renal mass        *Acute right lower extremity DVT in gastrocnemius and soleal  Patient now on anticoagulation with Lovenox appropriately.  We will follow and transition to DOAC therapy as he further recovers.       11/8/2024

## 2024-11-08 NOTE — PLAN OF CARE
Goal Outcome Evaluation:     Patient resting well this shift and without any c/o pain or discomfort. His vital signs have been WNL, and he is tolerating his tube feedings well. Colostomy to left lower quadrant with a moderate output of light brown liquid stool emptied. F/C remains to bedside drainage with straw colored urine output and there is a small dry dressing under his colostomy where his BRONWYN drain was pulled yesterday. NG tube is sutured in place and patient denies any issues at this time.

## 2024-11-08 NOTE — PROGRESS NOTES
Postoperative day 12 status post left hemicolectomy with colostomy    S: No events overnight.  Failed patient yesterday started core track was placed.  Patient tolerating tube feeds.  Denies any abdominal pain    O:   Vitals:    11/07/24 2301 11/08/24 0357 11/08/24 0701 11/08/24 0910   BP: 110/57  121/56    BP Location: Right arm  Left arm    Patient Position: Lying  Lying    Pulse: 104  118 (!) 121   Resp: 16  16    Temp: 98 °F (36.7 °C)  98.4 °F (36.9 °C)    TempSrc: Oral  Oral    SpO2: 96%  97%    Weight:  85.5 kg (188 lb 7.9 oz)     Height:          Alert, chronically ill-appearing, no distress  Breathing comfortable  Abdomen soft, nontender, midline incision with bluish discoloration of the dressing, wound clean and dry    Sodium 133, potassium 3.6, CO2 18.6, creatinine 0.5, alk phos 215, LFTs within normal limits,  White blood cell count 19.9, slightly up, hemoglobin 7.8, platelets 1200    Assessment and plan    Postoperative day 12 status post left hemicolectomy with colostomy for perforated synchronous colon cancer.  Patient with history of daily alcohol use.  Having slow recovery as suspected.  He has developed dysphagia and is getting tube feeds through a core track tube.   Leukocytosis, stable.  He had a CAT scan that did not show any evidence of fluid collection or abscess.  I think his leukocytosis is reactive or secondary to right pleural effusion  Renal mass, already set up with urology for follow-up as an outpatient whenever he recovers  Synchronous cancer, will need to have colonoscopy whenever he recovers to ensure no other masses are found in the remaining of the colon  Was found to have bluish discoloration of the dressing of the wound.  She does not have any drainage, I am not sure the use blue stain liquid yesterday for dressing changes.  Discussed with the nurses about the need to start dressing changes with Dakin solution    Continue tube feeds  Continue speech therapy  Full anticoagulation  for DVT    Will continue to follow

## 2024-11-08 NOTE — PROGRESS NOTES
"Nutrition Services    Patient Name:  Arsh Haynes  YOB: 1950  MRN: [id number]  Admit Date:  10/26/2024Nutrition Services    Patient Name: Arsh Haynes  YOB: 1950  MRN: [id number]  Admission date: 10/26/2024    PROGRESS NOTE      Encounter Information: TF FU        PO Diet: NPO Diet NPO Type: Strict NPO   PO Supplements: -   PO Intake:  -       Current nutrition support: Novasource renal @55ml/hr with FWF 30ml q6   Nutrition support review: Tolerating @20ml/hr and increasing to goal rate        Labs (reviewed below): Na 133        GI Function:  BM 11/7       Nutrition Intervention Updates: Continue increasing TF to goal rate novasource renal @55ml/hr with FWF 30ml q6       Results from last 7 days   Lab Units 11/08/24  0556 11/07/24  0704 11/06/24  0624   SODIUM mmol/L 133* 129* 131*   POTASSIUM mmol/L 3.6 3.7 4.1   CHLORIDE mmol/L 102 97* 99   CO2 mmol/L 18.6* 22.7 21.7*   BUN mg/dL 8 9 8   CREATININE mg/dL 0.58* 0.53* 0.48*   CALCIUM mg/dL 7.5* 8.0* 8.1*   BILIRUBIN mg/dL 0.4 0.5 0.6   ALK PHOS U/L 215* 250* 238*   ALT (SGPT) U/L 32 36 31   AST (SGOT) U/L 21 28 28   GLUCOSE mg/dL 112* 114* 98     Results from last 7 days   Lab Units 11/08/24  0556 11/07/24  0704 11/07/24  0703 11/06/24  0624   MAGNESIUM mg/dL  --  2.1  --  2.1   PHOSPHORUS mg/dL  --  3.2  --  3.0   HEMOGLOBIN g/dL 7.8*  --    < > 8.0*   HEMATOCRIT % 25.3*  --    < > 25.0*    < > = values in this interval not displayed.     No results found for: \"COVID19\"  Lab Results   Component Value Date    HGBA1C 6.60 (H) 10/26/2024       Wt Readings from Last 10 Encounters:   11/08/24 0357 85.5 kg (188 lb 7.9 oz)   11/07/24 0500 85.1 kg (187 lb 9.8 oz)   11/06/24 0600 85.1 kg (187 lb 9.8 oz)   11/06/24 0100 84.2 kg (185 lb 10 oz)   11/04/24 0415 87 kg (191 lb 12.8 oz)   11/04/24 0152 87 kg (191 lb 12.8 oz)   11/02/24 0543 89.8 kg (197 lb 15.6 oz)   11/01/24 0548 92.6 kg (204 lb 2.3 oz)   10/31/24 0448 96 kg (211 lb 10.3 oz) "   10/30/24 0300 98.4 kg (216 lb 14.9 oz)   10/29/24 0431 92.5 kg (203 lb 14.8 oz)   10/28/24 0436 90.2 kg (198 lb 13.7 oz)   10/27/24 0404 83.4 kg (183 lb 13.8 oz)   10/26/24 1933 83 kg (183 lb)       RD to follow up per protocol.    Electronically signed by:  Roxanna Celestin RD  11/08/24 16:12 EST

## 2024-11-08 NOTE — THERAPY TREATMENT NOTE
Patient Name: Arsh Haynes  : 1950    MRN: [id number]                              Today's Date: 2024       Admit Date: 10/26/2024    Visit Dx:     ICD-10-CM ICD-9-CM   1. Perforation of sigmoid colon  K63.1 569.83   2. Hypotension, unspecified hypotension type  I95.9 458.9   3. Increased lactic acid level  R79.89 276.2   4. Bowel perforation  K63.1 569.83     Patient Active Problem List   Diagnosis    Colon cancer    HTN (hypertension)    Thrombocytosis    Acute DVT (deep venous thrombosis)    Renal mass    Hyponatremia    Anemia    Peritonitis    Moderate protein-calorie malnutrition     Past Medical History:   Diagnosis Date    HTN (hypertension)      Past Surgical History:   Procedure Laterality Date    COLON RESECTION N/A 10/26/2024    Procedure: LOW ANTERIOR COLON RESECTION SIGMOID, COLOSTOMY, SPLENIC FLEXURE MOBILIZATION;  Surgeon: Mc Guillaume MD;  Location: [facility];  Service: General;  Laterality: N/A;    EXPLORATORY LAPAROTOMY N/A 10/26/2024    Procedure: LAPAROTOMY EXPLORATORY, LEFT HEMICOLECTOMY;  Surgeon: Mc Guillaume MD;  Location: [facility];  Service: General;  Laterality: N/A;      General Information       Row Name 24 1213          OT Time and Intention    Subjective Information no complaints;fatigue  -SM     Document Type therapy note (daily note)  -SM     Mode of Treatment occupational therapy;co-treatment  -SM     Patient Effort adequate  -SM       Row Name 24 1213          General Information    Patient Profile Reviewed yes  -SM     Existing Precautions/Restrictions fall;NPO  -SM       Row Name 24 1213          Cognition    Orientation Status (Cognition) oriented x 3  -SM       Row Name 24 1213          Safety Issues/Impairments Affecting Functional Mobility    Impairments Affecting Function (Mobility) strength;balance;endurance/activity tolerance;coordination;pain;postural/trunk control;range of motion (ROM);cognition   -     Comment, Safety Issues/Impairments (Mobility) PT/OT cotreatment medically appropriate and necessary due to patient acuity level, to maximize therapeutic benefit due to impaired act tolerance, and for safety of patient and staff. Treatment focused on progression of care and goals established in POC.  -               User Key  (r) = Recorded By, (t) = Taken By, (c) = Cosigned By      Initials Name Provider Type    Sarah Chanel OT Occupational Therapist                     Mobility/ADL's       Row Name 11/08/24 1215          Bed Mobility    Supine-Sit [place] (Bed Mobility) maximum assist (25% patient effort);2 person assist  -     Sit-Supine [place] (Bed Mobility) maximum assist (25% patient effort);2 person assist  -     Assistive Device (Bed Mobility) head of bed elevated;bed rails;repositioning sheet  -       Row Name 11/08/24 1215          Transfers    Transfers sit-stand transfer  -       Row Name 11/08/24 1215          Sit-Stand Transfer    Sit-Stand [place] (Transfers) maximum assist (25% patient effort);2 person assist  -     Comment, (Sit-Stand Transfer) stood once upright for a few seconds, then encouraged to attempt again but only able to achieve a partial stand on 2nd attempt due to fatique  -       Row Name 11/08/24 1215          Lower Body Dressing Assessment/Training    [place] Level (Lower Body Dressing) socks;dependent (less than 25% patient effort);don  -       Row Name 11/08/24 1215          Self-Feeding Assessment/Training    Position (Feeding) sitting up in bed  -     Comment, (Feeding) sips of water only, cup to mouth SBA  -       Row Name 11/08/24 1215          Toileting Assessment/Training    [place] Level (Toileting) dependent (less than 25% patient effort)  -               User Key  (r) = Recorded By, (t) = Taken By, (c) = Cosigned By      Initials Name Provider Type    Sarah Chanel OT Occupational Therapist                    Obj/Interventions       Row Name 11/08/24 1220          Shoulder (Therapeutic Exercise)    Shoulder AROM (Therapeutic Exercise) bilateral;scapular elevation;scapular retraction;scapular protraction;10 repetitions  forward reach below shower level  -       Row Name 11/08/24 1220          Elbow/Forearm (Therapeutic Exercise)    Elbow/Forearm (Therapeutic Exercise) AROM (active range of motion)  -     Elbow/Forearm AROM (Therapeutic Exercise) bilateral;extension;flexion;5 repetitions  -       Row Name 11/08/24 1220          Hand (Therapeutic Exercise)    Hand (Therapeutic Exercise) AROM (active range of motion)  -     Hand AROM/AAROM (Therapeutic Exercise) bilateral;AROM (active range of motion);finger flexion;finger extension;5 repetitions  -       Row Name 11/08/24 1220          Motor Skills    Therapeutic Exercise elbow/forearm;hand  -[facility] Name 11/08/24 1220          Balance    Balance Assessment standing static balance  -     Static Sitting Balance standby assist  -     Position, Sitting Balance sitting edge of bed  -     Static Standing Balance 2-person assist  -               User Key  (r) = Recorded By, (t) = Taken By, (c) = Cosigned By      Initials Name Provider Type     Sarah Abernathy OT Occupational Therapist                   Goals/Plan    No documentation.                  Clinical Impression       [facility] Name 11/08/24 1222          Pain Assessment    Pretreatment Pain Rating 9/10  -     Posttreatment Pain Rating 9/10  -     Pain Location wrist  -     Pain Side/Orientation right  -     Pain Management Interventions nursing notified;positioning techniques utilized  -       Row Name 11/08/24 1222          Plan of Care Review    Plan of Care Reviewed With patient  -     Outcome Evaluation Pt is able to progress slightly with therapy today. He sat EOB for 7 minutes and stood upright once with max AX2 for all bed mobility and standing assistance. Pt fatiques very  quickly with activity. He c/o of R wrist and shoulder pain more today. RN notified. RUE very crepitus sounding during any active movement or weight bearing through RUE to assist with coming to a stand. Pt remains max/dep for all mobility related ADLs.  -       Row Name 11/08/24 1222          Therapy Plan Review/Discharge Plan (OT)    Anticipated Discharge Disposition (OT) skilled nursing facility  -       Row Name 11/08/24 1222          Positioning and Restraints    Pre-Treatment Position in bed  -     Post Treatment Position bed  -SM     In Bed fowlers;call light within reach;notified nsg;encouraged to call for assist;exit alarm on  -               User Key  (r) = Recorded By, (t) = Taken By, (c) = Cosigned By      Initials Name Provider Type    Sarah Chanel OT Occupational Therapist                   Outcome Measures       Row Name 11/08/24 1225          How much help from another is currently needed...    Putting on and taking off regular lower body clothing? 1  -SM     Bathing (including washing, rinsing, and drying) 1  -SM     Toileting (which includes using toilet bed pan or urinal) 1  -SM     Putting on and taking off regular upper body clothing 2  -SM     Taking care of personal grooming (such as brushing teeth) 2  -SM     Eating meals 2  -SM     AM-PAC 6 Clicks Score (OT) 9  -       Row Name 11/08/24 1225          Functional Assessment    Outcome Measure Options AM-PAC 6 Clicks Daily Activity (OT)  -SM               User Key  (r) = Recorded By, (t) = Taken By, (c) = Cosigned By      Initials Name Provider Type    Sarah Chanel OT Occupational Therapist                    Occupational Therapy Education       Title: PT OT SLP Therapies (In Progress)       Topic: Occupational Therapy (Done)       Point: ADL training (Done)       Description:   Instruct learner(s) on proper safety adaptation and remediation techniques during self care or transfers.   Instruct in proper use of  assistive devices.                  Learning Progress Summary            Patient Acceptance, E, VU,NR by MM at 11/2/2024 1328    Comment: role of OT, goals, d/c rec                      Point: Precautions (Done)       Description:   Instruct learner(s) on prescribed precautions during self-care and functional transfers.                  Learning Progress Summary            Patient Acceptance, E, VU,NR by MM at 11/2/2024 1328    Comment: role of OT, goals, d/c rec                      Point: Body mechanics (Done)       Description:   Instruct learner(s) on proper positioning and spine alignment during self-care, functional mobility activities and/or exercises.                  Learning Progress Summary            Patient Acceptance, E, VU,NR by MM at 11/2/2024 1328    Comment: role of OT, goals, d/c rec                                      User Key       Initials Effective Dates Name Provider Type Discipline     05/31/24 -  Yu Chapman OT Occupational Therapist OT                  OT Recommendation and Plan  Therapy Frequency (OT): 5 times/wk  Plan of Care Review  Plan of Care Reviewed With: patient  Progress: no change  Outcome Evaluation: Pt is able to progress slightly with therapy today. He sat EOB for 7 minutes and stood upright once with max AX2 for all bed mobility and standing assistance. Pt fatiques very quickly with activity. He c/o of R wrist and shoulder pain more today. RN notified. RUE very crepitus sounding during any active movement or weight bearing through RUE to assist with coming to a stand. Pt remains max/dep for all mobility related ADLs.     Time Calculation:         Time Calculation- OT       Row Name 11/08/24 1225             Time Calculation- OT    OT Start Time 0954  -      OT Stop Time 1011  -      OT Time Calculation (min) 17 min  -      Total Timed Code Minutes- OT 17 minute(s)  -      OT Received On 11/08/24  -      OT - Next Appointment 11/11/24  -         Timed  Charges    16574 - OT Therapeutic Activity Minutes 17  -SM         Total Minutes    Timed Charges Total Minutes 17  -SM       Total Minutes 17  -SM                User Key  (r) = Recorded By, (t) = Taken By, (c) = Cosigned By      Initials Name Provider Type    Sarah Chanel OT Occupational Therapist                  Therapy Charges for Today       Code Description Service Date Service Provider Modifiers Qty    [id number] HC OT THERAPEUTIC ACT EA 15 MIN 11/8/2024 Sarah Abernathy OT GO 1                 Sarah Abernathy OT  11/8/2024

## 2024-11-09 ENCOUNTER — APPOINTMENT (OUTPATIENT)
Dept: GENERAL RADIOLOGY | Facility: HOSPITAL | Age: 74
DRG: 853 | End: 2024-11-09
Payer: MEDICARE

## 2024-11-09 LAB
ABO GROUP BLD: NORMAL
ALBUMIN SERPL-MCNC: 1.7 G/DL (ref 3.5–5.2)
ALBUMIN/GLOB SERPL: 0.4 G/DL
ALP SERPL-CCNC: 199 U/L (ref 39–117)
ALT SERPL W P-5'-P-CCNC: 27 U/L (ref 1–41)
ANION GAP SERPL CALCULATED.3IONS-SCNC: 10.3 MMOL/L (ref 5–15)
AST SERPL-CCNC: 16 U/L (ref 1–40)
BASOPHILS # BLD AUTO: 0.13 10*3/MM3 (ref 0–0.2)
BASOPHILS NFR BLD AUTO: 0.6 % (ref 0–1.5)
BILIRUB SERPL-MCNC: 0.4 MG/DL (ref 0–1.2)
BLD GP AB SCN SERPL QL: NEGATIVE
BUN SERPL-MCNC: 8 MG/DL (ref 8–23)
BUN/CREAT SERPL: 15.1 (ref 7–25)
CALCIUM SPEC-SCNC: 7.4 MG/DL (ref 8.6–10.5)
CHLORIDE SERPL-SCNC: 105 MMOL/L (ref 98–107)
CO2 SERPL-SCNC: 17.7 MMOL/L (ref 22–29)
CREAT SERPL-MCNC: 0.53 MG/DL (ref 0.76–1.27)
CRP SERPL-MCNC: 25.7 MG/DL (ref 0–0.5)
DEPRECATED RDW RBC AUTO: 56.1 FL (ref 37–54)
EGFRCR SERPLBLD CKD-EPI 2021: 106.5 ML/MIN/1.73
EOSINOPHIL # BLD AUTO: 0.29 10*3/MM3 (ref 0–0.4)
EOSINOPHIL NFR BLD AUTO: 1.4 % (ref 0.3–6.2)
ERYTHROCYTE [DISTWIDTH] IN BLOOD BY AUTOMATED COUNT: 18.2 % (ref 12.3–15.4)
ERYTHROCYTE [SEDIMENTATION RATE] IN BLOOD: 60 MM/HR (ref 0–20)
GLOBULIN UR ELPH-MCNC: 4.1 GM/DL
GLUCOSE BLDC GLUCOMTR-MCNC: 155 MG/DL (ref 70–130)
GLUCOSE BLDC GLUCOMTR-MCNC: 204 MG/DL (ref 70–130)
GLUCOSE BLDC GLUCOMTR-MCNC: 221 MG/DL (ref 70–130)
GLUCOSE BLDC GLUCOMTR-MCNC: 222 MG/DL (ref 70–130)
GLUCOSE SERPL-MCNC: 207 MG/DL (ref 65–99)
HCT VFR BLD AUTO: 23.2 % (ref 37.5–51)
HGB BLD-MCNC: 7.3 G/DL (ref 13–17.7)
IMM GRANULOCYTES # BLD AUTO: 0.96 10*3/MM3 (ref 0–0.05)
IMM GRANULOCYTES NFR BLD AUTO: 4.7 % (ref 0–0.5)
LYMPHOCYTES # BLD AUTO: 1.52 10*3/MM3 (ref 0.7–3.1)
LYMPHOCYTES NFR BLD AUTO: 7.4 % (ref 19.6–45.3)
MCH RBC QN AUTO: 27.2 PG (ref 26.6–33)
MCHC RBC AUTO-ENTMCNC: 31.5 G/DL (ref 31.5–35.7)
MCV RBC AUTO: 86.6 FL (ref 79–97)
MONOCYTES # BLD AUTO: 1.95 10*3/MM3 (ref 0.1–0.9)
MONOCYTES NFR BLD AUTO: 9.5 % (ref 5–12)
NEUTROPHILS NFR BLD AUTO: 15.62 10*3/MM3 (ref 1.7–7)
NEUTROPHILS NFR BLD AUTO: 76.4 % (ref 42.7–76)
PLATELET # BLD AUTO: 1184 10*3/MM3 (ref 140–450)
PMV BLD AUTO: 8.3 FL (ref 6–12)
POTASSIUM SERPL-SCNC: 3.5 MMOL/L (ref 3.5–5.2)
POTASSIUM SERPL-SCNC: 4.4 MMOL/L (ref 3.5–5.2)
PROCALCITONIN SERPL-MCNC: 4.09 NG/ML (ref 0–0.25)
PROT SERPL-MCNC: 5.8 G/DL (ref 6–8.5)
QT INTERVAL: 390 MS
QTC INTERVAL: 501 MS
RBC # BLD AUTO: 2.68 10*6/MM3 (ref 4.14–5.8)
RH BLD: NEGATIVE
SODIUM SERPL-SCNC: 133 MMOL/L (ref 136–145)
T&S EXPIRATION DATE: NORMAL
WBC NRBC COR # BLD AUTO: 20.47 10*3/MM3 (ref 3.4–10.8)

## 2024-11-09 PROCEDURE — 94799 UNLISTED PULMONARY SVC/PX: CPT

## 2024-11-09 PROCEDURE — 86850 RBC ANTIBODY SCREEN: CPT | Performed by: INTERNAL MEDICINE

## 2024-11-09 PROCEDURE — 73030 X-RAY EXAM OF SHOULDER: CPT

## 2024-11-09 PROCEDURE — 86900 BLOOD TYPING SEROLOGIC ABO: CPT

## 2024-11-09 PROCEDURE — 86900 BLOOD TYPING SEROLOGIC ABO: CPT | Performed by: INTERNAL MEDICINE

## 2024-11-09 PROCEDURE — 80053 COMPREHEN METABOLIC PANEL: CPT | Performed by: INTERNAL MEDICINE

## 2024-11-09 PROCEDURE — 93010 ELECTROCARDIOGRAM REPORT: CPT | Performed by: INTERNAL MEDICINE

## 2024-11-09 PROCEDURE — 86923 COMPATIBILITY TEST ELECTRIC: CPT

## 2024-11-09 PROCEDURE — P9016 RBC LEUKOCYTES REDUCED: HCPCS

## 2024-11-09 PROCEDURE — 99024 POSTOP FOLLOW-UP VISIT: CPT | Performed by: SURGERY

## 2024-11-09 PROCEDURE — 25810000003 SODIUM CHLORIDE 0.9 % SOLUTION: Performed by: STUDENT IN AN ORGANIZED HEALTH CARE EDUCATION/TRAINING PROGRAM

## 2024-11-09 PROCEDURE — 94761 N-INVAS EAR/PLS OXIMETRY MLT: CPT

## 2024-11-09 PROCEDURE — 82948 REAGENT STRIP/BLOOD GLUCOSE: CPT

## 2024-11-09 PROCEDURE — 99232 SBSQ HOSP IP/OBS MODERATE 35: CPT | Performed by: INTERNAL MEDICINE

## 2024-11-09 PROCEDURE — 86901 BLOOD TYPING SEROLOGIC RH(D): CPT | Performed by: INTERNAL MEDICINE

## 2024-11-09 PROCEDURE — 93005 ELECTROCARDIOGRAM TRACING: CPT | Performed by: STUDENT IN AN ORGANIZED HEALTH CARE EDUCATION/TRAINING PROGRAM

## 2024-11-09 PROCEDURE — 85025 COMPLETE CBC W/AUTO DIFF WBC: CPT | Performed by: INTERNAL MEDICINE

## 2024-11-09 PROCEDURE — 85652 RBC SED RATE AUTOMATED: CPT | Performed by: STUDENT IN AN ORGANIZED HEALTH CARE EDUCATION/TRAINING PROGRAM

## 2024-11-09 PROCEDURE — 63710000001 INSULIN GLARGINE PER 5 UNITS: Performed by: STUDENT IN AN ORGANIZED HEALTH CARE EDUCATION/TRAINING PROGRAM

## 2024-11-09 PROCEDURE — 36430 TRANSFUSION BLD/BLD COMPNT: CPT

## 2024-11-09 PROCEDURE — 63710000001 INSULIN REGULAR HUMAN PER 5 UNITS: Performed by: HOSPITALIST

## 2024-11-09 PROCEDURE — 86140 C-REACTIVE PROTEIN: CPT | Performed by: STUDENT IN AN ORGANIZED HEALTH CARE EDUCATION/TRAINING PROGRAM

## 2024-11-09 PROCEDURE — 25010000002 ENOXAPARIN PER 10 MG: Performed by: INTERNAL MEDICINE

## 2024-11-09 PROCEDURE — 84145 PROCALCITONIN (PCT): CPT | Performed by: STUDENT IN AN ORGANIZED HEALTH CARE EDUCATION/TRAINING PROGRAM

## 2024-11-09 PROCEDURE — 84132 ASSAY OF SERUM POTASSIUM: CPT | Performed by: STUDENT IN AN ORGANIZED HEALTH CARE EDUCATION/TRAINING PROGRAM

## 2024-11-09 RX ORDER — POTASSIUM CHLORIDE 1.5 G/1.58G
40 POWDER, FOR SOLUTION ORAL EVERY 4 HOURS
Status: COMPLETED | OUTPATIENT
Start: 2024-11-09 | End: 2024-11-09

## 2024-11-09 RX ORDER — HYDROCODONE BITARTRATE AND ACETAMINOPHEN 5; 325 MG/1; MG/1
1 TABLET ORAL EVERY 6 HOURS PRN
Status: DISPENSED | OUTPATIENT
Start: 2024-11-09 | End: 2024-11-19

## 2024-11-09 RX ORDER — SODIUM CHLORIDE 9 MG/ML
100 INJECTION, SOLUTION INTRAVENOUS CONTINUOUS
Status: DISCONTINUED | OUTPATIENT
Start: 2024-11-09 | End: 2024-11-10

## 2024-11-09 RX ADMIN — SODIUM HYPOCHLORITE: 1.25 SOLUTION TOPICAL at 20:35

## 2024-11-09 RX ADMIN — Medication 10 ML: at 09:21

## 2024-11-09 RX ADMIN — HYDROCODONE BITARTRATE AND ACETAMINOPHEN 1 TABLET: 5; 325 TABLET ORAL at 07:05

## 2024-11-09 RX ADMIN — POTASSIUM CHLORIDE 40 MEQ: 1.5 POWDER, FOR SOLUTION ORAL at 12:39

## 2024-11-09 RX ADMIN — ENOXAPARIN SODIUM 90 MG: 100 INJECTION SUBCUTANEOUS at 18:31

## 2024-11-09 RX ADMIN — INSULIN HUMAN 4 UNITS: 100 INJECTION, SOLUTION PARENTERAL at 20:34

## 2024-11-09 RX ADMIN — SODIUM CHLORIDE 100 ML/HR: 9 INJECTION, SOLUTION INTRAVENOUS at 20:35

## 2024-11-09 RX ADMIN — ENOXAPARIN SODIUM 90 MG: 100 INJECTION SUBCUTANEOUS at 05:39

## 2024-11-09 RX ADMIN — TERAZOSIN 2 MG: 2 CAPSULE ORAL at 20:33

## 2024-11-09 RX ADMIN — Medication 10 ML: at 20:34

## 2024-11-09 RX ADMIN — CHLORHEXIDINE GLUCONATE 15 ML: 1.2 RINSE ORAL at 12:39

## 2024-11-09 RX ADMIN — METOPROLOL TARTRATE 50 MG: 50 TABLET, FILM COATED ORAL at 20:33

## 2024-11-09 RX ADMIN — HYDROCODONE BITARTRATE AND ACETAMINOPHEN 1 TABLET: 5; 325 TABLET ORAL at 20:33

## 2024-11-09 RX ADMIN — INSULIN HUMAN 4 UNITS: 100 INJECTION, SOLUTION PARENTERAL at 12:39

## 2024-11-09 RX ADMIN — CHLORHEXIDINE GLUCONATE 15 ML: 1.2 RINSE ORAL at 20:32

## 2024-11-09 RX ADMIN — Medication 100 MG: at 09:20

## 2024-11-09 RX ADMIN — METOPROLOL TARTRATE 50 MG: 50 TABLET, FILM COATED ORAL at 09:20

## 2024-11-09 RX ADMIN — INSULIN HUMAN 2 UNITS: 100 INJECTION, SOLUTION PARENTERAL at 18:31

## 2024-11-09 RX ADMIN — SODIUM HYPOCHLORITE: 1.25 SOLUTION TOPICAL at 05:38

## 2024-11-09 RX ADMIN — INSULIN GLARGINE 10 UNITS: 100 INJECTION, SOLUTION SUBCUTANEOUS at 20:33

## 2024-11-09 RX ADMIN — FOLIC ACID 1 MG: 1 TABLET ORAL at 09:20

## 2024-11-09 RX ADMIN — LANSOPRAZOLE 30 MG: 15 TABLET, ORALLY DISINTEGRATING ORAL at 05:38

## 2024-11-09 RX ADMIN — INSULIN HUMAN 4 UNITS: 100 INJECTION, SOLUTION PARENTERAL at 09:20

## 2024-11-09 RX ADMIN — POTASSIUM CHLORIDE 40 MEQ: 1.5 POWDER, FOR SOLUTION ORAL at 09:20

## 2024-11-09 NOTE — PROGRESS NOTES
Postoperative day 13 status post left colectomy with colostomy    S: No events overnight.  Continues to feel fatigue.  Tolerating tube feeds    O:   Vitals:    11/09/24 0838 11/09/24 1236 11/09/24 1319 11/09/24 1355   BP:  111/53 120/58 115/54   BP Location:  Right arm Right arm Right arm   Patient Position:  Lying Lying Lying   Pulse: 113 101 108 106   Resp:  18 18 18   Temp:  97.6 °F (36.4 °C) 97.4 °F (36.3 °C) 98.1 °F (36.7 °C)   TempSrc:  Oral Oral Oral   SpO2: 97% 97% 98% 97%   Weight:       Height:          Ostomy output a liter  Urine output 2.5 L  Alert, no acute distress  Abdomen soft, nontender, open wound clean dry intact vase.  No discoloration today  Ostomy pink and viable with stool    Hemoglobin 7.3, white blood cell count 20, C-reactive protein 25    Assessment and plan    Postoperative day 13 status post left colectomy for synchronous colon cancer.  Persistent leukocytosis but has been afebrile.  He has been able to tolerate tube feeds.  DVT on full anticoagulation, Hematuria  He has bilateral pleural effusions    There is many factors that can contribute to the patient elevated white blood cell count without meaning he has an ongoing infection.  Clinically he is doing a little bit better.  He had a CAT scan several days ago that was unrevealing for abscess or any intra-abdominal complication    Continue tube feeds at goal  Physical therapy  Wet-to-dry dressing changes with saline and gauze we will place wound VAC on Monday to the midline wound

## 2024-11-09 NOTE — PLAN OF CARE
Pt oriented NAD resting most of day, incision CDI, PRBC ordered, vazquez cath noted hematuria with clots uro consulted      Problem: Skin Injury Risk Increased  Goal: Skin Health and Integrity  11/9/2024 1551 by Adeola Moralez RN  Outcome: Progressing  11/9/2024 1551 by Adeola Moralez RN  Outcome: Progressing  Intervention: Optimize Skin Protection  Recent Flowsheet Documentation  Taken 11/9/2024 1400 by Adeola Moralez RN  Activity Management: activity encouraged  Head of Bed (HOB) Positioning: HOB elevated  Taken 11/9/2024 1200 by Adeola Moralez RN  Head of Bed (HOB) Positioning: HOB elevated  Taken 11/9/2024 1000 by Adeola Moralez RN  Head of Bed (HOB) Positioning: HOB elevated  Taken 11/9/2024 0920 by Adeola Moralez RN  Activity Management: activity encouraged  Pressure Reduction Techniques:   frequent weight shift encouraged   weight shift assistance provided  Head of Bed (HOB) Positioning: HOB elevated  Pressure Reduction Devices: pressure-redistributing mattress utilized  Skin Protection: incontinence pads utilized     Problem: Fall Injury Risk  Goal: Absence of Fall and Fall-Related Injury  11/9/2024 1551 by Adeola Moralez RN  Outcome: Progressing  11/9/2024 1551 by Adeola Moralez RN  Outcome: Progressing  Intervention: Identify and Manage Contributors  Recent Flowsheet Documentation  Taken 11/9/2024 1400 by Adeola Moralez RN  Medication Review/Management: medications reviewed  Self-Care Promotion:   independence encouraged   BADL personal objects within reach   BADL personal routines maintained  Taken 11/9/2024 0920 by Adeola Moralez RN  Medication Review/Management: medications reviewed  Self-Care Promotion:   independence encouraged   BADL personal objects within reach   BADL personal routines maintained  Intervention: Promote Injury-Free Environment  Recent Flowsheet Documentation  Taken 11/9/2024 1400 by Adeola Moralez RN  Safety Promotion/Fall Prevention:   activity supervised    assistive device/personal items within reach   clutter free environment maintained   fall prevention program maintained   nonskid shoes/slippers when out of bed   room organization consistent   safety round/check completed  Taken 11/9/2024 0920 by Adeola Moralez RN  Safety Promotion/Fall Prevention:   activity supervised   assistive device/personal items within reach   clutter free environment maintained   fall prevention program maintained   nonskid shoes/slippers when out of bed   room organization consistent   safety round/check completed     Problem: Adult Inpatient Plan of Care  Goal: Plan of Care Review  11/9/2024 1551 by Adeola Moralez RN  Outcome: Progressing  11/9/2024 1551 by Adeola Moralez RN  Outcome: Progressing  Goal: Patient-Specific Goal (Individualized)  11/9/2024 1551 by Adeola Moralez RN  Outcome: Progressing  11/9/2024 1551 by Adeola Moralez RN  Outcome: Progressing  Goal: Absence of Hospital-Acquired Illness or Injury  11/9/2024 1551 by Adeola Moralez RN  Outcome: Progressing  11/9/2024 1551 by Adeola Moralez RN  Outcome: Progressing  Intervention: Identify and Manage Fall Risk  Recent Flowsheet Documentation  Taken 11/9/2024 1400 by Adeola Moralez RN  Safety Promotion/Fall Prevention:   activity supervised   assistive device/personal items within reach   clutter free environment maintained   fall prevention program maintained   nonskid shoes/slippers when out of bed   room organization consistent   safety round/check completed  Taken 11/9/2024 0920 by Adeola Moralez RN  Safety Promotion/Fall Prevention:   activity supervised   assistive device/personal items within reach   clutter free environment maintained   fall prevention program maintained   nonskid shoes/slippers when out of bed   room organization consistent   safety round/check completed  Intervention: Prevent Skin Injury  Recent Flowsheet Documentation  Taken 11/9/2024 1400 by Adeola Moralez RN  Body Position:  tilted  Taken 11/9/2024 1200 by Adeola Moralez RN  Body Position: sitting up in bed  Taken 11/9/2024 1000 by Adeola Moralez RN  Body Position: supine  Taken 11/9/2024 0920 by Adeola Moralez RN  Body Position: supine  Skin Protection: incontinence pads utilized  Intervention: Prevent and Manage VTE (Venous Thromboembolism) Risk  Recent Flowsheet Documentation  Taken 11/9/2024 0920 by Adeola Moralez RN  VTE Prevention/Management: (Lovenox) other (see comments)  Intervention: Prevent Infection  Recent Flowsheet Documentation  Taken 11/9/2024 1400 by Adeola Moralez RN  Infection Prevention:   single patient room provided   rest/sleep promoted  Taken 11/9/2024 0920 by Adeola Moralez RN  Infection Prevention:   rest/sleep promoted   single patient room provided  Goal: Optimal Comfort and Wellbeing  11/9/2024 1551 by Adeola Moralez RN  Outcome: Progressing  11/9/2024 1551 by Adeola Moralez RN  Outcome: Progressing  Intervention: Provide Person-Centered Care  Recent Flowsheet Documentation  Taken 11/9/2024 1400 by Adeola Moralez RN  Trust Relationship/Rapport: care explained  Taken 11/9/2024 0920 by Adeola Moralez RN  Trust Relationship/Rapport:   care explained   thoughts/feelings acknowledged   reassurance provided   questions encouraged   questions answered   emotional support provided  Goal: Readiness for Transition of Care  11/9/2024 1551 by Adeola Moralez RN  Outcome: Progressing  11/9/2024 1551 by Adeola Moralez RN  Outcome: Progressing     Problem: Malnutrition  Goal: Improved Nutritional Intake  11/9/2024 1551 by Adeola Moralez RN  Outcome: Progressing  11/9/2024 1551 by Adeola Moralez RN  Outcome: Progressing     Problem: Sepsis/Septic Shock  Goal: Optimal Coping  11/9/2024 1551 by Adeola Moralez RN  Outcome: Progressing  11/9/2024 1551 by Adeola Moralez RN  Outcome: Progressing  Intervention: Support Patient and Family Response  Recent Flowsheet Documentation  Taken 11/9/2024 1400  by Adeola Moralez RN  Family/Support System Care:   self-care encouraged   presence promoted  Taken 11/9/2024 0920 by Adeola Moralez RN  Family/Support System Care:   self-care encouraged   presence promoted  Goal: Absence of Bleeding  11/9/2024 1551 by Adeola Moralez RN  Outcome: Progressing  11/9/2024 1551 by Adeola Moralez RN  Outcome: Progressing  Intervention: Monitor and Manage Bleeding  Recent Flowsheet Documentation  Taken 11/9/2024 0920 by Adeola Moralez RN  Bleeding Precautions: monitored for signs of bleeding  Bleeding Management: dressing monitored  Goal: Blood Glucose Level Within Target Range  11/9/2024 1551 by Adeola Moralez RN  Outcome: Progressing  11/9/2024 1551 by Adeola Moralez RN  Outcome: Progressing  Intervention: Optimize Glycemic Control  Recent Flowsheet Documentation  Taken 11/9/2024 0920 by Adeola Moralez RN  Hyperglycemia Management: blood glucose monitored  Goal: Absence of Infection Signs and Symptoms  11/9/2024 1551 by Adeola Moralez RN  Outcome: Progressing  11/9/2024 1551 by Adeola Moralez RN  Outcome: Progressing  Intervention: Initiate Sepsis Management  Recent Flowsheet Documentation  Taken 11/9/2024 1400 by Adeola Moralez RN  Infection Prevention:   single patient room provided   rest/sleep promoted  Taken 11/9/2024 0920 by Adeola Moralez RN  Infection Prevention:   rest/sleep promoted   single patient room provided  Intervention: Promote Recovery  Recent Flowsheet Documentation  Taken 11/9/2024 1400 by Adeola Moralez RN  Activity Management: activity encouraged  Taken 11/9/2024 0920 by Adeola Moralez RN  Activity Management: activity encouraged  Goal: Optimal Nutrition Delivery  11/9/2024 1551 by Adeola Moralez RN  Outcome: Progressing  11/9/2024 1551 by Adeola Moralez RN  Outcome: Progressing     Problem: Alcohol Withdrawal  Goal: Alcohol Withdrawal Symptom Control  11/9/2024 1551 by Adeola Moralez RN  Outcome: Progressing  11/9/2024 1551 by  Moralez, Lace N, RN  Outcome: Progressing  Intervention: Minimize or Manage Alcohol Withdrawal Symptoms  Recent Flowsheet Documentation  Taken 11/9/2024 0920 by Adeola Moralez RN  Aspiration Precautions: upright posture maintained  Goal: Optimal Neurologic Function  11/9/2024 1551 by Adeola Moralez RN  Outcome: Progressing  11/9/2024 1551 by Adeola Moralez RN  Outcome: Progressing     Problem: VTE (Venous Thromboembolism)  Goal: Tissue Perfusion  11/9/2024 1551 by Adeola Moralez RN  Outcome: Progressing  11/9/2024 1551 by Adeola Moralez RN  Outcome: Progressing  Intervention: Optimize Tissue Perfusion  Recent Flowsheet Documentation  Taken 11/9/2024 0920 by Adeola Moralez RN  Bleeding Precautions: monitored for signs of bleeding  VTE Prevention/Management: (Lovenox) other (see comments)  Goal: Optimal Right Ventricular Function  11/9/2024 1551 by Adeola Moralez RN  Outcome: Progressing  11/9/2024 1551 by Adeola Moralez RN  Outcome: Progressing   Goal Outcome Evaluation:

## 2024-11-09 NOTE — PROGRESS NOTES
Name: Arsh Haynes ADMIT: 10/26/2024   : 1950  PCP: Provider, No Known    MRN: [id number] LOS: 14 days   AGE/SEX: 74 y.o. male  ROOM: [facility]     Subjective   Subjective   No acute events overnight.  No family at bedside today. Discussed with nursing. Patient has had some back pain but no complaints of knee pain today. Tolerating tube feeds.     Objective   Objective     Vital Signs  Temp:  [97.4 °F (36.3 °C)-99.5 °F (37.5 °C)] 98.1 °F (36.7 °C)  Heart Rate:  [101-126] 106  Resp:  [16-18] 18  BP: (111-144)/(50-59) 115/54  SpO2:  [95 %-98 %] 97 %  on   ;   Device (Oxygen Therapy): room air  Body mass index is 25.56 kg/m².    Physical Exam  General: Sleeping comfortably. Lying in bed.  Chronically ill-appearing.   Neuro: Generalized weakness non-distended, no focal deficits.   Lungs: Clear to auscultation bilaterally. No wheeze or crackles. No distress.   Heart: RRR, no murmurs. No edema.  Abdomen: Soft, nondistended.  BRONWYN drain in place.  Well-healing midline incision.  Ostomy with stool output.  Ext: Warm and well-perfused.  Right knee with swelling, tenderness to palpation.  Skin: No rashes or lesions. IV site without swelling or erythema.     Results Review     I reviewed the patient's new clinical results:  Results from last 7 days   Lab Units 24  0556 24  0703 24  0624   WBC 10*3/mm3 20.47* 18.92* 17.83* 19.15*   HEMOGLOBIN g/dL 7.3* 7.8* 8.1* 8.0*   PLATELETS 10*3/mm3 1,184* 1,244* 1,392* 1,122*     Results from last 7 days   Lab Units 24  0528 24  1637 24  0556 24  0704 24  0624   SODIUM mmol/L 133*  --  133* 129* 131*   POTASSIUM mmol/L 3.5 4.1 3.6 3.7 4.1   CHLORIDE mmol/L 105  --  102 97* 99   CO2 mmol/L 17.7*  --  18.6* 22.7 21.7*   BUN mg/dL 8  --  8 9 8   CREATININE mg/dL 0.53*  --  0.58* 0.53* 0.48*   GLUCOSE mg/dL 207*  --  112* 114* 98   EGFR mL/min/1.73 106.5  --  103.6 106.5 109.7     Results from last 7 days   Lab Units  11/09/24  0528 11/08/24  0556 11/07/24  0704 11/06/24  0624   ALBUMIN g/dL 1.7* 2.1* 2.5* 1.8*   BILIRUBIN mg/dL 0.4 0.4 0.5 0.6   ALK PHOS U/L 199* 215* 250* 238*   AST (SGOT) U/L 16 21 28 28   ALT (SGPT) U/L 27 32 36 31     Results from last 7 days   Lab Units 11/09/24  0528 11/08/24  0556 11/07/24  0704 11/06/24  0624   CALCIUM mg/dL 7.4* 7.5* 8.0* 8.1*   ALBUMIN g/dL 1.7* 2.1* 2.5* 1.8*   MAGNESIUM mg/dL  --   --  2.1 2.1   PHOSPHORUS mg/dL  --   --  3.2 3.0       Glucose   Date/Time Value Ref Range Status   11/09/2024 1128 204 (H) 70 - 130 mg/dL Final   11/09/2024 0555 221 (H) 70 - 130 mg/dL Final   11/08/2024 2036 160 (H) 70 - 130 mg/dL Final   11/08/2024 1536 164 (H) 70 - 130 mg/dL Final   11/08/2024 1042 124 70 - 130 mg/dL Final   11/08/2024 0623 118 70 - 130 mg/dL Final   11/07/2024 2221 130 70 - 130 mg/dL Final       XR Knee 3 View Right    Result Date: 11/8/2024   1. No radiographic evidence of acute fracture or dislocation. 2. 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  This report was finalized on 11/8/2024 7:34 PM by Jeremy Gee MD on Workstation: [id number]       I have personally reviewed all medications:  Scheduled Medications  chlorhexidine, 15 mL, Mouth/Throat, Q12H  enoxaparin, 1 mg/kg, Subcutaneous, Q12H  folic acid, 1 mg, Nasogastric, Daily  insulin glargine, 5 Units, Subcutaneous, Nightly  insulin regular, 2-9 Units, Subcutaneous, 4x Daily AC & at Bedtime  lansoprazole, 30 mg, Oral, Q AM  metoprolol tartrate, 50 mg, Oral, Q12H  sodium chloride, 10 mL, Intravenous, Q12H  sodium hypochlorite, , Topical, BID  terazosin, 2 mg, Nasogastric, Daily  thiamine, 100 mg, Nasogastric, Daily    Infusions     Diet  NPO Diet NPO Type: Strict NPO      Intake/Output Summary (Last 24 hours) at 11/9/2024 1423  Last data filed at 11/9/2024 0900  Gross per 24 hour   Intake 190 ml   Output 3450 ml   Net -3260 ml       Assessment/Plan     Active Hospital Problems    Diagnosis  POA    **Peritonitis  [K65.9]  Unknown    HTN (hypertension) [I10]  Unknown    Thrombocytosis [D75.839]  Unknown    Acute DVT (deep venous thrombosis) [I82.409]  Unknown    Renal mass [N28.89]  Unknown    Hyponatremia [E87.1]  Unknown    Anemia [D64.9]  Unknown    Moderate protein-calorie malnutrition [E44.0]  Yes    Colon cancer [C18.9]  No      Resolved Hospital Problems    Diagnosis Date Resolved POA    Perforation of sigmoid colon [K63.1] 10/27/2024 Yes       74 y.o. male with Peritonitis.    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  Leukocytosis persists at 20K today.  Will recheck a procalcitonin and inflammatory markers today to see if it has elevated.  The patient remains afebrile and there is no obvious infectious source at this time.  Gout discussion as below, but awaiting arthrocentesis.  -Acute right lower extremity DVT found on 11/5.  He has a severe thrombocytosis and in addition to his anemia as well as the colon cancer.  Hematology/oncology following.  Continue therapeutic Lovenox.  -Hgb further decreased to 7.3 today.  Hematology has ordered 2 units PRBC.  No signs of active bleeding.  Hematology following.  Repeat CBC with morning labs, transfuse for Hgb less than 7.  -Thrombocytosis persist, though improved today.  Hematology following.  -Urology evaluated the renal mass and urinary retention.  Blankenship catheter in place.  Continue Flomax.  Voiding trial when closer to discharge.  He will follow-up in 4 to 6 weeks with repeat CT for the renal mass. Given continued clots in the catheter, will ask urology to reevaluate today.  -Hyponatremia: Sodium improved to 133 today.  Continue IV fluids.  Repeat BMP with morning labs.  If sodium continues to worsen, would need to consider additional workup.  -Increase Lantus to 10 units nightly.  Continue SSI.  Ongoing  titration of insulin based on requirements.  -SLP evaluated and recommending n.p.o. at this time.  Core track placed and tube feeds started.  Continue normal saline infusion.   -Right knee pain: Does have effusion noted on exam, unchanged from yesterday.  Tenderness to palpation.  Has been afebrile.  Certainly could be gout.  Will consult orthopedic surgery for arthrocentesis.  Discussed with nursing today and they will recall.    Pharmacy to dose Lovenox for DVT prophylaxis.  Full code.  Discussed with patient, family, and nursing staff.  Anticipate discharge TBD      Sarah Segura MD  [facility]  11/09/24  14:23 EST

## 2024-11-09 NOTE — CONSULTS
Orthopaedic Consult    Nayana Whitaker MD      Patient: Arsh Haynes    Date of Admission: 10/26/2024  7:37 PM    YOB: 1950    Medical Record Number: [id number]    Attending Physician: Sarah Segura MD  Consulting Physician: Nayana Whitaker MD      Chief Complaints: Increased lactic acid level [R79.89]  Perforation of sigmoid colon [K63.1]  Hypotension, unspecified hypotension type [I95.9]      History of Present Illness: 74 y.o. male admitted to [facility] with Increased lactic acid level [R79.89]  Perforation of sigmoid colon [K63.1]  Hypotension, unspecified hypotension type [I95.9].  I was consulted for right knee pain.  Patient reports to me that his right shoulder is hurting him more than his right knee.  He does have an elevated ESR at 65 CRP at 22.9 his uric acid was 5.4 which is normal.  He has a history of colon cancer and recently had surgery 2 weeks ago for this.  He reports he has had knee pain on his right knee for at least 1 year.  He does report that it feels worse currently.  He has not been out of bed and has not put any weight on it.  He is able to flex and extend through his knee.    Allergies: No Known Allergies    Medications:   Home Medications:  Medications Prior to Admission   Medication Sig Dispense Refill Last Dose/Taking    amLODIPine-benazepril (LOTREL) 10-20 MG per capsule Take 1 capsule by mouth Daily.   Taking    metoprolol succinate XL (TOPROL-XL) 100 MG 24 hr tablet Take 1 tablet by mouth Daily.   Taking       Current Medications:  Scheduled Meds:chlorhexidine, 15 mL, Mouth/Throat, Q12H  enoxaparin, 1 mg/kg, Subcutaneous, Q12H  folic acid, 1 mg, Nasogastric, Daily  insulin glargine, 10 Units, Subcutaneous, Nightly  insulin regular, 2-9 Units, Subcutaneous, 4x Daily AC & at Bedtime  lansoprazole, 30 mg, Oral, Q AM  metoprolol tartrate, 50 mg, Oral, Q12H  sodium chloride, 10 mL, Intravenous, Q12H  sodium hypochlorite, , Topical, BID  terazosin, 2 mg,  Nasogastric, Daily  thiamine, 100 mg, Nasogastric, Daily      Continuous Infusions:sodium chloride, 100 mL/hr      PRN Meds:.  acetaminophen **OR** acetaminophen    Calcium Replacement - Follow Nurse / BPA Driven Protocol    dextrose    dextrose    glucagon (human recombinant)    HYDROcodone-acetaminophen    labetalol    Magnesium Standard Dose Replacement - Follow Nurse / BPA Driven Protocol    nitroglycerin    ondansetron    Phosphorus Replacement - Follow Nurse / BPA Driven Protocol    Potassium Replacement - Follow Nurse / BPA Driven Protocol    [COMPLETED] Insert Peripheral IV **AND** sodium chloride    sodium chloride    sodium chloride    sodium chloride    Past Medical History:   Diagnosis Date    HTN (hypertension)      Past Surgical History:   Procedure Laterality Date    COLON RESECTION N/A 10/26/2024    Procedure: LOW ANTERIOR COLON RESECTION SIGMOID, COLOSTOMY, SPLENIC FLEXURE MOBILIZATION;  Surgeon: Mc Guillaume MD;  Location: [facility];  Service: General;  Laterality: N/A;    EXPLORATORY LAPAROTOMY N/A 10/26/2024    Procedure: LAPAROTOMY EXPLORATORY, LEFT HEMICOLECTOMY;  Surgeon: Mc Guillaume MD;  Location: [facility];  Service: General;  Laterality: N/A;     Social History     Occupational History    Not on file   Tobacco Use    Smoking status: Never    Smokeless tobacco: Never   Vaping Use    Vaping status: Never Used   Substance and Sexual Activity    Alcohol use: Yes     Alcohol/week: 42.0 standard drinks of alcohol     Types: 42 Cans of beer per week     Comment: 6 beer daily    Drug use: Never    Sexual activity: Defer      Social History     Social History Narrative    Not on file     History reviewed. No pertinent family history.      Review of Systems:   Constitutional: Negative for fatigue, fever, or weight loss  HEENT: Patient denies any headaches, vision changes, sore throat. Admits to wearing glasses  Pulmonary: Patient denies any cough, congestion, SOA,  or wheezing  Cardiovascular: Patient denies any chest pain, palpitations, weakness,  Gastrointestinal:  Patient denies nausea, vomiting, diarrhea, constipation  Genital/Urinary: Patient denies dysuria, urinary frequency, urgency, incontinence, retention  Musculoskeletal: Positive for right knee and right shoulder pain. Negative for muscle cramps  Neurological: Patient denies dizziness, paresthesia, loss of sensation, seizures, syncope  Endocrine system: Patient denies tremors, cold or heat intolerance  Psychological: Patient denies thoughts/plans or harming self or other; hallucinations,  insomnia  Skin: Patient denies any bruising, rashes, lesions, or ulcers   Hematopoietic: Patient denies history of MRSA or slow wound healing,  spontaneous or excessive bleeding    Physical Exam: 74 y.o. male  Vitals:    11/09/24 1319 11/09/24 1355 11/09/24 1616 11/09/24 1655   BP: 120/58 115/54 124/63 128/58   BP Location: Right arm Right arm     Patient Position: Lying Lying     Pulse: 108 106 111 111   Resp: 18 18 18 18   Temp: 97.4 °F (36.3 °C) 98.1 °F (36.7 °C) 98.1 °F (36.7 °C) 98.2 °F (36.8 °C)   TempSrc: Oral Oral Oral Oral   SpO2: 98% 97% 97% 97%   Weight:       Height:                                General Appearance:  Alert, cooperative, in no acute distress    HEENT:    Normocephalic,  Atraumatic, Pupils are equal   Neck:   No adenopathy, supple, trachea midline, no thyromegaly       Lungs:     Clear to auscultation, equal expansion bilaterally, respirations regular, even and               unlabored    Heart:    Regular rhythm and normal rate, normal S1 and S2, no murmur, no gallops   Chest Wall:    Within normal limits   Abdomen:     Normal bowel sounds, no masses,  soft non-tender, non-distended,       Rectal:  Musculoskeletal:     Deferred  Exam of his right knee demonstrates is able to flex it to 90 and extend to 0.  He is tender over the patellofemoral joint.  I do not appreciate any effusion on exam today.  Exam  of his right shoulder he is very tender to palpation I am even unable to move his shoulder.  Neurovascular intact distally on both the foot and hand.   Extremities:   No clubbing, no edema, no cyanosis   Pulses  Neurovascular:   Pulses palpable and equal bilaterally in all 4 extremities    Patient was alert and oriented x 3 spheres   Skin:   No lesions, ulcers or rashes   Neurologic:   Cranial nerves 2 - 12 grossly intact, sensation intact            Diagnostic Tests:  X-rays of the knee demonstrate that he has severe arthritis especially in the lateral compartment of his right knee.  No appreciated knee effusion        Assessment:    Peritonitis    Colon cancer    HTN (hypertension)    Thrombocytosis    Acute DVT (deep venous thrombosis)    Renal mass    Hyponatremia    Anemia    Moderate protein-calorie malnutrition            Plan:      I went ahead and ordered x-rays of his right shoulder as this is bothering him more.  Based on exam I have a low clinical threshold for infection based on the lack of effusion and good range of motion of his knee.  If concerned they could order an MRI and possibly have a IR aspiration.  I do not appreciate enough fluid for me to do this at bedside which is fairly routine for me.  We will continue to follow along peripherally if he gets worse.  I will follow-up on the x-rays and see the patient tomorrow.  Questions encouraged and answered no guarantees given    Date: 11/9/2024  Nayana Whitaker MD  Orthopaedic Surgeon    [facility]  [phone]  [email or web address]

## 2024-11-09 NOTE — PROGRESS NOTES
REASON FOR CONSULTATION: Thrombocytosis, DVT, colon cancer        History of Present Illness :    The patient is a 74-year-old male who presented to the ER 10/26/2024 with worsening abdominal pain.  His scans at that point consistent with sigmoid perforation.  He had been ill for several months developing weight loss associated with diarrhea.  Additional findings were consistent with septicemia with lactic acidosis, alcoholism, hyperglycemia and a right renal mass.     Admitting H&H were 13.7 and 43.2 with white count of 9720 and platelet count of 549,000 and a procalcitonin of 9.58.     The patient went to surgery 10/27 undergoing an open left hemicolectomy with Larson pouch and end colostomy, splenic flexure mobilization.  Findings were of fecal peritonitis, rectosigmoid colon cancer, ischemic colon with perforation at the descending sigmoid colon junction.  Pathology concerning the malignancy included 2 foci of invasive mildly differentiated mucinous adenocarcinoma with tumor sizes of 4.8 cm and 6.0 cm both tumors invading through muscularis propria into pericolonic adipose tissue,, descending colon cancer with no lymphovascular nor peritoneal invasion identified, 0 of 16 lymph nodes with pathologic stage -mpT3, N0.  Additional sigmoid colon cancer moderately differentiated with tumor invading muscularis propria, 0 16 nodes negative and again pathologic stage  -Pathologic stage: mpT3, N0.      Postop the patient continued therapy for septic shock from perforated bowel and underlying malignancy quiring pressors.  Studies revealed gram-negative bacilli, gram-positive cocci with IV antibiotic therapies directed at findings.  10/29 H&H of 8.6 and 27.9 with white count of 20,190, platelet count of 236,000.  He was able to be weaned off pressors and extubated 10/29/2024.  Tube feeds started.        Patient seen by infectious disease 11/3/2024 with Zosyn continued.  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  Patient with further confusion     Review 11/5/2024 with some general improvement though further anemia and thrombocytosis.  Doppler examination 11/5 with acute right lower extremity DVT in the gastrocnemius and soleal.        Interval history:  11/6/2024  Discussed findings with patient at bedside  T97.8, pulse 102, respirations 18, /62  H&H of 8.0 and 25.0, white count of 19,150, platelet count of 1,112,000    11/7/2024-postoperative day 11  T97.2, pulse 105, respirations 18, /50  3 resting, plans to move out of CCU today.  H&H 8.1 and 25.8 with white count of 17,830, platelet count of 1,392, 000  , B12 of 824, folate 11.7, ferritin of 230, iron profile consistent with anemia of chronic disease, , reticulocyte count 1.51 and reticulocyte hemoglobin 23.1    11/8/2024 postoperative day 12  T98.4, pulse 120, respirate 16, /56  We have been following somewhat peripherally reviewing studies each day since he is not a candidate for any immediate therapy postoperative.  H&H 7.8 and 25.3 with white count of 18,009 or 20, platelet count of 1, 244,000, BUN/creatinine of 8 and 0.58, calcium 7.5, BUN 2.1, normal transaminases, total bilirubin of 0.4    11/9/2024  T97.8, pulse 115, respirations 18, /50  Patient remains very fatigued.  H&H 7.3 and 23.2 with white count of 20,470, H&H is 7.3 and 23.2, BUN/creatinine of 8 and 0.53    Past Medical History, Past Surgical History, Social History, Family History have been reviewed and are without significant changes except as mentioned.    Review of Systems   A comprehensive 14 point review of systems was performed and was negative except as mentioned.    Medications:  The current medication list was reviewed in  the EMR    ALLERGIES:  No Known Allergies    Objective      Vitals:    11/08/24 1954 11/08/24 2346 11/08/24 2347 11/09/24 0727   BP: 144/57 128/59 128/59 114/50   BP Location: Left arm Left arm  Left arm   Patient Position: Lying Lying  Lying   Pulse: (!) 126 114 115 115   Resp: 16 16  18   Temp: 97.7 °F (36.5 °C) 97.9 °F (36.6 °C)  97.8 °F (36.6 °C)   TempSrc: Oral Oral  Oral   SpO2: 95% 95% 97% 96%   Weight:       Height:              No data to display                Physical Exam    Constitutional:       Appearance: He is obese.      Comments: Appearing male, alert   HENT:      Nose: Nose normal.      Mouth/Throat:      Mouth: Mucous membranes are moist.      Pharynx: Oropharynx is clear.   Eyes:      Extraocular Movements: Extraocular movements intact.      Conjunctiva/sclera: Conjunctivae normal.      Pupils: Pupils are equal, round, and reactive to light.   Cardiovascular:      Rate and Rhythm: Regular rhythm. Tachycardia present.      Pulses: Normal pulses.      Heart sounds: Normal heart sounds.   Pulmonary:      Effort: Pulmonary effort is normal.      Breath sounds: Normal breath sounds.   Abdominal:      General: There is distension.      Palpations: There is no mass.      Tenderness: There is abdominal tenderness. There is no guarding or rebound.      Hernia: No hernia is present.      Comments: Signs reduced, ostomy intact and functioning   Musculoskeletal:         General: Normal range of motion.      Cervical back: Normal range of motion and neck supple.      Right lower leg: No edema (Trace, no palpable cord).      Left lower leg: No edema.   Neurological:      General: No focal deficit present.      Mental Status: He is oriented to person, place, and time.   Psychiatric:         Mood and Affect: Mood normal.   RECENT LABS:  Hematology WBC   Date Value Ref Range Status   11/09/2024 20.47 (H) 3.40 - 10.80 10*3/mm3 Final     RBC   Date Value Ref Range Status   11/09/2024 2.68 (L) 4.14 - 5.80 10*6/mm3  Final     Hemoglobin   Date Value Ref Range Status   11/09/2024 7.3 (L) 13.0 - 17.7 g/dL Final     Hematocrit   Date Value Ref Range Status   11/09/2024 23.2 (L) 37.5 - 51.0 % Final     Platelets   Date Value Ref Range Status   11/09/2024 1,184 (C) 140 - 450 10*3/mm3 Final              Assessment & Plan     Septic shock from perforated bowel from underlying malignancy  Subsequent abdominal cultures positive for Pseudomonas, E. coli and mixed anaerobes, negative blood cultures  Patient now more likely, completed antibiotic therapies     *Synchronous colon cancers  Both malignancies are T3 N0-stage IIA-and as we consider his concurrent renal mass and performance status, adjuvant chemotherapy must be considered particularly with invasion of the descending colon mass into pericolonic and perirectal tissue-apparently the the site of perforation.  These issues discussed with general surgery.  MSI status-negative by IHC, PCR-microsatellite stable.  This is particularly useful considering the patient's family history and findings of synchronous colon cancer.     *Anemia  Suspected multifactorial anemia, additional laboratory studies consistent with anemia of chronic disease  Transfusion anticipated 11/9/2024     *Leukocytosis  Agree that this is multifactorial but notedly right shifted with predominant neutrophilia and no immature forms.  This is a reactive leukocytosis usually related to concurrent physiologic stress and infection.  Continues to be variable     *Thrombocytosis  This is also reactive developing gradually postoperative and does not need to be addressed with cytoreductive medications.  Additional laboratory studies per iron deficiency were completed and found to be negative  Continue to monitor, gradually improving        *Urinary retention, findings of right renal mass  Seen by urology, voiding trial, outpatient scans for follow-up of renal mass        *Acute right lower extremity DVT in gastrocnemius and  soleal  Patient now on anticoagulation with Lovenox appropriately.  We will follow and transition to DOAC therapy as he further recovers.       11/9/2024

## 2024-11-10 LAB
ALBUMIN SERPL-MCNC: 2.1 G/DL (ref 3.5–5.2)
ALBUMIN/GLOB SERPL: 0.6 G/DL
ALP SERPL-CCNC: 204 U/L (ref 39–117)
ALT SERPL W P-5'-P-CCNC: 27 U/L (ref 1–41)
ANION GAP SERPL CALCULATED.3IONS-SCNC: 11.1 MMOL/L (ref 5–15)
ANISOCYTOSIS BLD QL: ABNORMAL
AST SERPL-CCNC: 19 U/L (ref 1–40)
BACTERIA UR QL AUTO: ABNORMAL /HPF
BASOPHILS # BLD MANUAL: 0 10*3/MM3 (ref 0–0.2)
BASOPHILS NFR BLD MANUAL: 0 % (ref 0–1.5)
BH BB BLOOD EXPIRATION DATE: NORMAL
BH BB BLOOD EXPIRATION DATE: NORMAL
BH BB BLOOD TYPE BARCODE: 9500
BH BB BLOOD TYPE BARCODE: 9500
BH BB DISPENSE STATUS: NORMAL
BH BB DISPENSE STATUS: NORMAL
BH BB PRODUCT CODE: NORMAL
BH BB PRODUCT CODE: NORMAL
BH BB UNIT NUMBER: NORMAL
BH BB UNIT NUMBER: NORMAL
BILIRUB SERPL-MCNC: 0.6 MG/DL (ref 0–1.2)
BILIRUB UR QL STRIP: NEGATIVE
BUN SERPL-MCNC: 10 MG/DL (ref 8–23)
BUN/CREAT SERPL: 22.7 (ref 7–25)
CALCIUM SPEC-SCNC: 7.7 MG/DL (ref 8.6–10.5)
CHLORIDE SERPL-SCNC: 107 MMOL/L (ref 98–107)
CLARITY UR: ABNORMAL
CO2 SERPL-SCNC: 16.9 MMOL/L (ref 22–29)
COLOR UR: YELLOW
CREAT SERPL-MCNC: 0.44 MG/DL (ref 0.76–1.27)
CROSSMATCH INTERPRETATION: NORMAL
CROSSMATCH INTERPRETATION: NORMAL
DEPRECATED RDW RBC AUTO: 54 FL (ref 37–54)
EGFRCR SERPLBLD CKD-EPI 2021: 112.6 ML/MIN/1.73
EOSINOPHIL # BLD MANUAL: 0.19 10*3/MM3 (ref 0–0.4)
EOSINOPHIL NFR BLD MANUAL: 1 % (ref 0.3–6.2)
ERYTHROCYTE [DISTWIDTH] IN BLOOD BY AUTOMATED COUNT: 16.8 % (ref 12.3–15.4)
GLOBULIN UR ELPH-MCNC: 3.4 GM/DL
GLUCOSE BLDC GLUCOMTR-MCNC: 160 MG/DL (ref 70–130)
GLUCOSE BLDC GLUCOMTR-MCNC: 172 MG/DL (ref 70–130)
GLUCOSE BLDC GLUCOMTR-MCNC: 178 MG/DL (ref 70–130)
GLUCOSE SERPL-MCNC: 175 MG/DL (ref 65–99)
GLUCOSE UR STRIP-MCNC: NEGATIVE MG/DL
HCT VFR BLD AUTO: 30.5 % (ref 37.5–51)
HGB BLD-MCNC: 9.3 G/DL (ref 13–17.7)
HGB UR QL STRIP.AUTO: ABNORMAL
HYALINE CASTS UR QL AUTO: ABNORMAL /LPF
KETONES UR QL STRIP: NEGATIVE
LEUKOCYTE ESTERASE UR QL STRIP.AUTO: ABNORMAL
LYMPHOCYTES # BLD MANUAL: 1.35 10*3/MM3 (ref 0.7–3.1)
LYMPHOCYTES NFR BLD MANUAL: 5.1 % (ref 5–12)
MCH RBC QN AUTO: 27.1 PG (ref 26.6–33)
MCHC RBC AUTO-ENTMCNC: 30.5 G/DL (ref 31.5–35.7)
MCV RBC AUTO: 88.9 FL (ref 79–97)
METAMYELOCYTES NFR BLD MANUAL: 3 % (ref 0–0)
MICROCYTES BLD QL: ABNORMAL
MONOCYTES # BLD: 0.97 10*3/MM3 (ref 0.1–0.9)
NEUTROPHILS # BLD AUTO: 15.96 10*3/MM3 (ref 1.7–7)
NEUTROPHILS NFR BLD MANUAL: 83.8 % (ref 42.7–76)
NITRITE UR QL STRIP: NEGATIVE
NRBC BLD AUTO-RTO: 0.2 /100 WBC (ref 0–0.2)
PH UR STRIP.AUTO: 5.5 [PH] (ref 5–8)
PLAT MORPH BLD: NORMAL
PLATELET # BLD AUTO: 1089 10*3/MM3 (ref 140–450)
PMV BLD AUTO: 8.5 FL (ref 6–12)
POLYCHROMASIA BLD QL SMEAR: ABNORMAL
POTASSIUM SERPL-SCNC: 3.8 MMOL/L (ref 3.5–5.2)
PROT SERPL-MCNC: 5.5 G/DL (ref 6–8.5)
PROT UR QL STRIP: ABNORMAL
RBC # BLD AUTO: 3.43 10*6/MM3 (ref 4.14–5.8)
RBC # UR STRIP: ABNORMAL /HPF
REF LAB TEST METHOD: ABNORMAL
SODIUM SERPL-SCNC: 135 MMOL/L (ref 136–145)
SP GR UR STRIP: 1.02 (ref 1–1.03)
SQUAMOUS #/AREA URNS HPF: ABNORMAL /HPF
UNIT  ABO: NORMAL
UNIT  ABO: NORMAL
UNIT  RH: NORMAL
UNIT  RH: NORMAL
URATE CRY URNS QL MICRO: PRESENT /HPF
UROBILINOGEN UR QL STRIP: ABNORMAL
VARIANT LYMPHS NFR BLD MANUAL: 7.1 % (ref 19.6–45.3)
WBC # UR STRIP: ABNORMAL /HPF
WBC MORPH BLD: NORMAL
WBC NRBC COR # BLD AUTO: 19.04 10*3/MM3 (ref 3.4–10.8)

## 2024-11-10 PROCEDURE — 63710000001 INSULIN REGULAR HUMAN PER 5 UNITS: Performed by: HOSPITALIST

## 2024-11-10 PROCEDURE — 63710000001 INSULIN GLARGINE PER 5 UNITS: Performed by: STUDENT IN AN ORGANIZED HEALTH CARE EDUCATION/TRAINING PROGRAM

## 2024-11-10 PROCEDURE — 99024 POSTOP FOLLOW-UP VISIT: CPT | Performed by: SURGERY

## 2024-11-10 PROCEDURE — 81001 URINALYSIS AUTO W/SCOPE: CPT

## 2024-11-10 PROCEDURE — 80053 COMPREHEN METABOLIC PANEL: CPT | Performed by: INTERNAL MEDICINE

## 2024-11-10 PROCEDURE — 99231 SBSQ HOSP IP/OBS SF/LOW 25: CPT | Performed by: INTERNAL MEDICINE

## 2024-11-10 PROCEDURE — 94799 UNLISTED PULMONARY SVC/PX: CPT

## 2024-11-10 PROCEDURE — 97530 THERAPEUTIC ACTIVITIES: CPT

## 2024-11-10 PROCEDURE — 85025 COMPLETE CBC W/AUTO DIFF WBC: CPT | Performed by: INTERNAL MEDICINE

## 2024-11-10 PROCEDURE — 25010000002 ENOXAPARIN PER 10 MG: Performed by: INTERNAL MEDICINE

## 2024-11-10 PROCEDURE — 85007 BL SMEAR W/DIFF WBC COUNT: CPT | Performed by: INTERNAL MEDICINE

## 2024-11-10 PROCEDURE — 82948 REAGENT STRIP/BLOOD GLUCOSE: CPT

## 2024-11-10 PROCEDURE — 94761 N-INVAS EAR/PLS OXIMETRY MLT: CPT

## 2024-11-10 RX ORDER — LIDOCAINE 4 G/G
2 PATCH TOPICAL
Status: DISCONTINUED | OUTPATIENT
Start: 2024-11-10 | End: 2024-11-30 | Stop reason: HOSPADM

## 2024-11-10 RX ADMIN — FOLIC ACID 1 MG: 1 TABLET ORAL at 08:21

## 2024-11-10 RX ADMIN — HYDROCODONE BITARTRATE AND ACETAMINOPHEN 1 TABLET: 5; 325 TABLET ORAL at 22:03

## 2024-11-10 RX ADMIN — SODIUM HYPOCHLORITE: 1.25 SOLUTION TOPICAL at 08:22

## 2024-11-10 RX ADMIN — INSULIN HUMAN 2 UNITS: 100 INJECTION, SOLUTION PARENTERAL at 08:22

## 2024-11-10 RX ADMIN — METOPROLOL TARTRATE 50 MG: 50 TABLET, FILM COATED ORAL at 22:03

## 2024-11-10 RX ADMIN — TERAZOSIN 2 MG: 2 CAPSULE ORAL at 22:04

## 2024-11-10 RX ADMIN — ENOXAPARIN SODIUM 90 MG: 100 INJECTION SUBCUTANEOUS at 05:40

## 2024-11-10 RX ADMIN — CHLORHEXIDINE GLUCONATE 15 ML: 1.2 RINSE ORAL at 22:05

## 2024-11-10 RX ADMIN — INSULIN HUMAN 2 UNITS: 100 INJECTION, SOLUTION PARENTERAL at 22:06

## 2024-11-10 RX ADMIN — INSULIN HUMAN 2 UNITS: 100 INJECTION, SOLUTION PARENTERAL at 17:04

## 2024-11-10 RX ADMIN — ENOXAPARIN SODIUM 90 MG: 100 INJECTION SUBCUTANEOUS at 17:04

## 2024-11-10 RX ADMIN — Medication 10 ML: at 08:22

## 2024-11-10 RX ADMIN — HYDROCODONE BITARTRATE AND ACETAMINOPHEN 1 TABLET: 5; 325 TABLET ORAL at 05:40

## 2024-11-10 RX ADMIN — SODIUM HYPOCHLORITE: 1.25 SOLUTION TOPICAL at 22:06

## 2024-11-10 RX ADMIN — LIDOCAINE 2 PATCH: 4 PATCH TOPICAL at 10:45

## 2024-11-10 RX ADMIN — CHLORHEXIDINE GLUCONATE 15 ML: 1.2 RINSE ORAL at 08:21

## 2024-11-10 RX ADMIN — INSULIN GLARGINE 15 UNITS: 100 INJECTION, SOLUTION SUBCUTANEOUS at 22:04

## 2024-11-10 RX ADMIN — INSULIN HUMAN 2 UNITS: 100 INJECTION, SOLUTION PARENTERAL at 12:25

## 2024-11-10 RX ADMIN — METOPROLOL TARTRATE 50 MG: 50 TABLET, FILM COATED ORAL at 08:22

## 2024-11-10 RX ADMIN — Medication 10 ML: at 22:29

## 2024-11-10 RX ADMIN — LANSOPRAZOLE 30 MG: 15 TABLET, ORALLY DISINTEGRATING ORAL at 05:36

## 2024-11-10 RX ADMIN — Medication 100 MG: at 08:21

## 2024-11-10 NOTE — PROGRESS NOTES
Name: Arsh Haynes ADMIT: 10/26/2024   : 1950  PCP: Provider, No Known    MRN: [id number] LOS: 15 days   AGE/SEX: 74 y.o. male  ROOM: [facility] [place]     Subjective   Subjective   No acute events overnight.  No family at bedside today.  Discussed with patient and he is feeling okay today.  Still complaining of some right shoulder pain.  He states this is extremely chronic and unchanged.  Had this prior to hospitalization.  Knee pain has improved somewhat.  Tolerating tube feeds.    Objective   Objective     Vital Signs  Temp:  [97.7 °F (36.5 °C)-98.2 °F (36.8 °C)] 97.7 °F (36.5 °C)  Heart Rate:  [] 117  Resp:  [16-18] 16  BP: (115-136)/(50-63) 128/62  SpO2:  [94 %-97 %] 97 %  on  Flow (L/min) (Oxygen Therapy):  [0] 0;   Device (Oxygen Therapy): room air  Body mass index is 25.56 kg/m².    Physical Exam  General: Sleeping comfortably but arouses.  Answers questions appropriately. Lying in bed.  Chronically ill-appearing.   Neuro: Generalized weakness, moves all extremities.  Face symmetric.  No focal deficits.  Lungs: Clear to auscultation bilaterally. No wheeze or crackles. No distress.   Heart: RRR, no murmurs. No edema.  Abdomen: Soft, nondistended. Well-healing midline incision.  Ostomy with stool output.  Ext: Warm and well-perfused.  Right knee swelling improved, nontender to palpation.  Skin: No rashes or lesions. IV site without swelling or erythema.     Results Review     I reviewed the patient's new clinical results:  Results from last 7 days   Lab Units 11/10/24  0509 24  0528 24  0556 24  0703   WBC 10*3/mm3 19.04* 20.47* 18.92* 17.83*   HEMOGLOBIN g/dL 9.3* 7.3* 7.8* 8.1*   PLATELETS 10*3/mm3 1,089* 1,184* 1,244* 1,392*     Results from last 7 days   Lab Units 11/10/24  0455 24  1712 24  0528 24  1637 24  0556 24  0704   SODIUM mmol/L 135*  --  133*  --  133* 129*   POTASSIUM mmol/L 3.8 4.4 3.5 4.1 3.6 3.7   CHLORIDE mmol/L 107  --  105  --  102  97*   CO2 mmol/L 16.9*  --  17.7*  --  18.6* 22.7   BUN mg/dL 10  --  8  --  8 9   CREATININE mg/dL 0.44*  --  0.53*  --  0.58* 0.53*   GLUCOSE mg/dL 175*  --  207*  --  112* 114*   EGFR mL/min/1.73 112.6  --  106.5  --  103.6 106.5     Results from last 7 days   Lab Units 11/10/24  0455 11/09/24  0528 11/08/24  0556 11/07/24  0704   ALBUMIN g/dL 2.1* 1.7* 2.1* 2.5*   BILIRUBIN mg/dL 0.6 0.4 0.4 0.5   ALK PHOS U/L 204* 199* 215* 250*   AST (SGOT) U/L 19 16 21 28   ALT (SGPT) U/L 27 27 32 36     Results from last 7 days   Lab Units 11/10/24  0455 11/09/24  0528 11/08/24  0556 11/07/24  0704 11/06/24 0624   CALCIUM mg/dL 7.7* 7.4* 7.5* 8.0* 8.1*   ALBUMIN g/dL 2.1* 1.7* 2.1* 2.5* 1.8*   MAGNESIUM mg/dL  --   --   --  2.1 2.1   PHOSPHORUS mg/dL  --   --   --  3.2 3.0     Results from last 7 days   Lab Units 11/09/24 0528   PROCALCITONIN ng/mL 4.09*     Glucose   Date/Time Value Ref Range Status   11/10/2024 1039 160 (H) 70 - 130 mg/dL Final   11/10/2024 0555 172 (H) 70 - 130 mg/dL Final   11/09/2024 2009 222 (H) 70 - 130 mg/dL Final   11/09/2024 1610 155 (H) 70 - 130 mg/dL Final   11/09/2024 1128 204 (H) 70 - 130 mg/dL Final   11/09/2024 0555 221 (H) 70 - 130 mg/dL Final   11/08/2024 2036 160 (H) 70 - 130 mg/dL Final       XR Shoulder 2+ View Right    Result Date: 11/9/2024   1. Diffuse osteopenia, without radiographic evidence of acute fracture or dislocation. 2. Severe arthropathic changes at the glenohumeral joint, which could reflect chronic DJD or neuropathic arthropathy. 3. Moderate DJD at the AC joint and small anterior/inferior acromial enthesophyte. 4. Partially imaged right perihilar and right basilar opacities, likely a pleural effusion with underlying atelectasis and/or pneumonia.  This report was finalized on 11/9/2024 8:00 PM by Jeremy Gee MD on Workstation: [id number]      XR Knee 3 View Right    Result Date: 11/8/2024   1. No radiographic evidence of acute fracture or dislocation. 2.  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  This report was finalized on 11/8/2024 7:34 PM by Jeremy Gee MD on Workstation: [id number]       I have personally reviewed all medications:  Scheduled Medications  chlorhexidine, 15 mL, Mouth/Throat, Q12H  enoxaparin, 1 mg/kg, Subcutaneous, Q12H  folic acid, 1 mg, Nasogastric, Daily  insulin glargine, 10 Units, Subcutaneous, Nightly  insulin regular, 2-9 Units, Subcutaneous, 4x Daily AC & at Bedtime  lansoprazole, 30 mg, Oral, Q AM  Lidocaine, 2 patch, Transdermal, Q24H  metoprolol tartrate, 50 mg, Oral, Q12H  sodium chloride, 10 mL, Intravenous, Q12H  sodium hypochlorite, , Topical, BID  terazosin, 2 mg, Nasogastric, Daily  thiamine, 100 mg, Nasogastric, Daily    Infusions  sodium chloride, 100 mL/hr, Last Rate: 100 mL/hr (11/09/24 2035)      Diet  NPO Diet NPO Type: Strict NPO      Intake/Output Summary (Last 24 hours) at 11/10/2024 1337  Last data filed at 11/10/2024 0900  Gross per 24 hour   Intake 1721 ml   Output 2425 ml   Net -704 ml       Assessment/Plan     Active Hospital Problems    Diagnosis  POA    **Peritonitis [K65.9]  Unknown    HTN (hypertension) [I10]  Unknown    Thrombocytosis [D75.839]  Unknown    Acute DVT (deep venous thrombosis) [I82.409]  Unknown    Renal mass [N28.89]  Unknown    Hyponatremia [E87.1]  Unknown    Anemia [D64.9]  Unknown    Moderate protein-calorie malnutrition [E44.0]  Yes    Colon cancer [C18.9]  No      Resolved Hospital Problems    Diagnosis Date Resolved POA    Perforation of sigmoid colon [K63.1] 10/27/2024 Yes       74 y.o. male with Peritonitis.    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  Leukocytosis persists at 19K today.  Will recheck a procalcitonin and inflammatory markers today to see if it has elevated.  The patient remains afebrile and  there is no obvious infectious source at this time.  -Acute right lower extremity DVT found on 11/5.  He has a severe thrombocytosis and in addition to his anemia as well as the colon cancer.  Hematology/oncology following.  Continue therapeutic Lovenox.  -Hgb further decreased to 7.3 yesterday.  Hematology has ordered 2 units PRBC.  Has improved to 9.3 today.  Hematology following.  Repeat CBC with morning labs, transfuse for Hgb less than 7.  -Thrombocytosis persist, though improved today.  Hematology following.  -Urology evaluated the renal mass and urinary retention.  Blankenship catheter in place.  Continue Flomax.  Voiding trial when closer to discharge.  He will follow-up in 4 to 6 weeks with repeat CT for the renal mass. Given continued clots in the catheter, urology reconsulted.  CT renal mass has been ordered.  -Hyponatremia: Sodium improved to 135 today.  Discontinue normal saline.  Repeat BMP with morning labs.  If sodium continues to worsen, would need to consider additional workup.  -Bicarbonate decreased to 16.9 today, anion gap normal.  Going to discontinue normal saline.  Recheck BMP with morning labs.  -Increase Lantus to 15 units nightly.  Continue SSI.  Ongoing titration of insulin based on requirements.  -SLP evaluated and recommending n.p.o. at this time.  Core track placed and tube feeds started.  SLP to reevaluate tomorrow.  -Right knee pain, right shoulder pain: Orthopedic surgery consulted.  No significant effusion to perform bedside arthrocentesis.  XR of right shoulder with severe arthropathic changes.  Planning to do a steroid injection of the right shoulder.  They will then follow-up in the outpatient setting.    Pharmacy to dose Lovenox for DVT prophylaxis.  Full code.  Discussed with patient, family, and nursing staff.  Anticipate discharge TBD      Sarah Segura MD  [facility]  11/10/24  13:37 EST

## 2024-11-10 NOTE — PROGRESS NOTES
REASON FOR CONSULTATION: Thrombocytosis, DVT, colon cancer        History of Present Illness :    The patient is a 74-year-old male who presented to the ER 10/26/2024 with worsening abdominal pain.  His scans at that point consistent with sigmoid perforation.  He had been ill for several months developing weight loss associated with diarrhea.  Additional findings were consistent with septicemia with lactic acidosis, alcoholism, hyperglycemia and a right renal mass.     Admitting H&H were 13.7 and 43.2 with white count of 9720 and platelet count of 549,000 and a procalcitonin of 9.58.     The patient went to surgery 10/27 undergoing an open left hemicolectomy with Larson pouch and end colostomy, splenic flexure mobilization.  Findings were of fecal peritonitis, rectosigmoid colon cancer, ischemic colon with perforation at the descending sigmoid colon junction.  Pathology concerning the malignancy included 2 foci of invasive mildly differentiated mucinous adenocarcinoma with tumor sizes of 4.8 cm and 6.0 cm both tumors invading through muscularis propria into pericolonic adipose tissue,, descending colon cancer with no lymphovascular nor peritoneal invasion identified, 0 of 16 lymph nodes with pathologic stage -mpT3, N0.  Additional sigmoid colon cancer moderately differentiated with tumor invading muscularis propria, 0 16 nodes negative and again pathologic stage  -Pathologic stage: mpT3, N0.      Postop the patient continued therapy for septic shock from perforated bowel and underlying malignancy quiring pressors.  Studies revealed gram-negative bacilli, gram-positive cocci with IV antibiotic therapies directed at findings.  10/29 H&H of 8.6 and 27.9 with white count of 20,190, platelet count of 236,000.  He was able to be weaned off pressors and extubated 10/29/2024.  Tube feeds started.        Patient seen by infectious disease 11/3/2024 with Zosyn continued.  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  Patient with further confusion     Review 11/5/2024 with some general improvement though further anemia and thrombocytosis.  Doppler examination 11/5 with acute right lower extremity DVT in the gastrocnemius and soleal.        Interval history:  11/6/2024  Discussed findings with patient at bedside  T97.8, pulse 102, respirations 18, /62  H&H of 8.0 and 25.0, white count of 19,150, platelet count of 1,112,000    11/7/2024-postoperative day 11  T97.2, pulse 105, respirations 18, /50  3 resting, plans to move out of CCU today.  H&H 8.1 and 25.8 with white count of 17,830, platelet count of 1,392, 000  , B12 of 824, folate 11.7, ferritin of 230, iron profile consistent with anemia of chronic disease, , reticulocyte count 1.51 and reticulocyte hemoglobin 23.1    11/8/2024 postoperative day 12  T98.4, pulse 120, respirate 16, /56  We have been following somewhat peripherally reviewing studies each day since he is not a candidate for any immediate therapy postoperative.  H&H 7.8 and 25.3 with white count of 18,009 or 20, platelet count of 1, 244,000, BUN/creatinine of 8 and 0.58, calcium 7.5, BUN 2.1, normal transaminases, total bilirubin of 0.4    11/9/2024  T97.8, pulse 115, respirations 18, /50  Patient remains very fatigued.  H&H 7.3 and 23.2 with white count of 20,470, H&H is 7.3 and 23.2, BUN/creatinine of 8 and 0.53    11/10/2024  T97.9, pulse 92, respiration 16, /55, SpO2 94%  Postop day 14  H&H 9.3 and 30.5, white count 19,040, platelet count of 1,089,000  Urologic review-gross hematuria, catheter irrigated bedside without additional bleeding, right renal mass to be assessed as outpatient.      Past Medical History, Past Surgical  History, Social History, Family History have been reviewed and are without significant changes except as mentioned.    Review of Systems   A comprehensive 14 point review of systems was performed and was negative except as mentioned.    Medications:  The current medication list was reviewed in the EMR    ALLERGIES:  No Known Allergies    Objective      Vitals:    11/09/24 1856 11/09/24 2253 11/10/24 0708 11/10/24 0857   BP: 136/62 116/50 120/55    BP Location:  Right arm Left arm    Patient Position:  Lying Lying    Pulse: 113 102 112 92   Resp: 16 16 16 16   Temp: 98.1 °F (36.7 °C) 97.8 °F (36.6 °C) 97.9 °F (36.6 °C)    TempSrc: Oral Oral Oral    SpO2: 97% 96% 94%    Weight:       Height:              No data to display                Physical Exam    Constitutional:       Appearance: He is obese.      Comments: Weak Appearing male, alert   HENT:      Nose: Nose normal.      Mouth/Throat:      Mouth: Mucous membranes are moist.      Pharynx: Oropharynx is clear.   Eyes:      Extraocular Movements: Extraocular movements intact.      Conjunctiva/sclera: Conjunctivae normal.      Pupils: Pupils are equal, round, and reactive to light.   Cardiovascular:      Rate and Rhythm: Regular rhythm. Tachycardia present.      Pulses: Normal pulses.      Heart sounds: Normal heart sounds.   Pulmonary:      Effort: Pulmonary effort is normal.      Breath sounds: Normal breath sounds.   Abdominal:      General: There is distension.      Palpations: There is no mass.      Tenderness: There is abdominal tenderness. There is no guarding or rebound.      Hernia: No hernia is present.      Comments: Signs reduced, ostomy intact and functioning   Musculoskeletal:         General: Normal range of motion.      Cervical back: Normal range of motion and neck supple.      Right lower leg: No edema (Trace, no palpable cord).      Left lower leg: No edema.   Neurological:      General: No focal deficit present.      Mental Status: He is  oriented to person, place, and time.   Psychiatric:         Mood and Affect: Mood normal.   RECENT LABS:  Hematology WBC   Date Value Ref Range Status   11/10/2024 19.04 (H) 3.40 - 10.80 10*3/mm3 Final     RBC   Date Value Ref Range Status   11/10/2024 3.43 (L) 4.14 - 5.80 10*6/mm3 Final     Hemoglobin   Date Value Ref Range Status   11/10/2024 9.3 (L) 13.0 - 17.7 g/dL Final     Hematocrit   Date Value Ref Range Status   11/10/2024 30.5 (L) 37.5 - 51.0 % Final     Platelets   Date Value Ref Range Status   11/10/2024 1,089 (C) 140 - 450 10*3/mm3 Final              Assessment & Plan     Septic shock from perforated bowel from underlying malignancy  Subsequent abdominal cultures positive for Pseudomonas, E. coli and mixed anaerobes, negative blood cultures  Patient now more likely, completed antibiotic therapies     *Synchronous colon cancers  Both malignancies are T3 N0-stage IIA-and as we consider his concurrent renal mass and performance status, adjuvant chemotherapy must be considered particularly with invasion of the descending colon mass into pericolonic and perirectal tissue-apparently the the site of perforation.  These issues discussed with general surgery.  MSI status-negative by IHC, PCR-microsatellite stable.  This is particularly useful considering the patient's family history and findings of synchronous colon cancer.  Plan follow-up to determine whether he would be a candidate for adjuvant therapy but we would have to clarify/treat his renal mass initially.     *Anemia  Suspected multifactorial anemia, additional laboratory studies consistent with anemia of chronic disease  Transfusion anticipated 11/9/2024  Assessment 11/10/2024 H9.3 and 30.5       *Leukocytosis  Agree that this is multifactorial but notedly right shifted with predominant neutrophilia and no immature forms.  This is a reactive leukocytosis usually related to concurrent physiologic stress and infection.  Continues to be  variable-reflective of underlying processes including likely additional malignancy     *Thrombocytosis  This is also reactive developing gradually postoperative and does not need to be addressed with cytoreductive medications.  Additional laboratory studies per iron deficiency were completed and found to be negative  Continue to monitor, gradually improving        *Urinary retention, findings of right renal mass  Seen by urology, voiding trial, outpatient scans for follow-up of renal mass        *Acute right lower extremity DVT in gastrocnemius and soleal  Patient now on anticoagulation with Lovenox appropriately.  We will follow and transition to DOAC therapy as he further recovers.       11/10/2024

## 2024-11-10 NOTE — PLAN OF CARE
Goal Outcome Evaluation:      Patient rested well during the night and his wife stayed with him to bedside until early am. around 0400. He continues with tube feedings at a goal rate of  55cc/hr and 30ml Q 6 hours water flush, no residual noted. Placement to right nare is secured with a nasal septal tie. No signs or symptoms of skin breakdown or sores. Lungs are clear to diminished bilaterally without coughing noted. He continues to have a functioning colostomy with loose light brown stool collecting and surrounding skin areas are clean and intact with exception to his midline surgical incision. The midline incision has been cleansed, packed as ordered and a dry dressing applied. He is alert, oriented, and sleepy with some increased edema noted around his torso and hip areas. F/C to bedside drainage with straw colored urine that also has some rust colored sediment and pink staining to his F/C tube. Urine collected and sent  for evaluation per on call physician.

## 2024-11-10 NOTE — PROGRESS NOTES
REASON FOR FOLLOWUP/CHIEF COMPLAINT:  Thrombocytosis, DVT, colon cancer     HISTORY OF PRESENT ILLNESS:   Slow recovery from his colon surgery.  Feeding tube remains.  No new events overnight    Past Medical History, Past Surgical History, Social History, Family History have been reviewed and are without significant changes except as mentioned.    Review of Systems   Review of Systems   Constitutional:  Negative for activity change.   HENT:  Negative for nosebleeds and trouble swallowing.    Respiratory:  Negative for shortness of breath and wheezing.    Cardiovascular:  Negative for chest pain and palpitations.   Gastrointestinal:  Negative for constipation, diarrhea and nausea.   Genitourinary:  Negative for dysuria and hematuria.   Musculoskeletal:  Negative for arthralgias and myalgias.   Neurological:  Negative for seizures and syncope.   Hematological:  Negative for adenopathy. Does not bruise/bleed easily.   Psychiatric/Behavioral:  Negative for confusion.        Medications:  The current medication list was reviewed in the EMR    ALLERGIES:  No Known Allergies           Vitals:    11/09/24 2253 11/10/24 0708 11/10/24 0857 11/10/24 1231   BP: 116/50 120/55  128/62   BP Location: Right arm Left arm  Left arm   Patient Position: Lying Lying  Lying   Pulse: 102 112 92 117   Resp: 16 16 16 16   Temp: 97.8 °F (36.6 °C) 97.9 °F (36.6 °C)  97.7 °F (36.5 °C)   TempSrc: Oral Oral  Oral   SpO2: 96% 94%  97%   Weight:       Height:         Physical Exam    CONSTITUTIONAL:  Vital signs reviewed.  No distress, looks comfortable.  EYES:  Conjunctivae and lids unremarkable.  PERRLA  EARS, NOSE, MOUTH, THROAT:  Ears and nose appear unremarkable.  Lips, teeth, gums appear unremarkable.  RESPIRATORY:  Normal respiratory effort.  Lungs clear to auscultation bilaterally.  CARDIOVASCULAR:  Normal S1, S2.  No murmurs, rubs or gallops.  No significant lower extremity edema.  GASTROINTESTINAL: Abdomen appears unremarkable.   Nontender.  No hepatomegaly.  No splenomegaly.  NEURO: Cranial nerves 2-12 grossly intact.  No focal deficits.  Appears to have equal strength all 4 extremities.  MUSCULOSKELETAL:  Unremarkable digits/nails.  No cyanosis or clubbing.  SKIN:  Warm.  No rashes.  PSYCHIATRIC:  Normal judgment and insight.  Normal mood and affect.      RECENT LABS:  WBC   Date Value Ref Range Status   11/10/2024 19.04 (H) 3.40 - 10.80 10*3/mm3 Final   11/09/2024 20.47 (H) 3.40 - 10.80 10*3/mm3 Final   11/08/2024 18.92 (H) 3.40 - 10.80 10*3/mm3 Final     Hemoglobin   Date Value Ref Range Status   11/10/2024 9.3 (L) 13.0 - 17.7 g/dL Final   11/09/2024 7.3 (L) 13.0 - 17.7 g/dL Final   11/08/2024 7.8 (L) 13.0 - 17.7 g/dL Final     Platelets   Date Value Ref Range Status   11/10/2024 1,089 (C) 140 - 450 10*3/mm3 Final   11/09/2024 1,184 (C) 140 - 450 10*3/mm3 Final   11/08/2024 1,244 (C) 140 - 450 10*3/mm3 Final       ASSESSMENT/PLAN:  Arsh Haynes 3105/1      Septic shock from perforated bowel from underlying malignancy  Subsequent abdominal cultures positive for Pseudomonas, E. coli and mixed anaerobes, negative blood cultures  Completed antibiotics     *Synchronous colon cancers (both T3N0)  Both malignancies are T3 N0-stage IIA-and as we consider his concurrent renal mass and performance status, adjuvant chemotherapy must be considered particularly with invasion of the descending colon mass into pericolonic and perirectal tissue-apparently the the site of perforation.  These issues discussed with general surgery.  MSI status-negative by IHC, PCR-microsatellite stable.  This is particularly useful considering the patient's family history and findings of synchronous colon cancer.  Plan follow-up to determine whether he would be a candidate for adjuvant therapy but we would have to clarify/treat his renal mass initially.     *Anemia  Suspected multifactorial anemia, additional laboratory studies consistent with anemia of chronic  disease  Transfusion anticipated 11/9/2024  Assessment 11/10/2024 H9.3 and 30.5  Hb 9.7      *Leukocytosis  Agree that this is multifactorial but notedly right shifted with predominant neutrophilia and no immature forms.  This is a reactive leukocytosis usually related to concurrent physiologic stress and infection.  Continues to be variable-reflective of underlying processes including likely additional malignancy  WBC 14.8     *Thrombocytosis  This is also reactive developing gradually postoperative and does not need to be addressed with cytoreductive medications.  Additional laboratory studies per iron deficiency were completed and found to be negative  Continue to monitor, gradually improving  PLT 1040     *Urinary retention, findings of right renal mass  Seen by urology, voiding trial, outpatient scans for follow-up of renal mass  CT abdomen renal mass protocol (ordered by urology), 11/11/2024: Subtle heterogenous mass upper pole right kidney, 2 x 1.8 cm, solid appearance.  Also, indeterminate 1 cm focus upper pole right kidney.  Left kidney 1.2 cm low-attenuation focus suspected to be a benign cyst.  Left kidney 1.2 cm focus slightly inferiorly, indeterminate.  Radiologist recommended renal mass protocol CT of the abdomen in 6 months for follow-up.     *Acute right lower extremity DVT in gastrocnemius and soleal  Patient now on anticoagulation with Lovenox appropriately.  We will follow and transition to DOAC therapy as he further recovers.     Plan  On Lovenox for right leg DVT.  When ready for oral anticoagulants per other physicians, I usually prefer Eliquis, but any would be reasonable  Two separate synchronous stage II colon cancers, but presented with perforation so higher risk.  Can follow-up with Dr. Serra as an outpatient to determine if adjuvant chemotherapy would be appropriate.  He needs some time to recover from septic shock from bowel perforation from underlying malignancy.  He is currently  slowly recovering with a feeding tube in place.  Dr. Oshea ordered CT abdomen renal mass protocol today and this has been read.  Await his recommendations

## 2024-11-10 NOTE — PROGRESS NOTES
Nayana Whitaker MD     Orthopedic Progress Note    Subjective :   Patient reports that his right knee pain is feel better.  Did order x-rays yesterday.  Of his shoulder.    Objective :    Vital signs in last 24 hours:  Temp:  [97.4 °F (36.3 °C)-98.2 °F (36.8 °C)] 97.9 °F (36.6 °C)  Heart Rate:  [] 92  Resp:  [16-18] 16  BP: (111-136)/(50-63) 120/55  Vitals:    11/09/24 1856 11/09/24 2253 11/10/24 0708 11/10/24 0857   BP: 136/62 116/50 120/55    BP Location:  Right arm Left arm    Patient Position:  Lying Lying    Pulse: 113 102 112 92   Resp: 16 16 16 16   Temp: 98.1 °F (36.7 °C) 97.8 °F (36.6 °C) 97.9 °F (36.6 °C)    TempSrc: Oral Oral Oral    SpO2: 97% 96% 94%    Weight:       Height:           PHYSICAL EXAM:  Patient today is able to extend his right knee to 0 flex to 90.  Reports pain is improved.  Extreme limited range of motion with palpable crepitus on his right shoulder.  Neurovascular intact distally    LABS:  Results from last 7 days   Lab Units 11/10/24  0509   WBC 10*3/mm3 19.04*   HEMOGLOBIN g/dL 9.3*   HEMATOCRIT % 30.5*   PLATELETS 10*3/mm3 1,089*     Results from last 7 days   Lab Units 11/10/24  0455   SODIUM mmol/L 135*   POTASSIUM mmol/L 3.8   CHLORIDE mmol/L 107   CO2 mmol/L 16.9*   BUN mg/dL 10   CREATININE mg/dL 0.44*   GLUCOSE mg/dL 175*   CALCIUM mg/dL 7.7*           Intake/Output                         11/08/24 0701 - 11/09/24 0700 11/09/24 0701 - 11/10/24 0700     [id number] 9795-8207 Total 8872-6238 1188-5580 Total                 Intake    P.O.  --  -- --  0  -- 0    Blood  --  -- --  600  -- 600    Volume (Transfuse RBC Infuse Each Unit Over: 3.5H) -- -- -- 300 -- 300    Volume (Transfuse RBC Infuse Each Unit Over: 3.5H) -- -- -- 300 -- 300    Other  --  30 30  --  -- --    Flush/ Irrigation Intake (mL) (NG/OG Tube Right nostril) -- 30 30 -- -- --    NG/GT  --  160 160  --  -- --    Total Intake -- 190 190 600 -- 600       Output    Urine  1901 947 2442     Stool   925  75 1000  450  175 625    Total Output 2825 700 3525 1565 073 3075             ASSESSMENT:  Right knee and right shoulder pain    Plan:  Discussed with patient once again low threshold for septic knee secondary to good range of motion.  I did review the x-rays that were out of his shoulder yesterday patient has a severe glenohumeral arthritis.  Ultimately he be a candidate for a shoulder replacement he needs to get his medical problems under they.  He would like a steroid injection I will see if we are able to do this in the hospital.  I will see him  as an outpatient for his right knee and right shoulder.  Nayana Whitaker MD    Date: 11/10/2024  Time: 09:26 EST    Nayana Whitaker MD  Orthopaedic Surgeon  [facility]

## 2024-11-10 NOTE — THERAPY TREATMENT NOTE
Patient Name: Arsh Haynes  : 1950    MRN: [id number]                              Today's Date: 11/10/2024       Admit Date: 10/26/2024    Visit Dx:     ICD-10-CM ICD-9-CM   1. Perforation of sigmoid colon  K63.1 569.83   2. Hypotension, unspecified hypotension type  I95.9 458.9   3. Increased lactic acid level  R79.89 276.2   4. Bowel perforation  K63.1 569.83     Patient Active Problem List   Diagnosis    Colon cancer    HTN (hypertension)    Thrombocytosis    Acute DVT (deep venous thrombosis)    Renal mass    Hyponatremia    Anemia    Peritonitis    Moderate protein-calorie malnutrition     Past Medical History:   Diagnosis Date    HTN (hypertension)      Past Surgical History:   Procedure Laterality Date    COLON RESECTION N/A 10/26/2024    Procedure: LOW ANTERIOR COLON RESECTION SIGMOID, COLOSTOMY, SPLENIC FLEXURE MOBILIZATION;  Surgeon: Mc Guillaume MD;  Location: [facility];  Service: General;  Laterality: N/A;    EXPLORATORY LAPAROTOMY N/A 10/26/2024    Procedure: LAPAROTOMY EXPLORATORY, LEFT HEMICOLECTOMY;  Surgeon: Mc Guillaume MD;  Location: [facility];  Service: General;  Laterality: N/A;      General Information       Row Name 11/10/24 1124          Physical Therapy Time and Intention    Document Type therapy note (daily note)  -EF     Mode of Treatment individual therapy;physical therapy  -EF       Row Name 11/10/24 1124          General Information    Existing Precautions/Restrictions fall;NPO  -EF     Barriers to Rehab medically complex  -EF       Row Name 11/10/24 1124          Cognition    Orientation Status (Cognition) oriented x 3  -EF       Row Name 11/10/24 1124          Safety Issues/Impairments Affecting Functional Mobility    Impairments Affecting Function (Mobility) strength;balance;endurance/activity tolerance;coordination;pain;postural/trunk control;range of motion (ROM);cognition  -EF               User Key  (r) = Recorded By, (t) = Taken By,  (c) = Cosigned By      Initials Name Provider Type    EF Sabine Tineo, PT Physical Therapist                   Mobility       Row Name 11/10/24 1124          Bed Mobility    Supine-Sit Bonner (Bed Mobility) maximum assist (25% patient effort);2 person assist;verbal cues  -EF     Sit-Supine Bonner (Bed Mobility) maximum assist (25% patient effort);2 person assist;verbal cues  -EF     Assistive Device (Bed Mobility) head of bed elevated;repositioning sheet;bed rails  -EF     Comment, (Bed Mobility) cues for sequencing; R shoulder pain limiting  -EF       Row Name 11/10/24 1124          Sit-Stand Transfer    Sit-Stand Bonner (Transfers) maximum assist (25% patient effort);2 person assist;verbal cues  -EF     Comment, (Sit-Stand Transfer) STS x1 from elevated bed surface with B HHA. Pt able to clear EOB but unable to achieve full stand. Declined further attempts at tranfers due to fatigue.  -EF       Row Name 11/10/24 1124          Gait/Stairs (Locomotion)    Bonner Level (Gait) unable to assess  -EF               User Key  (r) = Recorded By, (t) = Taken By, (c) = Cosigned By      Initials Name Provider Type    EF Sabine Tineo, PT Physical Therapist                   Obj/Interventions    No documentation.                  Goals/Plan    No documentation.                  Clinical Impression       Row Name 11/10/24 1125          Pain    Pretreatment Pain Rating 8/10  -EF     Posttreatment Pain Rating 8/10  -EF     Pain Location shoulder  -EF     Pain Side/Orientation right  -EF     Pain Management Interventions positioning techniques utilized  RN applied pain patch to R shoulder during therapy session  -EF     Response to Pain Interventions no change per patient report  -EF       Row Name 11/10/24 1125          Plan of Care Review    Plan of Care Reviewed With patient  -EF     Progress no change  -EF     Outcome Evaluation Pt agreeable to PT and able to maintain activity level during  session today. Pt continues to be limited by R shoulder pain, weakness, fatigue, impaired trunk control, and impaired balance. Pt required max A x2 with sitting balance and max A x2 for STS transfer from EOB. Pt able to clear EOB but unable to achieve full upright standing posture due to B LE weakness and impaired postural control. Pt fatigued after standing trial and requested to lie back down. PT will continue to follow for ongoing strengthening and advancement of mobility. Rec DC to SNF.  -EF       Row Name 11/10/24 1125          Therapy Assessment/Plan (PT)    Therapy Frequency (PT) 5 times/wk  -EF       Row Name 11/10/24 1125          Positioning and Restraints    Pre-Treatment Position in bed  -EF     Post Treatment Position bed  -EF     In Bed fowlers;call light within reach;encouraged to call for assist;exit alarm on;RUE elevated;LUE elevated;notified nsg;legs elevated  -EF               User Key  (r) = Recorded By, (t) = Taken By, (c) = Cosigned By      Initials Name Provider Type    Sabine Butt, PT Physical Therapist                   Outcome Measures       Row Name 11/10/24 1127 11/10/24 0822       How much help from another person do you currently need...    Turning from your back to your side while in flat bed without using bedrails? 2  -EF 2  -LH    Moving from lying on back to sitting on the side of a flat bed without bedrails? 2  -EF 2  -LH    Moving to and from a bed to a chair (including a wheelchair)? 1  -EF 3  -LH    Standing up from a chair using your arms (e.g., wheelchair, bedside chair)? 2  -EF 2  -LH    Climbing 3-5 steps with a railing? 1  -EF 1  -LH    To walk in hospital room? 1  -EF 2  -LH    AM-PAC 6 Clicks Score (PT) 9  -EF 12  -LH    Highest Level of Mobility Goal 3 --> Sit at edge of bed  -EF 4 --> Transfer to chair/commode  -      Row Name 11/10/24 0000          How much help from another person do you currently need...    Turning from your back to your side while in  flat bed without using bedrails? 2  -MS     Moving from lying on back to sitting on the side of a flat bed without bedrails? 2  -MS     Moving to and from a bed to a chair (including a wheelchair)? 3  -MS     Standing up from a chair using your arms (e.g., wheelchair, bedside chair)? 2  -MS     Climbing 3-5 steps with a railing? 1  -MS     To walk in hospital room? 2  -MS     AM-PAC 6 Clicks Score (PT) 12  -MS     Highest Level of Mobility Goal 4 --> Transfer to chair/commode  -MS               User Key  (r) = Recorded By, (t) = Taken By, (c) = Cosigned By      Initials Name Provider Type     Sabine Tineo, DOTTIE Physical Therapist    Adeola Newton RN Registered Nurse    Micki Myers RN Registered Nurse                                 Physical Therapy Education       Title: PT OT SLP Therapies (Done)       Topic: Physical Therapy (Done)       Point: Mobility training (Done)       Learning Progress Summary            Patient Acceptance, E, NR,VU by ADRIA at 11/10/2024 1127    Acceptance, E, VU by JS at 11/8/2024 1820    Acceptance, E,D, VU,NR by MG at 11/8/2024 1459    Acceptance, E, VU by CW at 11/2/2024 1622                      Point: Home exercise program (Done)       Learning Progress Summary            Patient Acceptance, E, VU by JS at 11/8/2024 1820                      Point: Body mechanics (Done)       Learning Progress Summary            Patient Acceptance, E, NR,VU by EF at 11/10/2024 1127    Acceptance, E, VU by JS at 11/8/2024 1820    Acceptance, E,D, VU,NR by MG at 11/8/2024 1459    Acceptance, E, VU by CW at 11/2/2024 1622                      Point: Precautions (Done)       Learning Progress Summary            Patient Acceptance, E, VU by JS at 11/8/2024 1820    Acceptance, E,D, VU,NR by MG at 11/8/2024 1459    Acceptance, E, VU by CW at 11/2/2024 1622                                      User Key       Initials Effective Dates Name Provider Type [facility] 05/31/24 -  Noman  Sabine, PT Physical Therapist PT    MG 05/24/22 -  Liv Cruz, PT Physical Therapist PT    CW 12/13/22 -  Lisa Paulson, PT Physical Therapist PT    JS 02/26/24 -  Johnson Morales, RN Registered Nurse Nurse                  PT Recommendation and Plan     Progress: no change  Outcome Evaluation: Pt agreeable to PT and able to maintain activity level during session today. Pt continues to be limited by R shoulder pain, weakness, fatigue, impaired trunk control, and impaired balance. Pt required max A x2 with sitting balance and max A x2 for STS transfer from EOB. Pt able to clear EOB but unable to achieve full upright standing posture due to B LE weakness and impaired postural control. Pt fatigued after standing trial and requested to lie back down. PT will continue to follow for ongoing strengthening and advancement of mobility. Rec DC to SNF.     Time Calculation:         PT Charges       Row Name 11/10/24 1128             Time Calculation    Start Time 1039  -EF      Stop Time 1053  -EF      Time Calculation (min) 14 min  -EF      PT Received On 11/10/24  -EF      PT - Next Appointment 11/11/24  -EF         Time Calculation- PT    Total Timed Code Minutes- PT 14 minute(s)  -EF         Timed Charges    37895 - PT Therapeutic Activity Minutes 14  -EF         Total Minutes    Timed Charges Total Minutes 14  -EF       Total Minutes 14  -EF                User Key  (r) = Recorded By, (t) = Taken By, (c) = Cosigned By      Initials Name Provider Type    EF Sabine Tineo, PT Physical Therapist                  Therapy Charges for Today       Code Description Service Date Service Provider Modifiers Qty    39281519983 HC PT THERAPEUTIC ACT EA 15 MIN 11/10/2024 Sabine Tineo, PT GP 1    19186796350 HC PT THER SUPP EA 15 MIN 11/10/2024 Sabine Tineo, PT GP 1            PT G-Codes  Outcome Measure Options: AM-PAC 6 Clicks Daily Activity (OT)  AM-PAC 6 Clicks Score (PT): 9  AM-PAC 6 Clicks Score (OT):  9  Modified Wythe Scale: 5 - Severe disability.  Bedridden, incontinent, and requiring constant nursing care and attention.  PT Discharge Summary  Anticipated Discharge Disposition (PT): skilled nursing facility    Sabine Tineo, PT  11/10/2024

## 2024-11-10 NOTE — PROGRESS NOTES
Postoperative day 14 status post left colectomy with colostomy    S: No evidence overnight.  Feeling fair.  Tolerating tube feeds    O:   Vitals:    11/09/24 2253 11/10/24 0708 11/10/24 0857 11/10/24 1231   BP: 116/50 120/55  128/62   BP Location: Right arm Left arm  Left arm   Patient Position: Lying Lying  Lying   Pulse: 102 112 92 117   Resp: 16 16 16 16   Temp: 97.8 °F (36.6 °C) 97.9 °F (36.6 °C)  97.7 °F (36.5 °C)   TempSrc: Oral Oral  Oral   SpO2: 96% 94%  97%   Weight:       Height:          Urine 1.5 L, ostomy output 625  Alert, no acute distress  Chronically ill-appearing  Abdomen soft, nontender, midline wound with dressing clean dry and intact, ostomy pink and viable    White cell count 19,000, downtrending, hemoglobin 9.3  Platelets 2000    Assessment and plan    Postoperative day 14 status post left colectomy with colostomy.  Dysphagia.  Tolerating tube feeds, leukocytosis without signs of infection    Continue working with speech therapy  Tube feeds  Leukocytosis, no signs of infection, will continue to monitor  DVT, on full anticoagulation    Mc Guillaume MD, FACS  General, Minimally Invasive and Endoscopic Surgery  [facility]    [address], [address]  [place], [place], [address]  P: [phone]  F: [phone]

## 2024-11-10 NOTE — CONSULTS
Arsh Haynes  [id number]    Urology Consult note    Referring provider: Sarah Segura MD    CC/reason for consult:  Gross hematuria    HPI: Pt not known to [facility], initially presented to [facility] ED 10/26/24 for abd pain fever and chills. Imaging noted free air in distal and proximal sigmoid colon and right renal mass. Pt s/p open hemicolectomy 10/26/24 with florez pouch and end colostomy. Catheter and flomax then terazosin BID starting 11/03/24. Gross hematuria with clots starting 11/10/24 am.    ROS:   Review of Systems -   Psych: no depression, no anxiety  HEENT: normal vision, no runny nose  Pulm: no shortness of air, no cough/wheeze  Endocrine: no excess thirst, no polyuria.    Cardiovascular ROS: no chest pain or dyspnea on exertion  Gastrointestinal ROS: no abdominal pain, change in bowel habits, or black or bloody stools.   Genito-Urinary ROS: no dysuria or urgency  Musculoskeletal ROS: no chronic muscle or joint aches, normal range of motion  Neurological ROS: no lateralizing weakness, no chronic HA  Dermatological ROS: no rashes, no skin lesions.   Heme: no easy bleeding, no easy bruising. Takes sq lovenox blood thinners.     Past Medical History:   Diagnosis Date    HTN (hypertension)        Past Surgical History:   Procedure Laterality Date    COLON RESECTION N/A 10/26/2024    Procedure: LOW ANTERIOR COLON RESECTION SIGMOID, COLOSTOMY, SPLENIC FLEXURE MOBILIZATION;  Surgeon: Mc Guillaume MD;  Location[facility];  Service: General;  Laterality: N/A;    EXPLORATORY LAPAROTOMY N/A 10/26/2024    Procedure: LAPAROTOMY EXPLORATORY, LEFT HEMICOLECTOMY;  Surgeon: Mc Guillaume MD;  Location[facility];  Service: General;  Laterality: N/A;       No current facility-administered medications on file prior to encounter.     Current Outpatient Medications on File Prior to Encounter   Medication Sig Dispense Refill    amLODIPine-benazepril (LOTREL) 10-20 MG per capsule Take 1 capsule  by mouth Daily.      metoprolol succinate XL (TOPROL-XL) 100 MG 24 hr tablet Take 1 tablet by mouth Daily.         Current Facility-Administered Medications   Medication Dose Route Frequency Provider Last Rate Last Admin    acetaminophen (TYLENOL) tablet 650 mg  650 mg Oral Q4H PRN Mc Guillaume MD   650 mg at 11/08/24 1536    Or    acetaminophen (TYLENOL) suppository 650 mg  650 mg Rectal Q4H PRN Mc Guillaume MD        Calcium Replacement - Follow Nurse / BPA Driven Protocol   Does not apply PRN Mc Guillaume MD        chlorhexidine (PERIDEX) 0.12 % solution 15 mL  15 mL Mouth/Throat Q12H Alberto Lerner MD   15 mL at 11/09/24 2032    dextrose (D50W) (25 g/50 mL) IV injection 25 g  25 g Intravenous Q15 Min PRN Mc Guillaume MD   25 g at 10/28/24 0025    dextrose (GLUTOSE) oral gel 15 g  15 g Oral Q15 Min PRN Mc Guillaume MD        Enoxaparin Sodium (LOVENOX) syringe 90 mg  1 mg/kg Subcutaneous Q12H Darek Lerner MD   90 mg at 11/10/24 0540    folic acid (FOLVITE) tablet 1 mg  1 mg Nasogastric Daily Alberto Lerner MD   1 mg at 11/09/24 0920    glucagon (GLUCAGEN) injection 1 mg  1 mg Intramuscular Q15 Min PRN Mc Guillaume MD        HYDROcodone-acetaminophen (NORCO) 5-325 MG per tablet 1 tablet  1 tablet Oral Q6H PRN Sarah Segura MD   1 tablet at 11/10/24 0540    insulin glargine (LANTUS, SEMGLEE) injection 10 Units  10 Units Subcutaneous Nightly Sarah Segura MD   10 Units at 11/09/24 2033    insulin regular (humuLIN R,novoLIN R) injection 2-9 Units  2-9 Units Subcutaneous 4x Daily AC & at Bedtime Coy Fagan MD   4 Units at 11/09/24 2034    labetalol (NORMODYNE,TRANDATE) injection 20 mg  20 mg Intravenous Q10 Min PRN Mc Guillaume MD   20 mg at 11/03/24 0054    lansoprazole (PREVACID SOLUTAB) disintegrating tablet Tablet Delayed Release Dispersible 30 mg  30 mg Oral Q AM Gabby Finn APRN   30 mg  at 11/10/24 0536    Magnesium Standard Dose Replacement - Follow Nurse / BPA Driven Protocol   Does not apply PRN Mc Guillaume MD        metoprolol tartrate (LOPRESSOR) tablet 50 mg  50 mg Oral Q12H Gabby Finn APRN   50 mg at 11/09/24 2033    nitroglycerin (NITROSTAT) SL tablet 0.4 mg  0.4 mg Sublingual Q5 Min PRN Mc Guillaume MD        ondansetron (ZOFRAN) injection 4 mg  4 mg Intravenous Q6H PRN Mc Guillaume MD        Phosphorus Replacement - Follow Nurse / BPA Driven Protocol   Does not apply PRN Mc Guillaume MD        Potassium Replacement - Follow Nurse / BPA Driven Protocol   Does not apply PRN Mc Guillaume MD        sodium chloride 0.9 % flush 10 mL  10 mL Intravenous PRN Mc Guillaume MD        sodium chloride 0.9 % flush 10 mL  10 mL Intravenous Q12H Mc Guillaume MD   10 mL at 11/09/24 2034    sodium chloride 0.9 % flush 10 mL  10 mL Intravenous PRN Mc Guillaume MD        sodium chloride 0.9 % flush 10 mL  10 mL Intravenous PRN Arnulfo Lynch Jr., MD        sodium chloride 0.9 % flush 20 mL  20 mL Intravenous PRN Arnulfo Lynch Jr., MD        sodium chloride 0.9 % infusion  100 mL/hr Intravenous Continuous Sarah Segura  mL/hr at 11/09/24 2035 100 mL/hr at 11/09/24 2035    sodium hypochlorite (DAKIN'S 1/4 STRENGTH) 0.125 % topical solution 0.125% solution   Topical BID Mc Guillaume MD   Given at 11/09/24 2035    terazosin (HYTRIN) capsule 2 mg  2 mg Nasogastric Daily Darek Lerner MD   2 mg at 11/09/24 2033    thiamine (VITAMIN B-1) tablet 100 mg  100 mg Nasogastric Daily Arnulfo Lynch Jr., MD   100 mg at 11/09/24 0920       No Known Allergies    Social History     Socioeconomic History    Marital status:    Tobacco Use    Smoking status: Never    Smokeless tobacco: Never   Vaping Use    Vaping status: Never Used   Substance and Sexual Activity     Alcohol use: Yes     Alcohol/week: 42.0 standard drinks of alcohol     Types: 42 Cans of beer per week     Comment: 6 beer daily    Drug use: Never    Sexual activity: Defer       History reviewed. No pertinent family history.    Exam:   Gen: Afeb, VSS, NAD  HEENT: NCAT, normal conjunctivae  Psych: normal mood, normal affect  Resp: unlabored breathing  CV: RRR, no c/c/e  Abd: soft/nt/nd  Ext: moves all extremities well, no calf tenderness  Skin: no rashes or lesions on exposed skin.   : patent cath, no abd/pelvic pain, clear urine    Labs and imaging reviewed  Pertinent in HPI    Narrative & Impression   CT OF THE ABDOMEN PELVIS WITH CONTRAST     HISTORY: Abdominal pain     COMPARISON: Lower abdominal     TECHNIQUE: Axial CT imaging was obtained through the abdomen and pelvis.  No IV contrast was administered.     FINDINGS:  Images through the lung bases demonstrate scarring versus atelectasis.  No suspicious hepatic lesions are seen. Stomach appears mildly  distended. The duodenum,, spleen, and gallbladder all appear normal. The  pancreas is atrophic. The adrenal glands are mildly nodular in  appearance. The kidneys enhance symmetrically. No hydronephrosis is  seen. The patient has a heterogeneously enhancing lesion involving the  right kidney, which measures up to 3.4 x 3.1 cm. Appearance is highly  worrisome for renal malignancy. There is a 5 mm nonobstructing stone  within the inferior pole of the left kidney. Tiny low-attenuation  lesions are seen within the left kidney, favored to reflect cysts. There  are aortoiliac calcifications. Prostate gland contains dystrophic  calcifications. There is colonic diverticulosis. There is diffuse wall  thickening involving the distal descending colon/proximal sigmoid colon,  along with more focal masslike areas associated with the distal  descending colon and proximal sigmoid colon. The area within the  proximal sigmoid colon measures up to 4.8 x 4.7 cm. The area  within the  distal descending colon measures at least 3.7 x 3.1 cm. There is  widespread pneumoperitoneum. This is favored to be secondary to  perforation at the descending colon/sigmoid colon junction. The patient  is noted to have some loculated fluid adjacent to the distal descending  colon/proximal sigmoid colon. This may represent reactive fluid,  although abscess is not excluded. Further free fluid is noted tracking  along the paracolic gutters bilaterally, and extending into the pelvis.  Urinary bladder appears thick walled. Correlation with urinalysis and  urine cultures is recommended. The appendix is normal. No acute osseous  abnormalities are seen.     IMPRESSION:     1. The patient has widespread pneumoperitoneum, which is favored to be  secondary to a perforation at the junction of the distal descending  colon and proximal sigmoid colon. The patient does have diverticular  disease, and the appearance may be related to perforated diverticulitis.  There is also relatively increased stool burden within the sigmoid  colon, and stercoral colitis would be another consideration. However,  there are more masslike areas of wall thickening noted within the distal  descending colon and proximal sigmoid colon. The area involving the  proximal sigmoid colon in particular is low in attenuation. These areas  may reflect areas of phlegmonous involvement and/or retained stool of  the wall of the colon. However, neoplasm is not excluded. There is free  fluid which is noted tracking along the paracolic gutters, and within  the pelvis, with more loculated fluid adjacent to the presumed area of  perforation. These areas may reflect reactive fluid, although abscess is  not excluded. Urgent General surgical consultation is recommended.  2. Heterogeneously enhancing mass arising from the right kidney,  suspicious for renal neoplasm.  3. Thick-walled appearance to the urinary bladder. Correlation with  urinalysis and urine  cultures is recommended.        CT ABDOMEN PELVIS WO CONTRAST-     INDICATION: Lower abdominal pressure, dysuria     COMPARISON: CT abdomen pelvis October 26, 2024     TECHNIQUE:  Routine CT abdomen/pelvis without IV contrast. Coronal and sagittal  reformats. Radiation dose reduction techniques were utilized, including  automated exposure control and exposure modulation based on body size.     FINDINGS:      Lung bases: Moderate right and small left pleural effusions. Right  middle lobe and right lower lobe collapse. Partial atelectasis in the  left lower lobe. Coronary artery atherosclerotic calcifications.     ABDOMEN: No liver mass identified. Vicarious excretion of contrast  within gallbladder. No biliary ductal dilatation. Spleen is normal in  size. Moderate pancreatic atrophy. No pancreatic ductal dilatation or  mass seen. No adrenal nodules. Symmetric perinephric edema. Complex  cystic mass in the right mid kidney, series 2, axial mage 87, measuring  4.0 cm. Nonobstructing nephrolithiasis in the inferior pole left kidney,  series 2, axial mage 91, measuring 4 to 5 mm. No hydronephrosis or  ureterolithiasis.     Pelvis: Prostatomegaly with the prostate measuring 5.5 cm in right to  left dimension. [place] in the bladder. Gas in the bladder lumen is likely  after [place]. Bladder wall thickening, with under distention.     Bowel: Feeding tube tip is located in the duodenal bulb. No obstruction.  Partial colectomy with left lower quadrant end colostomy. Postoperative  inflammatory changes and ill-defined fluid in the pelvic wall adjacent  to the ostomy. Short segment of distal colon leading to the ostomy is  mildly thick walled, with under distention. Colonic diverticulosis.  Normal appendix. Larson's pouch. Right pelvic wall drain catheter  extends into the right upper quadrant beneath the diaphragm. Right  abdominal wall drain catheter extends into the pelvic cavity. Left  pelvic wall drain catheter extends  along the left paracolic gutter into  the left upper quadrant. Postoperative inflammatory changes seen in the  abdominal and pelvic cavity, without a drainable fluid collection  identified. Trace ill-defined fluid seen beneath the left lower quadrant  abdominal and pelvic wall.     Abdominal wall: Soft tissue defect seen in the abdominal and pelvic  wall, containing packing material, status post laparotomy. Body wall  edema/anasarca seen in the flanks.     Retroperitoneum: No lymphadenopathy.     Vasculature: No abdominal aortic aneurysm. Moderate aortoiliac  atherosclerotic calcification.     Osseous structures: No destructive osseous lesions..     IMPRESSION:     1. Interval partial colectomy with left lower quadrant end colostomy and  Larson's pouch present. Suspect some inflammation in a short segment of  distal colon leading to the ostomy. Inflammatory changes seen in the  abdominal and pelvic cavity with trace fluid seen beneath the left lower  quadrant abdominal/pelvic wall, without a postoperative drainable fluid  collection identified.  2. Moderate right and small left pleural effusions.  3. Right middle lobe and right lower lobe collapse.  4. 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  5. Nonobstructing left nephrolithiasis.  6. Colonic diverticulosis.  7. Mild prostatomegaly.  8. Mild bladder wall thickening. May be related to underdistention  and/or chronic outlet obstruction.    Assessment:    Gross hematuria  - new undiagnosed  - H/H 9.3/30.5  - recent RBC infusion  - Cr. 0.53 -> 0.44  - catheter manually irrigated at bedside, no blood no clots    Right renal mass  - stable    PLAN:   Irrigate catheter PRN gross hematuria and clots  Will f/u as OP regarding BPH and right renal mass    Shaan Ewing, APRN   Formerly [facility]  [phone]

## 2024-11-10 NOTE — PLAN OF CARE
Goal Outcome Evaluation:  Plan of Care Reviewed With: patient        Progress: no change  Outcome Evaluation: Pt agreeable to PT and able to maintain activity level during session today. Pt continues to be limited by R shoulder pain, weakness, fatigue, impaired trunk control, and impaired balance. Pt required max A x2 with sitting balance and max A x2 for STS transfer from EOB. Pt able to clear EOB but unable to achieve full upright standing posture due to B LE weakness and impaired postural control. Pt fatigued after standing trial and requested to lie back down. PT will continue to follow for ongoing strengthening and advancement of mobility. Rec DC to SNF.    Anticipated Discharge Disposition (PT): skilled nursing facility

## 2024-11-10 NOTE — PLAN OF CARE
Pt oriented vs stable R shoulder pain lidocaine patches applied. Tolerating TF well.       Problem: Skin Injury Risk Increased  Goal: Skin Health and Integrity  Outcome: Progressing  Intervention: Optimize Skin Protection  Recent Flowsheet Documentation  Taken 11/10/2024 1400 by dAeola Moralez RN  Activity Management: activity encouraged  Head of Bed (HOB) Positioning: HOB elevated  Taken 11/10/2024 1200 by Adeola Moralez RN  Head of Bed (HOB) Positioning: HOB elevated  Taken 11/10/2024 1000 by Adeola Moralez RN  Head of Bed (HOB) Positioning: HOB elevated  Taken 11/10/2024 0822 by Adeola Moralez RN  Activity Management: activity minimized  Pressure Reduction Techniques: weight shift assistance provided  Head of Bed (HOB) Positioning: HOB elevated  Pressure Reduction Devices: pressure-redistributing mattress utilized  Skin Protection: incontinence pads utilized     Problem: Fall Injury Risk  Goal: Absence of Fall and Fall-Related Injury  Outcome: Progressing  Intervention: Identify and Manage Contributors  Recent Flowsheet Documentation  Taken 11/10/2024 1400 by Adeola Moralez RN  Medication Review/Management: medications reviewed  Self-Care Promotion:   independence encouraged   BADL personal objects within reach   BADL personal routines maintained  Taken 11/10/2024 0822 by Adeola Moralez RN  Medication Review/Management: medications reviewed  Self-Care Promotion:   independence encouraged   BADL personal objects within reach   BADL personal routines maintained  Intervention: Promote Injury-Free Environment  Recent Flowsheet Documentation  Taken 11/10/2024 1400 by Adeola Moralez RN  Safety Promotion/Fall Prevention:   activity supervised   assistive device/personal items within reach   clutter free environment maintained   fall prevention program maintained   nonskid shoes/slippers when out of bed   room organization consistent   safety round/check completed  Taken 11/10/2024 0822 by Adeola Moralez  RN  Safety Promotion/Fall Prevention:   activity supervised   assistive device/personal items within reach   clutter free environment maintained   fall prevention program maintained   nonskid shoes/slippers when out of bed   room organization consistent   safety round/check completed     Problem: Adult Inpatient Plan of Care  Goal: Plan of Care Review  Outcome: Progressing  Goal: Patient-Specific Goal (Individualized)  Outcome: Progressing  Goal: Absence of Hospital-Acquired Illness or Injury  Outcome: Progressing  Intervention: Identify and Manage Fall Risk  Recent Flowsheet Documentation  Taken 11/10/2024 1400 by Adeola Moralez RN  Safety Promotion/Fall Prevention:   activity supervised   assistive device/personal items within reach   clutter free environment maintained   fall prevention program maintained   nonskid shoes/slippers when out of bed   room organization consistent   safety round/check completed  Taken 11/10/2024 0822 by Adeola Moralez RN  Safety Promotion/Fall Prevention:   activity supervised   assistive device/personal items within reach   clutter free environment maintained   fall prevention program maintained   nonskid shoes/slippers when out of bed   room organization consistent   safety round/check completed  Intervention: Prevent Skin Injury  Recent Flowsheet Documentation  Taken 11/10/2024 1400 by Adeola Moralez RN  Body Position: supine  Taken 11/10/2024 1200 by Adeola Moralez RN  Body Position: supine  Taken 11/10/2024 1000 by Adeola Moralez RN  Body Position: tilted  Taken 11/10/2024 0822 by Adeola Moralez RN  Body Position: supine  Skin Protection: incontinence pads utilized  Intervention: Prevent Infection  Recent Flowsheet Documentation  Taken 11/10/2024 1400 by Adeola Moralez RN  Infection Prevention:   rest/sleep promoted   single patient room provided  Taken 11/10/2024 0822 by Adeola Moralez RN  Infection Prevention:   single patient room provided   rest/sleep  promoted  Goal: Optimal Comfort and Wellbeing  Outcome: Progressing  Intervention: Provide Person-Centered Care  Recent Flowsheet Documentation  Taken 11/10/2024 1400 by Adeola Moralez RN  Trust Relationship/Rapport: care explained  Taken 11/10/2024 0822 by Adeola Moralez RN  Trust Relationship/Rapport:   care explained   thoughts/feelings acknowledged   reassurance provided   questions encouraged   questions answered   emotional support provided  Goal: Readiness for Transition of Care  Outcome: Progressing     Problem: Malnutrition  Goal: Improved Nutritional Intake  Outcome: Progressing     Problem: Sepsis/Septic Shock  Goal: Optimal Coping  Outcome: Progressing  Intervention: Support Patient and Family Response  Recent Flowsheet Documentation  Taken 11/10/2024 1400 by Adeola Moralez RN  Family/Support System Care:   presence promoted   self-care encouraged  Taken 11/10/2024 0822 by Adeola Moralez RN  Family/Support System Care:   self-care encouraged   presence promoted  Goal: Absence of Bleeding  Outcome: Progressing  Intervention: Monitor and Manage Bleeding  Recent Flowsheet Documentation  Taken 11/10/2024 0822 by Adeola Moralez RN  Bleeding Precautions: monitored for signs of bleeding  Bleeding Management: dressing monitored  Goal: Blood Glucose Level Within Target Range  Outcome: Progressing  Intervention: Optimize Glycemic Control  Recent Flowsheet Documentation  Taken 11/10/2024 0822 by Adeola Moralez RN  Hyperglycemia Management: blood glucose monitored  Goal: Absence of Infection Signs and Symptoms  Outcome: Progressing  Intervention: Initiate Sepsis Management  Recent Flowsheet Documentation  Taken 11/10/2024 1400 by Adeola Moralez RN  Infection Prevention:   rest/sleep promoted   single patient room provided  Taken 11/10/2024 0822 by Adeola Moralez RN  Infection Prevention:   single patient room provided   rest/sleep promoted  Intervention: Promote Recovery  Recent Flowsheet  Documentation  Taken 11/10/2024 1400 by Adeola Moralez, RN  Activity Management: activity encouraged  Taken 11/10/2024 0822 by Adeola Moralez RN  Activity Management: activity minimized  Goal: Optimal Nutrition Delivery  Outcome: Progressing     Problem: Alcohol Withdrawal  Goal: Alcohol Withdrawal Symptom Control  Outcome: Progressing  Intervention: Minimize or Manage Alcohol Withdrawal Symptoms  Recent Flowsheet Documentation  Taken 11/10/2024 0822 by Adeola Moralez RN  Aspiration Precautions: upright posture maintained  Goal: Optimal Neurologic Function  Outcome: Progressing     Problem: VTE (Venous Thromboembolism)  Goal: Tissue Perfusion  Outcome: Progressing  Intervention: Optimize Tissue Perfusion  Recent Flowsheet Documentation  Taken 11/10/2024 0822 by Adeola Moralez, RN  Bleeding Precautions: monitored for signs of bleeding  Goal: Optimal Right Ventricular Function  Outcome: Progressing   Goal Outcome Evaluation:

## 2024-11-11 ENCOUNTER — APPOINTMENT (OUTPATIENT)
Dept: CT IMAGING | Facility: HOSPITAL | Age: 74
DRG: 853 | End: 2024-11-11
Payer: MEDICARE

## 2024-11-11 LAB
ALBUMIN SERPL-MCNC: 1.9 G/DL (ref 3.5–5.2)
ALBUMIN/GLOB SERPL: 0.5 G/DL
ALP SERPL-CCNC: 217 U/L (ref 39–117)
ALT SERPL W P-5'-P-CCNC: 31 U/L (ref 1–41)
ANION GAP SERPL CALCULATED.3IONS-SCNC: 11.6 MMOL/L (ref 5–15)
AST SERPL-CCNC: 25 U/L (ref 1–40)
BASOPHILS # BLD AUTO: 0.12 10*3/MM3 (ref 0–0.2)
BASOPHILS NFR BLD AUTO: 0.8 % (ref 0–1.5)
BILIRUB SERPL-MCNC: 0.3 MG/DL (ref 0–1.2)
BUN SERPL-MCNC: 11 MG/DL (ref 8–23)
BUN/CREAT SERPL: 25.6 (ref 7–25)
CALCIUM SPEC-SCNC: 8 MG/DL (ref 8.6–10.5)
CHLORIDE SERPL-SCNC: 104 MMOL/L (ref 98–107)
CO2 SERPL-SCNC: 18.4 MMOL/L (ref 22–29)
CREAT SERPL-MCNC: 0.43 MG/DL (ref 0.76–1.27)
DEPRECATED RDW RBC AUTO: 55.5 FL (ref 37–54)
EGFRCR SERPLBLD CKD-EPI 2021: 113.4 ML/MIN/1.73
EOSINOPHIL # BLD AUTO: 0.55 10*3/MM3 (ref 0–0.4)
EOSINOPHIL NFR BLD AUTO: 3.7 % (ref 0.3–6.2)
ERYTHROCYTE [DISTWIDTH] IN BLOOD BY AUTOMATED COUNT: 17.2 % (ref 12.3–15.4)
GLOBULIN UR ELPH-MCNC: 3.8 GM/DL
GLUCOSE BLDC GLUCOMTR-MCNC: 155 MG/DL (ref 70–130)
GLUCOSE BLDC GLUCOMTR-MCNC: 161 MG/DL (ref 70–130)
GLUCOSE BLDC GLUCOMTR-MCNC: 179 MG/DL (ref 70–130)
GLUCOSE BLDC GLUCOMTR-MCNC: 180 MG/DL (ref 70–130)
GLUCOSE BLDC GLUCOMTR-MCNC: 191 MG/DL (ref 70–130)
GLUCOSE SERPL-MCNC: 175 MG/DL (ref 65–99)
HCT VFR BLD AUTO: 31 % (ref 37.5–51)
HGB BLD-MCNC: 9.7 G/DL (ref 13–17.7)
IMM GRANULOCYTES # BLD AUTO: 0.69 10*3/MM3 (ref 0–0.05)
IMM GRANULOCYTES NFR BLD AUTO: 4.7 % (ref 0–0.5)
LYMPHOCYTES # BLD AUTO: 1.34 10*3/MM3 (ref 0.7–3.1)
LYMPHOCYTES NFR BLD AUTO: 9.1 % (ref 19.6–45.3)
MCH RBC QN AUTO: 27.6 PG (ref 26.6–33)
MCHC RBC AUTO-ENTMCNC: 31.3 G/DL (ref 31.5–35.7)
MCV RBC AUTO: 88.3 FL (ref 79–97)
MONOCYTES # BLD AUTO: 1.4 10*3/MM3 (ref 0.1–0.9)
MONOCYTES NFR BLD AUTO: 9.5 % (ref 5–12)
NEUTROPHILS NFR BLD AUTO: 10.65 10*3/MM3 (ref 1.7–7)
NEUTROPHILS NFR BLD AUTO: 72.2 % (ref 42.7–76)
PLATELET # BLD AUTO: 1040 10*3/MM3 (ref 140–450)
PMV BLD AUTO: 8.4 FL (ref 6–12)
POTASSIUM SERPL-SCNC: 3.1 MMOL/L (ref 3.5–5.2)
POTASSIUM SERPL-SCNC: 4 MMOL/L (ref 3.5–5.2)
PROT SERPL-MCNC: 5.7 G/DL (ref 6–8.5)
QT INTERVAL: 360 MS
QTC INTERVAL: 483 MS
RBC # BLD AUTO: 3.51 10*6/MM3 (ref 4.14–5.8)
SODIUM SERPL-SCNC: 134 MMOL/L (ref 136–145)
WBC NRBC COR # BLD AUTO: 14.75 10*3/MM3 (ref 3.4–10.8)

## 2024-11-11 PROCEDURE — 85025 COMPLETE CBC W/AUTO DIFF WBC: CPT | Performed by: INTERNAL MEDICINE

## 2024-11-11 PROCEDURE — 25810000003 SODIUM CHLORIDE 0.9 % SOLUTION: Performed by: STUDENT IN AN ORGANIZED HEALTH CARE EDUCATION/TRAINING PROGRAM

## 2024-11-11 PROCEDURE — 63710000001 INSULIN REGULAR HUMAN PER 5 UNITS: Performed by: HOSPITALIST

## 2024-11-11 PROCEDURE — 25510000001 IOPAMIDOL 61 % SOLUTION: Performed by: STUDENT IN AN ORGANIZED HEALTH CARE EDUCATION/TRAINING PROGRAM

## 2024-11-11 PROCEDURE — 92526 ORAL FUNCTION THERAPY: CPT

## 2024-11-11 PROCEDURE — 63710000001 INSULIN GLARGINE PER 5 UNITS: Performed by: STUDENT IN AN ORGANIZED HEALTH CARE EDUCATION/TRAINING PROGRAM

## 2024-11-11 PROCEDURE — 93010 ELECTROCARDIOGRAM REPORT: CPT | Performed by: INTERNAL MEDICINE

## 2024-11-11 PROCEDURE — 99024 POSTOP FOLLOW-UP VISIT: CPT | Performed by: SURGERY

## 2024-11-11 PROCEDURE — 25010000002 ENOXAPARIN PER 10 MG: Performed by: INTERNAL MEDICINE

## 2024-11-11 PROCEDURE — 84132 ASSAY OF SERUM POTASSIUM: CPT | Performed by: STUDENT IN AN ORGANIZED HEALTH CARE EDUCATION/TRAINING PROGRAM

## 2024-11-11 PROCEDURE — 99232 SBSQ HOSP IP/OBS MODERATE 35: CPT | Performed by: INTERNAL MEDICINE

## 2024-11-11 PROCEDURE — 97530 THERAPEUTIC ACTIVITIES: CPT

## 2024-11-11 PROCEDURE — 93005 ELECTROCARDIOGRAM TRACING: CPT | Performed by: STUDENT IN AN ORGANIZED HEALTH CARE EDUCATION/TRAINING PROGRAM

## 2024-11-11 PROCEDURE — 82948 REAGENT STRIP/BLOOD GLUCOSE: CPT

## 2024-11-11 PROCEDURE — 80053 COMPREHEN METABOLIC PANEL: CPT | Performed by: INTERNAL MEDICINE

## 2024-11-11 PROCEDURE — 94761 N-INVAS EAR/PLS OXIMETRY MLT: CPT

## 2024-11-11 PROCEDURE — 74170 CT ABD WO CNTRST FLWD CNTRST: CPT

## 2024-11-11 RX ORDER — POTASSIUM CHLORIDE 1.5 G/1.58G
40 POWDER, FOR SOLUTION ORAL EVERY 4 HOURS
Status: COMPLETED | OUTPATIENT
Start: 2024-11-11 | End: 2024-11-11

## 2024-11-11 RX ORDER — IOPAMIDOL 612 MG/ML
100 INJECTION, SOLUTION INTRAVASCULAR
Status: COMPLETED | OUTPATIENT
Start: 2024-11-11 | End: 2024-11-11

## 2024-11-11 RX ADMIN — METOPROLOL TARTRATE 75 MG: 50 TABLET, FILM COATED ORAL at 08:48

## 2024-11-11 RX ADMIN — INSULIN GLARGINE 20 UNITS: 100 INJECTION, SOLUTION SUBCUTANEOUS at 21:43

## 2024-11-11 RX ADMIN — CHLORHEXIDINE GLUCONATE 15 ML: 1.2 RINSE ORAL at 08:48

## 2024-11-11 RX ADMIN — Medication 10 ML: at 08:50

## 2024-11-11 RX ADMIN — INSULIN HUMAN 2 UNITS: 100 INJECTION, SOLUTION PARENTERAL at 12:03

## 2024-11-11 RX ADMIN — INSULIN HUMAN 2 UNITS: 100 INJECTION, SOLUTION PARENTERAL at 21:43

## 2024-11-11 RX ADMIN — METOPROLOL TARTRATE 75 MG: 50 TABLET, FILM COATED ORAL at 21:42

## 2024-11-11 RX ADMIN — POTASSIUM CHLORIDE 40 MEQ: 1.5 POWDER, FOR SOLUTION ORAL at 08:48

## 2024-11-11 RX ADMIN — HYDROCODONE BITARTRATE AND ACETAMINOPHEN 1 TABLET: 5; 325 TABLET ORAL at 03:47

## 2024-11-11 RX ADMIN — POTASSIUM CHLORIDE 40 MEQ: 1.5 POWDER, FOR SOLUTION ORAL at 14:36

## 2024-11-11 RX ADMIN — SODIUM CHLORIDE 1000 ML: 9 INJECTION, SOLUTION INTRAVENOUS at 12:02

## 2024-11-11 RX ADMIN — SODIUM HYPOCHLORITE: 1.25 SOLUTION TOPICAL at 21:43

## 2024-11-11 RX ADMIN — LIDOCAINE 2 PATCH: 4 PATCH TOPICAL at 08:49

## 2024-11-11 RX ADMIN — Medication 10 ML: at 21:44

## 2024-11-11 RX ADMIN — IOPAMIDOL 85 ML: 612 INJECTION, SOLUTION INTRAVENOUS at 10:05

## 2024-11-11 RX ADMIN — INSULIN HUMAN 2 UNITS: 100 INJECTION, SOLUTION PARENTERAL at 08:50

## 2024-11-11 RX ADMIN — Medication 100 MG: at 08:50

## 2024-11-11 RX ADMIN — LANSOPRAZOLE 30 MG: 15 TABLET, ORALLY DISINTEGRATING ORAL at 06:43

## 2024-11-11 RX ADMIN — POTASSIUM CHLORIDE 40 MEQ: 1.5 POWDER, FOR SOLUTION ORAL at 17:20

## 2024-11-11 RX ADMIN — ENOXAPARIN SODIUM 90 MG: 100 INJECTION SUBCUTANEOUS at 17:19

## 2024-11-11 RX ADMIN — CHLORHEXIDINE GLUCONATE 15 ML: 1.2 RINSE ORAL at 21:43

## 2024-11-11 RX ADMIN — INSULIN HUMAN 2 UNITS: 100 INJECTION, SOLUTION PARENTERAL at 17:19

## 2024-11-11 RX ADMIN — FOLIC ACID 1 MG: 1 TABLET ORAL at 08:48

## 2024-11-11 RX ADMIN — ENOXAPARIN SODIUM 90 MG: 100 INJECTION SUBCUTANEOUS at 06:54

## 2024-11-11 RX ADMIN — SODIUM HYPOCHLORITE: 1.25 SOLUTION TOPICAL at 08:50

## 2024-11-11 RX ADMIN — HYDROCODONE BITARTRATE AND ACETAMINOPHEN 1 TABLET: 5; 325 TABLET ORAL at 21:42

## 2024-11-11 RX ADMIN — TERAZOSIN 2 MG: 2 CAPSULE ORAL at 21:43

## 2024-11-11 NOTE — PROGRESS NOTES
Name: Arsh Haynes ADMIT: 10/26/2024   : 1950  PCP: Provider, No Known    MRN: [id number] LOS: 16 days   AGE/SEX: 74 y.o. male  ROOM: [facility]     Subjective   Subjective   No acute events overnight.  No family at bedside today.  Discussed with patient and he is feeling okay today.  Still complaining of some right shoulder pain.  He states this is extremely chronic and unchanged.  Had this prior to hospitalization.  Knee pain has improved somewhat.  Tolerating tube feeds.    Objective   Objective     Vital Signs  Temp:  [97.4 °F (36.3 °C)-98.3 °F (36.8 °C)] 98.3 °F (36.8 °C)  Heart Rate:  [101-126] 126  Resp:  [16-20] 18  BP: (107-142)/(54-66) 123/55  SpO2:  [93 %-97 %] 95 %  on   ;   Device (Oxygen Therapy): room air  Body mass index is 25.56 kg/m².    Physical Exam  General: Sleeping comfortably but arouses.  Answers questions appropriately. Lying in bed.  Chronically ill-appearing.   Neuro: Generalized weakness, moves all extremities.  Face symmetric.  No focal deficits.  Lungs: Clear to auscultation bilaterally. No wheeze or crackles. No distress.   Heart: RRR, no murmurs. No edema.  Abdomen: Soft, nondistended. Well-healing midline incision.  Ostomy with stool output.  Ext: Warm and well-perfused.  Right knee swelling improved, nontender to palpation.  Skin: No rashes or lesions. IV site without swelling or erythema.     Results Review     I reviewed the patient's new clinical results:  Results from last 7 days   Lab Units 24  0516 11/10/24  0509 24  0528 24  0556   WBC 10*3/mm3 14.75* 19.04* 20.47* 18.92*   HEMOGLOBIN g/dL 9.7* 9.3* 7.3* 7.8*   PLATELETS 10*3/mm3 1,040* 1,089* 1,184* 1,244*     Results from last 7 days   Lab Units 24  0516 11/10/24  0455 24  1712 24  0528 24  1637 24  0556   SODIUM mmol/L 134* 135*  --  133*  --  133*   POTASSIUM mmol/L 3.1* 3.8 4.4 3.5   < > 3.6   CHLORIDE mmol/L 104 107  --  105  --  102   CO2 mmol/L 18.4* 16.9*  --   17.7*  --  18.6*   BUN mg/dL 11 10  --  8  --  8   CREATININE mg/dL 0.43* 0.44*  --  0.53*  --  0.58*   GLUCOSE mg/dL 175* 175*  --  207*  --  112*   EGFR mL/min/1.73 113.4 112.6  --  106.5  --  103.6    < > = values in this interval not displayed.     Results from last 7 days   Lab Units 11/11/24  0516 11/10/24  0455 11/09/24  0528 11/08/24  0556   ALBUMIN g/dL 1.9* 2.1* 1.7* 2.1*   BILIRUBIN mg/dL 0.3 0.6 0.4 0.4   ALK PHOS U/L 217* 204* 199* 215*   AST (SGOT) U/L 25 19 16 21   ALT (SGPT) U/L 31 27 27 32     Results from last 7 days   Lab Units 11/11/24  0516 11/10/24  0455 11/09/24  0528 11/08/24  0556 11/07/24  0704 11/06/24  0624   CALCIUM mg/dL 8.0* 7.7* 7.4* 7.5* 8.0* 8.1*   ALBUMIN g/dL 1.9* 2.1* 1.7* 2.1* 2.5* 1.8*   MAGNESIUM mg/dL  --   --   --   --  2.1 2.1   PHOSPHORUS mg/dL  --   --   --   --  3.2 3.0     Results from last 7 days   Lab Units 11/09/24  0528   PROCALCITONIN ng/mL 4.09*     Glucose   Date/Time Value Ref Range Status   11/11/2024 0603 180 (H) 70 - 130 mg/dL Final   11/11/2024 0107 161 (H) 70 - 130 mg/dL Final   11/10/2024 1534 178 (H) 70 - 130 mg/dL Final   11/10/2024 1039 160 (H) 70 - 130 mg/dL Final   11/10/2024 0555 172 (H) 70 - 130 mg/dL Final   11/09/2024 2009 222 (H) 70 - 130 mg/dL Final   11/09/2024 1610 155 (H) 70 - 130 mg/dL Final       XR Shoulder 2+ View Right    Result Date: 11/9/2024   1. Diffuse osteopenia, without radiographic evidence of acute fracture or dislocation. 2. Severe arthropathic changes at the glenohumeral joint, which could reflect chronic DJD or neuropathic arthropathy. 3. Moderate DJD at the AC joint and small anterior/inferior acromial enthesophyte. 4. Partially imaged right perihilar and right basilar opacities, likely a pleural effusion with underlying atelectasis and/or pneumonia.  This report was finalized on 11/9/2024 8:00 PM by Jeremy Gee MD on Workstation: [id number]       I have personally reviewed all medications:  Scheduled  Medications  chlorhexidine, 15 mL, Mouth/Throat, Q12H  enoxaparin, 1 mg/kg, Subcutaneous, Q12H  folic acid, 1 mg, Nasogastric, Daily  insulin glargine, 15 Units, Subcutaneous, Nightly  insulin regular, 2-9 Units, Subcutaneous, 4x Daily AC & at Bedtime  lansoprazole, 30 mg, Oral, Q AM  Lidocaine, 2 patch, Transdermal, Q24H  metoprolol tartrate, 75 mg, Oral, Q12H  potassium chloride, 40 mEq, Oral, Q4H  sodium chloride, 1,000 mL, Intravenous, Once  sodium chloride, 10 mL, Intravenous, Q12H  sodium hypochlorite, , Topical, BID  terazosin, 2 mg, Nasogastric, Daily  thiamine, 100 mg, Nasogastric, Daily    Infusions       Diet  NPO Diet NPO Type: Strict NPO      Intake/Output Summary (Last 24 hours) at 11/11/2024 1001  Last data filed at 11/11/2024 0336  Gross per 24 hour   Intake 0 ml   Output 2050 ml   Net -2050 ml       Assessment/Plan     Active Hospital Problems    Diagnosis  POA    **Peritonitis [K65.9]  Unknown    HTN (hypertension) [I10]  Unknown    Thrombocytosis [D75.839]  Unknown    Acute DVT (deep venous thrombosis) [I82.409]  Unknown    Renal mass [N28.89]  Unknown    Hyponatremia [E87.1]  Unknown    Anemia [D64.9]  Unknown    Moderate protein-calorie malnutrition [E44.0]  Yes    Colon cancer [C18.9]  No      Resolved Hospital Problems    Diagnosis Date Resolved POA    Perforation of sigmoid colon [K63.1] 10/27/2024 Yes       74 y.o. male with Peritonitis.    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  Leukocytosis has improved to 14.7 5K today.  Procalcitonin downtrending.  Inflammatory markers relatively stable.  Patient has been afebrile with no signs of new infection.  Continue to monitor.  -Sinus tachycardia: HR elevated between 100-120s.  Seem to be better when he was on IV fluids yesterday.  Going to give another bolus and see how he responds.   He is not complaining of significant pain.  He is already on anticoagulation.  Blood pressure and oxygenation are appropriate.  His WBC is improving and he has been afebrile, so low concern for another infection.  EKG showing sinus tach.  Will slightly increase dose of metoprolol as well.  Continue to closely monitor.  If sinus tachycardia persists and no other cause identified, may need to ask cardiology to evaluate.  -Acute right lower extremity DVT found on 11/5.  He has a severe thrombocytosis and in addition to his anemia as well as the colon cancer.  Hematology/oncology following.  Continue therapeutic Lovenox.  -Hgb stable/improved today.  S/p PRBC transfusions.  Hematology following.  Repeat CBC with morning labs, transfuse for Hgb less than 7.  -Thrombocytosis persist, though improved today.  Hematology following.  -Urology evaluated the renal mass and urinary retention.  Blankenship catheter in place.  Continue Flomax.  Voiding trial when closer to discharge.  He will follow-up in 4 to 6 weeks with repeat CT for the renal mass. Given continued clots in the catheter, urology reconsulted.  CT renal mass has been ordered.  -Hyponatremia: Sodium stable today at 134.  Giving IV fluid bolus.  Repeat BMP with morning labs.  -Hypokalemia: Potassium low this morning.  Replace as needed and repeat daily BMP.  -Bicarbonate improved to 18.4 today, anion gap normal.  Likely secondary to normal saline.  Recheck BMP with morning labs.  -Increase Lantus to 20 units nightly.  Continue SSI.  Ongoing titration of insulin based on requirements.  -SLP evaluated and recommending n.p.o. at this time.  Core track placed and tube feeds started.  SLP to reevaluate today.  -Right knee pain, right shoulder pain: Orthopedic surgery consulted.  No significant effusion to perform bedside arthrocentesis.  XR of right shoulder with severe arthropathic changes.  Planning to do a steroid injection of the right shoulder.  They will then follow-up  in the outpatient setting.    Pharmacy to dose Lovenox for DVT prophylaxis.  Full code.  Discussed with patient and nursing staff.  Anticipate discharge TBD      Sarah Segura MD  [facility]  11/11/24  10:01 EST

## 2024-11-11 NOTE — PLAN OF CARE
Goal Outcome Evaluation:         Pt. Alert, oriented x4, with NG tube with feeding continuously , c/o pain to right shoulder , pain medicine given per NG tube x2 at this shift, with colostomy bag intact , wound to middle of abdominal area, changed dressing as ordered, v/s stable, 02 sats 96% on room air, no s/s acute distress noted

## 2024-11-11 NOTE — THERAPY TREATMENT NOTE
Acute Care - Speech Language Pathology   Swallow Treatment Note [facility]     Patient Name: Arsh Haynes  : 1950  MRN: [id number]  Today's Date: 2024               Admit Date: 10/26/2024    Visit Dx:     ICD-10-CM ICD-9-CM   1. Perforation of sigmoid colon  K63.1 569.83   2. Hypotension, unspecified hypotension type  I95.9 458.9   3. Increased lactic acid level  R79.89 276.2   4. Bowel perforation  K63.1 569.83     Patient Active Problem List   Diagnosis    Colon cancer    HTN (hypertension)    Thrombocytosis    Acute DVT (deep venous thrombosis)    Renal mass    Hyponatremia    Anemia    Peritonitis    Moderate protein-calorie malnutrition     Past Medical History:   Diagnosis Date    HTN (hypertension)      Past Surgical History:   Procedure Laterality Date    COLON RESECTION N/A 10/26/2024    Procedure: LOW ANTERIOR COLON RESECTION SIGMOID, COLOSTOMY, SPLENIC FLEXURE MOBILIZATION;  Surgeon: Mc Guillaume MD;  Location: [facility];  Service: General;  Laterality: N/A;    EXPLORATORY LAPAROTOMY N/A 10/26/2024    Procedure: LAPAROTOMY EXPLORATORY, LEFT HEMICOLECTOMY;  Surgeon: Mc Guillaume MD;  Location: [facility];  Service: General;  Laterality: N/A;       SLP Recommendation and Plan                                                                               Outcome Evaluation: VFSS completed. Recommend NPO, meds alternate route. Allow water protocol- ice chips and sips of water 30 minutes s/p oral care. Patient demonstrated aspiration thin, penetration nectar thick, and unable to clear moderate pharngeal residue. High risk of aspiration with all PO secondary to pharyngeal residue. SLP to follow for re-eval as overall strength and endurance improve.      SWALLOW EVALUATION (Last 72 Hours)       SLP Adult Swallow Evaluation       Row Name 24 [id number]                   Rehab Evaluation    Document Type therapy note (daily note)  -OC        Subjective  Information no complaints  -OC        Patient Observations alert;cooperative;agree to therapy  -OC        Patient/Family/Caregiver Comments/Observations reports fatigue. no feeling of improvement in strength/endurance  -OC        Patient Effort adequate  -OC        Symptoms Noted During/After Treatment none  -OC           General Information    Patient Profile Reviewed yes  -OC        Current Method of Nutrition NPO  -OC        Precautions/Limitations, Vision WFL;for purposes of eval  -OC        Precautions/Limitations, Hearing WFL;for purposes of eval  -OC        Prior Level of Function-Communication unknown  -OC        Prior Level of Function-Swallowing no diet consistency restrictions;regular textures;thin liquids  -OC        Plans/Goals Discussed with patient;agreed upon  -OC        Barriers to Rehab none identified  -OC        Patient's Goals for Discharge return to PO diet  -OC           Pain    Pretreatment Pain Rating 0/10 - no pain  -OC        Posttreatment Pain Rating 0/10 - no pain  -OC           Swallow Goals (SLP)    Swallow STGs lingual strengthening goal selection (SLP);pharyngeal strengthening exercise goal selection (SLP)  -OC        Lingual Strengthening Goal Selection (SLP) lingual strengthening, SLP goal 1  -OC        Pharyngeal Strengthening Exercise Goal Selection (SLP) pharyngeal strengthening exercise, SLP goal 1  -OC           (STG) Lingual Strengthening Goal 1 (SLP)    Activity (Lingual Strengthening Goal 1, SLP) increase tongue back strength  -OC        Increase Tongue Back Strength MOST/swallow strong  -OC        [place]/Accuracy (Lingual Strengthening Goal 1, SLP) with minimal cues (75-90% accuracy)  -OC        Time Frame (Lingual Strengthening Goal 1, SLP) by discharge  -OC        Barriers (Lingual Strengthening Goal 1, SLP) fatigue  -OC        Progress/Outcomes (Lingual Strengthening Goal 1, SLP) good progress toward goal  -OC        Comment (Lingual Strengthening Goal 1, SLP)  written insturctions provided  -OC           (STG) Pharyngeal Strengthening Exercise Goal 1 (SLP)    Activity (Pharyngeal Strengthening Goal 1, SLP) increase superior movement of the hyolaryngeal complex;increase anterior movement of the hyolaryngeal complex;increase epiglottic inversion and retroflexion;increase squeeze/positive pressure generation;increase tongue base retraction  -OC        [place]/Accuracy (Pharyngeal Strengthening Goal 1, SLP) independently (over 90% accuracy)  -OC        Time Frame (Pharyngeal Strengthening Goal 1, SLP) by discharge  -OC        Barriers (Pharyngeal Strengthening Goal 1, SLP) fatigue  -OC        Progress/Outcomes (Pharyngeal Strengthening Goal 1, SLP) continuing progress toward goal  -OC        Comment (Pharyngeal Strengthening Goal 1, SLP) provided written materials and demonstrated exercises, patient demonstrated understanding.  -OC                  User Key  (r) = Recorded By, (t) = Taken By, (c) = Cosigned By      Initials Name Effective Dates    OC Card, AlisonYESICA 08/28/23 -                     EDUCATION  The patient has been educated in the following areas:   Dysphagia (Swallowing Impairment).    SLP to follow for re-eval/repeat instrumental as patient endurance/strength improve.        SLP GOALS       Row Name 11/11/24 0593             (STG) Lingual Strengthening Goal 1 (SLP)    Activity (Lingual Strengthening Goal 1, SLP) increase tongue back strength  -OC      Increase Tongue Back Strength MOST/swallow strong  -OC      [place]/Accuracy (Lingual Strengthening Goal 1, SLP) with minimal cues (75-90% accuracy)  -OC      Time Frame (Lingual Strengthening Goal 1, SLP) by discharge  -OC      Barriers (Lingual Strengthening Goal 1, SLP) fatigue  -OC      Progress/Outcomes (Lingual Strengthening Goal 1, SLP) good progress toward goal  -OC      Comment (Lingual Strengthening Goal 1, SLP) written insturctions provided  -OC         (STG) Pharyngeal Strengthening  Exercise Goal 1 (SLP)    Activity (Pharyngeal Strengthening Goal 1, SLP) increase superior movement of the hyolaryngeal complex;increase anterior movement of the hyolaryngeal complex;increase epiglottic inversion and retroflexion;increase squeeze/positive pressure generation;increase tongue base retraction  -OC      [place]/Accuracy (Pharyngeal Strengthening Goal 1, SLP) independently (over 90% accuracy)  -OC      Time Frame (Pharyngeal Strengthening Goal 1, SLP) by discharge  -OC      Barriers (Pharyngeal Strengthening Goal 1, SLP) fatigue  -OC      Progress/Outcomes (Pharyngeal Strengthening Goal 1, SLP) continuing progress toward goal  -OC      Comment (Pharyngeal Strengthening Goal 1, SLP) provided written materials and demonstrated exercises, patient demonstrated understanding.  -OC                User Key  (r) = Recorded By, (t) = Taken By, (c) = Cosigned By      Initials Name Provider Type    Alison Isaacs SLP Speech and Language Pathologist                         Time Calculation:    Time Calculation- SLP       Row Name 11/11/24 1339             Time Calculation- SLP    SLP Start Time 0930  -OC         Untimed Charges    SLP Treatment ST Treatment Swallow Minutes  - 12878  -OC      [id number] Treatment Swallow Minutes 45  -OC         Total Minutes    Untimed Charges Total Minutes 45  -OC       Total Minutes 45  -OC                User Key  (r) = Recorded By, (t) = Taken By, (c) = Cosigned By      Initials Name Provider Type    Alison Isaacs SLP Speech and Language Pathologist                    Therapy Charges for Today       Code Description Service Date Service Provider Modifiers Qty    [id number]  ST TREATMENT SWALLOW 3 11/11/2024 Alison Mays SLP GN 1                 YESICA Bush  11/11/2024

## 2024-11-11 NOTE — PROGRESS NOTES
Postoperative day 15 status post left colectomy with colostomy    S: No events overnight.  Shoulder pain is his main complaint.  Denies any abdominal pain nausea or vomiting.  He has been tolerating tube feeds and having bowel function    O:   Vitals:    11/10/24 2203 11/10/24 2309 11/11/24 0722 11/11/24 0754   BP: 142/66 107/58 123/55    BP Location:  Left arm Left arm    Patient Position:  Lying Lying    Pulse: (!) 124 101 109 (!) 126   Resp:  18 18    Temp:  97.6 °F (36.4 °C) 98.3 °F (36.8 °C)    TempSrc:  Oral Oral    SpO2:  96% 96% 95%   Weight:       Height:          Ostomy output 700 cc  Alert, chronically ill-appearing, no distress  Breathing comfortable  Tachycardic, regular rhythm  Abdomen soft, nontender, nondistended, midline incision with open wound with clean base.    Sodium 134, potassium 3.1, CO2 18.4, creatinine 0.4, albumin 1.9  14,000, downtrending, hemoglobin stable at 5.7.  Platelets 1000    CT scan abdomen pelvis ordered by urology to assess kidney mass showed no evidence of intra-abdominal pathology but some stranding around the peritoneum consistent with resolving peritonitis    Assessment and plan    Postoperative day 15, slow recovery, still on tube feeds due to dysphagia.  Tachycardia, no clear source other than can be secondary to rehydration.  He is already getting IV bolus and see how he does.  Continue tube feeds, speech therapy to reevaluate and see if the patient can start p.o. intake  Wound VAC to be placed through his midline wound  Continue tube feeds  Continue full anticoagulation    Mc Guillaume MD, FACS  General, Minimally Invasive and Endoscopic Surgery  [facility]    [address], [address]  [place], [place], [address]  P: [phone]  F: [phone]

## 2024-11-11 NOTE — PROGRESS NOTES
Nutrition Services    Patient Name:  Arsh Haynes  YOB: 1950  MRN: [id number]  Admit Date:  10/26/2024  Assessment Date:  11/11/24    Summary: TF Follow Up     Tolerating TFs and having bowel function.  Wound vac to be placed to midline wound per chart review.  S/p VFSS today - SLP continues to recommend NPO.  R knee and shoulder pain.  Colostomy.    Current TF regimen: Novasource Renal @ 55 mL/hr (goal) + 30 mL q 6 hour free water flushes    Initial Goal:     Novasource Renal at 20 mL/hr + 30 mL q 6 hr water flush      End Goal:    Novasource Renal at 55 mL/hr + 30 mL q 6 hr water flush      Calories  2420 kcals (100%)    Protein  110 g (100%)    Free water  859 mL   Flushes  120 mL     The above end goal rate is for 22 hrs/day to assume interruptions for ADLs. Water flushes adjusted based on clinical picture + Rx flushes/IV fluids     Labs reviewed: Na 134, K 3.1, Gluc 180/155/179, Creat 0.43, Platelets 1040, Alb 1.9  Meds reviewed: lovenox, folic acid, insulin, prevacid, KCl, thiamine    Plans/Recommendations:  Continue TFs of Novasource Renal @ goal rate of 55 mL/hr.  30 mL q 6 hour free water flushes.  Follow for tolerance.    RD to follow closely.    CLINICAL NUTRITION ASSESSMENT      Reason for Assessment Follow-up Protocol     Diagnosis/Problem   Peritonitis      Medical/Surgical History Past Medical History:   Diagnosis Date    HTN (hypertension)        Past Surgical History:   Procedure Laterality Date    COLON RESECTION N/A 10/26/2024    Procedure: LOW ANTERIOR COLON RESECTION SIGMOID, COLOSTOMY, SPLENIC FLEXURE MOBILIZATION;  Surgeon: Mc Guillaume MD;  Location: [facility] OR;  Service: General;  Laterality: N/A;    EXPLORATORY LAPAROTOMY N/A 10/26/2024    Procedure: LAPAROTOMY EXPLORATORY, LEFT HEMICOLECTOMY;  Surgeon: Mc Guillaume MD;  Location: [facility] OR;  Service: General;  Laterality: N/A;        Anthropometrics        Current Height  Current  "Weight  BMI kg/m2 Height: 182.9 cm (72\")  Weight:  (cant weigh patientt because bed was not zeroed before patient was placed in it) (11/11/24 0500)  Body mass index is 25.56 kg/m².   Adjusted BMI (if applicable)    BMI Category Overweight (25 - 29.9)   Ideal Body Weight (IBW) 178 lb (80.9 kg)   Usual Body Weight (UBW) 202 lb (9/2023)   Weight Trend Loss, Unknown   Weight History Wt Readings from Last 30 Encounters:   11/08/24 0357 85.5 kg (188 lb 7.9 oz)   11/07/24 0500 85.1 kg (187 lb 9.8 oz)   11/06/24 0600 85.1 kg (187 lb 9.8 oz)   11/06/24 0100 84.2 kg (185 lb 10 oz)   11/04/24 0415 87 kg (191 lb 12.8 oz)   11/04/24 0152 87 kg (191 lb 12.8 oz)   11/02/24 0543 89.8 kg (197 lb 15.6 oz)   11/01/24 0548 92.6 kg (204 lb 2.3 oz)   10/31/24 0448 96 kg (211 lb 10.3 oz)   10/30/24 0300 98.4 kg (216 lb 14.9 oz)   10/29/24 0431 92.5 kg (203 lb 14.8 oz)   10/28/24 0436 90.2 kg (198 lb 13.7 oz)   10/27/24 0404 83.4 kg (183 lb 13.8 oz)   10/26/24 1933 83 kg (183 lb)      --  Estimated/Assessed Needs        Current Weight  Weight:  (cant weigh patientt because bed was not zeroed before patient was placed in it) (11/11/24 0500)       Energy Requirements    Weight for Calculation 178 lb (80.9 kg) IBW   Method for Estimation  30-35 kcal/kg   EST Needs (kcal/day) 6055-8693       Protein Requirements    Weight for Calculation 178 lb (80.9 kg) IBW   EST Protein Needs (g/kg) 1.2 - 1.5 gm/kg   EST Daily Needs (g/day)        Fluid Requirements     Method for Estimation 1 mL/kcal    EST Needs (mL/day)      Labs       Pertinent Labs    Results from last 7 days   Lab Units 11/11/24  0516 11/10/24  0455 11/09/24  1712 11/09/24  0528   SODIUM mmol/L 134* 135*  --  133*   POTASSIUM mmol/L 3.1* 3.8 4.4 3.5   CHLORIDE mmol/L 104 107  --  105   CO2 mmol/L 18.4* 16.9*  --  17.7*   BUN mg/dL 11 10  --  8   CREATININE mg/dL 0.43* 0.44*  --  0.53*   CALCIUM mg/dL 8.0* 7.7*  --  7.4*   BILIRUBIN mg/dL 0.3 0.6  --  0.4   ALK PHOS U/L 217* 204*  " "--  199*   ALT (SGPT) U/L 31 27  --  27   AST (SGOT) U/L 25 19  --  16   GLUCOSE mg/dL 175* 175*  --  207*     Results from last 7 days   Lab Units 11/11/24  0516 11/08/24  0556 11/07/24  0704 11/07/24  0703 11/06/24  0624   MAGNESIUM mg/dL  --   --  2.1  --  2.1   PHOSPHORUS mg/dL  --   --  3.2  --  3.0   HEMOGLOBIN g/dL 9.7*   < >  --    < > 8.0*   HEMATOCRIT % 31.0*   < >  --    < > 25.0*   WBC 10*3/mm3 14.75*   < >  --    < > 19.15*   ALBUMIN g/dL 1.9*   < > 2.5*  --  1.8*    < > = values in this interval not displayed.     Results from last 7 days   Lab Units 11/11/24  0516 11/10/24  0509 11/09/24  0528 11/08/24  0556 11/07/24  0703   PLATELETS 10*3/mm3 1,040* 1,089* 1,184* 1,244* 1,392*     No results found for: \"COVID19\"  Lab Results   Component Value Date    HGBA1C 6.60 (H) 10/26/2024          Medications           Scheduled Medications chlorhexidine, 15 mL, Mouth/Throat, Q12H  enoxaparin, 1 mg/kg, Subcutaneous, Q12H  folic acid, 1 mg, Nasogastric, Daily  insulin glargine, 20 Units, Subcutaneous, Nightly  insulin regular, 2-9 Units, Subcutaneous, 4x Daily AC & at Bedtime  lansoprazole, 30 mg, Oral, Q AM  Lidocaine, 2 patch, Transdermal, Q24H  metoprolol tartrate, 75 mg, Oral, Q12H  potassium chloride, 40 mEq, Oral, Q4H  sodium chloride, 10 mL, Intravenous, Q12H  sodium hypochlorite, , Topical, BID  terazosin, 2 mg, Nasogastric, Daily  thiamine, 100 mg, Nasogastric, Daily       Infusions       PRN Medications   acetaminophen **OR** acetaminophen    Calcium Replacement - Follow Nurse / BPA Driven Protocol    dextrose    dextrose    glucagon (human recombinant)    HYDROcodone-acetaminophen    labetalol    Magnesium Standard Dose Replacement - Follow Nurse / BPA Driven Protocol    nitroglycerin    ondansetron    Phosphorus Replacement - Follow Nurse / BPA Driven Protocol    Potassium Replacement - Follow Nurse / BPA Driven Protocol    [COMPLETED] Insert Peripheral IV **AND** sodium chloride    sodium chloride    " sodium chloride    sodium chloride     Physical Findings          General Findings alert, oriented, overweight, room air   Oral/Mouth Cavity tooth or teeth missing   Edema  generalized, upper extremity, 1+ (trace), 2+ (mild)   Gastrointestinal normoactive, last bowel movement: 11/11   Skin  pressure injury: bilateral gluteal, surgical incision: midline abdomen, R upper abdomen, R lower abdomen   Tubes/Drains/Lines colostomy, NG tube   NFPE Other: follow up to perform   --  Current Nutrition Orders & Evaluation of Intake       Oral Nutrition     Food Allergies NKFA   Current PO Diet NPO Diet NPO Type: Strict NPO   Supplement n/a   PO Evaluation     % PO Intake N/a    Factors Affecting Intake: altered GI function, altered respiratory status   --   Enteral Nutrition     Enteral Route NG    TF Delivery Method Continuous    Propofol Rate/Kcal     Current TF Order/Rate  Novasource Renal @ 55 mL/hr    TF Goal Rate 55 mL/hr    Current Water Flush 30 mL Q 6 hr    Modular None    TF Residual  no or minimal residual    TF Tolerance tolerating    TF Observation Other: discussed with RN     PES STATEMENT / NUTRITION DIAGNOSIS      Nutrition Dx Problem  Problem: Inadequate Oral Intake  Etiology: Medical Diagnosis - sigmoid perforation, just extubated this am, NGT in place    Signs/Symptoms: NPO and Report/Observation     NUTRITION INTERVENTION / PLAN OF CARE      Intervention Goal(s) Maintain nutrition status, Reduce/improve symptoms, Meet estimated needs, Disease management/therapy, Maintain TF/PN, and No significant weight loss         RD Intervention/Action Continue to monitor and Care plan reviewed   --      Prescription/Orders:       PO Diet       Supplements       Enteral Nutrition    Enteral Prescription:     Enteral Route NG    TF Delivery Method Continuous    Enteral Product Novasource Renal    Modular None    Propofol Rate/Kcal     TF Start Rate  20 mL/hr     TF Goal Rate  55 mL/hr    Free Water Flush 30 mL Q 6 hr     Provision at Goal:          Calories 2420 kcal, meets 100% needs         Protein  110 gm protein, meets 100% needs         Fluid (mL) 859 mL free water + 120 mL in flushes         Parenteral Nutrition    New Prescription Ordered? Continue same per protocol   --      Monitor/Evaluation Per protocol, I&O, Pertinent labs, EN delivery/tolerance, Weight, Skin status, GI status, Symptoms, Swallow function   Discharge Plan/Needs Pending clinical course   --    RD to follow per protocol.      Electronically signed by:  Maryann Franklin RD  11/11/24 16:58 EST

## 2024-11-11 NOTE — NURSING NOTE
"   11/11/24 0916   Colostomy [facility]Q   Placement date: If unknown, DO NOT use \"Add Comment\" note: 10/26/24   Inserted by: Dr Guillaume  Location: [facility]   Stomal Appliance 2 piece;Clean;Dry;Intact;Changed;Drainable   Stoma Appearance moist;red  (oval)   Peristomal Assessment Clean;Intact   Accessories/Skin Care cleansed with water;skin barrier ring   Stoma Function flatus;stool   Stool Color brown, light   Stool Consistency liquid   Treatment Bag change;Site care   Output (mL) 100 mL     WOCN: This is the first day patient is awake, alert to observe ostomy pouch change. He states that doesn't look too difficult. Placed in 2 piece 2 1/4 pedro with adapt ring. Discussed frequency pouch change, when to empty pouch, diet, ADL's, showering. Patient verbalizes understanding.   "

## 2024-11-11 NOTE — THERAPY TREATMENT NOTE
Patient Name: Arsh Haynes  : 1950    MRN: [id number]                              Today's Date: 2024       Admit Date: 10/26/2024    Visit Dx:     ICD-10-CM ICD-9-CM   1. Perforation of sigmoid colon  K63.1 569.83   2. Hypotension, unspecified hypotension type  I95.9 458.9   3. Increased lactic acid level  R79.89 276.2   4. Bowel perforation  K63.1 569.83     Patient Active Problem List   Diagnosis    Colon cancer    HTN (hypertension)    Thrombocytosis    Acute DVT (deep venous thrombosis)    Renal mass    Hyponatremia    Anemia    Peritonitis    Moderate protein-calorie malnutrition     Past Medical History:   Diagnosis Date    HTN (hypertension)      Past Surgical History:   Procedure Laterality Date    COLON RESECTION N/A 10/26/2024    Procedure: LOW ANTERIOR COLON RESECTION SIGMOID, COLOSTOMY, SPLENIC FLEXURE MOBILIZATION;  Surgeon: Mc Guillaume MD;  Location: [facility];  Service: General;  Laterality: N/A;    EXPLORATORY LAPAROTOMY N/A 10/26/2024    Procedure: LAPAROTOMY EXPLORATORY, LEFT HEMICOLECTOMY;  Surgeon: Mc Guillaume MD;  Location: [facility];  Service: General;  Laterality: N/A;      General Information       Row Name 24          Physical Therapy Time and Intention    Document Type therapy note (daily note)  -MG     Mode of Treatment individual therapy;physical therapy  -MG       Row Name 24          General Information    Patient Profile Reviewed yes  -MG     Existing Precautions/Restrictions fall;NPO  Cortrak, colostomy, vazquez  -MG     Barriers to Rehab medically complex  -MG       Row Name 24          Cognition    Orientation Status (Cognition) oriented x 4  -MG       Row Name 24 162          Safety Issues/Impairments Affecting Functional Mobility    Safety Issues Affecting Function (Mobility) awareness of need for assistance;insight into deficits/self-awareness;sequencing abilities;judgment  -MG      Impairments Affecting Function (Mobility) strength;balance;endurance/activity tolerance;coordination;pain;postural/trunk control;range of motion (ROM);cognition  -MG     Comment, Safety Issues/Impairments (Mobility) Gait belt and non-skid socks donned. PT tech present for safe functional mobility.  -MG               User Key  (r) = Recorded By, (t) = Taken By, (c) = Cosigned By      Initials Name Provider Type    MG Liv Cruz, PT Physical Therapist                   Mobility       Row Name 11/11/24 1622          Bed Mobility    Supine-Sit [place] (Bed Mobility) moderate assist (50% patient effort);maximum assist (25% patient effort);2 person assist  -MG     Sit-Supine [place] (Bed Mobility) maximum assist (25% patient effort);2 person assist;verbal cues  -     Assistive Device (Bed Mobility) head of bed elevated;bed rails  -MG     Comment, (Bed Mobility) Cues for sequencing and hand placement. R shoulder cont to limit mobility. Able to assist at LEs for sup>sit.  -       Row Name 11/11/24 1622          Transfers    Comment, (Transfers) One L lateral scoot w/ MaxA x2.  -       Row Name 11/11/24 1622          Sit-Stand Transfer    Sit-Stand [place] (Transfers) moderate assist (50% patient effort);maximum assist (25% patient effort);2 person assist;verbal cues  -MG     Comment, (Sit-Stand Transfer) x1 from elevated EOB w/ B feet/knees blocked and pt holding onto back of PT arms. Able to clear his bottom from the bed, but unable to achieve full upright. Declined further attempts d/t fatigue.  -       Row Name 11/11/24 1622          Gait/Stairs (Locomotion)    Comment, (Gait/Stairs) Able to take one L side steps w/ MaxA x2 via HHA for balance. Flexed fwd and difficulty/pain w/ WB on RLE.  -MG               User Key  (r) = Recorded By, (t) = Taken By, (c) = Cosigned By      Initials Name Provider Type    Liv Sandoval, PT Physical Therapist                   Obj/Interventions       Row Name  11/11/24 1624          Motor Skills    Therapeutic Exercise other (see comments)  AP, LAQ x10. Declined attempting HF.  -MG       Row Name 11/11/24 1624          Balance    Static Sitting Balance contact guard;standby assist  -MG     Position, Sitting Balance sitting edge of bed  -MG               User Key  (r) = Recorded By, (t) = Taken By, (c) = Cosigned By      Initials Name Provider Type    MG Liv Cruz, PT Physical Therapist                   Goals/Plan    No documentation.                  Clinical Impression       Row Name 11/11/24 1625          Pain    Pretreatment Pain Rating 2/10  -MG     Posttreatment Pain Rating 2/10  -MG     Pain Location knee;shoulder  -MG     Pain Side/Orientation right  -MG     Pain Management Interventions premedicated for activity;positioning techniques utilized  -MG     Response to Pain Interventions activity participation with tolerable pain  -MG     Pre/Posttreatment Pain Comment Reports pain patch was applied to R shoulder today that helped. No increase in pain w/ activity.  -MG       Row Name 11/11/24 1625          Plan of Care Review    Plan of Care Reviewed With patient  -MG     Progress improving  -MG     Outcome Evaluation Pt seen for PT tx this PM and tolerated the session fairly. Today, pt was MaxA x2 for bed mobility, one STS, one L side step and one seated lateral scoot to the L along EOB. While EOB pt required CG/SBA to maintain his sitting balance while resting and working on LE ther-ex. Pt remains quick to fatigue and generally weak. Noted pt possibly in a-fib during session w/ HR jumping between 100's - 140s with highest at 148bpm. Pt did c/o dizziness on sitting, but it did not worsen with further activity. SBAR w/ RN. PT will cont to follow.  -MG       Row Name 11/11/24 1625          Therapy Assessment/Plan (PT)    Rehab Potential (PT) good  -MG     Criteria for Skilled Interventions Met (PT) yes  -MG     Therapy Frequency (PT) 5 times/wk  -MG       Row Name  11/11/24 1625          Vital Signs    Pretreatment Heart Rate (beats/min) 115  -MG     Intratreatment Heart Rate (beats/min) 148  -MG     Posttreatment Heart Rate (beats/min) 109  -MG     Pre SpO2 (%) 94  -MG     O2 Delivery Pre Treatment room air  -MG     Intra SpO2 (%) 91  -MG     O2 Delivery Intra Treatment room air  -MG     Post SpO2 (%) 93  -MG     O2 Delivery Post Treatment room air  -MG     Pre Patient Position Supine  -MG     Intra Patient Position Standing  -MG     Post Patient Position Supine  -MG       Row Name 11/11/24 1625          Positioning and Restraints    Pre-Treatment Position in bed  -MG     Post Treatment Position bed  -MG     In Bed notified nsg;supine;fowlers;call light within reach;encouraged to call for assist;exit alarm on;side rails up x3;heels elevated  -MG               User Key  (r) = Recorded By, (t) = Taken By, (c) = Cosigned By      Initials Name Provider Type    Liv Sandoval PT Physical Therapist                   Outcome Measures       Row Name 11/11/24 1629          How much help from another person do you currently need...    Turning from your back to your side while in flat bed without using bedrails? 2  -MG     Moving from lying on back to sitting on the side of a flat bed without bedrails? 2  -MG     Moving to and from a bed to a chair (including a wheelchair)? 1  -MG     Standing up from a chair using your arms (e.g., wheelchair, bedside chair)? 2  -MG     Climbing 3-5 steps with a railing? 1  -MG     To walk in hospital room? 1  -MG     AM-PAC 6 Clicks Score (PT) 9  -MG     Highest Level of Mobility Goal 3 --> Sit at edge of bed  -MG               User Key  (r) = Recorded By, (t) = Taken By, (c) = Cosigned By      Initials Name Provider Type    Liv Sandoval PT Physical Therapist                                 Physical Therapy Education       Title: PT OT SLP Therapies (Done)       Topic: Physical Therapy (Done)       Point: Mobility training (Done)        Learning Progress Summary            Patient Acceptance, E, VU,NR by MG at 11/11/2024 1629    Acceptance, E, NR,VU by EF at 11/10/2024 1127    Acceptance, E, VU by JS at 11/8/2024 1820    Acceptance, E,D, VU,NR by MG at 11/8/2024 1459    Acceptance, E, VU by CW at 11/2/2024 1622                      Point: Home exercise program (Done)       Learning Progress Summary            Patient Acceptance, E, VU,NR by MG at 11/11/2024 1629    Acceptance, E, VU by JS at 11/8/2024 1820                      Point: Body mechanics (Done)       Learning Progress Summary            Patient Acceptance, E, VU,NR by MG at 11/11/2024 1629    Acceptance, E, NR,VU by EF at 11/10/2024 1127    Acceptance, E, VU by JS at 11/8/2024 1820    Acceptance, E,D, VU,NR by MG at 11/8/2024 1459    Acceptance, E, VU by CW at 11/2/2024 1622                      Point: Precautions (Done)       Learning Progress Summary            Patient Acceptance, E, VU,NR by MG at 11/11/2024 1629    Acceptance, E, VU by JS at 11/8/2024 1820    Acceptance, E,D, VU,NR by MG at 11/8/2024 1459    Acceptance, E, VU by CW at 11/2/2024 1622                                      User Key       Initials Effective Dates Name Provider Type Discipline    EF 05/31/24 -  Sabine Tineo, PT Physical Therapist PT    MG 05/24/22 -  Liv Cruz, PT Physical Therapist PT     12/13/22 -  Lisa Pauslon, PT Physical Therapist PT     02/26/24 -  Johnson Morales, RN Registered Nurse Nurse                  PT Recommendation and Plan     Progress: improving  Outcome Evaluation: Pt seen for PT tx this PM and tolerated the session fairly. Today, pt was MaxA x2 for bed mobility, one STS, one L side step and one seated lateral scoot to the L along EOB. While EOB pt required CG/SBA to maintain his sitting balance while resting and working on LE ther-ex. Pt remains quick to fatigue and generally weak. Noted pt possibly in a-fib during session w/ HR jumping between 100's - 140s with  highest at 148bpm. Pt did c/o dizziness on sitting, but it did not worsen with further activity. SBAR w/ RN. PT will cont to follow.     Time Calculation:         PT Charges       Row Name 11/11/24 1630             Time Calculation    Start Time 1415  -MG      Stop Time 1429  -MG      Time Calculation (min) 14 min  -MG      PT Received On 11/11/24  -MG      PT - Next Appointment 11/12/24  -MG                User Key  (r) = Recorded By, (t) = Taken By, (c) = Cosigned By      Initials Name Provider Type    MG Liv Cruz, PT Physical Therapist                  Therapy Charges for Today       Code Description Service Date Service Provider Modifiers Qty    51328599562 HC PT THERAPEUTIC ACT EA 15 MIN 11/11/2024 Liv Cruz, PT GP 1    28743935222 HC PT THER SUPP EA 15 MIN 11/11/2024 Liv Cruz, PT GP 1            PT G-Codes  Outcome Measure Options: AM-PAC 6 Clicks Daily Activity (OT)  AM-PAC 6 Clicks Score (PT): 9  AM-PAC 6 Clicks Score (OT): 9  Modified Fabian Scale: 5 - Severe disability.  Bedridden, incontinent, and requiring constant nursing care and attention.  PT Discharge Summary  Anticipated Discharge Disposition (PT): skilled nursing facility    Liv Cruz PT  11/11/2024

## 2024-11-11 NOTE — CASE MANAGEMENT/SOCIAL WORK
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  Patient has NG tube with continuous feeds.  [facility] is following.............[facility]                   Discharge Codes    No documentation.                 Expected Discharge Date and Time       Expected Discharge Date Expected Discharge Time    Nov 11, 2024               La Mcnam[facility] RN

## 2024-11-11 NOTE — PLAN OF CARE
Goal Outcome Evaluation:  Patient alert and oriented. RA. Tube feed via NG. Wound care completed. Medicated per MAR. CT abd and kidney. EKG for tachycardia. Potassium replaced per protocol. Plan of care ongoing.

## 2024-11-11 NOTE — PLAN OF CARE
Goal Outcome Evaluation:  Plan of Care Reviewed With: patient        Progress: improving  Outcome Evaluation: Pt seen for PT tx this PM and tolerated the session fairly. Today, pt was MaxA x2 for bed mobility, one STS, one L side step and one seated lateral scoot to the L along EOB. While EOB pt required CG/SBA to maintain his sitting balance while resting and working on LE ther-ex. Pt remains quick to fatigue and generally weak. Noted pt possibly in a-fib during session w/ HR jumping between 100's - 140s with highest at 148bpm. Pt did c/o dizziness on sitting, but it did not worsen with further activity. SBAR w/ RN. PT will cont to follow.    Anticipated Discharge Disposition (PT): skilled nursing facility

## 2024-11-12 LAB
ALBUMIN SERPL-MCNC: 1.9 G/DL (ref 3.5–5.2)
ALBUMIN/GLOB SERPL: 0.5 G/DL
ALP SERPL-CCNC: 234 U/L (ref 39–117)
ALT SERPL W P-5'-P-CCNC: 35 U/L (ref 1–41)
ANION GAP SERPL CALCULATED.3IONS-SCNC: 10.2 MMOL/L (ref 5–15)
AST SERPL-CCNC: 28 U/L (ref 1–40)
BASOPHILS # BLD AUTO: 0.14 10*3/MM3 (ref 0–0.2)
BASOPHILS NFR BLD AUTO: 1.1 % (ref 0–1.5)
BILIRUB SERPL-MCNC: 0.3 MG/DL (ref 0–1.2)
BUN SERPL-MCNC: 11 MG/DL (ref 8–23)
BUN/CREAT SERPL: 26.2 (ref 7–25)
CALCIUM SPEC-SCNC: 8.3 MG/DL (ref 8.6–10.5)
CHLORIDE SERPL-SCNC: 106 MMOL/L (ref 98–107)
CO2 SERPL-SCNC: 18.8 MMOL/L (ref 22–29)
CREAT SERPL-MCNC: 0.42 MG/DL (ref 0.76–1.27)
DEPRECATED RDW RBC AUTO: 52.6 FL (ref 37–54)
EGFRCR SERPLBLD CKD-EPI 2021: 114.2 ML/MIN/1.73
EOSINOPHIL # BLD AUTO: 0.49 10*3/MM3 (ref 0–0.4)
EOSINOPHIL NFR BLD AUTO: 3.9 % (ref 0.3–6.2)
ERYTHROCYTE [DISTWIDTH] IN BLOOD BY AUTOMATED COUNT: 17 % (ref 12.3–15.4)
GLOBULIN UR ELPH-MCNC: 4 GM/DL
GLUCOSE BLDC GLUCOMTR-MCNC: 168 MG/DL (ref 70–130)
GLUCOSE BLDC GLUCOMTR-MCNC: 186 MG/DL (ref 70–130)
GLUCOSE BLDC GLUCOMTR-MCNC: 186 MG/DL (ref 70–130)
GLUCOSE BLDC GLUCOMTR-MCNC: 191 MG/DL (ref 70–130)
GLUCOSE SERPL-MCNC: 186 MG/DL (ref 65–99)
HCT VFR BLD AUTO: 30.1 % (ref 37.5–51)
HGB BLD-MCNC: 9.6 G/DL (ref 13–17.7)
IMM GRANULOCYTES # BLD AUTO: 0.55 10*3/MM3 (ref 0–0.05)
IMM GRANULOCYTES NFR BLD AUTO: 4.4 % (ref 0–0.5)
LYMPHOCYTES # BLD AUTO: 1.36 10*3/MM3 (ref 0.7–3.1)
LYMPHOCYTES NFR BLD AUTO: 10.9 % (ref 19.6–45.3)
MCH RBC QN AUTO: 27.5 PG (ref 26.6–33)
MCHC RBC AUTO-ENTMCNC: 31.9 G/DL (ref 31.5–35.7)
MCV RBC AUTO: 86.2 FL (ref 79–97)
MONOCYTES # BLD AUTO: 1.15 10*3/MM3 (ref 0.1–0.9)
MONOCYTES NFR BLD AUTO: 9.3 % (ref 5–12)
NEUTROPHILS NFR BLD AUTO: 70.4 % (ref 42.7–76)
NEUTROPHILS NFR BLD AUTO: 8.74 10*3/MM3 (ref 1.7–7)
NRBC BLD AUTO-RTO: 0 /100 WBC (ref 0–0.2)
PLATELET # BLD AUTO: 953 10*3/MM3 (ref 140–450)
PMV BLD AUTO: 8.1 FL (ref 6–12)
POTASSIUM SERPL-SCNC: 3.9 MMOL/L (ref 3.5–5.2)
PROT SERPL-MCNC: 5.9 G/DL (ref 6–8.5)
RBC # BLD AUTO: 3.49 10*6/MM3 (ref 4.14–5.8)
SODIUM SERPL-SCNC: 135 MMOL/L (ref 136–145)
WBC NRBC COR # BLD AUTO: 12.43 10*3/MM3 (ref 3.4–10.8)

## 2024-11-12 PROCEDURE — 82948 REAGENT STRIP/BLOOD GLUCOSE: CPT

## 2024-11-12 PROCEDURE — 97530 THERAPEUTIC ACTIVITIES: CPT

## 2024-11-12 PROCEDURE — 63710000001 INSULIN REGULAR HUMAN PER 5 UNITS: Performed by: HOSPITALIST

## 2024-11-12 PROCEDURE — 63710000001 INSULIN GLARGINE PER 5 UNITS: Performed by: STUDENT IN AN ORGANIZED HEALTH CARE EDUCATION/TRAINING PROGRAM

## 2024-11-12 PROCEDURE — 99024 POSTOP FOLLOW-UP VISIT: CPT | Performed by: SURGERY

## 2024-11-12 PROCEDURE — 99232 SBSQ HOSP IP/OBS MODERATE 35: CPT | Performed by: INTERNAL MEDICINE

## 2024-11-12 PROCEDURE — 85025 COMPLETE CBC W/AUTO DIFF WBC: CPT | Performed by: INTERNAL MEDICINE

## 2024-11-12 PROCEDURE — 97535 SELF CARE MNGMENT TRAINING: CPT

## 2024-11-12 PROCEDURE — 25010000002 ENOXAPARIN PER 10 MG: Performed by: INTERNAL MEDICINE

## 2024-11-12 PROCEDURE — 94799 UNLISTED PULMONARY SVC/PX: CPT

## 2024-11-12 PROCEDURE — 94760 N-INVAS EAR/PLS OXIMETRY 1: CPT

## 2024-11-12 PROCEDURE — 80053 COMPREHEN METABOLIC PANEL: CPT | Performed by: INTERNAL MEDICINE

## 2024-11-12 RX ADMIN — HYDROCODONE BITARTRATE AND ACETAMINOPHEN 1 TABLET: 5; 325 TABLET ORAL at 03:36

## 2024-11-12 RX ADMIN — Medication 100 MG: at 08:56

## 2024-11-12 RX ADMIN — Medication 10 ML: at 08:57

## 2024-11-12 RX ADMIN — LIDOCAINE 2 PATCH: 4 PATCH TOPICAL at 08:57

## 2024-11-12 RX ADMIN — Medication 10 ML: at 21:07

## 2024-11-12 RX ADMIN — ENOXAPARIN SODIUM 90 MG: 100 INJECTION SUBCUTANEOUS at 17:12

## 2024-11-12 RX ADMIN — SODIUM HYPOCHLORITE: 1.25 SOLUTION TOPICAL at 21:08

## 2024-11-12 RX ADMIN — INSULIN HUMAN 2 UNITS: 100 INJECTION, SOLUTION PARENTERAL at 11:23

## 2024-11-12 RX ADMIN — SODIUM HYPOCHLORITE: 1.25 SOLUTION TOPICAL at 08:56

## 2024-11-12 RX ADMIN — ENOXAPARIN SODIUM 90 MG: 100 INJECTION SUBCUTANEOUS at 06:46

## 2024-11-12 RX ADMIN — INSULIN HUMAN 2 UNITS: 100 INJECTION, SOLUTION PARENTERAL at 06:46

## 2024-11-12 RX ADMIN — INSULIN GLARGINE 25 UNITS: 100 INJECTION, SOLUTION SUBCUTANEOUS at 21:08

## 2024-11-12 RX ADMIN — CHLORHEXIDINE GLUCONATE 15 ML: 1.2 RINSE ORAL at 21:07

## 2024-11-12 RX ADMIN — FOLIC ACID 1 MG: 1 TABLET ORAL at 08:56

## 2024-11-12 RX ADMIN — METOPROLOL TARTRATE 75 MG: 50 TABLET, FILM COATED ORAL at 21:07

## 2024-11-12 RX ADMIN — METOPROLOL TARTRATE 75 MG: 50 TABLET, FILM COATED ORAL at 08:56

## 2024-11-12 RX ADMIN — INSULIN HUMAN 2 UNITS: 100 INJECTION, SOLUTION PARENTERAL at 21:08

## 2024-11-12 RX ADMIN — TERAZOSIN 2 MG: 2 CAPSULE ORAL at 21:07

## 2024-11-12 RX ADMIN — CHLORHEXIDINE GLUCONATE 15 ML: 1.2 RINSE ORAL at 08:57

## 2024-11-12 RX ADMIN — INSULIN HUMAN 2 UNITS: 100 INJECTION, SOLUTION PARENTERAL at 17:12

## 2024-11-12 RX ADMIN — LANSOPRAZOLE 30 MG: 15 TABLET, ORALLY DISINTEGRATING ORAL at 06:45

## 2024-11-12 NOTE — PROGRESS NOTES
REASON FOR FOLLOWUP/CHIEF COMPLAINT:  Thrombocytosis, DVT, colon cancer     HISTORY OF PRESENT ILLNESS:   No new problems.  Urology plans to follow him up in the office for what is suspected to be a renal cell carcinoma on scans (after he has some time to recover from colon surgery).    Past Medical History, Past Surgical History, Social History, Family History have been reviewed and are without significant changes except as mentioned.    Review of Systems   Review of Systems   Constitutional:  Negative for activity change.   HENT:  Negative for nosebleeds and trouble swallowing.    Respiratory:  Negative for shortness of breath and wheezing.    Cardiovascular:  Negative for chest pain and palpitations.   Gastrointestinal:  Negative for constipation, diarrhea and nausea.   Genitourinary:  Negative for dysuria and hematuria.   Musculoskeletal:  Negative for arthralgias and myalgias.   Neurological:  Negative for seizures and syncope.   Hematological:  Negative for adenopathy. Does not bruise/bleed easily.   Psychiatric/Behavioral:  Negative for confusion.        Medications:  The current medication list was reviewed in the EMR    ALLERGIES:  No Known Allergies           Vitals:    11/11/24 1211 11/11/24 1958 11/11/24 2342 11/12/24 0711   BP: 138/59 140/70 120/61 132/64   BP Location: Left arm Left arm Left arm Left arm   Patient Position: Lying Lying Lying Lying   Pulse: 109 114 92 113   Resp: 18 18 18 18   Temp: 98.1 °F (36.7 °C) 97.6 °F (36.4 °C) 97.5 °F (36.4 °C) 97.7 °F (36.5 °C)   TempSrc: Oral Oral Oral Oral   SpO2: 95% 95% 95% 96%   Weight:       Height:         Physical Exam    CONSTITUTIONAL:  Vital signs reviewed.  No distress, looks comfortable.  EYES:  Conjunctivae and lids unremarkable.  PERRLA  EARS, NOSE, MOUTH, THROAT:  Ears and nose appear unremarkable.  Lips, teeth, gums appear unremarkable.  RESPIRATORY:  Normal respiratory effort.  Lungs clear to auscultation bilaterally.  CARDIOVASCULAR:   Normal S1, S2.  No murmurs, rubs or gallops.  No significant lower extremity edema.  GASTROINTESTINAL: Abdomen appears unremarkable.  Nontender.  No hepatomegaly.  No splenomegaly.  NEURO: Cranial nerves 2-12 grossly intact.  No focal deficits.  Appears to have equal strength all 4 extremities.  MUSCULOSKELETAL:  Unremarkable digits/nails.  No cyanosis or clubbing.  SKIN:  Warm.  No rashes.  PSYCHIATRIC:  Normal judgment and insight.  Normal mood and affect.        RECENT LABS:  WBC   Date Value Ref Range Status   11/12/2024 12.43 (H) 3.40 - 10.80 10*3/mm3 Final   11/11/2024 14.75 (H) 3.40 - 10.80 10*3/mm3 Final   11/10/2024 19.04 (H) 3.40 - 10.80 10*3/mm3 Final     Hemoglobin   Date Value Ref Range Status   11/12/2024 9.6 (L) 13.0 - 17.7 g/dL Final   11/11/2024 9.7 (L) 13.0 - 17.7 g/dL Final   11/10/2024 9.3 (L) 13.0 - 17.7 g/dL Final     Platelets   Date Value Ref Range Status   11/12/2024 953 (H) 140 - 450 10*3/mm3 Final   11/11/2024 1,040 (C) 140 - 450 10*3/mm3 Final   11/10/2024 1,089 (C) 140 - 450 10*3/mm3 Final       ASSESSMENT/PLAN:  Arsh Haynes 3105/1      Septic shock from perforated bowel from underlying malignancy  Subsequent abdominal cultures positive for Pseudomonas, E. coli and mixed anaerobes, negative blood cultures  Completed antibiotics     *Synchronous colon cancers (both T3N0)  Both malignancies are T3 N0-stage IIA-and as we consider his concurrent renal mass and performance status, adjuvant chemotherapy must be considered particularly with invasion of the descending colon mass into pericolonic and perirectal tissue-apparently the the site of perforation.  These issues discussed with general surgery.  MSI status-negative by IHC, PCR-microsatellite stable.  This is particularly useful considering the patient's family history and findings of synchronous colon cancer.  Plan follow-up to determine whether he would be a candidate for adjuvant therapy but we would have to clarify/treat his renal  mass initially.     *Suspected renal cell carcinoma based on kidney mass protocol CT, 11/11/2024.  Urology plans to follow him up in the office after he has some time to recover from colon surgery    *Anemia  Suspected multifactorial anemia, additional laboratory studies consistent with anemia of chronic disease  Transfusion anticipated 11/9/2024  Assessment 11/10/2024 H9.3 and 30.5  Hb 9.6, from 9.7      *Leukocytosis  Agree that this is multifactorial but notedly right shifted with predominant neutrophilia and no immature forms.  This is a reactive leukocytosis usually related to concurrent physiologic stress and infection.  Continues to be variable-reflective of underlying processes including likely additional malignancy  WBC 12.4, from 14.8     *Thrombocytosis  This is also reactive developing gradually postoperative and does not need to be addressed with cytoreductive medications.  Additional laboratory studies per iron deficiency were completed and found to be negative  Continue to monitor, gradually improving  , from 1040     *Acute right lower extremity DVT in gastrocnemius and soleal  Patient now on anticoagulation with Lovenox appropriately.  We will follow and transition to DOAC therapy as he further recovers.     *Family history of kidney cancer: Brother and father    Plan  On Lovenox for right leg DVT.  When ready for oral anticoagulants per other physicians, I usually prefer Eliquis, but any would be reasonable  Two separate synchronous stage II colon cancers, but presented with perforation so higher risk.  Can follow-up with Dr. Serra as an outpatient to determine if adjuvant chemotherapy would be appropriate.  He needs some time to recover from septic shock from bowel perforation from underlying malignancy.  He is currently slowly recovering with a feeding tube in place.  Noted urology feels he has a renal cell carcinoma based on scans.  Patient's brother and father also with kidney cancer.   Message sent to Dr. Serra to update him.

## 2024-11-12 NOTE — PLAN OF CARE
Goal Outcome Evaluation:  Patient alert and oriented. Wound care provided. Tube feed infusing. Patient frequently turned and positioned. Plan of care ongoing.

## 2024-11-12 NOTE — THERAPY TREATMENT NOTE
Patient Name: Arsh Haynes  : 1950    MRN: [id number]                              Today's Date: 2024       Admit Date: 10/26/2024    Visit Dx:     ICD-10-CM ICD-9-CM   1. Perforation of sigmoid colon  K63.1 569.83   2. Hypotension, unspecified hypotension type  I95.9 458.9   3. Increased lactic acid level  R79.89 276.2   4. Bowel perforation  K63.1 569.83     Patient Active Problem List   Diagnosis    Colon cancer    HTN (hypertension)    Thrombocytosis    Acute DVT (deep venous thrombosis)    Renal mass    Hyponatremia    Anemia    Peritonitis    Moderate protein-calorie malnutrition     Past Medical History:   Diagnosis Date    HTN (hypertension)      Past Surgical History:   Procedure Laterality Date    COLON RESECTION N/A 10/26/2024    Procedure: LOW ANTERIOR COLON RESECTION SIGMOID, COLOSTOMY, SPLENIC FLEXURE MOBILIZATION;  Surgeon: Mc Guillaume MD;  Location: [facility];  Service: General;  Laterality: N/A;    EXPLORATORY LAPAROTOMY N/A 10/26/2024    Procedure: LAPAROTOMY EXPLORATORY, LEFT HEMICOLECTOMY;  Surgeon: Mc Guillaume MD;  Location: [facility];  Service: General;  Laterality: N/A;      General Information       Row Name 24 1548          Physical Therapy Time and Intention    Document Type therapy note (daily note)  -MG     Mode of Treatment co-treatment;occupational therapy;physical therapy;other (see comments)  -MG       Row Name 24 1548          General Information    Patient Profile Reviewed yes  -MG     Existing Precautions/Restrictions fall;NPO  -MG     Barriers to Rehab medically complex  -MG       Row Name 24 1542          Cognition    Orientation Status (Cognition) oriented x 4  -MG       Row Name 24 1545          Safety Issues/Impairments Affecting Functional Mobility    Safety Issues Affecting Function (Mobility) insight into deficits/self-awareness;judgment;safety precaution awareness;sequencing abilities  -MG      Impairments Affecting Function (Mobility) strength;balance;endurance/activity tolerance;coordination;pain;postural/trunk control;range of motion (ROM);cognition  -MG     Cognitive Impairments, Mobility Safety/Performance insight into deficits/self-awareness;judgment;safety precaution awareness;sequencing abilities  -MG     Comment, Safety Issues/Impairments (Mobility) Co treatment medically appropriate and necessary due to patient acuity level, activity tolerance and safety of patient and staff. Treatment is focusing on progression of care and goals established in the POC. Gait belt and non-skid socks donned.  -MG               User Key  (r) = Recorded By, (t) = Taken By, (c) = Cosigned By      Initials Name Provider Type    MG Liv Cruz, PT Physical Therapist                   Mobility       Row Name 11/12/24 1548          Bed Mobility    Bed Mobility scooting/bridging  -MG     Scooting/Bridging [place] (Bed Mobility) maximum assist (25% patient effort);2 person assist;verbal cues  -MG     Sit-Supine [place] (Bed Mobility) maximum assist (25% patient effort);2 person assist;verbal cues  -MG     Assistive Device (Bed Mobility) head of bed elevated;bed rails  -MG     Comment, (Bed Mobility) On arrival pt sitting EOB w/ OT. Pt fatigued and lethargic at end of session requiring increased assist to return to supine.  -MG       Row Name 11/12/24 1548          Sit-Stand Transfer    Sit-Stand [place] (Transfers) moderate assist (50% patient effort);maximum assist (25% patient effort);2 person assist;verbal cues  -MG     Comment, (Sit-Stand Transfer) Arm in arm. B feet/knees blocked, but did not buckle as he was pushing into the bed. Able to clear the bed, but unable to come to full standing. Unable to tolerate second stand or lateral scooting along EOB d/t fatigue.  -MG       Row Name 11/12/24 1548          Gait/Stairs (Locomotion)    [place] Level (Gait) maximum assist (25% patient effort);2  person assist;verbal cues  -MG     Assistive Device (Gait) --  arm in arm  -MG     Distance in Feet (Gait) --  2 R side steps  -MG     Deviations/Abnormal Patterns (Gait) stride length decreased;gait speed decreased  -MG     Bilateral Gait Deviations forward flexed posture;heel strike decreased  -MG     Comment, (Gait/Stairs) Able to take two R side steps w/ MaxA x2 via arm-in-arm. Cues for upright posture and head up. B knees blocked for safety, but no overt knee buckling noted.  -MG               User Key  (r) = Recorded By, (t) = Taken By, (c) = Cosigned By      Initials Name Provider Type    MG Liv Cruz, PT Physical Therapist                   Obj/Interventions       Row Name 11/12/24 5499          Motor Skills    Therapeutic Exercise other (see comments)  AP, LAQ x10  -MG       Row Name 11/12/24 3337          Balance    Comment, Balance Tolerated sitting EOB approx 15 min w/ varying assist from SV to Lory as he fatigued. Cues and education on increasing his upright/sitting tolerance. Worked on cervical extension and rotation, as well as, fxn mobility and LE ther-ex.  -MG               User Key  (r) = Recorded By, (t) = Taken By, (c) = Cosigned By      Initials Name Provider Type    MG Liv Cruz, PT Physical Therapist                   Goals/Plan    No documentation.                  Clinical Impression       Row Name 11/12/24 7353          Pain    Pretreatment Pain Rating 2/10  -MG     Posttreatment Pain Rating 2/10  -MG     Pain Location knee;shoulder  -MG     Pain Side/Orientation right  -MG     Pain Management Interventions premedicated for activity;positioning techniques utilized  -MG     Response to Pain Interventions activity participation with tolerable pain  -MG       Row Name 11/12/24 5491          Plan of Care Review    Plan of Care Reviewed With patient  -MG     Progress improving  -MG     Outcome Evaluation Pt seen for PT/OT tx this PM, with this PT arriving after OT assisted pt into  sitting EOB. Pt cont to become quick to fatigue and requires max encouragement/education, but was able to progress his activity tolerance. Today, pt was ModA x1 w/ sup>sit per OT, MaxA x2 for one STS and 2 R side steps w/ arm-in-arm assist and B feet/knees blocked for safety, but no knee buckling was noted. Pt cont to have difficulty w/ upright posture and cervical extension in standing, but in sitting his assist level varied from SV up to Lory as he fatigued. HR highest at 133 during the session and O2 dropped to 88% after side steps, but up to 95% after seated rest. SBAR w/ RN. PT will cont to follow.  -MG       Row Name 11/12/24 3984          Therapy Assessment/Plan (PT)    Rehab Potential (PT) good  -MG     Criteria for Skilled Interventions Met (PT) yes  -MG     Therapy Frequency (PT) 5 times/wk  -MG       Row Name 11/12/24 9174          Vital Signs    Pretreatment Heart Rate (beats/min) 113  -MG     Intratreatment Heart Rate (beats/min) 133  -MG     Posttreatment Heart Rate (beats/min) 115  -MG     Pre SpO2 (%) 99  -MG     O2 Delivery Pre Treatment room air  -MG     Intra SpO2 (%) 88  -MG     O2 Delivery Intra Treatment room air  -MG     Post SpO2 (%) 95  -MG     O2 Delivery Post Treatment room air  -MG     Pre Patient Position Supine  -MG     Intra Patient Position Standing  -MG     Post Patient Position Supine  -MG       Row Name 11/12/24 4893          Positioning and Restraints    Pre-Treatment Position in bed  -MG     Post Treatment Position bed  -MG     In Bed notified nsg;supine;fowlers;call light within reach;encouraged to call for assist;exit alarm on;side rails up x3;heels elevated  -MG               User Key  (r) = Recorded By, (t) = Taken By, (c) = Cosigned By      Initials Name Provider Type    MG Liv Cruz, PT Physical Therapist                   Outcome Measures       Row Name 11/12/24 8175          How much help from another person do you currently need...    Turning from your back to your  side while in flat bed without using bedrails? 2  -MG     Moving from lying on back to sitting on the side of a flat bed without bedrails? 2  -MG     Moving to and from a bed to a chair (including a wheelchair)? 1  -MG     Standing up from a chair using your arms (e.g., wheelchair, bedside chair)? 2  -MG     Climbing 3-5 steps with a railing? 1  -MG     To walk in hospital room? 1  -MG     AM-PAC 6 Clicks Score (PT) 9  -MG     Highest Level of Mobility Goal 3 --> Sit at edge of bed  -MG       Row Name 11/12/24 1521          Functional Assessment    Outcome Measure Options AM-PAC 6 Clicks Daily Activity (OT)  -BC               User Key  (r) = Recorded By, (t) = Taken By, (c) = Cosigned By      Initials Name Provider Type    Liv Sandoval, PT Physical Therapist    BC Ricky Hernandez, OT Occupational Therapist                                 Physical Therapy Education       Title: PT OT SLP Therapies (Done)       Topic: Physical Therapy (Done)       Point: Mobility training (Done)       Learning Progress Summary            Patient Acceptance, E, VU,NR by MG at 11/12/2024 1559    Acceptance, E, VU by JS at 11/11/2024 1813    Acceptance, E, VU,NR by MG at 11/11/2024 1629    Acceptance, E, NR,VU by EF at 11/10/2024 1127    Acceptance, E, VU by JS at 11/8/2024 1820    Acceptance, E,D, VU,NR by MG at 11/8/2024 1459    Acceptance, E, VU by CW at 11/2/2024 1622                      Point: Home exercise program (Done)       Learning Progress Summary            Patient Acceptance, E, VU,NR by MG at 11/12/2024 1559    Acceptance, E, VU by JS at 11/11/2024 1813    Acceptance, E, VU,NR by MG at 11/11/2024 1629    Acceptance, E, VU by JS at 11/8/2024 1820                      Point: Body mechanics (Done)       Learning Progress Summary            Patient Acceptance, E, VU,NR by MG at 11/12/2024 1559    Acceptance, E, VU by JS at 11/11/2024 1813    Acceptance, E, VU,NR by MG at 11/11/2024 1629    Acceptance, E, NR,VU by EF at  11/10/2024 1127    Acceptance, E, VU by JS at 11/8/2024 1820    Acceptance, E,D, VU,NR by MG at 11/8/2024 1459    Acceptance, E, VU by CW at 11/2/2024 1622                      Point: Precautions (Done)       Learning Progress Summary            Patient Acceptance, E, VU,NR by MG at 11/12/2024 1559    Acceptance, E, VU by JS at 11/11/2024 1813    Acceptance, E, VU,NR by MG at 11/11/2024 1629    Acceptance, E, VU by JS at 11/8/2024 1820    Acceptance, E,D, VU,NR by MG at 11/8/2024 1459    Acceptance, E, VU by CW at 11/2/2024 1622                                      User Key       Initials Effective Dates Name Provider Type Discipline    EF 05/31/24 -  Sabine Tineo, PT Physical Therapist PT    MG 05/24/22 -  Liv Cruz, PT Physical Therapist PT    CW 12/13/22 -  Lisa Paulson, PT Physical Therapist PT    JS 02/26/24 -  Johnson Morales, RN Registered Nurse Nurse                  PT Recommendation and Plan     Progress: improving  Outcome Evaluation: Pt seen for PT/OT tx this PM, with this PT arriving after OT assisted pt into sitting EOB. Pt cont to become quick to fatigue and requires max encouragement/education, but was able to progress his activity tolerance. Today, pt was ModA x1 w/ sup>sit per OT, MaxA x2 for one STS and 2 R side steps w/ arm-in-arm assist and B feet/knees blocked for safety, but no knee buckling was noted. Pt cont to have difficulty w/ upright posture and cervical extension in standing, but in sitting his assist level varied from SV up to Lory as he fatigued. HR highest at 133 during the session and O2 dropped to 88% after side steps, but up to 95% after seated rest. SBAR w/ RN. PT will cont to follow.     Time Calculation:         PT Charges       Row Name 11/12/24 1559             Time Calculation    Start Time 1405  -MG      Stop Time 1422  -MG      Time Calculation (min) 17 min  -MG      PT Received On 11/12/24  -MG      PT - Next Appointment 11/13/24  -MG                User  Key  (r) = Recorded By, (t) = Taken By, (c) = Cosigned By      Initials Name Provider Type     Liv Cruz, PT Physical Therapist                  Therapy Charges for Today       Code Description Service Date Service Provider Modifiers Qty    [id number]  PT THERAPEUTIC ACT EA 15 MIN 11/11/2024 Liv Cruz, PT GP 1    [id number]  PT THER SUPP EA 15 MIN 11/11/2024 Liv Cruz, PT GP 1    [id number]  PT THERAPEUTIC ACT EA 15 MIN 11/12/2024 Liv Cruz, PT GP 1            PT G-Codes  Outcome Measure Options: AM-PAC 6 Clicks Daily Activity (OT)  AM-PAC 6 Clicks Score (PT): 9  AM-PAC 6 Clicks Score (OT): 10  Modified [place] Scale: 5 - Severe disability.  Bedridden, incontinent, and requiring constant nursing care and attention.  PT Discharge Summary  Anticipated Discharge Disposition (PT): skilled nursing facility    Liv Cruz PT  11/12/2024

## 2024-11-12 NOTE — PROGRESS NOTES
Postoperative day 16 status post left colectomy with colostomy     S: Dysphagia persists.  Otherwise no events overnight    O:   Vitals:    11/11/24 2342 11/12/24 0711 11/12/24 1114 11/12/24 1305   BP: 120/61 132/64  111/51   BP Location: Left arm Left arm  Left arm   Patient Position: Lying Lying  Lying   Pulse: 92 113 102 107   Resp: 18 18  18   Temp: 97.5 °F (36.4 °C) 97.7 °F (36.5 °C)  97.9 °F (36.6 °C)   TempSrc: Oral Oral  Oral   SpO2: 95% 96% 96% 95%   Weight:       Height:          Alert, no acute distress  Breathing comfortable  Tachycardic at times, otherwise regular rhythm  Abdomen soft, nontender nondistended, midline incision open with clean tissue at the base    White blood cell count 12,000, downtrending, hemoglobin 9.6  Platelets 953  Albumin 1.9    Assessment and plan    Postoperative day 16 status post left colectomy with colostomy, slowly recovery.  He is tolerating tube feeds and having good bowel function.  White blood cell count is improving    Continue working with speech therapy, wound VAC to be placed through his midline incision tomorrow  Hopefully reevaluate his swallowing on Friday so he could avoid PEG tube placement    He understood

## 2024-11-12 NOTE — THERAPY TREATMENT NOTE
Patient Name: Arsh Haynes  : 1950    MRN: [id number]                              Today's Date: 2024       Admit Date: 10/26/2024    Visit Dx:     ICD-10-CM ICD-9-CM   1. Perforation of sigmoid colon  K63.1 569.83   2. Hypotension, unspecified hypotension type  I95.9 458.9   3. Increased lactic acid level  R79.89 276.2   4. Bowel perforation  K63.1 569.83     Patient Active Problem List   Diagnosis    Colon cancer    HTN (hypertension)    Thrombocytosis    Acute DVT (deep venous thrombosis)    Renal mass    Hyponatremia    Anemia    Peritonitis    Moderate protein-calorie malnutrition     Past Medical History:   Diagnosis Date    HTN (hypertension)      Past Surgical History:   Procedure Laterality Date    COLON RESECTION N/A 10/26/2024    Procedure: LOW ANTERIOR COLON RESECTION SIGMOID, COLOSTOMY, SPLENIC FLEXURE MOBILIZATION;  Surgeon: Mc Guillaume MD;  Location: [facility];  Service: General;  Laterality: N/A;    EXPLORATORY LAPAROTOMY N/A 10/26/2024    Procedure: LAPAROTOMY EXPLORATORY, LEFT HEMICOLECTOMY;  Surgeon: Mc Guillaume MD;  Location: [facility];  Service: General;  Laterality: N/A;      General Information       Row Name 24 1511          OT Time and Intention    Subjective Information complains of;fatigue  -BC     Document Type therapy note (daily note)  -BC     Mode of Treatment --  initiated OT session w/ PT assisting at the end for increased safety and progression sit<>Stand and due to decreased endurance for participation  -BC     Patient Effort adequate  -BC     Symptoms Noted During/After Treatment dizziness  -BC     Comment c/o dizziness upon sitting up, BP stable at 138/66  -BC       Row Name 24 1511          General Information    Patient Profile Reviewed yes  -BC     Existing Precautions/Restrictions fall;NPO  -BC       Row Name 24 1511          Cognition    Orientation Status (Cognition) oriented x 4  -BC       Row Name  11/12/24 1511          Safety Issues/Impairments Affecting Functional Mobility    Safety Issues Affecting Function (Mobility) judgment;safety precaution awareness;insight into deficits/self-awareness;sequencing abilities  -BC     Impairments Affecting Function (Mobility) strength;balance;endurance/activity tolerance;coordination;pain;postural/trunk control;range of motion (ROM);cognition  -BC     Cognitive Impairments, Mobility Safety/Performance safety precaution awareness;judgment;problem-solving/reasoning  -BC               User Key  (r) = Recorded By, (t) = Taken By, (c) = Cosigned By      Initials Name Provider Type    BC Ricky Hernandez OT Occupational Therapist                     Mobility/ADL's       Row Name 11/12/24 1513          Bed Mobility    Bed Mobility supine-sit;sit-supine  -BC     Supine-Sit [place] (Bed Mobility) moderate assist (50% patient effort);1 person assist  -BC     Sit-Supine [place] (Bed Mobility) maximum assist (25% patient effort);2 person assist;verbal cues  -BC     Assistive Device (Bed Mobility) head of bed elevated;bed rails  -BC     Comment, (Bed Mobility) cues for sequence and hand placement, Pt was able to bring BLEs off EOB today, reaching w cues LUE across to bedrail, with assist at  trunk (mod AX1) to sit up from supine w/ HOB elevated. Max AX2 as increased fatigue/lethargy following EOB balance and standing.  -BC       Row Name 11/12/24 1513          Transfers    Transfers sit-stand transfer;stand-sit transfer  -BC     Comment, (Transfers) twl lateral to R scoots towards HOB arm in arm max AX2  -BC       Row Name 11/12/24 1513          Sit-Stand Transfer    Sit-Stand [place] (Transfers) moderate assist (50% patient effort);maximum assist (25% patient effort);2 person assist;verbal cues  -BC     Comment, (Sit-Stand Transfer) arm in arm assist of 2 provided w/ 3 count to stand and take lateral to HOB steps. Posterior lean in standing using bed to support but  did not buckle requiring knee block. Unable to stand 2nd time 2/2 increased fatigue, lateral to [facility] scoot sitting up MaxAX2.  -BC       Row Name 11/12/24 1513          Stand-Sit Transfer    Stand-Sit [place] (Transfers) maximum assist (25% patient effort);2 person assist  -BC       Row Name 11/12/24 1513          Functional Mobility    Patient was able to Ambulate no, other medical factors prevent ambulation  -BC       Row Name 11/12/24 1513          Activities of Daily Living    BADL Assessment/Intervention upper body dressing;lower body dressing;grooming  -BC       Row Name 11/12/24 1513          Lower Body Dressing Assessment/Training    [place] Level (Lower Body Dressing) don;socks;dependent (less than 25% patient effort)  -BC     Position (Lower Body Dressing) supine  -BC       Row Name 11/12/24 1513          Upper Body Dressing Assessment/Training    [place] Level (Upper Body Dressing) doff;don;pajama/robe;moderate assist (50% patient effort)  -BC     Position (Upper Body Dressing) long sitting  -BC     Comment, (Upper Body Dressing) sitting up in bed doff/don gown w/ assist for thread RUE w/ limited sh range and gen'd weakness, mod cues  -BC       Row Name 11/12/24 1513          Grooming Assessment/Training    [place] Level (Grooming) wash face, hands;supervision  -BC     Position (Grooming) long sitting;sitting up in bed  -BC     Comment, (Grooming) sup A w/ utilizing LUE to reach face to wash  -BC               User Key  (r) = Recorded By, (t) = Taken By, (c) = Cosigned By      Initials Name Provider Type    BC Ricky Hernandez OT Occupational Therapist                   Obj/Interventions       Row Name 11/12/24 1516          Motor Skills    Motor Skills --  RUE AAROM/PROM x7 reps to maintain joint mobility and prevent adhesive capsulitis due to significant weakness and some noted edema. LUE AROM x5 reps  -BC     Functional Endurance Poor  -BC       Row Name 11/12/24 1516           Balance    Balance Assessment sitting static balance;standing static balance;standing dynamic balance  -BC     Static Sitting Balance contact guard;supervision  -BC     Dynamic Sitting Balance contact guard;minimal assist  -BC     Position, Sitting Balance sitting edge of bed  -BC     Static Standing Balance maximum assist;2-person assist  -BC     Comment, Balance Sat up EOB ~15 mins w/ close SUP to min A as increased fatigue and leaning toward R attempting to lay back down. Cues for upright sitting tolerance to increase capacity for further ADLs/OOB tasks.  -BC               User Key  (r) = Recorded By, (t) = Taken By, (c) = Cosigned By      Initials Name Provider Type    BC Ricky Hernandez OT Occupational Therapist                   Goals/Plan    No documentation.                  Clinical Impression       Row Name 11/12/24 1519          Pain Assessment    Pre/Posttreatment Pain Comment reports of R sh pain w/ mvmts but did not rate; pain patch on R sh prior to session. no c/o pain w/ PROM stretch  -BC       Row Name 11/12/24 1514          Plan of Care Review    Plan of Care Reviewed With patient  -BC     Progress improving  -BC     Outcome Evaluation Pt tolerated OT session fair but continues to fatigue quickly and only able to tolerate one stand from EOB w/ mAX ax2. Pt did progress to taking two lateral to HOB steps this date and participated in supine ADL participation and reaching prior to mobility for increased activity and ADL tolerance. HR up to 133 with activities and c/o dizziness upone sitting up but BP stable. Continue IP OT services to address ADl impairments, increased function, and for transfer/mobility retraining.  -BC       Row Name 11/12/24 1517          Therapy Assessment/Plan (OT)    Rehab Potential (OT) good  -BC     Criteria for Skilled Therapeutic Interventions Met (OT) yes;meets criteria;skilled treatment is necessary  -BC       Row Name 11/12/24 0203          Therapy Plan Review/Discharge  Plan (OT)    Anticipated Discharge Disposition (OT) [facility]       Row Name 11/12/24 1519          Vital Signs    O2 Delivery Pre Treatment room air  -BC     O2 Delivery Intra Treatment room air  -BC     O2 Delivery Post Treatment room air  -BC     Pre Patient Position Supine  -BC     Intra Patient Position Standing  -BC     Post Patient Position Supine  -BC       Row Name 11/12/24 1519          Positioning and Restraints    Pre-Treatment Position in bed  -BC     Post Treatment Position bed  -BC     In Bed fowlers;call light within reach;encouraged to call for assist;exit alarm on;side rails up x3;heels elevated  -BC               User Key  (r) = Recorded By, (t) = Taken By, (c) = Cosigned By      Initials Name Provider Type    Ricky Stauffer OT Occupational Therapist                   Outcome Measures       Row Name 11/12/24 1521          How much help from another is currently needed...    Putting on and taking off regular lower body clothing? 1  -BC     Bathing (including washing, rinsing, and drying) 1  -BC     Toileting (which includes using toilet bed pan or urinal) 1  -BC     Putting on and taking off regular upper body clothing 2  -BC     Taking care of personal grooming (such as brushing teeth) 3  -BC     Eating meals 2  -BC     AM-PAC 6 Clicks Score (OT) 10  -BC       Row Name 11/12/24 1521          Functional Assessment    Outcome Measure Options AM-PAC 6 Clicks Daily Activity (OT)  -BC               User Key  (r) = Recorded By, (t) = Taken By, (c) = Cosigned By      Initials Name Provider Type    Ricky Stauffer OT Occupational Therapist                    Occupational Therapy Education       Title: PT OT SLP Therapies (Done)       Topic: Occupational Therapy (Done)       Point: ADL training (Done)       Description:   Instruct learner(s) on proper safety adaptation and remediation techniques during self care or transfers.   Instruct in proper use of assistive devices.                   Learning Progress Summary            Patient Acceptance, E, VU by JS at 11/11/2024 1813    Acceptance, E, VU by JS at 11/8/2024 1820    Acceptance, E, VU,NR by MM at 11/2/2024 1328    Comment: role of OT, goals, d/c rec                      Point: Precautions (Done)       Description:   Instruct learner(s) on prescribed precautions during self-care and functional transfers.                  Learning Progress Summary            Patient Acceptance, E, VU by JS at 11/11/2024 1813    Acceptance, E, VU by JS at 11/8/2024 1820    Acceptance, E, VU,NR by MM at 11/2/2024 1328    Comment: role of OT, goals, d/c rec                      Point: Body mechanics (Done)       Description:   Instruct learner(s) on proper positioning and spine alignment during self-care, functional mobility activities and/or exercises.                  Learning Progress Summary            Patient Acceptance, E, VU by JS at 11/11/2024 1813    Acceptance, E, VU by  at 11/8/2024 1820    Acceptance, E, VU,NR by MM at 11/2/2024 1328    Comment: role of OT, goals, d/c rec                                      User Key       Initials Effective Dates Name Provider Type Discipline     02/26/24 -  Johnson Morales, RN Registered Nurse Nurse     05/31/24 -  Yu Chapman OT Occupational Therapist OT                  OT Recommendation and Plan     Plan of Care Review  Plan of Care Reviewed With: patient  Progress: improving  Outcome Evaluation: Pt tolerated OT session fair but continues to fatigue quickly and only able to tolerate one stand from EOB w/ mAX ax2. Pt did progress to taking two lateral to HOB steps this date and participated in supine ADL participation and reaching prior to mobility for increased activity and ADL tolerance. HR up to 133 with activities and c/o dizziness upone sitting up but BP stable. Continue IP OT services to address ADl impairments, increased function, and for transfer/mobility retraining.     Time  Calculation:         Time Calculation- OT       Row Name 11/12/24 1522             Time Calculation- OT    OT Start Time 1350  -BC      OT Stop Time 1421  -BC      OT Time Calculation (min) 31 min  -BC      Total Timed Code Minutes- OT 31 minute(s)  -BC      OT Received On 11/12/24  -BC      OT - Next Appointment 11/13/24  -BC         Timed Charges    65464 - OT Therapeutic Activity Minutes 15  -BC      50080 - OT Self Care/Mgmt Minutes 16  -BC         Total Minutes    Timed Charges Total Minutes 31  -BC       Total Minutes 31  -BC                User Key  (r) = Recorded By, (t) = Taken By, (c) = Cosigned By      Initials Name Provider Type    BC Ricky Hernandez OT Occupational Therapist                  Therapy Charges for Today       Code Description Service Date Service Provider Modifiers Qty    02494818872 HC OT THERAPEUTIC ACT EA 15 MIN 11/12/2024 Ricky Hernandez OT GO 1    02311311056 HC OT SELF CARE/MGMT/TRAIN EA 15 MIN 11/12/2024 Ricky Hernandez OT GO 1                 Ricky Hernandez OT  11/12/2024

## 2024-11-12 NOTE — PROGRESS NOTES
Name: Asrh Haynes ADMIT: 10/26/2024   : 1950  PCP: Provider, No Known    MRN: [id number] LOS: 17 days   AGE/SEX: 74 y.o. male  ROOM: [facility]     Subjective   Subjective   No acute events overnight.  No family at bedside today.  Patient states he continues to have shoulder pain but no otherwise no complaints. Still not cleared for diet from SLP standpoint.     Objective   Objective     Vital Signs  Temp:  [97.5 °F (36.4 °C)-97.7 °F (36.5 °C)] 97.7 °F (36.5 °C)  Heart Rate:  [] 102  Resp:  [18] 18  BP: (120-140)/(61-70) 132/64  SpO2:  [95 %-96 %] 96 %  on   ;   Device (Oxygen Therapy): room air  Body mass index is 25.56 kg/m².    Physical Exam  General: Sleeping comfortably but arouses.  Answers questions appropriately. Lying in bed.  Chronically ill-appearing. Core trak in place.   Neuro: Generalized weakness, moves all extremities.  Face symmetric.  No focal deficits.  Lungs: Clear to auscultation bilaterally. No wheeze or crackles. No distress.   Heart: RRR, no murmurs. No edema.  Abdomen: Soft, nondistended. Well-healing midline incision.  Ostomy with stool output.  Ext: Warm and well-perfused.  Right knee swelling improved, nontender to palpation.  Skin: No rashes or lesions. IV site without swelling or erythema.     Results Review     I reviewed the patient's new clinical results:  Results from last 7 days   Lab Units 24  0527 24  0516 11/10/24  0509 24  0528   WBC 10*3/mm3 12.43* 14.75* 19.04* 20.47*   HEMOGLOBIN g/dL 9.6* 9.7* 9.3* 7.3*   PLATELETS 10*3/mm3 953* 1,040* 1,089* 1,184*     Results from last 7 days   Lab Units 24  0526 24  2137 24  0516 11/10/24  0455 24  1712 24  0528   SODIUM mmol/L 135*  --  134* 135*  --  133*   POTASSIUM mmol/L 3.9 4.0 3.1* 3.8   < > 3.5   CHLORIDE mmol/L 106  --  104 107  --  105   CO2 mmol/L 18.8*  --  18.4* 16.9*  --  17.7*   BUN mg/dL 11  --  11 10  --  8   CREATININE mg/dL 0.42*  --  0.43* 0.44*  --   0.53*   GLUCOSE mg/dL 186*  --  175* 175*  --  207*   EGFR mL/min/1.73 114.2  --  113.4 112.6  --  106.5    < > = values in this interval not displayed.     Results from last 7 days   Lab Units 11/12/24 0526 11/11/24  0516 11/10/24  0455 11/09/24 0528   ALBUMIN g/dL 1.9* 1.9* 2.1* 1.7*   BILIRUBIN mg/dL 0.3 0.3 0.6 0.4   ALK PHOS U/L 234* 217* 204* 199*   AST (SGOT) U/L 28 25 19 16   ALT (SGPT) U/L 35 31 27 27     Results from last 7 days   Lab Units 11/12/24 0526 11/11/24  0516 11/10/24  0455 11/09/24  0528 11/08/24  0556 11/07/24  0704 11/06/24  0624   CALCIUM mg/dL 8.3* 8.0* 7.7* 7.4*   < > 8.0* 8.1*   ALBUMIN g/dL 1.9* 1.9* 2.1* 1.7*   < > 2.5* 1.8*   MAGNESIUM mg/dL  --   --   --   --   --  2.1 2.1   PHOSPHORUS mg/dL  --   --   --   --   --  3.2 3.0    < > = values in this interval not displayed.     Results from last 7 days   Lab Units 11/09/24 0528   PROCALCITONIN ng/mL 4.09*     Glucose   Date/Time Value Ref Range Status   11/12/2024 1045 168 (H) 70 - 130 mg/dL Final   11/12/2024 0619 186 (H) 70 - 130 mg/dL Final   11/11/2024 2109 191 (H) 70 - 130 mg/dL Final   11/11/2024 1543 179 (H) 70 - 130 mg/dL Final   11/11/2024 1041 155 (H) 70 - 130 mg/dL Final   11/11/2024 0603 180 (H) 70 - 130 mg/dL Final   11/11/2024 0107 161 (H) 70 - 130 mg/dL Final       No radiology results for the last day    I have personally reviewed all medications:  Scheduled Medications  chlorhexidine, 15 mL, Mouth/Throat, Q12H  enoxaparin, 1 mg/kg, Subcutaneous, Q12H  folic acid, 1 mg, Nasogastric, Daily  insulin glargine, 20 Units, Subcutaneous, Nightly  insulin regular, 2-9 Units, Subcutaneous, 4x Daily AC & at Bedtime  lansoprazole, 30 mg, Oral, Q AM  Lidocaine, 2 patch, Transdermal, Q24H  metoprolol tartrate, 75 mg, Oral, Q12H  sodium chloride, 10 mL, Intravenous, Q12H  sodium hypochlorite, , Topical, BID  terazosin, 2 mg, Nasogastric, Daily  thiamine, 100 mg, Nasogastric, Daily    Infusions       Diet  NPO Diet NPO Type: Strict  NPO      Intake/Output Summary (Last 24 hours) at 11/12/2024 1320  Last data filed at 11/12/2024 0649  Gross per 24 hour   Intake 1303 ml   Output 1650 ml   Net -347 ml       Assessment/Plan     Active Hospital Problems    Diagnosis  POA    **Peritonitis [K65.9]  Unknown    HTN (hypertension) [I10]  Unknown    Thrombocytosis [D75.839]  Unknown    Acute DVT (deep venous thrombosis) [I82.409]  Unknown    Renal mass [N28.89]  Unknown    Hyponatremia [E87.1]  Unknown    Anemia [D64.9]  Unknown    Moderate protein-calorie malnutrition [E44.0]  Yes    Colon cancer [C18.9]  No      Resolved Hospital Problems    Diagnosis Date Resolved POA    Perforation of sigmoid colon [K63.1] 10/27/2024 Yes       74 y.o. male with Peritonitis.    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  Leukocytosis has improved to 12.43K today.  Procalcitonin downtrending.  Inflammatory markers relatively stable.  Patient has been afebrile with no signs of new infection.  Continue to monitor.  -Sinus tachycardia: HR trend is improving. Blood pressure and oxygenation are appropriate.  His WBC is improving and he has been afebrile, so low concern for another infection.  EKG showing sinus tach. Continue increased dose of metoprolol.  Continue to closely monitor.  If sinus tachycardia persists and no other cause identified, may need to ask cardiology to evaluate.  -Acute right lower extremity DVT found on 11/5.  He has a severe thrombocytosis and in addition to his anemia as well as the colon cancer.  Hematology/oncology following.  Continue therapeutic Lovenox.  -Hgb stable/improved today.  S/p PRBC transfusions.  Hematology following.  Repeat CBC with morning labs, transfuse for Hgb less than 7.  -Thrombocytosis persist, though improved today.  Hematology following.  -Urology evaluated the renal  mass and urinary retention.  Blankenship catheter in place.  Continue Flomax.  Voiding trial when closer to discharge.  He will follow-up in 4 to 6 weeks with repeat CT for the renal mass. Given continued clots in the catheter, urology reconsulted.  CT renal mass concerning for renal cell carcinoma. Updated patient and wife. Urology will follow up for this as an outpatient.   -Hyponatremia: Sodium stable today at 135. Repeat BMP with morning labs.  -Hypokalemia: Potassium normal this morning.  Replace as needed and repeat daily BMP.  -Bicarbonate stable at 18.8 today, anion gap normal.  Likely secondary to normal saline.  Recheck BMP with morning labs.  -Increase Lantus to 25 units nightly.  Continue SSI.  Ongoing titration of insulin based on requirements.  -SLP evaluated and recommending n.p.o. at this time.  Core track placed and tube feeds started.  SLP to reevaluate today.  -Right knee pain, right shoulder pain: Orthopedic surgery consulted.  No significant effusion to perform bedside arthrocentesis.  XR of right shoulder with severe arthropathic changes.  Planning to do a steroid injection of the right shoulder.  They will then follow-up in the outpatient setting.    Pharmacy to dose Lovenox for DVT prophylaxis.  Full code.  Discussed with patient and nursing staff.  Anticipate discharge HECTOR Segura MD  [facility]  11/12/24  13:20 EST

## 2024-11-12 NOTE — PLAN OF CARE
Goal Outcome Evaluation:      Pt alert and oriented, restless most of the shift, sr/st on monitor, RA, c/o pain medicated with Narco x 2 this shift, BP stable, catheter care performed, will continue to monitor.        Problem: Adult Inpatient Plan of Care  Goal: Absence of Hospital-Acquired Illness or Injury  Intervention: Identify and Manage Fall Risk  Recent Flowsheet Documentation  Taken 11/12/2024 0412 by Corina Matt RN  Safety Promotion/Fall Prevention:   activity supervised   room organization consistent   safety round/check completed  Taken 11/12/2024 0212 by Corina Matt RN  Safety Promotion/Fall Prevention:   activity supervised   room organization consistent   safety round/check completed  Taken 11/12/2024 0015 by Corina Matt RN  Safety Promotion/Fall Prevention:   activity supervised   safety round/check completed   room organization consistent  Taken 11/11/2024 2212 by Corina Matt RN  Safety Promotion/Fall Prevention:   activity supervised   room organization consistent   safety round/check completed  Taken 11/11/2024 2030 by Corina Matt RN  Safety Promotion/Fall Prevention:   activity supervised   room organization consistent   safety round/check completed  Intervention: Prevent Skin Injury  Recent Flowsheet Documentation  Taken 11/12/2024 0412 by Corina Matt RN  Body Position:   right   tilted  Taken 11/12/2024 0212 by Corina Matt RN  Body Position: supine  Taken 11/12/2024 0015 by Corina Matt RN  Body Position:   left   tilted  Taken 11/11/2024 2212 by Corina Matt RN  Body Position: supine  Taken 11/11/2024 2030 by Corina Matt RN  Body Position:   left   tilted  Intervention: Prevent and Manage VTE (Venous Thromboembolism) Risk  Recent Flowsheet Documentation  Taken 11/12/2024 0015 by Corina Matt RN  VTE Prevention/Management: SCDs (sequential compression devices) off  Taken 11/11/2024 2030 by Corina Matt RN  VTE  Prevention/Management: SCDs (sequential compression devices) off  Intervention: Prevent Infection  Recent Flowsheet Documentation  Taken 11/12/2024 0412 by Corina Matt RN  Infection Prevention:   hand hygiene promoted   rest/sleep promoted  Taken 11/12/2024 0212 by Corina Matt RN  Infection Prevention:   hand hygiene promoted   rest/sleep promoted  Taken 11/12/2024 0015 by Corina Matt RN  Infection Prevention:   hand hygiene promoted   rest/sleep promoted  Taken 11/11/2024 2212 by Corina Matt RN  Infection Prevention:   hand hygiene promoted   rest/sleep promoted  Taken 11/11/2024 2030 by Corina Matt RN  Infection Prevention:   hand hygiene promoted   rest/sleep promoted  Goal: Optimal Comfort and Wellbeing  Intervention: Monitor Pain and Promote Comfort  Recent Flowsheet Documentation  Taken 11/11/2024 2030 by Corina Matt RN  Pain Management Interventions:   care clustered   pain medication given  Intervention: Provide Person-Centered Care  Recent Flowsheet Documentation  Taken 11/12/2024 0015 by Corina Matt RN  Trust Relationship/Rapport:   care explained   choices provided  Taken 11/11/2024 2030 by Corina Matt RN  Trust Relationship/Rapport:   care explained   choices provided     Problem: Skin Injury Risk Increased  Goal: Skin Health and Integrity  Intervention: Optimize Skin Protection  Recent Flowsheet Documentation  Taken 11/12/2024 0412 by Corina Matt RN  Head of Bed (HOB) Positioning: HOB elevated  Taken 11/12/2024 0212 by Corina Matt RN  Head of Bed (HOB) Positioning:   HOB elevated   HOB at 30-45 degrees  Taken 11/12/2024 0015 by Corina Matt RN  Activity Management: bedrest  Pressure Reduction Techniques:   frequent weight shift encouraged   weight shift assistance provided  Head of Bed (HOB) Positioning: HOB at 30-45 degrees  Pressure Reduction Devices:   alternating pressure pump (LORE)   pressure-redistributing mattress  utilized  Taken 11/11/2024 2212 by Corina Matt RN  Head of Bed (HOB) Positioning: HOB elevated  Taken 11/11/2024 2030 by Corina Matt RN  Activity Management: bedrest  Pressure Reduction Techniques:   frequent weight shift encouraged   weight shift assistance provided  Head of Bed (HOB) Positioning: HOB elevated  Pressure Reduction Devices:   alternating pressure pump (LORE)   pressure-redistributing mattress utilized  Intervention: Promote and Optimize Oral Intake  Recent Flowsheet Documentation  Taken 11/12/2024 0015 by Corina Matt RN  Oral Nutrition Promotion: rest periods promoted  Taken 11/11/2024 2030 by Corina Matt RN  Oral Nutrition Promotion: rest periods promoted     Problem: Restraint, Nonviolent  Goal: Absence of Harm or Injury  Intervention: Implement Least Restrictive Safety Strategies  Recent Flowsheet Documentation  Taken 11/12/2024 0412 by Corina Matt RN  Medical Device Protection: tubing secured  Taken 11/12/2024 0212 by Corina Matt RN  Medical Device Protection: tubing secured  Taken 11/12/2024 0015 by Corina Matt RN  Medical Device Protection: tubing secured  Diversional Activities: television  Taken 11/11/2024 2212 by Corina Matt RN  Medical Device Protection: tubing secured  Taken 11/11/2024 2030 by Corina Matt RN  Medical Device Protection: tubing secured  Diversional Activities: television  Intervention: Protect Dignity, Rights and Personal Wellbeing  Recent Flowsheet Documentation  Taken 11/12/2024 0015 by Corina Matt RN  Trust Relationship/Rapport:   care explained   choices provided  Taken 11/11/2024 2030 by Corina Matt RN  Trust Relationship/Rapport:   care explained   choices provided  Intervention: Protect Skin and Joint Integrity  Recent Flowsheet Documentation  Taken 11/12/2024 0412 by Corina Matt RN  Body Position:   right   tilted  Taken 11/12/2024 0212 by Corina Matt RN  Body Position:  supine  Taken 11/12/2024 0015 by Corina Matt RN  Body Position:   left   tilted  Range of Motion: ROM (range of motion) performed  Taken 11/11/2024 2212 by Corina Matt RN  Body Position: supine  Taken 11/11/2024 2030 by Corina Matt RN  Body Position:   left   tilted  Range of Motion: active ROM (range of motion) encouraged     Problem: Mechanical Ventilation Invasive  Goal: Effective Communication  Intervention: Ensure Effective Communication  Recent Flowsheet Documentation  Taken 11/12/2024 0015 by Corina Matt RN  Trust Relationship/Rapport:   care explained   choices provided  Diversional Activities: television  Communication Enhancement Strategies: call light answered in person  Taken 11/11/2024 2030 by Corina Matt RN  Trust Relationship/Rapport:   care explained   choices provided  Diversional Activities: television  Communication Enhancement Strategies: call light answered in person  Goal: Optimal Device Function  Intervention: Optimize Device Care and Function  Recent Flowsheet Documentation  Taken 11/12/2024 0412 by Corina Matt RN  Airway Safety Measures: oxygen flowmeter at bedside  Taken 11/12/2024 0212 by Corina Matt RN  Airway Safety Measures: oxygen flowmeter at bedside  Taken 11/12/2024 0015 by Corina Matt RN  Airway Safety Measures: oxygen flowmeter at bedside  Taken 11/11/2024 2212 by Corina Matt RN  Airway Safety Measures: oxygen flowmeter at bedside  Taken 11/11/2024 2030 by Corina Matt RN  Airway/Ventilation Management: calming measures promoted  Airway Safety Measures:   oxygen flowmeter at bedside   suction at bedside  Goal: Mechanical Ventilation Liberation  Intervention: Promote Extubation and Mechanical Ventilation Liberation  Recent Flowsheet Documentation  Taken 11/12/2024 0412 by Corina Matt RN  Medication Review/Management: medications reviewed  Taken 11/12/2024 0212 by Corina Matt RN  Medication  Review/Management: medications reviewed  Taken 11/12/2024 0015 by Corina Matt RN  Environmental Support: calm environment promoted  Medication Review/Management: medications reviewed  Taken 11/11/2024 2212 by oCrina Matt RN  Medication Review/Management: medications reviewed  Taken 11/11/2024 2030 by Corina Matt RN  Environmental Support: calm environment promoted  Sleep/Rest Enhancement: awakenings minimized  Medication Review/Management: medications reviewed  Goal: Absence of Device-Related Skin and Tissue Injury  Intervention: Maintain Skin and Tissue Health  Recent Flowsheet Documentation  Taken 11/12/2024 0015 by Corina Matt RN  Device Skin Pressure Protection:   absorbent pad utilized/changed   tubing/devices free from skin contact  Taken 11/11/2024 2030 by Corina Matt RN  Device Skin Pressure Protection:   absorbent pad utilized/changed   tubing/devices free from skin contact  Goal: Absence of Ventilator-Induced Lung Injury  Intervention: Prevent Ventilator-Associated Pneumonia  Recent Flowsheet Documentation  Taken 11/12/2024 0412 by Corina Matt RN  Head of Bed (HOB) Positioning: HOB elevated  Taken 11/12/2024 0212 by Corina Matt RN  Head of Bed (HOB) Positioning:   HOB elevated   HOB at 30-45 degrees  Taken 11/12/2024 0015 by Corina Matt RN  Head of Bed (HOB) Positioning: HOB at 30-45 degrees  Taken 11/11/2024 2212 by Corina Matt RN  Head of Bed (HOB) Positioning: HOB elevated  Taken 11/11/2024 2030 by Corina Matt RN  Head of Bed (HOB) Positioning: HOB elevated     Problem: Fall Injury Risk  Goal: Absence of Fall and Fall-Related Injury  Intervention: Identify and Manage Contributors  Recent Flowsheet Documentation  Taken 11/12/2024 0412 by Corina Matt RN  Medication Review/Management: medications reviewed  Taken 11/12/2024 0212 by Corina Matt RN  Medication Review/Management: medications reviewed  Taken 11/12/2024 0015 by  Corina Matt RN  Medication Review/Management: medications reviewed  Self-Care Promotion: independence encouraged  Taken 11/11/2024 2212 by Corina Matt RN  Medication Review/Management: medications reviewed  Taken 11/11/2024 2030 by Corina Matt RN  Medication Review/Management: medications reviewed  Self-Care Promotion: independence encouraged  Intervention: Promote Injury-Free Environment  Recent Flowsheet Documentation  Taken 11/12/2024 0412 by Corina Matt RN  Safety Promotion/Fall Prevention:   activity supervised   room organization consistent   safety round/check completed  Taken 11/12/2024 0212 by Corina Matt RN  Safety Promotion/Fall Prevention:   activity supervised   room organization consistent   safety round/check completed  Taken 11/12/2024 0015 by Corina Matt RN  Safety Promotion/Fall Prevention:   activity supervised   safety round/check completed   room organization consistent  Taken 11/11/2024 2212 by Corina Matt RN  Safety Promotion/Fall Prevention:   activity supervised   room organization consistent   safety round/check completed  Taken 11/11/2024 2030 by Corina Matt RN  Safety Promotion/Fall Prevention:   activity supervised   room organization consistent   safety round/check completed     Problem: Enteral Nutrition  Goal: Absence of Aspiration Signs and Symptoms  Intervention: Minimize Aspiration Risk  Recent Flowsheet Documentation  Taken 11/12/2024 0412 by Corina Matt RN  Head of Bed (HOB) Positioning: HOB elevated  Taken 11/12/2024 0212 by Corina Matt RN  Head of Bed (HOB) Positioning:   HOB elevated   HOB at 30-45 degrees  Taken 11/12/2024 0015 by Corina Matt RN  Head of Bed (HOB) Positioning: HOB at 30-45 degrees  Taken 11/11/2024 2212 by Cornia Matt RN  Head of Bed (HOB) Positioning: HOB elevated  Taken 11/11/2024 2030 by Corina Matt RN  Head of Bed (HOB) Positioning: HOB elevated  Enteral Feeding  Safety: tubing labeled as enteral feeding  Goal: Safe, Effective Therapy Delivery  Intervention: Prevent Feeding-Related Adverse Events  Recent Flowsheet Documentation  Taken 11/11/2024 2030 by Corina Matt RN  Enteral Feeding Safety: tubing labeled as enteral feeding     Problem: Restraint, Nonviolent  Goal: Absence of Harm or Injury  Intervention: Implement Least Restrictive Safety Strategies  Recent Flowsheet Documentation  Taken 11/12/2024 0412 by Corina Matt RN  Medical Device Protection: tubing secured  Taken 11/12/2024 0212 by Corina Matt RN  Medical Device Protection: tubing secured  Taken 11/12/2024 0015 by Corina Matt RN  Medical Device Protection: tubing secured  Diversional Activities: television  Taken 11/11/2024 2212 by Corina Matt RN  Medical Device Protection: tubing secured  Taken 11/11/2024 2030 by Corina Matt RN  Medical Device Protection: tubing secured  Diversional Activities: television  Intervention: Protect Dignity, Rights and Personal Wellbeing  Recent Flowsheet Documentation  Taken 11/12/2024 0015 by Corina Matt RN  Trust Relationship/Rapport:   care explained   choices provided  Taken 11/11/2024 2030 by Corina Matt RN  Trust Relationship/Rapport:   care explained   choices provided  Intervention: Protect Skin and Joint Integrity  Recent Flowsheet Documentation  Taken 11/12/2024 0412 by Corina Matt RN  Body Position:   right   tilted  Taken 11/12/2024 0212 by Corina Matt RN  Body Position: supine  Taken 11/12/2024 0015 by Corina Matt RN  Body Position:   left   tilted  Range of Motion: ROM (range of motion) performed  Taken 11/11/2024 2212 by Corina Matt RN  Body Position: supine  Taken 11/11/2024 2030 by Corina Matt RN  Body Position:   left   tilted  Range of Motion: active ROM (range of motion) encouraged     Problem: Skin Injury Risk Increased  Goal: Skin Health and Integrity  Intervention:  Optimize Skin Protection  Recent Flowsheet Documentation  Taken 11/12/2024 0412 by Corina Matt RN  Head of Bed (HOB) Positioning: HOB elevated  Taken 11/12/2024 0212 by Corina Matt RN  Head of Bed (HOB) Positioning:   HOB elevated   HOB at 30-45 degrees  Taken 11/12/2024 0015 by Corina Matt RN  Activity Management: bedrest  Pressure Reduction Techniques:   frequent weight shift encouraged   weight shift assistance provided  Head of Bed (HOB) Positioning: HOB at 30-45 degrees  Pressure Reduction Devices:   alternating pressure pump (LORE)   pressure-redistributing mattress utilized  Taken 11/11/2024 2212 by Corina Matt RN  Head of Bed (HOB) Positioning: HOB elevated  Taken 11/11/2024 2030 by Corina Matt RN  Activity Management: bedrest  Pressure Reduction Techniques:   frequent weight shift encouraged   weight shift assistance provided  Head of Bed (HOB) Positioning: HOB elevated  Pressure Reduction Devices:   alternating pressure pump (LORE)   pressure-redistributing mattress utilized  Intervention: Promote and Optimize Oral Intake  Recent Flowsheet Documentation  Taken 11/12/2024 0015 by Corina Matt RN  Oral Nutrition Promotion: rest periods promoted  Taken 11/11/2024 2030 by Corina Matt RN  Oral Nutrition Promotion: rest periods promoted     Problem: Fall Injury Risk  Goal: Absence of Fall and Fall-Related Injury  Intervention: Identify and Manage Contributors  Recent Flowsheet Documentation  Taken 11/12/2024 0412 by Corina Matt RN  Medication Review/Management: medications reviewed  Taken 11/12/2024 0212 by Corina Matt RN  Medication Review/Management: medications reviewed  Taken 11/12/2024 0015 by Corina Matt RN  Medication Review/Management: medications reviewed  Self-Care Promotion: independence encouraged  Taken 11/11/2024 2212 by Corina Matt RN  Medication Review/Management: medications reviewed  Taken 11/11/2024 2030 by Shaka  ANGEL Arriaga  Medication Review/Management: medications reviewed  Self-Care Promotion: independence encouraged  Intervention: Promote Injury-Free Environment  Recent Flowsheet Documentation  Taken 11/12/2024 0412 by Corina Matt RN  Safety Promotion/Fall Prevention:   activity supervised   room organization consistent   safety round/check completed  Taken 11/12/2024 0212 by Corina Matt RN  Safety Promotion/Fall Prevention:   activity supervised   room organization consistent   safety round/check completed  Taken 11/12/2024 0015 by Corina Matt RN  Safety Promotion/Fall Prevention:   activity supervised   safety round/check completed   room organization consistent  Taken 11/11/2024 2212 by Corina Matt RN  Safety Promotion/Fall Prevention:   activity supervised   room organization consistent   safety round/check completed  Taken 11/11/2024 2030 by Corina Matt RN  Safety Promotion/Fall Prevention:   activity supervised   room organization consistent   safety round/check completed     Problem: Adult Inpatient Plan of Care  Goal: Absence of Hospital-Acquired Illness or Injury  Intervention: Identify and Manage Fall Risk  Recent Flowsheet Documentation  Taken 11/12/2024 0412 by Corina Matt RN  Safety Promotion/Fall Prevention:   activity supervised   room organization consistent   safety round/check completed  Taken 11/12/2024 0212 by Corina Matt RN  Safety Promotion/Fall Prevention:   activity supervised   room organization consistent   safety round/check completed  Taken 11/12/2024 0015 by Corina Matt RN  Safety Promotion/Fall Prevention:   activity supervised   safety round/check completed   room organization consistent  Taken 11/11/2024 2212 by Corina Matt RN  Safety Promotion/Fall Prevention:   activity supervised   room organization consistent   safety round/check completed  Taken 11/11/2024 2030 by Corina Matt RN  Safety Promotion/Fall Prevention:    activity supervised   room organization consistent   safety round/check completed  Intervention: Prevent Skin Injury  Recent Flowsheet Documentation  Taken 11/12/2024 0412 by Corina Matt RN  Body Position:   right   tilted  Taken 11/12/2024 0212 by Corina Matt RN  Body Position: supine  Taken 11/12/2024 0015 by Corina Matt RN  Body Position:   left   tilted  Taken 11/11/2024 2212 by Corina Matt RN  Body Position: supine  Taken 11/11/2024 2030 by Corina Matt RN  Body Position:   left   tilted  Intervention: Prevent and Manage VTE (Venous Thromboembolism) Risk  Recent Flowsheet Documentation  Taken 11/12/2024 0015 by Corina Matt RN  VTE Prevention/Management: SCDs (sequential compression devices) off  Taken 11/11/2024 2030 by Corina Matt RN  VTE Prevention/Management: SCDs (sequential compression devices) off  Intervention: Prevent Infection  Recent Flowsheet Documentation  Taken 11/12/2024 0412 by Corina Matt RN  Infection Prevention:   hand hygiene promoted   rest/sleep promoted  Taken 11/12/2024 0212 by Corina Matt RN  Infection Prevention:   hand hygiene promoted   rest/sleep promoted  Taken 11/12/2024 0015 by Corina Matt RN  Infection Prevention:   hand hygiene promoted   rest/sleep promoted  Taken 11/11/2024 2212 by Corina Matt RN  Infection Prevention:   hand hygiene promoted   rest/sleep promoted  Taken 11/11/2024 2030 by Corina Matt RN  Infection Prevention:   hand hygiene promoted   rest/sleep promoted  Goal: Optimal Comfort and Wellbeing  Intervention: Monitor Pain and Promote Comfort  Recent Flowsheet Documentation  Taken 11/11/2024 2030 by Corina Matt RN  Pain Management Interventions:   care clustered   pain medication given  Intervention: Provide Person-Centered Care  Recent Flowsheet Documentation  Taken 11/12/2024 0015 by Corina Matt RN  Trust Relationship/Rapport:   care explained   choices  provided  Taken 11/11/2024 2030 by Corina Matt RN  Trust Relationship/Rapport:   care explained   choices provided     Problem: Malnutrition  Goal: Improved Nutritional Intake  Intervention: Promote and Optimize Oral Intake  Recent Flowsheet Documentation  Taken 11/12/2024 0015 by Corina Matt RN  Oral Nutrition Promotion: rest periods promoted  Taken 11/11/2024 2030 by Corina Matt RN  Oral Nutrition Promotion: rest periods promoted     Problem: Sepsis/Septic Shock  Goal: Optimal Coping  Intervention: Support Patient and Family Response  Recent Flowsheet Documentation  Taken 11/12/2024 0015 by Corina Matt RN  Supportive Measures: active listening utilized  Taken 11/11/2024 2030 by Corina Matt RN  Supportive Measures: active listening utilized  Goal: Absence of Bleeding  Intervention: Monitor and Manage Bleeding  Recent Flowsheet Documentation  Taken 11/11/2024 2030 by Corina Matt RN  Bleeding Precautions: monitored for signs of bleeding  Bleeding Management: dressing monitored  Goal: Blood Glucose Level Within Target Range  Intervention: Optimize Glycemic Control  Recent Flowsheet Documentation  Taken 11/11/2024 2030 by Corina Matt RN  Hyperglycemia Management: blood glucose monitored  Hypoglycemia Management: blood glucose monitored  Goal: Absence of Infection Signs and Symptoms  Intervention: Initiate Sepsis Management  Recent Flowsheet Documentation  Taken 11/12/2024 0412 by Corina Matt RN  Infection Prevention:   hand hygiene promoted   rest/sleep promoted  Isolation Precautions: precautions maintained  Taken 11/12/2024 0212 by Corina Matt RN  Infection Prevention:   hand hygiene promoted   rest/sleep promoted  Isolation Precautions: precautions maintained  Taken 11/12/2024 0015 by Corina Matt RN  Infection Management: aseptic technique maintained  Infection Prevention:   hand hygiene promoted   rest/sleep promoted  Isolation Precautions:  precautions maintained  Taken 11/11/2024 2212 by Corina Matt RN  Infection Prevention:   hand hygiene promoted   rest/sleep promoted  Isolation Precautions: precautions maintained  Taken 11/11/2024 2030 by Corina Matt RN  Stabilization Measures: legs elevated  Infection Management: aseptic technique maintained  Infection Prevention:   hand hygiene promoted   rest/sleep promoted  Isolation Precautions: precautions maintained  Intervention: Promote Stabilization  Recent Flowsheet Documentation  Taken 11/11/2024 2030 by Corina Matt RN  Fluid/Electrolyte Management: fluids adjusted  Intervention: Promote Recovery  Recent Flowsheet Documentation  Taken 11/12/2024 0015 by Corina Matt RN  Activity Management: bedrest  Taken 11/11/2024 2030 by Corina Matt RN  Activity Management: bedrest  Airway/Ventilation Management: calming measures promoted  Sleep/Rest Enhancement: awakenings minimized     Problem: Alcohol Withdrawal  Goal: Alcohol Withdrawal Symptom Control  Intervention: Minimize or Manage Alcohol Withdrawal Symptoms  Recent Flowsheet Documentation  Taken 11/12/2024 0015 by Corina Matt RN  Sensory Stimulation Regulation: lighting decreased  Taken 11/11/2024 2030 by Corina Matt RN  Sensory Stimulation Regulation: lighting decreased  Aspiration Precautions: upright posture maintained  Seizure Precautions: activity supervised  Goal: Optimal Neurologic Function  Intervention: Minimize or Manage Acute Neurologic Symptoms  Recent Flowsheet Documentation  Taken 11/12/2024 0015 by Corina Matt RN  Sensory Stimulation Regulation: lighting decreased  Taken 11/11/2024 2030 by Corina Matt RN  Sensory Stimulation Regulation: lighting decreased  Airway/Ventilation Management: calming measures promoted  Cerebral Perfusion Promotion: blood pressure monitored  Intervention: Prevent Seizure-Related Injury  Recent Flowsheet Documentation  Taken 11/11/2024 2030 by Shaka  ANGEL Arriaga  Seizure Precautions: activity supervised     Problem: VTE (Venous Thromboembolism)  Goal: Tissue Perfusion  Intervention: Optimize Tissue Perfusion  Recent Flowsheet Documentation  Taken 11/12/2024 0015 by Corina Matt RN  VTE Prevention/Management: SCDs (sequential compression devices) off  Taken 11/11/2024 2030 by Corina Matt RN  Bleeding Precautions: monitored for signs of bleeding  VTE Prevention/Management: SCDs (sequential compression devices) off  Goal: Optimal Right Ventricular Function  Intervention: Optimal Blood Flow  Recent Flowsheet Documentation  Taken 11/11/2024 2030 by Corina Matt RN  Stabilization Measures: legs elevated     Problem: Restraint, Nonviolent  Goal: Absence of Harm or Injury  Intervention: Implement Least Restrictive Safety Strategies  Recent Flowsheet Documentation  Taken 11/12/2024 0412 by Corina Matt RN  Medical Device Protection: tubing secured  Taken 11/12/2024 0212 by Corina Matt RN  Medical Device Protection: tubing secured  Taken 11/12/2024 0015 by Corina Matt RN  Medical Device Protection: tubing secured  Diversional Activities: television  Taken 11/11/2024 2212 by Corina Matt RN  Medical Device Protection: tubing secured  Taken 11/11/2024 2030 by Corina Matt RN  Medical Device Protection: tubing secured  Diversional Activities: television  Intervention: Protect Dignity, Rights and Personal Wellbeing  Recent Flowsheet Documentation  Taken 11/12/2024 0015 by Corina Matt RN  Trust Relationship/Rapport:   care explained   choices provided  Taken 11/11/2024 2030 by Corina Matt RN  Trust Relationship/Rapport:   care explained   choices provided  Intervention: Protect Skin and Joint Integrity  Recent Flowsheet Documentation  Taken 11/12/2024 0412 by Corina Matt RN  Body Position:   right   tilted  Taken 11/12/2024 0212 by Corina Matt RN  Body Position: supine  Taken 11/12/2024 0015 by  Corina Matt RN  Body Position:   left   tilted  Range of Motion: ROM (range of motion) performed  Taken 11/11/2024 2212 by Corina Matt RN  Body Position: supine  Taken 11/11/2024 2030 by Corina Matt RN  Body Position:   left   tilted  Range of Motion: active ROM (range of motion) encouraged     Problem: Mechanical Ventilation Invasive  Goal: Effective Communication  Intervention: Ensure Effective Communication  Recent Flowsheet Documentation  Taken 11/12/2024 0015 by Corina Mtat RN  Trust Relationship/Rapport:   care explained   choices provided  Diversional Activities: television  Communication Enhancement Strategies: call light answered in person  Taken 11/11/2024 2030 by Corina Matt RN  Trust Relationship/Rapport:   care explained   choices provided  Diversional Activities: television  Communication Enhancement Strategies: call light answered in person  Goal: Optimal Device Function  Intervention: Optimize Device Care and Function  Recent Flowsheet Documentation  Taken 11/12/2024 0412 by Corina Matt RN  Airway Safety Measures: oxygen flowmeter at bedside  Taken 11/12/2024 0212 by Corina Matt RN  Airway Safety Measures: oxygen flowmeter at bedside  Taken 11/12/2024 0015 by Corina Matt RN  Airway Safety Measures: oxygen flowmeter at bedside  Taken 11/11/2024 2212 by Corina Matt RN  Airway Safety Measures: oxygen flowmeter at bedside  Taken 11/11/2024 2030 by Corina Matt RN  Airway/Ventilation Management: calming measures promoted  Airway Safety Measures:   oxygen flowmeter at bedside   suction at bedside  Goal: Mechanical Ventilation Liberation  Intervention: Promote Extubation and Mechanical Ventilation Liberation  Recent Flowsheet Documentation  Taken 11/12/2024 0412 by Corina Matt RN  Medication Review/Management: medications reviewed  Taken 11/12/2024 0212 by Corina Matt RN  Medication Review/Management: medications  reviewed  Taken 11/12/2024 0015 by Corina Matt RN  Environmental Support: calm environment promoted  Medication Review/Management: medications reviewed  Taken 11/11/2024 2212 by Corina Matt RN  Medication Review/Management: medications reviewed  Taken 11/11/2024 2030 by Corina Matt RN  Environmental Support: calm environment promoted  Sleep/Rest Enhancement: awakenings minimized  Medication Review/Management: medications reviewed  Goal: Absence of Device-Related Skin and Tissue Injury  Intervention: Maintain Skin and Tissue Health  Recent Flowsheet Documentation  Taken 11/12/2024 0015 by Corina Matt RN  Device Skin Pressure Protection:   absorbent pad utilized/changed   tubing/devices free from skin contact  Taken 11/11/2024 2030 by Corina Matt RN  Device Skin Pressure Protection:   absorbent pad utilized/changed   tubing/devices free from skin contact  Goal: Absence of Ventilator-Induced Lung Injury  Intervention: Prevent Ventilator-Associated Pneumonia  Recent Flowsheet Documentation  Taken 11/12/2024 0412 by Corina Matt RN  Head of Bed (HOB) Positioning: HOB elevated  Taken 11/12/2024 0212 by Corina Matt RN  Head of Bed (HOB) Positioning:   HOB elevated   HOB at 30-45 degrees  Taken 11/12/2024 0015 by Corina Matt RN  Head of Bed (HOB) Positioning: HOB at 30-45 degrees  Taken 11/11/2024 2212 by Corina Matt RN  Head of Bed (HOB) Positioning: HOB elevated  Taken 11/11/2024 2030 by Corina Matt RN  Head of Bed (HOB) Positioning: HOB elevated     Problem: Enteral Nutrition  Goal: Absence of Aspiration Signs and Symptoms  Intervention: Minimize Aspiration Risk  Recent Flowsheet Documentation  Taken 11/12/2024 0412 by Corina Matt RN  Head of Bed (HOB) Positioning: HOB elevated  Taken 11/12/2024 0212 by Corina Matt RN  Head of Bed (HOB) Positioning:   HOB elevated   HOB at 30-45 degrees  Taken 11/12/2024 0015 by Corina Matt RN  Head  of Bed (HOB) Positioning: HOB at 30-45 degrees  Taken 11/11/2024 2212 by Corina Matt RN  Head of Bed ([facility]) Positioning: HOB elevated  Taken 11/11/2024 2030 by Corina Matt RN  Head of Bed ([facility]) Positioning: HOB elevated  Enteral Feeding Safety: tubing labeled as enteral feeding  Goal: Safe, Effective Therapy Delivery  Intervention: Prevent Feeding-Related Adverse Events  Recent Flowsheet Documentation  Taken 11/11/2024 2030 by Corina Matt RN  Enteral Feeding Safety: tubing labeled as enteral feeding

## 2024-11-12 NOTE — PLAN OF CARE
Goal Outcome Evaluation:  Plan of Care Reviewed With: patient        Progress: improving  Outcome Evaluation: Pt seen for PT/OT tx this PM, with this PT arriving after OT assisted pt into sitting EOB. Pt cont to become quick to fatigue and requires max encouragement/education, but was able to progress his activity tolerance. Today, pt was ModA x1 w/ sup>sit per OT, MaxA x2 for one STS and 2 R side steps w/ arm-in-arm assist and B feet/knees blocked for safety, but no knee buckling was noted. Pt cont to have difficulty w/ upright posture and cervical extension in standing, but in sitting his assist level varied from SV up to Lory as he fatigued. HR highest at 133 during the session and O2 dropped to 88% after side steps, but up to 95% after seated rest. KHLOE w/ RN. PT will cont to follow.    Anticipated Discharge Disposition (PT): skilled nursing facility

## 2024-11-12 NOTE — PLAN OF CARE
Goal Outcome Evaluation:  Plan of Care Reviewed With: patient        Progress: improving  Outcome Evaluation: Pt tolerated OT session fair but continues to fatigue quickly and only able to tolerate one stand from EOB w/ mAX ax2. Pt did progress to taking two lateral to HOB steps this date and participated in supine ADL participation and reaching prior to mobility for increased activity and ADL tolerance. HR up to 133 with activities and c/o dizziness upone sitting up but BP stable. Continue IP OT services to address ADl impairments, increased function, and for transfer/mobility retraining.    Anticipated Discharge Disposition (OT): skilled nursing facility

## 2024-11-12 NOTE — PROGRESS NOTES
"UROLOGY PROGRESS NOTE    NAEO. Resting in bed this AM.     Lab Results   Component Value Date    CREATININE 0.42 (L) 11/12/2024    WBC 12.43 (H) 11/12/2024    HGB 9.6 (L) 11/12/2024         Results for orders placed or performed during the hospital encounter of 10/26/24   Blood Culture - Blood, Arm, Left    Specimen: Arm, Left; Blood   Result Value Ref Range    Blood Culture No growth at 5 days      Results from last 7 days   Lab Units 11/10/24  0347   COLOR UA  Yellow   CLARITY UA  Cloudy*   BILIRUBIN UA  Negative   KETONES UA  Negative   PH, URINE  5.5   UROBILINOGEN UA  1.0 E.U./dL   LEUKOCYTES UA  Trace*   NITRITE UA  Negative   BACTERIA UA /HPF None Seen       Intake/Output:    Intake/Output Summary (Last 24 hours) at 11/12/2024 0753  Last data filed at 11/12/2024 0649  Gross per 24 hour   Intake 1303 ml   Output 1750 ml   Net -447 ml       Exam:  /64 (BP Location: Left arm, Patient Position: Lying)   Pulse 113   Temp 97.7 °F (36.5 °C) (Oral)   Resp 18   Ht 182.9 cm (72\")   Wt 85.5 kg (188 lb 7.9 oz)   SpO2 96%   BMI 25.56 kg/m²     : Blankenship in place; clear urine    IMAGING:  CT Abdomen Kidney With & Without Contrast    Result Date: 11/12/2024    (viewed and interpreted by me) - 2cm right posterior lower pole renal mass, concerning for RCC    CT ABDOMEN WITHOUT AND WITH IV CONTRAST  HISTORY: 74-year-old male with a right renal mass. Exploratory laparotomy and left hemicolectomy with colostomy on 10/26/2024 for perforated sigmoid colon.  TECHNIQUE: Radiation dose reduction techniques were utilized, including automated exposure control and exposure modulation based on body size. 2 mm images were obtained through the abdomen without contrast. Subsequently, IV contrast was administered and 3 mm images were obtained through the abdomen and a delayed cut series through the abdomen was obtained as well. Compared with noncontrasted CT abdomen pelvis 11/04/2024.  FINDINGS: 1. The mass partially exophytic " from the posterior aspect of the mid portion of the right kidney enhances heterogeneously and the anterior margin extends to the medullary sinus fat. Appearance is very suspicious for renal cell carcinoma. Right renal vein is patent. There is no suspicious retroperitoneal lymphadenopathy.  There is a subtle heterogeneous indeterminate nodule within the parenchyma at the upper pole of the right kidney measuring 2.0 x 1.8 cm, delayed sequence series 6 image 46. There is also an indeterminant 1 cm low-attenuation focus posteriorly at the upper pole of the right kidney, image 52.  2. Left kidney has nonobstructing stones at the lower pole and a few subcentimeter low-attenuation foci, parenchymal scars and a 1.2 cm low-attenuation focus which is suspected to represent a benign cyst, image 64. There is also a 1.2 cm focus slightly inferiorly which is indeterminate, image 66. Renal mass protocol CT of the abdomen is recommended in 6 months for followup of these.  3. There are expected postsurgical lung changes following exploratory laparotomy and left hemicolectomy. Unremarkable appearance of the left mid abdominal colostomy. No evidence for bowel ileus or obstruction. There is a feeding tube with the distal tip within the proximal duodenum.  4. The liver appears unremarkable other than a tiny sliver of subcapsular fluid along the posterior hepatic segment. Gallbladder appears unremarkable and there is no biliary dilatation. Pancreas, spleen, and adrenals appear unremarkable.  5. Since the 11/04/2024 CT, there has been no significant change in the moderate size right pleural effusion and near complete collapse of the right lower lobe. There is a small left pleural effusion with slightly less atelectasis at the left lower lobe.  This report was finalized on 11/12/2024 7:22 AM by Dr. Jessica Bwoling M.D on Workstation: [id number]          Assessment:    Peritonitis    Colon cancer    HTN (hypertension)    Thrombocytosis     Acute DVT (deep venous thrombosis)    Renal mass    Hyponatremia    Anemia    Moderate protein-calorie malnutrition    Right renal mass  Urinary retention    73 yo M admitted for septic shoc 2/2 perforated sigmoid colon from adenocarcinoma s/p ex-lap, hemicolectomy and hartmanns. He had an incidentally found right renal mass. CT [facility] 11/11/24 viewed/interpreted by me -- 2cm right posterior lower pole renal mass concerning for RCC as well as some low attenuation lesion on left. He has urinary retention on terazosin.     Plan:    -No acute urologic intervention  -Will schedule outpatient f/u for management of right renal mass after he has recovered from acute issues -- schedulers messaged  -Continue alpha - blocker therapy for urinary retention  -Continue vazquez -- VT when closer to discharge, will schedule patient for outpatient void trial as well should he continue to need catheter -- schedulers messaged    Mansi Jackson MD

## 2024-11-13 ENCOUNTER — APPOINTMENT (OUTPATIENT)
Dept: GENERAL RADIOLOGY | Facility: HOSPITAL | Age: 74
DRG: 853 | End: 2024-11-13
Payer: MEDICARE

## 2024-11-13 LAB
ALBUMIN SERPL-MCNC: 2.2 G/DL (ref 3.5–5.2)
ALBUMIN/GLOB SERPL: 0.6 G/DL
ALP SERPL-CCNC: 233 U/L (ref 39–117)
ALT SERPL W P-5'-P-CCNC: 38 U/L (ref 1–41)
ANION GAP SERPL CALCULATED.3IONS-SCNC: 10 MMOL/L (ref 5–15)
AST SERPL-CCNC: 24 U/L (ref 1–40)
BASOPHILS # BLD AUTO: 0.1 10*3/MM3 (ref 0–0.2)
BASOPHILS NFR BLD AUTO: 0.7 % (ref 0–1.5)
BILIRUB SERPL-MCNC: 0.3 MG/DL (ref 0–1.2)
BUN SERPL-MCNC: 12 MG/DL (ref 8–23)
BUN/CREAT SERPL: 28.6 (ref 7–25)
CALCIUM SPEC-SCNC: 8.4 MG/DL (ref 8.6–10.5)
CHLORIDE SERPL-SCNC: 104 MMOL/L (ref 98–107)
CO2 SERPL-SCNC: 20 MMOL/L (ref 22–29)
CREAT SERPL-MCNC: 0.42 MG/DL (ref 0.76–1.27)
DEPRECATED RDW RBC AUTO: 52.7 FL (ref 37–54)
EGFRCR SERPLBLD CKD-EPI 2021: 114.2 ML/MIN/1.73
EOSINOPHIL # BLD AUTO: 0.44 10*3/MM3 (ref 0–0.4)
EOSINOPHIL NFR BLD AUTO: 3.2 % (ref 0.3–6.2)
ERYTHROCYTE [DISTWIDTH] IN BLOOD BY AUTOMATED COUNT: 17.1 % (ref 12.3–15.4)
GLOBULIN UR ELPH-MCNC: 3.6 GM/DL
GLUCOSE BLDC GLUCOMTR-MCNC: 165 MG/DL (ref 70–130)
GLUCOSE BLDC GLUCOMTR-MCNC: 178 MG/DL (ref 70–130)
GLUCOSE BLDC GLUCOMTR-MCNC: 182 MG/DL (ref 70–130)
GLUCOSE BLDC GLUCOMTR-MCNC: 201 MG/DL (ref 70–130)
GLUCOSE SERPL-MCNC: 178 MG/DL (ref 65–99)
HCT VFR BLD AUTO: 28.8 % (ref 37.5–51)
HGB BLD-MCNC: 9.2 G/DL (ref 13–17.7)
IMM GRANULOCYTES # BLD AUTO: 0.39 10*3/MM3 (ref 0–0.05)
IMM GRANULOCYTES NFR BLD AUTO: 2.9 % (ref 0–0.5)
LYMPHOCYTES # BLD AUTO: 1.39 10*3/MM3 (ref 0.7–3.1)
LYMPHOCYTES NFR BLD AUTO: 10.2 % (ref 19.6–45.3)
MCH RBC QN AUTO: 27.3 PG (ref 26.6–33)
MCHC RBC AUTO-ENTMCNC: 31.9 G/DL (ref 31.5–35.7)
MCV RBC AUTO: 85.5 FL (ref 79–97)
MONOCYTES # BLD AUTO: 1.32 10*3/MM3 (ref 0.1–0.9)
MONOCYTES NFR BLD AUTO: 9.7 % (ref 5–12)
NEUTROPHILS NFR BLD AUTO: 73.3 % (ref 42.7–76)
NEUTROPHILS NFR BLD AUTO: 9.99 10*3/MM3 (ref 1.7–7)
NRBC BLD AUTO-RTO: 0 /100 WBC (ref 0–0.2)
PLATELET # BLD AUTO: 884 10*3/MM3 (ref 140–450)
PMV BLD AUTO: 8.1 FL (ref 6–12)
POTASSIUM SERPL-SCNC: 3.4 MMOL/L (ref 3.5–5.2)
POTASSIUM SERPL-SCNC: 4.1 MMOL/L (ref 3.5–5.2)
PROT SERPL-MCNC: 5.8 G/DL (ref 6–8.5)
RBC # BLD AUTO: 3.37 10*6/MM3 (ref 4.14–5.8)
SODIUM SERPL-SCNC: 134 MMOL/L (ref 136–145)
WBC NRBC COR # BLD AUTO: 13.63 10*3/MM3 (ref 3.4–10.8)

## 2024-11-13 PROCEDURE — 25010000002 ENOXAPARIN PER 10 MG: Performed by: INTERNAL MEDICINE

## 2024-11-13 PROCEDURE — 94799 UNLISTED PULMONARY SVC/PX: CPT

## 2024-11-13 PROCEDURE — 85025 COMPLETE CBC W/AUTO DIFF WBC: CPT | Performed by: INTERNAL MEDICINE

## 2024-11-13 PROCEDURE — 73120 X-RAY EXAM OF HAND: CPT

## 2024-11-13 PROCEDURE — 63710000001 INSULIN GLARGINE PER 5 UNITS: Performed by: STUDENT IN AN ORGANIZED HEALTH CARE EDUCATION/TRAINING PROGRAM

## 2024-11-13 PROCEDURE — 63710000001 INSULIN REGULAR HUMAN PER 5 UNITS: Performed by: HOSPITALIST

## 2024-11-13 PROCEDURE — 80053 COMPREHEN METABOLIC PANEL: CPT | Performed by: INTERNAL MEDICINE

## 2024-11-13 PROCEDURE — 84132 ASSAY OF SERUM POTASSIUM: CPT | Performed by: STUDENT IN AN ORGANIZED HEALTH CARE EDUCATION/TRAINING PROGRAM

## 2024-11-13 PROCEDURE — 99232 SBSQ HOSP IP/OBS MODERATE 35: CPT | Performed by: INTERNAL MEDICINE

## 2024-11-13 PROCEDURE — 82948 REAGENT STRIP/BLOOD GLUCOSE: CPT

## 2024-11-13 PROCEDURE — 99024 POSTOP FOLLOW-UP VISIT: CPT | Performed by: SURGERY

## 2024-11-13 RX ORDER — LIDOCAINE 4 G/G
1 PATCH TOPICAL
Status: DISCONTINUED | OUTPATIENT
Start: 2024-11-13 | End: 2024-11-30 | Stop reason: HOSPADM

## 2024-11-13 RX ORDER — ACETAMINOPHEN 650 MG/1
650 SUPPOSITORY RECTAL EVERY 4 HOURS PRN
Status: DISCONTINUED | OUTPATIENT
Start: 2024-11-13 | End: 2024-11-30 | Stop reason: HOSPADM

## 2024-11-13 RX ORDER — ACETAMINOPHEN 325 MG/1
650 TABLET ORAL EVERY 4 HOURS PRN
Status: DISCONTINUED | OUTPATIENT
Start: 2024-11-13 | End: 2024-11-30 | Stop reason: HOSPADM

## 2024-11-13 RX ORDER — POTASSIUM CHLORIDE 1.5 G/1.58G
40 POWDER, FOR SOLUTION ORAL EVERY 4 HOURS
Status: COMPLETED | OUTPATIENT
Start: 2024-11-13 | End: 2024-11-13

## 2024-11-13 RX ADMIN — Medication 100 MG: at 08:55

## 2024-11-13 RX ADMIN — LIDOCAINE 1 PATCH: 4 PATCH TOPICAL at 13:23

## 2024-11-13 RX ADMIN — ENOXAPARIN SODIUM 90 MG: 100 INJECTION SUBCUTANEOUS at 18:03

## 2024-11-13 RX ADMIN — INSULIN HUMAN 2 UNITS: 100 INJECTION, SOLUTION PARENTERAL at 18:05

## 2024-11-13 RX ADMIN — CHLORHEXIDINE GLUCONATE 15 ML: 1.2 RINSE ORAL at 08:53

## 2024-11-13 RX ADMIN — ENOXAPARIN SODIUM 90 MG: 100 INJECTION SUBCUTANEOUS at 06:34

## 2024-11-13 RX ADMIN — POTASSIUM CHLORIDE 40 MEQ: 1.5 POWDER, FOR SOLUTION ORAL at 13:23

## 2024-11-13 RX ADMIN — LIDOCAINE 2 PATCH: 4 PATCH TOPICAL at 08:54

## 2024-11-13 RX ADMIN — POTASSIUM CHLORIDE 40 MEQ: 1.5 POWDER, FOR SOLUTION ORAL at 08:55

## 2024-11-13 RX ADMIN — TERAZOSIN 2 MG: 2 CAPSULE ORAL at 20:59

## 2024-11-13 RX ADMIN — LANSOPRAZOLE 30 MG: 15 TABLET, ORALLY DISINTEGRATING ORAL at 06:35

## 2024-11-13 RX ADMIN — FOLIC ACID 1 MG: 1 TABLET ORAL at 12:59

## 2024-11-13 RX ADMIN — INSULIN HUMAN 2 UNITS: 100 INJECTION, SOLUTION PARENTERAL at 06:35

## 2024-11-13 RX ADMIN — Medication 10 ML: at 21:00

## 2024-11-13 RX ADMIN — METOPROLOL TARTRATE 75 MG: 50 TABLET, FILM COATED ORAL at 08:54

## 2024-11-13 RX ADMIN — HYDROCODONE BITARTRATE AND ACETAMINOPHEN 1 TABLET: 5; 325 TABLET ORAL at 21:49

## 2024-11-13 RX ADMIN — Medication 10 ML: at 08:55

## 2024-11-13 RX ADMIN — INSULIN GLARGINE 30 UNITS: 100 INJECTION, SOLUTION SUBCUTANEOUS at 21:00

## 2024-11-13 RX ADMIN — INSULIN HUMAN 4 UNITS: 100 INJECTION, SOLUTION PARENTERAL at 21:00

## 2024-11-13 RX ADMIN — INSULIN HUMAN 2 UNITS: 100 INJECTION, SOLUTION PARENTERAL at 12:59

## 2024-11-13 RX ADMIN — CHLORHEXIDINE GLUCONATE 15 ML: 1.2 RINSE ORAL at 20:59

## 2024-11-13 RX ADMIN — HYDROCODONE BITARTRATE AND ACETAMINOPHEN 1 TABLET: 5; 325 TABLET ORAL at 04:09

## 2024-11-13 RX ADMIN — METOPROLOL TARTRATE 75 MG: 50 TABLET, FILM COATED ORAL at 20:59

## 2024-11-13 NOTE — PLAN OF CARE
Goal Outcome Evaluation:      Pt resting on the bed, alert and oriented, RA, st on monitor, administered meds per mar, narco x 1 this shift, BP stable, turned q 2 hrs, cath care performed, will continue to  monitor.      Problem: Adult Inpatient Plan of Care  Goal: Absence of Hospital-Acquired Illness or Injury  Intervention: Identify and Manage Fall Risk  Recent Flowsheet Documentation  Taken 11/13/2024 0557 by Corina Matt RN  Safety Promotion/Fall Prevention:   activity supervised   room organization consistent   safety round/check completed  Taken 11/13/2024 0412 by Corina Matt RN  Safety Promotion/Fall Prevention:   activity supervised   room organization consistent   safety round/check completed  Taken 11/13/2024 0212 by Corina Matt RN  Safety Promotion/Fall Prevention:   activity supervised   room organization consistent   safety round/check completed  Taken 11/13/2024 0015 by Corina Matt RN  Safety Promotion/Fall Prevention:   activity supervised   room organization consistent   safety round/check completed  Taken 11/12/2024 2212 by Corina Matt RN  Safety Promotion/Fall Prevention:   activity supervised   room organization consistent   safety round/check completed  Taken 11/12/2024 2030 by Corina Matt RN  Safety Promotion/Fall Prevention:   activity supervised   room organization consistent   safety round/check completed  Intervention: Prevent Skin Injury  Recent Flowsheet Documentation  Taken 11/13/2024 0557 by Corina Matt RN  Body Position:   right   tilted  Taken 11/13/2024 0412 by Corina Matt RN  Body Position:   left   tilted  Taken 11/13/2024 0212 by Corina Matt RN  Body Position: supine  Taken 11/13/2024 0015 by Corina Matt RN  Body Position:   right   tilted  Taken 11/12/2024 2212 by Corina Matt RN  Body Position:   left   tilted  Taken 11/12/2024 2030 by Corina Matt RN  Body Position: supine  Intervention: Prevent and  Manage VTE (Venous Thromboembolism) Risk  Recent Flowsheet Documentation  Taken 11/13/2024 0015 by Corina Matt RN  VTE Prevention/Management: SCDs (sequential compression devices) off  Taken 11/12/2024 2030 by Corina Matt RN  VTE Prevention/Management: SCDs (sequential compression devices) off  Intervention: Prevent Infection  Recent Flowsheet Documentation  Taken 11/13/2024 0557 by Corina Matt RN  Infection Prevention:   hand hygiene promoted   rest/sleep promoted  Taken 11/13/2024 0412 by Corina Matt RN  Infection Prevention:   hand hygiene promoted   rest/sleep promoted  Taken 11/13/2024 0212 by Corina Matt RN  Infection Prevention:   hand hygiene promoted   rest/sleep promoted  Taken 11/13/2024 0015 by Corina Matt RN  Infection Prevention:   hand hygiene promoted   single patient room provided   rest/sleep promoted  Taken 11/12/2024 2212 by Corina Matt RN  Infection Prevention:   hand hygiene promoted   rest/sleep promoted  Taken 11/12/2024 2030 by Corina Matt RN  Infection Prevention:   hand hygiene promoted   rest/sleep promoted  Goal: Optimal Comfort and Wellbeing  Intervention: Provide Person-Centered Care  Recent Flowsheet Documentation  Taken 11/13/2024 0015 by Corina Matt RN  Trust Relationship/Rapport:   care explained   choices provided  Taken 11/12/2024 2030 by Corina Matt RN  Trust Relationship/Rapport:   care explained   choices provided     Problem: Skin Injury Risk Increased  Goal: Skin Health and Integrity  Intervention: Optimize Skin Protection  Recent Flowsheet Documentation  Taken 11/13/2024 0557 by Corina Matt RN  Head of Bed (HOB) Positioning: HOB elevated  Taken 11/13/2024 0412 by Corina Matt RN  Head of Bed (HOB) Positioning: HOB elevated  Taken 11/13/2024 0212 by Corina Matt RN  Head of Bed (HOB) Positioning: HOB elevated  Taken 11/13/2024 0015 by Corina Matt RN  Activity Management:  bedrest  Pressure Reduction Techniques:   frequent weight shift encouraged   weight shift assistance provided  Head of Bed (HOB) Positioning: HOB elevated  Pressure Reduction Devices:   alternating pressure pump (LORE)   pressure-redistributing mattress utilized  Taken 11/12/2024 2212 by Corina Matt RN  Head of Bed (HOB) Positioning: HOB elevated  Taken 11/12/2024 2030 by Corina Matt RN  Activity Management: activity encouraged  Pressure Reduction Techniques:   frequent weight shift encouraged   weight shift assistance provided  Head of Bed (HOB) Positioning: HOB elevated  Pressure Reduction Devices:   alternating pressure pump (LORE)   pressure-redistributing mattress utilized  Intervention: Promote and Optimize Oral Intake  Recent Flowsheet Documentation  Taken 11/13/2024 0015 by Corina Matt RN  Oral Nutrition Promotion: rest periods promoted  Taken 11/12/2024 2030 by Corina Matt RN  Oral Nutrition Promotion: rest periods promoted     Problem: Restraint, Nonviolent  Goal: Absence of Harm or Injury  Intervention: Implement Least Restrictive Safety Strategies  Recent Flowsheet Documentation  Taken 11/13/2024 0557 by Corina Matt RN  Medical Device Protection: tubing secured  Taken 11/13/2024 0412 by Corina Matt RN  Medical Device Protection: tubing secured  Taken 11/13/2024 0212 by Corina Matt RN  Medical Device Protection: tubing secured  Taken 11/13/2024 0015 by Corina Matt RN  Medical Device Protection: tubing secured  Diversional Activities: television  Taken 11/12/2024 2212 by Corina Matt RN  Medical Device Protection: tubing secured  Taken 11/12/2024 2030 by Corina Matt RN  Medical Device Protection: tubing secured  Diversional Activities: television  Intervention: Protect Dignity, Rights and Personal Wellbeing  Recent Flowsheet Documentation  Taken 11/13/2024 0015 by Corina Matt RN  Trust Relationship/Rapport:   care explained   choices  provided  Taken 11/12/2024 2030 by Corina Matt RN  Trust Relationship/Rapport:   care explained   choices provided  Intervention: Protect Skin and Joint Integrity  Recent Flowsheet Documentation  Taken 11/13/2024 0557 by Corina Matt RN  Body Position:   right   tilted  Taken 11/13/2024 0412 by Corina Matt RN  Body Position:   left   tilted  Taken 11/13/2024 0212 by Corina Matt RN  Body Position: supine  Taken 11/13/2024 0015 by Corina Matt RN  Body Position:   right   tilted  Range of Motion: ROM (range of motion) performed  Taken 11/12/2024 2212 by Corina Matt RN  Body Position:   left   tilted  Taken 11/12/2024 2030 by Corina Matt RN  Body Position: supine  Range of Motion: active ROM (range of motion) encouraged     Problem: Mechanical Ventilation Invasive  Goal: Effective Communication  Intervention: Ensure Effective Communication  Recent Flowsheet Documentation  Taken 11/13/2024 0015 by Corina Matt RN  Trust Relationship/Rapport:   care explained   choices provided  Diversional Activities: television  Family/Support System Care: presence promoted  Communication Enhancement Strategies: call light answered in person  Taken 11/12/2024 2030 by Corina Matt RN  Trust Relationship/Rapport:   care explained   choices provided  Diversional Activities: television  Family/Support System Care: presence promoted  Communication Enhancement Strategies: call light answered in person  Goal: Optimal Device Function  Intervention: Optimize Device Care and Function  Recent Flowsheet Documentation  Taken 11/13/2024 0557 by Corina Matt RN  Airway Safety Measures: oxygen flowmeter at bedside  Taken 11/13/2024 0412 by Corina Matt RN  Airway Safety Measures: oxygen flowmeter at bedside  Taken 11/13/2024 0212 by Corina Matt RN  Airway Safety Measures: oxygen flowmeter at bedside  Taken 11/13/2024 0015 by Corina Matt RN  Airway/Ventilation  Management: calming measures promoted  Airway Safety Measures: oxygen flowmeter at bedside  Taken 11/12/2024 2212 by Corina Matt RN  Airway Safety Measures: oxygen flowmeter at bedside  Taken 11/12/2024 2030 by Corina Matt RN  Airway/Ventilation Management: calming measures promoted  Airway Safety Measures: oxygen flowmeter at bedside  Goal: Mechanical Ventilation Liberation  Intervention: Promote Extubation and Mechanical Ventilation Liberation  Recent Flowsheet Documentation  Taken 11/13/2024 0557 by Corina Matt RN  Medication Review/Management: medications reviewed  Taken 11/13/2024 0412 by Corina Matt RN  Medication Review/Management: medications reviewed  Taken 11/13/2024 0212 by Corina Matt RN  Medication Review/Management: medications reviewed  Taken 11/13/2024 0015 by Corina Matt RN  Sleep/Rest Enhancement: awakenings minimized  Medication Review/Management: medications reviewed  Taken 11/12/2024 2212 by Corina Matt RN  Medication Review/Management: medications reviewed  Taken 11/12/2024 2030 by Corina Matt RN  Environmental Support:   rest periods encouraged   calm environment promoted  Sleep/Rest Enhancement: awakenings minimized  Medication Review/Management: medications reviewed  Goal: Absence of Device-Related Skin and Tissue Injury  Intervention: Maintain Skin and Tissue Health  Recent Flowsheet Documentation  Taken 11/13/2024 0015 by Corina Matt RN  Device Skin Pressure Protection:   absorbent pad utilized/changed   tubing/devices free from skin contact  Taken 11/12/2024 2030 by Corina Matt RN  Device Skin Pressure Protection:   absorbent pad utilized/changed   tubing/devices free from skin contact  Goal: Absence of Ventilator-Induced Lung Injury  Intervention: Prevent Ventilator-Associated Pneumonia  Recent Flowsheet Documentation  Taken 11/13/2024 0557 by Corina Matt RN  Head of Bed (HOB) Positioning: HOB elevated  Taken  11/13/2024 0412 by Corina Matt RN  Head of Bed (HOB) Positioning: HOB elevated  Taken 11/13/2024 0212 by Corina Matt RN  Head of Bed (HOB) Positioning: HOB elevated  Taken 11/13/2024 0015 by Corina Matt RN  Head of Bed (HOB) Positioning: HOB elevated  Taken 11/12/2024 2212 by Corina Matt RN  Head of Bed (HOB) Positioning: HOB elevated  Taken 11/12/2024 2030 by Corina Matt RN  Head of Bed (HOB) Positioning: HOB elevated     Problem: Fall Injury Risk  Goal: Absence of Fall and Fall-Related Injury  Intervention: Identify and Manage Contributors  Recent Flowsheet Documentation  Taken 11/13/2024 0557 by Corina Matt RN  Medication Review/Management: medications reviewed  Taken 11/13/2024 0412 by Corina Matt RN  Medication Review/Management: medications reviewed  Taken 11/13/2024 0212 by Corina Matt RN  Medication Review/Management: medications reviewed  Taken 11/13/2024 0015 by Corina Matt RN  Medication Review/Management: medications reviewed  Self-Care Promotion: independence encouraged  Taken 11/12/2024 2212 by Corina Matt RN  Medication Review/Management: medications reviewed  Taken 11/12/2024 2030 by Corina Matt RN  Medication Review/Management: medications reviewed  Self-Care Promotion: independence encouraged  Intervention: Promote Injury-Free Environment  Recent Flowsheet Documentation  Taken 11/13/2024 0557 by Corina Matt RN  Safety Promotion/Fall Prevention:   activity supervised   room organization consistent   safety round/check completed  Taken 11/13/2024 0412 by Corina Matt RN  Safety Promotion/Fall Prevention:   activity supervised   room organization consistent   safety round/check completed  Taken 11/13/2024 0212 by Corina Matt RN  Safety Promotion/Fall Prevention:   activity supervised   room organization consistent   safety round/check completed  Taken 11/13/2024 0015 by Corina Matt RN  Safety  Promotion/Fall Prevention:   activity supervised   room organization consistent   safety round/check completed  Taken 11/12/2024 2212 by Corina Matt RN  Safety Promotion/Fall Prevention:   activity supervised   room organization consistent   safety round/check completed  Taken 11/12/2024 2030 by Corina Matt RN  Safety Promotion/Fall Prevention:   activity supervised   room organization consistent   safety round/check completed     Problem: Enteral Nutrition  Goal: Absence of Aspiration Signs and Symptoms  Intervention: Minimize Aspiration Risk  Recent Flowsheet Documentation  Taken 11/13/2024 0557 by Corina Matt RN  Head of Bed (HOB) Positioning: HOB elevated  Taken 11/13/2024 0412 by Corina Matt RN  Head of Bed (HOB) Positioning: HOB elevated  Taken 11/13/2024 0212 by Corina Matt RN  Head of Bed (HOB) Positioning: HOB elevated  Taken 11/13/2024 0015 by Corina Matt RN  Head of Bed (HOB) Positioning: HOB elevated  Taken 11/12/2024 2212 by Corina Matt RN  Head of Bed (HOB) Positioning: HOB elevated  Taken 11/12/2024 2030 by Corina Matt RN  Head of Bed (HOB) Positioning: HOB elevated     Problem: Restraint, Nonviolent  Goal: Absence of Harm or Injury  Intervention: Implement Least Restrictive Safety Strategies  Recent Flowsheet Documentation  Taken 11/13/2024 0557 by Corina Matt RN  Medical Device Protection: tubing secured  Taken 11/13/2024 0412 by Corina Matt RN  Medical Device Protection: tubing secured  Taken 11/13/2024 0212 by Corina Matt RN  Medical Device Protection: tubing secured  Taken 11/13/2024 0015 by Corina Matt RN  Medical Device Protection: tubing secured  Diversional Activities: television  Taken 11/12/2024 2212 by Corina Matt RN  Medical Device Protection: tubing secured  Taken 11/12/2024 2030 by Corina Matt RN  Medical Device Protection: tubing secured  Diversional Activities: television  Intervention:  Protect Dignity, Rights and Personal Wellbeing  Recent Flowsheet Documentation  Taken 11/13/2024 0015 by Corina Matt RN  Trust Relationship/Rapport:   care explained   choices provided  Taken 11/12/2024 2030 by Corina Matt RN  Trust Relationship/Rapport:   care explained   choices provided  Intervention: Protect Skin and Joint Integrity  Recent Flowsheet Documentation  Taken 11/13/2024 0557 by Corina Matt RN  Body Position:   right   tilted  Taken 11/13/2024 0412 by Corina Matt RN  Body Position:   left   tilted  Taken 11/13/2024 0212 by Corina Matt RN  Body Position: supine  Taken 11/13/2024 0015 by Corina Matt RN  Body Position:   right   tilted  Range of Motion: ROM (range of motion) performed  Taken 11/12/2024 2212 by Corina Matt RN  Body Position:   left   tilted  Taken 11/12/2024 2030 by Corina Matt RN  Body Position: supine  Range of Motion: active ROM (range of motion) encouraged     Problem: Skin Injury Risk Increased  Goal: Skin Health and Integrity  Intervention: Optimize Skin Protection  Recent Flowsheet Documentation  Taken 11/13/2024 0557 by Corina Matt RN  Head of Bed (HOB) Positioning: HOB elevated  Taken 11/13/2024 0412 by Corina Matt RN  Head of Bed (HOB) Positioning: HOB elevated  Taken 11/13/2024 0212 by Corina Matt RN  Head of Bed (HOB) Positioning: HOB elevated  Taken 11/13/2024 0015 by Corina Matt RN  Activity Management: bedrest  Pressure Reduction Techniques:   frequent weight shift encouraged   weight shift assistance provided  Head of Bed (HOB) Positioning: HOB elevated  Pressure Reduction Devices:   alternating pressure pump (LORE)   pressure-redistributing mattress utilized  Taken 11/12/2024 2212 by Corina Matt RN  Head of Bed (HOB) Positioning: HOB elevated  Taken 11/12/2024 2030 by Corina Matt RN  Activity Management: activity encouraged  Pressure Reduction Techniques:   frequent weight  shift encouraged   weight shift assistance provided  Head of Bed (HOB) Positioning: HOB elevated  Pressure Reduction Devices:   alternating pressure pump (LORE)   pressure-redistributing mattress utilized  Intervention: Promote and Optimize Oral Intake  Recent Flowsheet Documentation  Taken 11/13/2024 0015 by Corina Matt RN  Oral Nutrition Promotion: rest periods promoted  Taken 11/12/2024 2030 by Corina Matt RN  Oral Nutrition Promotion: rest periods promoted     Problem: Fall Injury Risk  Goal: Absence of Fall and Fall-Related Injury  Intervention: Identify and Manage Contributors  Recent Flowsheet Documentation  Taken 11/13/2024 0557 by Corina Matt RN  Medication Review/Management: medications reviewed  Taken 11/13/2024 0412 by Corian Matt RN  Medication Review/Management: medications reviewed  Taken 11/13/2024 0212 by Corina Matt RN  Medication Review/Management: medications reviewed  Taken 11/13/2024 0015 by Corina Matt RN  Medication Review/Management: medications reviewed  Self-Care Promotion: independence encouraged  Taken 11/12/2024 2212 by Corina Matt RN  Medication Review/Management: medications reviewed  Taken 11/12/2024 2030 by Corina Matt RN  Medication Review/Management: medications reviewed  Self-Care Promotion: independence encouraged  Intervention: Promote Injury-Free Environment  Recent Flowsheet Documentation  Taken 11/13/2024 0557 by Corina Matt RN  Safety Promotion/Fall Prevention:   activity supervised   room organization consistent   safety round/check completed  Taken 11/13/2024 0412 by Corina Matt RN  Safety Promotion/Fall Prevention:   activity supervised   room organization consistent   safety round/check completed  Taken 11/13/2024 0212 by Corina Matt RN  Safety Promotion/Fall Prevention:   activity supervised   room organization consistent   safety round/check completed  Taken 11/13/2024 0015 by Shaka  ANGEL Arriaga  Safety Promotion/Fall Prevention:   activity supervised   room organization consistent   safety round/check completed  Taken 11/12/2024 2212 by Corina Matt RN  Safety Promotion/Fall Prevention:   activity supervised   room organization consistent   safety round/check completed  Taken 11/12/2024 2030 by Corina Matt RN  Safety Promotion/Fall Prevention:   activity supervised   room organization consistent   safety round/check completed     Problem: Adult Inpatient Plan of Care  Goal: Absence of Hospital-Acquired Illness or Injury  Intervention: Identify and Manage Fall Risk  Recent Flowsheet Documentation  Taken 11/13/2024 0557 by Corina Matt RN  Safety Promotion/Fall Prevention:   activity supervised   room organization consistent   safety round/check completed  Taken 11/13/2024 0412 by Corina Matt RN  Safety Promotion/Fall Prevention:   activity supervised   room organization consistent   safety round/check completed  Taken 11/13/2024 0212 by Corina Matt RN  Safety Promotion/Fall Prevention:   activity supervised   room organization consistent   safety round/check completed  Taken 11/13/2024 0015 by Corina Matt RN  Safety Promotion/Fall Prevention:   activity supervised   room organization consistent   safety round/check completed  Taken 11/12/2024 2212 by Corina Matt RN  Safety Promotion/Fall Prevention:   activity supervised   room organization consistent   safety round/check completed  Taken 11/12/2024 2030 by Corina Matt RN  Safety Promotion/Fall Prevention:   activity supervised   room organization consistent   safety round/check completed  Intervention: Prevent Skin Injury  Recent Flowsheet Documentation  Taken 11/13/2024 0557 by Corina Matt RN  Body Position:   right   tilted  Taken 11/13/2024 0412 by Corina Matt RN  Body Position:   left   tilted  Taken 11/13/2024 0212 by Corina Matt RN  Body Position: supine  Taken  11/13/2024 0015 by Corina Matt RN  Body Position:   right   tilted  Taken 11/12/2024 2212 by Corina Matt RN  Body Position:   left   tilted  Taken 11/12/2024 2030 by Corina Matt RN  Body Position: supine  Intervention: Prevent and Manage VTE (Venous Thromboembolism) Risk  Recent Flowsheet Documentation  Taken 11/13/2024 0015 by Corina Matt RN  VTE Prevention/Management: SCDs (sequential compression devices) off  Taken 11/12/2024 2030 by Corina Matt RN  VTE Prevention/Management: SCDs (sequential compression devices) off  Intervention: Prevent Infection  Recent Flowsheet Documentation  Taken 11/13/2024 0557 by Corina Matt RN  Infection Prevention:   hand hygiene promoted   rest/sleep promoted  Taken 11/13/2024 0412 by Corina Matt RN  Infection Prevention:   hand hygiene promoted   rest/sleep promoted  Taken 11/13/2024 0212 by Corina Matt RN  Infection Prevention:   hand hygiene promoted   rest/sleep promoted  Taken 11/13/2024 0015 by Corina Matt RN  Infection Prevention:   hand hygiene promoted   single patient room provided   rest/sleep promoted  Taken 11/12/2024 2212 by Corina Matt RN  Infection Prevention:   hand hygiene promoted   rest/sleep promoted  Taken 11/12/2024 2030 by Corina Matt RN  Infection Prevention:   hand hygiene promoted   rest/sleep promoted  Goal: Optimal Comfort and Wellbeing  Intervention: Provide Person-Centered Care  Recent Flowsheet Documentation  Taken 11/13/2024 0015 by Corina Matt RN  Trust Relationship/Rapport:   care explained   choices provided  Taken 11/12/2024 2030 by Corina Matt RN  Trust Relationship/Rapport:   care explained   choices provided     Problem: Malnutrition  Goal: Improved Nutritional Intake  Intervention: Promote and Optimize Oral Intake  Recent Flowsheet Documentation  Taken 11/13/2024 0015 by Corina Matt RN  Oral Nutrition Promotion: rest periods promoted  Taken  11/12/2024 2030 by Corina Matt RN  Oral Nutrition Promotion: rest periods promoted     Problem: Sepsis/Septic Shock  Goal: Optimal Coping  Intervention: Support Patient and Family Response  Recent Flowsheet Documentation  Taken 11/13/2024 0015 by Corina Matt RN  Family/Support System Care: presence promoted  Taken 11/12/2024 2030 by Corina Matt RN  Supportive Measures: active listening utilized  Family/Support System Care: presence promoted  Goal: Absence of Bleeding  Intervention: Monitor and Manage Bleeding  Recent Flowsheet Documentation  Taken 11/12/2024 2212 by Corina Matt RN  Bleeding Management: dressing monitored  Goal: Absence of Infection Signs and Symptoms  Intervention: Initiate Sepsis Management  Recent Flowsheet Documentation  Taken 11/13/2024 0557 by Corina Matt RN  Infection Prevention:   hand hygiene promoted   rest/sleep promoted  Isolation Precautions: precautions maintained  Taken 11/13/2024 0412 by Corina Matt RN  Infection Prevention:   hand hygiene promoted   rest/sleep promoted  Isolation Precautions: precautions maintained  Taken 11/13/2024 0212 by Corina Matt RN  Infection Prevention:   hand hygiene promoted   rest/sleep promoted  Isolation Precautions: precautions maintained  Taken 11/13/2024 0015 by Corina Matt RN  Infection Management: aseptic technique maintained  Infection Prevention:   hand hygiene promoted   single patient room provided   rest/sleep promoted  Isolation Precautions: precautions maintained  Taken 11/12/2024 2212 by Corina Matt RN  Infection Prevention:   hand hygiene promoted   rest/sleep promoted  Isolation Precautions: precautions maintained  Taken 11/12/2024 2030 by Corina Matt RN  Stabilization Measures: legs elevated  Infection Management: aseptic technique maintained  Infection Prevention:   hand hygiene promoted   rest/sleep promoted  Isolation Precautions: precautions  maintained  Intervention: Promote Stabilization  Recent Flowsheet Documentation  Taken 11/12/2024 2030 by Corina Matt RN  Fever Reduction/Comfort Measures: lightweight bedding  Intervention: Promote Recovery  Recent Flowsheet Documentation  Taken 11/13/2024 0015 by Corina Matt RN  Activity Management: bedrest  Airway/Ventilation Management: calming measures promoted  Sleep/Rest Enhancement: awakenings minimized  Taken 11/12/2024 2030 by Corina Matt RN  Activity Management: activity encouraged  Airway/Ventilation Management: calming measures promoted  Sleep/Rest Enhancement: awakenings minimized     Problem: Alcohol Withdrawal  Goal: Alcohol Withdrawal Symptom Control  Intervention: Minimize or Manage Alcohol Withdrawal Symptoms  Recent Flowsheet Documentation  Taken 11/13/2024 0015 by Corina Matt RN  Sensory Stimulation Regulation: lighting decreased  Seizure Precautions: activity supervised  Taken 11/12/2024 2212 by Corina Matt RN  Aspiration Precautions: upright posture maintained  Taken 11/12/2024 2030 by Corina Matt RN  Sensory Stimulation Regulation: lighting decreased  Seizure Precautions: activity supervised  Goal: Optimal Neurologic Function  Intervention: Minimize or Manage Acute Neurologic Symptoms  Recent Flowsheet Documentation  Taken 11/13/2024 0015 by Corina Matt RN  Sensory Stimulation Regulation: lighting decreased  Airway/Ventilation Management: calming measures promoted  Taken 11/12/2024 2030 by Corina Matt RN  Sensory Stimulation Regulation: lighting decreased  Airway/Ventilation Management: calming measures promoted  Cerebral Perfusion Promotion: blood pressure monitored  Intervention: Prevent Seizure-Related Injury  Recent Flowsheet Documentation  Taken 11/13/2024 0015 by Corina Matt RN  Seizure Precautions: activity supervised  Taken 11/12/2024 2030 by Corina Matt RN  Seizure Precautions: activity supervised     Problem: VTE  (Venous Thromboembolism)  Goal: Tissue Perfusion  Intervention: Optimize Tissue Perfusion  Recent Flowsheet Documentation  Taken 11/13/2024 0015 by Corina Matt RN  VTE Prevention/Management: SCDs (sequential compression devices) off  Taken 11/12/2024 2030 by Corina Matt RN  VTE Prevention/Management: SCDs (sequential compression devices) off  Goal: Optimal Right Ventricular Function  Intervention: Optimal Blood Flow  Recent Flowsheet Documentation  Taken 11/12/2024 2030 by Corina Matt RN  Stabilization Measures: legs elevated     Problem: Restraint, Nonviolent  Goal: Absence of Harm or Injury  Intervention: Implement Least Restrictive Safety Strategies  Recent Flowsheet Documentation  Taken 11/13/2024 0557 by Corina Matt RN  Medical Device Protection: tubing secured  Taken 11/13/2024 0412 by Corina Matt RN  Medical Device Protection: tubing secured  Taken 11/13/2024 0212 by Corina Matt RN  Medical Device Protection: tubing secured  Taken 11/13/2024 0015 by Corina Matt RN  Medical Device Protection: tubing secured  Diversional Activities: television  Taken 11/12/2024 2212 by Corina Matt RN  Medical Device Protection: tubing secured  Taken 11/12/2024 2030 by Corina Matt RN  Medical Device Protection: tubing secured  Diversional Activities: television  Intervention: Protect Dignity, Rights and Personal Wellbeing  Recent Flowsheet Documentation  Taken 11/13/2024 0015 by Corina Matt RN  Trust Relationship/Rapport:   care explained   choices provided  Taken 11/12/2024 2030 by Corina Matt RN  Trust Relationship/Rapport:   care explained   choices provided  Intervention: Protect Skin and Joint Integrity  Recent Flowsheet Documentation  Taken 11/13/2024 0557 by Corina Matt RN  Body Position:   right   tilted  Taken 11/13/2024 0412 by Corina Matt RN  Body Position:   left   tilted  Taken 11/13/2024 0212 by Corina Matt RN  Body  Position: supine  Taken 11/13/2024 0015 by Corina Matt RN  Body Position:   right   tilted  Range of Motion: ROM (range of motion) performed  Taken 11/12/2024 2212 by Corina Matt RN  Body Position:   left   tilted  Taken 11/12/2024 2030 by Corina Matt RN  Body Position: supine  Range of Motion: active ROM (range of motion) encouraged     Problem: Mechanical Ventilation Invasive  Goal: Effective Communication  Intervention: Ensure Effective Communication  Recent Flowsheet Documentation  Taken 11/13/2024 0015 by Corina Matt RN  Trust Relationship/Rapport:   care explained   choices provided  Diversional Activities: television  Family/Support System Care: presence promoted  Communication Enhancement Strategies: call light answered in person  Taken 11/12/2024 2030 by Corina Matt RN  Trust Relationship/Rapport:   care explained   choices provided  Diversional Activities: television  Family/Support System Care: presence promoted  Communication Enhancement Strategies: call light answered in person  Goal: Optimal Device Function  Intervention: Optimize Device Care and Function  Recent Flowsheet Documentation  Taken 11/13/2024 0557 by Corina Matt RN  Airway Safety Measures: oxygen flowmeter at bedside  Taken 11/13/2024 0412 by Corina Matt RN  Airway Safety Measures: oxygen flowmeter at bedside  Taken 11/13/2024 0212 by Corina Matt RN  Airway Safety Measures: oxygen flowmeter at bedside  Taken 11/13/2024 0015 by Corina Matt RN  Airway/Ventilation Management: calming measures promoted  Airway Safety Measures: oxygen flowmeter at bedside  Taken 11/12/2024 2212 by Corina Matt RN  Airway Safety Measures: oxygen flowmeter at bedside  Taken 11/12/2024 2030 by Corina Matt RN  Airway/Ventilation Management: calming measures promoted  Airway Safety Measures: oxygen flowmeter at bedside  Goal: Mechanical Ventilation Liberation  Intervention: Promote Extubation  and Mechanical Ventilation Liberation  Recent Flowsheet Documentation  Taken 11/13/2024 0557 by Corina Matt RN  Medication Review/Management: medications reviewed  Taken 11/13/2024 0412 by Corina Matt RN  Medication Review/Management: medications reviewed  Taken 11/13/2024 0212 by Corina Matt RN  Medication Review/Management: medications reviewed  Taken 11/13/2024 0015 by Corina Matt RN  Sleep/Rest Enhancement: awakenings minimized  Medication Review/Management: medications reviewed  Taken 11/12/2024 2212 by Corina Matt RN  Medication Review/Management: medications reviewed  Taken 11/12/2024 2030 by Corina Matt RN  Environmental Support:   rest periods encouraged   calm environment promoted  Sleep/Rest Enhancement: awakenings minimized  Medication Review/Management: medications reviewed  Goal: Absence of Device-Related Skin and Tissue Injury  Intervention: Maintain Skin and Tissue Health  Recent Flowsheet Documentation  Taken 11/13/2024 0015 by Corina Matt RN  Device Skin Pressure Protection:   absorbent pad utilized/changed   tubing/devices free from skin contact  Taken 11/12/2024 2030 by Corina Matt RN  Device Skin Pressure Protection:   absorbent pad utilized/changed   tubing/devices free from skin contact  Goal: Absence of Ventilator-Induced Lung Injury  Intervention: Prevent Ventilator-Associated Pneumonia  Recent Flowsheet Documentation  Taken 11/13/2024 0557 by Corina Matt RN  Head of Bed (HOB) Positioning: HOB elevated  Taken 11/13/2024 0412 by Corina Matt RN  Head of Bed (HOB) Positioning: HOB elevated  Taken 11/13/2024 0212 by Corina Matt RN  Head of Bed (HOB) Positioning: HOB elevated  Taken 11/13/2024 0015 by Corina Matt RN  Head of Bed (HOB) Positioning: HOB elevated  Taken 11/12/2024 2212 by Corina Matt RN  Head of Bed (HOB) Positioning: HOB elevated  Taken 11/12/2024 2030 by Corina Matt RN  Head of Bed  (HOB) Positioning: HOB elevated     Problem: Enteral Nutrition  Goal: Absence of Aspiration Signs and Symptoms  Intervention: Minimize Aspiration Risk  Recent Flowsheet Documentation  Taken 11/13/2024 0557 by Corina Matt RN  Head of Bed (HOB) Positioning: HOB elevated  Taken 11/13/2024 0412 by Corina Matt RN  Head of Bed (HOB) Positioning: HOB elevated  Taken 11/13/2024 0212 by Corina Matt RN  Head of Bed (HOB) Positioning: HOB elevated  Taken 11/13/2024 0015 by Corina Matt RN  Head of Bed (HOB) Positioning: HOB elevated  Taken 11/12/2024 2212 by Corina Matt RN  Head of Bed (HOB) Positioning: HOB elevated  Taken 11/12/2024 2030 by Corina Matt RN  Head of Bed (HOB) Positioning: HOB elevated

## 2024-11-13 NOTE — PROGRESS NOTES
Postoperative day 17 s/p left colectomy with colostomy    S: Tolerating tube feeds.  No events overnight.  Wound VAC placed by nursing staff    O:   Vitals:    11/12/24 1911 11/12/24 2253 11/13/24 0707 11/13/24 1356   BP: 136/63 124/71 125/60 125/65   BP Location: Left arm Left arm Left arm Left arm   Patient Position: Lying Lying Lying Lying   Pulse: 113 104 112 107   Resp: 18 18 16 16   Temp: 97.7 °F (36.5 °C) 97.9 °F (36.6 °C) 98.3 °F (36.8 °C) 98.4 °F (36.9 °C)   TempSrc: Oral Oral Oral Oral   SpO2: 95% 96% 95% 96%   Weight:       Height:          Alert, no acute distress  Breathing comfortable  Tachycardic at times  Abdomen soft, nontender, nondistended, wound VAC in place, ostomy pink and viable with stool    White blood cell count 13,000, slightly up, hemoglobin 9.2  Sodium 134, potassium 3.4    Assessment and plan    Postoperative day 17 s/p colectomy with colostomy.  Slowly recovery, very poor functional status    Continue working with speech therapy, hopefully he will avoid having PEG tube  Continue tube feeds at goal  Wound VAC to be changed every other day  DC Blankenship catheter  Continue anticoagulation  Antibiotics as per ID    Will continue to follow

## 2024-11-13 NOTE — PLAN OF CARE
Goal Outcome Evaluation:      Pt is alert with some confusion; Pt refused PT today stating he is having a hard day; F/C removed and started voiding trial; Wound vac placed by wound care; ostomy with output; Q2 turned in bed; medicated per orders; plan of care on going.

## 2024-11-13 NOTE — PROGRESS NOTES
Name: Arsh Haynes ADMIT: 10/26/2024   : 1950  PCP: Provider, No Known    MRN: [id number] LOS: 18 days   AGE/SEX: 74 y.o. male  ROOM: [facility] [place]     Subjective   Subjective   No acute events overnight.  No family at bedside today.  Patient endorsing knuckle pain on his left hand today.  No chest pain or shortness of breath.  Was too weak to get in a chair at bedside yesterday but I have encouraged him to really try today.    Objective   Objective     Vital Signs  Temp:  [97.7 °F (36.5 °C)-98.4 °F (36.9 °C)] 98.4 °F (36.9 °C)  Heart Rate:  [104-113] 107  Resp:  [16-18] 16  BP: (124-136)/(60-71) 125/65  SpO2:  [95 %-96 %] 96 %  on   ;   Device (Oxygen Therapy): room air  Body mass index is 25.56 kg/m².    Physical Exam  General: Sleeping comfortably but arouses.  Answers questions appropriately. Lying in bed.  Chronically ill-appearing. Core trak in place.   Neuro: Generalized weakness, moves all extremities.  Face symmetric.  No focal deficits.  Lungs: Clear to auscultation bilaterally. No wheeze or crackles. No distress.   Heart: RRR, no murmurs. No edema.  Abdomen: Soft, nondistended. Well-healing midline incision.  Ostomy with stool output.  Ext: Warm and well-perfused.  Right knee swelling improved, nontender to palpation.  Skin: No rashes or lesions. IV site without swelling or erythema.     Physical examination on 11/3/2024 is unchanged from yesterday's exam.    Results Review     I reviewed the patient's new clinical results:  Results from last 7 days   Lab Units 24  0637 24  0527 24  0516 11/10/24  0509   WBC 10*3/mm3 13.63* 12.43* 14.75* 19.04*   HEMOGLOBIN g/dL 9.2* 9.6* 9.7* 9.3*   PLATELETS 10*3/mm3 884* 953* 1,040* 1,089*     Results from last 7 days   Lab Units 24  0637 24  0526 24  2137 24  0516 11/10/24  0455   SODIUM mmol/L 134* 135*  --  134* 135*   POTASSIUM mmol/L 3.4* 3.9 4.0 3.1* 3.8   CHLORIDE mmol/L 104 106  --  104 107   CO2 mmol/L 20.0*  18.8*  --  18.4* 16.9*   BUN mg/dL 12 11  --  11 10   CREATININE mg/dL 0.42* 0.42*  --  0.43* 0.44*   GLUCOSE mg/dL 178* 186*  --  175* 175*   EGFR mL/min/1.73 114.2 114.2  --  113.4 112.6     Results from last 7 days   Lab Units 11/13/24  0637 11/12/24  0526 11/11/24  0516 11/10/24  0455   ALBUMIN g/dL 2.2* 1.9* 1.9* 2.1*   BILIRUBIN mg/dL 0.3 0.3 0.3 0.6   ALK PHOS U/L 233* 234* 217* 204*   AST (SGOT) U/L 24 28 25 19   ALT (SGPT) U/L 38 35 31 27     Results from last 7 days   Lab Units 11/13/24  0637 11/12/24  0526 11/11/24  0516 11/10/24  0455 11/08/24  0556 11/07/24  0704   CALCIUM mg/dL 8.4* 8.3* 8.0* 7.7*   < > 8.0*   ALBUMIN g/dL 2.2* 1.9* 1.9* 2.1*   < > 2.5*   MAGNESIUM mg/dL  --   --   --   --   --  2.1   PHOSPHORUS mg/dL  --   --   --   --   --  3.2    < > = values in this interval not displayed.     Results from last 7 days   Lab Units 11/09/24  0528   PROCALCITONIN ng/mL 4.09*     Glucose   Date/Time Value Ref Range Status   11/13/2024 1058 165 (H) 70 - 130 mg/dL Final   11/13/2024 0555 178 (H) 70 - 130 mg/dL Final   11/12/2024 2046 186 (H) 70 - 130 mg/dL Final   11/12/2024 1555 191 (H) 70 - 130 mg/dL Final   11/12/2024 1045 168 (H) 70 - 130 mg/dL Final   11/12/2024 0619 186 (H) 70 - 130 mg/dL Final   11/11/2024 2109 191 (H) 70 - 130 mg/dL Final       No radiology results for the last day    I have personally reviewed all medications:  Scheduled Medications  chlorhexidine, 15 mL, Mouth/Throat, Q12H  enoxaparin, 1 mg/kg, Subcutaneous, Q12H  folic acid, 1 mg, Nasogastric, Daily  insulin glargine, 25 Units, Subcutaneous, Nightly  insulin regular, 2-9 Units, Subcutaneous, 4x Daily AC & at Bedtime  [START ON 11/14/2024] lansoprazole, 30 mg, Nasogastric, Q AM  Lidocaine, 1 patch, Transdermal, Q24H  Lidocaine, 2 patch, Transdermal, Q24H  metoprolol tartrate, 75 mg, Nasogastric, Q12H  sodium chloride, 10 mL, Intravenous, Q12H  sodium hypochlorite, , Topical, BID  terazosin, 2 mg, Nasogastric, Daily  thiamine,  100 mg, Nasogastric, Daily    Infusions       Diet  NPO Diet NPO Type: Strict NPO      Intake/Output Summary (Last 24 hours) at 11/13/2024 1517  Last data filed at 11/13/2024 1356  Gross per 24 hour   Intake 0 ml   Output 1225 ml   Net -1225 ml       Assessment/Plan     Active Hospital Problems    Diagnosis  POA    **Peritonitis [K65.9]  Unknown    HTN (hypertension) [I10]  Unknown    Thrombocytosis [D75.839]  Unknown    Acute DVT (deep venous thrombosis) [I82.409]  Unknown    Renal mass [N28.89]  Unknown    Hyponatremia [E87.1]  Unknown    Anemia [D64.9]  Unknown    Moderate protein-calorie malnutrition [E44.0]  Yes    Colon cancer [C18.9]  No      Resolved Hospital Problems    Diagnosis Date Resolved POA    Perforation of sigmoid colon [K63.1] 10/27/2024 Yes       74 y.o. male with Peritonitis.    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  Leukocytosis fairly stable today at 13K.  Procalcitonin downtrending.  Inflammatory markers relatively stable.  Patient has been afebrile with no signs of new infection.  Continue to monitor.  -Sinus tachycardia: HR trend is improving. Blood pressure and oxygenation are appropriate.  His WBC is improving and he has been afebrile, so low concern for another infection.  EKG showing sinus tach. Continue increased dose of metoprolol.  Continue to closely monitor.  If sinus tachycardia persists and no other cause identified, may need to ask cardiology to evaluate.  -Acute right lower extremity DVT found on 11/5.  He has a severe thrombocytosis and in addition to his anemia as well as the colon cancer.  Hematology/oncology following.  Continue therapeutic Lovenox.  -Hgb slightly decreased to 9.6 today but trend has been fairly stable.  S/p PRBC transfusions.  Hematology following.  Repeat CBC with morning labs, transfuse for  Hgb less than 7.  -Thrombocytosis persist, though continues to improve today.  Hematology following.  -Urology evaluated the renal mass and urinary retention.  Blankenship catheter in place.  Continue Flomax.  Voiding trial when closer to discharge.  He will follow-up in 4 to 6 weeks with repeat CT for the renal mass. Given continued clots in the catheter, urology reconsulted.  CT renal mass concerning for renal cell carcinoma. Updated patient and wife. Urology will follow up for this as an outpatient.   -Hyponatremia: Sodium stable today at 134. Repeat BMP with morning labs.  -Hypokalemia: Potassium low at 3.4 this morning.  Replace as needed and repeat daily BMP.  -Increase Lantus to 30 units nightly.  Continue SSI.  Ongoing titration of insulin based on requirements.  -SLP evaluated and recommending n.p.o. at this time.  Core track placed and tube feeds started.  SLP to reevaluate today.  -Right knee pain, right shoulder pain: Orthopedic surgery consulted.  No significant effusion to perform bedside arthrocentesis.  XR of right shoulder with severe arthropathic changes.  Planning to do a steroid injection of the right shoulder but this has not been done yet.  Patient also endorsing left middle knuckle pain today.  It is warm and tender to palpation.  Going to start with an XR.    Pharmacy to dose Lovenox for DVT prophylaxis.  DNR.  Discussed with patient this morning.  He states that he would not want CPR or intubation.  CODE STATUS updated to reflect this.  Discussed with patient and nursing staff.  Anticipate discharge HECTOR Segura MD  [facility]  11/13/24  15:17 EST

## 2024-11-13 NOTE — PROGRESS NOTES
REASON FOR FOLLOWUP/CHIEF COMPLAINT:  Thrombocytosis, DVT, colon cancer     HISTORY OF PRESENT ILLNESS:   Still feeling weak    Past Medical History, Past Surgical History, Social History, Family History have been reviewed and are without significant changes except as mentioned.    Review of Systems   Review of Systems   Constitutional:  Negative for activity change.   HENT:  Negative for nosebleeds and trouble swallowing.    Respiratory:  Negative for shortness of breath and wheezing.    Cardiovascular:  Negative for chest pain and palpitations.   Gastrointestinal:  Negative for constipation, diarrhea and nausea.   Genitourinary:  Negative for dysuria and hematuria.   Musculoskeletal:  Negative for arthralgias and myalgias.   Neurological:  Negative for seizures and syncope.   Hematological:  Negative for adenopathy. Does not bruise/bleed easily.   Psychiatric/Behavioral:  Negative for confusion.        Medications:  The current medication list was reviewed in the EMR    ALLERGIES:  No Known Allergies           Vitals:    11/12/24 1911 11/12/24 2253 11/13/24 0514 11/13/24 0707   BP: 136/63 124/71  125/60   BP Location: Left arm Left arm  Left arm   Patient Position: Lying Lying  Lying   Pulse: 113 104  112   Resp: 18 18  16   Temp: 97.7 °F (36.5 °C) 97.9 °F (36.6 °C)  98.3 °F (36.8 °C)   TempSrc: Oral Oral  Oral   SpO2: 95% 96%  95%   Weight:   Comment: Bed scale not working. Needs to be re zeroed.    Height:         Physical Exam    CONSTITUTIONAL:  Vital signs reviewed.  No distress, looks comfortable.  EYES:  Conjunctivae and lids unremarkable.  PERRLA  EARS, NOSE, MOUTH, THROAT:  Ears and nose appear unremarkable.  Lips, teeth, gums appear unremarkable.  RESPIRATORY:  Normal respiratory effort.  Lungs clear to auscultation bilaterally.  CARDIOVASCULAR:  Normal S1, S2.  No murmurs, rubs or gallops.  No significant lower extremity edema.  GASTROINTESTINAL: Abdomen appears unremarkable.  Nontender.  No  hepatomegaly.  No splenomegaly.  NEURO: Cranial nerves 2-12 grossly intact.  No focal deficits.  Appears to have equal strength all 4 extremities.  MUSCULOSKELETAL:  Unremarkable digits/nails.  No cyanosis or clubbing.  SKIN:  Warm.  No rashes.  PSYCHIATRIC:  Normal judgment and insight.  Normal mood and affect.        RECENT LABS:  WBC   Date Value Ref Range Status   11/13/2024 13.63 (H) 3.40 - 10.80 10*3/mm3 Final   11/12/2024 12.43 (H) 3.40 - 10.80 10*3/mm3 Final   11/11/2024 14.75 (H) 3.40 - 10.80 10*3/mm3 Final     Hemoglobin   Date Value Ref Range Status   11/13/2024 9.2 (L) 13.0 - 17.7 g/dL Final   11/12/2024 9.6 (L) 13.0 - 17.7 g/dL Final   11/11/2024 9.7 (L) 13.0 - 17.7 g/dL Final     Platelets   Date Value Ref Range Status   11/13/2024 884 (H) 140 - 450 10*3/mm3 Final   11/12/2024 953 (H) 140 - 450 10*3/mm3 Final   11/11/2024 1,040 (C) 140 - 450 10*3/mm3 Final       ASSESSMENT/PLAN:  Arsh Haynes 3105/1      Septic shock from perforated bowel from underlying malignancy  Subsequent abdominal cultures positive for Pseudomonas, E. coli and mixed anaerobes, negative blood cultures  Completed antibiotics     *Synchronous colon cancers (both T3N0)  Both malignancies are T3 N0-stage IIA-and as we consider his concurrent renal mass and performance status, adjuvant chemotherapy must be considered particularly with invasion of the descending colon mass into pericolonic and perirectal tissue-apparently the the site of perforation.  These issues discussed with general surgery.  MSI status-negative by IHC, PCR-microsatellite stable.  This is particularly useful considering the patient's family history and findings of synchronous colon cancer.  Discussed with Dr. Serra: Cannot consider adjuvant therapy for resected colon cancer unless his functional status significantly improves and suspected renal cell carcinoma is resected and he has recovered from that as well..     *Suspected renal cell carcinoma based on kidney  mass protocol CT, 11/11/2024.  Urology plans to follow him up in the office after he has some time to recover from colon surgery    *Anemia  Suspected multifactorial anemia, additional laboratory studies consistent with anemia of chronic disease  Transfusion anticipated 11/9/2024  Assessment 11/10/2024 H9.3 and 30.5  Hb 9.2, from 9.6, from 9.7      *Leukocytosis  Agree that this is multifactorial but notedly right shifted with predominant neutrophilia and no immature forms.  This is a reactive leukocytosis usually related to concurrent physiologic stress and infection.  Continues to be variable-reflective of underlying processes including likely additional malignancy  WBC 13.6, from 12.4, from 14.8     *Thrombocytosis  This is also reactive developing gradually postoperative and does not need to be addressed with cytoreductive medications.  Additional laboratory studies per iron deficiency were completed and found to be negative  Continue to monitor, gradually improving  , from 953, from 1040     *Acute right lower extremity DVT in gastrocnemius and soleal  Patient now on anticoagulation with Lovenox appropriately.  We will follow and transition to DOAC therapy as he further recovers.     *Family history of kidney cancer: Brother and father    Plan  On Lovenox for right leg DVT.  When ready for oral anticoagulants per other physicians, I usually prefer Eliquis, but any would be reasonable  Two separate synchronous stage II colon cancers, but presented with perforation so higher risk.  Can follow-up with Dr. Serra as an outpatient to determine if adjuvant chemotherapy would be appropriate.  He needs some time to recover from septic shock from bowel perforation from underlying malignancy.  He is currently slowly recovering with a feeding tube in place.  Noted urology feels he has a renal cell carcinoma based on scans.  Patient's brother and father also with kidney cancer.  Message sent to Dr. Serra to update  him.  I sent a message to our office requesting an appointment Dr. Serra in roughly 5 to 8 weeks or so.  (He needs some time to recover and needs to see urology)    Chart reviewed including surgery notes and hospitalist note

## 2024-11-13 NOTE — CASE MANAGEMENT/SOCIAL WORK
Continued Stay Note  [facility]     Patient Name: Arsh Haynes  MRN: [id number]  Today's Date: 11/13/2024    Admit Date: 10/26/2024    Plan: Plan [place] when medically appropriate- pre cert needed.   BENITA Clay RN   Discharge Plan       Row Name 11/13/24 0941       Plan    Plan Plan [place] when medically appropriate- pre cert needed.   BENITA Clay RN    Provided Post Acute Provider List? Yes    Plan Comments Pt continues with juni Montgomery  ( [phone]) is following for [place].  Plan [place] when medically appropriate- pre cert needed. BENITA Clay RN                   Discharge Codes    No documentation.                 Expected Discharge Date and Time       Expected Discharge Date Expected Discharge Time    Nov 14, 2024               Felecia Clay RN

## 2024-11-13 NOTE — SIGNIFICANT NOTE
"   11/13/24 1127   OTHER   Discipline physical therapist   Rehab Time/Intention   Session Not Performed patient/family declined, not feeling well  (Pt declined PT/OT tx this AM stating feeling \"frustrated\" and wanting to rest, requested therapy return tomorrow. Per RN pt had abdominal wound vac placed this AM. PT to follow up next service date for tx as appropriate.)   Therapy Assessment/Plan (PT)   Criteria for Skilled Interventions Met (PT) yes   Recommendation   PT - Next Appointment 11/14/24       "

## 2024-11-13 NOTE — NURSING NOTE
"   11/13/24 0901   Wound 10/26/24 2301 midline abdomen   Placement Date/Time: 10/26/24 2301   Orientation: midline  Location: abdomen  Primary Wound Type: Incision   Dressing Appearance dry;intact   Base clean;moist;red   Periwound intact;dry   Edges open   Wound Length (cm) 12 cm   Wound Width (cm) 4 cm   Wound Depth (cm) 2.5 cm   Wound Surface Area (cm^2) 48 cm^2   Wound Volume (cm^3) 120 cm^3   Drainage Characteristics/Odor sanguineous   Drainage Amount scant   Care, Wound cleansed with;sterile normal saline;negative pressure wound therapy   Dressing Care dressing applied;dressing changed;foam;transparent film   Periwound Care barrier film applied   NPWT (Negative Pressure Wound Therapy) 11/13/24 abd   Placement Date: 11/13/24   Location: abd   Therapy Setting continuous therapy   Dressing foam, black;transparent dressing   Pressure Setting 125 mmHg   Sponges Inserted 2   Colostomy LLQ   Placement date: If unknown, DO NOT use \"Add Comment\" note: 10/26/24   Inserted by: Dr Guillaume  Location: LLQ   Stomal Appliance 2 piece;Clean;Dry;Intact;Changed;Drainable   Stoma Appearance irregular;moist;red  (oval)   Peristomal Assessment Clean;Intact   Accessories/Skin Care cleansed with water;skin barrier ring   Stoma Function flatus;stool   Stool Color brown, light   Stool Consistency liquid   Treatment Bag change;Site care   Output (mL) 200 mL     WOCN: Wound vac applied to midline abd wound, small piece adapt ring placed at umb to assist with seal. Good seal obtained. Ostomy appliance changed. Placed in [place] 2 1/4 2 piece appliance with ring. Tolerate dressing change without pain. Patient is on a pro plus mattress for skin prevention. Will plan to change vac dressing Monday, Wednesday and Friday.   "

## 2024-11-14 LAB
ALBUMIN SERPL-MCNC: 2.4 G/DL (ref 3.5–5.2)
ALBUMIN/GLOB SERPL: 0.6 G/DL
ALP SERPL-CCNC: 214 U/L (ref 39–117)
ALT SERPL W P-5'-P-CCNC: 32 U/L (ref 1–41)
ANION GAP SERPL CALCULATED.3IONS-SCNC: 12.5 MMOL/L (ref 5–15)
AST SERPL-CCNC: 22 U/L (ref 1–40)
BASOPHILS # BLD AUTO: 0.13 10*3/MM3 (ref 0–0.2)
BASOPHILS NFR BLD AUTO: 0.9 % (ref 0–1.5)
BILIRUB SERPL-MCNC: 0.4 MG/DL (ref 0–1.2)
BUN SERPL-MCNC: 15 MG/DL (ref 8–23)
BUN/CREAT SERPL: 33.3 (ref 7–25)
CALCIUM SPEC-SCNC: 8.5 MG/DL (ref 8.6–10.5)
CHLORIDE SERPL-SCNC: 102 MMOL/L (ref 98–107)
CO2 SERPL-SCNC: 20.5 MMOL/L (ref 22–29)
CREAT SERPL-MCNC: 0.45 MG/DL (ref 0.76–1.27)
DEPRECATED RDW RBC AUTO: 55.4 FL (ref 37–54)
EGFRCR SERPLBLD CKD-EPI 2021: 111.9 ML/MIN/1.73
EOSINOPHIL # BLD AUTO: 0.32 10*3/MM3 (ref 0–0.4)
EOSINOPHIL NFR BLD AUTO: 2.2 % (ref 0.3–6.2)
ERYTHROCYTE [DISTWIDTH] IN BLOOD BY AUTOMATED COUNT: 17.2 % (ref 12.3–15.4)
GLOBULIN UR ELPH-MCNC: 3.8 GM/DL
GLUCOSE BLDC GLUCOMTR-MCNC: 151 MG/DL (ref 70–130)
GLUCOSE BLDC GLUCOMTR-MCNC: 165 MG/DL (ref 70–130)
GLUCOSE BLDC GLUCOMTR-MCNC: 167 MG/DL (ref 70–130)
GLUCOSE BLDC GLUCOMTR-MCNC: 202 MG/DL (ref 70–130)
GLUCOSE SERPL-MCNC: 202 MG/DL (ref 65–99)
HCT VFR BLD AUTO: 28.5 % (ref 37.5–51)
HGB BLD-MCNC: 9.1 G/DL (ref 13–17.7)
IMM GRANULOCYTES # BLD AUTO: 0.41 10*3/MM3 (ref 0–0.05)
IMM GRANULOCYTES NFR BLD AUTO: 2.9 % (ref 0–0.5)
LYMPHOCYTES # BLD AUTO: 1.47 10*3/MM3 (ref 0.7–3.1)
LYMPHOCYTES NFR BLD AUTO: 10.3 % (ref 19.6–45.3)
MCH RBC QN AUTO: 28 PG (ref 26.6–33)
MCHC RBC AUTO-ENTMCNC: 31.9 G/DL (ref 31.5–35.7)
MCV RBC AUTO: 87.7 FL (ref 79–97)
MONOCYTES # BLD AUTO: 1.52 10*3/MM3 (ref 0.1–0.9)
MONOCYTES NFR BLD AUTO: 10.6 % (ref 5–12)
NEUTROPHILS NFR BLD AUTO: 10.45 10*3/MM3 (ref 1.7–7)
NEUTROPHILS NFR BLD AUTO: 73.1 % (ref 42.7–76)
NRBC BLD AUTO-RTO: 0 /100 WBC (ref 0–0.2)
PLATELET # BLD AUTO: 885 10*3/MM3 (ref 140–450)
PMV BLD AUTO: 8.6 FL (ref 6–12)
POTASSIUM SERPL-SCNC: 3.8 MMOL/L (ref 3.5–5.2)
PROT SERPL-MCNC: 6.2 G/DL (ref 6–8.5)
RBC # BLD AUTO: 3.25 10*6/MM3 (ref 4.14–5.8)
SODIUM SERPL-SCNC: 135 MMOL/L (ref 136–145)
WBC NRBC COR # BLD AUTO: 14.3 10*3/MM3 (ref 3.4–10.8)

## 2024-11-14 PROCEDURE — 99232 SBSQ HOSP IP/OBS MODERATE 35: CPT | Performed by: INTERNAL MEDICINE

## 2024-11-14 PROCEDURE — 82948 REAGENT STRIP/BLOOD GLUCOSE: CPT

## 2024-11-14 PROCEDURE — 80053 COMPREHEN METABOLIC PANEL: CPT | Performed by: INTERNAL MEDICINE

## 2024-11-14 PROCEDURE — 25010000002 ENOXAPARIN PER 10 MG: Performed by: INTERNAL MEDICINE

## 2024-11-14 PROCEDURE — 63710000001 INSULIN REGULAR HUMAN PER 5 UNITS: Performed by: HOSPITALIST

## 2024-11-14 PROCEDURE — 63710000001 INSULIN GLARGINE PER 5 UNITS: Performed by: STUDENT IN AN ORGANIZED HEALTH CARE EDUCATION/TRAINING PROGRAM

## 2024-11-14 PROCEDURE — 85025 COMPLETE CBC W/AUTO DIFF WBC: CPT | Performed by: INTERNAL MEDICINE

## 2024-11-14 RX ADMIN — CHLORHEXIDINE GLUCONATE 15 ML: 1.2 RINSE ORAL at 08:33

## 2024-11-14 RX ADMIN — INSULIN GLARGINE 30 UNITS: 100 INJECTION, SOLUTION SUBCUTANEOUS at 21:32

## 2024-11-14 RX ADMIN — METOPROLOL TARTRATE 75 MG: 50 TABLET, FILM COATED ORAL at 08:34

## 2024-11-14 RX ADMIN — Medication 100 MG: at 08:33

## 2024-11-14 RX ADMIN — METOPROLOL TARTRATE 75 MG: 50 TABLET, FILM COATED ORAL at 21:31

## 2024-11-14 RX ADMIN — Medication 10 ML: at 08:33

## 2024-11-14 RX ADMIN — LIDOCAINE 1 PATCH: 4 PATCH TOPICAL at 08:34

## 2024-11-14 RX ADMIN — INSULIN HUMAN 2 UNITS: 100 INJECTION, SOLUTION PARENTERAL at 12:23

## 2024-11-14 RX ADMIN — INSULIN HUMAN 2 UNITS: 100 INJECTION, SOLUTION PARENTERAL at 17:21

## 2024-11-14 RX ADMIN — ENOXAPARIN SODIUM 90 MG: 100 INJECTION SUBCUTANEOUS at 17:21

## 2024-11-14 RX ADMIN — Medication 2.5 MG: at 02:54

## 2024-11-14 RX ADMIN — FOLIC ACID 1 MG: 1 TABLET ORAL at 08:33

## 2024-11-14 RX ADMIN — LANSOPRAZOLE 30 MG: 15 TABLET, ORALLY DISINTEGRATING ORAL at 06:58

## 2024-11-14 RX ADMIN — ENOXAPARIN SODIUM 90 MG: 100 INJECTION SUBCUTANEOUS at 06:58

## 2024-11-14 RX ADMIN — CHLORHEXIDINE GLUCONATE 15 ML: 1.2 RINSE ORAL at 21:31

## 2024-11-14 RX ADMIN — INSULIN HUMAN 4 UNITS: 100 INJECTION, SOLUTION PARENTERAL at 06:58

## 2024-11-14 RX ADMIN — ACETAMINOPHEN 325MG 650 MG: 325 TABLET ORAL at 02:54

## 2024-11-14 RX ADMIN — Medication 10 ML: at 21:32

## 2024-11-14 RX ADMIN — LIDOCAINE 2 PATCH: 4 PATCH TOPICAL at 08:34

## 2024-11-14 NOTE — PROGRESS NOTES
postoperative day 18 s/p left colectomy with colostomy      no events overnight  White blood cell count stable  Abdomen soft and nontender  Ostomy pink and viable    Speech therapy evaluation tomorrow, if he continues to fail then he will need to have feeding tube placed  Continue anticoagulation    Mc Guillaume MD, FACS  General, Minimally Invasive and Endoscopic Surgery  [facility]    [address], [address]  [place], [place], [address]  P: [phone]  F: [phone]

## 2024-11-14 NOTE — PLAN OF CARE
Goal Outcome Evaluation:      Pt alert and oriented, RA, sr/st on monitor, restless most of the shift, notified [facility], melatonin given as ordered, narco x 1 for pain, will continue to monitor.      Problem: Adult Inpatient Plan of Care  Goal: Absence of Hospital-Acquired Illness or Injury  Intervention: Identify and Manage Fall Risk  Recent Flowsheet Documentation  Taken 11/14/2024 0412 by Corina Matt RN  Safety Promotion/Fall Prevention:   activity supervised   safety round/check completed   room organization consistent  Taken 11/14/2024 0212 by Corina Matt RN  Safety Promotion/Fall Prevention:   activity supervised   room organization consistent   safety round/check completed  Taken 11/14/2024 0001 by Corina Matt RN  Safety Promotion/Fall Prevention:   activity supervised   room organization consistent   safety round/check completed  Taken 11/13/2024 2212 by Corina Matt RN  Safety Promotion/Fall Prevention:   activity supervised   room organization consistent   safety round/check completed  Taken 11/13/2024 2045 by Corina Matt RN  Safety Promotion/Fall Prevention:   activity supervised   room organization consistent   safety round/check completed  Intervention: Prevent Skin Injury  Recent Flowsheet Documentation  Taken 11/14/2024 0412 by Corina Matt RN  Body Position: supine  Taken 11/14/2024 0212 by Corina Matt RN  Body Position:   left   tilted  Taken 11/14/2024 0001 by Corina Matt RN  Body Position:   right   tilted  Taken 11/13/2024 2212 by Corina Matt RN  Body Position:   left   tilted  Taken 11/13/2024 2045 by Corina Matt RN  Body Position: turned  Skin Protection: incontinence pads utilized  Intervention: Prevent and Manage VTE (Venous Thromboembolism) Risk  Recent Flowsheet Documentation  Taken 11/14/2024 0001 by Corina Matt RN  VTE Prevention/Management: SCDs (sequential compression devices) off  Taken 11/13/2024 2045 by Shaka  ANGEL Arriaga  VTE Prevention/Management: SCDs (sequential compression devices) off  Intervention: Prevent Infection  Recent Flowsheet Documentation  Taken 11/14/2024 0412 by Corina Matt RN  Infection Prevention:   hand hygiene promoted   rest/sleep promoted  Taken 11/14/2024 0212 by Corina Matt RN  Infection Prevention:   hand hygiene promoted   rest/sleep promoted  Taken 11/14/2024 0001 by Corina Matt RN  Infection Prevention:   hand hygiene promoted   single patient room provided   rest/sleep promoted  Taken 11/13/2024 2212 by Corina Matt RN  Infection Prevention:   hand hygiene promoted   single patient room provided   rest/sleep promoted  Taken 11/13/2024 2045 by Corina Matt RN  Infection Prevention:   hand hygiene promoted   rest/sleep promoted  Goal: Optimal Comfort and Wellbeing  Intervention: Provide Person-Centered Care  Recent Flowsheet Documentation  Taken 11/14/2024 0001 by Corina Matt RN  Trust Relationship/Rapport:   care explained   choices provided  Taken 11/13/2024 2045 by Corina Matt RN  Trust Relationship/Rapport: care explained     Problem: Skin Injury Risk Increased  Goal: Skin Health and Integrity  Intervention: Optimize Skin Protection  Recent Flowsheet Documentation  Taken 11/14/2024 0412 by Corina Matt RN  Head of Bed (HOB) Positioning: HOB elevated  Taken 11/14/2024 0212 by Corina Matt RN  Head of Bed (HOB) Positioning: HOB elevated  Taken 11/14/2024 0001 by Corina Matt RN  Activity Management: bedrest  Pressure Reduction Techniques:   frequent weight shift encouraged   weight shift assistance provided  Head of Bed (HOB) Positioning: HOB elevated  Pressure Reduction Devices:   alternating pressure pump (LORE)   pressure-redistributing mattress utilized  Taken 11/13/2024 2212 by Corina Matt RN  Head of Bed (HOB) Positioning: HOB elevated  Taken 11/13/2024 2045 by Corina Matt RN  Activity Management:  bedrest  Pressure Reduction Techniques:   frequent weight shift encouraged   weight shift assistance provided  Head of Bed (HOB) Positioning: HOB elevated  Pressure Reduction Devices:   alternating pressure pump (LORE)   pressure-redistributing mattress utilized  Skin Protection: incontinence pads utilized  Intervention: Promote and Optimize Oral Intake  Recent Flowsheet Documentation  Taken 11/14/2024 0001 by Corina Matt RN  Oral Nutrition Promotion: rest periods promoted  Taken 11/13/2024 2045 by Corina Matt RN  Oral Nutrition Promotion: rest periods promoted     Problem: Restraint, Nonviolent  Goal: Absence of Harm or Injury  Intervention: Implement Least Restrictive Safety Strategies  Recent Flowsheet Documentation  Taken 11/14/2024 0412 by Corina Matt RN  Medical Device Protection: tubing secured  Taken 11/14/2024 0212 by Corina Matt RN  Medical Device Protection: tubing secured  Taken 11/14/2024 0001 by Corina Matt RN  Medical Device Protection: tubing secured  Diversional Activities: television  Taken 11/13/2024 2212 by Corina Matt RN  Medical Device Protection: tubing secured  Taken 11/13/2024 2045 by Corina Matt RN  Medical Device Protection: tubing secured  Diversional Activities: television  Intervention: Protect Dignity, Rights and Personal Wellbeing  Recent Flowsheet Documentation  Taken 11/14/2024 0001 by Corina Matt RN  Trust Relationship/Rapport:   care explained   choices provided  Taken 11/13/2024 2045 by Corina Matt RN  Trust Relationship/Rapport: care explained  Intervention: Protect Skin and Joint Integrity  Recent Flowsheet Documentation  Taken 11/14/2024 0412 by Corina Matt RN  Body Position: supine  Taken 11/14/2024 0212 by Corina Matt RN  Body Position:   left   tilted  Taken 11/14/2024 0001 by Corina Matt RN  Body Position:   right   tilted  Range of Motion: ROM (range of motion) performed  Taken 11/13/2024  2212 by Corina Matt RN  Body Position:   left   tilted  Taken 11/13/2024 2045 by Corina Matt RN  Body Position: turned  Skin Protection: incontinence pads utilized  Range of Motion: ROM (range of motion) performed     Problem: Mechanical Ventilation Invasive  Goal: Effective Communication  Intervention: Ensure Effective Communication  Recent Flowsheet Documentation  Taken 11/14/2024 0001 by Corina Matt RN  Trust Relationship/Rapport:   care explained   choices provided  Diversional Activities: television  Communication Enhancement Strategies: call light answered in person  Taken 11/13/2024 2045 by Corina Matt RN  Trust Relationship/Rapport: care explained  Diversional Activities: television  Communication Enhancement Strategies: call light answered in person  Goal: Optimal Device Function  Intervention: Optimize Device Care and Function  Recent Flowsheet Documentation  Taken 11/14/2024 0412 by Corina Matt RN  Airway Safety Measures: oxygen flowmeter at bedside  Taken 11/14/2024 0212 by Corina Matt RN  Airway Safety Measures: oxygen flowmeter at bedside  Taken 11/14/2024 0001 by Corina Matt RN  Airway Safety Measures: oxygen flowmeter at bedside  Taken 11/13/2024 2212 by Corina Matt RN  Airway Safety Measures:   oxygen flowmeter at bedside   suction at bedside  Taken 11/13/2024 2045 by Corina Matt RN  Airway Safety Measures: oxygen flowmeter at bedside  Goal: Mechanical Ventilation Liberation  Intervention: Promote Extubation and Mechanical Ventilation Liberation  Recent Flowsheet Documentation  Taken 11/14/2024 0412 by Corina Matt RN  Medication Review/Management: medications reviewed  Taken 11/14/2024 0212 by Corina Matt RN  Medication Review/Management: medications reviewed  Taken 11/14/2024 0001 by Corina Matt RN  Environmental Support: rest periods encouraged  Sleep/Rest Enhancement: awakenings minimized  Medication  Review/Management: medications reviewed  Taken 11/13/2024 2212 by Corina Matt RN  Medication Review/Management: medications reviewed  Taken 11/13/2024 2045 by Corina Matt RN  Environmental Support: rest periods encouraged  Sleep/Rest Enhancement: awakenings minimized  Medication Review/Management: medications reviewed  Goal: Absence of Device-Related Skin and Tissue Injury  Intervention: Maintain Skin and Tissue Health  Recent Flowsheet Documentation  Taken 11/14/2024 0001 by Corina Matt RN  Device Skin Pressure Protection:   absorbent pad utilized/changed   tubing/devices free from skin contact  Taken 11/13/2024 2045 by Corina Matt RN  Device Skin Pressure Protection:   absorbent pad utilized/changed   tubing/devices free from skin contact  Goal: Absence of Ventilator-Induced Lung Injury  Intervention: Prevent Ventilator-Associated Pneumonia  Recent Flowsheet Documentation  Taken 11/14/2024 0412 by Corina Matt RN  Head of Bed (HOB) Positioning: HOB elevated  Taken 11/14/2024 0212 by Corina Matt RN  Head of Bed (HOB) Positioning: HOB elevated  Taken 11/14/2024 0001 by Corina Matt RN  Head of Bed (HOB) Positioning: HOB elevated  Taken 11/13/2024 2212 by Corina Matt RN  Head of Bed (HOB) Positioning: HOB elevated  Taken 11/13/2024 2045 by Corina Matt RN  Head of Bed (HOB) Positioning: HOB elevated     Problem: Fall Injury Risk  Goal: Absence of Fall and Fall-Related Injury  Intervention: Identify and Manage Contributors  Recent Flowsheet Documentation  Taken 11/14/2024 0412 by Corina Matt RN  Medication Review/Management: medications reviewed  Taken 11/14/2024 0212 by Corina Matt RN  Medication Review/Management: medications reviewed  Taken 11/14/2024 0001 by Corina Matt RN  Medication Review/Management: medications reviewed  Self-Care Promotion: independence encouraged  Taken 11/13/2024 2212 by Corina Matt RN  Medication  Review/Management: medications reviewed  Taken 11/13/2024 2045 by Corina Matt RN  Medication Review/Management: medications reviewed  Self-Care Promotion: independence encouraged  Intervention: Promote Injury-Free Environment  Recent Flowsheet Documentation  Taken 11/14/2024 0412 by Corina Matt RN  Safety Promotion/Fall Prevention:   activity supervised   safety round/check completed   room organization consistent  Taken 11/14/2024 0212 by Corina Matt RN  Safety Promotion/Fall Prevention:   activity supervised   room organization consistent   safety round/check completed  Taken 11/14/2024 0001 by Corina Matt RN  Safety Promotion/Fall Prevention:   activity supervised   room organization consistent   safety round/check completed  Taken 11/13/2024 2212 by Corina Matt RN  Safety Promotion/Fall Prevention:   activity supervised   room organization consistent   safety round/check completed  Taken 11/13/2024 2045 by Corina Matt RN  Safety Promotion/Fall Prevention:   activity supervised   room organization consistent   safety round/check completed     Problem: Enteral Nutrition  Goal: Absence of Aspiration Signs and Symptoms  Intervention: Minimize Aspiration Risk  Recent Flowsheet Documentation  Taken 11/14/2024 0412 by Corina Matt RN  Head of Bed (HOB) Positioning: HOB elevated  Taken 11/14/2024 0212 by Corina Matt RN  Head of Bed (HOB) Positioning: HOB elevated  Taken 11/14/2024 0001 by Corina Matt RN  Head of Bed (HOB) Positioning: HOB elevated  Taken 11/13/2024 2212 by Corina Matt RN  Head of Bed (HOB) Positioning: HOB elevated  Taken 11/13/2024 2045 by Corina Matt RN  Head of Bed (HOB) Positioning: HOB elevated     Problem: Restraint, Nonviolent  Goal: Absence of Harm or Injury  Intervention: Implement Least Restrictive Safety Strategies  Recent Flowsheet Documentation  Taken 11/14/2024 0412 by Corina Matt RN  Medical Device  Protection: tubing secured  Taken 11/14/2024 0212 by Corina Matt RN  Medical Device Protection: tubing secured  Taken 11/14/2024 0001 by Corina Matt RN  Medical Device Protection: tubing secured  Diversional Activities: television  Taken 11/13/2024 2212 by Corina Matt RN  Medical Device Protection: tubing secured  Taken 11/13/2024 2045 by Corina Matt RN  Medical Device Protection: tubing secured  Diversional Activities: television  Intervention: Protect Dignity, Rights and Personal Wellbeing  Recent Flowsheet Documentation  Taken 11/14/2024 0001 by Corina Matt RN  Trust Relationship/Rapport:   care explained   choices provided  Taken 11/13/2024 2045 by Corina Matt RN  Trust Relationship/Rapport: care explained  Intervention: Protect Skin and Joint Integrity  Recent Flowsheet Documentation  Taken 11/14/2024 0412 by Corina Matt RN  Body Position: supine  Taken 11/14/2024 0212 by Corina Matt RN  Body Position:   left   tilted  Taken 11/14/2024 0001 by Corina Matt RN  Body Position:   right   tilted  Range of Motion: ROM (range of motion) performed  Taken 11/13/2024 2212 by Corina Matt RN  Body Position:   left   tilted  Taken 11/13/2024 2045 by Corina Matt RN  Body Position: turned  Skin Protection: incontinence pads utilized  Range of Motion: ROM (range of motion) performed     Problem: Skin Injury Risk Increased  Goal: Skin Health and Integrity  Intervention: Optimize Skin Protection  Recent Flowsheet Documentation  Taken 11/14/2024 0412 by Corina Matt RN  Head of Bed (HOB) Positioning: HOB elevated  Taken 11/14/2024 0212 by Corina Matt RN  Head of Bed (HOB) Positioning: HOB elevated  Taken 11/14/2024 0001 by Corina Matt RN  Activity Management: bedrest  Pressure Reduction Techniques:   frequent weight shift encouraged   weight shift assistance provided  Head of Bed (HOB) Positioning: HOB elevated  Pressure Reduction  Devices:   alternating pressure pump (LORE)   pressure-redistributing mattress utilized  Taken 11/13/2024 2212 by Corina Matt RN  Head of Bed (HOB) Positioning: HOB elevated  Taken 11/13/2024 2045 by Corina Matt RN  Activity Management: bedrest  Pressure Reduction Techniques:   frequent weight shift encouraged   weight shift assistance provided  Head of Bed (HOB) Positioning: HOB elevated  Pressure Reduction Devices:   alternating pressure pump (LORE)   pressure-redistributing mattress utilized  Skin Protection: incontinence pads utilized  Intervention: Promote and Optimize Oral Intake  Recent Flowsheet Documentation  Taken 11/14/2024 0001 by Corina Matt RN  Oral Nutrition Promotion: rest periods promoted  Taken 11/13/2024 2045 by Corina Matt RN  Oral Nutrition Promotion: rest periods promoted     Problem: Fall Injury Risk  Goal: Absence of Fall and Fall-Related Injury  Intervention: Identify and Manage Contributors  Recent Flowsheet Documentation  Taken 11/14/2024 0412 by Corina Matt RN  Medication Review/Management: medications reviewed  Taken 11/14/2024 0212 by Corina Matt RN  Medication Review/Management: medications reviewed  Taken 11/14/2024 0001 by Corina Matt RN  Medication Review/Management: medications reviewed  Self-Care Promotion: independence encouraged  Taken 11/13/2024 2212 by Corina Matt RN  Medication Review/Management: medications reviewed  Taken 11/13/2024 2045 by Corina Matt RN  Medication Review/Management: medications reviewed  Self-Care Promotion: independence encouraged  Intervention: Promote Injury-Free Environment  Recent Flowsheet Documentation  Taken 11/14/2024 0412 by Corina Matt RN  Safety Promotion/Fall Prevention:   activity supervised   safety round/check completed   room organization consistent  Taken 11/14/2024 0212 by Corina Matt RN  Safety Promotion/Fall Prevention:   activity supervised   room  organization consistent   safety round/check completed  Taken 11/14/2024 0001 by Corina Matt RN  Safety Promotion/Fall Prevention:   activity supervised   room organization consistent   safety round/check completed  Taken 11/13/2024 2212 by Corina Matt RN  Safety Promotion/Fall Prevention:   activity supervised   room organization consistent   safety round/check completed  Taken 11/13/2024 2045 by Corina Matt RN  Safety Promotion/Fall Prevention:   activity supervised   room organization consistent   safety round/check completed     Problem: Adult Inpatient Plan of Care  Goal: Absence of Hospital-Acquired Illness or Injury  Intervention: Identify and Manage Fall Risk  Recent Flowsheet Documentation  Taken 11/14/2024 0412 by Corina Matt RN  Safety Promotion/Fall Prevention:   activity supervised   safety round/check completed   room organization consistent  Taken 11/14/2024 0212 by Corina Matt RN  Safety Promotion/Fall Prevention:   activity supervised   room organization consistent   safety round/check completed  Taken 11/14/2024 0001 by Corina Matt RN  Safety Promotion/Fall Prevention:   activity supervised   room organization consistent   safety round/check completed  Taken 11/13/2024 2212 by Corina Matt RN  Safety Promotion/Fall Prevention:   activity supervised   room organization consistent   safety round/check completed  Taken 11/13/2024 2045 by Corina Matt RN  Safety Promotion/Fall Prevention:   activity supervised   room organization consistent   safety round/check completed  Intervention: Prevent Skin Injury  Recent Flowsheet Documentation  Taken 11/14/2024 0412 by Corina Matt RN  Body Position: supine  Taken 11/14/2024 0212 by Corina Matt RN  Body Position:   left   tilted  Taken 11/14/2024 0001 by Corina Matt RN  Body Position:   right   tilted  Taken 11/13/2024 2212 by Corina Matt RN  Body Position:   left   tilted  Taken  11/13/2024 2045 by Corina Matt RN  Body Position: turned  Skin Protection: incontinence pads utilized  Intervention: Prevent and Manage VTE (Venous Thromboembolism) Risk  Recent Flowsheet Documentation  Taken 11/14/2024 0001 by Corina aMtt RN  VTE Prevention/Management: SCDs (sequential compression devices) off  Taken 11/13/2024 2045 by Corina Matt RN  VTE Prevention/Management: SCDs (sequential compression devices) off  Intervention: Prevent Infection  Recent Flowsheet Documentation  Taken 11/14/2024 0412 by Corina Matt RN  Infection Prevention:   hand hygiene promoted   rest/sleep promoted  Taken 11/14/2024 0212 by Corina Matt RN  Infection Prevention:   hand hygiene promoted   rest/sleep promoted  Taken 11/14/2024 0001 by Corina Matt RN  Infection Prevention:   hand hygiene promoted   single patient room provided   rest/sleep promoted  Taken 11/13/2024 2212 by Corina Matt RN  Infection Prevention:   hand hygiene promoted   single patient room provided   rest/sleep promoted  Taken 11/13/2024 2045 by Corina Matt RN  Infection Prevention:   hand hygiene promoted   rest/sleep promoted  Goal: Optimal Comfort and Wellbeing  Intervention: Provide Person-Centered Care  Recent Flowsheet Documentation  Taken 11/14/2024 0001 by Corina Matt RN  Trust Relationship/Rapport:   care explained   choices provided  Taken 11/13/2024 2045 by Corina Matt RN  Trust Relationship/Rapport: care explained     Problem: Malnutrition  Goal: Improved Nutritional Intake  Intervention: Promote and Optimize Oral Intake  Recent Flowsheet Documentation  Taken 11/14/2024 0001 by Corina Matt RN  Oral Nutrition Promotion: rest periods promoted  Taken 11/13/2024 2045 by Corina Matt RN  Oral Nutrition Promotion: rest periods promoted     Problem: Sepsis/Septic Shock  Goal: Optimal Coping  Intervention: Support Patient and Family Response  Recent Flowsheet  Documentation  Taken 11/14/2024 0001 by Corina Matt RN  Supportive Measures: active listening utilized  Taken 11/13/2024 2045 by Corina Matt RN  Supportive Measures: active listening utilized  Goal: Absence of Infection Signs and Symptoms  Intervention: Initiate Sepsis Management  Recent Flowsheet Documentation  Taken 11/14/2024 0412 by Corina Matt RN  Infection Prevention:   hand hygiene promoted   rest/sleep promoted  Isolation Precautions: precautions maintained  Taken 11/14/2024 0212 by Corina Matt RN  Infection Prevention:   hand hygiene promoted   rest/sleep promoted  Isolation Precautions: precautions maintained  Taken 11/14/2024 0001 by Corina Matt RN  Infection Management: aseptic technique maintained  Infection Prevention:   hand hygiene promoted   single patient room provided   rest/sleep promoted  Isolation Precautions: precautions maintained  Taken 11/13/2024 2212 by Corina Matt RN  Infection Prevention:   hand hygiene promoted   single patient room provided   rest/sleep promoted  Isolation Precautions: precautions maintained  Taken 11/13/2024 2045 by Corina Matt RN  Infection Management: aseptic technique maintained  Infection Prevention:   hand hygiene promoted   rest/sleep promoted  Isolation Precautions: precautions maintained  Intervention: Promote Recovery  Recent Flowsheet Documentation  Taken 11/14/2024 0001 by Corina Matt RN  Activity Management: bedrest  Sleep/Rest Enhancement: awakenings minimized  Taken 11/13/2024 2045 by Corina Matt RN  Activity Management: bedrest  Sleep/Rest Enhancement: awakenings minimized     Problem: Alcohol Withdrawal  Goal: Alcohol Withdrawal Symptom Control  Intervention: Minimize or Manage Alcohol Withdrawal Symptoms  Recent Flowsheet Documentation  Taken 11/14/2024 0001 by Corina Matt RN  Sensory Stimulation Regulation: care clustered  Aspiration Precautions: upright posture maintained  Seizure  Precautions: activity supervised  Taken 11/13/2024 2045 by Corina Matt RN  Sensory Stimulation Regulation: lighting decreased  Seizure Precautions: activity supervised  Goal: Optimal Neurologic Function  Intervention: Minimize or Manage Acute Neurologic Symptoms  Recent Flowsheet Documentation  Taken 11/14/2024 0001 by Corina Matt RN  Sensory Stimulation Regulation: care clustered  Cerebral Perfusion Promotion: blood pressure monitored  Taken 11/13/2024 2045 by Corina Matt RN  Sensory Stimulation Regulation: lighting decreased  Cerebral Perfusion Promotion: blood pressure monitored  Intervention: Prevent Seizure-Related Injury  Recent Flowsheet Documentation  Taken 11/14/2024 0001 by Corina Matt RN  Seizure Precautions: activity supervised  Taken 11/13/2024 2045 by Corina Matt RN  Seizure Precautions: activity supervised     Problem: VTE (Venous Thromboembolism)  Goal: Tissue Perfusion  Intervention: Optimize Tissue Perfusion  Recent Flowsheet Documentation  Taken 11/14/2024 0001 by Corina Matt RN  VTE Prevention/Management: SCDs (sequential compression devices) off  Taken 11/13/2024 2045 by Corina Matt RN  VTE Prevention/Management: SCDs (sequential compression devices) off     Problem: Restraint, Nonviolent  Goal: Absence of Harm or Injury  Intervention: Implement Least Restrictive Safety Strategies  Recent Flowsheet Documentation  Taken 11/14/2024 0412 by Corina Matt RN  Medical Device Protection: tubing secured  Taken 11/14/2024 0212 by Corina Matt RN  Medical Device Protection: tubing secured  Taken 11/14/2024 0001 by Corina Matt RN  Medical Device Protection: tubing secured  Diversional Activities: television  Taken 11/13/2024 2212 by Corina Matt RN  Medical Device Protection: tubing secured  Taken 11/13/2024 2045 by Corina Matt RN  Medical Device Protection: tubing secured  Diversional Activities: television  Intervention:  Protect Dignity, Rights and Personal Wellbeing  Recent Flowsheet Documentation  Taken 11/14/2024 0001 by Corina Matt RN  Trust Relationship/Rapport:   care explained   choices provided  Taken 11/13/2024 2045 by Corina Matt RN  Trust Relationship/Rapport: care explained  Intervention: Protect Skin and Joint Integrity  Recent Flowsheet Documentation  Taken 11/14/2024 0412 by Corina Matt RN  Body Position: supine  Taken 11/14/2024 0212 by Corina Matt RN  Body Position:   left   tilted  Taken 11/14/2024 0001 by Corina Matt RN  Body Position:   right   tilted  Range of Motion: ROM (range of motion) performed  Taken 11/13/2024 2212 by Corina Matt RN  Body Position:   left   tilted  Taken 11/13/2024 2045 by Corina Matt RN  Body Position: turned  Skin Protection: incontinence pads utilized  Range of Motion: ROM (range of motion) performed     Problem: Mechanical Ventilation Invasive  Goal: Effective Communication  Intervention: Ensure Effective Communication  Recent Flowsheet Documentation  Taken 11/14/2024 0001 by Corina Matt RN  Trust Relationship/Rapport:   care explained   choices provided  Diversional Activities: television  Communication Enhancement Strategies: call light answered in person  Taken 11/13/2024 2045 by Corina Matt RN  Trust Relationship/Rapport: care explained  Diversional Activities: television  Communication Enhancement Strategies: call light answered in person  Goal: Optimal Device Function  Intervention: Optimize Device Care and Function  Recent Flowsheet Documentation  Taken 11/14/2024 0412 by Corina Matt RN  Airway Safety Measures: oxygen flowmeter at bedside  Taken 11/14/2024 0212 by Corina Matt RN  Airway Safety Measures: oxygen flowmeter at bedside  Taken 11/14/2024 0001 by Corina Matt RN  Airway Safety Measures: oxygen flowmeter at bedside  Taken 11/13/2024 2212 by Corina Matt RN  Airway Safety  Measures:   oxygen flowmeter at bedside   suction at bedside  Taken 11/13/2024 2045 by Corina Matt RN  Airway Safety Measures: oxygen flowmeter at bedside  Goal: Mechanical Ventilation Liberation  Intervention: Promote Extubation and Mechanical Ventilation Liberation  Recent Flowsheet Documentation  Taken 11/14/2024 0412 by Corina Matt RN  Medication Review/Management: medications reviewed  Taken 11/14/2024 0212 by Corina Matt RN  Medication Review/Management: medications reviewed  Taken 11/14/2024 0001 by Corina Matt RN  Environmental Support: rest periods encouraged  Sleep/Rest Enhancement: awakenings minimized  Medication Review/Management: medications reviewed  Taken 11/13/2024 2212 by Corina Matt RN  Medication Review/Management: medications reviewed  Taken 11/13/2024 2045 by Corina Matt RN  Environmental Support: rest periods encouraged  Sleep/Rest Enhancement: awakenings minimized  Medication Review/Management: medications reviewed  Goal: Absence of Device-Related Skin and Tissue Injury  Intervention: Maintain Skin and Tissue Health  Recent Flowsheet Documentation  Taken 11/14/2024 0001 by Corina Matt RN  Device Skin Pressure Protection:   absorbent pad utilized/changed   tubing/devices free from skin contact  Taken 11/13/2024 2045 by Corina Matt RN  Device Skin Pressure Protection:   absorbent pad utilized/changed   tubing/devices free from skin contact  Goal: Absence of Ventilator-Induced Lung Injury  Intervention: Prevent Ventilator-Associated Pneumonia  Recent Flowsheet Documentation  Taken 11/14/2024 0412 by Corina Matt RN  Head of Bed (HOB) Positioning: HOB elevated  Taken 11/14/2024 0212 by Corina Matt RN  Head of Bed (HOB) Positioning: HOB elevated  Taken 11/14/2024 0001 by Corina Matt RN  Head of Bed (HOB) Positioning: HOB elevated  Taken 11/13/2024 2212 by Corina Matt RN  Head of Bed ([facility]) Positioning: HOB  elevated  Taken 11/13/2024 2045 by Corina Matt RN  Head of Bed (HOB) Positioning: HOB elevated     Problem: Enteral Nutrition  Goal: Absence of Aspiration Signs and Symptoms  Intervention: Minimize Aspiration Risk  Recent Flowsheet Documentation  Taken 11/14/2024 0412 by Corina Matt RN  Head of Bed (HOB) Positioning: HOB elevated  Taken 11/14/2024 0212 by Corina Matt RN  Head of Bed (HOB) Positioning: HOB elevated  Taken 11/14/2024 0001 by Corina Matt RN  Head of Bed (HOB) Positioning: HOB elevated  Taken 11/13/2024 2212 by Corina Matt RN  Head of Bed (HOB) Positioning: HOB elevated  Taken 11/13/2024 2045 by Corina Matt RN  Head of Bed (HOB) Positioning: HOB elevated

## 2024-11-14 NOTE — PROGRESS NOTES
"Nutrition Services    Patient Name: Arsh Haynes  YOB: 1950  MRN: [id number]  Admission date: 10/26/2024    PROGRESS NOTE      Encounter Information: Checking in on TF tolerance. RD visited pt at bedside. TF infusing as ordered. Pt denies TF intolerance. Plan for VFSS.       PO Diet: NPO Diet NPO Type: Strict NPO   PO Supplements: -   PO Intake:  -       Current nutrition support: Novasource Renal @ 55 mL/hr with 30 mL q6 FWF   Nutrition support review: Infusing as ordered, pt declines intolerance       Labs (reviewed below): Reviewed, management per attending         GI Function:  Ostomy: 550 mL output in last 24 hours       Nutrition Intervention Updates: Continue TF as ordered. Monitor for VFSS results.        Results from last 7 days   Lab Units 11/14/24 0410 11/13/24 1951 11/13/24 0637 11/12/24  0526   SODIUM mmol/L 135*  --  134* 135*   POTASSIUM mmol/L 3.8 4.1 3.4* 3.9   CHLORIDE mmol/L 102  --  104 106   CO2 mmol/L 20.5*  --  20.0* 18.8*   BUN mg/dL 15  --  12 11   CREATININE mg/dL 0.45*  --  0.42* 0.42*   CALCIUM mg/dL 8.5*  --  8.4* 8.3*   BILIRUBIN mg/dL 0.4  --  0.3 0.3   ALK PHOS U/L 214*  --  233* 234*   ALT (SGPT) U/L 32  --  38 35   AST (SGOT) U/L 22  --  24 28   GLUCOSE mg/dL 202*  --  178* 186*     Results from last 7 days   Lab Units 11/14/24  0410   HEMOGLOBIN g/dL 9.1*   HEMATOCRIT % 28.5*     No results found for: \"COVID19\"  Lab Results   Component Value Date    HGBA1C 6.60 (H) 10/26/2024       Wt Readings from Last 10 Encounters:   11/08/24 0357 85.5 kg (188 lb 7.9 oz)   11/07/24 0500 85.1 kg (187 lb 9.8 oz)   11/06/24 0600 85.1 kg (187 lb 9.8 oz)   11/06/24 0100 84.2 kg (185 lb 10 oz)   11/04/24 0415 87 kg (191 lb 12.8 oz)   11/04/24 0152 87 kg (191 lb 12.8 oz)   11/02/24 0543 89.8 kg (197 lb 15.6 oz)   11/01/24 0548 92.6 kg (204 lb 2.3 oz)   10/31/24 0448 96 kg (211 lb 10.3 oz)   10/30/24 0300 98.4 kg (216 lb 14.9 oz)   10/29/24 0431 92.5 kg (203 lb 14.8 oz)   10/28/24 " 0436 90.2 kg (198 lb 13.7 oz)   10/27/24 0404 83.4 kg (183 lb 13.8 oz)   10/26/24 1933 83 kg (183 lb)       RD to follow up per protocol.    Electronically signed by:  Aga King RD  11/14/24 15:42 EST

## 2024-11-14 NOTE — PLAN OF CARE
Goal Outcome Evaluation:      Pt is alert but is slow to respond.  Pt refused PT today and refused to get out of bed; Q2 turned in bed; tube feed infusing; no complaints of pain; medicated per orders; plan of care on going.

## 2024-11-14 NOTE — SIGNIFICANT NOTE
11/14/24 1601   OTHER   Discipline physical therapist   Rehab Time/Intention   Session Not Performed patient/family declined, not feeling well  (Spent several minutes talking w/ pt as he cont to refuse any PT tx d/t feeling too weak and tired. Discussed tsf to chair via PT or Munir lift, sitting EOB, supine ther-ex, rolling in bed and/or being placed in a chair position w/ pt declining each. Further educated on benefit of any mobility w/ recovery process and importance of sitting more upright in bed to work on his neck/postural muscles as he has been having difficulty w/ swallowing; declined elevating his HOB. Pt requested PT to follow up tomorrow at noon, stating he will work with therapy then. RN made aware. PT to follow up tomorrow. )   Therapy Assessment/Plan (PT)   Criteria for Skilled Interventions Met (PT) yes   Recommendation   PT - Next Appointment 11/15/24

## 2024-11-14 NOTE — PROGRESS NOTES
REASON FOR FOLLOWUP/CHIEF COMPLAINT:  Thrombocytosis, DVT, colon cancer     HISTORY OF PRESENT ILLNESS:   Feeling weak.  Slow recovery.    Past Medical History, Past Surgical History, Social History, Family History have been reviewed and are without significant changes except as mentioned.    Review of Systems   Review of Systems   Constitutional:  Negative for activity change.   HENT:  Negative for nosebleeds and trouble swallowing.    Respiratory:  Negative for shortness of breath and wheezing.    Cardiovascular:  Negative for chest pain and palpitations.   Gastrointestinal:  Negative for constipation, diarrhea and nausea.   Genitourinary:  Negative for dysuria and hematuria.   Musculoskeletal:  Negative for arthralgias and myalgias.   Neurological:  Negative for seizures and syncope.   Hematological:  Negative for adenopathy. Does not bruise/bleed easily.   Psychiatric/Behavioral:  Negative for confusion.        Medications:  The current medication list was reviewed in the EMR    ALLERGIES:  No Known Allergies           Vitals:    11/13/24 2312 11/14/24 0505 11/14/24 0704 11/14/24 1304   BP: 99/56  128/66 118/56   BP Location: Left arm  Right arm Right arm   Patient Position: Lying  Lying Lying   Pulse: 98  109 101   Resp: 16  18 18   Temp: 98 °F (36.7 °C)  97.9 °F (36.6 °C) 98.1 °F (36.7 °C)   TempSrc: Oral  Oral Oral   SpO2: 95%  96% 97%   Weight:  Comment: Bed scale not working.     Height:         Physical Exam    CONSTITUTIONAL:  Vital signs reviewed.  No distress, looks comfortable.  EYES:  Conjunctivae and lids unremarkable.  PERRLA  EARS, NOSE, MOUTH, THROAT:  Ears and nose appear unremarkable.  Lips, teeth, gums appear unremarkable.  RESPIRATORY:  Normal respiratory effort.  Lungs clear to auscultation bilaterally.  CARDIOVASCULAR:  Normal S1, S2.  No murmurs, rubs or gallops.  No significant lower extremity edema.  GASTROINTESTINAL: Abdomen appears unremarkable.  Nontender.  No hepatomegaly.  No  splenomegaly.  NEURO: Cranial nerves 2-12 grossly intact.  No focal deficits.  Appears to have equal strength all 4 extremities.  MUSCULOSKELETAL:  Unremarkable digits/nails.  No cyanosis or clubbing.  SKIN:  Warm.  No rashes.  PSYCHIATRIC:  Normal judgment and insight.  Normal mood and affect.       RECENT LABS:  WBC   Date Value Ref Range Status   11/14/2024 14.30 (H) 3.40 - 10.80 10*3/mm3 Final   11/13/2024 13.63 (H) 3.40 - 10.80 10*3/mm3 Final   11/12/2024 12.43 (H) 3.40 - 10.80 10*3/mm3 Final     Hemoglobin   Date Value Ref Range Status   11/14/2024 9.1 (L) 13.0 - 17.7 g/dL Final   11/13/2024 9.2 (L) 13.0 - 17.7 g/dL Final   11/12/2024 9.6 (L) 13.0 - 17.7 g/dL Final     Platelets   Date Value Ref Range Status   11/14/2024 885 (H) 140 - 450 10*3/mm3 Final   11/13/2024 884 (H) 140 - 450 10*3/mm3 Final   11/12/2024 953 (H) 140 - 450 10*3/mm3 Final       ASSESSMENT/PLAN:  Arsh Guevarajocelyne 3105/1      Septic shock from perforated bowel from underlying malignancy  Subsequent abdominal cultures positive for Pseudomonas, E. coli and mixed anaerobes, negative blood cultures  Completed antibiotics  Awaiting recovery  *Synchronous colon cancers (both T3N0)  Both malignancies are T3 N0-stage IIA-and as we consider his concurrent renal mass and performance status, adjuvant chemotherapy must be considered particularly with invasion of the descending colon mass into pericolonic and perirectal tissue-apparently the the site of perforation.  These issues discussed with general surgery.  MSI status-negative by IHC, PCR-microsatellite stable.  This is particularly useful considering the patient's family history and findings of synchronous colon cancer.  Discussed with Dr. Serra: Cannot consider adjuvant therapy for resected colon cancer unless his functional status significantly improves and suspected renal cell carcinoma is resected and he has recovered from that as well..  Awaiting recovery from the surgery    *Suspected renal  cell carcinoma based on kidney mass protocol CT, 11/11/2024.  Urology plans to follow him up in the office after he has some time to recover from colon surgery    *Anemia  Suspected multifactorial anemia, additional laboratory studies consistent with anemia of chronic disease  Transfusion anticipated 11/9/2024  Assessment 11/10/2024 H9.3 and 30.5  Hb 9.1, from 9.2, from 9.6, from 9.7      *Leukocytosis  Agree that this is multifactorial but notedly right shifted with predominant neutrophilia and no immature forms.  This is a reactive leukocytosis usually related to concurrent physiologic stress and infection.  Continues to be variable-reflective of underlying processes including likely additional malignancy  WBC 14.3, from 13.6, from 12.4, from 14.8     *Thrombocytosis  This is also reactive developing gradually postoperative and does not need to be addressed with cytoreductive medications.  Additional laboratory studies per iron deficiency were completed and found to be negative  Continue to monitor, gradually improving  , from 884, from 953, from 1040     *Acute right lower extremity DVT in gastrocnemius and soleal  Patient now on anticoagulation with Lovenox appropriately.  We will follow and transition to DOAC therapy as he further recovers.     *Family history of kidney cancer: Brother and father    Plan  On Lovenox for right leg DVT.  When ready for oral anticoagulants per other physicians, I usually prefer Eliquis, but any would be reasonable  Two separate synchronous stage II colon cancers, but presented with perforation so higher risk.  Can follow-up with Dr. Serra as an outpatient to determine if adjuvant chemotherapy would be appropriate.  He needs some time to recover from septic shock from bowel perforation from underlying malignancy.  He is currently slowly recovering with a feeding tube in place.  Noted urology feels he has a renal cell carcinoma based on scans.  Patient's brother and father  also with kidney cancer.  Message sent to Dr. Serra to update him.  I sent a message to our office requesting an appointment Dr. Serra in roughly 5 to 8 weeks or so.  (He needs some time to recover and needs to see urology)    Chart reviewed including surgery note  And hospitalist note

## 2024-11-14 NOTE — PROGRESS NOTES
"    Name: Arsh Haynes ADMIT: 10/26/2024   : 1950  PCP: Provider, No Known    MRN: [id number] LOS: 19 days   AGE/SEX: 74 y.o. male  ROOM: [facility]     Subjective   Subjective   Pt reports feeling \"okay\" today. Still weak. Pain controlled. Denies SOA or CP or palp. No N/V/D. No F/C/NS.       Objective   Objective   Vital Signs  Temp:  [97.4 °F (36.3 °C)-98.4 °F (36.9 °C)] 97.9 °F (36.6 °C)  Heart Rate:  [] 109  Resp:  [16-18] 18  BP: ()/(56-69) 128/66  SpO2:  [95 %-96 %] 96 %  on   ;   Device (Oxygen Therapy): room air  Body mass index is 25.56 kg/m².  Physical Exam  Vitals and nursing note reviewed.   Constitutional:       General: He is not in acute distress.     Appearance: He is ill-appearing. He is not toxic-appearing or diaphoretic.   HENT:      Head: Normocephalic.      Nose: Nose normal.      Comments: CorTrack in right nare     Mouth/Throat:      Mouth: Mucous membranes are moist.      Pharynx: Oropharynx is clear.   Eyes:      General: No scleral icterus.        Right eye: No discharge.         Left eye: No discharge.      Conjunctiva/sclera: Conjunctivae normal.   Cardiovascular:      Rate and Rhythm: Normal rate and regular rhythm.      Pulses: Normal pulses.   Pulmonary:      Effort: Pulmonary effort is normal. No respiratory distress.      Breath sounds: Normal breath sounds. No wheezing or rales.   Abdominal:      General: Bowel sounds are normal. There is no distension.      Palpations: Abdomen is soft.      Tenderness: There is abdominal tenderness (appropriate postop).      Comments: Ostomy LLQ  Midline incision with wound vac   Musculoskeletal:         General: No swelling.      Cervical back: Neck supple.   Skin:     General: Skin is warm and dry.      Capillary Refill: Capillary refill takes 2 to 3 seconds.      Coloration: Skin is not jaundiced.   Neurological:      Mental Status: He is alert and oriented to person, place, and time. Mental status is at baseline.      Cranial " Nerves: No cranial nerve deficit.      Coordination: Coordination normal.   Psychiatric:      Comments: Flat affect       Results Review     I reviewed the patient's new clinical results.  Results from last 7 days   Lab Units 11/14/24 0410 11/13/24 0637 11/12/24 0527 11/11/24  0516   WBC 10*3/mm3 14.30* 13.63* 12.43* 14.75*   HEMOGLOBIN g/dL 9.1* 9.2* 9.6* 9.7*   PLATELETS 10*3/mm3 885* 884* 953* 1,040*     Results from last 7 days   Lab Units 11/14/24 0410 11/13/24 1951 11/13/24 0637 11/12/24 0526 11/11/24 2137 11/11/24  0516   SODIUM mmol/L 135*  --  134* 135*  --  134*   POTASSIUM mmol/L 3.8 4.1 3.4* 3.9   < > 3.1*   CHLORIDE mmol/L 102  --  104 106  --  104   CO2 mmol/L 20.5*  --  20.0* 18.8*  --  18.4*   BUN mg/dL 15  --  12 11  --  11   CREATININE mg/dL 0.45*  --  0.42* 0.42*  --  0.43*   GLUCOSE mg/dL 202*  --  178* 186*  --  175*   EGFR mL/min/1.73 111.9  --  114.2 114.2  --  113.4    < > = values in this interval not displayed.     Results from last 7 days   Lab Units 11/14/24 0410 11/13/24 0637 11/12/24 0526 11/11/24  0516   ALBUMIN g/dL 2.4* 2.2* 1.9* 1.9*   BILIRUBIN mg/dL 0.4 0.3 0.3 0.3   ALK PHOS U/L 214* 233* 234* 217*   AST (SGOT) U/L 22 24 28 25   ALT (SGPT) U/L 32 38 35 31     Results from last 7 days   Lab Units 11/14/24 0410 11/13/24 0637 11/12/24 0526 11/11/24  0516   CALCIUM mg/dL 8.5* 8.4* 8.3* 8.0*   ALBUMIN g/dL 2.4* 2.2* 1.9* 1.9*     Results from last 7 days   Lab Units 11/09/24  0528   PROCALCITONIN ng/mL 4.09*     Glucose   Date/Time Value Ref Range Status   11/14/2024 0617 202 (H) 70 - 130 mg/dL Final   11/13/2024 2025 201 (H) 70 - 130 mg/dL Final   11/13/2024 1705 182 (H) 70 - 130 mg/dL Final   11/13/2024 1058 165 (H) 70 - 130 mg/dL Final   11/13/2024 0555 178 (H) 70 - 130 mg/dL Final   11/12/2024 2046 186 (H) 70 - 130 mg/dL Final   11/12/2024 1555 191 (H) 70 - 130 mg/dL Final       XR Hand 2 View Left    Result Date: 11/13/2024  Degenerative change as described    This  report was finalized on 11/13/2024 5:06 PM by Dr. Virgil Conley M.D on Workstation: [id number]       I have personally reviewed all medications:  Scheduled Medications  chlorhexidine, 15 mL, Mouth/Throat, Q12H  enoxaparin, 1 mg/kg, Subcutaneous, Q12H  folic acid, 1 mg, Nasogastric, Daily  insulin glargine, 30 Units, Subcutaneous, Nightly  insulin regular, 2-9 Units, Subcutaneous, 4x Daily AC & at Bedtime  lansoprazole, 30 mg, Nasogastric, Q AM  Lidocaine, 1 patch, Transdermal, Q24H  Lidocaine, 2 patch, Transdermal, Q24H  metoprolol tartrate, 75 mg, Nasogastric, Q12H  sodium chloride, 10 mL, Intravenous, Q12H  sodium hypochlorite, , Topical, BID  terazosin, 2 mg, Nasogastric, Daily  thiamine, 100 mg, Nasogastric, Daily    Infusions   Diet  NPO Diet NPO Type: Strict NPO    I have personally reviewed:  [x]  Laboratory   [x]  Microbiology   [x]  Radiology   [x]  EKG/Telemetry  []  Cardiology/Vascular   []  Pathology    [x]  Records       Assessment/Plan     Active Hospital Problems    Diagnosis  POA    **Peritonitis [K65.9]  Unknown    HTN (hypertension) [I10]  Unknown    Thrombocytosis [D75.839]  Unknown    Acute DVT (deep venous thrombosis) [I82.409]  Unknown    Renal mass [N28.89]  Unknown    Hyponatremia [E87.1]  Unknown    Anemia [D64.9]  Unknown    Moderate protein-calorie malnutrition [E44.0]  Yes    Colon cancer [C18.9]  No      Resolved Hospital Problems    Diagnosis Date Resolved POA    Perforation of sigmoid colon [K63.1] 10/27/2024 Yes       75yo gentleman admitted to ICU with peritonitis due to perforated rectosigmoid adenoCA. We assumed care when pt came out of ICU.     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 Leukocytosis fairly stable today at 14K. Procalcitonin downtrending. Inflammatory markers relatively stable. Patient has been afebrile with  no signs of new infection. Continue to monitor.  -Heme/Onc has seen, recommends outpt f/u with Dr. Serra in 5-8 weeks.    Sinus tachycardia: EKG showing sinus tach. Continue increased dose of metoprolol. Continue to closely monitor.  If sinus tachycardia persists and no other cause identified, may need to ask Cardiology to evaluate.    Acute RLE DVT found on 11/5:  He has a severe thrombocytosis and in addition to his anemia as well as the colon cancer. Hematology/Oncology following. Continue therapeutic Lovenox.    Anemia, multifactorial: Hgb slightly decreased to 9.1 today but trend has been fairly stable. S/p 2 units PRBCs here. Hematology/Oncology following. Repeating CBC with morning labs, transfuse for Hgb less than 7.    Thrombocytosis: stable today. Reactive per Hematology/Oncology. No iron def.    Right renal mass and urinary retention: Urology evaluated the renal mass and urinary retention. Blankenship catheter out now. Continue Hytrin. He will follow-up in 4 to 6 weeks with repeat CT for the renal mass.     Hyponatremia: Sodium stable today at 135. Repeat BMP with morning labs.    Hypokalemia: Replacing as needed and repeat daily BMP. Check Mg++ level.    DM2: Requiring Lantus to 30 units nightly and SSI. Ongoing titration of insulin based on requirements. A1c 6.6. No history of DM per pt. Suspect combination of stress of acute illness and tube feeds in setting of what was likely pre-diabetic state.    Dysphagia: SLP evaluated and recommended NPO. CorTrack placed and tube feeds started. SLP plans VFSS on Monday.    Right knee pain, right shoulder pain: Orthopedic surgery consulted. No significant effusion to perform bedside arthrocentesis. XR of right shoulder with severe arthropathic changes only. Ortho not concerned for septic joints. Patient also reported left middle knuckle pain. XR unremarkable.      Pharmacy to dose Lovenox for DVT prophylaxis.  DNR.  Discussed with patient and RN.  Anticipate discharge  to SNU facility next week.  Expected Discharge Date: 11/19/2024; Expected Discharge Time:       Davian Flores MD  [facility]  11/14/24  09:07 EST

## 2024-11-15 LAB
ALBUMIN SERPL-MCNC: 2.3 G/DL (ref 3.5–5.2)
ALBUMIN/GLOB SERPL: 0.5 G/DL
ALP SERPL-CCNC: 200 U/L (ref 39–117)
ALT SERPL W P-5'-P-CCNC: 35 U/L (ref 1–41)
ANION GAP SERPL CALCULATED.3IONS-SCNC: 11.4 MMOL/L (ref 5–15)
AST SERPL-CCNC: 25 U/L (ref 1–40)
BASOPHILS # BLD AUTO: 0.11 10*3/MM3 (ref 0–0.2)
BASOPHILS NFR BLD AUTO: 0.7 % (ref 0–1.5)
BILIRUB SERPL-MCNC: 0.3 MG/DL (ref 0–1.2)
BUN SERPL-MCNC: 16 MG/DL (ref 8–23)
BUN/CREAT SERPL: 32 (ref 7–25)
CALCIUM SPEC-SCNC: 8.6 MG/DL (ref 8.6–10.5)
CHLORIDE SERPL-SCNC: 103 MMOL/L (ref 98–107)
CO2 SERPL-SCNC: 20.6 MMOL/L (ref 22–29)
CREAT SERPL-MCNC: 0.5 MG/DL (ref 0.76–1.27)
DEPRECATED RDW RBC AUTO: 52.6 FL (ref 37–54)
EGFRCR SERPLBLD CKD-EPI 2021: 108.4 ML/MIN/1.73
EOSINOPHIL # BLD AUTO: 0.45 10*3/MM3 (ref 0–0.4)
EOSINOPHIL NFR BLD AUTO: 3.1 % (ref 0.3–6.2)
ERYTHROCYTE [DISTWIDTH] IN BLOOD BY AUTOMATED COUNT: 17.3 % (ref 12.3–15.4)
GLOBULIN UR ELPH-MCNC: 4.2 GM/DL
GLUCOSE BLDC GLUCOMTR-MCNC: 152 MG/DL (ref 70–130)
GLUCOSE BLDC GLUCOMTR-MCNC: 169 MG/DL (ref 70–130)
GLUCOSE BLDC GLUCOMTR-MCNC: 174 MG/DL (ref 70–130)
GLUCOSE BLDC GLUCOMTR-MCNC: 208 MG/DL (ref 70–130)
GLUCOSE SERPL-MCNC: 170 MG/DL (ref 65–99)
HCT VFR BLD AUTO: 30.1 % (ref 37.5–51)
HGB BLD-MCNC: 9.4 G/DL (ref 13–17.7)
IMM GRANULOCYTES # BLD AUTO: 0.33 10*3/MM3 (ref 0–0.05)
IMM GRANULOCYTES NFR BLD AUTO: 2.2 % (ref 0–0.5)
LYMPHOCYTES # BLD AUTO: 1.53 10*3/MM3 (ref 0.7–3.1)
LYMPHOCYTES NFR BLD AUTO: 10.4 % (ref 19.6–45.3)
MAGNESIUM SERPL-MCNC: 1.8 MG/DL (ref 1.6–2.4)
MCH RBC QN AUTO: 26.8 PG (ref 26.6–33)
MCHC RBC AUTO-ENTMCNC: 31.2 G/DL (ref 31.5–35.7)
MCV RBC AUTO: 85.8 FL (ref 79–97)
MONOCYTES # BLD AUTO: 1.45 10*3/MM3 (ref 0.1–0.9)
MONOCYTES NFR BLD AUTO: 9.9 % (ref 5–12)
NEUTROPHILS NFR BLD AUTO: 10.83 10*3/MM3 (ref 1.7–7)
NEUTROPHILS NFR BLD AUTO: 73.7 % (ref 42.7–76)
NRBC BLD AUTO-RTO: 0 /100 WBC (ref 0–0.2)
PHOSPHATE SERPL-MCNC: 3.6 MG/DL (ref 2.5–4.5)
PLATELET # BLD AUTO: 865 10*3/MM3 (ref 140–450)
PMV BLD AUTO: 8.6 FL (ref 6–12)
POTASSIUM SERPL-SCNC: 3.6 MMOL/L (ref 3.5–5.2)
POTASSIUM SERPL-SCNC: 4.5 MMOL/L (ref 3.5–5.2)
PROT SERPL-MCNC: 6.5 G/DL (ref 6–8.5)
RBC # BLD AUTO: 3.51 10*6/MM3 (ref 4.14–5.8)
SODIUM SERPL-SCNC: 135 MMOL/L (ref 136–145)
WBC NRBC COR # BLD AUTO: 14.7 10*3/MM3 (ref 3.4–10.8)

## 2024-11-15 PROCEDURE — 97110 THERAPEUTIC EXERCISES: CPT

## 2024-11-15 PROCEDURE — 97530 THERAPEUTIC ACTIVITIES: CPT

## 2024-11-15 PROCEDURE — 63710000001 INSULIN GLARGINE PER 5 UNITS: Performed by: STUDENT IN AN ORGANIZED HEALTH CARE EDUCATION/TRAINING PROGRAM

## 2024-11-15 PROCEDURE — 25010000002 ENOXAPARIN PER 10 MG: Performed by: INTERNAL MEDICINE

## 2024-11-15 PROCEDURE — 63710000001 INSULIN REGULAR HUMAN PER 5 UNITS: Performed by: HOSPITALIST

## 2024-11-15 PROCEDURE — 82948 REAGENT STRIP/BLOOD GLUCOSE: CPT

## 2024-11-15 PROCEDURE — 83735 ASSAY OF MAGNESIUM: CPT | Performed by: HOSPITALIST

## 2024-11-15 PROCEDURE — 84100 ASSAY OF PHOSPHORUS: CPT | Performed by: HOSPITALIST

## 2024-11-15 PROCEDURE — 97535 SELF CARE MNGMENT TRAINING: CPT

## 2024-11-15 PROCEDURE — 99232 SBSQ HOSP IP/OBS MODERATE 35: CPT | Performed by: INTERNAL MEDICINE

## 2024-11-15 PROCEDURE — 80053 COMPREHEN METABOLIC PANEL: CPT | Performed by: INTERNAL MEDICINE

## 2024-11-15 PROCEDURE — 99024 POSTOP FOLLOW-UP VISIT: CPT | Performed by: SURGERY

## 2024-11-15 PROCEDURE — 85025 COMPLETE CBC W/AUTO DIFF WBC: CPT | Performed by: INTERNAL MEDICINE

## 2024-11-15 PROCEDURE — 84132 ASSAY OF SERUM POTASSIUM: CPT | Performed by: HOSPITALIST

## 2024-11-15 RX ORDER — POTASSIUM CHLORIDE 1.5 G/1.58G
40 POWDER, FOR SOLUTION ORAL EVERY 4 HOURS
Status: COMPLETED | OUTPATIENT
Start: 2024-11-15 | End: 2024-11-15

## 2024-11-15 RX ADMIN — CHLORHEXIDINE GLUCONATE 15 ML: 1.2 RINSE ORAL at 21:09

## 2024-11-15 RX ADMIN — INSULIN GLARGINE 30 UNITS: 100 INJECTION, SOLUTION SUBCUTANEOUS at 21:10

## 2024-11-15 RX ADMIN — LIDOCAINE 2 PATCH: 4 PATCH TOPICAL at 08:47

## 2024-11-15 RX ADMIN — LIDOCAINE 1 PATCH: 4 PATCH TOPICAL at 08:47

## 2024-11-15 RX ADMIN — SODIUM HYPOCHLORITE: 1.25 SOLUTION TOPICAL at 08:47

## 2024-11-15 RX ADMIN — TERAZOSIN 2 MG: 2 CAPSULE ORAL at 01:29

## 2024-11-15 RX ADMIN — TERAZOSIN 2 MG: 2 CAPSULE ORAL at 21:09

## 2024-11-15 RX ADMIN — METOPROLOL TARTRATE 75 MG: 50 TABLET, FILM COATED ORAL at 08:46

## 2024-11-15 RX ADMIN — INSULIN HUMAN 2 UNITS: 100 INJECTION, SOLUTION PARENTERAL at 11:00

## 2024-11-15 RX ADMIN — Medication 10 ML: at 08:48

## 2024-11-15 RX ADMIN — LANSOPRAZOLE 30 MG: 15 TABLET, ORALLY DISINTEGRATING ORAL at 06:38

## 2024-11-15 RX ADMIN — INSULIN HUMAN 2 UNITS: 100 INJECTION, SOLUTION PARENTERAL at 17:24

## 2024-11-15 RX ADMIN — ENOXAPARIN SODIUM 90 MG: 100 INJECTION SUBCUTANEOUS at 06:38

## 2024-11-15 RX ADMIN — FOLIC ACID 1 MG: 1 TABLET ORAL at 08:46

## 2024-11-15 RX ADMIN — METOPROLOL TARTRATE 75 MG: 50 TABLET, FILM COATED ORAL at 21:09

## 2024-11-15 RX ADMIN — HYDROCODONE BITARTRATE AND ACETAMINOPHEN 1 TABLET: 5; 325 TABLET ORAL at 03:30

## 2024-11-15 RX ADMIN — INSULIN HUMAN 4 UNITS: 100 INJECTION, SOLUTION PARENTERAL at 21:16

## 2024-11-15 RX ADMIN — CHLORHEXIDINE GLUCONATE 15 ML: 1.2 RINSE ORAL at 08:45

## 2024-11-15 RX ADMIN — Medication 10 ML: at 21:11

## 2024-11-15 RX ADMIN — POTASSIUM CHLORIDE 40 MEQ: 1.5 POWDER, FOR SOLUTION ORAL at 08:46

## 2024-11-15 RX ADMIN — INSULIN HUMAN 2 UNITS: 100 INJECTION, SOLUTION PARENTERAL at 08:47

## 2024-11-15 RX ADMIN — ENOXAPARIN SODIUM 90 MG: 100 INJECTION SUBCUTANEOUS at 17:23

## 2024-11-15 RX ADMIN — Medication 100 MG: at 08:46

## 2024-11-15 RX ADMIN — HYDROCODONE BITARTRATE AND ACETAMINOPHEN 1 TABLET: 5; 325 TABLET ORAL at 19:41

## 2024-11-15 RX ADMIN — POTASSIUM CHLORIDE 40 MEQ: 1.5 POWDER, FOR SOLUTION ORAL at 13:43

## 2024-11-15 NOTE — CONSULTS
visited pt this afternoon after consultation with pt's RN.   provided emotional and spiritual support via presence, dialogue and prayer.

## 2024-11-15 NOTE — PROGRESS NOTES
REASON FOR FOLLOWUP/CHIEF COMPLAINT:  Thrombocytosis, DVT, colon cancer     HISTORY OF PRESENT ILLNESS:   Remains quite weak.    Past Medical History, Past Surgical History, Social History, Family History have been reviewed and are without significant changes except as mentioned.    Review of Systems   Review of Systems   Constitutional:  Negative for activity change.   HENT:  Negative for nosebleeds and trouble swallowing.    Respiratory:  Negative for shortness of breath and wheezing.    Cardiovascular:  Negative for chest pain and palpitations.   Gastrointestinal:  Negative for constipation, diarrhea and nausea.   Genitourinary:  Negative for dysuria and hematuria.   Musculoskeletal:  Negative for arthralgias and myalgias.   Neurological:  Negative for seizures and syncope.   Hematological:  Negative for adenopathy. Does not bruise/bleed easily.   Psychiatric/Behavioral:  Negative for confusion.        Medications:  The current medication list was reviewed in the EMR    ALLERGIES:  No Known Allergies           Vitals:    11/14/24 2316 11/14/24 2317 11/15/24 0533 11/15/24 0718   BP: 123/63 123/63  125/62   BP Location: Left arm   Left arm   Patient Position: Lying   Lying   Pulse: 106 105  106   Resp: 18   16   Temp: 98.9 °F (37.2 °C)   98.2 °F (36.8 °C)   TempSrc: Oral   Oral   SpO2: 98% 96%  97%   Weight:   Comment: bed scale is not working    Height:         Physical Exam    CONSTITUTIONAL:  Vital signs reviewed.  No distress, looks comfortable.  EYES:  Conjunctivae and lids unremarkable.  PERRLA  EARS, NOSE, MOUTH, THROAT:  Ears and nose appear unremarkable.  Lips, teeth, gums appear unremarkable.  RESPIRATORY:  Normal respiratory effort.  Lungs clear to auscultation bilaterally.  CARDIOVASCULAR:  Normal S1, S2.  No murmurs, rubs or gallops.  No significant lower extremity edema.  GASTROINTESTINAL: Abdomen appears unremarkable.  Nontender.  No hepatomegaly.  No splenomegaly.  NEURO: Cranial nerves 2-12  grossly intact.  No focal deficits.  Appears to have equal strength all 4 extremities.  MUSCULOSKELETAL:  Unremarkable digits/nails.  No cyanosis or clubbing.  SKIN:  Warm.  No rashes.  PSYCHIATRIC:  Normal judgment and insight.  Normal mood and affect.      RECENT LABS:  WBC   Date Value Ref Range Status   11/15/2024 14.70 (H) 3.40 - 10.80 10*3/mm3 Final   11/14/2024 14.30 (H) 3.40 - 10.80 10*3/mm3 Final   11/13/2024 13.63 (H) 3.40 - 10.80 10*3/mm3 Final     Hemoglobin   Date Value Ref Range Status   11/15/2024 9.4 (L) 13.0 - 17.7 g/dL Final   11/14/2024 9.1 (L) 13.0 - 17.7 g/dL Final   11/13/2024 9.2 (L) 13.0 - 17.7 g/dL Final     Platelets   Date Value Ref Range Status   11/15/2024 865 (H) 140 - 450 10*3/mm3 Final   11/14/2024 885 (H) 140 - 450 10*3/mm3 Final   11/13/2024 884 (H) 140 - 450 10*3/mm3 Final       ASSESSMENT/PLAN:  Arsh Guevarajocelyne 3105/1      Septic shock from perforated bowel from underlying malignancy  Subsequent abdominal cultures positive for Pseudomonas, E. coli and mixed anaerobes, negative blood cultures  Completed antibiotics  Very slow recovery.    *Synchronous colon cancers (both T3N0)  Both malignancies are T3 N0-stage IIA-and as we consider his concurrent renal mass and performance status, adjuvant chemotherapy must be considered particularly with invasion of the descending colon mass into pericolonic and perirectal tissue-apparently the the site of perforation.  These issues discussed with general surgery.  MSI status-negative by IHC, PCR-microsatellite stable.  This is particularly useful considering the patient's family history and findings of synchronous colon cancer.  Discussed with Dr. Serra: Cannot consider adjuvant therapy for resected colon cancer unless his functional status significantly improves and suspected renal cell carcinoma is resected and he has recovered from that as well..  Slowly recovering from the surgery    *Suspected renal cell carcinoma based on kidney mass  protocol CT, 11/11/2024.  Urology plans to follow him up in the office after he has some time to recover from colon surgery    *Anemia  Suspected multifactorial anemia, additional laboratory studies consistent with anemia of chronic disease  Transfusion anticipated 11/9/2024  Assessment 11/10/2024 H9.3 and 30.5  Hb 9.4, from 9.1, from 9.2, from 9.6, from 9.7      *Leukocytosis  Agree that this is multifactorial but notedly right shifted with predominant neutrophilia and no immature forms.  This is a reactive leukocytosis usually related to concurrent physiologic stress and infection.  Continues to be variable-reflective of underlying processes including likely additional malignancy  WBC 14.7, from 14.3, from 13.6, from 12.4, from 14.8     *Thrombocytosis  This is also reactive developing gradually postoperative and does not need to be addressed with cytoreductive medications.  Additional laboratory studies per iron deficiency were completed and found to be negative  Continue to monitor, gradually improving  , from 885, from 884, from 953, from 1040     *Acute right lower extremity DVT in gastrocnemius and soleal  Patient now on anticoagulation with Lovenox appropriately.  We will follow and transition to DOAC therapy as he further recovers.     *Family history of kidney cancer: Brother and father    Plan  On Lovenox for right leg DVT.  When ready for oral anticoagulants per other physicians, I usually prefer Eliquis, but any would be reasonable  Two separate synchronous stage II colon cancers, but presented with perforation so higher risk.  Can follow-up with Dr. Serra as an outpatient to determine if adjuvant chemotherapy would be appropriate.  He needs some time to recover from septic shock from bowel perforation from underlying malignancy.  He is currently slowly recovering with a Dobbhoff feeding tube in place.  Dr. Guillaume states he might need a PEG or G-tube  Noted urology feels he has a renal cell  carcinoma based on scans.  Patient's brother and father also with kidney cancer.  Message sent to Dr. Serra to update him.  I sent a message to our office requesting an appointment Dr. Serra in roughly 5 to 8 weeks or so.  (He needs some time to recover and needs to see urology)    Chart reviewed including surgery note and hospitalist note

## 2024-11-15 NOTE — THERAPY TREATMENT NOTE
Patient Name: Arsh Haynes  : 1950    MRN: [id number]                              Today's Date: 11/15/2024       Admit Date: 10/26/2024    Visit Dx:     ICD-10-CM ICD-9-CM   1. Perforation of sigmoid colon  K63.1 569.83   2. Hypotension, unspecified hypotension type  I95.9 458.9   3. Increased lactic acid level  R79.89 276.2   4. Bowel perforation  K63.1 569.83     Patient Active Problem List   Diagnosis    Colon cancer    HTN (hypertension)    Thrombocytosis    Acute DVT (deep venous thrombosis)    Renal mass    Hyponatremia    Anemia    Peritonitis    Moderate protein-calorie malnutrition     Past Medical History:   Diagnosis Date    HTN (hypertension)     Hypercholesterolemia      Past Surgical History:   Procedure Laterality Date    COLON RESECTION N/A 10/26/2024    Procedure: LOW ANTERIOR COLON RESECTION SIGMOID, COLOSTOMY, SPLENIC FLEXURE MOBILIZATION;  Surgeon: Mc Guillaume MD;  Location: [facility];  Service: General;  Laterality: N/A;    EXPLORATORY LAPAROTOMY N/A 10/26/2024    Procedure: LAPAROTOMY EXPLORATORY, LEFT HEMICOLECTOMY;  Surgeon: Mc Guillaume MD;  Location: [facility];  Service: General;  Laterality: N/A;      General Information       Row Name 11/15/24 1229          OT Time and Intention    Subjective Information no complaints  -BC     Document Type therapy note (daily note)  -BC     Mode of Treatment co-treatment;occupational therapy;physical therapy;other (see comments)  -BC     Patient Effort good  -BC     Symptoms Noted During/After Treatment increased pain  -BC     Comment R knee pain  -BC       Row Name 11/15/24 1229          General Information    Patient Profile Reviewed yes  -BC     Existing Precautions/Restrictions fall;NPO  -BC       Row Name 11/15/24 1229          Cognition    Orientation Status (Cognition) oriented x 4  -BC       Row Name 11/15/24 1229          Safety Issues/Impairments Affecting Functional Mobility    Safety Issues  Affecting Function (Mobility) insight into deficits/self-awareness;problem-solving;safety precaution awareness;safety precautions follow-through/compliance;sequencing abilities  -BC     Impairments Affecting Function (Mobility) strength;balance;endurance/activity tolerance;coordination;pain;postural/trunk control;range of motion (ROM);cognition  -BC     Comment, Safety Issues/Impairments (Mobility) PT/OT cotreatment medically appropriate and necessary due to patient acuity level, to maximize therapeutic benefit due to impaired act tolerance, and for safety of patient and staff. Treatment focused on progression of care and goals established in POC.  -BC               User Key  (r) = Recorded By, (t) = Taken By, (c) = Cosigned By      Initials Name Provider Type    BC Ricky Hernandez OT Occupational Therapist                     Mobility/ADL's       Row Name 11/15/24 1230          Bed Mobility    Bed Mobility supine-sit  -BC     Supine-Sit [place] (Bed Mobility) minimum assist (75% patient effort);moderate assist (50% patient effort);1 person assist;2 person assist;verbal cues;nonverbal cues (demo/gesture)  -BC     Assistive Device (Bed Mobility) head of bed elevated;bed rails  -BC     Comment, (Bed Mobility) cues for sequence, assist w/ reaching across midline and help w/ LEs and trunk  -BC       Row Name 11/15/24 1230          Transfers    Transfers sit-stand transfer;bed-chair transfer;stand-sit transfer  -BC       Row Name 11/15/24 1230          Bed-Chair Transfer    Bed-Chair [place] (Transfers) maximum assist (25% patient effort);2 person assist;verbal cues  -BC     Comment, (Bed-Chair Transfer) arm in arm assist squat pivot to L side from EOB elevated  -BC       Row Name 11/15/24 1230          Sit-Stand Transfer    Sit-Stand [place] (Transfers) moderate assist (50% patient effort);maximum assist (25% patient effort);2 person assist;verbal cues  -BC     Comment, (Sit-Stand Transfer) R knee pain  w/ pivot, unable to come to full [place] to transition to chair  -BC       Row Name 11/15/24 1230          Stand-Sit Transfer    Stand-Sit [place] (Transfers) maximum assist (25% patient effort);2 person assist  -BC       Row Name 11/15/24 1230          Functional Mobility    Patient was able to Ambulate no, other medical factors prevent ambulation  -BC       Row Name 11/15/24 1230          Activities of Daily Living    BADL Assessment/Intervention --  min A for bringing washcloth to head seated up in chair, offered ADL practice but fatigued from sitting EOB/and transfer up to chair at this time.  -BC       Row Name 11/15/24 1230          Lower Body Dressing Assessment/Training    [place] Level (Lower Body Dressing) don;socks;dependent (less than 25% patient effort)  -BC               User Key  (r) = Recorded By, (t) = Taken By, (c) = Cosigned By      Initials Name Provider Type    Ricky Stauffer OT Occupational Therapist                   Obj/Interventions       Row Name 11/15/24 1232          Balance    Balance Assessment sitting static balance  -BC     Static Sitting Balance supervision  -BC     Position, Sitting Balance unsupported;sitting edge of bed  -BC     Static Standing Balance maximum assist;2-person assist  -BC     Comment, Balance EOB sitting close Sup A in prep for stand/transfer OOB ~15 mins. Min A intermittently as fatigued. Cues for posture/head control and cont'd w/ educatin/practice while supported sitting up in chair following transfer. Pt was able to carryout C spine ROM stretching and BLE mvmts sitting supported sup A.  -BC               User Key  (r) = Recorded By, (t) = Taken By, (c) = Cosigned By      Initials Name Provider Type    Ricky Stauffer OT Occupational Therapist                   Goals/Plan    No documentation.                  Clinical Impression       Row Name 11/15/24 1238          Pain Assessment    Pretreatment Pain Rating 2/10  -BC     Posttreatment Pain  Rating 5/10  -BC     Pain Location knee  -BC     Pain Side/Orientation right  -BC     Pain Management Interventions premedicated for activity;nursing notified;breathing exercises utilized;exercise or physical activity utilized  -BC     Response to Pain Interventions activity participation with tolerable pain  -BC       Row Name 11/15/24 1238          Plan of Care Review    Plan of Care Reviewed With patient  -BC     Progress improving  -BC     Outcome Evaluation Pt participated in OT/PT cotx this AM for increased tolerance and completed transfer OOB up to chair today. Pt more participatory today and tolerated up to chair for increased OOB tolerance w/ improved in spo2, cues and educ on AROM program and upright OOB tolerance for participation. Cont OP OT to address deficits, increase ADL/act tolerance and performance w/ DC REC SNF.  -BC       Row Name 11/15/24 1238          Therapy Assessment/Plan (OT)    Rehab Potential (OT) good  -BC     Criteria for Skilled Therapeutic Interventions Met (OT) yes;meets criteria;skilled treatment is necessary  -[facility] Name 11/15/24 1238          Therapy Plan Review/Discharge Plan (OT)    Anticipated Discharge Disposition (OT) skilled nursing facility  -BC       Row Name 11/15/24 1238          Vital Signs    Pre SpO2 (%) 94  -BC     Intra SpO2 (%) 92  -BC     Post SpO2 (%) 96  -BC     O2 Delivery Post Treatment room air  -BC     Pre Patient Position Supine  -BC     Intra Patient Position Standing  -BC     Post Patient Position Sitting  -[facility] Name 11/15/24 1238          Positioning and Restraints    Pre-Treatment Position in bed  -BC     Post Treatment Position chair  -BC     In Chair notified nsg;call light within reach;encouraged to call for assist;exit alarm on;waffle cushion  -BC               User Key  (r) = Recorded By, (t) = Taken By, (c) = Cosigned By      Initials Name Provider Type    Ricky Stauffer, OT Occupational Therapist                   Outcome  Measures       Row Name 11/15/24 1247          How much help from another is currently needed...    Putting on and taking off regular lower body clothing? 1  -BC     Bathing (including washing, rinsing, and drying) 1  -BC     Toileting (which includes using toilet bed pan or urinal) 1  -BC     Putting on and taking off regular upper body clothing 2  -BC     Taking care of personal grooming (such as brushing teeth) 3  -BC     Eating meals 1  -BC     AM-PAC 6 Clicks Score (OT) 9  -BC       Row Name 11/15/24 1218          How much help from another person do you currently need...    Turning from your back to your side while in flat bed without using bedrails? 2  -MG     Moving from lying on back to sitting on the side of a flat bed without bedrails? 2  -MG     Moving to and from a bed to a chair (including a wheelchair)? 2  -MG     Standing up from a chair using your arms (e.g., wheelchair, bedside chair)? 2  -MG     Climbing 3-5 steps with a railing? 1  -MG     To walk in hospital room? 1  -MG     AM-PAC 6 Clicks Score (PT) 10  -MG     Highest Level of Mobility Goal 4 --> Transfer to chair/commode  -MG       Row Name 11/15/24 1247          Functional Assessment    Outcome Measure Options AM-PAC 6 Clicks Daily Activity (OT)  -BC               User Key  (r) = Recorded By, (t) = Taken By, (c) = Cosigned By      Initials Name Provider Type    MG Liv Cruz, PT Physical Therapist    BC Ricky Hernandez OT Occupational Therapist                    Occupational Therapy Education       Title: PT OT SLP Therapies (Done)       Topic: Occupational Therapy (Done)       Point: ADL training (Done)       Description:   Instruct learner(s) on proper safety adaptation and remediation techniques during self care or transfers.   Instruct in proper use of assistive devices.                  Learning Progress Summary            Patient Acceptance, E, VU by ALEX at 11/11/2024 1813    Acceptance, E, VU by JS at 11/8/2024 1820     Acceptance, E, VU,NR by MM at 11/2/2024 1328    Comment: role of OT, goals, d/c rec                      Point: Precautions (Done)       Description:   Instruct learner(s) on prescribed precautions during self-care and functional transfers.                  Learning Progress Summary            Patient Acceptance, E, VU by  at 11/11/2024 1813    Acceptance, E, VU by ALEX at 11/8/2024 1820    Acceptance, E, VU,NR by MM at 11/2/2024 1328    Comment: role of OT, goals, d/c rec                      Point: Body mechanics (Done)       Description:   Instruct learner(s) on proper positioning and spine alignment during self-care, functional mobility activities and/or exercises.                  Learning Progress Summary            Patient Acceptance, E, VU by  at 11/11/2024 1813    Acceptance, E, VU by ALEX at 11/8/2024 1820    Acceptance, E, VU,NR by MM at 11/2/2024 1328    Comment: role of OT, goals, d/c rec                                      User Key       Initials Effective Dates Name Provider Type Discipline     02/26/24 -  Johnson Morales RN Registered Nurse Nurse     05/31/24 -  Yu Chapman OT Occupational Therapist OT                  OT Recommendation and Plan     Plan of Care Review  Plan of Care Reviewed With: patient  Progress: improving  Outcome Evaluation: Pt participated in OT/PT cotx this AM for increased tolerance and completed transfer OOB up to chair today. Pt more participatory today and tolerated up to chair for increased OOB tolerance w/ improved in spo2, cues and educ on AROM program and upright OOB tolerance for participation. Cont OP OT to address deficits, increase ADL/act tolerance and performance w/ DC REC SNF.     Time Calculation:         Time Calculation- OT       Row Name 11/15/24 1248             Time Calculation- OT    OT Start Time 1120  -BC      OT Stop Time 1144  -BC      OT Time Calculation (min) 24 min  -BC      Total Timed Code Minutes- OT 24 minute(s)  -BC      OT Received  On 11/15/24  -BC      OT - Next Appointment 11/18/24  -BC         Timed Charges    28359 - OT Therapeutic Activity Minutes 16  -BC      81015 - OT Self Care/Mgmt Minutes 8  -BC         Total Minutes    Timed Charges Total Minutes 24  -BC       Total Minutes 24  -BC                User Key  (r) = Recorded By, (t) = Taken By, (c) = Cosigned By      Initials Name Provider Type    BC Ricky Hernandez OT Occupational Therapist                  Therapy Charges for Today       Code Description Service Date Service Provider Modifiers Qty    87273956296 HC OT THERAPEUTIC ACT EA 15 MIN 11/15/2024 Ricky Hernandez OT GO 1    59456608744 HC OT SELF CARE/MGMT/TRAIN EA 15 MIN 11/15/2024 Ricky Hernandez OT GO 1                 Ricky Hernandez OT  11/15/2024

## 2024-11-15 NOTE — THERAPY TREATMENT NOTE
Patient Name: Arsh Haynes  : 1950    MRN: [id number]                              Today's Date: 11/15/2024       Admit Date: 10/26/2024    Visit Dx:     ICD-10-CM ICD-9-CM   1. Perforation of sigmoid colon  K63.1 569.83   2. Hypotension, unspecified hypotension type  I95.9 458.9   3. Increased lactic acid level  R79.89 276.2   4. Bowel perforation  K63.1 569.83     Patient Active Problem List   Diagnosis    Colon cancer    HTN (hypertension)    Thrombocytosis    Acute DVT (deep venous thrombosis)    Renal mass    Hyponatremia    Anemia    Peritonitis    Moderate protein-calorie malnutrition     Past Medical History:   Diagnosis Date    HTN (hypertension)     Hypercholesterolemia      Past Surgical History:   Procedure Laterality Date    COLON RESECTION N/A 10/26/2024    Procedure: LOW ANTERIOR COLON RESECTION SIGMOID, COLOSTOMY, SPLENIC FLEXURE MOBILIZATION;  Surgeon: Mc Guillaume MD;  Location: [facility];  Service: General;  Laterality: N/A;    EXPLORATORY LAPAROTOMY N/A 10/26/2024    Procedure: LAPAROTOMY EXPLORATORY, LEFT HEMICOLECTOMY;  Surgeon: Mc Guillaume MD;  Location: [facility];  Service: General;  Laterality: N/A;      General Information       Row Name 11/15/24 1205          Physical Therapy Time and Intention    Document Type progress note/recertification;therapy note (daily note)  -MG     Mode of Treatment co-treatment;occupational therapy;physical therapy;other (see comments)  -MG       Row Name 11/15/24 1205          General Information    Patient Profile Reviewed yes  -MG     Existing Precautions/Restrictions fall;NPO  Abdominal wound vac. Ostomy.  -MG     Barriers to Rehab medically complex  -MG       Row Name 11/15/24 1205          Cognition    Orientation Status (Cognition) oriented x 4  -MG       Row Name 11/15/24 1205          Safety Issues/Impairments Affecting Functional Mobility    Safety Issues Affecting Function (Mobility) insight into  deficits/self-awareness;judgment;safety precaution awareness;sequencing abilities  -MG     Impairments Affecting Function (Mobility) strength;balance;endurance/activity tolerance;coordination;pain;postural/trunk control;range of motion (ROM);cognition  -MG     Comment, Safety Issues/Impairments (Mobility) Co treatment medically appropriate and necessary due to patient acuity level, activity tolerance and safety of patient and staff. Treatment is focusing on progression of care and goals established in the POC. Gait belt and non-skid socks donned.  -MG               User Key  (r) = Recorded By, (t) = Taken By, (c) = Cosigned By      Initials Name Provider Type    MG Liv Cruz, PT Physical Therapist                   Mobility       Row Name 11/15/24 1205          Bed Mobility    Supine-Sit [place] (Bed Mobility) minimum assist (75% patient effort);moderate assist (50% patient effort);1 person assist;2 person assist;verbal cues;nonverbal cues (demo/gesture)  -MG     Assistive Device (Bed Mobility) head of bed elevated;bed rails  -MG     Comment, (Bed Mobility) Cues for sequencing. Increased time to complete.  -MG       Row Name 11/15/24 1205          Bed-Chair Transfer    Bed-Chair [place] (Transfers) maximum assist (25% patient effort);2 person assist;verbal cues  -MG     Assistive Device (Bed-Chair Transfers) other (see comments)  -MG     Comment, (Bed-Chair Transfer) Arm-in-arm. Squat pivot to the left.  -MG       Row Name 11/15/24 1205          Sit-Stand Transfer    Sit-Stand [place] (Transfers) moderate assist (50% patient effort);maximum assist (25% patient effort);2 person assist;verbal cues  -MG     Assistive Device (Sit-Stand Transfers) other (see comments)  -MG     Comment, (Sit-Stand Transfer) Arm-in-arm. C/o R knee pain w/ weight bearing. Unable to come to full upright standing.  -MG               User Key  (r) = Recorded By, (t) = Taken By, (c) = Cosigned By      Initials Name Provider  Type    MG Liv Cruz, PT Physical Therapist                   Obj/Interventions       Row Name 11/15/24 1207          Motor Skills    Therapeutic Exercise other (see comments)  BLE: LAQ x3, HF x10, AP x3. Edu to peform x10 when in the chair as able. Chin tucks x3.  -MG       Row Name 11/15/24 1207          Balance    Static Sitting Balance supervision  -MG     Position, Sitting Balance sitting edge of bed;unsupported  -MG     Static Standing Balance maximum assist;2-person assist;verbal cues  -MG               User Key  (r) = Recorded By, (t) = Taken By, (c) = Cosigned By      Initials Name Provider Type    MG Liv Cruz, PT Physical Therapist                   Goals/Plan       Row Name 11/15/24 1219          Bed Mobility Goal 1 (PT)    Progress/Outcomes (Bed Mobility Goal 1, PT) goal ongoing;continuing progress toward goal  -MG       Row Name 11/15/24 1219          Transfer Goal 1 (PT)    [place] Level/Cues Needed (Transfer Goal 1, PT) moderate assist (50-74% patient effort)  -MG     Progress/Outcome (Transfer Goal 1, PT) goal revised this date  -MG       Row Name 11/15/24 1219          Gait Training Goal 1 (PT)    [place] Level (Gait Training Goal 1, PT) moderate assist (50-74% patient effort)  -MG     Distance (Gait Training Goal 1, PT) 10  -MG     Progress/Outcome (Gait Training Goal 1, PT) goal revised this date  -MG               User Key  (r) = Recorded By, (t) = Taken By, (c) = Cosigned By      Initials Name Provider Type    MG Liv Cruz, PT Physical Therapist                   Clinical Impression       Row Name 11/15/24 1208          Pain    Pretreatment Pain Rating 2/10  -MG     Posttreatment Pain Rating 5/10  -MG     Pain Location knee  -MG     Pain Side/Orientation right  -MG     Pain Management Interventions premedicated for activity;nursing notified;positioning techniques utilized;breathing exercises utilized;exercise or physical activity utilized  -MG     Response to Pain  Interventions activity participation with tolerable pain;activity level improved;mobility function improved  -MG       Row Name 11/15/24 1208          Plan of Care Review    Plan of Care Reviewed With patient  -MG     Progress improving  -MG     Outcome Evaluation Pt seen for PT/OT cotx this AM and tolerated the session well. Pt appeared in better spirits, had c/o R knee and shouler pain, but was agreeable to trial tsf to the chair. Today, pt was Min/ModA x1-2 for bed mobility, SV for sitting balance while EOB, Mod/MaxA x2 for STS and MaxA x2 for a L squat pivot tsf to the chair. Pts O2 increased to 96% from 94% once in the chair, and on room air. While in the chair pt was able to recall his LE ther-ex, performing a few reps of each. Educated pt on chin tucks, effortful swallowing, and keeping his head in a neutral alignment, as able, to cont working on his neck musculature strength; he v/u and was able to perform 3 chin tucks. KHLOE w/ RN. PT will cont to follow.  -MG       Row Name 11/15/24 1208          Therapy Assessment/Plan (PT)    Rehab Potential (PT) good  -MG     Criteria for Skilled Interventions Met (PT) yes  -MG     Therapy Frequency (PT) 5 times/wk  -MG       Row Name 11/15/24 1208          Vital Signs    Pre SpO2 (%) 94  -MG     O2 Delivery Pre Treatment room air  -MG     O2 Delivery Intra Treatment room air  -MG     Post SpO2 (%) 96  -MG     O2 Delivery Post Treatment room air  -MG     Pre Patient Position Supine  -MG     Intra Patient Position Standing  -MG     Post Patient Position Sitting  -MG       Row Name 11/15/24 1208          Positioning and Restraints    Pre-Treatment Position in bed  -MG     Post Treatment Position chair  -MG     In Chair notified nsg;sitting;call light within reach;encouraged to call for assist;exit alarm on;waffle cushion  -MG               User Key  (r) = Recorded By, (t) = Taken By, (c) = Cosigned By      Initials Name Provider Type    Liv Sandoval, PT Physical  Therapist                   Outcome Measures       Row Name 11/15/24 1218          How much help from another person do you currently need...    Turning from your back to your side while in flat bed without using bedrails? 2  -MG     Moving from lying on back to sitting on the side of a flat bed without bedrails? 2  -MG     Moving to and from a bed to a chair (including a wheelchair)? 2  -MG     Standing up from a chair using your arms (e.g., wheelchair, bedside chair)? 2  -MG     Climbing 3-5 steps with a railing? 1  -MG     To walk in hospital room? 1  -MG     AM-PAC 6 Clicks Score (PT) 10  -MG     Highest Level of Mobility Goal 4 --> Transfer to chair/commode  -MG               User Key  (r) = Recorded By, (t) = Taken By, (c) = Cosigned By      Initials Name Provider Type    Liv Sandoval, PT Physical Therapist                                 Physical Therapy Education       Title: PT OT SLP Therapies (Done)       Topic: Physical Therapy (Done)       Point: Mobility training (Done)       Learning Progress Summary            Patient Acceptance, E,D,TB, VU,NR by MG at 11/15/2024 1218    Acceptance, E, VU,NR by MG at 11/12/2024 1559    Acceptance, E, VU by JS at 11/11/2024 1813    Acceptance, E, VU,NR by MG at 11/11/2024 1629    Acceptance, E, NR,VU by EF at 11/10/2024 1127    Acceptance, E, VU by JS at 11/8/2024 1820    Acceptance, E,D, VU,NR by MG at 11/8/2024 1459    Acceptance, E, VU by CW at 11/2/2024 1622                      Point: Home exercise program (Done)       Learning Progress Summary            Patient Acceptance, E,D,TB, VU,NR by MG at 11/15/2024 1218    Acceptance, E, VU,NR by MG at 11/12/2024 1559    Acceptance, E, VU by JS at 11/11/2024 1813    Acceptance, E, VU,NR by MG at 11/11/2024 1629    Acceptance, E, VU by JS at 11/8/2024 1820                      Point: Body mechanics (Done)       Learning Progress Summary            Patient Acceptance, E,D,TB, VU,NR by MG at 11/15/2024 1218     Acceptance, E, VU,NR by MG at 11/12/2024 1559    Acceptance, E, VU by JS at 11/11/2024 1813    Acceptance, E, VU,NR by MG at 11/11/2024 1629    Acceptance, E, NR,VU by EF at 11/10/2024 1127    Acceptance, E, VU by JS at 11/8/2024 1820    Acceptance, E,D, VU,NR by MG at 11/8/2024 1459    Acceptance, E, VU by CW at 11/2/2024 1622                      Point: Precautions (Done)       Learning Progress Summary            Patient Acceptance, E,D,TB, VU,NR by MG at 11/15/2024 1218    Acceptance, E, VU,NR by MG at 11/12/2024 1559    Acceptance, E, VU by JS at 11/11/2024 1813    Acceptance, E, VU,NR by MG at 11/11/2024 1629    Acceptance, E, VU by JS at 11/8/2024 1820    Acceptance, E,D, VU,NR by MG at 11/8/2024 1459    Acceptance, E, VU by CW at 11/2/2024 1622                                      User Key       Initials Effective Dates Name Provider Type Discipline    EF 05/31/24 -  Sabine Tineo, PT Physical Therapist PT    MG 05/24/22 -  Liv Cruz, PT Physical Therapist PT    CW 12/13/22 -  Lisa Paulson, PT Physical Therapist PT    JS 02/26/24 -  Johnson Morales, RN Registered Nurse Nurse                  PT Recommendation and Plan     Progress: improving  Outcome Evaluation: Pt seen for PT/OT cotx this AM and tolerated the session well. Pt appeared in better spirits, had c/o R knee and shouler pain, but was agreeable to trial tsf to the chair. Today, pt was Min/ModA x1-2 for bed mobility, SV for sitting balance while EOB, Mod/MaxA x2 for STS and MaxA x2 for a L squat pivot tsf to the chair. Pts O2 increased to 96% from 94% once in the chair, and on room air. While in the chair pt was able to recall his LE ther-ex, performing a few reps of each. Educated pt on chin tucks, effortful swallowing, and keeping his head in a neutral alignment, as able, to cont working on his neck musculature strength; he v/u and was able to perform 3 chin tucks. KHLOE w/ RN. PT will cont to follow.     Time Calculation:          PT Charges       Row Name 11/15/24 1218             Time Calculation    Start Time 1120  -MG      Stop Time 1144  -MG      Time Calculation (min) 24 min  -MG      PT Received On 11/15/24  -MG      PT - Next Appointment 11/18/24  -MG      PT Goal Re-Cert Due Date 11/29/24  -MG                User Key  (r) = Recorded By, (t) = Taken By, (c) = Cosigned By      Initials Name Provider Type    MG Liv Cruz, PT Physical Therapist                  Therapy Charges for Today       Code Description Service Date Service Provider Modifiers Qty    46953005401 HC PT THERAPEUTIC ACT EA 15 MIN 11/15/2024 Liv Cruz, PT GP 1    12506157641 HC PT THER PROC EA 15 MIN 11/15/2024 Liv Cruz, PT GP 1            PT G-Codes  Outcome Measure Options: AM-PAC 6 Clicks Daily Activity (OT)  AM-PAC 6 Clicks Score (PT): 10  AM-PAC 6 Clicks Score (OT): 10  Modified Marshall Scale: 5 - Severe disability.  Bedridden, incontinent, and requiring constant nursing care and attention.  PT Discharge Summary  Anticipated Discharge Disposition (PT): skilled nursing facility    Liv Cruz PT  11/15/2024

## 2024-11-15 NOTE — PROGRESS NOTES
postoperative day 19 s/p left colectomy with colostomy     S: No events overnight.  Continues to feel extremely weak    O:   Vitals:    11/14/24 2316 11/14/24 2317 11/15/24 0718 11/15/24 1218   BP: 123/63 123/63 125/62 108/56   BP Location: Left arm  Left arm Left arm   Patient Position: Lying  Lying Sitting   Pulse: 106 105 106 108   Resp: 18  16 16   Temp: 98.9 °F (37.2 °C)  98.2 °F (36.8 °C) 98.1 °F (36.7 °C)   TempSrc: Oral  Oral Oral   SpO2: 98% 96% 97% 96%   Height:          Alert, no acute distress  Abdomen soft, nontender, ostomy pink and viable with stool    White blood cell count 14,000, stable, hemoglobin 9.4, platelets 865    Assessment and plan    Postoperative day 19 status post left colectomy with colostomy.  Dysphagia working with speech therapy  From surgical standpoint he is doing very well.  Continues to have leukocytosis without any sign of significant infection.  He is tolerating tube feeds.  He continues to have dysphagia that will be evaluated today.  Discussed with the patient that if he continues to fail swallow evaluation then he may need to have permanent feeding access with gastrostomy tube.  Continue wound VAC to midline wound  He understood

## 2024-11-15 NOTE — PLAN OF CARE
Goal Outcome Evaluation:  Patient alert and oriented. On RA. Tube feeds infusing via NG tube. Medicated per MAR. Plan of care ongoing.

## 2024-11-15 NOTE — CASE MANAGEMENT/SOCIAL WORK
Continued Stay Note  [facility]     Patient Name: Arsh Haynes  MRN: [id number]  Today's Date: 11/15/2024    Admit Date: 10/26/2024    Plan: Plan [place] when medically appropriate- pre cert needed. BENITA Clay RN   Discharge Plan       Row Name 11/15/24 0724       Plan    Plan Plan [place] when medically appropriate- pre cert needed. BENITA Clay RN    Plan Comments Pt continues with [facility]. Valentina ( [phone]) is following for [place]. Plan [place] when medically appropriate- pre cert needed. BENITA Caly RN                   Discharge Codes    No documentation.                 Expected Discharge Date and Time       Expected Discharge Date Expected Discharge Time    Nov 19, 2024               Felecia Clay RN

## 2024-11-15 NOTE — PLAN OF CARE
Goal Outcome Evaluation:  Plan of Care Reviewed With: patient        Progress: improving  Outcome Evaluation: Pt seen for PT/OT cotx this AM and tolerated the session well. Pt appeared in better spirits, had c/o R knee and shouler pain, but was agreeable to trial tsf to the chair. Today, pt was Min/ModA x1-2 for bed mobility, SV for sitting balance while EOB, Mod/MaxA x2 for STS and MaxA x2 for a L squat pivot tsf to the chair. Pts O2 increased to 96% from 94% once in the chair, and on room air. While in the chair pt was able to recall his LE ther-ex, performing a few reps of each. Educated pt on chin tucks, effortful swallowing, and keeping his head in a neutral alignment, as able, to cont working on his neck musculature strength; he v/u and was able to perform 3 chin tucks. Encouraged to sit UIC couple times/day. KHLOE w/ RN. PT will cont to follow.    Anticipated Discharge Disposition (PT): skilled nursing facility

## 2024-11-15 NOTE — NURSING NOTE
"   11/15/24 0916   Wound 10/26/24 2301 midline abdomen   Placement Date/Time: 10/26/24 2301   Orientation: midline  Location: abdomen  Primary Wound Type: Incision   Dressing Appearance dry;intact  (vac in place)   Base clean;moist;granulating;red   Periwound intact;dry   Edges irregular;open   Drainage Characteristics/Odor sanguineous   Drainage Amount small   Care, Wound cleansed with;sterile normal saline;negative pressure wound therapy   Dressing Care dressing applied;dressing changed;foam;transparent film   Periwound Care barrier film applied   NPWT (Negative Pressure Wound Therapy) 11/13/24 abd   Placement Date: 11/13/24   Location: abd   Therapy Setting continuous therapy   Dressing foam, black;transparent dressing   Pressure Setting 125 mmHg   Sponges Inserted 2   Sponges Removed 2   Colostomy LLQ   Placement date: If unknown, DO NOT use \"Add Comment\" note: 10/26/24   Inserted by: Dr Guillaume  Location: LLQ   Stomal Appliance 2 piece;Clean;Dry;Intact;Drainable   Stoma Appearance moist;red   Peristomal Assessment CARLOS EDUARDO   Stoma Function stool;flatus   Stool Color brown, light   Stool Consistency liquid;loose   Treatment Placement checked   Output (mL) 100 mL     WOCN; Wound vac dressing change abd, Small piece adapt ring placed at umb to assist with seal.  Good seal obtained. Ostomy appliance intact and in place. Will plan to change wound vac and ostomy pouch Monday.   "

## 2024-11-15 NOTE — PLAN OF CARE
Goal Outcome Evaluation:  Plan of Care Reviewed With: patient        Progress: improving  Outcome Evaluation: Pt participated in OT/PT cotx this AM for increased tolerance and completed transfer OOB up to chair today. Pt more participatory today and tolerated up to chair for increased OOB tolerance w/ improved in spo2, cues and educ on AROM program and upright OOB tolerance for participation. Cont OP OT to address deficits, increase ADL/act tolerance and performance w/ DC REC SNF.    Anticipated Discharge Disposition (OT): skilled nursing facility

## 2024-11-15 NOTE — PROGRESS NOTES
"    Name: Arsh Haynes ADMIT: 10/26/2024   : 1950  PCP: Provider, No Known    MRN: [id number] LOS: 20 days   AGE/SEX: 74 y.o. male  ROOM: [facility]/     Subjective   Subjective   Pt reports feeling \"okay\" again today. Still weak/tired. Pain controlled. Denies SOA or CP or palp. No N/V/D. No F/C/NS.       Objective   Objective   Vital Signs  Temp:  [98.1 °F (36.7 °C)-98.9 °F (37.2 °C)] 98.2 °F (36.8 °C)  Heart Rate:  [101-121] 106  Resp:  [16-18] 16  BP: (118-142)/(56-63) 125/62  SpO2:  [94 %-98 %] 97 %  on   ;   Device (Oxygen Therapy): room air  Body mass index is 25.56 kg/m².    (No change in exam today)    Physical Exam  Vitals and nursing note reviewed.   Constitutional:       General: He is not in acute distress.     Appearance: He is ill-appearing. He is not toxic-appearing or diaphoretic.   HENT:      Head: Normocephalic.      Nose: Nose normal.      Comments: CorTrack in right nare     Mouth/Throat:      Mouth: Mucous membranes are moist.      Pharynx: Oropharynx is clear.   Eyes:      General: No scleral icterus.        Right eye: No discharge.         Left eye: No discharge.      Conjunctiva/sclera: Conjunctivae normal.   Cardiovascular:      Rate and Rhythm: Normal rate and regular rhythm.      Pulses: Normal pulses.   Pulmonary:      Effort: Pulmonary effort is normal. No respiratory distress.      Breath sounds: Normal breath sounds. No wheezing or rales.   Abdominal:      General: Bowel sounds are normal. There is no distension.      Palpations: Abdomen is soft.      Tenderness: There is abdominal tenderness (appropriate postop).      Comments: Ostomy LLQ  Midline incision with wound vac   Musculoskeletal:         General: No swelling.      Cervical back: Neck supple.   Skin:     General: Skin is warm and dry.      Capillary Refill: Capillary refill takes 2 to 3 seconds.      Coloration: Skin is not jaundiced.   Neurological:      Mental Status: He is alert and oriented to person, place, and time. " Mental status is at baseline.      Cranial Nerves: No cranial nerve deficit.      Coordination: Coordination normal.   Psychiatric:      Comments: Flat affect       Results Review     I reviewed the patient's new clinical results.  Results from last 7 days   Lab Units 11/15/24  0559 11/14/24  0410 11/13/24  0637 11/12/24  0527   WBC 10*3/mm3 14.70* 14.30* 13.63* 12.43*   HEMOGLOBIN g/dL 9.4* 9.1* 9.2* 9.6*   PLATELETS 10*3/mm3 865* 885* 884* 953*     Results from last 7 days   Lab Units 11/15/24  0559 11/14/24  0410 11/13/24  1951 11/13/24  0637 11/12/24  0526   SODIUM mmol/L 135* 135*  --  134* 135*   POTASSIUM mmol/L 3.6 3.8 4.1 3.4* 3.9   CHLORIDE mmol/L 103 102  --  104 106   CO2 mmol/L 20.6* 20.5*  --  20.0* 18.8*   BUN mg/dL 16 15  --  12 11   CREATININE mg/dL 0.50* 0.45*  --  0.42* 0.42*   GLUCOSE mg/dL 170* 202*  --  178* 186*   EGFR mL/min/1.73 108.4 111.9  --  114.2 114.2     Results from last 7 days   Lab Units 11/15/24  0559 11/14/24  0410 11/13/24  0637 11/12/24  0526   ALBUMIN g/dL 2.3* 2.4* 2.2* 1.9*   BILIRUBIN mg/dL 0.3 0.4 0.3 0.3   ALK PHOS U/L 200* 214* 233* 234*   AST (SGOT) U/L 25 22 24 28   ALT (SGPT) U/L 35 32 38 35     Results from last 7 days   Lab Units 11/15/24  0559 11/14/24  0410 11/13/24  0637 11/12/24  0526   CALCIUM mg/dL 8.6 8.5* 8.4* 8.3*   ALBUMIN g/dL 2.3* 2.4* 2.2* 1.9*   MAGNESIUM mg/dL 1.8  --   --   --    PHOSPHORUS mg/dL 3.6  --   --   --      Results from last 7 days   Lab Units 11/09/24  0528   PROCALCITONIN ng/mL 4.09*     Glucose   Date/Time Value Ref Range Status   11/15/2024 0629 174 (H) 70 - 130 mg/dL Final   11/14/2024 1940 165 (H) 70 - 130 mg/dL Final   11/14/2024 1547 151 (H) 70 - 130 mg/dL Final   11/14/2024 1034 167 (H) 70 - 130 mg/dL Final   11/14/2024 0617 202 (H) 70 - 130 mg/dL Final   11/13/2024 2025 201 (H) 70 - 130 mg/dL Final   11/13/2024 1705 182 (H) 70 - 130 mg/dL Final       XR Hand 2 View Left    Result Date: 11/13/2024  Degenerative change as  described    This report was finalized on 11/13/2024 5:06 PM by Dr. Virgil Conley M.D on Workstation: [id number]       I have personally reviewed all medications:  Scheduled Medications  chlorhexidine, 15 mL, Mouth/Throat, Q12H  enoxaparin, 1 mg/kg, Subcutaneous, Q12H  folic acid, 1 mg, Nasogastric, Daily  insulin glargine, 30 Units, Subcutaneous, Nightly  insulin regular, 2-9 Units, Subcutaneous, 4x Daily AC & at Bedtime  lansoprazole, 30 mg, Nasogastric, Q AM  Lidocaine, 1 patch, Transdermal, Q24H  Lidocaine, 2 patch, Transdermal, Q24H  metoprolol tartrate, 75 mg, Nasogastric, Q12H  potassium chloride, 40 mEq, Oral, Q4H  sodium chloride, 10 mL, Intravenous, Q12H  sodium hypochlorite, , Topical, BID  terazosin, 2 mg, Nasogastric, Daily  thiamine, 100 mg, Nasogastric, Daily    Infusions   Diet  NPO Diet NPO Type: Strict NPO    I have personally reviewed:  [x]  Laboratory   []  Microbiology   []  Radiology   [x]  EKG/Telemetry  []  Cardiology/Vascular   []  Pathology    []  Records       Assessment/Plan     Active Hospital Problems    Diagnosis  POA    **Peritonitis [K65.9]  Unknown    HTN (hypertension) [I10]  Unknown    Thrombocytosis [D75.839]  Unknown    Acute DVT (deep venous thrombosis) [I82.409]  Unknown    Renal mass [N28.89]  Unknown    Hyponatremia [E87.1]  Unknown    Anemia [D64.9]  Unknown    Moderate protein-calorie malnutrition [E44.0]  Yes    Colon cancer [C18.9]  No      Resolved Hospital Problems    Diagnosis Date Resolved POA    Perforation of sigmoid colon [K63.1] 10/27/2024 Yes       73yo gentleman admitted to ICU with peritonitis due to perforated rectosigmoid adenoCA. We assumed care when pt came out of ICU.     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 Leukocytosis fairly stable today at 14K. Patient remains afebrile with no signs of new  infection. Continue to monitor.  -Heme/Onc has seen, recommends outpt f/u with Dr. Serra in 5-8 weeks.    Sinus tachycardia: EKG showed sinus tach. Continue increased dose of metoprolol. Continue to closely monitor.  If sinus tachycardia persists and no other cause identified, may need to ask Cardiology to evaluate.    Acute RLE DVT found on 11/5:  He has a severe thrombocytosis and in addition to his anemia as well as the colon cancer. Hematology/Oncology following. Continue therapeutic Lovenox. Ultimately can change to Eliquis.    Anemia, multifactorial: Hgb slightly stable today. S/p 2 units PRBCs here. Hematology/Oncology following. Repeating CBC with morning labs, transfuse for Hgb less than 7.    Thrombocytosis: stable today. Reactive per Hematology/Oncology. No iron deficiency.    Right renal mass and urinary retention: Urology evaluated the renal mass and urinary retention. Blankenship catheter out now. Continue Hytrin. He will follow-up in 4 to 6 weeks with repeat CT for the renal mass which Urology feels is most likely RCC.     Hyponatremia: Sodium stable today at 135. Repeat BMP with morning labs.    Hypokalemia: Replacing as needed. Mg++ level fine.    DM2: Requiring Lantus to 30 units nightly and SSI. Ongoing titration of insulin based on requirements. A1c 6.6. No history of DM per pt. Suspect combination of stress of acute illness and tube feeds in setting of what was likely pre-diabetic state.    Dysphagia: SLP evaluated and recommended NPO. CorTrack placed and tube feeds started. SLP plans VFSS on Monday.    Right knee pain, right shoulder pain: Orthopedic surgery consulted. No significant effusion to perform bedside arthrocentesis. XR of right shoulder with severe arthropathic changes only. Ortho not concerned for septic joints. Patient also reported left middle knuckle pain. XR unremarkable.      Pharmacy to dose Lovenox for DVT prophylaxis.  DNR.  Discussed with patient. No family present.  Anticipate  discharge to SNU facility next week.  Expected Discharge Date: 11/19/2024; Expected Discharge Time:       Davian Flores MD  [facility]  11/15/24  09:35 EST

## 2024-11-16 LAB
ALBUMIN SERPL-MCNC: 2.2 G/DL (ref 3.5–5.2)
ALBUMIN/GLOB SERPL: 0.5 G/DL
ALP SERPL-CCNC: 194 U/L (ref 39–117)
ALT SERPL W P-5'-P-CCNC: 35 U/L (ref 1–41)
ANION GAP SERPL CALCULATED.3IONS-SCNC: 9.5 MMOL/L (ref 5–15)
AST SERPL-CCNC: 26 U/L (ref 1–40)
BASOPHILS # BLD AUTO: 0.11 10*3/MM3 (ref 0–0.2)
BASOPHILS NFR BLD AUTO: 0.9 % (ref 0–1.5)
BILIRUB SERPL-MCNC: 0.3 MG/DL (ref 0–1.2)
BUN SERPL-MCNC: 19 MG/DL (ref 8–23)
BUN/CREAT SERPL: 39.6 (ref 7–25)
CALCIUM SPEC-SCNC: 8.7 MG/DL (ref 8.6–10.5)
CHLORIDE SERPL-SCNC: 105 MMOL/L (ref 98–107)
CO2 SERPL-SCNC: 20.5 MMOL/L (ref 22–29)
CREAT SERPL-MCNC: 0.48 MG/DL (ref 0.76–1.27)
DEPRECATED RDW RBC AUTO: 53.9 FL (ref 37–54)
EGFRCR SERPLBLD CKD-EPI 2021: 109.7 ML/MIN/1.73
EOSINOPHIL # BLD AUTO: 0.48 10*3/MM3 (ref 0–0.4)
EOSINOPHIL NFR BLD AUTO: 4 % (ref 0.3–6.2)
ERYTHROCYTE [DISTWIDTH] IN BLOOD BY AUTOMATED COUNT: 17.2 % (ref 12.3–15.4)
GLOBULIN UR ELPH-MCNC: 4.4 GM/DL
GLUCOSE BLDC GLUCOMTR-MCNC: 149 MG/DL (ref 70–130)
GLUCOSE BLDC GLUCOMTR-MCNC: 169 MG/DL (ref 70–130)
GLUCOSE BLDC GLUCOMTR-MCNC: 181 MG/DL (ref 70–130)
GLUCOSE BLDC GLUCOMTR-MCNC: 188 MG/DL (ref 70–130)
GLUCOSE SERPL-MCNC: 174 MG/DL (ref 65–99)
HCT VFR BLD AUTO: 30 % (ref 37.5–51)
HGB BLD-MCNC: 9.5 G/DL (ref 13–17.7)
IMM GRANULOCYTES # BLD AUTO: 0.3 10*3/MM3 (ref 0–0.05)
IMM GRANULOCYTES NFR BLD AUTO: 2.5 % (ref 0–0.5)
LYMPHOCYTES # BLD AUTO: 1.53 10*3/MM3 (ref 0.7–3.1)
LYMPHOCYTES NFR BLD AUTO: 12.6 % (ref 19.6–45.3)
MAGNESIUM SERPL-MCNC: 2 MG/DL (ref 1.6–2.4)
MCH RBC QN AUTO: 27.2 PG (ref 26.6–33)
MCHC RBC AUTO-ENTMCNC: 31.7 G/DL (ref 31.5–35.7)
MCV RBC AUTO: 86 FL (ref 79–97)
MONOCYTES # BLD AUTO: 1.29 10*3/MM3 (ref 0.1–0.9)
MONOCYTES NFR BLD AUTO: 10.7 % (ref 5–12)
NEUTROPHILS NFR BLD AUTO: 69.3 % (ref 42.7–76)
NEUTROPHILS NFR BLD AUTO: 8.4 10*3/MM3 (ref 1.7–7)
NRBC BLD AUTO-RTO: 0 /100 WBC (ref 0–0.2)
PHOSPHATE SERPL-MCNC: 3.6 MG/DL (ref 2.5–4.5)
PLATELET # BLD AUTO: 795 10*3/MM3 (ref 140–450)
PMV BLD AUTO: 8.6 FL (ref 6–12)
POTASSIUM SERPL-SCNC: 4.2 MMOL/L (ref 3.5–5.2)
PROT SERPL-MCNC: 6.6 G/DL (ref 6–8.5)
RBC # BLD AUTO: 3.49 10*6/MM3 (ref 4.14–5.8)
SODIUM SERPL-SCNC: 135 MMOL/L (ref 136–145)
WBC NRBC COR # BLD AUTO: 12.11 10*3/MM3 (ref 3.4–10.8)

## 2024-11-16 PROCEDURE — 85025 COMPLETE CBC W/AUTO DIFF WBC: CPT | Performed by: INTERNAL MEDICINE

## 2024-11-16 PROCEDURE — 84100 ASSAY OF PHOSPHORUS: CPT | Performed by: HOSPITALIST

## 2024-11-16 PROCEDURE — 80053 COMPREHEN METABOLIC PANEL: CPT | Performed by: INTERNAL MEDICINE

## 2024-11-16 PROCEDURE — 25010000002 ENOXAPARIN PER 10 MG: Performed by: INTERNAL MEDICINE

## 2024-11-16 PROCEDURE — 63710000001 INSULIN GLARGINE PER 5 UNITS: Performed by: STUDENT IN AN ORGANIZED HEALTH CARE EDUCATION/TRAINING PROGRAM

## 2024-11-16 PROCEDURE — 99232 SBSQ HOSP IP/OBS MODERATE 35: CPT | Performed by: INTERNAL MEDICINE

## 2024-11-16 PROCEDURE — 82948 REAGENT STRIP/BLOOD GLUCOSE: CPT

## 2024-11-16 PROCEDURE — 63710000001 INSULIN REGULAR HUMAN PER 5 UNITS: Performed by: HOSPITALIST

## 2024-11-16 PROCEDURE — 83735 ASSAY OF MAGNESIUM: CPT | Performed by: HOSPITALIST

## 2024-11-16 RX ADMIN — HYDROCODONE BITARTRATE AND ACETAMINOPHEN 1 TABLET: 5; 325 TABLET ORAL at 12:24

## 2024-11-16 RX ADMIN — FOLIC ACID 1 MG: 1 TABLET ORAL at 09:19

## 2024-11-16 RX ADMIN — INSULIN HUMAN 2 UNITS: 100 INJECTION, SOLUTION PARENTERAL at 11:20

## 2024-11-16 RX ADMIN — INSULIN HUMAN 2 UNITS: 100 INJECTION, SOLUTION PARENTERAL at 20:43

## 2024-11-16 RX ADMIN — LIDOCAINE 1 PATCH: 4 PATCH TOPICAL at 09:19

## 2024-11-16 RX ADMIN — CHLORHEXIDINE GLUCONATE 15 ML: 1.2 RINSE ORAL at 09:19

## 2024-11-16 RX ADMIN — INSULIN GLARGINE 30 UNITS: 100 INJECTION, SOLUTION SUBCUTANEOUS at 20:16

## 2024-11-16 RX ADMIN — Medication 100 MG: at 09:19

## 2024-11-16 RX ADMIN — Medication 10 ML: at 20:17

## 2024-11-16 RX ADMIN — METOPROLOL TARTRATE 75 MG: 50 TABLET, FILM COATED ORAL at 20:15

## 2024-11-16 RX ADMIN — LIDOCAINE 2 PATCH: 4 PATCH TOPICAL at 09:20

## 2024-11-16 RX ADMIN — LANSOPRAZOLE 30 MG: 15 TABLET, ORALLY DISINTEGRATING ORAL at 06:11

## 2024-11-16 RX ADMIN — HYDROCODONE BITARTRATE AND ACETAMINOPHEN 1 TABLET: 5; 325 TABLET ORAL at 19:00

## 2024-11-16 RX ADMIN — TERAZOSIN 2 MG: 2 CAPSULE ORAL at 20:16

## 2024-11-16 RX ADMIN — HYDROCODONE BITARTRATE AND ACETAMINOPHEN 1 TABLET: 5; 325 TABLET ORAL at 03:02

## 2024-11-16 RX ADMIN — Medication 10 ML: at 09:20

## 2024-11-16 RX ADMIN — ENOXAPARIN SODIUM 90 MG: 100 INJECTION SUBCUTANEOUS at 17:18

## 2024-11-16 RX ADMIN — CHLORHEXIDINE GLUCONATE 15 ML: 1.2 RINSE ORAL at 20:15

## 2024-11-16 RX ADMIN — ENOXAPARIN SODIUM 90 MG: 100 INJECTION SUBCUTANEOUS at 06:11

## 2024-11-16 RX ADMIN — METOPROLOL TARTRATE 75 MG: 50 TABLET, FILM COATED ORAL at 09:19

## 2024-11-16 RX ADMIN — INSULIN HUMAN 2 UNITS: 100 INJECTION, SOLUTION PARENTERAL at 07:46

## 2024-11-16 NOTE — PLAN OF CARE
Goal Outcome Evaluation:  Patient alert and oriented. RA. VSS. Oral care provided. Tube feed currently infusing. Medicated for pain per MAR. Patient frequently turned and repositioned. Plan of care ongoing.

## 2024-11-16 NOTE — PROGRESS NOTES
REASON FOR FOLLOWUP/CHIEF COMPLAINT:  Thrombocytosis, DVT, colon cancer     HISTORY OF PRESENT ILLNESS:   Continues to be very weak.    Past Medical History, Past Surgical History, Social History, Family History have been reviewed and are without significant changes except as mentioned.    Review of Systems   Review of Systems   Constitutional:  Negative for activity change.   HENT:  Negative for nosebleeds and trouble swallowing.    Respiratory:  Negative for shortness of breath and wheezing.    Cardiovascular:  Negative for chest pain and palpitations.   Gastrointestinal:  Negative for constipation, diarrhea and nausea.   Genitourinary:  Negative for dysuria and hematuria.   Musculoskeletal:  Negative for arthralgias and myalgias.   Neurological:  Negative for seizures and syncope.   Hematological:  Negative for adenopathy. Does not bruise/bleed easily.   Psychiatric/Behavioral:  Negative for confusion.        Medications:  The current medication list was reviewed in the EMR    ALLERGIES:  No Known Allergies           Vitals:    11/15/24 1218 11/15/24 1916 11/15/24 2337 11/16/24 0636   BP: 108/56 125/52 117/65 112/53   BP Location: Left arm Left arm Left arm Left arm   Patient Position: Sitting Lying Lying Lying   Pulse: 108 111 104 109   Resp: 16 16 16 16   Temp: 98.1 °F (36.7 °C) 97.7 °F (36.5 °C) 97.7 °F (36.5 °C) 97.5 °F (36.4 °C)   TempSrc: Oral Oral Oral Oral   SpO2: 96% 96% 97% 96%   Weight:       Height:         Physical Exam    CONSTITUTIONAL:  Vital signs reviewed.  No distress, looks comfortable.  EYES:  Conjunctivae and lids unremarkable.  PERRLA  EARS, NOSE, MOUTH, THROAT:  Ears and nose appear unremarkable.  Lips, teeth, gums appear unremarkable.  RESPIRATORY:  Normal respiratory effort.  Lungs clear to auscultation bilaterally.  CARDIOVASCULAR:  Normal S1, S2.  No murmurs, rubs or gallops.  No significant lower extremity edema.  GASTROINTESTINAL: Abdomen appears unremarkable.  Nontender.  No  hepatomegaly.  No splenomegaly.  NEURO: Cranial nerves 2-12 grossly intact.  No focal deficits.  Appears to have equal strength all 4 extremities.  MUSCULOSKELETAL:  Unremarkable digits/nails.  No cyanosis or clubbing.  SKIN:  Warm.  No rashes.  PSYCHIATRIC:  Normal judgment and insight.  Normal mood and affect.      RECENT LABS:  WBC   Date Value Ref Range Status   11/16/2024 12.11 (H) 3.40 - 10.80 10*3/mm3 Final   11/15/2024 14.70 (H) 3.40 - 10.80 10*3/mm3 Final   11/14/2024 14.30 (H) 3.40 - 10.80 10*3/mm3 Final     Hemoglobin   Date Value Ref Range Status   11/16/2024 9.5 (L) 13.0 - 17.7 g/dL Final   11/15/2024 9.4 (L) 13.0 - 17.7 g/dL Final   11/14/2024 9.1 (L) 13.0 - 17.7 g/dL Final     Platelets   Date Value Ref Range Status   11/16/2024 795 (H) 140 - 450 10*3/mm3 Final   11/15/2024 865 (H) 140 - 450 10*3/mm3 Final   11/14/2024 885 (H) 140 - 450 10*3/mm3 Final       ASSESSMENT/PLAN:  Arsh Ivy 3105/1      Septic shock from perforated bowel from underlying malignancy  Subsequent abdominal cultures positive for Pseudomonas, E. coli and mixed anaerobes, negative blood cultures  Completed antibiotics  Slow recovery    *Synchronous colon cancers (both T3N0)  Both malignancies are T3 N0-stage IIA-and as we consider his concurrent renal mass and performance status, adjuvant chemotherapy must be considered particularly with invasion of the descending colon mass into pericolonic and perirectal tissue-apparently the the site of perforation.  These issues discussed with general surgery.  MSI status-negative by IHC, PCR-microsatellite stable.  This is particularly useful considering the patient's family history and findings of synchronous colon cancer.  Discussed with Dr. Serra: Cannot consider adjuvant therapy for resected colon cancer unless his functional status significantly improves and suspected renal cell carcinoma is resected and he has recovered from that as well..  Continuing to slowly recover from the  surgery    *Suspected renal cell carcinoma based on kidney mass protocol CT, 11/11/2024.  Urology plans to follow him up in the office after he has some time to recover from colon surgery    *Anemia  Suspected multifactorial anemia, additional laboratory studies consistent with anemia of chronic disease  Transfusion anticipated 11/9/2024  Assessment 11/10/2024 H9.3 and 30.5  Hb 9.5, from 9.4, from 9.1, from 9.2, from 9.6, from 9.7      *Leukocytosis  Agree that this is multifactorial but notedly right shifted with predominant neutrophilia and no immature forms.  This is a reactive leukocytosis usually related to concurrent physiologic stress and infection.  Continues to be variable-reflective of underlying processes including likely additional malignancy  WBC 12.1, from 14.7, from 14.3, from 13.6, from 12.4, from 14.8     *Thrombocytosis  This is also reactive developing gradually postoperative and does not need to be addressed with cytoreductive medications.  Additional laboratory studies per iron deficiency were completed and found to be negative  Continue to monitor, gradually improving  , from 865, from 885, from 884, from 953, from 1040     *Acute right lower extremity DVT in gastrocnemius and soleal  Patient now on anticoagulation with Lovenox appropriately.  We will follow and transition to DOAC therapy as he further recovers.     *Family history of kidney cancer: Brother and father    Plan  On Lovenox for right leg DVT.  When ready for oral anticoagulants per other physicians, I usually prefer Eliquis, but any would be reasonable  Two separate synchronous stage II colon cancers, but presented with perforation so higher risk.  Can follow-up with Dr. Serra as an outpatient to determine if adjuvant chemotherapy would be appropriate.  He needs some time to recover from septic shock from bowel perforation from underlying malignancy.  He is currently slowly recovering with a Dobbhoff feeding tube in  place.  Dr. Guillaume states he might need a PEG or G-tube  Noted urology feels he has a renal cell carcinoma based on scans.  Patient's brother and father also with kidney cancer.  Message sent to Dr. Serra to update him.  I sent a message to our office requesting an appointment Dr. Serra in roughly 5 to 8 weeks or so.  (He needs some time to recover and needs to see urology)    Chart reviewed including surgery note and hospital note and CBC and CMP and last 3 glucoses

## 2024-11-16 NOTE — PROGRESS NOTES
postoperative day 20 s/p left colectomy with colostomy.    S: No events overnight.  Continues to feel weak.  Asking how long his ostomy needs to remain.    O:   Vitals:    11/15/24 1218 11/15/24 1916 11/15/24 2337 11/16/24 0636   BP: 108/56 125/52 117/65 112/53   BP Location: Left arm Left arm Left arm Left arm   Patient Position: Sitting Lying Lying Lying   Pulse: 108 111 104 109   Resp: 16 16 16 16   Temp: 98.1 °F (36.7 °C) 97.7 °F (36.5 °C) 97.7 °F (36.5 °C) 97.5 °F (36.4 °C)   TempSrc: Oral Oral Oral Oral   SpO2: 96% 96% 97% 96%   Height:          Alert, no acute distress  No increased work of breathing, on room air   Abdomen soft, nontender, ostomy pink and viable with stool, output 350cc    White blood cell count 12 from 14,000, stable, hemoglobin 9.5, platelets 795    Assessment and plan    Postoperative day 20 status post left colectomy with colostomy. From surgical standpoint he is doing very well.   WBC trending down, no evidence of infection.   He is tolerating tube feeds.  He continues to have dysphagia that will be evaluated today.  Discussed with the patient that if he continues to fail swallow evaluation then he may need to have permanent feeding access with gastrostomy tube.  Continue wound VAC to midline wound  On DVT ppx       Kenia Mills MD

## 2024-11-16 NOTE — PROGRESS NOTES
Name: Arsh Haynes ADMIT: 10/26/2024   : 1950  PCP: Provider, No Known    MRN: [id number] LOS: 21 days   AGE/SEX: 74 y.o. male  ROOM: [facility]     Subjective   Subjective   Pt reports feeling about the same today. Still weak/tired. Pain controlled. Denies SOA or CP or palp. No N/V/D. No F/C/NS. Good UOP and good output in ostomy bag. Tolerating ice chips.       Objective   Objective   Vital Signs  Temp:  [97.5 °F (36.4 °C)-98.1 °F (36.7 °C)] 97.5 °F (36.4 °C)  Heart Rate:  [104-111] 109  Resp:  [16] 16  BP: (108-125)/(52-65) 112/53  SpO2:  [96 %-97 %] 96 %  on   ;   Device (Oxygen Therapy): room air  Body mass index is 25.56 kg/m².    (No change in exam today)    Physical Exam  Vitals and nursing note reviewed. Exam conducted with a chaperone present (RN).   Constitutional:       General: He is not in acute distress.     Appearance: He is ill-appearing. He is not toxic-appearing or diaphoretic.   HENT:      Head: Normocephalic.      Nose: Nose normal.      Comments: CorTrack in right nare     Mouth/Throat:      Mouth: Mucous membranes are moist.      Pharynx: Oropharynx is clear.   Eyes:      General: No scleral icterus.        Right eye: No discharge.         Left eye: No discharge.      Conjunctiva/sclera: Conjunctivae normal.   Cardiovascular:      Rate and Rhythm: Normal rate and regular rhythm.      Pulses: Normal pulses.   Pulmonary:      Effort: Pulmonary effort is normal. No respiratory distress.      Breath sounds: Normal breath sounds. No wheezing or rales.   Abdominal:      General: Bowel sounds are normal. There is no distension.      Palpations: Abdomen is soft.      Tenderness: There is abdominal tenderness (appropriate postop).      Comments: Ostomy LLQ  Midline incision with wound vac   Musculoskeletal:         General: No swelling.      Cervical back: Neck supple.   Skin:     General: Skin is warm and dry.      Capillary Refill: Capillary refill takes less than 2 seconds.      Coloration:  Skin is not jaundiced.   Neurological:      Mental Status: He is alert and oriented to person, place, and time. Mental status is at baseline.      Cranial Nerves: No cranial nerve deficit.      Coordination: Coordination normal.   Psychiatric:      Comments: Flat affect       Results Review     I reviewed the patient's new clinical results.  Results from last 7 days   Lab Units 11/16/24  0430 11/15/24  0559 11/14/24  0410 11/13/24  0637   WBC 10*3/mm3 12.11* 14.70* 14.30* 13.63*   HEMOGLOBIN g/dL 9.5* 9.4* 9.1* 9.2*   PLATELETS 10*3/mm3 795* 865* 885* 884*     Results from last 7 days   Lab Units 11/16/24  0430 11/15/24  1707 11/15/24  0559 11/14/24  0410 11/13/24  1951 11/13/24  0637   SODIUM mmol/L 135*  --  135* 135*  --  134*   POTASSIUM mmol/L 4.2 4.5 3.6 3.8   < > 3.4*   CHLORIDE mmol/L 105  --  103 102  --  104   CO2 mmol/L 20.5*  --  20.6* 20.5*  --  20.0*   BUN mg/dL 19  --  16 15  --  12   CREATININE mg/dL 0.48*  --  0.50* 0.45*  --  0.42*   GLUCOSE mg/dL 174*  --  170* 202*  --  178*   EGFR mL/min/1.73 109.7  --  108.4 111.9  --  114.2    < > = values in this interval not displayed.     Results from last 7 days   Lab Units 11/16/24  0430 11/15/24  0559 11/14/24  0410 11/13/24  0637   ALBUMIN g/dL 2.2* 2.3* 2.4* 2.2*   BILIRUBIN mg/dL 0.3 0.3 0.4 0.3   ALK PHOS U/L 194* 200* 214* 233*   AST (SGOT) U/L 26 25 22 24   ALT (SGPT) U/L 35 35 32 38     Results from last 7 days   Lab Units 11/16/24  0430 11/15/24  0559 11/14/24  0410 11/13/24  0637   CALCIUM mg/dL 8.7 8.6 8.5* 8.4*   ALBUMIN g/dL 2.2* 2.3* 2.4* 2.2*   MAGNESIUM mg/dL 2.0 1.8  --   --    PHOSPHORUS mg/dL 3.6 3.6  --   --            Glucose   Date/Time Value Ref Range Status   11/16/2024 0634 181 (H) 70 - 130 mg/dL Final   11/15/2024 2043 208 (H) 70 - 130 mg/dL Final   11/15/2024 1531 152 (H) 70 - 130 mg/dL Final   11/15/2024 1036 169 (H) 70 - 130 mg/dL Final   11/15/2024 0629 174 (H) 70 - 130 mg/dL Final   11/14/2024 1940 165 (H) 70 - 130 mg/dL  Final   11/14/2024 1547 151 (H) 70 - 130 mg/dL Final       No radiology results for the last day    I have personally reviewed all medications:  Scheduled Medications  chlorhexidine, 15 mL, Mouth/Throat, Q12H  enoxaparin, 1 mg/kg, Subcutaneous, Q12H  folic acid, 1 mg, Nasogastric, Daily  insulin glargine, 30 Units, Subcutaneous, Nightly  insulin regular, 2-9 Units, Subcutaneous, 4x Daily AC & at Bedtime  lansoprazole, 30 mg, Nasogastric, Q AM  Lidocaine, 1 patch, Transdermal, Q24H  Lidocaine, 2 patch, Transdermal, Q24H  metoprolol tartrate, 75 mg, Nasogastric, Q12H  sodium chloride, 10 mL, Intravenous, Q12H  sodium hypochlorite, , Topical, BID  terazosin, 2 mg, Nasogastric, Daily  thiamine, 100 mg, Nasogastric, Daily    Infusions   Diet  NPO Diet NPO Type: Strict NPO    I have personally reviewed:  [x]  Laboratory   []  Microbiology   []  Radiology   [x]  EKG/Telemetry  []  Cardiology/Vascular   []  Pathology    []  Records       Assessment/Plan     Active Hospital Problems    Diagnosis  POA    **Peritonitis [K65.9]  Unknown    HTN (hypertension) [I10]  Unknown    Thrombocytosis [D75.839]  Unknown    Acute DVT (deep venous thrombosis) [I82.409]  Unknown    Renal mass [N28.89]  Unknown    Hyponatremia [E87.1]  Unknown    Anemia [D64.9]  Unknown    Moderate protein-calorie malnutrition [E44.0]  Yes    Colon cancer [C18.9]  No      Resolved Hospital Problems    Diagnosis Date Resolved POA    Perforation of sigmoid colon [K63.1] 10/27/2024 Yes       73yo gentleman admitted to ICU with peritonitis due to perforated rectosigmoid adenoCA. We assumed care when pt came out of ICU.     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 Leukocytosis fairly stable today at 12K. Patient remains afebrile with no signs of new infection. Continue to monitor.  -Heme/Onc has seen,  recommends outpt f/u with Dr. Serra in 5-8 weeks.    Sinus tachycardia: EKG showed sinus tach. Continue increased dose of metoprolol. Continue to closely monitor.  If sinus tachycardia persists and no other cause identified, may need to ask Cardiology to evaluate.    Acute RLE DVT found on 11/5:  He has a severe thrombocytosis and in addition to his anemia as well as the colon cancer. Hematology/Oncology following. Continue therapeutic Lovenox. Ultimately can change to Eliquis.    Anemia, multifactorial: Hgb stable today. S/p 2 units PRBCs here. Hematology/Oncology following. Repeating CBC with morning labs, transfuse for Hgb less than 7.    Thrombocytosis: sl improvement today. Reactive per Hematology/Oncology. No iron deficiency.    Right renal mass and urinary retention: Urology evaluated the renal mass and urinary retention. Blankenship catheter out now. Voiding w/o issue and Cr fine. Continue Hytrin. He will follow-up in 4 to 6 weeks with repeat CT for the renal mass which Urology feels is most likely RCC.     Hyponatremia: Sodium stable today at 135. Repeat BMP with morning labs.    Hypokalemia: Replacing as needed. Mg++ level fine.    DM2: Requiring Lantus to 30 units nightly and SSI. Ongoing titration of insulin based on requirements. A1c 6.6. No history of DM per pt. Suspect combination of stress of acute illness and tube feeds in setting of what was likely pre-diabetic state.    Dysphagia: SLP evaluated and recommended NPO. CorTrack placed and tube feeds started. SLP plans VFSS on Monday.    Right knee pain, right shoulder pain: Orthopedic surgery consulted. No significant effusion to perform bedside arthrocentesis. XR of right shoulder with severe arthropathic changes only. Ortho not concerned for septic joints. Patient also reported left middle knuckle pain. XR unremarkable.      Pharmacy to dose Lovenox for DVT prophylaxis.  DNR.  Discussed with patient. No family present.  Anticipate discharge to SNU  facility next week.  Expected Discharge Date: 11/19/2024; Expected Discharge Time:       Davian Flores MD  [facility]  11/16/24  09:28 EST

## 2024-11-16 NOTE — PLAN OF CARE
Goal Outcome Evaluation:      Patient more alert this shift with VSS, he is pleasant and able to make his needs known but continues to speak with a soft tone. Oral care given this shift and patient is able to take ice chips independently without any s/s of choking... Patient with wound vac in place and it is pulling a small amount of drainage. He is tolerating his tube feedings well and without any residual  of issues noted.

## 2024-11-17 LAB
ALBUMIN SERPL-MCNC: 2.6 G/DL (ref 3.5–5.2)
ALBUMIN/GLOB SERPL: 0.7 G/DL
ALP SERPL-CCNC: 203 U/L (ref 39–117)
ALT SERPL W P-5'-P-CCNC: 40 U/L (ref 1–41)
ANION GAP SERPL CALCULATED.3IONS-SCNC: 10 MMOL/L (ref 5–15)
AST SERPL-CCNC: 28 U/L (ref 1–40)
BASOPHILS # BLD AUTO: 0.1 10*3/MM3 (ref 0–0.2)
BASOPHILS NFR BLD AUTO: 0.7 % (ref 0–1.5)
BILIRUB SERPL-MCNC: 0.3 MG/DL (ref 0–1.2)
BUN SERPL-MCNC: 17 MG/DL (ref 8–23)
BUN/CREAT SERPL: 39.5 (ref 7–25)
CALCIUM SPEC-SCNC: 8.8 MG/DL (ref 8.6–10.5)
CHLORIDE SERPL-SCNC: 103 MMOL/L (ref 98–107)
CO2 SERPL-SCNC: 23 MMOL/L (ref 22–29)
CREAT SERPL-MCNC: 0.43 MG/DL (ref 0.76–1.27)
DEPRECATED RDW RBC AUTO: 54.7 FL (ref 37–54)
EGFRCR SERPLBLD CKD-EPI 2021: 113.4 ML/MIN/1.73
EOSINOPHIL # BLD AUTO: 0.59 10*3/MM3 (ref 0–0.4)
EOSINOPHIL NFR BLD AUTO: 4.3 % (ref 0.3–6.2)
ERYTHROCYTE [DISTWIDTH] IN BLOOD BY AUTOMATED COUNT: 17.4 % (ref 12.3–15.4)
GLOBULIN UR ELPH-MCNC: 3.9 GM/DL
GLUCOSE BLDC GLUCOMTR-MCNC: 125 MG/DL (ref 70–130)
GLUCOSE BLDC GLUCOMTR-MCNC: 132 MG/DL (ref 70–130)
GLUCOSE BLDC GLUCOMTR-MCNC: 146 MG/DL (ref 70–130)
GLUCOSE BLDC GLUCOMTR-MCNC: 174 MG/DL (ref 70–130)
GLUCOSE SERPL-MCNC: 131 MG/DL (ref 65–99)
HCT VFR BLD AUTO: 28.8 % (ref 37.5–51)
HGB BLD-MCNC: 8.8 G/DL (ref 13–17.7)
IMM GRANULOCYTES # BLD AUTO: 0.26 10*3/MM3 (ref 0–0.05)
IMM GRANULOCYTES NFR BLD AUTO: 1.9 % (ref 0–0.5)
LYMPHOCYTES # BLD AUTO: 1.77 10*3/MM3 (ref 0.7–3.1)
LYMPHOCYTES NFR BLD AUTO: 12.9 % (ref 19.6–45.3)
MAGNESIUM SERPL-MCNC: 1.9 MG/DL (ref 1.6–2.4)
MCH RBC QN AUTO: 26.3 PG (ref 26.6–33)
MCHC RBC AUTO-ENTMCNC: 30.6 G/DL (ref 31.5–35.7)
MCV RBC AUTO: 86.2 FL (ref 79–97)
MONOCYTES # BLD AUTO: 1.33 10*3/MM3 (ref 0.1–0.9)
MONOCYTES NFR BLD AUTO: 9.7 % (ref 5–12)
NEUTROPHILS NFR BLD AUTO: 70.5 % (ref 42.7–76)
NEUTROPHILS NFR BLD AUTO: 9.64 10*3/MM3 (ref 1.7–7)
NRBC BLD AUTO-RTO: 0 /100 WBC (ref 0–0.2)
PHOSPHATE SERPL-MCNC: 3.4 MG/DL (ref 2.5–4.5)
PLATELET # BLD AUTO: 866 10*3/MM3 (ref 140–450)
PMV BLD AUTO: 8.7 FL (ref 6–12)
POTASSIUM SERPL-SCNC: 4 MMOL/L (ref 3.5–5.2)
PROT SERPL-MCNC: 6.5 G/DL (ref 6–8.5)
RBC # BLD AUTO: 3.34 10*6/MM3 (ref 4.14–5.8)
SODIUM SERPL-SCNC: 136 MMOL/L (ref 136–145)
WBC NRBC COR # BLD AUTO: 13.69 10*3/MM3 (ref 3.4–10.8)

## 2024-11-17 PROCEDURE — 83735 ASSAY OF MAGNESIUM: CPT | Performed by: HOSPITALIST

## 2024-11-17 PROCEDURE — 25010000002 ENOXAPARIN PER 10 MG: Performed by: INTERNAL MEDICINE

## 2024-11-17 PROCEDURE — 99024 POSTOP FOLLOW-UP VISIT: CPT | Performed by: STUDENT IN AN ORGANIZED HEALTH CARE EDUCATION/TRAINING PROGRAM

## 2024-11-17 PROCEDURE — 82948 REAGENT STRIP/BLOOD GLUCOSE: CPT

## 2024-11-17 PROCEDURE — 80053 COMPREHEN METABOLIC PANEL: CPT | Performed by: INTERNAL MEDICINE

## 2024-11-17 PROCEDURE — 99232 SBSQ HOSP IP/OBS MODERATE 35: CPT | Performed by: INTERNAL MEDICINE

## 2024-11-17 PROCEDURE — 63710000001 INSULIN GLARGINE PER 5 UNITS: Performed by: STUDENT IN AN ORGANIZED HEALTH CARE EDUCATION/TRAINING PROGRAM

## 2024-11-17 PROCEDURE — 85025 COMPLETE CBC W/AUTO DIFF WBC: CPT | Performed by: INTERNAL MEDICINE

## 2024-11-17 PROCEDURE — 63710000001 INSULIN REGULAR HUMAN PER 5 UNITS: Performed by: HOSPITALIST

## 2024-11-17 PROCEDURE — 84100 ASSAY OF PHOSPHORUS: CPT | Performed by: HOSPITALIST

## 2024-11-17 RX ADMIN — CHLORHEXIDINE GLUCONATE 15 ML: 1.2 RINSE ORAL at 21:13

## 2024-11-17 RX ADMIN — Medication 100 MG: at 09:08

## 2024-11-17 RX ADMIN — INSULIN HUMAN 2 UNITS: 100 INJECTION, SOLUTION PARENTERAL at 21:11

## 2024-11-17 RX ADMIN — INSULIN GLARGINE 30 UNITS: 100 INJECTION, SOLUTION SUBCUTANEOUS at 21:12

## 2024-11-17 RX ADMIN — LANSOPRAZOLE 30 MG: 15 TABLET, ORALLY DISINTEGRATING ORAL at 05:10

## 2024-11-17 RX ADMIN — LIDOCAINE 1 PATCH: 4 PATCH TOPICAL at 09:07

## 2024-11-17 RX ADMIN — CHLORHEXIDINE GLUCONATE 15 ML: 1.2 RINSE ORAL at 09:07

## 2024-11-17 RX ADMIN — METOPROLOL TARTRATE 75 MG: 50 TABLET, FILM COATED ORAL at 21:13

## 2024-11-17 RX ADMIN — ENOXAPARIN SODIUM 90 MG: 100 INJECTION SUBCUTANEOUS at 05:11

## 2024-11-17 RX ADMIN — HYDROCODONE BITARTRATE AND ACETAMINOPHEN 1 TABLET: 5; 325 TABLET ORAL at 18:18

## 2024-11-17 RX ADMIN — FOLIC ACID 1 MG: 1 TABLET ORAL at 09:08

## 2024-11-17 RX ADMIN — ENOXAPARIN SODIUM 90 MG: 100 INJECTION SUBCUTANEOUS at 18:17

## 2024-11-17 RX ADMIN — HYDROCODONE BITARTRATE AND ACETAMINOPHEN 1 TABLET: 5; 325 TABLET ORAL at 23:49

## 2024-11-17 RX ADMIN — LIDOCAINE 2 PATCH: 4 PATCH TOPICAL at 09:08

## 2024-11-17 RX ADMIN — TERAZOSIN 2 MG: 2 CAPSULE ORAL at 21:12

## 2024-11-17 RX ADMIN — HYDROCODONE BITARTRATE AND ACETAMINOPHEN 1 TABLET: 5; 325 TABLET ORAL at 11:43

## 2024-11-17 RX ADMIN — HYDROCODONE BITARTRATE AND ACETAMINOPHEN 1 TABLET: 5; 325 TABLET ORAL at 05:10

## 2024-11-17 RX ADMIN — Medication 10 ML: at 21:13

## 2024-11-17 RX ADMIN — Medication 10 ML: at 09:08

## 2024-11-17 NOTE — PROGRESS NOTES
Name: Arsh Haynes ADMIT: 10/26/2024   : 1950  PCP: Provider, No Known    MRN: [id number] LOS: 22 days   AGE/SEX: 74 y.o. male  ROOM: [facility]     Subjective   Subjective   Pt reports feeling about the same again today. Still weak/tired. Pain controlled. Denies SOA or CP or palp. No N/V/D. No F/C/NS. Good UOP and good output in ostomy bag. Tolerating ice chips.       Objective   Objective   Vital Signs  Temp:  [97.7 °F (36.5 °C)-98.1 °F (36.7 °C)] 98 °F (36.7 °C)  Heart Rate:  [106-113] 108  Resp:  [16-18] 16  BP: (102-136)/(57-64) 116/59  SpO2:  [96 %-98 %] 98 %  on   ;   Device (Oxygen Therapy): room air  Body mass index is 25.56 kg/m².    (No change in exam today)    Physical Exam  Vitals and nursing note reviewed.   Constitutional:       General: He is not in acute distress.     Appearance: He is ill-appearing. He is not toxic-appearing or diaphoretic.   HENT:      Head: Normocephalic.      Nose: Nose normal.      Comments: CorTrack in right nare     Mouth/Throat:      Mouth: Mucous membranes are moist.      Pharynx: Oropharynx is clear.   Eyes:      General: No scleral icterus.        Right eye: No discharge.         Left eye: No discharge.      Conjunctiva/sclera: Conjunctivae normal.   Cardiovascular:      Rate and Rhythm: Normal rate and regular rhythm.      Pulses: Normal pulses.   Pulmonary:      Effort: Pulmonary effort is normal. No respiratory distress.      Breath sounds: Normal breath sounds. No wheezing or rales.   Abdominal:      General: Bowel sounds are normal. There is no distension.      Palpations: Abdomen is soft.      Tenderness: There is abdominal tenderness (appropriate postop).      Comments: Ostomy LLQ  Midline incision with wound vac   Musculoskeletal:         General: No swelling.      Cervical back: Neck supple.   Skin:     General: Skin is warm and dry.      Capillary Refill: Capillary refill takes less than 2 seconds.      Coloration: Skin is not jaundiced.   Neurological:       Mental Status: He is alert and oriented to person, place, and time. Mental status is at baseline.      Cranial Nerves: No cranial nerve deficit.      Coordination: Coordination normal.   Psychiatric:      Comments: Flat affect       Results Review     I reviewed the patient's new clinical results.  Results from last 7 days   Lab Units 11/17/24  0540 11/16/24  0430 11/15/24  0559 11/14/24  0410   WBC 10*3/mm3 13.69* 12.11* 14.70* 14.30*   HEMOGLOBIN g/dL 8.8* 9.5* 9.4* 9.1*   PLATELETS 10*3/mm3 866* 795* 865* 885*     Results from last 7 days   Lab Units 11/17/24  0540 11/16/24  0430 11/15/24  1707 11/15/24  0559 11/14/24  0410   SODIUM mmol/L 136 135*  --  135* 135*   POTASSIUM mmol/L 4.0 4.2 4.5 3.6 3.8   CHLORIDE mmol/L 103 105  --  103 102   CO2 mmol/L 23.0 20.5*  --  20.6* 20.5*   BUN mg/dL 17 19  --  16 15   CREATININE mg/dL 0.43* 0.48*  --  0.50* 0.45*   GLUCOSE mg/dL 131* 174*  --  170* 202*   EGFR mL/min/1.73 113.4 109.7  --  108.4 111.9     Results from last 7 days   Lab Units 11/17/24  0540 11/16/24  0430 11/15/24  0559 11/14/24  0410   ALBUMIN g/dL 2.6* 2.2* 2.3* 2.4*   BILIRUBIN mg/dL 0.3 0.3 0.3 0.4   ALK PHOS U/L 203* 194* 200* 214*   AST (SGOT) U/L 28 26 25 22   ALT (SGPT) U/L 40 35 35 32     Results from last 7 days   Lab Units 11/17/24  0540 11/16/24  0430 11/15/24  0559 11/14/24  0410   CALCIUM mg/dL 8.8 8.7 8.6 8.5*   ALBUMIN g/dL 2.6* 2.2* 2.3* 2.4*   MAGNESIUM mg/dL 1.9 2.0 1.8  --    PHOSPHORUS mg/dL 3.4 3.6 3.6  --            Glucose   Date/Time Value Ref Range Status   11/17/2024 0616 146 (H) 70 - 130 mg/dL Final   11/16/2024 2011 188 (H) 70 - 130 mg/dL Final   11/16/2024 1533 149 (H) 70 - 130 mg/dL Final   11/16/2024 1039 169 (H) 70 - 130 mg/dL Final   11/16/2024 0634 181 (H) 70 - 130 mg/dL Final   11/15/2024 2043 208 (H) 70 - 130 mg/dL Final   11/15/2024 1531 152 (H) 70 - 130 mg/dL Final       No radiology results for the last day    I have personally reviewed all  medications:  Scheduled Medications  chlorhexidine, 15 mL, Mouth/Throat, Q12H  enoxaparin, 1 mg/kg, Subcutaneous, Q12H  folic acid, 1 mg, Nasogastric, Daily  insulin glargine, 30 Units, Subcutaneous, Nightly  insulin regular, 2-9 Units, Subcutaneous, 4x Daily AC & at Bedtime  lansoprazole, 30 mg, Nasogastric, Q AM  Lidocaine, 1 patch, Transdermal, Q24H  Lidocaine, 2 patch, Transdermal, Q24H  metoprolol tartrate, 75 mg, Nasogastric, Q12H  sodium chloride, 10 mL, Intravenous, Q12H  sodium hypochlorite, , Topical, BID  terazosin, 2 mg, Nasogastric, Daily  thiamine, 100 mg, Nasogastric, Daily    Infusions   Diet  NPO Diet NPO Type: Strict NPO    I have personally reviewed:  [x]  Laboratory   []  Microbiology   []  Radiology   [x]  EKG/Telemetry  []  Cardiology/Vascular   []  Pathology    []  Records       Assessment/Plan     Active Hospital Problems    Diagnosis  POA    **Peritonitis [K65.9]  Unknown    HTN (hypertension) [I10]  Unknown    Thrombocytosis [D75.839]  Unknown    Acute DVT (deep venous thrombosis) [I82.409]  Unknown    Renal mass [N28.89]  Unknown    Hyponatremia [E87.1]  Unknown    Anemia [D64.9]  Unknown    Moderate protein-calorie malnutrition [E44.0]  Yes    Colon cancer [C18.9]  No      Resolved Hospital Problems    Diagnosis Date Resolved POA    Perforation of sigmoid colon [K63.1] 10/27/2024 Yes       75yo gentleman admitted to ICU with peritonitis due to perforated rectosigmoid adenoCA. We assumed care when pt came out of ICU.     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 Leukocytosis fairly stable today at 13K. Patient remains afebrile with no signs of new infection. Continue to monitor.  -Heme/Onc has seen, recommends outpt f/u with Dr. Serra in 5-8 weeks.    Sinus tachycardia: EKG showed sinus tach. Continue increased dose of metoprolol.  Continue to closely monitor.  If sinus tachycardia persists and no other cause identified, may need to ask Cardiology to evaluate.    Acute RLE DVT found on 11/5:  He has a severe thrombocytosis and in addition to his anemia as well as the colon cancer. Hematology/Oncology following. Continue therapeutic Lovenox. Ultimately can change to Eliquis.    Anemia, multifactorial: Hgb fairly stable today. S/p 2 units PRBCs here. Hematology/Oncology following. Repeating CBC with morning labs, transfuse for Hgb less than 7.    Thrombocytosis: sl worse today. Reactive per Hematology/Oncology. No iron deficiency.    Right renal mass and urinary retention: Urology evaluated the renal mass and urinary retention. Blankenship catheter out now. Voiding w/o issue and Cr fine. Continue Hytrin. He will follow-up in 4 to 6 weeks with repeat CT for the renal mass which Urology feels is most likely RCC.     Hyponatremia: Sodium stable today at 136. Repeat BMP with morning labs.    Hypokalemia: Replacing as needed. Mg++ level fine.    DM2: Requiring Lantus and SSI. Ongoing titration of insulin based on requirements. A1c 6.6. No history of DM per pt. Suspect combination of stress of acute illness and tube feeds in setting of what was likely pre-diabetic state.    Dysphagia: SLP evaluated and recommended NPO. CorTrack placed and tube feeds started. SLP plans VFSS tomorrow--hopefully he has regained some swallow function as strength improves.    Right knee pain, right shoulder pain: Orthopedic surgery consulted. No significant effusion to perform bedside arthrocentesis. XR of right shoulder with severe arthropathic changes only. Ortho not concerned for septic joints. Patient also reported left middle knuckle pain. XR unremarkable.      Pharmacy to dose Lovenox for DVT prophylaxis.  DNR.  Discussed with patient and Dr. Robison. No family present.  Anticipate discharge to SNU facility next week.  Expected Discharge Date: 11/19/2024; Expected Discharge  Time:       Davian Flores MD  [facility]  11/17/24  09:53 EST

## 2024-11-17 NOTE — PLAN OF CARE
Goal Outcome Evaluation:  Plan of Care Reviewed With: patient        Progress: improving        Patient resting abed and continues to c/o some abdominal pain intermittently that is resolved with a pain medication. He is tolerating his tubefeedings at goal of 55cc/hr and without any residual. VSS, nursing is turning and repositioning him every two hours as needed. He continues with a purewick in place with clear, straw colored urine obtained. Wound vac is in place over midline surgical site and is pulling a small amount of serosanguinous, and tenacious drainage from the wound.

## 2024-11-17 NOTE — PROGRESS NOTES
REASON FOR FOLLOWUP/CHIEF COMPLAINT:  Thrombocytosis, DVT, colon cancer     HISTORY OF PRESENT ILLNESS:   Remains quite weak    Past Medical History, Past Surgical History, Social History, Family History have been reviewed and are without significant changes except as mentioned.    Review of Systems   Review of Systems   Constitutional:  Negative for activity change.   HENT:  Negative for nosebleeds and trouble swallowing.    Respiratory:  Negative for shortness of breath and wheezing.    Cardiovascular:  Negative for chest pain and palpitations.   Gastrointestinal:  Negative for constipation, diarrhea and nausea.   Genitourinary:  Negative for dysuria and hematuria.   Musculoskeletal:  Negative for arthralgias and myalgias.   Neurological:  Negative for seizures and syncope.   Hematological:  Negative for adenopathy. Does not bruise/bleed easily.   Psychiatric/Behavioral:  Negative for confusion.        Medications:  The current medication list was reviewed in the EMR    ALLERGIES:  No Known Allergies           Vitals:    11/16/24 1217 11/16/24 1906 11/16/24 2254 11/17/24 0704   BP: 102/57 129/64 136/63 116/59   BP Location: Left arm Right arm Right arm Right arm   Patient Position: Lying Lying Lying Lying   Pulse: 106  113 108   Resp: 16 18 16 16   Temp: 97.7 °F (36.5 °C) 98.1 °F (36.7 °C) 97.9 °F (36.6 °C) 98 °F (36.7 °C)   TempSrc: Oral Oral Oral Oral   SpO2: 96%  96% 98%   Weight:       Height:         Physical Exam    CONSTITUTIONAL:  Vital signs reviewed.  No distress, looks comfortable.  EYES:  Conjunctivae and lids unremarkable.  PERRLA  EARS, NOSE, MOUTH, THROAT:  Ears and nose appear unremarkable.  Lips, teeth, gums appear unremarkable.  RESPIRATORY:  Normal respiratory effort.  Lungs clear to auscultation bilaterally.  CARDIOVASCULAR:  Normal S1, S2.  No murmurs, rubs or gallops.  No significant lower extremity edema.  GASTROINTESTINAL: Abdomen appears unremarkable.  Nontender.  No hepatomegaly.   No splenomegaly.  NEURO: Cranial nerves 2-12 grossly intact.  No focal deficits.  Appears to have equal strength all 4 extremities.  MUSCULOSKELETAL:  Unremarkable digits/nails.  No cyanosis or clubbing.  SKIN:  Warm.  No rashes.  PSYCHIATRIC:  Normal judgment and insight.  Normal mood and affect.        RECENT LABS:  WBC   Date Value Ref Range Status   11/17/2024 13.69 (H) 3.40 - 10.80 10*3/mm3 Final   11/16/2024 12.11 (H) 3.40 - 10.80 10*3/mm3 Final   11/15/2024 14.70 (H) 3.40 - 10.80 10*3/mm3 Final     Hemoglobin   Date Value Ref Range Status   11/17/2024 8.8 (L) 13.0 - 17.7 g/dL Final   11/16/2024 9.5 (L) 13.0 - 17.7 g/dL Final   11/15/2024 9.4 (L) 13.0 - 17.7 g/dL Final     Platelets   Date Value Ref Range Status   11/17/2024 866 (H) 140 - 450 10*3/mm3 Final   11/16/2024 795 (H) 140 - 450 10*3/mm3 Final   11/15/2024 865 (H) 140 - 450 10*3/mm3 Final       ASSESSMENT/PLAN:  Arsh Guevarajocelyne 3105/1      Septic shock from perforated bowel from underlying malignancy  Subsequent abdominal cultures positive for Pseudomonas, E. coli and mixed anaerobes, negative blood cultures  Completed antibiotics  Continuing with slow recovery    *Synchronous colon cancers (both T3N0)  Both malignancies are T3 N0-stage IIA-and as we consider his concurrent renal mass and performance status, adjuvant chemotherapy must be considered particularly with invasion of the descending colon mass into pericolonic and perirectal tissue-apparently the the site of perforation.  These issues discussed with general surgery.  MSI status-negative by IHC, PCR-microsatellite stable.  This is particularly useful considering the patient's family history and findings of synchronous colon cancer.  Discussed with Dr. Serra: Cannot consider adjuvant therapy for resected colon cancer unless his functional status significantly improves and suspected renal cell carcinoma is resected and he has recovered from that as well..  Continue to slowly recover from the  surgery    *Suspected renal cell carcinoma based on kidney mass protocol CT, 11/11/2024.  Urology plans to follow him up in the office after he has some time to recover from colon surgery    *Anemia  Suspected multifactorial anemia, additional laboratory studies consistent with anemia of chronic disease  Transfusion anticipated 11/9/2024  Assessment 11/10/2024 H9.3 and 30.5  Hb 8.8, from 9.5, from 9.4, from 9.1, from 9.2, from 9.6, from 9.7      *Leukocytosis  Agree that this is multifactorial but notedly right shifted with predominant neutrophilia and no immature forms.  This is a reactive leukocytosis usually related to concurrent physiologic stress and infection.  Continues to be variable-reflective of underlying processes including likely additional malignancy  WBC 12.1, from 14.7, from 14.3, from 13.6, from 12.4, from 14.8     *Thrombocytosis  This is also reactive developing gradually postoperative and does not need to be addressed with cytoreductive medications.  Additional laboratory studies per iron deficiency were completed and found to be negative  Continue to monitor, gradually improving  , from 795, from 865, from 885, from 884, from 953, from 1040     *Acute right lower extremity DVT in gastrocnemius and soleal  Patient now on anticoagulation with Lovenox appropriately.  We will follow and transition to DOAC therapy as he further recovers.  No clinical signs of further clotting or bleeding     *Family history of kidney cancer: Brother and father    Plan  On Lovenox for right leg DVT.  When ready for oral anticoagulants per other physicians, I usually prefer Eliquis, but any would be reasonable  Two separate synchronous stage II colon cancers, but presented with perforation so higher risk.  Can follow-up with Dr. Serra as an outpatient to determine if adjuvant chemotherapy would be appropriate.  He needs some time to recover from septic shock from bowel perforation from underlying malignancy.   He is currently slowly recovering with a Dobbhoff feeding tube in place.  Dr. Guillaume states he might need a PEG or G-tube  Noted urology feels he has a renal cell carcinoma based on scans.  Patient's brother and father also with kidney cancer.  Message sent to Dr. Serra to update him: He states the suspected kidney cancer would definitely need to be resected before considering any adjuvant chemo for colon cancer.  Keep appointment with Dr. Serra on 1/8/2025 as scheduled     I don't think I'm adding much at this point.  Will sign off.  Please call if I can be of any further assistance.  Thanks for the opportunity to help.     Chart reviewed including surgery note, Hospitalist note, CBC, CMP, last 3 glucoses

## 2024-11-17 NOTE — PROGRESS NOTES
postoperative day 21 s/p left colectomy with colostomy.    S: No events overnight.  States he feels tired.    O:   Vitals:    11/16/24 1217 11/16/24 1906 11/16/24 2254 11/17/24 0704   BP: 102/57 129/64 136/63 116/59   BP Location: Left arm Right arm Right arm Right arm   Patient Position: Lying Lying Lying Lying   Pulse: 106  113 108   Resp: 16 18 16 16   Temp: 97.7 °F (36.5 °C) 98.1 °F (36.7 °C) 97.9 °F (36.6 °C) 98 °F (36.7 °C)   TempSrc: Oral Oral Oral Oral   SpO2: 96%  96% 98%   Height:          Alert, no acute distress  No increased work of breathing, on room air   Abdomen soft, nontender, ostomy pink and viable with stool, output 200cc    White blood cell count 13 from 12, stable, hemoglobin 8.8, platelets 866    Assessment and plan    Postoperative day 21 status post left colectomy with colostomy. From surgical standpoint he is doing very well.   WBC trending down, no evidence of infection.   He is tolerating tube feeds.  He continues to have dysphagia that will be evaluated today.  Discussed with the patient that if he continues to fail swallow evaluation then he may need to have permanent feeding access with gastrostomy tube.  Continue wound VAC to midline wound  On DVT ppx       Kenia Mills MD

## 2024-11-17 NOTE — PLAN OF CARE
Pt oriented ST, DBP low held metoprolol, bs mgmt. Pain meds given      Problem: Skin Injury Risk Increased  Goal: Skin Health and Integrity  Outcome: Progressing  Intervention: Optimize Skin Protection  Recent Flowsheet Documentation  Taken 11/17/2024 1400 by Adeola Moralez RN  Activity Management: activity encouraged  Head of Bed (HOB) Positioning: HOB elevated  Taken 11/17/2024 1200 by Adeola Moralez RN  Head of Bed (HOB) Positioning: HOB elevated  Taken 11/17/2024 1000 by Adeola Moralez RN  Head of Bed (HOB) Positioning: HOB elevated  Taken 11/17/2024 0857 by Adeola Moralez RN  Activity Management: activity encouraged  Pressure Reduction Techniques:   frequent weight shift encouraged   weight shift assistance provided  Head of Bed (HOB) Positioning: HOB elevated  Pressure Reduction Devices: pressure-redistributing mattress utilized  Skin Protection: incontinence pads utilized     Problem: Fall Injury Risk  Goal: Absence of Fall and Fall-Related Injury  Outcome: Progressing  Intervention: Identify and Manage Contributors  Recent Flowsheet Documentation  Taken 11/17/2024 1400 by Adeola Moralez RN  Medication Review/Management: medications reviewed  Self-Care Promotion:   independence encouraged   BADL personal objects within reach   BADL personal routines maintained  Taken 11/17/2024 0857 by Adeola Moralez RN  Medication Review/Management: medications reviewed  Self-Care Promotion:   independence encouraged   BADL personal objects within reach   BADL personal routines maintained  Intervention: Promote Injury-Free Environment  Recent Flowsheet Documentation  Taken 11/17/2024 1400 by Adeola Moralez, RN  Safety Promotion/Fall Prevention:   activity supervised   assistive device/personal items within reach   clutter free environment maintained   fall prevention program maintained   nonskid shoes/slippers when out of bed   room organization consistent   safety round/check completed  Taken 11/17/2024 0857 by  Moralez, Lace N, RN  Safety Promotion/Fall Prevention:   activity supervised   assistive device/personal items within reach   clutter free environment maintained   fall prevention program maintained   nonskid shoes/slippers when out of bed   room organization consistent   safety round/check completed     Problem: Adult Inpatient Plan of Care  Goal: Plan of Care Review  Outcome: Progressing  Goal: Patient-Specific Goal (Individualized)  Outcome: Progressing  Goal: Absence of Hospital-Acquired Illness or Injury  Outcome: Progressing  Intervention: Identify and Manage Fall Risk  Recent Flowsheet Documentation  Taken 11/17/2024 1400 by Adeola Moralez RN  Safety Promotion/Fall Prevention:   activity supervised   assistive device/personal items within reach   clutter free environment maintained   fall prevention program maintained   nonskid shoes/slippers when out of bed   room organization consistent   safety round/check completed  Taken 11/17/2024 0857 by Adeola Moralez RN  Safety Promotion/Fall Prevention:   activity supervised   assistive device/personal items within reach   clutter free environment maintained   fall prevention program maintained   nonskid shoes/slippers when out of bed   room organization consistent   safety round/check completed  Intervention: Prevent Skin Injury  Recent Flowsheet Documentation  Taken 11/17/2024 1400 by Adeola Moralez RN  Body Position: supine  Taken 11/17/2024 1200 by Adeola Moralez RN  Body Position: tilted  Taken 11/17/2024 1000 by Adeola Moralez RN  Body Position: sitting up in bed  Taken 11/17/2024 0857 by Adeola Moralez RN  Body Position: supine  Skin Protection: incontinence pads utilized  Intervention: Prevent and Manage VTE (Venous Thromboembolism) Risk  Recent Flowsheet Documentation  Taken 11/17/2024 0857 by Adeola Moralez RN  VTE Prevention/Management: (Lovenox) other (see comments)  Intervention: Prevent Infection  Recent Flowsheet Documentation  Taken  11/17/2024 1400 by Adeola Moralez RN  Infection Prevention:   rest/sleep promoted   single patient room provided  Taken 11/17/2024 0857 by Adeola Moralez RN  Infection Prevention:   single patient room provided   rest/sleep promoted  Goal: Optimal Comfort and Wellbeing  Outcome: Progressing  Intervention: Provide Person-Centered Care  Recent Flowsheet Documentation  Taken 11/17/2024 1400 by Adeola Moralez RN  Trust Relationship/Rapport: care explained  Taken 11/17/2024 0857 by Adeola Moralez RN  Trust Relationship/Rapport:   care explained   thoughts/feelings acknowledged   reassurance provided   questions encouraged   questions answered   emotional support provided  Goal: Readiness for Transition of Care  Outcome: Progressing     Problem: Malnutrition  Goal: Improved Nutritional Intake  Outcome: Progressing     Problem: Sepsis/Septic Shock  Goal: Optimal Coping  Outcome: Progressing  Intervention: Support Patient and Family Response  Recent Flowsheet Documentation  Taken 11/17/2024 1400 by Adeola Moralez RN  Family/Support System Care:   self-care encouraged   presence promoted  Taken 11/17/2024 0857 by Adeola Moralez RN  Family/Support System Care:   self-care encouraged   presence promoted  Goal: Absence of Bleeding  Outcome: Progressing  Intervention: Monitor and Manage Bleeding  Recent Flowsheet Documentation  Taken 11/17/2024 0857 by Adeola Moralez RN  Bleeding Precautions:   blood pressure closely monitored   monitored for signs of bleeding  Bleeding Management: dressing monitored  Goal: Blood Glucose Level Within Target Range  Outcome: Progressing  Intervention: Optimize Glycemic Control  Recent Flowsheet Documentation  Taken 11/17/2024 0857 by Adeola Moralez RN  Hyperglycemia Management: blood glucose monitored  Goal: Absence of Infection Signs and Symptoms  Outcome: Progressing  Intervention: Initiate Sepsis Management  Recent Flowsheet Documentation  Taken 11/17/2024 1400 by Adeola Moralez  N, RN  Infection Prevention:   rest/sleep promoted   single patient room provided  Taken 11/17/2024 0857 by Adeola Moralez RN  Infection Prevention:   single patient room provided   rest/sleep promoted  Intervention: Promote Recovery  Recent Flowsheet Documentation  Taken 11/17/2024 1400 by Adeola Moralez RN  Activity Management: activity encouraged  Taken 11/17/2024 0857 by Adeola Moralez RN  Activity Management: activity encouraged  Goal: Optimal Nutrition Delivery  Outcome: Progressing     Problem: Alcohol Withdrawal  Goal: Alcohol Withdrawal Symptom Control  Outcome: Progressing  Intervention: Minimize or Manage Alcohol Withdrawal Symptoms  Recent Flowsheet Documentation  Taken 11/17/2024 0857 by Adeola Moralez RN  Aspiration Precautions: oral hygiene care promoted  Goal: Optimal Neurologic Function  Outcome: Progressing     Problem: VTE (Venous Thromboembolism)  Goal: Tissue Perfusion  Outcome: Progressing  Intervention: Optimize Tissue Perfusion  Recent Flowsheet Documentation  Taken 11/17/2024 0857 by Adeola Moralez RN  Bleeding Precautions:   blood pressure closely monitored   monitored for signs of bleeding  VTE Prevention/Management: (Lovenox) other (see comments)  Goal: Optimal Right Ventricular Function  Outcome: Progressing     Problem: Restraint, Nonviolent  Goal: Absence of Harm or Injury  Outcome: Progressing  Intervention: Implement Least Restrictive Safety Strategies  Recent Flowsheet Documentation  Taken 11/17/2024 1400 by Adeola Moralez RN  Diversional Activities:   television   smartphone  Taken 11/17/2024 0857 by Adeola Moralez RN  Diversional Activities:   television   smartphone  Intervention: Protect Dignity, Rights and Personal Wellbeing  Recent Flowsheet Documentation  Taken 11/17/2024 1400 by Adeola Moralez RN  Trust Relationship/Rapport: care explained  Taken 11/17/2024 0857 by Adeola Moralez RN  Trust Relationship/Rapport:   care explained   thoughts/feelings  acknowledged   reassurance provided   questions encouraged   questions answered   emotional support provided  Intervention: Protect Skin and Joint Integrity  Recent Flowsheet Documentation  Taken 11/17/2024 1400 by Adeola Moralez RN  Body Position: supine  Taken 11/17/2024 1200 by Adeola Moralez RN  Body Position: tilted  Taken 11/17/2024 1000 by Adeola Moralez RN  Body Position: sitting up in bed  Taken 11/17/2024 0857 by Adeola Moralez RN  Body Position: supine  Skin Protection: incontinence pads utilized  Range of Motion: active ROM (range of motion) encouraged     Problem: Mechanical Ventilation Invasive  Goal: Effective Communication  Outcome: Progressing  Intervention: Ensure Effective Communication  Recent Flowsheet Documentation  Taken 11/17/2024 1400 by Adeola Moralez RN  Trust Relationship/Rapport: care explained  Diversional Activities:   [employer]  Family/Support System Care:   self-care encouraged   presence promoted  Taken 11/17/2024 0857 by Adeola Moralez RN  Trust Relationship/Rapport:   care explained   thoughts/feelings acknowledged   reassurance provided   questions encouraged   questions answered   emotional support provided  Diversional Activities:   [employer]  Family/Support System Care:   self-care encouraged   presence promoted  Goal: Optimal Device Function  Outcome: Progressing  Goal: Mechanical Ventilation Liberation  Outcome: Progressing  Intervention: Promote Extubation and Mechanical Ventilation Liberation  Recent Flowsheet Documentation  Taken 11/17/2024 1400 by Adeola Moralez RN  Medication Review/Management: medications reviewed  Taken 11/17/2024 0857 by Adeola Moralez RN  Medication Review/Management: medications reviewed  Goal: Optimal Nutrition Delivery  Outcome: Progressing  Goal: Absence of Device-Related Skin and Tissue Injury  Outcome: Progressing  Goal: Absence of Ventilator-Induced Lung Injury  Outcome: Progressing  Intervention:  Prevent Ventilator-Associated Pneumonia  Recent Flowsheet Documentation  Taken 11/17/2024 1400 by Adeola Moralez RN  Head of Bed (HOB) Positioning: HOB elevated  Taken 11/17/2024 1200 by Adeola Moralez RN  Head of Bed (HOB) Positioning: HOB elevated  Taken 11/17/2024 1000 by Adeola Moralez RN  Head of Bed (HOB) Positioning: HOB elevated  Taken 11/17/2024 0857 by Adeola Moralez RN  Head of Bed (HOB) Positioning: HOB elevated     Problem: Enteral Nutrition  Goal: Absence of Aspiration Signs and Symptoms  Outcome: Progressing  Intervention: Minimize Aspiration Risk  Recent Flowsheet Documentation  Taken 11/17/2024 1400 by Adeola Moralez RN  Head of Bed (HOB) Positioning: HOB elevated  Taken 11/17/2024 1200 by Adeola Moralez RN  Head of Bed (HOB) Positioning: HOB elevated  Taken 11/17/2024 1000 by Adeola Moralez RN  Head of Bed (HOB) Positioning: HOB elevated  Taken 11/17/2024 0857 by Adeola Moralez RN  Head of Bed (HOB) Positioning: HOB elevated  Goal: Safe, Effective Therapy Delivery  Outcome: Progressing  Goal: Feeding Tolerance  Outcome: Progressing   Goal Outcome Evaluation:

## 2024-11-18 ENCOUNTER — APPOINTMENT (OUTPATIENT)
Dept: GENERAL RADIOLOGY | Facility: HOSPITAL | Age: 74
DRG: 853 | End: 2024-11-18
Payer: MEDICARE

## 2024-11-18 PROBLEM — R13.12 OROPHARYNGEAL DYSPHAGIA: Status: ACTIVE | Noted: 2024-11-18

## 2024-11-18 LAB
ALBUMIN SERPL-MCNC: 2.6 G/DL (ref 3.5–5.2)
ALBUMIN/GLOB SERPL: 0.7 G/DL
ALP SERPL-CCNC: 204 U/L (ref 39–117)
ALT SERPL W P-5'-P-CCNC: 38 U/L (ref 1–41)
ANION GAP SERPL CALCULATED.3IONS-SCNC: 11.7 MMOL/L (ref 5–15)
AST SERPL-CCNC: 28 U/L (ref 1–40)
BASOPHILS # BLD AUTO: 0.09 10*3/MM3 (ref 0–0.2)
BASOPHILS NFR BLD AUTO: 0.7 % (ref 0–1.5)
BILIRUB SERPL-MCNC: 0.3 MG/DL (ref 0–1.2)
BUN SERPL-MCNC: 16 MG/DL (ref 8–23)
BUN/CREAT SERPL: 32 (ref 7–25)
CALCIUM SPEC-SCNC: 8.7 MG/DL (ref 8.6–10.5)
CHLORIDE SERPL-SCNC: 99 MMOL/L (ref 98–107)
CO2 SERPL-SCNC: 22.3 MMOL/L (ref 22–29)
CREAT SERPL-MCNC: 0.5 MG/DL (ref 0.76–1.27)
DEPRECATED RDW RBC AUTO: 54.8 FL (ref 37–54)
EGFRCR SERPLBLD CKD-EPI 2021: 108.4 ML/MIN/1.73
EOSINOPHIL # BLD AUTO: 0.64 10*3/MM3 (ref 0–0.4)
EOSINOPHIL NFR BLD AUTO: 4.9 % (ref 0.3–6.2)
ERYTHROCYTE [DISTWIDTH] IN BLOOD BY AUTOMATED COUNT: 17.5 % (ref 12.3–15.4)
GLOBULIN UR ELPH-MCNC: 4 GM/DL
GLUCOSE BLDC GLUCOMTR-MCNC: 102 MG/DL (ref 70–130)
GLUCOSE BLDC GLUCOMTR-MCNC: 109 MG/DL (ref 70–130)
GLUCOSE BLDC GLUCOMTR-MCNC: 153 MG/DL (ref 70–130)
GLUCOSE BLDC GLUCOMTR-MCNC: 169 MG/DL (ref 70–130)
GLUCOSE SERPL-MCNC: 156 MG/DL (ref 65–99)
HCT VFR BLD AUTO: 28.9 % (ref 37.5–51)
HGB BLD-MCNC: 9.3 G/DL (ref 13–17.7)
IMM GRANULOCYTES # BLD AUTO: 0.4 10*3/MM3 (ref 0–0.05)
IMM GRANULOCYTES NFR BLD AUTO: 3.1 % (ref 0–0.5)
LYMPHOCYTES # BLD AUTO: 1.73 10*3/MM3 (ref 0.7–3.1)
LYMPHOCYTES NFR BLD AUTO: 13.3 % (ref 19.6–45.3)
MAGNESIUM SERPL-MCNC: 1.9 MG/DL (ref 1.6–2.4)
MCH RBC QN AUTO: 27.6 PG (ref 26.6–33)
MCHC RBC AUTO-ENTMCNC: 32.2 G/DL (ref 31.5–35.7)
MCV RBC AUTO: 85.8 FL (ref 79–97)
MONOCYTES # BLD AUTO: 1.24 10*3/MM3 (ref 0.1–0.9)
MONOCYTES NFR BLD AUTO: 9.5 % (ref 5–12)
NEUTROPHILS NFR BLD AUTO: 68.5 % (ref 42.7–76)
NEUTROPHILS NFR BLD AUTO: 8.95 10*3/MM3 (ref 1.7–7)
NRBC BLD AUTO-RTO: 0.2 /100 WBC (ref 0–0.2)
PHOSPHATE SERPL-MCNC: 3.5 MG/DL (ref 2.5–4.5)
PLATELET # BLD AUTO: 820 10*3/MM3 (ref 140–450)
PMV BLD AUTO: 8.6 FL (ref 6–12)
POTASSIUM SERPL-SCNC: 3.9 MMOL/L (ref 3.5–5.2)
PROT SERPL-MCNC: 6.6 G/DL (ref 6–8.5)
QT INTERVAL: 348 MS
QTC INTERVAL: 445 MS
RBC # BLD AUTO: 3.37 10*6/MM3 (ref 4.14–5.8)
SODIUM SERPL-SCNC: 133 MMOL/L (ref 136–145)
WBC NRBC COR # BLD AUTO: 13.05 10*3/MM3 (ref 3.4–10.8)

## 2024-11-18 PROCEDURE — 93005 ELECTROCARDIOGRAM TRACING: CPT | Performed by: INTERNAL MEDICINE

## 2024-11-18 PROCEDURE — 99024 POSTOP FOLLOW-UP VISIT: CPT | Performed by: SURGERY

## 2024-11-18 PROCEDURE — 85025 COMPLETE CBC W/AUTO DIFF WBC: CPT | Performed by: INTERNAL MEDICINE

## 2024-11-18 PROCEDURE — 63710000001 INSULIN GLARGINE PER 5 UNITS: Performed by: INTERNAL MEDICINE

## 2024-11-18 PROCEDURE — 63710000001 INSULIN REGULAR HUMAN PER 5 UNITS: Performed by: HOSPITALIST

## 2024-11-18 PROCEDURE — 74230 X-RAY XM SWLNG FUNCJ C+: CPT

## 2024-11-18 PROCEDURE — 84100 ASSAY OF PHOSPHORUS: CPT | Performed by: HOSPITALIST

## 2024-11-18 PROCEDURE — 92611 MOTION FLUOROSCOPY/SWALLOW: CPT

## 2024-11-18 PROCEDURE — 80053 COMPREHEN METABOLIC PANEL: CPT | Performed by: INTERNAL MEDICINE

## 2024-11-18 PROCEDURE — 25010000002 ENOXAPARIN PER 10 MG: Performed by: INTERNAL MEDICINE

## 2024-11-18 PROCEDURE — 93010 ELECTROCARDIOGRAM REPORT: CPT | Performed by: INTERNAL MEDICINE

## 2024-11-18 PROCEDURE — 83735 ASSAY OF MAGNESIUM: CPT | Performed by: HOSPITALIST

## 2024-11-18 PROCEDURE — 82948 REAGENT STRIP/BLOOD GLUCOSE: CPT

## 2024-11-18 RX ORDER — TAMSULOSIN HYDROCHLORIDE 0.4 MG/1
0.4 CAPSULE ORAL DAILY
Status: DISCONTINUED | OUTPATIENT
Start: 2024-11-18 | End: 2024-11-30 | Stop reason: HOSPADM

## 2024-11-18 RX ADMIN — HYDROCODONE BITARTRATE AND ACETAMINOPHEN 1 TABLET: 5; 325 TABLET ORAL at 17:28

## 2024-11-18 RX ADMIN — BARIUM SULFATE 4 ML: 980 POWDER, FOR SUSPENSION ORAL at 08:28

## 2024-11-18 RX ADMIN — ENOXAPARIN SODIUM 90 MG: 100 INJECTION SUBCUTANEOUS at 17:27

## 2024-11-18 RX ADMIN — INSULIN HUMAN 2 UNITS: 100 INJECTION, SOLUTION PARENTERAL at 11:33

## 2024-11-18 RX ADMIN — Medication 100 MG: at 08:58

## 2024-11-18 RX ADMIN — LIDOCAINE 2 PATCH: 4 PATCH TOPICAL at 08:58

## 2024-11-18 RX ADMIN — CHLORHEXIDINE GLUCONATE 15 ML: 1.2 RINSE ORAL at 21:47

## 2024-11-18 RX ADMIN — ACETAMINOPHEN 325MG 650 MG: 325 TABLET ORAL at 09:03

## 2024-11-18 RX ADMIN — LANSOPRAZOLE 30 MG: 15 TABLET, ORALLY DISINTEGRATING ORAL at 06:16

## 2024-11-18 RX ADMIN — TAMSULOSIN HYDROCHLORIDE 0.4 MG: 0.4 CAPSULE ORAL at 11:34

## 2024-11-18 RX ADMIN — METOPROLOL TARTRATE 75 MG: 50 TABLET, FILM COATED ORAL at 08:58

## 2024-11-18 RX ADMIN — Medication 10 ML: at 21:47

## 2024-11-18 RX ADMIN — CHLORHEXIDINE GLUCONATE 15 ML: 1.2 RINSE ORAL at 08:57

## 2024-11-18 RX ADMIN — INSULIN HUMAN 2 UNITS: 100 INJECTION, SOLUTION PARENTERAL at 06:52

## 2024-11-18 RX ADMIN — METOPROLOL TARTRATE 75 MG: 50 TABLET, FILM COATED ORAL at 21:47

## 2024-11-18 RX ADMIN — INSULIN GLARGINE 15 UNITS: 100 INJECTION, SOLUTION SUBCUTANEOUS at 21:46

## 2024-11-18 RX ADMIN — ENOXAPARIN SODIUM 90 MG: 100 INJECTION SUBCUTANEOUS at 06:15

## 2024-11-18 RX ADMIN — LIDOCAINE 1 PATCH: 4 PATCH TOPICAL at 08:59

## 2024-11-18 RX ADMIN — Medication 10 ML: at 08:59

## 2024-11-18 RX ADMIN — BARIUM SULFATE 55 ML: 0.81 POWDER, FOR SUSPENSION ORAL at 08:28

## 2024-11-18 RX ADMIN — BARIUM SULFATE 50 ML: 400 SUSPENSION ORAL at 08:28

## 2024-11-18 RX ADMIN — FOLIC ACID 1 MG: 1 TABLET ORAL at 08:59

## 2024-11-18 RX ADMIN — HYDROCODONE BITARTRATE AND ACETAMINOPHEN 1 TABLET: 5; 325 TABLET ORAL at 06:16

## 2024-11-18 NOTE — SIGNIFICANT NOTE
11/18/24 1604   OTHER   Discipline physical therapist   Rehab Time/Intention   Session Not Performed other (see comments)  (Attempt x3. In AM pt KATHARINE for VFSS, then getting wound vac changed. In PM pt declined d/t fatigue, but requesting PT return tomorrow AM to assist w/ tsf to the chair as he is wanting to try eating lunch sitting up. RN made aware. PT to follow up tomorrow.)   Therapy Assessment/Plan (PT)   Criteria for Skilled Interventions Met (PT) yes   Recommendation   PT - Next Appointment 11/19/24

## 2024-11-18 NOTE — MBS/VFSS/FEES
Acute Care - Speech Language Pathology   Swallow Initial Evaluation [facility]     Patient Name: Arsh Haynes  : 1950  MRN: [id number]  Today's Date: 2024               Admit Date: 10/26/2024    Visit Dx:     ICD-10-CM ICD-9-CM   1. Perforation of sigmoid colon  K63.1 569.83   2. Hypotension, unspecified hypotension type  I95.9 458.9   3. Increased lactic acid level  R79.89 276.2   4. Bowel perforation  K63.1 569.83     Patient Active Problem List   Diagnosis    Colon cancer    HTN (hypertension)    Thrombocytosis    Acute DVT (deep venous thrombosis)    Renal mass    Hyponatremia    Anemia    Peritonitis    Moderate protein-calorie malnutrition     Past Medical History:   Diagnosis Date    HTN (hypertension)     Hypercholesterolemia      Past Surgical History:   Procedure Laterality Date    COLON RESECTION N/A 10/26/2024    Procedure: LOW ANTERIOR COLON RESECTION SIGMOID, COLOSTOMY, SPLENIC FLEXURE MOBILIZATION;  Surgeon: Mc Guillaume MD;  Location: [facility];  Service: General;  Laterality: N/A;    EXPLORATORY LAPAROTOMY N/A 10/26/2024    Procedure: LAPAROTOMY EXPLORATORY, LEFT HEMICOLECTOMY;  Surgeon: Mc Guillaume MD;  Location: [facility];  Service: General;  Laterality: N/A;       SLP Recommendation and Plan  SLP Swallowing Diagnosis: mild-moderate, oral dysphagia, pharyngeal dysphagia (24)  SLP Diet Recommendation: mechanical ground textures, no mixed consistencies, nectar thick liquids, water between meals after oral care, with supervision (24)  Recommended Precautions and Strategies: upright posture during/after eating, small bites of food and sips of liquid, 1:1 supervision, assist with feeding, general aspiration precautions, fatigue precautions (24)  SLP Rec. for Method of Medication Administration: meds whole, meds crushed, with puree, as tolerated (24)     Monitor for Signs of Aspiration: notify SLP if any  concerns (11/18/24 0800)  Recommended Diagnostics: [place] [place]11/18/24 0800[place]  Swallow Criteria for Skilled Therapeutic Interventions Met: demonstrates skilled criteria (11/18/24 0800)  Anticipated Discharge Disposition (SLP): unknown (11/18/24 0800)  Rehab Potential/Prognosis, Swallowing: good, to achieve stated therapy goals (11/18/24 0800)  Therapy Frequency (Swallow): PRN (11/18/24 0800)  Predicted Duration Therapy Intervention (Days): until discharge (11/18/24 0800)  Oral Care Recommendations: Oral Care BID/PRN, Toothbrush (11/18/24 0800)                                        Outcome Evaluation: VFSS completed. Recommend mechanical soft textures, no mixed consistencies and nectar thick liquids. Meds whole/crushed with puree. Recommend upright, slow rate, PO only when alert. Allow water protocol (30 minutes after oral care between meals). SLP to follow for diet tolerance and therapy.      SWALLOW EVALUATION (Last 72 Hours)       SLP Adult Swallow Evaluation       Row Name 11/18/24 0800                   Rehab Evaluation    Document Type evaluation  -OC        Subjective Information no complaints  -OC        Patient Observations alert;cooperative;agree to therapy  -OC        Patient Effort good  -OC        Symptoms Noted During/After Treatment none  -OC           General Information    Patient Profile Reviewed yes  -OC        Current Method of Nutrition NPO;nasogastric feedings  -OC        Precautions/Limitations, Vision WFL;for purposes of eval  -OC        Precautions/Limitations, Hearing WFL;for purposes of eval  -OC        Prior Level of Function-Communication unknown  -OC        Prior Level of Function-Swallowing unknown  -OC        Plans/Goals Discussed with patient;agreed upon  -OC        Barriers to Rehab none identified  -OC        Patient's Goals for Discharge return to PO diet  -OC           Pain    Pretreatment Pain Rating 0/10 - no pain  -OC        Posttreatment Pain Rating 0/10 - no  pain  -OC           Oral Motor Structure and Function    Dentition Assessment natural, present and adequate  -OC        Secretion Management WNL/WFL  -OC        Mucosal Quality moist, healthy  -OC           MBS/VFSS Interpretation    VFSS Summary VFSS completed in conjunction with Carolina GUZMAN.  Patient presents with mild-moderate oropharyngeal dysphagia characterized by decreased coordination and generalized pharyngeal weakness.  Adequate acceptance of all textures.  Timely swallow with honey thick via cup, nectar thick via cup, and small single sips of thin via cup.  Minimal preseptal thin large sips via cup, penetration with consecutive swallows.  Piecemeal deglutition with purée and mechanical soft.  Mild to moderate diffuse pharyngeal residue with purée, penetration occurred x 1 secondary to residue on the aryepiglottic folds.  Cued throat clear expelled penetrated purée material from vestibule.  Able to clear purée residue with multiple swallows and liquid wash, trace silent aspiration of thin liquid wash. Prolonged but functional mastication soft solids, no penetration or aspiration mild lingual residue able to clear with double swallow and nectar thick liquid wash.  -OC           SLP Communication to Radiology    Summary Statement VFSS completed in conjunction with Carolina GUZMAN. Patient presents with mild-moderate oropharyngeal dysphagia characterized by decreased coordination and generalized pharyngeal weakness. Adequate acceptance of all textures. Timely swallow with honey thick via cup, nectar thick via cup, and small single sips of thin via cup. Minimal preseptal thin large sips via cup, penetration with consecutive swallows. Piecemeal deglutition with purée and mechanical soft. Mild to moderate diffuse pharyngeal residue with purée, penetration occurred x 1 secondary to residue on the aryepiglottic folds. Cued throat clear expelled penetrated purée material from vestibule. Able to clear purée  residue with multiple swallows and liquid wash, trace silent aspiration of thin liquid wash. Prolonged but functional mastication soft solids, no penetration or aspiration mild lingual residue able to clear with double swallow and nectar thick liquid wash.  -OC           SLP Evaluation Clinical Impression    SLP Swallowing Diagnosis mild-moderate;oral dysphagia;pharyngeal dysphagia  -OC        Functional Impact risk of aspiration/pneumonia  -OC        Rehab Potential/Prognosis, Swallowing good, to achieve stated therapy goals  -OC        Swallow Criteria for Skilled Therapeutic Interventions Met demonstrates skilled criteria  -OC           Recommendations    Therapy Frequency (Swallow) PRN  -OC        Predicted Duration Therapy Intervention (Days) until discharge  -OC        SLP Diet Recommendation mechanical ground textures;no mixed consistencies;nectar thick liquids;water between meals after oral care, with supervision  -OC        Recommended Diagnostics reassess via VFSS (MBS)  -OC        Recommended Precautions and Strategies upright posture during/after eating;small bites of food and sips of liquid;1:1 supervision;assist with feeding;general aspiration precautions;fatigue precautions  -OC        Oral Care Recommendations Oral Care BID/PRN;Toothbrush  -OC        SLP Rec. for Method of Medication Administration meds whole;meds crushed;with puree;as tolerated  -OC        Monitor for Signs of Aspiration notify SLP if any concerns  -OC        Anticipated Discharge Disposition (SLP) unknown  -OC           (LTG) Swallow    (LTG) Swallow Tolerate least restrictive diet with no overt s/s aspiration.  -OC        [place] (Swallow Long Term Goal) independently (over 90% accuracy)  -OC        Time Frame (Swallow Long Term Goal) by discharge  -OC                  User Key  (r) = Recorded By, (t) = Taken By, (c) = Cosigned By      Initials Name Effective Dates    OC Card, YESICA Rosas 08/28/23 -                      EDUCATION  The patient has been educated in the following areas:   Dysphagia (Swallowing Impairment).        SLP GOALS       Row Name 11/18/24 0800             (LTG) Swallow    (LTG) Swallow Tolerate least restrictive diet with no overt s/s aspiration.  -OC      [place] (Swallow Long Term Goal) independently (over 90% accuracy)  -OC      Time Frame (Swallow Long Term Goal) by discharge  -OC                User Key  (r) = Recorded By, (t) = Taken By, (c) = Cosigned By      Initials Name Provider Type    Alison Isaacs SLP Speech and Language Pathologist                         Time Calculation:    Time Calculation- SLP       Row Name 11/18/24 0932             Time Calculation- SLP    SLP Start Time 0800  -OC      SLP Received On 11/18/24  -OC         Untimed Charges    SLP Eval/Re-eval  [facility] Fluoro Eval Swallow - 75025  -OC      54162-AG Eval Oral Pharyng Swallow Minutes --  -OC      45761-MU Motion Fluoro Eval Swallow Minutes 90  -OC         Total Minutes    Untimed Charges Total Minutes 90  -OC       Total Minutes 90  -OC                User Key  (r) = Recorded By, (t) = Taken By, (c) = Cosigned By      Initials Name Provider Type    Alison Isaacs SLP Speech and Language Pathologist                    Therapy Charges for Today       Code Description Service Date Service Provider Modifiers Qty    [id number] HC ST MOTION FLUORO EVAL SWALLOW 6 11/18/2024 Alison Mays SLP GN 1                 YESICA Bush  11/18/2024

## 2024-11-18 NOTE — PROGRESS NOTES
Nutrition Services    Patient Name:  Arsh Haynes  YOB: 1950  MRN: [id number]  Admit Date:  10/26/2024  Assessment Date:  11/18/24    Summary: Nutrition Follow Up     Passed VFSS today.  Now started on a diet - Regular/No Mixed Consistencies/No Straw/Mechanical Ground with [place] Thick Liquids.  Spoke with RN and Dr. Guillaume, patient with poor appetite, not wanting to eat much.  Recommend holding TFs today to see how patient does with PO intake.    Current TF regimen: Novasource Renal on hold for now    Initial Goal:     Novasource Renal at 20 mL/hr + 30 mL q 6 hr water flush      End Goal:    Novasource Renal at 55 mL/hr + 30 mL q 6 hr water flush      Calories  2420 kcals (100%)    Protein  110 g (100%)    Free water  859 mL   Flushes  120 mL     The above end goal rate is for 22 hrs/day to assume interruptions for ADLs. Water flushes adjusted based on clinical picture + Rx flushes/IV fluids     Labs reviewed: Na 133, K 3.9, Gluc 156/169/153, Creat 0.50, Platelets 820, Alb 2.6  Meds reviewed: lovenox, folic acid, insulin, prevacid, thiamine    Plans/Recommendations:  Hold TFs of Novasource Renal for now.  Magic Cups TID.  Follow to see how he does with PO intake.    RD to follow closely.    CLINICAL NUTRITION ASSESSMENT      Reason for Assessment Follow-up Protocol     Diagnosis/Problem   Peritonitis      Medical/Surgical History Past Medical History:   Diagnosis Date    HTN (hypertension)     Hypercholesterolemia        Past Surgical History:   Procedure Laterality Date    COLON RESECTION N/A 10/26/2024    Procedure: LOW ANTERIOR COLON RESECTION SIGMOID, COLOSTOMY, SPLENIC FLEXURE MOBILIZATION;  Surgeon: Mc Guillaume MD;  Location: [facility] MAIN OR;  Service: General;  Laterality: N/A;    EXPLORATORY LAPAROTOMY N/A 10/26/2024    Procedure: LAPAROTOMY EXPLORATORY, LEFT HEMICOLECTOMY;  Surgeon: Mc Guillaume MD;  Location: [facility] OR;  Service: General;  Laterality: N/A;     "    Anthropometrics        Current Height  Current Weight  BMI kg/m2 Height: 182.9 cm (72\")  Weight:  (bed scale is not working) (11/15/24 0533)  Body mass index is 25.56 kg/m².   Adjusted BMI (if applicable)    BMI Category Overweight (25 - 29.9)   Ideal Body Weight (IBW) 178 lb (80.9 kg)   Usual Body Weight (UBW) 202 lb (9/2023)   Weight Trend Loss, Unknown   Weight History Wt Readings from Last 30 Encounters:   11/08/24 0357 85.5 kg (188 lb 7.9 oz)   11/07/24 0500 85.1 kg (187 lb 9.8 oz)   11/06/24 0600 85.1 kg (187 lb 9.8 oz)   11/06/24 0100 84.2 kg (185 lb 10 oz)   11/04/24 0415 87 kg (191 lb 12.8 oz)   11/04/24 0152 87 kg (191 lb 12.8 oz)   11/02/24 0543 89.8 kg (197 lb 15.6 oz)   11/01/24 0548 92.6 kg (204 lb 2.3 oz)   10/31/24 0448 96 kg (211 lb 10.3 oz)   10/30/24 0300 98.4 kg (216 lb 14.9 oz)   10/29/24 0431 92.5 kg (203 lb 14.8 oz)   10/28/24 0436 90.2 kg (198 lb 13.7 oz)   10/27/24 0404 83.4 kg (183 lb 13.8 oz)   10/26/24 1933 83 kg (183 lb)      --  Estimated/Assessed Needs        Current Weight  Weight:  (bed scale is not working) (11/15/24 0533)       Energy Requirements    Weight for Calculation 178 lb (80.9 kg) IBW   Method for Estimation  30-35 kcal/kg   EST Needs (kcal/day) 6947-9191       Protein Requirements    Weight for Calculation 178 lb (80.9 kg) IBW   EST Protein Needs (g/kg) 1.2 - 1.5 gm/kg   EST Daily Needs (g/day)        Fluid Requirements     Method for Estimation 1 mL/kcal    EST Needs (mL/day)      Labs       Pertinent Labs    Results from last 7 days   Lab Units 11/18/24  0532 11/17/24  0540 11/16/24  0430   SODIUM mmol/L 133* 136 135*   POTASSIUM mmol/L 3.9 4.0 4.2   CHLORIDE mmol/L 99 103 105   CO2 mmol/L 22.3 23.0 20.5*   BUN mg/dL 16 17 19   CREATININE mg/dL 0.50* 0.43* 0.48*   CALCIUM mg/dL 8.7 8.8 8.7   BILIRUBIN mg/dL 0.3 0.3 0.3   ALK PHOS U/L 204* 203* 194*   ALT (SGPT) U/L 38 40 35   AST (SGOT) U/L 28 28 26   GLUCOSE mg/dL 156* 131* 174*     Results from last 7 days " "  Lab Units 11/18/24  0532 11/17/24  0540 11/16/24  0430   MAGNESIUM mg/dL 1.9 1.9 2.0   PHOSPHORUS mg/dL 3.5 3.4 3.6   HEMOGLOBIN g/dL 9.3* 8.8* 9.5*   HEMATOCRIT % 28.9* 28.8* 30.0*   WBC 10*3/mm3 13.05* 13.69* 12.11*   ALBUMIN g/dL 2.6* 2.6* 2.2*     Results from last 7 days   Lab Units 11/18/24  0532 11/17/24  0540 11/16/24  0430 11/15/24  0559 11/14/24  0410   PLATELETS 10*3/mm3 820* 866* 795* 865* 885*     No results found for: \"COVID19\"  Lab Results   Component Value Date    HGBA1C 6.60 (H) 10/26/2024          Medications           Scheduled Medications chlorhexidine, 15 mL, Mouth/Throat, Q12H  enoxaparin, 1 mg/kg, Subcutaneous, Q12H  folic acid, 1 mg, Nasogastric, Daily  insulin glargine, 15 Units, Subcutaneous, Nightly  insulin regular, 2-9 Units, Subcutaneous, 4x Daily AC & at Bedtime  lansoprazole, 30 mg, Nasogastric, Q AM  Lidocaine, 1 patch, Transdermal, Q24H  Lidocaine, 2 patch, Transdermal, Q24H  metoprolol tartrate, 75 mg, Nasogastric, Q12H  sodium chloride, 10 mL, Intravenous, Q12H  sodium hypochlorite, , Topical, BID  tamsulosin, 0.4 mg, Oral, Daily  thiamine, 100 mg, Nasogastric, Daily       Infusions       PRN Medications   acetaminophen **OR** acetaminophen    Calcium Replacement - Follow Nurse / BPA Driven Protocol    dextrose    dextrose    glucagon (human recombinant)    HYDROcodone-acetaminophen    labetalol    Magnesium Standard Dose Replacement - Follow Nurse / BPA Driven Protocol    melatonin    nitroglycerin    ondansetron    Phosphorus Replacement - Follow Nurse / BPA Driven Protocol    Potassium Replacement - Follow Nurse / BPA Driven Protocol    [COMPLETED] Insert Peripheral IV **AND** sodium chloride    sodium chloride    sodium chloride    sodium chloride     Physical Findings          General Findings alert, oriented, overweight, room air   Oral/Mouth Cavity tooth or teeth missing   Edema  generalized, lower extremity , upper extremity, 1+ (trace)   Gastrointestinal normoactive, " last bowel movement: 11/17   Skin  bruising, pressure injury: bilateral gluteal, surgical incision: midline abdomen, R upper abdomen, R lower abdomen, wound VAC   Tubes/Drains/Lines colostomy, Cortrak, NG tube, bridle in place   NFPE Other: follow up to perform   --  Current Nutrition Orders & Evaluation of Intake       Oral Nutrition     Food Allergies NKFA   Current PO Diet Diet: Regular/House; No Mixed Consistencies, No Straw; Texture: Mechanical Ground (NDD 2); Fluid Consistency: Nectar Thick   Supplement n/a   PO Evaluation     % PO Intake N/a    Factors Affecting Intake: altered GI function, altered respiratory status   --   Enteral Nutrition     Enteral Route NG    TF Delivery Method Continuous    Propofol Rate/Kcal     Current TF Order/Rate  Novasource Renal being held    TF Goal Rate 55 mL/hr    Current Water Flush 30 mL Q 6 hr    Modular None    TF Residual  no or minimal residual    TF Tolerance tolerating    TF Observation Other: discussed with RN and Dr. Guillaume     PES STATEMENT / NUTRITION DIAGNOSIS      Nutrition Dx Problem  Problem: Inadequate Oral Intake  Etiology: Medical Diagnosis - sigmoid perforation, just extubated this am, NGT in place    Signs/Symptoms: Report/Observation     NUTRITION INTERVENTION / PLAN OF CARE      Intervention Goal(s) Maintain nutrition status, Reduce/improve symptoms, Meet estimated needs, Disease management/therapy, Establish PO intake, Tolerate PO , and No significant weight loss         RD Intervention/Action Continue to monitor, Care plan reviewed, and Recommend/order: ONS   --      Prescription/Orders:       PO Diet       Supplements Magic Cup TID      Enteral Nutrition    Enteral Prescription:     Enteral Route NG    TF Delivery Method Continuous    Enteral Product Novasource Renal - rec holding for now    Modular None    Propofol Rate/Kcal     TF Start Rate  20 mL/hr     TF Goal Rate  55 mL/hr    Free Water Flush 30 mL Q 6 hr    Provision at Goal:          Calories  2420 kcal, meets 100% needs         Protein  110 gm protein, meets 100% needs         Fluid (mL) 859 mL free water + 120 mL in flushes         Parenteral Nutrition    New Prescription Ordered? Yes   --      Monitor/Evaluation Per protocol, PO intake, Supplement intake, Pertinent labs, Weight, Skin status, GI status, Symptoms, Swallow function   Discharge Plan/Needs Pending clinical course   --    RD to follow per protocol.      Electronically signed by:  Maryann Franklin RD  11/18/24 15:26 EST

## 2024-11-18 NOTE — NURSING NOTE
"   11/18/24 1101   Wound 10/26/24 2301 midline abdomen   Placement Date/Time: 10/26/24 2301   Orientation: midline  Location: abdomen  Primary Wound Type: Incision   Dressing Appearance dry;intact  (vac in place)   Base clean;moist;red   Edges open   Drainage Characteristics/Odor sanguineous   Drainage Amount scant   Care, Wound cleansed with;sterile normal saline;negative pressure wound therapy   Dressing Care dressing applied;dressing changed;foam;transparent film   Periwound Care barrier film applied   NPWT (Negative Pressure Wound Therapy) 11/13/24 abd   Placement Date: 11/13/24   Location: abd   Therapy Setting continuous therapy   Dressing foam, black;transparent dressing   Pressure Setting 125 mmHg   Sponges Inserted 2   Sponges Removed 2   Colostomy LLQ   Placement date: If unknown, DO NOT use \"Add Comment\" note: 10/26/24   Inserted by: Dr Guillaume  Location: LLQ   Stomal Appliance 2 piece;Clean;Dry;Intact;Changed;Drainable   Stoma Appearance moist;red  (oval)   Peristomal Assessment Clean;Intact   Accessories/Skin Care cleansed with water;skin barrier ring   Stoma Function stool;flatus   Stool Color brown, light   Stool Consistency loose   Treatment Bag change;Site care   Output (mL) 50 mL     WOCN: Wound vac dressing change . Wound decreasing in size. Small piece adapt applied umb to assist with seal. Ostomy appliance changed. Placed in 2 1/4 pedro 2 piece with adapt ring.  Patient had eyes closed thru changes. He doesn't want to eat anything. Will plan to continue to change vac 3 times per week.   "

## 2024-11-18 NOTE — PROGRESS NOTES
postoperative day 22 s/p left colectomy with colostomy.     S: Passed swallow evaluation.  Continue to feel weak    O:   Vitals:    11/17/24 1943 11/17/24 2349 11/18/24 0714 11/18/24 1220   BP: 124/60 127/66 115/55 119/58   BP Location: Left arm Left arm Left arm Left arm   Patient Position: Lying Lying Lying Lying   Pulse: 109 109 109 95   Resp: 16 18 18 18   Temp: 98.1 °F (36.7 °C) 98.6 °F (37 °C) 98.4 °F (36.9 °C) 98 °F (36.7 °C)   TempSrc: Oral Oral Oral Oral   SpO2: 95% 92% 93% 97%   Height:          Still 164 cc, urine output 1.2 L  Alert, chronically appearing, no distress  Abdomen soft, nontender nontender, ostomy pink and viable with stool    White blood cell count 13,000, hemoglobin stable    Assessment and plan    Postoperative day 22, passed swallow evaluation.  As per nutritionist patient does not seem to be hungry and is concerned about removing core track tube.    Will try regular diet today and see how he does  Keep core track tube in place    If unable to eat enough calories then we will discuss with him about having gastrostomy tube placed.    Mc Guillaume MD, FACS  General, Minimally Invasive and Endoscopic Surgery  [facility] [address], [address]  [place], [place], [address]  P: [phone]  F: [phone]

## 2024-11-18 NOTE — PLAN OF CARE
Goal Outcome Evaluation:  Plan of Care Reviewed With: patient        Progress: improving  Outcome Evaluation: Med x 1 for pain with some relief stated, VSS, sinus tach on monitor, tolerating tubefeed, Eyes closed at short intervals, resting quietly in room

## 2024-11-18 NOTE — PROGRESS NOTES
[facility] Medicine Services  PROGRESS NOTE    Patient Name: Arsh Haynes  : 1950  MRN: [id number]    Date of Admission: 10/26/2024  Primary Care Physician: Provider, No Known    Subjective   Subjective     CC:  Follow-up multiple issues    Subjective:  Patient says he is feeling about the same today.  He is agreeable to try a diet.  He is hoping that surgery will be okay with him having his tube out.  No new complaints    Review of Systems  No current fevers or chills  No current shortness of breath or cough  No current chest pain or palpitations       Objective   Objective     Vital Signs:   Temp:  [97.8 °F (36.6 °C)-98.6 °F (37 °C)] 98.4 °F (36.9 °C)  Heart Rate:  [107-109] 109  Resp:  [16-18] 18  BP: (115-127)/(55-66) 115/55        Physical Exam:  Constitutional:Awake, alert, elderly appearing  HENT: NCAT, mucous membranes moist, neck supple  Respiratory: No cough or wheezes, normal respirations, nonlabored breathing   Cardiovascular: Pulse rate is borderline tachycardic, palpable radial pulses  Gastrointestinal: Postoperative changes, soft, some expected tenderness, nondistended  Musculoskeletal: Frail and debilitated appearance, BMI is 25  Psychiatric: Flat affect, cooperative, conversational  Neurologic: No slurred speech or facial droop, follows commands  Skin: No rashes or jaundice, warm      Results Reviewed:  Results from last 7 days   Lab Units 24  0532 24  0540 24  0430   WBC 10*3/mm3 13.05* 13.69* 12.11*   HEMOGLOBIN g/dL 9.3* 8.8* 9.5*   HEMATOCRIT % 28.9* 28.8* 30.0*   PLATELETS 10*3/mm3 820* 866* 795*     Results from last 7 days   Lab Units 24  0532 24  0540 24  0430   SODIUM mmol/L 133* 136 135*   POTASSIUM mmol/L 3.9 4.0 4.2   CHLORIDE mmol/L 99 103 105   CO2 mmol/L 22.3 23.0 20.5*   BUN mg/dL 16 17 19   CREATININE mg/dL 0.50* 0.43* 0.48*   GLUCOSE mg/dL 156* 131* 174*   CALCIUM mg/dL 8.7 8.8 8.7   ALK PHOS U/L 204* 203* 194*   ALT (SGPT) U/L 38  40 35   AST (SGOT) U/L 28 28 26     Estimated Creatinine Clearance: 161.5 mL/min (A) (by C-G formula based on SCr of 0.5 mg/dL (L)).        Imaging Results (Last 24 Hours)       Procedure Component Value Units Date/Time    FL Video Swallow Single Contrast [[id number]] Resulted: 11/18/24 0752     Updated: 11/18/24 0840                I have reviewed the medications:  Scheduled Meds:chlorhexidine, 15 mL, Mouth/Throat, Q12H  enoxaparin, 1 mg/kg, Subcutaneous, Q12H  folic acid, 1 mg, Nasogastric, Daily  insulin glargine, 15 Units, Subcutaneous, Nightly  insulin regular, 2-9 Units, Subcutaneous, 4x Daily AC & at Bedtime  lansoprazole, 30 mg, Nasogastric, Q AM  Lidocaine, 1 patch, Transdermal, Q24H  Lidocaine, 2 patch, Transdermal, Q24H  metoprolol tartrate, 75 mg, Nasogastric, Q12H  sodium chloride, 10 mL, Intravenous, Q12H  sodium hypochlorite, , Topical, BID  terazosin, 2 mg, Nasogastric, Daily  thiamine, 100 mg, Nasogastric, Daily      Continuous Infusions:   PRN Meds:.  acetaminophen **OR** acetaminophen    Calcium Replacement - Follow Nurse / BPA Driven Protocol    dextrose    dextrose    glucagon (human recombinant)    HYDROcodone-acetaminophen    labetalol    Magnesium Standard Dose Replacement - Follow Nurse / BPA Driven Protocol    melatonin    nitroglycerin    ondansetron    Phosphorus Replacement - Follow Nurse / BPA Driven Protocol    Potassium Replacement - Follow Nurse / BPA Driven Protocol    [COMPLETED] Insert Peripheral IV **AND** sodium chloride    sodium chloride    sodium chloride    sodium chloride    Assessment & Plan   Assessment & Plan     Active Hospital Problems    Diagnosis  POA    **Peritonitis [K65.9]  Unknown    Oropharyngeal dysphagia [R13.12]  Unknown    HTN (hypertension) [I10]  Unknown    Thrombocytosis [D75.839]  Unknown    Acute DVT (deep venous thrombosis) [I82.409]  Unknown    Renal mass [N28.89]  Unknown    Hyponatremia [E87.1]  Unknown    Anemia [D64.9]  Unknown    Moderate  protein-calorie malnutrition [E44.0]  Yes    Colon cancer [C18.9]  No      Resolved Hospital Problems    Diagnosis Date Resolved POA    Perforation of sigmoid colon [K63.1] 10/27/2024 Yes        Brief Hospital Course to date:  Arsh Haynes is a 74 y.o. male admitted to ICU with peritonitis due to perforated rectosigmoid adenocarcinoma.  Hospital medicine service assumed care from the critical care team    Discussion/plan for today:  All medical problems are new under my management today.  Speech therapy evaluation today patient passed swallow evaluation.  Start recommended dysphagia diet.  Plan to follow-up on surgery team's recommendations to determine if they feel patient can go off of tube feedings.  Decrease long-acting insulin and continue correction scale.  PT OT for disability.  Continue metoprolol.  Trend sodium.  Trend anemia.  No active bleeding noted.  Otherwise per below as per my partner.  Change Hytrin to Flomax      S/p emergent left hemicolectomy with colostomy and washout on 10/26 (Guillaume) for perforated rectosigmoid adenocarcinoma with peritonitis and septic shock  -Now off antibiotics per infectious disease.  Patient remains afebrile with no signs of new infection. Continue to monitor.  -Heme/Onc has seen, recommends outpt f/u with Dr. Serra in 5-8 weeks.     Sinus tachycardia: EKG showed sinus tach. Continue metoprolol. Continue to closely monitor BP. Stable     Acute RLE DVT found on 11/5:  He has a severe thrombocytosis and in addition to his anemia as well as the colon cancer. Hematology/Oncology following. Continue therapeutic Lovenox. Ultimately can change to Eliquis.     Anemia, multifactorial: Hgb fairly stable today. S/p 2 units PRBCs earlier. Hematology/Oncology following.  transfuse for Hgb less than 7.     Thrombocytosis:  Reactive per Hematology/Oncology. No iron deficiency.     Right renal mass and urinary retention: Urology evaluated the renal mass and urinary retention. [place]  catheter out now. Voiding w/o issue and Cr fine. Continue alpha-blocker. He will follow-up in 4 to 6 weeks with repeat CT for the renal mass which Urology feels is most likely RCC.      Hyponatremia: monitor Repeat BMP with morning labs.     Hypokalemia: Replacing as needed. Mg++ level fine.     DM2: He required Lantus and SSI while on tube feeding. A1c 6.6. No history of DM per pt.      Dysphagia: SLP evaluated,  he required tube feeding.  Dysphagia diet.     Right knee pain, right shoulder pain: Orthopedic surgery consulted. No significant effusion to perform bedside arthrocentesis. XR of right shoulder with severe arthropathic changes only. Ortho not concerned for septic joints. Patient also reported left middle knuckle pain. XR unremarkable.  Supportive care        Pharmacy to dose Lovenox for DVT prophylaxis.  DNR.    Disposition: I expect the patient to be discharged to SNF.    CODE STATUS:   Code Status and Medical Interventions: No CPR (Do Not Attempt to Resuscitate); Limited Support; No intubation (DNI)   Ordered at: 11/13/24 1001     Medical Intervention Limits:    No intubation (DNI)     Code Status (Patient has no pulse and is not breathing):    No CPR (Do Not Attempt to Resuscitate)     Medical Interventions (Patient has pulse or is breathing):    Limited Support       José Manuel Ac MD  11/18/24

## 2024-11-18 NOTE — CASE MANAGEMENT/SOCIAL WORK
Continued Stay Note  [facility]     Patient Name: Arsh Haynes  MRN: [id number]  Today's Date: 11/18/2024    Admit Date: 10/26/2024    Plan: Plan [place] when medically appropirate- pre cert needed.   BENITA Clay RN   Discharge Plan       Row Name 11/18/24 0950       Plan    Plan Plan [place] when medically appropriate- pre cert needed.   BENITA Clay RN    Plan Comments Pt continues with juni Lunsford. Valentina ( [phone]) is following for [place]. Per Dr. Ac pt passed his swallowing study. Plan [place] when medically appropriate- pre cert needed. BENITA Clay RN                   Discharge Codes    No documentation.                 Expected Discharge Date and Time       Expected Discharge Date Expected Discharge Time    Nov 22, 2024               Felecia Clay RN

## 2024-11-18 NOTE — CONSULTS
"Chap visited with pt.  Pt said he is \"near death\" and wanted to pray for his wife to be supported by their [employer] and for his kids. Pt does not fear death.  Pt was resting and chap let him go back to resting after prayer.  Chap remains available.  "

## 2024-11-18 NOTE — PLAN OF CARE
Goal Outcome Evaluation:  Plan of Care Reviewed With: patient           Outcome Evaluation: VFSS completed. Recommend mechanical soft textures, no mixed consistencies and nectar thick liquids. Meds whole/crushed with puree. Recommend no straws, upright, slow rate, PO only when alert. Allow water protocol (30 minutes after oral care between meals). SLP to follow for diet tolerance and therapy.    Anticipated Discharge Disposition (SLP): unknown

## 2024-11-19 LAB
ALBUMIN SERPL-MCNC: 2.3 G/DL (ref 3.5–5.2)
ALBUMIN/GLOB SERPL: 0.5 G/DL
ALP SERPL-CCNC: 178 U/L (ref 39–117)
ALT SERPL W P-5'-P-CCNC: 35 U/L (ref 1–41)
ANION GAP SERPL CALCULATED.3IONS-SCNC: 10.1 MMOL/L (ref 5–15)
AST SERPL-CCNC: 28 U/L (ref 1–40)
BASOPHILS # BLD AUTO: 0.12 10*3/MM3 (ref 0–0.2)
BASOPHILS NFR BLD AUTO: 0.9 % (ref 0–1.5)
BILIRUB SERPL-MCNC: 0.5 MG/DL (ref 0–1.2)
BUN SERPL-MCNC: 14 MG/DL (ref 8–23)
BUN/CREAT SERPL: 27.5 (ref 7–25)
CALCIUM SPEC-SCNC: 8.6 MG/DL (ref 8.6–10.5)
CHLORIDE SERPL-SCNC: 100 MMOL/L (ref 98–107)
CO2 SERPL-SCNC: 22.9 MMOL/L (ref 22–29)
CREAT SERPL-MCNC: 0.51 MG/DL (ref 0.76–1.27)
DEPRECATED RDW RBC AUTO: 51.6 FL (ref 37–54)
EGFRCR SERPLBLD CKD-EPI 2021: 107.7 ML/MIN/1.73
EOSINOPHIL # BLD AUTO: 0.75 10*3/MM3 (ref 0–0.4)
EOSINOPHIL NFR BLD AUTO: 5.3 % (ref 0.3–6.2)
ERYTHROCYTE [DISTWIDTH] IN BLOOD BY AUTOMATED COUNT: 17.2 % (ref 12.3–15.4)
GLOBULIN UR ELPH-MCNC: 4.5 GM/DL
GLUCOSE BLDC GLUCOMTR-MCNC: 112 MG/DL (ref 70–130)
GLUCOSE BLDC GLUCOMTR-MCNC: 137 MG/DL (ref 70–130)
GLUCOSE BLDC GLUCOMTR-MCNC: 143 MG/DL (ref 70–130)
GLUCOSE BLDC GLUCOMTR-MCNC: 92 MG/DL (ref 70–130)
GLUCOSE SERPL-MCNC: 86 MG/DL (ref 65–99)
HCT VFR BLD AUTO: 27.9 % (ref 37.5–51)
HGB BLD-MCNC: 9 G/DL (ref 13–17.7)
IMM GRANULOCYTES # BLD AUTO: 0.29 10*3/MM3 (ref 0–0.05)
IMM GRANULOCYTES NFR BLD AUTO: 2.1 % (ref 0–0.5)
LYMPHOCYTES # BLD AUTO: 1.77 10*3/MM3 (ref 0.7–3.1)
LYMPHOCYTES NFR BLD AUTO: 12.6 % (ref 19.6–45.3)
MCH RBC QN AUTO: 26.9 PG (ref 26.6–33)
MCHC RBC AUTO-ENTMCNC: 32.3 G/DL (ref 31.5–35.7)
MCV RBC AUTO: 83.5 FL (ref 79–97)
MONOCYTES # BLD AUTO: 1.35 10*3/MM3 (ref 0.1–0.9)
MONOCYTES NFR BLD AUTO: 9.6 % (ref 5–12)
NEUTROPHILS NFR BLD AUTO: 69.5 % (ref 42.7–76)
NEUTROPHILS NFR BLD AUTO: 9.78 10*3/MM3 (ref 1.7–7)
NRBC BLD AUTO-RTO: 0 /100 WBC (ref 0–0.2)
PLATELET # BLD AUTO: 867 10*3/MM3 (ref 140–450)
PMV BLD AUTO: 8.7 FL (ref 6–12)
POTASSIUM SERPL-SCNC: 4 MMOL/L (ref 3.5–5.2)
PROT SERPL-MCNC: 6.8 G/DL (ref 6–8.5)
RBC # BLD AUTO: 3.34 10*6/MM3 (ref 4.14–5.8)
SODIUM SERPL-SCNC: 133 MMOL/L (ref 136–145)
WBC NRBC COR # BLD AUTO: 14.06 10*3/MM3 (ref 3.4–10.8)

## 2024-11-19 PROCEDURE — 97530 THERAPEUTIC ACTIVITIES: CPT

## 2024-11-19 PROCEDURE — 25010000002 ENOXAPARIN PER 10 MG: Performed by: INTERNAL MEDICINE

## 2024-11-19 PROCEDURE — 97535 SELF CARE MNGMENT TRAINING: CPT

## 2024-11-19 PROCEDURE — 80053 COMPREHEN METABOLIC PANEL: CPT | Performed by: INTERNAL MEDICINE

## 2024-11-19 PROCEDURE — 99024 POSTOP FOLLOW-UP VISIT: CPT | Performed by: SURGERY

## 2024-11-19 PROCEDURE — 82948 REAGENT STRIP/BLOOD GLUCOSE: CPT

## 2024-11-19 PROCEDURE — 85025 COMPLETE CBC W/AUTO DIFF WBC: CPT | Performed by: INTERNAL MEDICINE

## 2024-11-19 RX ORDER — INSULIN LISPRO 100 [IU]/ML
2-9 INJECTION, SOLUTION INTRAVENOUS; SUBCUTANEOUS
Status: DISCONTINUED | OUTPATIENT
Start: 2024-11-19 | End: 2024-11-30 | Stop reason: HOSPADM

## 2024-11-19 RX ORDER — DEXTROSE MONOHYDRATE 25 G/50ML
25 INJECTION, SOLUTION INTRAVENOUS
Status: DISCONTINUED | OUTPATIENT
Start: 2024-11-19 | End: 2024-11-30 | Stop reason: HOSPADM

## 2024-11-19 RX ORDER — IBUPROFEN 600 MG/1
1 TABLET ORAL
Status: DISCONTINUED | OUTPATIENT
Start: 2024-11-19 | End: 2024-11-30 | Stop reason: HOSPADM

## 2024-11-19 RX ORDER — METOPROLOL TARTRATE 50 MG
100 TABLET ORAL EVERY 12 HOURS SCHEDULED
Status: DISCONTINUED | OUTPATIENT
Start: 2024-11-19 | End: 2024-11-21

## 2024-11-19 RX ORDER — NICOTINE POLACRILEX 4 MG
15 LOZENGE BUCCAL
Status: DISCONTINUED | OUTPATIENT
Start: 2024-11-19 | End: 2024-11-30 | Stop reason: HOSPADM

## 2024-11-19 RX ADMIN — CHLORHEXIDINE GLUCONATE 15 ML: 1.2 RINSE ORAL at 21:49

## 2024-11-19 RX ADMIN — FOLIC ACID 1 MG: 1 TABLET ORAL at 09:07

## 2024-11-19 RX ADMIN — Medication 10 ML: at 21:49

## 2024-11-19 RX ADMIN — LANSOPRAZOLE 30 MG: 15 TABLET, ORALLY DISINTEGRATING ORAL at 07:14

## 2024-11-19 RX ADMIN — HYDROCODONE BITARTRATE AND ACETAMINOPHEN 1 TABLET: 5; 325 TABLET ORAL at 01:31

## 2024-11-19 RX ADMIN — HYDROCODONE BITARTRATE AND ACETAMINOPHEN 1 TABLET: 5; 325 TABLET ORAL at 07:14

## 2024-11-19 RX ADMIN — LIDOCAINE 1 PATCH: 4 PATCH TOPICAL at 09:07

## 2024-11-19 RX ADMIN — Medication 100 MG: at 09:08

## 2024-11-19 RX ADMIN — METOPROLOL TARTRATE 75 MG: 50 TABLET, FILM COATED ORAL at 09:07

## 2024-11-19 RX ADMIN — METOPROLOL TARTRATE 100 MG: 50 TABLET, FILM COATED ORAL at 21:49

## 2024-11-19 RX ADMIN — CHLORHEXIDINE GLUCONATE 15 ML: 1.2 RINSE ORAL at 09:06

## 2024-11-19 RX ADMIN — TAMSULOSIN HYDROCHLORIDE 0.4 MG: 0.4 CAPSULE ORAL at 09:08

## 2024-11-19 RX ADMIN — LIDOCAINE 2 PATCH: 4 PATCH TOPICAL at 09:08

## 2024-11-19 RX ADMIN — ENOXAPARIN SODIUM 90 MG: 100 INJECTION SUBCUTANEOUS at 07:14

## 2024-11-19 RX ADMIN — Medication 10 ML: at 09:08

## 2024-11-19 RX ADMIN — ENOXAPARIN SODIUM 90 MG: 100 INJECTION SUBCUTANEOUS at 17:28

## 2024-11-19 NOTE — THERAPY TREATMENT NOTE
Patient Name: Arsh Haynes  : 1950    MRN: [id number]                              Today's Date: 2024       Admit Date: 10/26/2024    Visit Dx:     ICD-10-CM ICD-9-CM   1. Perforation of sigmoid colon  K63.1 569.83   2. Hypotension, unspecified hypotension type  I95.9 458.9   3. Increased lactic acid level  R79.89 276.2   4. Bowel perforation  K63.1 569.83     Patient Active Problem List   Diagnosis    Colon cancer    HTN (hypertension)    Thrombocytosis    Acute DVT (deep venous thrombosis)    Renal mass    Hyponatremia    Anemia    Peritonitis    Moderate protein-calorie malnutrition    Oropharyngeal dysphagia     Past Medical History:   Diagnosis Date    HTN (hypertension)     Hypercholesterolemia      Past Surgical History:   Procedure Laterality Date    COLON RESECTION N/A 10/26/2024    Procedure: LOW ANTERIOR COLON RESECTION SIGMOID, COLOSTOMY, SPLENIC FLEXURE MOBILIZATION;  Surgeon: Mc Guillaume MD;  Location: [facility];  Service: General;  Laterality: N/A;    EXPLORATORY LAPAROTOMY N/A 10/26/2024    Procedure: LAPAROTOMY EXPLORATORY, LEFT HEMICOLECTOMY;  Surgeon: Mc Guillaume MD;  Location: [facility];  Service: General;  Laterality: N/A;      General Information       Row Name 24 1427          Physical Therapy Time and Intention    Document Type therapy note (daily note)  -MG     Mode of Treatment co-treatment;occupational therapy;physical therapy;other (see comments)  -MG       Row Name 24 1427          General Information    Patient Profile Reviewed yes  -MG     Existing Precautions/Restrictions fall  Abdominal wound vac, colostomy, DHT  -MG     Barriers to Rehab medically complex  -MG       Row Name 24 1427          Cognition    Orientation Status (Cognition) oriented x 4  -MG       Row Name 24 1427          Safety Issues/Impairments Affecting Functional Mobility    Safety Issues Affecting Function (Mobility) insight into  deficits/self-awareness;safety precaution awareness;sequencing abilities;positioning of assistive device;judgment  -MG     Impairments Affecting Function (Mobility) strength;balance;endurance/activity tolerance;coordination;pain;postural/trunk control;range of motion (ROM);cognition  -MG     Cognitive Impairments, Mobility Safety/Performance judgment;sequencing abilities  -MG     Comment, Safety Issues/Impairments (Mobility) Co treatment medically appropriate and necessary due to patient acuity level, activity tolerance and safety of patient and staff. Treatment is focusing on progression of care and goals established in the POC. Gait belt and non-skid socks donned.  -MG               User Key  (r) = Recorded By, (t) = Taken By, (c) = Cosigned By      Initials Name Provider Type    MG Liv Cruz, PT Physical Therapist                   Mobility       Row Name 11/19/24 1428          Bed Mobility    Supine-Sit [place] (Bed Mobility) moderate assist (50% patient effort);verbal cues  -MG     Assistive Device (Bed Mobility) head of bed elevated;bed rails  -MG     Comment, (Bed Mobility) Able to bring LEs off EOB himself. Primary assist at his trunk. Cues for sequencing and hand placement.  -MG       Row Name 11/19/24 1428          Bed-Chair Transfer    Bed-Chair [place] (Transfers) moderate assist (50% patient effort);maximum assist (25% patient effort);2 person assist;verbal cues  -MG     Assistive Device (Bed-Chair Transfers) walker, front-wheeled  -MG     Comment, (Bed-Chair Transfer) Stand/step pivot tsf to the L. Min/ModA from this PT for weight shifting. Cues for sequencing and posture. Assist at times w/ RW managment.  -MG       Row Name 11/19/24 1428          Sit-Stand Transfer    Sit-Stand [place] (Transfers) moderate assist (50% patient effort);maximum assist (25% patient effort);2 person assist;verbal cues  -MG     Assistive Device (Sit-Stand Transfers) walker, front-wheeled  -MG      Comment, (Sit-Stand Transfer) x2 from EOB. First stand w/ WBOS and MaxA x2. Second stand pt able to start with improved JUDY, requiring ModA x2. Cues for posture in standing.  -MG               User Key  (r) = Recorded By, (t) = Taken By, (c) = Cosigned By      Initials Name Provider Type    Liv Sandoval PT Physical Therapist                   Obj/Interventions       Row Name 11/19/24 1430          Motor Skills    Therapeutic Exercise other (see comments)  AP x10. Cues for full ROM and to cont performing throughout the day to help decrease swelling.  -MG       Row Name 11/19/24 1430          Balance    Static Sitting Balance standby assist;supervision  -MG     Dynamic Sitting Balance contact guard  -MG     Position, Sitting Balance sitting edge of bed;unsupported  -MG     Static Standing Balance moderate assist;2-person assist;verbal cues  -MG     Dynamic Standing Balance moderate assist;maximum assist;2-person assist;verbal cues  -MG     Position/Device Used, Standing Balance supported;walker, front-wheeled  -MG     Comment, Balance Improved upright balance and posture w/ tsf to the chair. No knee buckling or overt LOB noted.  -MG               User Key  (r) = Recorded By, (t) = Taken By, (c) = Cosigned By      Initials Name Provider Type    Liv Sandoval, PT Physical Therapist                   Goals/Plan    No documentation.                  Clinical Impression       Row Name 11/19/24 1433          Pain    Pain Location ankle;knee;shoulder;other (see comments)  -MG     Pain Side/Orientation right  -MG     Pain Management Interventions nursing notified  -MG     Response to Pain Interventions activity participation with tolerable pain  -MG     Pre/Posttreatment Pain Comment Reports cont R shoulder, knee and ankle pain, states pain is low and does not rate it. Also states his throat hurts when he swallows; rates 8/10 when swallowing.  -MG       Row Name 11/19/24 1431          Plan of Care Review    Plan of  Care Reviewed With patient  -MG     Progress improving  -MG     Outcome Evaluation Pt seen for PT/OT cotx this AM and tolerated the session well, demoing some progress. Today, pt was ModA x1 to sit EOB, Mod/MaxA x2 for total of two STS to a RW and Mod/MaxA x2 for a stand/step pivot tsf to the L to the recliner chair. Pt was able to demo improved balance and posture w/ upright standing, but cont to require cues. Pt also required Min/ModA from this PT for weight shifting when taking side steps towards the chair. Noted pt to have bilateral ankle swelling and was educated on performing ankle pumps throughout the day and keeping his LEs elevated; pt v/u. While in the chair pt was able to take a few bites of his lunch w/ OT. Encouraged pt to cont sitting [facility] for his meals and to increase his caloric intake. KHLOE w/ RN. PT will cont to follow.  -MG       Row Name 11/19/24 1433          Therapy Assessment/Plan (PT)    Rehab Potential (PT) good  -MG     Criteria for Skilled Interventions Met (PT) yes  -MG     Therapy Frequency (PT) 5 times/wk  -MG       Row Name 11/19/24 1433          Vital Signs    Pretreatment Heart Rate (beats/min) 88  -MG     Pre SpO2 (%) 98  -MG     O2 Delivery Pre Treatment room air  -MG     O2 Delivery Intra Treatment room air  -MG     Post SpO2 (%) 96  -MG     O2 Delivery Post Treatment room air  -MG     Pre Patient Position Supine  -MG     Intra Patient Position Standing  -MG     Post Patient Position Sitting  -MG       Row Name 11/19/24 1433          Positioning and Restraints    Pre-Treatment Position in bed  -MG     Post Treatment Position chair  -MG     In Chair notified nsg;reclined;call light within reach;encouraged to call for assist;exit alarm on;with family/caregiver;legs elevated  -MG               User Key  (r) = Recorded By, (t) = Taken By, (c) = Cosigned By      Initials Name Provider Type    MG Liv Cruz, PT Physical Therapist                   Outcome Measures       Row Name  11/19/24 1438          How much help from another person do you currently need...    Turning from your back to your side while in flat bed without using bedrails? 2  -MG     Moving from lying on back to sitting on the side of a flat bed without bedrails? 2  -MG     Moving to and from a bed to a chair (including a wheelchair)? 2  -MG     Standing up from a chair using your arms (e.g., wheelchair, bedside chair)? 2  -MG     Climbing 3-5 steps with a railing? 1  -MG     To walk in hospital room? 2  -MG     AM-PAC 6 Clicks Score (PT) 11  -MG     Highest Level of Mobility Goal 4 --> Transfer to chair/commode  -MG       Row Name 11/19/24 1230          Functional Assessment    Outcome Measure Options AM-PAC 6 Clicks Daily Activity (OT)  -BC               User Key  (r) = Recorded By, (t) = Taken By, (c) = Cosigned By      Initials Name Provider Type    MG Liv Cruz, PT Physical Therapist    BC Ricky Hernandez, OT Occupational Therapist                                 Physical Therapy Education       Title: PT OT SLP Therapies (Done)       Topic: Physical Therapy (Done)       Point: Mobility training (Done)       Learning Progress Summary            Patient Acceptance, E,D, VU,NR by MG at 11/19/2024 1438    Acceptance, E, VU by JS at 11/16/2024 1757    Acceptance, E, VU by JS at 11/15/2024 1824    Acceptance, E,D,TB, VU,NR by MG at 11/15/2024 1218    Acceptance, E, VU,NR by MG at 11/12/2024 1559    Acceptance, E, VU by JS at 11/11/2024 1813    Acceptance, E, VU,NR by MG at 11/11/2024 1629    Acceptance, E, NR,VU by EF at 11/10/2024 1127    Acceptance, E, VU by JS at 11/8/2024 1820    Acceptance, E,D, VU,NR by MG at 11/8/2024 1459    Acceptance, E, VU by CW at 11/2/2024 1622                      Point: Home exercise program (Done)       Learning Progress Summary            Patient Acceptance, E,D, VU,NR by MG at 11/19/2024 1438    Acceptance, E, VU by JS at 11/16/2024 1757    Acceptance, E, VU by JS at 11/15/2024 1821     Acceptance, E,D,TB, VU,NR by MG at 11/15/2024 1218    Acceptance, E, VU,NR by MG at 11/12/2024 1559    Acceptance, E, VU by JS at 11/11/2024 1813    Acceptance, E, VU,NR by MG at 11/11/2024 1629    Acceptance, E, VU by JS at 11/8/2024 1820                      Point: Body mechanics (Done)       Learning Progress Summary            Patient Acceptance, E,D, VU,NR by MG at 11/19/2024 1438    Acceptance, E, VU by JS at 11/16/2024 1757    Acceptance, E, VU by JS at 11/15/2024 1824    Acceptance, E,D,TB, VU,NR by MG at 11/15/2024 1218    Acceptance, E, VU,NR by MG at 11/12/2024 1559    Acceptance, E, VU by JS at 11/11/2024 1813    Acceptance, E, VU,NR by MG at 11/11/2024 1629    Acceptance, E, NR,VU by EF at 11/10/2024 1127    Acceptance, E, VU by JS at 11/8/2024 1820    Acceptance, E,D, VU,NR by MG at 11/8/2024 1459    Acceptance, E, VU by CW at 11/2/2024 1622                      Point: Precautions (Done)       Learning Progress Summary            Patient Acceptance, E,D, VU,NR by MG at 11/19/2024 1438    Acceptance, E, VU by JS at 11/16/2024 1757    Acceptance, E, VU by JS at 11/15/2024 1824    Acceptance, E,D,TB, VU,NR by MG at 11/15/2024 1218    Acceptance, E, VU,NR by MG at 11/12/2024 1559    Acceptance, E, VU by JS at 11/11/2024 1813    Acceptance, E, VU,NR by MG at 11/11/2024 1629    Acceptance, E, VU by JS at 11/8/2024 1820    Acceptance, E,D, VU,NR by MG at 11/8/2024 1459    Acceptance, E, VU by CW at 11/2/2024 1622                                      User Key       Initials Effective Dates Name Provider Type Discipline     05/31/24 -  Fussenegger, Sabine, PT Physical Therapist PT    MG 05/24/22 -  Liv Crzu, PT Physical Therapist PT    CW 12/13/22 -  Lisa Paulson, PT Physical Therapist PT    JS 02/26/24 -  Johnson Morales, RN Registered Nurse Nurse                  PT Recommendation and Plan     Progress: improving  Outcome Evaluation: Pt seen for PT/OT cotx this AM and tolerated the session well,  demoing some progress. Today, pt was ModA x1 to sit EOB, Mod/MaxA x2 for total of two STS to a RW and Mod/MaxA x2 for a stand/step pivot tsf to the L to the recliner chair. Pt was able to demo improved balance and posture w/ upright standing, but cont to require cues. Pt also required Min/ModA from this PT for weight shifting when taking side steps towards the chair. Noted pt to have bilateral ankle swelling and was educated on performing ankle pumps throughout the day and keeping his LEs elevated; pt v/u. While in the chair pt was able to take a few bites of his lunch w/ OT. Encouraged pt to cont sitting [facility] for his meals and to increase his caloric intake. DANDREAR w/ RN. PT will cont to follow.     Time Calculation:         PT Charges       Row Name 11/19/24 1438             Time Calculation    Start Time 1100  -MG      Stop Time 1127  -MG      Time Calculation (min) 27 min  -MG      PT Received On 11/19/24  -MG      PT - Next Appointment 11/20/24  -MG                User Key  (r) = Recorded By, (t) = Taken By, (c) = Cosigned By      Initials Name Provider Type    MG Liv Cruz, PT Physical Therapist                  Therapy Charges for Today       Code Description Service Date Service Provider Modifiers Qty    [id number]  PT THERAPEUTIC ACT EA 15 MIN 11/19/2024 Liv Cruz, PT GP 2            PT G-Codes  Outcome Measure Options: AM-PAC 6 Clicks Daily Activity (OT)  AM-PAC 6 Clicks Score (PT): 11  AM-PAC 6 Clicks Score (OT): 12  Modified [place] Scale: 5 - Severe disability.  Bedridden, incontinent, and requiring constant nursing care and attention.  PT Discharge Summary  Anticipated Discharge Disposition (PT): skilled nursing facility    Lvi Cruz PT  11/19/2024

## 2024-11-19 NOTE — PLAN OF CARE
Goal Outcome Evaluation:  Plan of Care Reviewed With: patient        Progress: improving  Outcome Evaluation: tolerating diet, med x1 for pain with some relief stated, VSS, NSR on monitor, Eyes closed at long interval, resting quietly in room

## 2024-11-19 NOTE — PLAN OF CARE
Goal Outcome Evaluation:  Plan of Care Reviewed With: patient        Progress: improving  Outcome Evaluation: Pt seen for PT/OT cotx this AM and tolerated the session well, demoing some progress. Today, pt was ModA x1 to sit EOB, Mod/MaxA x2 for total of two STS to a RW and Mod/MaxA x2 for a stand/step pivot tsf to the L to the recliner chair. Pt was able to demo improved balance and posture w/ upright standing, but cont to require cues. Pt also required Min/ModA from this PT for weight shifting when taking side steps towards the chair. Noted pt to have bilateral ankle swelling and was educated on performing ankle pumps throughout the day and keeping his LEs elevated; pt v/u. While in the chair pt was able to take a few bites of his lunch w/ OT. Encouraged pt to cont sitting [facility] for his meals and to increase his caloric intake. KHLOE w/ RN. PT will cont to follow.    Anticipated Discharge Disposition (PT): skilled nursing facility

## 2024-11-19 NOTE — PROGRESS NOTES
[facility] Medicine Services  PROGRESS NOTE    Patient Name: Arsh Haynes  : 1950  MRN: [id number]    Date of Admission: 10/26/2024  Primary Care Physician: Provider, No Known    Subjective   Subjective     CC:  Follow-up multiple issues    Subjective:  Patient reports he is eating a little bit but appetite is still somewhat poor at times.  He denies any new complaints today.  He is resting comfortably in bed.    Review of Systems  No current fevers or chills  No current shortness of breath or cough  No current chest pain or palpitations       Objective   Objective     Vital Signs:   Temp:  [97.2 °F (36.2 °C)-98 °F (36.7 °C)] 97.7 °F (36.5 °C)  Heart Rate:  [] 108  Resp:  [16-18] 16  BP: (116-127)/(56-65) 116/56        Physical Exam:  Constitutional:Awake, alert, elderly appearing  HENT: NCAT, mucous membranes moist, neck supple  Respiratory: No cough or wheezes, normal respirations, nonlabored breathing   Cardiovascular: Pulse rate is borderline tachycardic, palpable radial pulses  Gastrointestinal: Postoperative changes, soft, some expected tenderness, nondistended  Musculoskeletal: Frail and debilitated appearance, BMI is 25  Psychiatric: Flat affect, cooperative, conversational  Neurologic: No slurred speech or facial droop, follows commands  Skin: No rashes or jaundice, warm      Results Reviewed:  Results from last 7 days   Lab Units 24  0532 24  0540   WBC 10*3/mm3 14.06* 13.05* 13.69*   HEMOGLOBIN g/dL 9.0* 9.3* 8.8*   HEMATOCRIT % 27.9* 28.9* 28.8*   PLATELETS 10*3/mm3 867* 820* 866*     Results from last 7 days   Lab Units 24  0532 24  0540   SODIUM mmol/L 133* 133* 136   POTASSIUM mmol/L 4.0 3.9 4.0   CHLORIDE mmol/L 100 99 103   CO2 mmol/L 22.9 22.3 23.0   BUN mg/dL 14 16 17   CREATININE mg/dL 0.51* 0.50* 0.43*   GLUCOSE mg/dL 86 156* 131*   CALCIUM mg/dL 8.6 8.7 8.8   ALK PHOS U/L 178* 204* 203*   ALT (SGPT) U/L 35 38 40   AST  (SGOT) U/L 28 28 28     Estimated Creatinine Clearance: 158.3 mL/min (A) (by C-G formula based on SCr of 0.51 mg/dL (L)).        Imaging Results (Last 24 Hours)       ** No results found for the last 24 hours. **                I have reviewed the medications:  Scheduled Meds:chlorhexidine, 15 mL, Mouth/Throat, Q12H  enoxaparin, 1 mg/kg, Subcutaneous, Q12H  folic acid, 1 mg, Nasogastric, Daily  insulin lispro, 2-9 Units, Subcutaneous, 4x Daily AC & at Bedtime  lansoprazole, 30 mg, Nasogastric, Q AM  Lidocaine, 1 patch, Transdermal, Q24H  Lidocaine, 2 patch, Transdermal, Q24H  metoprolol tartrate, 100 mg, Nasogastric, Q12H  sodium chloride, 10 mL, Intravenous, Q12H  sodium hypochlorite, , Topical, BID  tamsulosin, 0.4 mg, Oral, Daily  thiamine, 100 mg, Nasogastric, Daily      Continuous Infusions:   PRN Meds:.  acetaminophen **OR** acetaminophen    Calcium Replacement - Follow Nurse / BPA Driven Protocol    dextrose    dextrose    dextrose    dextrose    glucagon (human recombinant)    glucagon (human recombinant)    HYDROcodone-acetaminophen    labetalol    Magnesium Standard Dose Replacement - Follow Nurse / BPA Driven Protocol    melatonin    nitroglycerin    ondansetron    Phosphorus Replacement - Follow Nurse / BPA Driven Protocol    Potassium Replacement - Follow Nurse / BPA Driven Protocol    [COMPLETED] Insert Peripheral IV **AND** sodium chloride    sodium chloride    sodium chloride    sodium chloride    Assessment & Plan   Assessment & Plan     Active Hospital Problems    Diagnosis  POA    **Peritonitis [K65.9]  Unknown    Oropharyngeal dysphagia [R13.12]  Unknown    HTN (hypertension) [I10]  Unknown    Thrombocytosis [D75.839]  Unknown    Acute DVT (deep venous thrombosis) [I82.409]  Unknown    Renal mass [N28.89]  Unknown    Hyponatremia [E87.1]  Unknown    Anemia [D64.9]  Unknown    Moderate protein-calorie malnutrition [E44.0]  Yes    Colon cancer [C18.9]  No      Resolved Hospital Problems     Diagnosis Date Resolved POA    Perforation of sigmoid colon [K63.1] 10/27/2024 Yes        Brief Hospital Course to date:  Arsh Haynes is a 74 y.o. male admitted to ICU with peritonitis due to perforated rectosigmoid adenocarcinoma.  Hospital medicine service assumed care from the critical care team    Discussion/plan for today:  Case discussed with surgery today.  Plan to order a calorie count to determine nutritional intake and whether NG tube can be removed.  If patient is unable to take adequate calorie he may need a PEG tube in order to recover from his surgery.  Continue recommended dysphagia diet.  Glucose reviewed.  Discontinue long-acting insulin for now.  Adjust correction scale to lispro for now.  Monitor and adjust further as needed.  PT OT for disability.  Slightly adjust metoprolol.  Trend sodium.  Trend anemia.  No active bleeding noted.  Otherwise per below as per my partner.  Change Hytrin to Flomax      S/p emergent left hemicolectomy with colostomy and washout on 10/26 (Guillaume) for perforated rectosigmoid adenocarcinoma with peritonitis and septic shock  -Now off antibiotics per infectious disease.  Patient remains afebrile with no signs of new infection. Continue to monitor.  -Heme/Onc has seen, recommends outpt f/u with Dr. Serra in 5-8 weeks.     Sinus tachycardia: EKG showed sinus tach. Continue metoprolol. Continue to closely monitor BP. Stable     Acute RLE DVT found on 11/5:  He has a severe thrombocytosis and in addition to his anemia as well as the colon cancer. Hematology/Oncology following. Continue therapeutic Lovenox. Ultimately can change to Eliquis.     Anemia, multifactorial: Hgb fairly stable today. S/p 2 units PRBCs earlier. Hematology/Oncology following.  transfuse for Hgb less than 7.     Thrombocytosis:  Reactive per Hematology/Oncology.      Right renal mass and urinary retention: Urology evaluated the renal mass and urinary retention. [place] catheter out now. Voiding w/o  issue and Cr fine. Continue alpha-blocker. He will follow-up in 4 to 6 weeks with repeat CT for the renal mass which Urology feels is most likely RCC.      Hyponatremia: monitor Repeat BMP with morning labs.     Hypokalemia: Replacing as needed.      DM2: He required Lantus and SSI while on tube feeding, requiring last insulin on oral diet. A1c 6.6. No history of DM per pt.      Dysphagia: SLP evaluated,  he required tube feeding.  Dysphagia diet.     Right knee pain, right shoulder pain: Orthopedic surgery consulted. No significant effusion to perform bedside arthrocentesis. XR of right shoulder with severe arthropathic changes only. Ortho not concerned for septic joints. Patient also reported left middle knuckle pain. XR unremarkable.  Supportive care.  Improving        Pharmacy to dose Lovenox for DVT prophylaxis.  DNR.    Disposition: I expect the patient to be discharged to SNF.    CODE STATUS:   Code Status and Medical Interventions: No CPR (Do Not Attempt to Resuscitate); Limited Support; No intubation (DNI)   Ordered at: 11/13/24 1001     Medical Intervention Limits:    No intubation (DNI)     Code Status (Patient has no pulse and is not breathing):    No CPR (Do Not Attempt to Resuscitate)     Medical Interventions (Patient has pulse or is breathing):    Limited Support       José Manuel Ac MD  11/19/24

## 2024-11-19 NOTE — PROGRESS NOTES
Postoperative day 23 s/p left colectomy with colostomy.     S: No events overnight.  Continues to be very weak.  Tolerating diet but not eating that much    O:   Vitals:    11/18/24 1220 11/18/24 1938 11/18/24 2311 11/19/24 0700   BP: 119/58 117/60 127/65 116/56   BP Location: Left arm Left arm Left arm Left arm   Patient Position: Lying Lying Lying Lying   Pulse: 95 103 93 108   Resp: 18 18 18 16   Temp: 98 °F (36.7 °C) 97.7 °F (36.5 °C) 97.2 °F (36.2 °C) 97.7 °F (36.5 °C)   TempSrc: Oral Oral Oral Oral   SpO2: 97% 97% 96% 95%   Height:          Stool 300 cc  Urine output 300 cc  Alert, no acute distress  Breathing comfortable  Abdomen soft nontender, ostomy pink and viable    White blood cell count 14,000, downtrending, hemoglobin 9    Assessment and plan    Postoperative day 23 status post left colectomy with colostomy.  Patient continues to be extremely weak.  Now tolerating regular diet but with poor p.o. intake    Start calorie count, if unable to eat enough calories then he may need to have permanent feeding tube placement.

## 2024-11-19 NOTE — PLAN OF CARE
Goal Outcome Evaluation:  Patient alert and oriented. RA. VSS. Sent down for video swallow. Mechanical soft diet ordered. Medicated for pain per MAR. Patient frequently turned and repositioned. Plan of care ongoing.

## 2024-11-19 NOTE — PLAN OF CARE
Goal Outcome Evaluation:  Patient alert and oriented. RA. VSS. Up in chair for lunch. Tray assistance provided. Calorie count started. Medicated per MAR. Plan of care ongoing.

## 2024-11-19 NOTE — THERAPY TREATMENT NOTE
Patient Name: Arsh Haynes  : 1950    MRN: [id number]                              Today's Date: 2024       Admit Date: 10/26/2024    Visit Dx:     ICD-10-CM ICD-9-CM   1. Perforation of sigmoid colon  K63.1 569.83   2. Hypotension, unspecified hypotension type  I95.9 458.9   3. Increased lactic acid level  R79.89 276.2   4. Bowel perforation  K63.1 569.83     Patient Active Problem List   Diagnosis    Colon cancer    HTN (hypertension)    Thrombocytosis    Acute DVT (deep venous thrombosis)    Renal mass    Hyponatremia    Anemia    Peritonitis    Moderate protein-calorie malnutrition    Oropharyngeal dysphagia     Past Medical History:   Diagnosis Date    HTN (hypertension)     Hypercholesterolemia      Past Surgical History:   Procedure Laterality Date    COLON RESECTION N/A 10/26/2024    Procedure: LOW ANTERIOR COLON RESECTION SIGMOID, COLOSTOMY, SPLENIC FLEXURE MOBILIZATION;  Surgeon: Mc Guillaume MD;  Location: [facility];  Service: General;  Laterality: N/A;    EXPLORATORY LAPAROTOMY N/A 10/26/2024    Procedure: LAPAROTOMY EXPLORATORY, LEFT HEMICOLECTOMY;  Surgeon: Mc Guillaume MD;  Location: [facility];  Service: General;  Laterality: N/A;      General Information       Row Name 24 1221          OT Time and Intention    Subjective Information no complaints  -BC     Document Type therapy note (daily note)  -BC     Mode of Treatment co-treatment;occupational therapy;physical therapy;other (see comments)  -BC     Patient Effort good  -BC     Symptoms Noted During/After Treatment none  -BC       Row Name 24 1221          General Information    Patient Profile Reviewed yes  -BC     Existing Precautions/Restrictions fall;NPO  -BC       Row Name 24 1221          Cognition    Orientation Status (Cognition) oriented x 4  -BC       Row Name 24 1221          Safety Issues/Impairments Affecting Functional Mobility    Impairments Affecting Function  (Mobility) strength;balance;endurance/activity tolerance;coordination;pain;postural/trunk control;range of motion (ROM);cognition  -BC     Cognitive Impairments, Mobility Safety/Performance judgment;sequencing abilities  -BC     Comment, Safety Issues/Impairments (Mobility) Cotx with PT/OT for safety w/ bed mobility, concern for tolerance to active participation and due to decreased endurance.  -BC               User Key  (r) = Recorded By, (t) = Taken By, (c) = Cosigned By      Initials Name Provider Type    BC Ricky Hernandez OT Occupational Therapist                     Mobility/ADL's       Row Name 11/19/24 1222          Bed Mobility    Bed Mobility supine-sit  -BC     Supine-Sit [place] (Bed Mobility) moderate assist (50% patient effort)  -BC     Bed Mobility, Safety Issues decreased use of arms for pushing/pulling  -BC     Comment, (Bed Mobility) cues for sequence and limited R sh ROM for reach across midline, help some w trunk and w/ LEs but Pt performed ~50% or> effort  -BC       Row Name 11/19/24 1222          Transfers    Transfers sit-stand transfer;bed-chair transfer;stand-sit transfer  -BC     Comment, (Transfers) Pt tolerated sit>Stand twice from EOB w/ walker and max AX2 first stand, mod2nd. Cont'd w/ lateral weightshifting and progressed w/ walker and ~mod AX2 to chair w/ assist for walker mgmt/positioning.  -BC       Row Name 11/19/24 1222          Bed-Chair Transfer    Bed-Chair [place] (Transfers) maximum assist (25% patient effort);2 person assist;verbal cues;moderate assist (50% patient effort);nonverbal cues (demo/gesture)  -BC     Assistive Device (Bed-Chair Transfers) walker, front-wheeled  -BC     Comment, (Bed-Chair Transfer) RWx  -BC       Row Name 11/19/24 1222          Sit-Stand Transfer    Sit-Stand [place] (Transfers) moderate assist (50% patient effort);maximum assist (25% patient effort);2 person assist;verbal cues  -BC     Comment, (Sit-Stand Transfer) RWx  -BC        Row Name 11/19/24 1222          Stand-Sit Transfer    Stand-Sit [place] (Transfers) 2 person assist;moderate assist (50% patient effort)  -BC     Assistive Device (Stand-Sit Transfers) walker, front-wheeled  -BC       Row Name 11/19/24 1222          Functional Mobility    Functional Mobility- Comment ~3-4 steps up to chair w FWW today  -BC     Patient was able to Ambulate yes  -BC       Row Name 11/19/24 1222          Activities of Daily Living    BADL Assessment/Intervention lower body dressing;feeding  -BC       Row Name 11/19/24 1222          Lower Body Dressing Assessment/Training    [place] Level (Lower Body Dressing) don;socks;dependent (less than 25% patient effort)  -BC       Row Name 11/19/24 1222          Self-Feeding Assessment/Training    [place] Level (Feeding) feeding skills;liquids to mouth;set up;supervision;scoop food and bring to mouth;verbal cues  -BC     Self-Feeding Assess/Train, Spillage Amount minimal  -BC     Position (Feeding) supported sitting  -BC       Row Name 11/19/24 1222          Toileting Assessment/Training    [place] Level (Toileting) dependent (less than 25% patient effort)  -BC               User Key  (r) = Recorded By, (t) = Taken By, (c) = Cosigned By      Initials Name Provider Type    Ricky Stauffer OT Occupational Therapist                   Obj/Interventions       Row Name 11/19/24 1224          Balance    Balance Assessment sitting static balance;sitting dynamic balance;standing static balance;standing dynamic balance  -BC     Static Sitting Balance supervision  -BC     Dynamic Sitting Balance contact guard;minimal assist  -BC     Position, Sitting Balance unsupported;sitting edge of bed  -BC     Static Standing Balance moderate assist;2-person assist  -BC     Dynamic Standing Balance moderate assist;2-person assist;maximum assist  -BC     Position/Device Used, Standing Balance walker, front-wheeled  -BC     Comment, Balance standing and  transfer/balance retraining w/ walker for UE support. improved balance and posture to transition up to chair.  -BC               User Key  (r) = Recorded By, (t) = Taken By, (c) = Cosigned By      Initials Name Provider Type    BC Ricky Hernandez, MARU Occupational Therapist                   Goals/Plan    No documentation.                  Clinical Impression       Row Name 11/19/24 1227          Pain Assessment    Pain Management Interventions breathing exercises utilized;exercise or physical activity utilized  -BC     Response to Pain Interventions activity participation with tolerable pain  -BC     Pre/Posttreatment Pain Comment R sh and R knee pain  -BC       Row Name 11/19/24 1227          Plan of Care Review    Plan of Care Reviewed With patient  -BC     Progress improving  -BC     Outcome Evaluation Pt w/ improved therapy tolerance to OT/PT session this AM and was able to demonstrate w/ increased functional performance/function to transition ~4-5 steps up to chair this AM with assist of 2 for support/safety as needed. Pt encouraged and engaged in self feeding once up in chair w/ goal to improve calorie intake and increase OOB ADL tolerance for rehab. Pt had minimal spillage and assist required for opening all containers/items and encouragement to participate in task with fair performance. Left [facility] with RN notified [facility]. Cont OT services to address impairments and improve function.  -BC       Row Name 11/19/24 1227          Therapy Assessment/Plan (OT)    Rehab Potential (OT) good  -BC     Criteria for Skilled Therapeutic Interventions Met (OT) yes;meets criteria;skilled treatment is necessary  -BC       Row Name 11/19/24 1227          Therapy Plan Review/Discharge Plan (OT)    Anticipated Discharge Disposition (OT) skilled nursing facility  -BC       Row Name 11/19/24 1227          Vital Signs    Pre SpO2 (%) 96  -BC     O2 Delivery Pre Treatment room air  -BC     O2 Delivery Post Treatment room air  -BC      Pre Patient Position Supine  -BC     Intra Patient Position Standing  -BC     Post Patient Position Supine  -BC       Row Name 11/19/24 1227          Positioning and Restraints    Pre-Treatment Position in bed  -BC     Post Treatment Position chair  -BC     In Chair notified nsg;reclined;call light within reach;encouraged to call for assist;exit alarm on  -BC               User Key  (r) = Recorded By, (t) = Taken By, (c) = Cosigned By      Initials Name Provider Type    Ricky Stauffer OT Occupational Therapist                   Outcome Measures       Row Name 11/19/24 1230          How much help from another is currently needed...    Putting on and taking off regular lower body clothing? 1  -BC     Bathing (including washing, rinsing, and drying) 2  -BC     Toileting (which includes using toilet bed pan or urinal) 1  -BC     Putting on and taking off regular upper body clothing 2  -BC     Taking care of personal grooming (such as brushing teeth) 3  -BC     Eating meals 3  -BC     AM-PAC 6 Clicks Score (OT) 12  -BC       Row Name 11/19/24 1230          Functional Assessment    Outcome Measure Options AM-PAC 6 Clicks Daily Activity (OT)  -BC               User Key  (r) = Recorded By, (t) = Taken By, (c) = Cosigned By      Initials Name Provider Type    Ricky Stauffer OT Occupational Therapist                    Occupational Therapy Education       Title: PT OT SLP Therapies (Done)       Topic: Occupational Therapy (Done)       Point: ADL training (Done)       Description:   Instruct learner(s) on proper safety adaptation and remediation techniques during self care or transfers.   Instruct in proper use of assistive devices.                  Learning Progress Summary            Patient Acceptance, E, VU by ALEX at 11/16/2024 1757    Acceptance, E, VU by ALEX at 11/15/2024 1824    Acceptance, E, VU by ALEX at 11/11/2024 1813    Acceptance, E, VU by ALEX at 11/8/2024 1820    Acceptance, E, VU,NR by MM at 11/2/2024 1329     Comment: role of OT, goals, d/c rec                      Point: Precautions (Done)       Description:   Instruct learner(s) on prescribed precautions during self-care and functional transfers.                  Learning Progress Summary            Patient Acceptance, E, VU by JS at 11/16/2024 1757    Acceptance, E, VU by JS at 11/15/2024 1824    Acceptance, E, VU by JS at 11/11/2024 1813    Acceptance, E, VU by JS at 11/8/2024 1820    Acceptance, E, VU,NR by MM at 11/2/2024 1328    Comment: role of OT, goals, d/c rec                      Point: Body mechanics (Done)       Description:   Instruct learner(s) on proper positioning and spine alignment during self-care, functional mobility activities and/or exercises.                  Learning Progress Summary            Patient Acceptance, E, VU by JS at 11/16/2024 1757    Acceptance, E, VU by JS at 11/15/2024 1824    Acceptance, E, VU by JS at 11/11/2024 1813    Acceptance, E, VU by JS at 11/8/2024 1820    Acceptance, E, VU,NR by MM at 11/2/2024 1328    Comment: role of OT, goals, d/c rec                                      User Key       Initials Effective Dates Name Provider Type Discipline     02/26/24 -  Johnson Morales RN Registered Nurse Nurse     05/31/24 -  Yu Chapman OT Occupational Therapist OT                  OT Recommendation and Plan     Plan of Care Review  Plan of Care Reviewed With: patient  Progress: improving  Outcome Evaluation: Pt w/ improved therapy tolerance to OT/PT session this AM and was able to demonstrate w/ increased functional performance/function to transition ~4-5 steps up to chair this AM with assist of 2 for support/safety as needed. Pt encouraged and engaged in self feeding once up in chair w/ goal to improve calorie intake and increase OOB ADL tolerance for rehab. Pt had minimal spillage and assist required for opening all containers/items and encouragement to participate in task with fair performance. Left [facility] with RN  notified [facility]. Cont OT services to address impairments and improve function.     Time Calculation:         Time Calculation- OT       Row Name 11/19/24 1231             Time Calculation- OT    OT Start Time 1104  -BC      OT Stop Time 1127  -BC      OT Time Calculation (min) 23 min  -BC      Total Timed Code Minutes- OT 23 minute(s)  -BC      OT Received On 11/19/24  -BC      OT - Next Appointment 11/20/24  -BC         Timed Charges    13231 - OT Therapeutic Activity Minutes 10  -BC      36261 - OT Self Care/Mgmt Minutes 13  -BC         Total Minutes    Timed Charges Total Minutes 23  -BC       Total Minutes 23  -BC                User Key  (r) = Recorded By, (t) = Taken By, (c) = Cosigned By      Initials Name Provider Type    BC Ricky Hernandez OT Occupational Therapist                  Therapy Charges for Today       Code Description Service Date Service Provider Modifiers Qty    01302860635 HC OT THERAPEUTIC ACT EA 15 MIN 11/19/2024 Ricky Hernandez OT GO 1    41160755497 HC OT SELF CARE/MGMT/TRAIN EA 15 MIN 11/19/2024 Ricky Hernandez OT GO 1                 Ricky Hernandez OT  11/19/2024

## 2024-11-19 NOTE — CONSULTS
Nutrition Services    Patient Name:  Arsh Haynes  YOB: 1950  MRN: [id number]  Admit Date:  10/26/2024    Assessment Date:  11/19/24    CLINICAL NUTRITION - PROGRESS NOTE       Reason for Encounter Other: Calorie Count Consult / Follow Up        Nutrition Order Diet: Regular/House; No Mixed Consistencies, No Straw; Texture: Mechanical Ground (NDD 2); Fluid Consistency: Nectar Thick    Supplements/Snacks Magic Cup TID    EN/PN Order TFs on hold, trying to see if he will eat enough to have cortrak removed     Estimated/Assessed Needs        Current Weight  Weight:  (bed scale is not working) (11/15/24 0533)       Energy Requirements    Weight for Calculation 178 lb (80.9 kg) IBW   Method for Estimation  30-35 kcal/kg   EST Needs (kcal/day) 5437-3874       Protein Requirements    Weight for Calculation 178 lb (80.9 kg) IBW   EST Protein Needs (g/kg) 1.2 - 1.5 gm/kg   EST Daily Needs (g/day)        Fluid Requirements     Method for Estimation 1 mL/kcal    EST Needs (mL/day)            Pertinent Information Eating a little bit, but appetite still poor.  25% x 1 meal per chart PO data.  Appeared to have eaten majority of lunch tray today.  Spoke with patient and RN.  Patient tells me he is dying - reported this to RN.        Intervention/Plan Will follow up tomorrow for day 1 results of calorie count.     RD to follow up per protocol.    Electronically signed by:  Maryann Franklin RD  11/19/24 14:53 EST

## 2024-11-19 NOTE — PLAN OF CARE
Goal Outcome Evaluation:  Plan of Care Reviewed With: patient        Progress: improving  Outcome Evaluation: Pt w/ improved therapy tolerance to OT/PT session this AM and was able to demonstrate w/ increased functional performance/function to transition ~4-5 steps up to chair this AM with assist of 2 for support/safety as needed. Pt encouraged and engaged in self feeding once up in chair w/ goal to improve calorie intake and increase OOB ADL tolerance for rehab. Pt had minimal spillage and assist required for opening all containers/items and encouragement to participate in task with fair performance. Left [facility] with RN notified [facility]. Cont OT services to address impairments and improve function.    Anticipated Discharge Disposition (OT): skilled nursing facility

## 2024-11-20 LAB
ALBUMIN SERPL-MCNC: 2.4 G/DL (ref 3.5–5.2)
ALBUMIN/GLOB SERPL: 0.6 G/DL
ALP SERPL-CCNC: 166 U/L (ref 39–117)
ALT SERPL W P-5'-P-CCNC: 35 U/L (ref 1–41)
ANION GAP SERPL CALCULATED.3IONS-SCNC: 9.9 MMOL/L (ref 5–15)
AST SERPL-CCNC: 29 U/L (ref 1–40)
BASOPHILS # BLD AUTO: 0.11 10*3/MM3 (ref 0–0.2)
BASOPHILS NFR BLD AUTO: 0.9 % (ref 0–1.5)
BILIRUB SERPL-MCNC: 0.6 MG/DL (ref 0–1.2)
BUN SERPL-MCNC: 10 MG/DL (ref 8–23)
BUN/CREAT SERPL: 20.4 (ref 7–25)
CALCIUM SPEC-SCNC: 8.5 MG/DL (ref 8.6–10.5)
CHLORIDE SERPL-SCNC: 99 MMOL/L (ref 98–107)
CO2 SERPL-SCNC: 22.1 MMOL/L (ref 22–29)
CREAT SERPL-MCNC: 0.49 MG/DL (ref 0.76–1.27)
DEPRECATED RDW RBC AUTO: 53.9 FL (ref 37–54)
EGFRCR SERPLBLD CKD-EPI 2021: 109 ML/MIN/1.73
EOSINOPHIL # BLD AUTO: 0.59 10*3/MM3 (ref 0–0.4)
EOSINOPHIL NFR BLD AUTO: 4.9 % (ref 0.3–6.2)
ERYTHROCYTE [DISTWIDTH] IN BLOOD BY AUTOMATED COUNT: 17.6 % (ref 12.3–15.4)
GLOBULIN UR ELPH-MCNC: 4.1 GM/DL
GLUCOSE BLDC GLUCOMTR-MCNC: 105 MG/DL (ref 70–130)
GLUCOSE BLDC GLUCOMTR-MCNC: 114 MG/DL (ref 70–130)
GLUCOSE BLDC GLUCOMTR-MCNC: 128 MG/DL (ref 70–130)
GLUCOSE BLDC GLUCOMTR-MCNC: 134 MG/DL (ref 70–130)
GLUCOSE SERPL-MCNC: 116 MG/DL (ref 65–99)
HCT VFR BLD AUTO: 28.3 % (ref 37.5–51)
HGB BLD-MCNC: 9.1 G/DL (ref 13–17.7)
IMM GRANULOCYTES # BLD AUTO: 0.24 10*3/MM3 (ref 0–0.05)
IMM GRANULOCYTES NFR BLD AUTO: 2 % (ref 0–0.5)
LYMPHOCYTES # BLD AUTO: 1.72 10*3/MM3 (ref 0.7–3.1)
LYMPHOCYTES NFR BLD AUTO: 14.2 % (ref 19.6–45.3)
MCH RBC QN AUTO: 27.5 PG (ref 26.6–33)
MCHC RBC AUTO-ENTMCNC: 32.2 G/DL (ref 31.5–35.7)
MCV RBC AUTO: 85.5 FL (ref 79–97)
MONOCYTES # BLD AUTO: 1.25 10*3/MM3 (ref 0.1–0.9)
MONOCYTES NFR BLD AUTO: 10.3 % (ref 5–12)
NEUTROPHILS NFR BLD AUTO: 67.7 % (ref 42.7–76)
NEUTROPHILS NFR BLD AUTO: 8.17 10*3/MM3 (ref 1.7–7)
NRBC BLD AUTO-RTO: 0 /100 WBC (ref 0–0.2)
PLATELET # BLD AUTO: 831 10*3/MM3 (ref 140–450)
PMV BLD AUTO: 8.6 FL (ref 6–12)
POTASSIUM SERPL-SCNC: 4.4 MMOL/L (ref 3.5–5.2)
PROT SERPL-MCNC: 6.5 G/DL (ref 6–8.5)
RBC # BLD AUTO: 3.31 10*6/MM3 (ref 4.14–5.8)
SODIUM SERPL-SCNC: 131 MMOL/L (ref 136–145)
WBC NRBC COR # BLD AUTO: 12.08 10*3/MM3 (ref 3.4–10.8)

## 2024-11-20 PROCEDURE — 80053 COMPREHEN METABOLIC PANEL: CPT | Performed by: INTERNAL MEDICINE

## 2024-11-20 PROCEDURE — 25010000002 ENOXAPARIN PER 10 MG: Performed by: INTERNAL MEDICINE

## 2024-11-20 PROCEDURE — 82948 REAGENT STRIP/BLOOD GLUCOSE: CPT

## 2024-11-20 PROCEDURE — 99024 POSTOP FOLLOW-UP VISIT: CPT | Performed by: SURGERY

## 2024-11-20 PROCEDURE — 85025 COMPLETE CBC W/AUTO DIFF WBC: CPT | Performed by: INTERNAL MEDICINE

## 2024-11-20 PROCEDURE — 94799 UNLISTED PULMONARY SVC/PX: CPT

## 2024-11-20 RX ADMIN — Medication 100 MG: at 08:34

## 2024-11-20 RX ADMIN — Medication 10 ML: at 21:38

## 2024-11-20 RX ADMIN — ENOXAPARIN SODIUM 90 MG: 100 INJECTION SUBCUTANEOUS at 18:39

## 2024-11-20 RX ADMIN — ENOXAPARIN SODIUM 90 MG: 100 INJECTION SUBCUTANEOUS at 06:10

## 2024-11-20 RX ADMIN — TAMSULOSIN HYDROCHLORIDE 0.4 MG: 0.4 CAPSULE ORAL at 08:34

## 2024-11-20 RX ADMIN — Medication 10 ML: at 08:34

## 2024-11-20 RX ADMIN — LIDOCAINE 2 PATCH: 4 PATCH TOPICAL at 08:35

## 2024-11-20 RX ADMIN — CHLORHEXIDINE GLUCONATE 15 ML: 1.2 RINSE ORAL at 21:37

## 2024-11-20 RX ADMIN — METOPROLOL TARTRATE 100 MG: 50 TABLET, FILM COATED ORAL at 21:37

## 2024-11-20 RX ADMIN — METOPROLOL TARTRATE 100 MG: 50 TABLET, FILM COATED ORAL at 08:34

## 2024-11-20 RX ADMIN — Medication 2.5 MG: at 21:37

## 2024-11-20 RX ADMIN — LANSOPRAZOLE 30 MG: 15 TABLET, ORALLY DISINTEGRATING ORAL at 06:10

## 2024-11-20 RX ADMIN — ACETAMINOPHEN 325MG 650 MG: 325 TABLET ORAL at 21:56

## 2024-11-20 RX ADMIN — LIDOCAINE 1 PATCH: 4 PATCH TOPICAL at 08:35

## 2024-11-20 RX ADMIN — FOLIC ACID 1 MG: 1 TABLET ORAL at 08:35

## 2024-11-20 RX ADMIN — CHLORHEXIDINE GLUCONATE 15 ML: 1.2 RINSE ORAL at 08:34

## 2024-11-20 NOTE — CASE MANAGEMENT/SOCIAL WORK
Continued Stay Note  [facility]     Patient Name: Arsh Haynes  MRN: [id number]  Today's Date: 11/20/2024    Admit Date: 10/26/2024    Plan: Plan [place] when medically appropriate- pre cert needed.  BENITA Clay RN   Discharge Plan       Row Name 11/20/24 1227       Plan    Plan Plan [place] when medically appropriate- pre cert needed.  BENITA Clay RN    Patient/Family in Agreement with Plan yes    Plan Comments Pt's Cor Trak discontinued today.   Spoke with pt at bedside.  Pt states he would like to go home but is extremely weak.  Pt's spouse had requested a referral to [facility] and Valentina ( [phone]) is following.  Explained to pt importance of working with PT and pre cert is needed for skilled care.  Attemped to call pt's spouse (Mel[place] [phone]) to discuss discharge plan.   Plan [place] when medically appropriate- pre cert needed. BENITA Clay RN                   Discharge Codes    No documentation.                 Expected Discharge Date and Time       Expected Discharge Date Expected Discharge Time    Nov 22, 2024               Felecia Clay RN

## 2024-11-20 NOTE — NURSING NOTE
"   11/20/24 0756   Wound 10/26/24 2301 midline abdomen   Placement Date/Time: 10/26/24 2301   Orientation: midline  Location: abdomen  Primary Wound Type: Incision   Dressing Appearance dry;intact  (vac in place)   Base clean;moist;granulating   Periwound intact   Edges open   Drainage Characteristics/Odor serosanguineous   Drainage Amount scant   Care, Wound cleansed with;sterile normal saline;negative pressure wound therapy   Dressing Care dressing applied;dressing changed;foam;transparent film   Periwound Care barrier film applied   NPWT (Negative Pressure Wound Therapy) 11/13/24 abd   Placement Date: 11/13/24   Location: abd   Therapy Setting continuous therapy   Dressing foam, black;transparent dressing   Pressure Setting 125 mmHg   Sponges Inserted 2   Sponges Removed 2   Colostomy LLQ   Placement date: If unknown, DO NOT use \"Add Comment\" note: 10/26/24   Inserted by: Dr Guillaume  Location: LLQ   Stomal Appliance 2 piece;Clean;Dry;Intact;Drainable   Stoma Appearance moist;red   Peristomal Assessment CARLOS EDUARDO   Stoma Function stool;flatus   Stool Color brown, light   Stool Consistency soft   Treatment Placement checked   Output (mL) 100 mL     WOCN: ostomy appliance due to be changed Friday. He wears a 2 1/4 2 piece pedro with adapt ring. Wound VAC change due Friday. Good seal obtained, small piece of adapt ring placed at umb to assist with seal. Continue to change vac Monday , Wednesday and Friday. He is very weak and continues to not to want to eat. No ostomy teaching. Patient had eyes closed thru vac change.   "

## 2024-11-20 NOTE — PROGRESS NOTES
Postoperative day 24    No issues  Calorie count ongoing  No pain    White blood cell count 12, downtrending    Plan   Remote core track tube today  Continue calorie count

## 2024-11-20 NOTE — PLAN OF CARE
Goal Outcome Evaluation:      Pt is A&Ox4; up in chair for a hour today; tolerating diet; Q2 turning in bed; wound vac in place; ostomy with good output; medicated per orders; plan of care on going.

## 2024-11-20 NOTE — PROGRESS NOTES
"Nutrition Services    Patient Name:  Arsh Haynes  YOB: 1950  MRN: [id number]  Admit Date:  10/26/2024  Assessment Date:  11/20/24    Summary: Calorie Count Follow Up     Cortrak removed today per Dr. Guillaume's orders.      1 day calorie count (as ordered by Dr. Ac) completed.     RN reports patient's intake improving.  Not a big breakfast eater.  Ate 100% of lunch yesterday and today, intake from dinner last night not documented per calorie count (only documented as 25% in chart).      Intake from lunch only yesterday: 530 kcal/14 grams protein  Intake from lunch only today: 640 kcal/24 gram protein    Plans/Recommendations:  Continue to encourage PO intake.  Continue Magic Cups TID.    RD to follow closely.    CLINICAL NUTRITION ASSESSMENT      Reason for Assessment Calorie Count, Follow-up Protocol     Diagnosis/Problem   Peritonitis      Medical/Surgical History Past Medical History:   Diagnosis Date    HTN (hypertension)     Hypercholesterolemia        Past Surgical History:   Procedure Laterality Date    COLON RESECTION N/A 10/26/2024    Procedure: LOW ANTERIOR COLON RESECTION SIGMOID, COLOSTOMY, SPLENIC FLEXURE MOBILIZATION;  Surgeon: Mc Guillaume MD;  Location: [facility];  Service: General;  Laterality: N/A;    EXPLORATORY LAPAROTOMY N/A 10/26/2024    Procedure: LAPAROTOMY EXPLORATORY, LEFT HEMICOLECTOMY;  Surgeon: Mc Guillaume MD;  Location: [facility];  Service: General;  Laterality: N/A;        Anthropometrics        Current Height  Current Weight  BMI kg/m2 Height: 182.9 cm (72\")  Weight:  (bed scale is not working) (11/15/24 0533)  Body mass index is 25.56 kg/m².   Adjusted BMI (if applicable)    BMI Category Overweight (25 - 29.9)   Ideal Body Weight (IBW) 178 lb (80.9 kg)   Usual Body Weight (UBW) 202 lb (9/2023)   Weight Trend Loss, Unknown   Weight History Wt Readings from Last 30 Encounters:   11/08/24 0357 85.5 kg (188 lb 7.9 oz)   11/07/24 " 0500 85.1 kg (187 lb 9.8 oz)   11/06/24 0600 85.1 kg (187 lb 9.8 oz)   11/06/24 0100 84.2 kg (185 lb 10 oz)   11/04/24 0415 87 kg (191 lb 12.8 oz)   11/04/24 0152 87 kg (191 lb 12.8 oz)   11/02/24 0543 89.8 kg (197 lb 15.6 oz)   11/01/24 0548 92.6 kg (204 lb 2.3 oz)   10/31/24 0448 96 kg (211 lb 10.3 oz)   10/30/24 0300 98.4 kg (216 lb 14.9 oz)   10/29/24 0431 92.5 kg (203 lb 14.8 oz)   10/28/24 0436 90.2 kg (198 lb 13.7 oz)   10/27/24 0404 83.4 kg (183 lb 13.8 oz)   10/26/24 1933 83 kg (183 lb)      --  Estimated/Assessed Needs        Current Weight  Weight:  (bed scale is not working) (11/15/24 0533)       Energy Requirements    Weight for Calculation 178 lb (80.9 kg) IBW   Method for Estimation  30-35 kcal/kg   EST Needs (kcal/day) 8108-5161       Protein Requirements    Weight for Calculation 178 lb (80.9 kg) IBW   EST Protein Needs (g/kg) 1.2 - 1.5 gm/kg   EST Daily Needs (g/day)        Fluid Requirements     Method for Estimation 1 mL/kcal    EST Needs (mL/day)      Labs       Pertinent Labs    Results from last 7 days   Lab Units 11/20/24  0333 11/19/24  0443 11/18/24  0532   SODIUM mmol/L 131* 133* 133*   POTASSIUM mmol/L 4.4 4.0 3.9   CHLORIDE mmol/L 99 100 99   CO2 mmol/L 22.1 22.9 22.3   BUN mg/dL 10 14 16   CREATININE mg/dL 0.49* 0.51* 0.50*   CALCIUM mg/dL 8.5* 8.6 8.7   BILIRUBIN mg/dL 0.6 0.5 0.3   ALK PHOS U/L 166* 178* 204*   ALT (SGPT) U/L 35 35 38   AST (SGOT) U/L 29 28 28   GLUCOSE mg/dL 116* 86 156*     Results from last 7 days   Lab Units 11/20/24 0333 11/19/24 0443 11/18/24  0532 11/17/24  0540 11/16/24  0430   MAGNESIUM mg/dL  --   --  1.9 1.9 2.0   PHOSPHORUS mg/dL  --   --  3.5 3.4 3.6   HEMOGLOBIN g/dL 9.1*   < > 9.3* 8.8* 9.5*   HEMATOCRIT % 28.3*   < > 28.9* 28.8* 30.0*   WBC 10*3/mm3 12.08*   < > 13.05* 13.69* 12.11*   ALBUMIN g/dL 2.4*   < > 2.6* 2.6* 2.2*    < > = values in this interval not displayed.     Results from last 7 days   Lab Units 11/20/24 0333 11/19/24  0443  "11/18/24  0532 11/17/24  0540 11/16/24  0430   PLATELETS 10*3/mm3 831* 867* 820* 866* 795*     No results found for: \"COVID19\"  Lab Results   Component Value Date    HGBA1C 6.60 (H) 10/26/2024          Medications           Scheduled Medications chlorhexidine, 15 mL, Mouth/Throat, Q12H  enoxaparin, 1 mg/kg, Subcutaneous, Q12H  folic acid, 1 mg, Nasogastric, Daily  insulin lispro, 2-9 Units, Subcutaneous, 4x Daily AC & at Bedtime  lansoprazole, 30 mg, Nasogastric, Q AM  Lidocaine, 1 patch, Transdermal, Q24H  Lidocaine, 2 patch, Transdermal, Q24H  metoprolol tartrate, 100 mg, Nasogastric, Q12H  sodium chloride, 10 mL, Intravenous, Q12H  sodium hypochlorite, , Topical, BID  tamsulosin, 0.4 mg, Oral, Daily  thiamine, 100 mg, Nasogastric, Daily       Infusions       PRN Medications   acetaminophen **OR** acetaminophen    Calcium Replacement - Follow Nurse / BPA Driven Protocol    dextrose    dextrose    dextrose    dextrose    glucagon (human recombinant)    glucagon (human recombinant)    labetalol    Magnesium Standard Dose Replacement - Follow Nurse / BPA Driven Protocol    melatonin    nitroglycerin    ondansetron    Phosphorus Replacement - Follow Nurse / BPA Driven Protocol    Potassium Replacement - Follow Nurse / BPA Driven Protocol    [COMPLETED] Insert Peripheral IV **AND** sodium chloride    sodium chloride    sodium chloride    sodium chloride     Physical Findings          General Findings alert, oriented, overweight, room air   Oral/Mouth Cavity tooth or teeth missing   Edema  generalized, lower extremity , upper extremity, 1+ (trace)   Gastrointestinal normoactive, last bowel movement: 11/17   Skin  bruising, pressure injury: bilateral gluteal, surgical incision: midline abdomen, R upper abdomen, R lower abdomen, wound VAC   Tubes/Drains/Lines colostomy   NFPE Other: follow up to perform   --  Current Nutrition Orders & Evaluation of Intake       Oral Nutrition     Food Allergies NKFA   Current PO Diet " Diet: Regular/House; No Mixed Consistencies, No Straw; Texture: Mechanical Ground (NDD 2); Fluid Consistency: Nectar Thick   Supplement Magic Cup, TID   PO Evaluation     % PO Intake 0-100%    Factors Affecting Intake: altered GI function, altered respiratory status   -  PES STATEMENT / NUTRITION DIAGNOSIS      Nutrition Dx Problem  Problem: Inadequate Oral Intake  Etiology: Medical Diagnosis - sigmoid perforation, just extubated this am, NGT in place    Signs/Symptoms: Report/Observation     NUTRITION INTERVENTION / PLAN OF CARE      Intervention Goal(s) Maintain nutrition status, Reduce/improve symptoms, Meet estimated needs, Disease management/therapy, Tolerate PO , Increase intake, and No significant weight loss         RD Intervention/Action Continue to monitor, Care plan reviewed, and Recommend/order: ONS   --      Prescription/Orders:       PO Diet       Supplements Magic Cup TID      Enteral Nutrition       Parenteral Nutrition    New Prescription Ordered? Continue same per protocol   --      Monitor/Evaluation Per protocol, PO intake, Supplement intake, Pertinent labs, Weight, Skin status, GI status, Symptoms, Swallow function   Discharge Plan/Needs Pending clinical course   --    RD to follow per protocol.      Electronically signed by:  Maryann Franklin RD  11/20/24 14:25 EST

## 2024-11-20 NOTE — PLAN OF CARE
Goal Outcome Evaluation:  Plan of Care Reviewed With: patient        Progress: improving  Outcome Evaluation: VSS, NSR to Sinus tach on monitor,tolerating diet, c/o right shoulder pain, no request for pain med, Eyes closed at short intervals, resting quietly in room

## 2024-11-20 NOTE — SIGNIFICANT NOTE
11/20/24 1134   OTHER   Discipline physical therapist   Rehab Time/Intention   Session Not Performed other (see comments)  (Pt declined PT tx at this time. States waiting for DHT to be removed and would like to sit in the chair position in the bed for lunch. Encouraged pt to sit UIC for dinner, which he agreed with. RN made aware. PT to follow up tomorrow for tx.)   Therapy Assessment/Plan (PT)   Criteria for Skilled Interventions Met (PT) yes   Recommendation   PT - Next Appointment 11/21/24

## 2024-11-20 NOTE — PROGRESS NOTES
[facility] Medicine Services  PROGRESS NOTE    Patient Name: Arsh Haynes  : 1950  MRN: [id number]    Date of Admission: 10/26/2024  Primary Care Physician: Provider, No Known    Subjective   Subjective     CC:  Follow-up multiple issues    Subjective:  Patient remains a somewhat poor historian.  He is resting in bed when I evaluated him.  He reports eating some yesterday but is unable to quantify well.  He understands we are working on calorie count.  No other new complaint today.    Review of Systems  No current fevers or chills  No current shortness of breath or cough  No current chest pain or palpitations       Objective   Objective     Vital Signs:   Temp:  [97.5 °F (36.4 °C)-98 °F (36.7 °C)] 98 °F (36.7 °C)  Heart Rate:  [] 100  Resp:  [16] 16  BP: ()/(60-67) 115/67        Physical Exam:  Constitutional:Awake, alert, elderly appearing  HENT: NCAT, mucous membranes moist, neck supple  Respiratory: No cough or wheezes, normal respirations, nonlabored breathing   Cardiovascular: Pulse rate is borderline tachycardic, palpable radial pulses  Gastrointestinal: Postoperative changes, soft, some expected tenderness, nondistended  Musculoskeletal: Frail and debilitated appearance, BMI is 25  Psychiatric: Flat affect, cooperative, conversational  Neurologic: No slurred speech or facial droop, follows commands  Skin: No rashes or jaundice, warm      Results Reviewed:  Results from last 7 days   Lab Units 24  0333 11/19/24  0443 24  0532   WBC 10*3/mm3 12.08* 14.06* 13.05*   HEMOGLOBIN g/dL 9.1* 9.0* 9.3*   HEMATOCRIT % 28.3* 27.9* 28.9*   PLATELETS 10*3/mm3 831* 867* 820*     Results from last 7 days   Lab Units 24  0333 243 24  0532   SODIUM mmol/L 131* 133* 133*   POTASSIUM mmol/L 4.4 4.0 3.9   CHLORIDE mmol/L 99 100 99   CO2 mmol/L 22.1 22.9 22.3   BUN mg/dL 10 14 16   CREATININE mg/dL 0.49* 0.51* 0.50*   GLUCOSE mg/dL 116* 86 156*   CALCIUM mg/dL 8.5* 8.6  8.7   ALK PHOS U/L 166* 178* 204*   ALT (SGPT) U/L 35 35 38   AST (SGOT) U/L 29 28 28     Estimated Creatinine Clearance: 164.8 mL/min (A) (by C-G formula based on SCr of 0.49 mg/dL (L)).        Imaging Results (Last 24 Hours)       ** No results found for the last 24 hours. **                I have reviewed the medications:  Scheduled Meds:chlorhexidine, 15 mL, Mouth/Throat, Q12H  enoxaparin, 1 mg/kg, Subcutaneous, Q12H  folic acid, 1 mg, Nasogastric, Daily  insulin lispro, 2-9 Units, Subcutaneous, 4x Daily AC & at Bedtime  lansoprazole, 30 mg, Nasogastric, Q AM  Lidocaine, 1 patch, Transdermal, Q24H  Lidocaine, 2 patch, Transdermal, Q24H  metoprolol tartrate, 100 mg, Nasogastric, Q12H  sodium chloride, 10 mL, Intravenous, Q12H  sodium hypochlorite, , Topical, BID  tamsulosin, 0.4 mg, Oral, Daily  thiamine, 100 mg, Nasogastric, Daily      Continuous Infusions:   PRN Meds:.  acetaminophen **OR** acetaminophen    Calcium Replacement - Follow Nurse / BPA Driven Protocol    dextrose    dextrose    dextrose    dextrose    glucagon (human recombinant)    glucagon (human recombinant)    labetalol    Magnesium Standard Dose Replacement - Follow Nurse / BPA Driven Protocol    melatonin    nitroglycerin    ondansetron    Phosphorus Replacement - Follow Nurse / BPA Driven Protocol    Potassium Replacement - Follow Nurse / BPA Driven Protocol    [COMPLETED] Insert Peripheral IV **AND** sodium chloride    sodium chloride    sodium chloride    sodium chloride    Assessment & Plan   Assessment & Plan     Active Hospital Problems    Diagnosis  POA    **Peritonitis [K65.9]  Unknown    Oropharyngeal dysphagia [R13.12]  Unknown    HTN (hypertension) [I10]  Unknown    Thrombocytosis [D75.839]  Unknown    Acute DVT (deep venous thrombosis) [I82.409]  Unknown    Renal mass [N28.89]  Unknown    Hyponatremia [E87.1]  Unknown    Anemia [D64.9]  Unknown    Moderate protein-calorie malnutrition [E44.0]  Yes    Colon cancer [C18.9]  No       Resolved Hospital Problems    Diagnosis Date Resolved POA    Perforation of sigmoid colon [K63.1] 10/27/2024 Yes        Brief Hospital Course to date:  Arsh Haynes is a 74 y.o. male admitted to ICU with peritonitis due to perforated rectosigmoid adenocarcinoma.  Hospital medicine service assumed care from the critical care team    Discussion/plan for today:  Case discussed with surgery.  Calorie count is underway to determine if patient is having enough oral intake to remove NG tube.  Otherwise patient may require a PEG tube in order to maximize nutrition following surgery.  Plan to follow-up on results of calorie count later today and discussed with nutrition team.  Continue dysphagia diet.  Long-acting insulin discontinued now that patient is off tube feeding.  Continue to monitor with correction scale.  A1c only 6.6.  Continue PT OT for debility.  Pulse improved on adjusted metoprolol. Trend sodium, slightly lower.  Trend anemia, stable.  No active bleeding noted.  Otherwise per below as per my partner.  For BPH, change Hytrin to Flomax now that able to swallow pills.      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  Patient remains afebrile with no signs of new infection. Continue to monitor.  Wound care following for postoperative wound management.  -Heme/Onc has seen, recommends outpt f/u with Dr. Serra in 5-8 weeks.     Sinus tachycardia: EKG showed sinus tach. Continue metoprolol, adjusted 11/19. Continue to closely monitor BP. Stable     Acute RLE DVT found on 11/5:  He has a severe thrombocytosis and in addition to his anemia as well as the colon cancer. Hematology/Oncology following. Continue therapeutic Lovenox. Ultimately can change to Eliquis.     Anemia, multifactorial: Hgb fairly stable today. S/p 2 units PRBCs earlier. Hematology/Oncology  following.  transfuse for Hgb less than 7.  Folic acid supplement      Thrombocytosis:  Reactive per Hematology/Oncology.      Right renal mass and urinary retention: Urology evaluated the renal mass and urinary retention. Blankenship catheter out now. Voiding w/o issue and Cr fine. Continue alpha-blocker. He will follow-up in 4 to 6 weeks with repeat CT for the renal mass which Urology feels is most likely RCC.      Hyponatremia: Monitor Repeat BMP with morning labs.  Currently only mildly low.     Hypokalemia: Replacing as needed.      DM2: He required Lantus and SSI while on tube feeding, requiring last insulin on oral diet. A1c 6.6. No history of DM per pt.      Dysphagia: SLP evaluated,  he required tube feeding.  Dysphagia diet.     Right knee pain, right shoulder pain: Orthopedic surgery consulted. No significant effusion to perform bedside arthrocentesis. XR of right shoulder with severe arthropathic changes only. Ortho not concerned for septic joints. Patient also reported left middle knuckle pain. XR unremarkable.  Supportive care.  Improving        Pharmacy to dose Lovenox for DVT prophylaxis.  DNR.    Disposition: I expect the patient to be discharged to SNF once further improved    CODE STATUS:   Code Status and Medical Interventions: No CPR (Do Not Attempt to Resuscitate); Limited Support; No intubation (DNI)   Ordered at: 11/13/24 1001     Medical Intervention Limits:    No intubation (DNI)     Code Status (Patient has no pulse and is not breathing):    No CPR (Do Not Attempt to Resuscitate)     Medical Interventions (Patient has pulse or is breathing):    Limited Support       José Manuel Ac MD  11/20/24

## 2024-11-21 LAB
ALBUMIN SERPL-MCNC: 2.6 G/DL (ref 3.5–5.2)
ALBUMIN/GLOB SERPL: 0.6 G/DL
ALP SERPL-CCNC: 160 U/L (ref 39–117)
ALT SERPL W P-5'-P-CCNC: 32 U/L (ref 1–41)
ANION GAP SERPL CALCULATED.3IONS-SCNC: 10.3 MMOL/L (ref 5–15)
AST SERPL-CCNC: 28 U/L (ref 1–40)
BASOPHILS # BLD AUTO: 0.07 10*3/MM3 (ref 0–0.2)
BASOPHILS NFR BLD AUTO: 0.7 % (ref 0–1.5)
BILIRUB SERPL-MCNC: 0.7 MG/DL (ref 0–1.2)
BUN SERPL-MCNC: 7 MG/DL (ref 8–23)
BUN/CREAT SERPL: 14.6 (ref 7–25)
CALCIUM SPEC-SCNC: 8.6 MG/DL (ref 8.6–10.5)
CHLORIDE SERPL-SCNC: 98 MMOL/L (ref 98–107)
CO2 SERPL-SCNC: 20.7 MMOL/L (ref 22–29)
CREAT SERPL-MCNC: 0.48 MG/DL (ref 0.76–1.27)
DEPRECATED RDW RBC AUTO: 51.9 FL (ref 37–54)
EGFRCR SERPLBLD CKD-EPI 2021: 109.7 ML/MIN/1.73
EOSINOPHIL # BLD AUTO: 0.58 10*3/MM3 (ref 0–0.4)
EOSINOPHIL NFR BLD AUTO: 5.5 % (ref 0.3–6.2)
ERYTHROCYTE [DISTWIDTH] IN BLOOD BY AUTOMATED COUNT: 17.2 % (ref 12.3–15.4)
GLOBULIN UR ELPH-MCNC: 4.3 GM/DL
GLUCOSE BLDC GLUCOMTR-MCNC: 123 MG/DL (ref 70–130)
GLUCOSE BLDC GLUCOMTR-MCNC: 139 MG/DL (ref 70–130)
GLUCOSE BLDC GLUCOMTR-MCNC: 148 MG/DL (ref 70–130)
GLUCOSE BLDC GLUCOMTR-MCNC: 179 MG/DL (ref 70–130)
GLUCOSE SERPL-MCNC: 124 MG/DL (ref 65–99)
HCT VFR BLD AUTO: 29.3 % (ref 37.5–51)
HGB BLD-MCNC: 9.7 G/DL (ref 13–17.7)
IMM GRANULOCYTES # BLD AUTO: 0.16 10*3/MM3 (ref 0–0.05)
IMM GRANULOCYTES NFR BLD AUTO: 1.5 % (ref 0–0.5)
LYMPHOCYTES # BLD AUTO: 1.59 10*3/MM3 (ref 0.7–3.1)
LYMPHOCYTES NFR BLD AUTO: 15.1 % (ref 19.6–45.3)
MCH RBC QN AUTO: 28 PG (ref 26.6–33)
MCHC RBC AUTO-ENTMCNC: 33.1 G/DL (ref 31.5–35.7)
MCV RBC AUTO: 84.4 FL (ref 79–97)
MONOCYTES # BLD AUTO: 1.05 10*3/MM3 (ref 0.1–0.9)
MONOCYTES NFR BLD AUTO: 10 % (ref 5–12)
NEUTROPHILS NFR BLD AUTO: 67.2 % (ref 42.7–76)
NEUTROPHILS NFR BLD AUTO: 7.09 10*3/MM3 (ref 1.7–7)
NRBC BLD AUTO-RTO: 0 /100 WBC (ref 0–0.2)
PLATELET # BLD AUTO: 773 10*3/MM3 (ref 140–450)
PMV BLD AUTO: 8.3 FL (ref 6–12)
POTASSIUM SERPL-SCNC: 4.3 MMOL/L (ref 3.5–5.2)
PROT SERPL-MCNC: 6.9 G/DL (ref 6–8.5)
RBC # BLD AUTO: 3.47 10*6/MM3 (ref 4.14–5.8)
SODIUM SERPL-SCNC: 129 MMOL/L (ref 136–145)
WBC NRBC COR # BLD AUTO: 10.54 10*3/MM3 (ref 3.4–10.8)

## 2024-11-21 PROCEDURE — 97530 THERAPEUTIC ACTIVITIES: CPT

## 2024-11-21 PROCEDURE — 63710000001 INSULIN LISPRO (HUMAN) PER 5 UNITS: Performed by: INTERNAL MEDICINE

## 2024-11-21 PROCEDURE — 97535 SELF CARE MNGMENT TRAINING: CPT

## 2024-11-21 PROCEDURE — 80053 COMPREHEN METABOLIC PANEL: CPT | Performed by: INTERNAL MEDICINE

## 2024-11-21 PROCEDURE — 94760 N-INVAS EAR/PLS OXIMETRY 1: CPT

## 2024-11-21 PROCEDURE — 25010000002 ENOXAPARIN PER 10 MG: Performed by: INTERNAL MEDICINE

## 2024-11-21 PROCEDURE — 85025 COMPLETE CBC W/AUTO DIFF WBC: CPT | Performed by: INTERNAL MEDICINE

## 2024-11-21 PROCEDURE — 99024 POSTOP FOLLOW-UP VISIT: CPT | Performed by: SURGERY

## 2024-11-21 PROCEDURE — 82948 REAGENT STRIP/BLOOD GLUCOSE: CPT

## 2024-11-21 PROCEDURE — 94799 UNLISTED PULMONARY SVC/PX: CPT

## 2024-11-21 RX ORDER — FOLIC ACID 1 MG/1
1 TABLET ORAL DAILY
Status: DISCONTINUED | OUTPATIENT
Start: 2024-11-21 | End: 2024-11-30 | Stop reason: HOSPADM

## 2024-11-21 RX ORDER — METOPROLOL TARTRATE 50 MG
100 TABLET ORAL EVERY 12 HOURS SCHEDULED
Status: DISCONTINUED | OUTPATIENT
Start: 2024-11-21 | End: 2024-11-30 | Stop reason: HOSPADM

## 2024-11-21 RX ADMIN — LANSOPRAZOLE 30 MG: 15 TABLET, ORALLY DISINTEGRATING ORAL at 05:29

## 2024-11-21 RX ADMIN — CHLORHEXIDINE GLUCONATE 15 ML: 1.2 RINSE ORAL at 21:14

## 2024-11-21 RX ADMIN — THIAMINE HCL TAB 100 MG 100 MG: 100 TAB at 08:43

## 2024-11-21 RX ADMIN — METOPROLOL TARTRATE 100 MG: 50 TABLET, FILM COATED ORAL at 08:43

## 2024-11-21 RX ADMIN — INSULIN LISPRO 2 UNITS: 100 INJECTION, SOLUTION INTRAVENOUS; SUBCUTANEOUS at 11:51

## 2024-11-21 RX ADMIN — TAMSULOSIN HYDROCHLORIDE 0.4 MG: 0.4 CAPSULE ORAL at 08:43

## 2024-11-21 RX ADMIN — METOPROLOL TARTRATE 100 MG: 50 TABLET, FILM COATED ORAL at 21:14

## 2024-11-21 RX ADMIN — ENOXAPARIN SODIUM 90 MG: 100 INJECTION SUBCUTANEOUS at 17:14

## 2024-11-21 RX ADMIN — ENOXAPARIN SODIUM 90 MG: 100 INJECTION SUBCUTANEOUS at 05:29

## 2024-11-21 RX ADMIN — FOLIC ACID 1 MG: 1 TABLET ORAL at 08:43

## 2024-11-21 RX ADMIN — Medication 10 ML: at 21:15

## 2024-11-21 NOTE — PLAN OF CARE
"Goal Outcome Evaluation:  Plan of Care Reviewed With: patient, family        Progress: no change  Outcome Evaluation: Pt seen for PT tx this PM and tolerated the session fairly. Pt agreeable to attempt sitting UIC for dinner. Today, pt was Mod/MaxA for bed mobility and Mod/MaxA x2 for two STS to RW from an elevated bed height. Pt was unable to successfully weight shift in order to side step to the chair. Pt states his legs \"just feel weak\" today. Pt was returned to supine where he performed rolling L<>R for brief change/clean up by his RN and this PT assisting. Pt was agreeable to being in the chair position in the bed for dinner and was assisted w/ set-up of his dinner, which he was able to take a couple bites of prior to this PT exit. PT will cont to follow and rec rehab at [facility].    Anticipated Discharge Disposition (PT): skilled nursing facility                        " no pain, swelling or deformity of joints

## 2024-11-21 NOTE — PROGRESS NOTES
Postoperative day 25    No issues,  Calorie count recorded as 100% for lunch and possible 25 for dinner, no really documented  He feels weak.  He is otherwise recovering slowly  Vital signs are within normal limits  Abdomen soft, nontender, ostomy pink and viable with stool    White blood cell count has normalized  Sodium 129 from 131    Plan:   -I think the patient will probably do fine with current p.o. intake.  I do not think that he will need to have gastrostomy tube placed he would benefit from  -He will benefit from rehab  -He will need to follow-up in my office in 2 weeks for follow-up.  He will eventually need to have colonoscopy for evaluation of right the remainder of the colon  -He will follow-up with urology regarding kidney mass   -Okay okay for discharge whenever medically ready    Mc Guillaume MD, FACS  General, Minimally Invasive and Endoscopic Surgery  [facility], [place], operated by [facility]    [address], [address]  [place], [place], [address]  P: [phone]  F: [phone]

## 2024-11-21 NOTE — THERAPY TREATMENT NOTE
Patient Name: Arsh Haynes  : 1950    MRN: [id number]                              Today's Date: 2024       Admit Date: 10/26/2024    Visit Dx:     ICD-10-CM ICD-9-CM   1. Perforation of sigmoid colon  K63.1 569.83   2. Hypotension, unspecified hypotension type  I95.9 458.9   3. Increased lactic acid level  R79.89 276.2   4. Bowel perforation  K63.1 569.83     Patient Active Problem List   Diagnosis    Colon cancer    HTN (hypertension)    Thrombocytosis    Acute DVT (deep venous thrombosis)    Renal mass    Hyponatremia    Anemia    Peritonitis    Moderate protein-calorie malnutrition    Oropharyngeal dysphagia     Past Medical History:   Diagnosis Date    HTN (hypertension)     Hypercholesterolemia      Past Surgical History:   Procedure Laterality Date    COLON RESECTION N/A 10/26/2024    Procedure: LOW ANTERIOR COLON RESECTION SIGMOID, COLOSTOMY, SPLENIC FLEXURE MOBILIZATION;  Surgeon: Mc Guillaume MD;  Location: [facility];  Service: General;  Laterality: N/A;    EXPLORATORY LAPAROTOMY N/A 10/26/2024    Procedure: LAPAROTOMY EXPLORATORY, LEFT HEMICOLECTOMY;  Surgeon: Mc Guillaume MD;  Location: [facility];  Service: General;  Laterality: N/A;      General Information       Row Name 24          Physical Therapy Time and Intention    Document Type therapy note (daily note);progress note/recertification  -MG     Mode of Treatment individual therapy;physical therapy  -MG       Row Name 24 164          General Information    Patient Profile Reviewed yes  -MG     Existing Precautions/Restrictions fall  Abdominal wound vac, colostomy  -MG     Barriers to Rehab medically complex  -MG       Row Name 24 164          Cognition    Orientation Status (Cognition) oriented x 4  -MG       Row Name 24 164          Safety Issues/Impairments Affecting Functional Mobility    Safety Issues Affecting Function (Mobility) insight into  deficits/self-awareness;safety precaution awareness;sequencing abilities  -MG     Impairments Affecting Function (Mobility) strength;balance;endurance/activity tolerance;coordination;pain;postural/trunk control;range of motion (ROM);cognition;grasp  -MG     Comment, Safety Issues/Impairments (Mobility) Gait belt and non-skid socks donned. RN present to assist w/ mobility.  -MG               User Key  (r) = Recorded By, (t) = Taken By, (c) = Cosigned By      Initials Name Provider Type    MG Liv Cruz, PT Physical Therapist                   Mobility       Row Name 11/21/24 1647          Bed Mobility    Bed Mobility rolling left;rolling right  -MG     Rolling Left [place] (Bed Mobility) maximum assist (25% patient effort);verbal cues  -MG     Rolling Right [place] (Bed Mobility) moderate assist (50% patient effort);verbal cues  -MG     Scooting/Bridging [place] (Bed Mobility) maximum assist (25% patient effort);2 person assist;verbal cues  -MG     Supine-Sit [place] (Bed Mobility) moderate assist (50% patient effort);verbal cues;nonverbal cues (demo/gesture)  -MG     Sit-Supine [place] (Bed Mobility) maximum assist (25% patient effort);verbal cues  -MG     Assistive Device (Bed Mobility) head of bed elevated;bed rails;repositioning sheet  -MG     Comment, (Bed Mobility) Increased time to complete and rest breaks needed. Required increased assist to supine d/t lack of trunk control and RUE use for reverse log roll. Pt rolled L<>R for brief change and clean up.  -MG       Row Name 11/21/24 1647          Sit-Stand Transfer    Sit-Stand [place] (Transfers) moderate assist (50% patient effort);maximum assist (25% patient effort);2 person assist;verbal cues;nonverbal cues (demo/gesture)  -MG     Assistive Device (Sit-Stand Transfers) walker, front-wheeled  -MG     Comment, (Sit-Stand Transfer) x2 from elevated EOB. Required assist w/ foot placement and RUE placement on RW. Unable to  weight shift appropriately for side steps to the chair.  -MG               User Key  (r) = Recorded By, (t) = Taken By, (c) = Cosigned By      Initials Name Provider Type    MG Liv Cruz PT Physical Therapist                   Obj/Interventions       Row Name 11/21/24 1654          Balance    Static Sitting Balance supervision  -MG     Dynamic Sitting Balance standby assist  -MG     Position, Sitting Balance sitting edge of bed  -MG     Static Standing Balance moderate assist;2-person assist;verbal cues  -MG     Position/Device Used, Standing Balance supported;walker, front-wheeled  -MG               User Key  (r) = Recorded By, (t) = Taken By, (c) = Cosigned By      Initials Name Provider Type    MG Liv Cruz, PT Physical Therapist                   Goals/Plan       Row Name 11/21/24 1658          Bed Mobility Goal 1 (PT)    Progress/Outcomes (Bed Mobility Goal 1, PT) goal ongoing;continuing progress toward goal  -MG       Row Name 11/21/24 1658          Transfer Goal 1 (PT)    Progress/Outcome (Transfer Goal 1, PT) goal ongoing  -MG       Row Name 11/21/24 1658          Gait Training Goal 1 (PT)    Progress/Outcome (Gait Training Goal 1, PT) goal no longer appropriate  -MG               User Key  (r) = Recorded By, (t) = Taken By, (c) = Cosigned By      Initials Name Provider Type    MG Liv Cruz, PT Physical Therapist                   Clinical Impression       Row Name 11/21/24 1654          Pain    Pretreatment Pain Rating 5/10  -MG     Posttreatment Pain Rating 5/10  -MG     Pain Location knee;shoulder  -MG     Pain Side/Orientation right  -MG     Pain Management Interventions positioning techniques utilized;premedicated for activity;nursing notified  -MG     Response to Pain Interventions activity participation with tolerable pain  -MG       Row Name 11/21/24 1654          Plan of Care Review    Plan of Care Reviewed With patient;family  -MG     Progress no change  -MG     Outcome Evaluation  "Pt seen for PT tx this PM and tolerated the session fairly. Pt agreeable to attempt sitting [facility] for dinner. Today, pt was Mod/MaxA for bed mobility and Mod/MaxA x2 for two STS to RW from an elevated bed height. Pt was unable to successfully weight shift in order to side step to the chair. Pt states his legs \"just feel weak\" today. Pt was returned to supine where he performed rolling L<>R for brief change/clean up by his RN and this PT assisting. Pt was agreeable to being in the chair position in the bed for dinner and was assisted w/ set-up of his dinner, which he was able to take a couple bites of prior to this PT exit. PT will cont to follow and rec rehab at [place].  -MG       Row Name 11/21/24 1650          Therapy Assessment/Plan (PT)    Rehab Potential (PT) good  -MG     Criteria for Skilled Interventions Met (PT) yes  -MG     Therapy Frequency (PT) 5 times/wk  -MG       Row Name 11/21/24 1654          Positioning and Restraints    Pre-Treatment Position in bed  -MG     Post Treatment Position bed  -MG     In Bed fowlers;supine;notified nsg;call light within reach;encouraged to call for assist;exit alarm on;with family/caregiver;side rails up x3  Chair position  -MG               User Key  (r) = Recorded By, (t) = Taken By, (c) = Cosigned By      Initials Name Provider Type    Liv Sandoval, PT Physical Therapist                   Outcome Measures       Row Name 11/21/24 1659 11/21/24 0849       How much help from another person do you currently need...    Turning from your back to your side while in flat bed without using bedrails? 2  -MG 2  -JE    Moving from lying on back to sitting on the side of a flat bed without bedrails? 2  -MG 2  -JE    Moving to and from a bed to a chair (including a wheelchair)? 2  -MG 2  -JE    Standing up from a chair using your arms (e.g., wheelchair, bedside chair)? 2  -MG 2  -JE    Climbing 3-5 steps with a railing? 1  -MG 1  -JE    To walk in hospital room? 1  -MG 1  -JE    " AM-PAC 6 Clicks Score (PT) 10  -MG 10  -JE    Highest Level of Mobility Goal 4 --> Transfer to chair/commode  -MG 4 --> Transfer to chair/commode  -JE      Row Name 11/21/24 0945          Functional Assessment    Outcome Measure Options AM-PAC 6 Clicks Daily Activity (OT)  -               User Key  (r) = Recorded By, (t) = Taken By, (c) = Cosigned By      Initials Name Provider Type    Brenda Willis, RN Registered Nurse    Liv Sandoval, PT Physical Therapist    Sarah Chanel, OT Occupational Therapist                                 Physical Therapy Education       Title: PT OT SLP Therapies (Done)       Topic: Physical Therapy (Done)       Point: Mobility training (Done)       Learning Progress Summary            Patient Acceptance, E,D, VU,NR by MG at 11/21/2024 1659    Acceptance, E, VU by JS at 11/19/2024 1806    Acceptance, E,D, VU,NR by MG at 11/19/2024 1438    Acceptance, E, VU by JS at 11/16/2024 1757    Acceptance, E, VU by JS at 11/15/2024 1824    Acceptance, E,D,TB, VU,NR by MG at 11/15/2024 1218    Acceptance, E, VU,NR by MG at 11/12/2024 1559    Acceptance, E, VU by JS at 11/11/2024 1813    Acceptance, E, VU,NR by MG at 11/11/2024 1629    Acceptance, E, NR,VU by EF at 11/10/2024 1127    Acceptance, E, VU by JS at 11/8/2024 1820    Acceptance, E,D, VU,NR by MG at 11/8/2024 1459    Acceptance, E, VU by CW at 11/2/2024 1622                      Point: Home exercise program (Done)       Learning Progress Summary            Patient Acceptance, E, VU by JS at 11/19/2024 1806    Acceptance, E,D, VU,NR by MG at 11/19/2024 1438    Acceptance, E, VU by JS at 11/16/2024 1757    Acceptance, E, VU by JS at 11/15/2024 1824    Acceptance, E,D,TB, VU,NR by MG at 11/15/2024 1218    Acceptance, E, VU,NR by MG at 11/12/2024 1559    Acceptance, E, VU by JS at 11/11/2024 1813    Acceptance, E, VU,NR by MG at 11/11/2024 1629    Acceptance, E, VU by JS at 11/8/2024 1820                      Point: Body  mechanics (Done)       Learning Progress Summary            Patient Acceptance, E,D, VU,NR by MG at 11/21/2024 1659    Acceptance, E, VU by JS at 11/19/2024 1806    Acceptance, E,D, VU,NR by MG at 11/19/2024 1438    Acceptance, E, VU by JS at 11/16/2024 1757    Acceptance, E, VU by JS at 11/15/2024 1824    Acceptance, E,D,TB, VU,NR by MG at 11/15/2024 1218    Acceptance, E, VU,NR by MG at 11/12/2024 1559    Acceptance, E, VU by JS at 11/11/2024 1813    Acceptance, E, VU,NR by MG at 11/11/2024 1629    Acceptance, E, NR,VU by EF at 11/10/2024 1127    Acceptance, E, VU by JS at 11/8/2024 1820    Acceptance, E,D, VU,NR by MG at 11/8/2024 1459    Acceptance, E, VU by CW at 11/2/2024 1622                      Point: Precautions (Done)       Learning Progress Summary            Patient Acceptance, E,D, VU,NR by MG at 11/21/2024 1659    Acceptance, E, VU by JS at 11/19/2024 1806    Acceptance, E,D, VU,NR by MG at 11/19/2024 1438    Acceptance, E, VU by JS at 11/16/2024 1757    Acceptance, E, VU by JS at 11/15/2024 1824    Acceptance, E,D,TB, VU,NR by MG at 11/15/2024 1218    Acceptance, E, VU,NR by MG at 11/12/2024 1559    Acceptance, E, VU by JS at 11/11/2024 1813    Acceptance, E, VU,NR by MG at 11/11/2024 1629    Acceptance, E, VU by JS at 11/8/2024 1820    Acceptance, E,D, VU,NR by MG at 11/8/2024 1459    Acceptance, E, VU by CW at 11/2/2024 1622                                      User Key       Initials Effective Dates Name Provider Type Discipline    EF 05/31/24 -  Sabine Tineo, PT Physical Therapist PT    MG 05/24/22 -  Liv Cruz, PT Physical Therapist PT    CW 12/13/22 -  Lisa Paulson, PT Physical Therapist PT    JS 02/26/24 -  Johnson Morales, RN Registered Nurse Nurse                  PT Recommendation and Plan     Progress: no change  Outcome Evaluation: Pt seen for PT tx this PM and tolerated the session fairly. Pt agreeable to attempt sitting [facility] for dinner. Today, pt was Mod/MaxA for bed  "mobility and Mod/MaxA x2 for two STS to RW from an elevated bed height. Pt was unable to successfully weight shift in order to side step to the chair. Pt states his legs \"just feel weak\" today. Pt was returned to supine where he performed rolling L<>R for brief change/clean up by his RN and this PT assisting. Pt was agreeable to being in the chair position in the bed for dinner and was assisted w/ set-up of his dinner, which he was able to take a couple bites of prior to this PT exit. PT will cont to follow and rec rehab at [place].     Time Calculation:         PT Charges       Row Name 11/21/24 1659             Time Calculation    Start Time 1542  -MG      Stop Time 1614  -MG      Time Calculation (min) 32 min  -MG      PT Received On 11/21/24  -MG      PT - Next Appointment 11/22/24  -MG      PT Goal Re-Cert Due Date 12/05/24  -MG                User Key  (r) = Recorded By, (t) = Taken By, (c) = Cosigned By      Initials Name Provider Type    MG Liv Cruz, PT Physical Therapist                  Therapy Charges for Today       Code Description Service Date Service Provider Modifiers Qty    [id number]  PT THERAPEUTIC ACT EA 15 MIN 11/21/2024 Liv Cruz, PT GP 2            PT G-Codes  Outcome Measure Options: AM-PAC 6 Clicks Daily Activity (OT)  AM-PAC 6 Clicks Score (PT): 10  AM-PAC 6 Clicks Score (OT): 12  Modified [place] Scale: 5 - Severe disability.  Bedridden, incontinent, and requiring constant nursing care and attention.  PT Discharge Summary  Anticipated Discharge Disposition (PT): skilled nursing facility    Liv Cruz PT  11/21/2024    "

## 2024-11-21 NOTE — PLAN OF CARE
Goal Outcome Evaluation:   Patient alert follows commands forgetful. Prn tylenol for arthritis pain. No nausea noted. Q2h turn. Pills given with applesauce. No acute distress noted will continue to monitor

## 2024-11-21 NOTE — PLAN OF CARE
Problem: Adult Inpatient Plan of Care  Goal: Plan of Care Review  Recent Flowsheet Documentation  Taken 11/21/2024 1624 by Brenda Johnson RN  Progress: improving  Outcome Evaluation: Generalized weakness. Loose stool per ostomy. Wound vac to abd CDI.  Nickie soft chopped diet. Plan d/c rehab.  Plan of Care Reviewed With: patient  Goal: Absence of Hospital-Acquired Illness or Injury  Intervention: Identify and Manage Fall Risk  Recent Flowsheet Documentation  Taken 11/21/2024 1430 by Brenda Johnson RN  Safety Promotion/Fall Prevention:   fall prevention program maintained   safety round/check completed  Taken 11/21/2024 1200 by Brenda Johnson RN  Safety Promotion/Fall Prevention:   fall prevention program maintained   safety round/check completed  Taken 11/21/2024 1030 by Brenda Johnson RN  Safety Promotion/Fall Prevention:   fall prevention program maintained   safety round/check completed  Taken 11/21/2024 0849 by Brenda Johnson RN  Safety Promotion/Fall Prevention:   fall prevention program maintained   safety round/check completed     Problem: Skin Injury Risk Increased  Goal: Skin Health and Integrity  Outcome: Progressing     Problem: Restraint, Nonviolent  Goal: Absence of Harm or Injury  Outcome: Progressing     Problem: Mechanical Ventilation Invasive  Goal: Effective Communication  Outcome: Progressing  Goal: Optimal Device Function  Outcome: Progressing  Goal: Mechanical Ventilation Liberation  Outcome: Progressing  Goal: Optimal Nutrition Delivery  Outcome: Progressing  Goal: Absence of Device-Related Skin and Tissue Injury  Outcome: Progressing  Goal: Absence of Ventilator-Induced Lung Injury  Outcome: Progressing     Problem: Fall Injury Risk  Goal: Absence of Fall and Fall-Related Injury  Intervention: Promote Injury-Free Environment  Recent Flowsheet Documentation  Taken 11/21/2024 1430 by Brenda Johnson RN  Safety Promotion/Fall Prevention:   fall prevention program  maintained   safety round/check completed  Taken 11/21/2024 1200 by Brenda Johnson RN  Safety Promotion/Fall Prevention:   fall prevention program maintained   safety round/check completed  Taken 11/21/2024 1030 by Brenda Johnson RN  Safety Promotion/Fall Prevention:   fall prevention program maintained   safety round/check completed  Taken 11/21/2024 0849 by Brenda Johnson RN  Safety Promotion/Fall Prevention:   fall prevention program maintained   safety round/check completed     Problem: Fall Injury Risk  Goal: Absence of Fall and Fall-Related Injury  Outcome: Progressing  Intervention: Promote Injury-Free Environment  Recent Flowsheet Documentation  Taken 11/21/2024 1430 by Brenda Johnson RN  Safety Promotion/Fall Prevention:   fall prevention program maintained   safety round/check completed  Taken 11/21/2024 1200 by Brenda Johnson RN  Safety Promotion/Fall Prevention:   fall prevention program maintained   safety round/check completed  Taken 11/21/2024 1030 by Brenda Johnson RN  Safety Promotion/Fall Prevention:   fall prevention program maintained   safety round/check completed  Taken 11/21/2024 0849 by Brenda Johnson RN  Safety Promotion/Fall Prevention:   fall prevention program maintained   safety round/check completed     Problem: Enteral Nutrition  Goal: Absence of Aspiration Signs and Symptoms  Outcome: Progressing  Goal: Safe, Effective Therapy Delivery  Outcome: Progressing  Goal: Feeding Tolerance  Outcome: Progressing     Problem: Malnutrition  Goal: Improved Nutritional Intake  Outcome: Progressing     Problem: Sepsis/Septic Shock  Goal: Optimal Coping  Outcome: Progressing  Goal: Absence of Bleeding  Outcome: Progressing  Goal: Blood Glucose Level Within Target Range  Outcome: Progressing  Goal: Absence of Infection Signs and Symptoms  Outcome: Progressing  Goal: Optimal Nutrition Delivery  Outcome: Progressing     Problem: Alcohol Withdrawal  Goal: Alcohol Withdrawal  Symptom Control  Outcome: Progressing  Goal: Optimal Neurologic Function  Outcome: Progressing     Problem: VTE (Venous Thromboembolism)  Goal: Tissue Perfusion  Outcome: Progressing  Goal: Optimal Right Ventricular Function  Outcome: Progressing   Goal Outcome Evaluation:  Plan of Care Reviewed With: patient        Progress: improving  Outcome Evaluation: Generalized weakness. Loose stool per ostomy. Wound vac to abd CDI.  Nickie soft chopped diet. Plan d/c rehab.

## 2024-11-21 NOTE — THERAPY PROGRESS REPORT/RE-CERT
Patient Name: Arsh Haynes  : 1950    MRN: [id number]                              Today's Date: 2024       Admit Date: 10/26/2024    Visit Dx:     ICD-10-CM ICD-9-CM   1. Perforation of sigmoid colon  K63.1 569.83   2. Hypotension, unspecified hypotension type  I95.9 458.9   3. Increased lactic acid level  R79.89 276.2   4. Bowel perforation  K63.1 569.83     Patient Active Problem List   Diagnosis    Colon cancer    HTN (hypertension)    Thrombocytosis    Acute DVT (deep venous thrombosis)    Renal mass    Hyponatremia    Anemia    Peritonitis    Moderate protein-calorie malnutrition    Oropharyngeal dysphagia     Past Medical History:   Diagnosis Date    HTN (hypertension)     Hypercholesterolemia      Past Surgical History:   Procedure Laterality Date    COLON RESECTION N/A 10/26/2024    Procedure: LOW ANTERIOR COLON RESECTION SIGMOID, COLOSTOMY, SPLENIC FLEXURE MOBILIZATION;  Surgeon: Mc Guillaume MD;  Location: [facility];  Service: General;  Laterality: N/A;    EXPLORATORY LAPAROTOMY N/A 10/26/2024    Procedure: LAPAROTOMY EXPLORATORY, LEFT HEMICOLECTOMY;  Surgeon: Mc Guillaume MD;  Location: [facility];  Service: General;  Laterality: N/A;      General Information       Row Name 2438          OT Time and Intention    Subjective Information complains of;fatigue  -SM     Document Type therapy note (daily note);progress note/recertification  -SM     Mode of Treatment occupational therapy;individual therapy  -SM     Patient Effort fair  -SM       Row Name 24          General Information    Patient Profile Reviewed yes  -SM     Prior Level of Function independent:;ADL's  -SM     Existing Precautions/Restrictions fall  Abdominal wound vac, colostomy  -SM       Row Name 24          Cognition    Orientation Status (Cognition) oriented x 4  -SM       Row Name 24 09          Safety Issues/Impairments Affecting Functional Mobility     Safety Issues Affecting Function (Mobility) awareness of need for assistance;insight into deficits/self-awareness  -     Impairments Affecting Function (Mobility) strength;balance;endurance/activity tolerance;coordination;pain;postural/trunk control;range of motion (ROM);cognition  -               User Key  (r) = Recorded By, (t) = Taken By, (c) = Cosigned By      Initials Name Provider Type     Sarah Abernathy OT Occupational Therapist                     Mobility/ADL's       Row Name 11/21/24 0939          Bed Mobility    Supine-Sit [place] (Bed Mobility) moderate assist (50% patient effort);maximum assist (25% patient effort)  -     Sit-Supine [place] (Bed Mobility) maximum assist (25% patient effort)  -     Assistive Device (Bed Mobility) head of bed elevated;bed rails;repositioning sheet  -       Row Name 11/21/24 0939          Transfers    Comment, (Transfers) pt declined attempting to stand today with encouragement  -       Row Name 11/21/24 0939          Lower Body Dressing Assessment/Training    [place] Level (Lower Body Dressing) don;socks;dependent (less than 25% patient effort)  -       Row Name 11/21/24 0939          Self-Feeding Assessment/Training    [place] Level (Feeding) set up  -       Row Name 11/21/24 0939          Toileting Assessment/Training    [place] Level (Toileting) dependent (less than 25% patient effort)  -     Position (Toileting) supine  -       Row Name 11/21/24 0939          Grooming Assessment/Training    [place] Level (Grooming) oral care regimen;set up;supervision  -     Position (Grooming) edge of bed sitting  -               User Key  (r) = Recorded By, (t) = Taken By, (c) = Cosigned By      Initials Name Provider Type     Sarah Abernathy OT Occupational Therapist                   Obj/Interventions       Row Name 11/21/24 0940          Shoulder (Therapeutic Exercise)    Shoulder AROM (Therapeutic Exercise) --   X5 reps shoulder rolls, reaching into flexion X2 reps each arm prior to fatique.  -       Row Name 11/21/24 0940          Motor Skills    Functional Endurance poor  -       Row Name 11/21/24 0940          Balance    Static Sitting Balance supervision  -     Position, Sitting Balance sitting edge of bed  -               User Key  (r) = Recorded By, (t) = Taken By, (c) = Cosigned By      Initials Name Provider Type     Sarah Abernathy OT Occupational Therapist                   Goals/Plan       Row Name 11/21/24 0944          Bed Mobility Goal 1 (OT)    Activity/Assistive Device (Bed Mobility Goal 1, OT) sit to supine;supine to sit  -SM     [place] Level/Cues Needed (Bed Mobility Goal 1, OT) moderate assist (50-74% patient effort)  -SM     Time Frame (Bed Mobility Goal 1, OT) short term goal (STG);2 weeks  -SM     Progress/Outcomes (Bed Mobility Goal 1, OT) goal ongoing  -       Row Name 11/21/24 0944          Transfer Goal 1 (OT)    Activity/Assistive Device (Transfer Goal 1, OT) sit-to-stand/stand-to-sit  -SM     [place] Level/Cues Needed (Transfer Goal 1, OT) maximum assist (25-49% patient effort)  -SM     Time Frame (Transfer Goal 1, OT) short term goal (STG);2 weeks  -SM     Progress/Outcome (Transfer Goal 1, OT) goal ongoing  -       Row Name 11/21/24 0944          Bathing Goal 1 (OT)    Activity/Device (Bathing Goal 1, OT) upper body bathing  -SM     [place] Level/Cues Needed (Bathing Goal 1, OT) moderate assist (50-74% patient effort)  -SM     Time Frame (Bathing Goal 1, OT) short term goal (STG);2 weeks  -SM     Progress/Outcomes (Bathing Goal 1, OT) goal met  -       Row Name 11/21/24 0944          Dressing Goal 1 (OT)    Activity/Device (Dressing Goal 1, OT) upper body dressing  -SM     [place]/Cues Needed (Dressing Goal 1, OT) standby assist  -SM     Time Frame (Dressing Goal 1, OT) short term goal (STG);2 weeks  -SM     Progress/Outcome (Dressing Goal 1, OT) new goal   -       Row Name 11/21/24 0944          Toileting Goal 1 (OT)    Activity/Device (Toileting Goal 1, OT) toileting skills, all  -SM     [place] Level/Cues Needed (Toileting Goal 1, OT) maximum assist (25-49% patient effort)  -SM     Time Frame (Toileting Goal 1, OT) short term goal (STG);2 weeks  -SM     Progress/Outcome (Toileting Goal 1, OT) goal ongoing  -[facility] Name 11/21/24 0944          Grooming Goal 1 (OT)    Activity/Device (Grooming Goal 1, OT) grooming skills, all  -SM     [place] (Grooming Goal 1, OT) minimum assist (75% or more patient effort)  -SM     Time Frame (Grooming Goal 1, OT) short term goal (STG);2 weeks  -SM     Progress/Outcome (Grooming Goal 1, OT) goal met  -SM       Row Name 11/21/24 0944          Self-Feeding Goal 1 (OT)    Activity/Device (Self-Feeding Goal 1, OT) self-feeding skills, all;built-up handle utensils  -SM     [place] Level/Cues Needed (Self-Feeding Goal 1, OT) minimum assist (75% or more patient effort)  -SM     Time Frame (Self-Feeding Goal 1, OT) short term goal (STG);2 weeks  -SM     Progress/Outcomes (Self-Feeding Goal 1, OT) goal met  -[facility] Name 11/21/24 0944          ROM Goal 1 (OT)    ROM Goal 1 (OT) increased ROM of BUE to 50% end range to increase (I) with ADLs  -SM     Time Frame (ROM Goal 1, OT) short term goal (STG);2 weeks  -SM     Progress/Outcome (ROM Goal 1, OT) goal met  -[facility] Name 11/21/24 0944          Therapy Assessment/Plan (OT)    Planned Therapy Interventions (OT) activity tolerance training;adaptive equipment training;BADL retraining;functional balance retraining;occupation/activity based interventions;patient/caregiver education/training;transfer/mobility retraining;strengthening exercise;ROM/therapeutic exercise  -               User Key  (r) = Recorded By, (t) = Taken By, (c) = Cosigned By      Initials Name Provider Type    Sarah Chanel OT Occupational Therapist                   Clinical  "Impression       Row Name 11/21/24 0942          Pain Assessment    Pain Management Interventions positioning techniques utilized  -     Pre/Posttreatment Pain Comment reports pain as \"fair\"  -       Row Name 11/21/24 0942          Plan of Care Review    Plan of Care Reviewed With patient  -     Progress no change  -     Outcome Evaluation OT goals updated today 2/2 LOS. Pt has been admitted for 26 days. His activity tolerance remains poor. He is agreeable to sitting EOB during session and brushing his teeth and hair set up assistance. He has a poor tolerance for any UE exercises. He declines standing today. Encouraged OOB activity and transfer to the chair to promote improved strength and activity tolerance. Will continue to progress as tolerated. Pt with slow progress towards ADL goals.  -       Row Name 11/21/24 0942          Therapy Assessment/Plan (OT)    Rehab Potential (OT) fair  -     Criteria for Skilled Therapeutic Interventions Met (OT) yes;meets criteria;skilled treatment is necessary  -     Therapy Frequency (OT) 5 times/wk  -       Row Name 11/21/24 0942          Therapy Plan Review/Discharge Plan (OT)    Anticipated Discharge Disposition (OT) skilled nursing facility  -       Row Name 11/21/24 0942          Positioning and Restraints    Pre-Treatment Position in bed  -     Post Treatment Position bed  -SM     In Bed fowlers;encouraged to call for assist;exit alarm on;notified nsg;call light within reach  -               User Key  (r) = Recorded By, (t) = Taken By, (c) = Cosigned By      Initials Name Provider Type     Sarah Abernathy, OT Occupational Therapist                   Outcome Measures       Row Name 11/21/24 0945          How much help from another is currently needed...    Putting on and taking off regular lower body clothing? 1  -SM     Bathing (including washing, rinsing, and drying) 2  -SM     Toileting (which includes using toilet bed pan or urinal) 1  -SM     " Putting on and taking off regular upper body clothing 2  -SM     Taking care of personal grooming (such as brushing teeth) 3  -SM     Eating meals 3  -SM     AM-PAC 6 Clicks Score (OT) 12  -SM       Row Name 11/21/24 0945          Functional Assessment    Outcome Measure Options AM-PAC 6 Clicks Daily Activity (OT)  -               User Key  (r) = Recorded By, (t) = Taken By, (c) = Cosigned By      Initials Name Provider Type    Sarah Chanel OT Occupational Therapist                    Occupational Therapy Education       Title: PT OT SLP Therapies (Done)       Topic: Occupational Therapy (Done)       Point: ADL training (Done)       Description:   Instruct learner(s) on proper safety adaptation and remediation techniques during self care or transfers.   Instruct in proper use of assistive devices.                  Learning Progress Summary            Patient Acceptance, E, VU by  at 11/19/2024 1806    Acceptance, E, VU by JS at 11/16/2024 1757    Acceptance, E, VU by JS at 11/15/2024 1824    Acceptance, E, VU by JS at 11/11/2024 1813    Acceptance, E, VU by JS at 11/8/2024 1820    Acceptance, E, VU,NR by MM at 11/2/2024 1328    Comment: role of OT, goals, d/c rec                      Point: Precautions (Done)       Description:   Instruct learner(s) on prescribed precautions during self-care and functional transfers.                  Learning Progress Summary            Patient Acceptance, E, VU by JS at 11/19/2024 1806    Acceptance, E, VU by JS at 11/16/2024 1757    Acceptance, E, VU by JS at 11/15/2024 1824    Acceptance, E, VU by JS at 11/11/2024 1813    Acceptance, E, VU by JS at 11/8/2024 1820    Acceptance, E, VU,NR by MM at 11/2/2024 1328    Comment: role of OT, goals, d/c rec                      Point: Body mechanics (Done)       Description:   Instruct learner(s) on proper positioning and spine alignment during self-care, functional mobility activities and/or exercises.                   Learning Progress Summary            Patient Acceptance, E, VU by ALEX at 11/19/2024 1806    Acceptance, E, VU by ALEX at 11/16/2024 1757    Acceptance, E, VU by ALEX at 11/15/2024 1824    Acceptance, E, VU by ALEX at 11/11/2024 1813    Acceptance, E, VU by ALEX at 11/8/2024 1820    Acceptance, E, VU,NR by MM at 11/2/2024 1328    Comment: role of OT, goals, d/c rec                                      User Key       Initials Effective Dates Name Provider Type Discipline     02/26/24 -  Johnson Morales RN Registered Nurse Nurse     05/31/24 -  Yu Chapman OT Occupational Therapist OT                  OT Recommendation and Plan  Planned Therapy Interventions (OT): activity tolerance training, adaptive equipment training, BADL retraining, functional balance retraining, occupation/activity based interventions, patient/caregiver education/training, transfer/mobility retraining, strengthening exercise, ROM/therapeutic exercise  Therapy Frequency (OT): 5 times/wk  Plan of Care Review  Plan of Care Reviewed With: patient  Progress: no change  Outcome Evaluation: OT goals updated today 2/2 LOS. Pt has been admitted for 26 days. His activity tolerance remains poor. He is agreeable to sitting EOB during session and brushing his teeth and hair set up assistance. He has a poor tolerance for any UE exercises. He declines standing today. Encouraged OOB activity and transfer to the chair to promote improved strength and activity tolerance. Will continue to progress as tolerated. Pt with slow progress towards ADL goals.     Time Calculation:         Time Calculation- OT       Row Name 11/21/24 0946             Time Calculation- OT    OT Start Time 0857  -SM      OT Stop Time 0922  -      OT Time Calculation (min) 25 min  -      OT Received On 11/21/24  -      OT Goal Re-Cert Due Date 12/05/24  -SM         Timed Charges    92736 - OT Therapeutic Activity Minutes 15  -SM      01514 - OT Self Care/Mgmt Minutes 10  -SM          Total Minutes    Timed Charges Total Minutes 25  -SM       Total Minutes 25  -SM                User Key  (r) = Recorded By, (t) = Taken By, (c) = Cosigned By      Initials Name Provider Type    Sarah Chanel OT Occupational Therapist                  Therapy Charges for Today       Code Description Service Date Service Provider Modifiers Qty    71474453289  OT THERAPEUTIC ACT EA 15 MIN 11/21/2024 Sarah Abernathy OT GO 1    [id number]  OT SELF CARE/MGMT/TRAIN EA 15 MIN 11/21/2024 Sarah Abernathy OT GO 1                 Sarah Abernathy OT  11/21/2024

## 2024-11-21 NOTE — PROGRESS NOTES
Name: Arsh Haynes ADMIT: 10/26/2024   : 1950  PCP: Provider, No Known    MRN: [id number] LOS: 26 days   AGE/SEX: 74 y.o. male  ROOM: [facility] [place]     Subjective   Subjective   Patient is seen at bedside, no new complaints.       Objective   Objective   Vital Signs  Temp:  [97.3 °F (36.3 °C)-98.9 °F (37.2 °C)] 98.3 °F (36.8 °C)  Heart Rate:  [] 90  Resp:  [16] 16  BP: (111-132)/(55-69) 122/59  SpO2:  [95 %-98 %] 98 %  on   ;   Device (Oxygen Therapy): room air  Body mass index is 25.56 kg/m².  Physical Exam  General, awake and alert.  Head and ENT, normocephalic and atraumatic.  Lungs, symmetric expansion, equal air entry bilaterally.  Heart, regular rate and rhythm.  Abdomen, soft and nontender.  Extremities, no clubbing or cyanosis.  Neuro, no focal deficits.  Skin: Warm and no rash.  Psych, normal mood and affect.  Musculoskeletal, joint examination is grossly normal.    Copied text material from yesterday's note has been reviewed for appropriate changes and remains accurate as of 24.        Results Review     I reviewed the patient's new clinical results.  Results from last 7 days   Lab Units 24  0554 24  0333 24  0443 24  0532   WBC 10*3/mm3 10.54 12.08* 14.06* 13.05*   HEMOGLOBIN g/dL 9.7* 9.1* 9.0* 9.3*   PLATELETS 10*3/mm3 773* 831* 867* 820*     Results from last 7 days   Lab Units 24  0554 24  0333 24  0443 24  0532   SODIUM mmol/L 129* 131* 133* 133*   POTASSIUM mmol/L 4.3 4.4 4.0 3.9   CHLORIDE mmol/L 98 99 100 99   CO2 mmol/L 20.7* 22.1 22.9 22.3   BUN mg/dL 7* 10 14 16   CREATININE mg/dL 0.48* 0.49* 0.51* 0.50*   GLUCOSE mg/dL 124* 116* 86 156*   EGFR mL/min/1.73 109.7 109.0 107.7 108.4     Results from last 7 days   Lab Units 24  0554 24  0333 24  0443 24  0532   ALBUMIN g/dL 2.6* 2.4* 2.3* 2.6*   BILIRUBIN mg/dL 0.7 0.6 0.5 0.3   ALK PHOS U/L 160* 166* 178* 204*   AST (SGOT) U/L 28 29 28 28   ALT (SGPT) U/L 32  35 35 38     Results from last 7 days   Lab Units 11/21/24  0554 11/20/24  0333 11/19/24  0443 11/18/24  0532 11/17/24  0540 11/16/24  0430 11/15/24  0559   CALCIUM mg/dL 8.6 8.5* 8.6 8.7 8.8 8.7 8.6   ALBUMIN g/dL 2.6* 2.4* 2.3* 2.6* 2.6* 2.2* 2.3*   MAGNESIUM mg/dL  --   --   --  1.9 1.9 2.0 1.8   PHOSPHORUS mg/dL  --   --   --  3.5 3.4 3.6 3.6       Glucose   Date/Time Value Ref Range Status   11/21/2024 1044 179 (H) 70 - 130 mg/dL Final   11/21/2024 0639 123 70 - 130 mg/dL Final   11/20/2024 2016 134 (H) 70 - 130 mg/dL Final   11/20/2024 1544 128 70 - 130 mg/dL Final   11/20/2024 1038 105 70 - 130 mg/dL Final   11/20/2024 0609 114 70 - 130 mg/dL Final   11/19/2024 2004 137 (H) 70 - 130 mg/dL Final       No radiology results for the last day    I have personally reviewed all medications:  Scheduled Medications  chlorhexidine, 15 mL, Mouth/Throat, Q12H  enoxaparin, 1 mg/kg, Subcutaneous, Q12H  folic acid, 1 mg, Oral, Daily  insulin lispro, 2-9 Units, Subcutaneous, 4x Daily AC & at Bedtime  [START ON 11/22/2024] lansoprazole, 30 mg, Oral, Q AM  Lidocaine, 1 patch, Transdermal, Q24H  Lidocaine, 2 patch, Transdermal, Q24H  metoprolol tartrate, 100 mg, Oral, Q12H  sodium chloride, 10 mL, Intravenous, Q12H  sodium hypochlorite, , Topical, BID  tamsulosin, 0.4 mg, Oral, Daily  thiamine, 100 mg, Oral, Daily    Infusions   Diet  Diet: Regular/House; No Mixed Consistencies, No Straw; Texture: Mechanical Ground (NDD 2); Fluid Consistency: Nectar Thick    I have personally reviewed:  [x]  Laboratory   [x]  Microbiology   [x]  Radiology   [x]  EKG/Telemetry  [x]  Cardiology/Vascular   []  Pathology    []  Records       Assessment/Plan     Active Hospital Problems    Diagnosis  POA    **Peritonitis [K65.9]  Unknown    Oropharyngeal dysphagia [R13.12]  Unknown    HTN (hypertension) [I10]  Unknown    Thrombocytosis [D75.839]  Unknown    Acute DVT (deep venous thrombosis) [I82.409]  Unknown    Renal mass [N28.89]  Unknown     Hyponatremia [E87.1]  Unknown    Anemia [D64.9]  Unknown    Moderate protein-calorie malnutrition [E44.0]  Yes    Colon cancer [C18.9]  No      Resolved Hospital Problems    Diagnosis Date Resolved POA    Perforation of sigmoid colon [K63.1] 10/27/2024 Yes       74 y.o. male admitted with Peritonitis.    Brief Hospital Course to date:  Arsh Haynes is a 74 y.o. male admitted to ICU with peritonitis due to perforated rectosigmoid adenocarcinoma.  Hospital medicine service assumed care from the critical care team     Discussion/plan for today:  Case discussed with surgery.  Calorie count is underway to determine if patient is having enough oral intake to remove NG tube.  Otherwise patient may require a PEG tube in order to maximize nutrition following surgery.  Plan to follow-up on results of calorie count later today and discussed with nutrition team.  Continue dysphagia diet.  Long-acting insulin discontinued now that patient is off tube feeding.  Continue to monitor with correction scale.  A1c only 6.6.  Continue PT OT for debility.  Pulse improved on adjusted metoprolol. Trend sodium, slightly lower.  Trend anemia, stable.  No active bleeding noted.  Otherwise per below as per my partner.  For BPH, change Hytrin to Flomax now that able to swallow pills.       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  Patient remains afebrile with no signs of new infection. Continue to monitor.  Wound care following for postoperative wound management.  -Heme/Onc has seen, recommends outpt f/u with Dr. Serra in 5-8 weeks.     Sinus tachycardia: EKG showed sinus tach. Continue metoprolol, adjusted 11/19. Continue to closely monitor BP. Stable     Acute RLE DVT found on 11/5:  He has a severe thrombocytosis and in addition to his anemia as well as the colon cancer.  Hematology/Oncology following. Continue therapeutic Lovenox. Ultimately can change to Eliquis.     Anemia, multifactorial: Hgb fairly stable today. S/p 2 units PRBCs earlier. Hematology/Oncology following.  transfuse for Hgb less than 7.  Folic acid supplement      Thrombocytosis:  Reactive per Hematology/Oncology.      Right renal mass and urinary retention: Urology evaluated the renal mass and urinary retention. [place] catheter out now. Voiding w/o issue and Cr fine. Continue alpha-blocker. He will follow-up in 4 to 6 weeks with repeat CT for the renal mass which Urology feels is most likely RCC.      Hyponatremia: Monitor Repeat BMP with morning labs.  Currently only mildly low.     Hypokalemia: Replacing as needed.      DM2: He required Lantus and SSI while on tube feeding, requiring last insulin on oral diet. A1c 6.6. No history of DM per pt.      Dysphagia: SLP evaluated,  he required tube feeding.  Dysphagia diet.     Right knee pain, right shoulder pain: Orthopedic surgery consulted. No significant effusion to perform bedside arthrocentesis. XR of right shoulder with severe arthropathic changes only. Ortho not concerned for septic joints. Patient also reported left middle knuckle pain. XR unremarkable.  Supportive care.  Improving        Pharmacy to dose Lovenox for DVT prophylaxis.  DNR.     Disposition: I expect the patient to be discharged to SNF once further improved      Tomas Chaney MD  [facility]  11/21/24  15:06 EST

## 2024-11-21 NOTE — PLAN OF CARE
Goal Outcome Evaluation:  Plan of Care Reviewed With: patient        Progress: no change  Outcome Evaluation: OT goals updated today 2/2 LOS. Pt has been admitted for 26 days. His activity tolerance remains poor. He is agreeable to sitting EOB during session and brushing his teeth and hair set up assistance. He has a poor tolerance for any UE exercises. He declines standing today. Encouraged OOB activity and transfer to the chair to promote improved strength and activity tolerance. Will continue to progress as tolerated. Pt with slow progress towards ADL goals.    Anticipated Discharge Disposition (OT): skilled nursing facility

## 2024-11-22 LAB
ALBUMIN SERPL-MCNC: 2.3 G/DL (ref 3.5–5.2)
ALBUMIN/GLOB SERPL: 0.5 G/DL
ALP SERPL-CCNC: 154 U/L (ref 39–117)
ALT SERPL W P-5'-P-CCNC: 28 U/L (ref 1–41)
ANION GAP SERPL CALCULATED.3IONS-SCNC: 11.7 MMOL/L (ref 5–15)
AST SERPL-CCNC: 25 U/L (ref 1–40)
BASOPHILS # BLD AUTO: 0.07 10*3/MM3 (ref 0–0.2)
BASOPHILS NFR BLD AUTO: 0.6 % (ref 0–1.5)
BILIRUB SERPL-MCNC: 0.6 MG/DL (ref 0–1.2)
BUN SERPL-MCNC: 6 MG/DL (ref 8–23)
BUN/CREAT SERPL: 10.2 (ref 7–25)
CALCIUM SPEC-SCNC: 8.9 MG/DL (ref 8.6–10.5)
CHLORIDE SERPL-SCNC: 99 MMOL/L (ref 98–107)
CO2 SERPL-SCNC: 20.3 MMOL/L (ref 22–29)
CREAT SERPL-MCNC: 0.59 MG/DL (ref 0.76–1.27)
DEPRECATED RDW RBC AUTO: 52.9 FL (ref 37–54)
EGFRCR SERPLBLD CKD-EPI 2021: 103.1 ML/MIN/1.73
EOSINOPHIL # BLD AUTO: 0.53 10*3/MM3 (ref 0–0.4)
EOSINOPHIL NFR BLD AUTO: 4.4 % (ref 0.3–6.2)
ERYTHROCYTE [DISTWIDTH] IN BLOOD BY AUTOMATED COUNT: 17.3 % (ref 12.3–15.4)
GLOBULIN UR ELPH-MCNC: 4.7 GM/DL
GLUCOSE BLDC GLUCOMTR-MCNC: 114 MG/DL (ref 70–130)
GLUCOSE BLDC GLUCOMTR-MCNC: 116 MG/DL (ref 70–130)
GLUCOSE BLDC GLUCOMTR-MCNC: 135 MG/DL (ref 70–130)
GLUCOSE BLDC GLUCOMTR-MCNC: 148 MG/DL (ref 70–130)
GLUCOSE SERPL-MCNC: 117 MG/DL (ref 65–99)
HCT VFR BLD AUTO: 29.3 % (ref 37.5–51)
HGB BLD-MCNC: 9.5 G/DL (ref 13–17.7)
IMM GRANULOCYTES # BLD AUTO: 0.13 10*3/MM3 (ref 0–0.05)
IMM GRANULOCYTES NFR BLD AUTO: 1.1 % (ref 0–0.5)
LYMPHOCYTES # BLD AUTO: 1.72 10*3/MM3 (ref 0.7–3.1)
LYMPHOCYTES NFR BLD AUTO: 14.4 % (ref 19.6–45.3)
MCH RBC QN AUTO: 27.2 PG (ref 26.6–33)
MCHC RBC AUTO-ENTMCNC: 32.4 G/DL (ref 31.5–35.7)
MCV RBC AUTO: 84 FL (ref 79–97)
MONOCYTES # BLD AUTO: 1.19 10*3/MM3 (ref 0.1–0.9)
MONOCYTES NFR BLD AUTO: 10 % (ref 5–12)
NEUTROPHILS NFR BLD AUTO: 69.5 % (ref 42.7–76)
NEUTROPHILS NFR BLD AUTO: 8.29 10*3/MM3 (ref 1.7–7)
NRBC BLD AUTO-RTO: 0 /100 WBC (ref 0–0.2)
PLATELET # BLD AUTO: 796 10*3/MM3 (ref 140–450)
PMV BLD AUTO: 8.3 FL (ref 6–12)
POTASSIUM SERPL-SCNC: 4.2 MMOL/L (ref 3.5–5.2)
PROT SERPL-MCNC: 7 G/DL (ref 6–8.5)
RBC # BLD AUTO: 3.49 10*6/MM3 (ref 4.14–5.8)
SODIUM SERPL-SCNC: 131 MMOL/L (ref 136–145)
WBC NRBC COR # BLD AUTO: 11.93 10*3/MM3 (ref 3.4–10.8)

## 2024-11-22 PROCEDURE — 80053 COMPREHEN METABOLIC PANEL: CPT | Performed by: INTERNAL MEDICINE

## 2024-11-22 PROCEDURE — 94760 N-INVAS EAR/PLS OXIMETRY 1: CPT

## 2024-11-22 PROCEDURE — 94799 UNLISTED PULMONARY SVC/PX: CPT

## 2024-11-22 PROCEDURE — 82948 REAGENT STRIP/BLOOD GLUCOSE: CPT

## 2024-11-22 PROCEDURE — 85025 COMPLETE CBC W/AUTO DIFF WBC: CPT | Performed by: INTERNAL MEDICINE

## 2024-11-22 PROCEDURE — 97530 THERAPEUTIC ACTIVITIES: CPT

## 2024-11-22 PROCEDURE — 25010000002 ENOXAPARIN PER 10 MG: Performed by: INTERNAL MEDICINE

## 2024-11-22 RX ADMIN — CHLORHEXIDINE GLUCONATE 15 ML: 1.2 RINSE ORAL at 20:06

## 2024-11-22 RX ADMIN — ACETAMINOPHEN 325MG 650 MG: 325 TABLET ORAL at 20:06

## 2024-11-22 RX ADMIN — LANSOPRAZOLE 30 MG: 15 TABLET, ORALLY DISINTEGRATING ORAL at 06:43

## 2024-11-22 RX ADMIN — TAMSULOSIN HYDROCHLORIDE 0.4 MG: 0.4 CAPSULE ORAL at 09:11

## 2024-11-22 RX ADMIN — ENOXAPARIN SODIUM 90 MG: 100 INJECTION SUBCUTANEOUS at 17:25

## 2024-11-22 RX ADMIN — METOPROLOL TARTRATE 100 MG: 50 TABLET, FILM COATED ORAL at 20:06

## 2024-11-22 RX ADMIN — CHLORHEXIDINE GLUCONATE 15 ML: 1.2 RINSE ORAL at 09:11

## 2024-11-22 RX ADMIN — METOPROLOL TARTRATE 100 MG: 50 TABLET, FILM COATED ORAL at 09:11

## 2024-11-22 RX ADMIN — ACETAMINOPHEN 325MG 650 MG: 325 TABLET ORAL at 06:50

## 2024-11-22 RX ADMIN — FOLIC ACID 1 MG: 1 TABLET ORAL at 09:11

## 2024-11-22 RX ADMIN — ENOXAPARIN SODIUM 90 MG: 100 INJECTION SUBCUTANEOUS at 06:43

## 2024-11-22 RX ADMIN — Medication 10 ML: at 09:12

## 2024-11-22 RX ADMIN — Medication 10 ML: at 20:07

## 2024-11-22 RX ADMIN — THIAMINE HCL TAB 100 MG 100 MG: 100 TAB at 09:12

## 2024-11-22 NOTE — NURSING NOTE
"   11/22/24 1425   Wound 10/26/24 2301 midline abdomen   Placement Date/Time: 10/26/24 2301   Orientation: midline  Location: abdomen  Primary Wound Type: Incision   Dressing Appearance moist drainage;intact  (machine not providing suction, creamy drainage pooling under drape)   Base moist;granulating;red;slough  (scant slough to distal aspect adjacent to and below umbilicus)   Periwound intact;dry   Periwound Temperature warm   Periwound Skin Turgor soft   Edges open   Drainage Characteristics/Odor serosanguineous;purulent  (150 ml serosanguineous to canister, scant pooling of creamy yellow drainage under vac drape distal aspect)   Care, Wound cleansed with;sterile normal saline;negative pressure wound therapy   Dressing Care dressing changed   Periwound Care   (spray prep, 1/4 piece of barrier ring placed at umbilicus to enhance seal)   NPWT (Negative Pressure Wound Therapy) 11/13/24 abd   Placement Date: 11/13/24   Location: abd   Therapy Setting continuous therapy   Dressing foam, black   Pressure Setting 125 mmHg   Sponges Inserted 1   Sponges Removed 2   Colostomy LLQ   Placement date: If unknown, DO NOT use \"Add Comment\" note: 10/26/24   Inserted by: Dr Guillaume  Location: LLQ   Stomal Appliance 2 piece;Changed  ([place] 2 1/4\" flat barrier)   Stoma Appearance moist;pink;protruding above skin level  (oval 1\"x1 5/8\" ([id number]))   Peristomal Assessment Red  (mild redness at 3 & 9 oclock)   Accessories/Skin Care cleansed with water;skin barrier ring   Stoma Function flatus;stool   Stool Color brown   Stool Consistency loose   Treatment Placement checked;Site care     Wound/ostomy - vac dressing and ostomy pouch changed. NPWT machine was not working properly so drainage was pooling under the drape, new machine placed, achieved excellent seal, new canister placed, wound size has decreased and becoming challenging to place granufoam, distal aspect of wound is approx 1 cm, proximal aspect is flush, could consider " application of the 3M peel and place dressing due to shallow depth, will explore this next week. Patient rested with eyes closed during entirety of care. Will plan on continuing vac changes M-W-F.

## 2024-11-22 NOTE — THERAPY TREATMENT NOTE
Patient Name: Arsh Haynes  : 1950    MRN: [id number]                              Today's Date: 2024       Admit Date: 10/26/2024    Visit Dx:     ICD-10-CM ICD-9-CM   1. Perforation of sigmoid colon  K63.1 569.83   2. Hypotension, unspecified hypotension type  I95.9 458.9   3. Increased lactic acid level  R79.89 276.2   4. Bowel perforation  K63.1 569.83     Patient Active Problem List   Diagnosis    Colon cancer    HTN (hypertension)    Thrombocytosis    Acute DVT (deep venous thrombosis)    Renal mass    Hyponatremia    Anemia    Peritonitis    Moderate protein-calorie malnutrition    Oropharyngeal dysphagia     Past Medical History:   Diagnosis Date    HTN (hypertension)     Hypercholesterolemia      Past Surgical History:   Procedure Laterality Date    COLON RESECTION N/A 10/26/2024    Procedure: LOW ANTERIOR COLON RESECTION SIGMOID, COLOSTOMY, SPLENIC FLEXURE MOBILIZATION;  Surgeon: Mc Guillaume MD;  Location: [facility];  Service: General;  Laterality: N/A;    EXPLORATORY LAPAROTOMY N/A 10/26/2024    Procedure: LAPAROTOMY EXPLORATORY, LEFT HEMICOLECTOMY;  Surgeon: Mc Guillaume MD;  Location: [facility];  Service: General;  Laterality: N/A;      General Information       Row Name 24 9278          Physical Therapy Time and Intention    Document Type therapy note (daily note)  -MG     Mode of Treatment individual therapy;physical therapy  -MG       Row Name 24 1332          General Information    Patient Profile Reviewed yes  -MG     Existing Precautions/Restrictions fall  Abdominal wound vac, colostomy  -MG     Barriers to Rehab medically complex  -MG       Row Name 24 1330          Cognition    Orientation Status (Cognition) oriented x 4  -MG       Row Name 24 1330          Safety Issues/Impairments Affecting Functional Mobility    Safety Issues Affecting Function (Mobility) insight into deficits/self-awareness;positioning of assistive  device;safety precaution awareness;sequencing abilities  -MG     Impairments Affecting Function (Mobility) strength;balance;endurance/activity tolerance;coordination;pain;postural/trunk control;range of motion (ROM);grasp  -MG     Comment, Safety Issues/Impairments (Mobility) Gait belt and non-skid socks donned. PT tech present for safe functional mobility.  -MG               User Key  (r) = Recorded By, (t) = Taken By, (c) = Cosigned By      Initials Name Provider Type    MG Liv Cruz, PT Physical Therapist                   Mobility       Row Name 11/22/24 1331          Bed Mobility    Supine-Sit [place] (Bed Mobility) moderate assist (50% patient effort);verbal cues;nonverbal cues (demo/gesture)  -MG     Assistive Device (Bed Mobility) head of bed elevated;bed rails;repositioning sheet  -MG     Comment, (Bed Mobility) Increased time to complete. Primary assist remains at trunk. Difficulty d/t weakness and chronic R shoulder issues.  -MG       Row Name 11/22/24 1331          Bed-Chair Transfer    Bed-Chair [place] (Transfers) moderate assist (50% patient effort);2 person assist;verbal cues  -MG     Assistive Device (Bed-Chair Transfers) walker, front-wheeled  -MG     Comment, (Bed-Chair Transfer) Step pivot L tsf. Min/ModA from this PT for weight shifting during first two steps, no weight shifting assist required for last 3 steps. Assist needed at times for RW management.  -MG       Row Name 11/22/24 1331          Sit-Stand Transfer    Sit-Stand [place] (Transfers) moderate assist (50% patient effort);maximum assist (25% patient effort);2 person assist;verbal cues;nonverbal cues (demo/gesture)  -MG     Assistive Device (Sit-Stand Transfers) walker, front-wheeled  -MG     Comment, (Sit-Stand Transfer) x1 elevated EOB. Assist w/ RUE placement on RW. [place] fwd x3 prior to standing. Cues and assist w/ foot placement.  -MG               User Key  (r) = Recorded By, (t) = Taken By, (c) = Cosigned By       Initials Name Provider Type    MG Liv Cruz, DOTTIE Physical Therapist                   Obj/Interventions       Row Name 11/22/24 1335          Motor Skills    Therapeutic Exercise other (see comments)  AP x10. Bilateral calf stretch x15s; painful for pt. Encouraged to do full ROM ankle pumps throughout the day. Able to demo LAQ and HF in sitting and encouraged to cont performing.  -MG       Row Name 11/22/24 1335          Balance    Static Sitting Balance standby assist  -MG     Dynamic Sitting Balance standby assist  -MG     Position, Sitting Balance sitting edge of bed  -MG     Static Standing Balance moderate assist;2-person assist  -MG     Dynamic Standing Balance moderate assist;2-person assist  -MG     Position/Device Used, Standing Balance supported;walker, front-wheeled  -MG     Comment, Balance Cont w/ good upright posture while EOB and once tsf to chair. Quick to fatigue when in the chair, leaning bwd.  -MG               User Key  (r) = Recorded By, (t) = Taken By, (c) = Cosigned By      Initials Name Provider Type    MG Liv Cruz, PT Physical Therapist                   Goals/Plan    No documentation.                  Clinical Impression       Row Name 11/22/24 1334          Pain    Pretreatment Pain Rating 4/10  -MG     Posttreatment Pain Rating 4/10  -MG     Pain Location knee;shoulder  -MG     Pain Side/Orientation right  -MG     Pain Management Interventions nursing notified;premedicated for activity;positioning techniques utilized;exercise or physical activity utilized  -MG     Response to Pain Interventions activity participation with tolerable pain  -MG       Row Name 11/22/24 1335          Plan of Care Review    Plan of Care Reviewed With patient  -MG     Progress improving  -MG     Outcome Evaluation Pt seen for PT tx this AM and tolerated the session well. Today, pt was Mod for bed mobility, Mod/MaxA x2 for STS to RW from an elevated bed height and ModA x2 for a step pivot tsf to  the L to the recliner. Once in the chair pt was reclined and set-up for lunch. Pt reports not feeling well today and continues to have R shoulder and knee pain. Pt was able to perform 10 ankle pumps w/ cues for full ROM and this PT performed a bilateral calf stretch x15s as pts achilles are tight w/ pt having limited ankle DF ROM. Encouraged pt to sit up for an hour and to work on his AP and glute squeezes; pt v/u. PT will cont to follow and rec rehab at [place].  -MG       Row Name 11/22/24 4567          Therapy Assessment/Plan (PT)    Rehab Potential (PT) good  -MG     Criteria for Skilled Interventions Met (PT) yes  -MG     Therapy Frequency (PT) 5 times/wk  -MG       Row Name 11/22/24 5677          Positioning and Restraints    Pre-Treatment Position in bed  -MG     Post Treatment Position chair  -MG     In Chair notified nsg;reclined;call light within reach;encouraged to call for assist;exit alarm on;legs elevated  Set-up with lunch tray  -MG               User Key  (r) = Recorded By, (t) = Taken By, (c) = Cosigned By      Initials Name Provider Type    Liv Sandoval, PT Physical Therapist                   Outcome Measures       Row Name 11/22/24 1340          How much help from another person do you currently need...    Turning from your back to your side while in flat bed without using bedrails? 2  -MG     Moving from lying on back to sitting on the side of a flat bed without bedrails? 2  -MG     Moving to and from a bed to a chair (including a wheelchair)? 2  -MG     Standing up from a chair using your arms (e.g., wheelchair, bedside chair)? 2  -MG     Climbing 3-5 steps with a railing? 1  -MG     To walk in hospital room? 2  -MG     AM-PAC 6 Clicks Score (PT) 11  -MG     Highest Level of Mobility Goal 4 --> Transfer to chair/commode  -MG               User Key  (r) = Recorded By, (t) = Taken By, (c) = Cosigned By      Initials Name Provider Type    Liv Sandoval, PT Physical Therapist                                  Physical Therapy Education       Title: PT OT SLP Therapies (Done)       Topic: Physical Therapy (Done)       Point: Mobility training (Done)       Learning Progress Summary            Patient Acceptance, E,D, VU,NR by MG at 11/22/2024 1340    Acceptance, E,D, VU,NR by MG at 11/21/2024 1659    Acceptance, E, VU by JS at 11/19/2024 1806    Acceptance, E,D, VU,NR by MG at 11/19/2024 1438    Acceptance, E, VU by JS at 11/16/2024 1757    Acceptance, E, VU by JS at 11/15/2024 1824    Acceptance, E,D,TB, VU,NR by MG at 11/15/2024 1218    Acceptance, E, VU,NR by MG at 11/12/2024 1559    Acceptance, E, VU by JS at 11/11/2024 1813    Acceptance, E, VU,NR by MG at 11/11/2024 1629    Acceptance, E, NR,VU by EF at 11/10/2024 1127    Acceptance, E, VU by JS at 11/8/2024 1820    Acceptance, E,D, VU,NR by MG at 11/8/2024 1459    Acceptance, E, VU by CW at 11/2/2024 1622                      Point: Home exercise program (Done)       Learning Progress Summary            Patient Acceptance, E,D, VU,NR by MG at 11/22/2024 1340    Acceptance, E, VU by JS at 11/19/2024 1806    Acceptance, E,D, VU,NR by MG at 11/19/2024 1438    Acceptance, E, VU by JS at 11/16/2024 1757    Acceptance, E, VU by JS at 11/15/2024 1824    Acceptance, E,D,TB, VU,NR by MG at 11/15/2024 1218    Acceptance, E, VU,NR by MG at 11/12/2024 1559    Acceptance, E, VU by JS at 11/11/2024 1813    Acceptance, E, VU,NR by MG at 11/11/2024 1629    Acceptance, E, VU by JS at 11/8/2024 1820                      Point: Body mechanics (Done)       Learning Progress Summary            Patient Acceptance, E,D, VU,NR by MG at 11/22/2024 1340    Acceptance, E,D, VU,NR by MG at 11/21/2024 1659    Acceptance, E, VU by JS at 11/19/2024 1806    Acceptance, E,D, VU,NR by MG at 11/19/2024 1438    Acceptance, E, VU by JS at 11/16/2024 1757    Acceptance, E, VU by JS at 11/15/2024 1824    Acceptance, E,D,TB, VU,NR by MG at 11/15/2024 1218    Acceptance, E, VU,NR by MG at  11/12/2024 1559    Acceptance, E, VU by JS at 11/11/2024 1813    Acceptance, E, VU,NR by MG at 11/11/2024 1629    Acceptance, E, NR,VU by EF at 11/10/2024 1127    Acceptance, E, VU by JS at 11/8/2024 1820    Acceptance, E,D, VU,NR by MG at 11/8/2024 1459    Acceptance, E, VU by CW at 11/2/2024 1622                      Point: Precautions (Done)       Learning Progress Summary            Patient Acceptance, E,D, VU,NR by MG at 11/22/2024 1340    Acceptance, E,D, VU,NR by MG at 11/21/2024 1659    Acceptance, E, VU by JS at 11/19/2024 1806    Acceptance, E,D, VU,NR by MG at 11/19/2024 1438    Acceptance, E, VU by JS at 11/16/2024 1757    Acceptance, E, VU by JS at 11/15/2024 1824    Acceptance, E,D,TB, VU,NR by MG at 11/15/2024 1218    Acceptance, E, VU,NR by MG at 11/12/2024 1559    Acceptance, E, VU by JS at 11/11/2024 1813    Acceptance, E, VU,NR by MG at 11/11/2024 1629    Acceptance, E, VU by JS at 11/8/2024 1820    Acceptance, E,D, VU,NR by MG at 11/8/2024 1459    Acceptance, E, VU by CW at 11/2/2024 1622                                      User Key       Initials Effective Dates Name Provider Type Discipline    EF 05/31/24 -  Sabine Tineo, PT Physical Therapist PT    MG 05/24/22 -  iLv Cruz, PT Physical Therapist PT    CW 12/13/22 -  Lisa Paulson, PT Physical Therapist PT    JS 02/26/24 -  Johnson Morales, RN Registered Nurse Nurse                  PT Recommendation and Plan     Progress: improving  Outcome Evaluation: Pt seen for PT tx this AM and tolerated the session well. Today, pt was Mod for bed mobility, Mod/MaxA x2 for STS to RW from an elevated bed height and ModA x2 for a step pivot tsf to the L to the recliner. Once in the chair pt was reclined and set-up for lunch. Pt reports not feeling well today and continues to have R shoulder and knee pain. Pt was able to perform 10 ankle pumps w/ cues for full ROM and this PT performed a bilateral calf stretch x15s as pts achilles are tight  w/ pt having limited ankle DF ROM. Encouraged pt to sit up for an hour and to work on his AP and glute squeezes; pt v/u. PT will cont to follow and rec rehab at [place].     Time Calculation:         PT Charges       Row Name 11/22/24 1340             Time Calculation    Start Time 1058  -MG      Stop Time 1114  -MG      Time Calculation (min) 16 min  -MG      PT Received On 11/22/24  -MG      PT - Next Appointment 11/23/24  -MG                User Key  (r) = Recorded By, (t) = Taken By, (c) = Cosigned By      Initials Name Provider Type    MG Liv Cruz, PT Physical Therapist                  Therapy Charges for Today       Code Description Service Date Service Provider Modifiers Qty    30729182796 HC PT THERAPEUTIC ACT EA 15 MIN 11/21/2024 Liv Cruz, PT GP 2    41897634284 HC PT THERAPEUTIC ACT EA 15 MIN 11/22/2024 Liv Cruz, PT GP 1            PT G-Codes  Outcome Measure Options: AM-PAC 6 Clicks Daily Activity (OT)  AM-PAC 6 Clicks Score (PT): 11  AM-PAC 6 Clicks Score (OT): 12  Modified Fabian Scale: 5 - Severe disability.  Bedridden, incontinent, and requiring constant nursing care and attention.  PT Discharge Summary  Anticipated Discharge Disposition (PT): skilled nursing facility    Liv Cruz PT  11/22/2024

## 2024-11-22 NOTE — CASE MANAGEMENT/SOCIAL WORK
Continued Stay Note  [facility]     Patient Name: Arsh Haynes  MRN: [id number]  Today's Date: 11/22/2024    Admit Date: 10/26/2024    Plan: Plan skilled care at accepting facility- pre cert needed.  BENITA Clay RN   Discharge Plan       Row Name 11/22/24 1229       Plan    Plan Plan skilled care at accepting facility- pre cert needed.  BENITA Clay RN    Patient/Family in Agreement with Plan yes    Plan Comments Per Valentina  [phone]) [facility].  Pt will need to provide updated choices for skilled care.  Plan skilled care at accepting facility- pre cert needed.  BENITA Clay RN                   Discharge Codes    No documentation.                 Expected Discharge Date and Time       Expected Discharge Date Expected Discharge Time    Nov 25, 2024               Felecia Clay RN

## 2024-11-22 NOTE — PLAN OF CARE
Goal Outcome Evaluation:  Plan of Care Reviewed With: patient        Progress: improving  Outcome Evaluation: Pt seen for PT tx this AM and tolerated the session well. Today, pt was Mod for bed mobility, Mod/MaxA x2 for STS to RW from an elevated bed height and ModA x2 for a step pivot tsf to the L to the recliner. Once in the chair pt was reclined and set-up for lunch. Pt reports not feeling well today and continues to have R shoulder and knee pain. Pt was able to perform 10 ankle pumps w/ cues for full ROM and this PT performed a bilateral calf stretch x15s as pts achilles are tight w/ pt having limited ankle DF ROM. Encouraged pt to sit up for an hour and to work on his AP and glute squeezes; pt v/u. PT will cont to follow and rec rehab at [place].    Anticipated Discharge Disposition (PT): skilled nursing facility    This PT assisted w/ pt tsf back to bed after lunch. Pt was Mod/MaxA x2 for STS, step pivot tsf to the R and for return to supine. Pt tolerated sitting UIC for approx 1hr 20min.

## 2024-11-22 NOTE — PLAN OF CARE
Goal Outcome Evaluation:     Patient A&Ox4. Up in chair x2 hours this shift. Medicated per orders. No complaints of pain. Plan of care ongoing.

## 2024-11-22 NOTE — PROGRESS NOTES
Name: Arsh Haynes ADMIT: 10/26/2024   : 1950  PCP: Provider, No Known    MRN: [id number] LOS: 27 days   AGE/SEX: 74 y.o. male  ROOM: [facility]     Subjective   Subjective   Patient is seen at bedside.  No acute overnight events have been reported to me.  He does not appear to be in any kind of distress at this point of time.       Objective   Objective   Vital Signs  Temp:  [97.5 °F (36.4 °C)-98.5 °F (36.9 °C)] 98.1 °F (36.7 °C)  Heart Rate:  [] 92  Resp:  [16] 16  BP: (108-137)/(58-69) 108/58  SpO2:  [95 %-99 %] 99 %  on  Flow (L/min) (Oxygen Therapy):  [2] 2;   Device (Oxygen Therapy): room air  Body mass index is 24.61 kg/m².  Physical Exam  General, awake and alert.  Head and ENT, normocephalic and atraumatic.  Lungs, symmetric expansion, equal air entry bilaterally.  Heart, regular rate and rhythm.  Abdomen, soft and nontender.  Extremities, no clubbing or cyanosis.  Neuro, no focal deficits.  Skin: Warm and no rash.  Psych, normal mood and affect.  Musculoskeletal, joint examination is grossly normal.     Copied text material from yesterday's note has been reviewed for appropriate changes and remains accurate as of 24.      Results Review     I reviewed the patient's new clinical results.  Results from last 7 days   Lab Units 2454 24  0333 24  0443   WBC 10*3/mm3 11.93* 10.54 12.08* 14.06*   HEMOGLOBIN g/dL 9.5* 9.7* 9.1* 9.0*   PLATELETS 10*3/mm3 796* 773* 831* 867*     Results from last 7 days   Lab Units 24  0554 24  0333 24  0443   SODIUM mmol/L 131* 129* 131* 133*   POTASSIUM mmol/L 4.2 4.3 4.4 4.0   CHLORIDE mmol/L 99 98 99 100   CO2 mmol/L 20.3* 20.7* 22.1 22.9   BUN mg/dL 6* 7* 10 14   CREATININE mg/dL 0.59* 0.48* 0.49* 0.51*   GLUCOSE mg/dL 117* 124* 116* 86   EGFR mL/min/1.73 103.1 109.7 109.0 107.7     Results from last 7 days   Lab Units 24  0427 24  0554 24  0333 24  0443   ALBUMIN  g/dL 2.3* 2.6* 2.4* 2.3*   BILIRUBIN mg/dL 0.6 0.7 0.6 0.5   ALK PHOS U/L 154* 160* 166* 178*   AST (SGOT) U/L 25 28 29 28   ALT (SGPT) U/L 28 32 35 35     Results from last 7 days   Lab Units 11/22/24  0427 11/21/24  0554 11/20/24  0333 11/19/24  0443 11/18/24  0532 11/17/24  0540 11/16/24  0430   CALCIUM mg/dL 8.9 8.6 8.5* 8.6 8.7 8.8 8.7   ALBUMIN g/dL 2.3* 2.6* 2.4* 2.3* 2.6* 2.6* 2.2*   MAGNESIUM mg/dL  --   --   --   --  1.9 1.9 2.0   PHOSPHORUS mg/dL  --   --   --   --  3.5 3.4 3.6       Glucose   Date/Time Value Ref Range Status   11/22/2024 1557 135 (H) 70 - 130 mg/dL Final   11/22/2024 1027 148 (H) 70 - 130 mg/dL Final   11/22/2024 0624 116 70 - 130 mg/dL Final   11/21/2024 2058 139 (H) 70 - 130 mg/dL Final   11/21/2024 1533 148 (H) 70 - 130 mg/dL Final   11/21/2024 1044 179 (H) 70 - 130 mg/dL Final   11/21/2024 0639 123 70 - 130 mg/dL Final       No radiology results for the last day    I have personally reviewed all medications:  Scheduled Medications  chlorhexidine, 15 mL, Mouth/Throat, Q12H  enoxaparin, 1 mg/kg, Subcutaneous, Q12H  folic acid, 1 mg, Oral, Daily  insulin lispro, 2-9 Units, Subcutaneous, 4x Daily AC & at Bedtime  lansoprazole, 30 mg, Oral, Q AM  Lidocaine, 1 patch, Transdermal, Q24H  Lidocaine, 2 patch, Transdermal, Q24H  metoprolol tartrate, 100 mg, Oral, Q12H  sodium chloride, 10 mL, Intravenous, Q12H  sodium hypochlorite, , Topical, BID  tamsulosin, 0.4 mg, Oral, Daily  thiamine, 100 mg, Oral, Daily    Infusions   Diet  Diet: Regular/House; No Mixed Consistencies, No Straw; Texture: Mechanical Ground (NDD 2); Fluid Consistency: Nectar Thick    I have personally reviewed:  [x]  Laboratory   [x]  Microbiology   [x]  Radiology   [x]  EKG/Telemetry  [x]  Cardiology/Vascular   []  Pathology    []  Records       Assessment/Plan     Active Hospital Problems    Diagnosis  POA    **Peritonitis [K65.9]  Unknown    Oropharyngeal dysphagia [R13.12]  Unknown    HTN (hypertension) [I10]  Unknown     Thrombocytosis [D75.839]  Unknown    Acute DVT (deep venous thrombosis) [I82.409]  Unknown    Renal mass [N28.89]  Unknown    Hyponatremia [E87.1]  Unknown    Anemia [D64.9]  Unknown    Moderate protein-calorie malnutrition [E44.0]  Yes    Colon cancer [C18.9]  No      Resolved Hospital Problems    Diagnosis Date Resolved POA    Perforation of sigmoid colon [K63.1] 10/27/2024 Yes       74 y.o. male admitted with Peritonitis.    Assessment and plan  1.  Status post emergent left hemicolectomy with colostomy and washout on 10/26.  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  Surgery following the patient.  He will need rehab placement upon discharge.    2.  Right lower extremity DVT, currently on Lovenox, will eventually need to be transitioned to Eliquis or Xarelto.    3.  Anemia, monitor hemoglobin trend, will recheck labs.    4.  Right renal mass and urinary retention, he will follow-up with repeat CT of renal mass in 4 to 6 weeks which urology feels this most likely RCC.    5.  Hyponatremia, sodium levels noted to be 131, monitor trend.    6.  Hypokalemia, replacement of electrolytes per protocol.    7.  Diabetes mellitus, continue Accu-Cheks and sliding scale insulin coverage.    8.  CODE STATUS is full code.  Further plans based on hospital course.      Tomas Chaney MD  [facility]  11/22/24  16:08 EST

## 2024-11-22 NOTE — CASE MANAGEMENT/SOCIAL WORK
Continued Stay Note  [facility]     Patient Name: Arsh Haynes  MRN: [id number]  Today's Date: 11/22/2024    Admit Date: 10/26/2024    Plan: Plan [place] when medically appropriate- pre cert needed.   BENITA Clay RN   Discharge Plan       Row Name 11/22/24 0911       Plan    Plan Plan [place] when medically appropriate- pre cert needed.   BENITA Clay RN    Patient/Family in Agreement with Plan yes    Plan Comments Pt did participate with PT on 11/21.  [facility]  ( [phone]) is following for [place].  Pre cert will be initiated when pt medically stable.  Plan [place] when medically appropriate- pre cert needed. BENITA Clay RN                   Discharge Codes    No documentation.                 Expected Discharge Date and Time       Expected Discharge Date Expected Discharge Time    Nov 25, 2024               Felecia Clay RN

## 2024-11-23 LAB
ALBUMIN SERPL-MCNC: 2.8 G/DL (ref 3.5–5.2)
ALBUMIN/GLOB SERPL: 0.7 G/DL
ALP SERPL-CCNC: 144 U/L (ref 39–117)
ALT SERPL W P-5'-P-CCNC: 30 U/L (ref 1–41)
ANION GAP SERPL CALCULATED.3IONS-SCNC: 12.6 MMOL/L (ref 5–15)
AST SERPL-CCNC: 23 U/L (ref 1–40)
BASOPHILS # BLD AUTO: 0.07 10*3/MM3 (ref 0–0.2)
BASOPHILS NFR BLD AUTO: 0.7 % (ref 0–1.5)
BILIRUB SERPL-MCNC: 0.6 MG/DL (ref 0–1.2)
BUN SERPL-MCNC: 7 MG/DL (ref 8–23)
BUN/CREAT SERPL: 11.9 (ref 7–25)
CALCIUM SPEC-SCNC: 8.9 MG/DL (ref 8.6–10.5)
CHLORIDE SERPL-SCNC: 96 MMOL/L (ref 98–107)
CO2 SERPL-SCNC: 20.4 MMOL/L (ref 22–29)
CREAT SERPL-MCNC: 0.59 MG/DL (ref 0.76–1.27)
DEPRECATED RDW RBC AUTO: 51.3 FL (ref 37–54)
EGFRCR SERPLBLD CKD-EPI 2021: 103.1 ML/MIN/1.73
EOSINOPHIL # BLD AUTO: 0.61 10*3/MM3 (ref 0–0.4)
EOSINOPHIL NFR BLD AUTO: 6 % (ref 0.3–6.2)
ERYTHROCYTE [DISTWIDTH] IN BLOOD BY AUTOMATED COUNT: 17.2 % (ref 12.3–15.4)
FUNGUS WND CULT: NORMAL
GLOBULIN UR ELPH-MCNC: 4.1 GM/DL
GLUCOSE BLDC GLUCOMTR-MCNC: 111 MG/DL (ref 70–130)
GLUCOSE BLDC GLUCOMTR-MCNC: 112 MG/DL (ref 70–130)
GLUCOSE BLDC GLUCOMTR-MCNC: 120 MG/DL (ref 70–130)
GLUCOSE BLDC GLUCOMTR-MCNC: 134 MG/DL (ref 70–130)
GLUCOSE SERPL-MCNC: 109 MG/DL (ref 65–99)
HCT VFR BLD AUTO: 29.8 % (ref 37.5–51)
HGB BLD-MCNC: 9.7 G/DL (ref 13–17.7)
IMM GRANULOCYTES # BLD AUTO: 0.13 10*3/MM3 (ref 0–0.05)
IMM GRANULOCYTES NFR BLD AUTO: 1.3 % (ref 0–0.5)
LYMPHOCYTES # BLD AUTO: 1.69 10*3/MM3 (ref 0.7–3.1)
LYMPHOCYTES NFR BLD AUTO: 16.7 % (ref 19.6–45.3)
MCH RBC QN AUTO: 27.2 PG (ref 26.6–33)
MCHC RBC AUTO-ENTMCNC: 32.6 G/DL (ref 31.5–35.7)
MCV RBC AUTO: 83.5 FL (ref 79–97)
MONOCYTES # BLD AUTO: 0.99 10*3/MM3 (ref 0.1–0.9)
MONOCYTES NFR BLD AUTO: 9.8 % (ref 5–12)
NEUTROPHILS NFR BLD AUTO: 6.66 10*3/MM3 (ref 1.7–7)
NEUTROPHILS NFR BLD AUTO: 65.5 % (ref 42.7–76)
NRBC BLD AUTO-RTO: 0 /100 WBC (ref 0–0.2)
PLATELET # BLD AUTO: 759 10*3/MM3 (ref 140–450)
PMV BLD AUTO: 8.7 FL (ref 6–12)
POTASSIUM SERPL-SCNC: 4.4 MMOL/L (ref 3.5–5.2)
PROT SERPL-MCNC: 6.9 G/DL (ref 6–8.5)
RBC # BLD AUTO: 3.57 10*6/MM3 (ref 4.14–5.8)
SODIUM SERPL-SCNC: 129 MMOL/L (ref 136–145)
WBC NRBC COR # BLD AUTO: 10.15 10*3/MM3 (ref 3.4–10.8)

## 2024-11-23 PROCEDURE — 25010000002 ENOXAPARIN PER 10 MG: Performed by: INTERNAL MEDICINE

## 2024-11-23 PROCEDURE — 82948 REAGENT STRIP/BLOOD GLUCOSE: CPT

## 2024-11-23 PROCEDURE — 80053 COMPREHEN METABOLIC PANEL: CPT | Performed by: INTERNAL MEDICINE

## 2024-11-23 PROCEDURE — 85025 COMPLETE CBC W/AUTO DIFF WBC: CPT | Performed by: INTERNAL MEDICINE

## 2024-11-23 RX ADMIN — TAMSULOSIN HYDROCHLORIDE 0.4 MG: 0.4 CAPSULE ORAL at 08:24

## 2024-11-23 RX ADMIN — METOPROLOL TARTRATE 100 MG: 50 TABLET, FILM COATED ORAL at 20:41

## 2024-11-23 RX ADMIN — LANSOPRAZOLE 30 MG: 15 TABLET, ORALLY DISINTEGRATING ORAL at 06:39

## 2024-11-23 RX ADMIN — Medication 10 ML: at 20:46

## 2024-11-23 RX ADMIN — ENOXAPARIN SODIUM 90 MG: 100 INJECTION SUBCUTANEOUS at 17:46

## 2024-11-23 RX ADMIN — DICLOFENAC SODIUM 4 G: 10 GEL TOPICAL at 17:46

## 2024-11-23 RX ADMIN — FOLIC ACID 1 MG: 1 TABLET ORAL at 08:24

## 2024-11-23 RX ADMIN — Medication 10 ML: at 08:25

## 2024-11-23 RX ADMIN — METOPROLOL TARTRATE 100 MG: 50 TABLET, FILM COATED ORAL at 08:24

## 2024-11-23 RX ADMIN — CHLORHEXIDINE GLUCONATE 15 ML: 1.2 RINSE ORAL at 20:44

## 2024-11-23 RX ADMIN — THIAMINE HCL TAB 100 MG 100 MG: 100 TAB at 08:24

## 2024-11-23 RX ADMIN — ENOXAPARIN SODIUM 90 MG: 100 INJECTION SUBCUTANEOUS at 06:39

## 2024-11-23 NOTE — PLAN OF CARE
Goal Outcome Evaluation:      Pt is A&Ox4; Q2 turned in bed; complains of right shoulder pain; medicated per orders; plan of care on going.

## 2024-11-23 NOTE — PROGRESS NOTES
Name: Arsh Haynes ADMIT: 10/26/2024   : 1950  PCP: Provider, No Known    MRN: [id number] LOS: 28 days   AGE/SEX: 74 y.o. male  ROOM: [facility]     Subjective   Subjective   2024  No overnight events, continues to have right knee and right shoulder pain.  Tolerating PO intake.        Objective   Objective   Vital Signs  Temp:  [97.6 °F (36.4 °C)-98.2 °F (36.8 °C)] 97.6 °F (36.4 °C)  Heart Rate:  [] 97  Resp:  [16-18] 16  BP: (108-136)/(59-63) 136/63  SpO2:  [97 %-98 %] 98 %  on  Flow (L/min) (Oxygen Therapy):  [2] 2;   Device (Oxygen Therapy): room air  Body mass index is 24.37 kg/m².  Physical Exam  Constitutional:       General: He is not in acute distress.     Appearance: He is not toxic-appearing.   HENT:      Head: Normocephalic and atraumatic.      Nose: Nose normal. No congestion.      Mouth/Throat:      Pharynx: Oropharynx is clear. No oropharyngeal exudate.   Eyes:      General: No scleral icterus.  Cardiovascular:      Rate and Rhythm: Normal rate and regular rhythm.      Heart sounds: No murmur heard.     No friction rub. No gallop.   Pulmonary:      Effort: No respiratory distress.      Breath sounds: No wheezing or rales.   Abdominal:      General: There is no distension.      Tenderness: There is no abdominal tenderness. There is no guarding.   Musculoskeletal:         General: No swelling or deformity.      Cervical back: Normal range of motion. No rigidity.      Right lower leg: No edema.      Left lower leg: No edema.   Skin:     Coloration: Skin is not jaundiced.      Findings: No bruising or lesion.   Neurological:      General: No focal deficit present.      Mental Status: He is alert and oriented to person, place, and time.      Motor: Weakness present.       Results Review     I reviewed the patient's new clinical results.  Results from last 7 days   Lab Units 24  0507 24  0427 24  0554 24  0333   WBC 10*3/mm3 10.15 11.93* 10.54 12.08*   HEMOGLOBIN  g/dL 9.7* 9.5* 9.7* 9.1*   PLATELETS 10*3/mm3 759* 796* 773* 831*     Results from last 7 days   Lab Units 11/23/24  0507 11/22/24  0427 11/21/24  0554 11/20/24  0333   SODIUM mmol/L 129* 131* 129* 131*   POTASSIUM mmol/L 4.4 4.2 4.3 4.4   CHLORIDE mmol/L 96* 99 98 99   CO2 mmol/L 20.4* 20.3* 20.7* 22.1   BUN mg/dL 7* 6* 7* 10   CREATININE mg/dL 0.59* 0.59* 0.48* 0.49*   GLUCOSE mg/dL 109* 117* 124* 116*   EGFR mL/min/1.73 103.1 103.1 109.7 109.0     Results from last 7 days   Lab Units 11/23/24  0507 11/22/24 0427 11/21/24  0554 11/20/24  0333   ALBUMIN g/dL 2.8* 2.3* 2.6* 2.4*   BILIRUBIN mg/dL 0.6 0.6 0.7 0.6   ALK PHOS U/L 144* 154* 160* 166*   AST (SGOT) U/L 23 25 28 29   ALT (SGPT) U/L 30 28 32 35     Results from last 7 days   Lab Units 11/23/24  0507 11/22/24  0427 11/21/24  0554 11/20/24  0333 11/19/24  0443 11/18/24  0532 11/17/24  0540   CALCIUM mg/dL 8.9 8.9 8.6 8.5*   < > 8.7 8.8   ALBUMIN g/dL 2.8* 2.3* 2.6* 2.4*   < > 2.6* 2.6*   MAGNESIUM mg/dL  --   --   --   --   --  1.9 1.9   PHOSPHORUS mg/dL  --   --   --   --   --  3.5 3.4    < > = values in this interval not displayed.       Glucose   Date/Time Value Ref Range Status   11/23/2024 1032 134 (H) 70 - 130 mg/dL Final   11/23/2024 0612 111 70 - 130 mg/dL Final   11/22/2024 2011 114 70 - 130 mg/dL Final   11/22/2024 1557 135 (H) 70 - 130 mg/dL Final   11/22/2024 1027 148 (H) 70 - 130 mg/dL Final   11/22/2024 0624 116 70 - 130 mg/dL Final   11/21/2024 2058 139 (H) 70 - 130 mg/dL Final       No radiology results for the last day    I have personally reviewed all medications:  Scheduled Medications  chlorhexidine, 15 mL, Mouth/Throat, Q12H  enoxaparin, 1 mg/kg, Subcutaneous, Q12H  folic acid, 1 mg, Oral, Daily  insulin lispro, 2-9 Units, Subcutaneous, 4x Daily AC & at Bedtime  lansoprazole, 30 mg, Oral, Q AM  Lidocaine, 1 patch, Transdermal, Q24H  Lidocaine, 2 patch, Transdermal, Q24H  metoprolol tartrate, 100 mg, Oral, Q12H  sodium chloride, 10 mL,  Intravenous, Q12H  sodium hypochlorite, , Topical, BID  tamsulosin, 0.4 mg, Oral, Daily  thiamine, 100 mg, Oral, Daily    Infusions   Diet  Diet: Regular/House; No Mixed Consistencies, No Straw; Texture: Mechanical Ground (NDD 2); Fluid Consistency: Nectar Thick    I have personally reviewed:  [x]  Laboratory   [x]  Microbiology   [x]  Radiology   [x]  EKG/Telemetry  [x]  Cardiology/Vascular   []  Pathology    []  Records       Assessment/Plan     Active Hospital Problems    Diagnosis  POA    **Peritonitis [K65.9]  Unknown    Oropharyngeal dysphagia [R13.12]  Unknown    HTN (hypertension) [I10]  Unknown    Thrombocytosis [D75.839]  Unknown    Acute DVT (deep venous thrombosis) [I82.409]  Unknown    Renal mass [N28.89]  Unknown    Hyponatremia [E87.1]  Unknown    Anemia [D64.9]  Unknown    Moderate protein-calorie malnutrition [E44.0]  Yes    Colon cancer [C18.9]  No      Resolved Hospital Problems    Diagnosis Date Resolved POA    Perforation of sigmoid colon [K63.1] 10/27/2024 Yes       74 y.o. male admitted with Peritonitis.    #Septic Shock  #Perforated Rectosigmoid adenocarcinom  #Feculent Peritonitis    - s/p open left hemicolectomy with porsha pouch and end colostomy by surgery on 10/26/2024    - Tolerating PO intake, no g tube placement needed    - Will need otpt colonoscopy    - ID followed, abx stopped    - surgery has signed off    - discussed with CM, plan for SNF at [place], choices needed     #RLE DVT    - Continue Therapeutic Lovenox, plan to transition to Eliquis at [place]    #Anemia  #Thrombocytosis    - stable, follow up with heme/ocn as otpt    #Right renal mass    - urology consulted and signed off    - concerning for RCC    - Patient to follow up with urology after discharge    #Hyponatremia    - sodium 129 today    - has fluctuated from 129-136 during admission    - Urine studies suggestive of SIADH    - Fluid restrictions    - Will consider starting NaCl tabs    #Hypokalemia    - replace per protocol      #DM    - ISS    #Right knee Pain  #Right shoulder pain    - X-rays reviewed, appear related to degenerative changes    - no intervention per ortho     - Diclofenac started         Therapeutic Lovenox  for DVT prophylaxis.  Limited code (no CPR, no intubation).  Discussed with patient and nursing staff.  Anticipate discharge to SNU facility in 1-2 days.  Expected Discharge Date: 11/27/2024; Expected Discharge Time:       DO [facility]  11/23/24  12:12 EST

## 2024-11-24 LAB
ALBUMIN SERPL-MCNC: 2.8 G/DL (ref 3.5–5.2)
ALBUMIN/GLOB SERPL: 0.7 G/DL
ALP SERPL-CCNC: 142 U/L (ref 39–117)
ALT SERPL W P-5'-P-CCNC: 27 U/L (ref 1–41)
ANION GAP SERPL CALCULATED.3IONS-SCNC: 14 MMOL/L (ref 5–15)
AST SERPL-CCNC: 22 U/L (ref 1–40)
BASOPHILS # BLD AUTO: 0.07 10*3/MM3 (ref 0–0.2)
BASOPHILS NFR BLD AUTO: 0.8 % (ref 0–1.5)
BILIRUB SERPL-MCNC: 0.6 MG/DL (ref 0–1.2)
BUN SERPL-MCNC: 7 MG/DL (ref 8–23)
BUN/CREAT SERPL: 13 (ref 7–25)
CALCIUM SPEC-SCNC: 8.8 MG/DL (ref 8.6–10.5)
CHLORIDE SERPL-SCNC: 93 MMOL/L (ref 98–107)
CO2 SERPL-SCNC: 20 MMOL/L (ref 22–29)
CREAT SERPL-MCNC: 0.54 MG/DL (ref 0.76–1.27)
DEPRECATED RDW RBC AUTO: 51.3 FL (ref 37–54)
EGFRCR SERPLBLD CKD-EPI 2021: 105.9 ML/MIN/1.73
EOSINOPHIL # BLD AUTO: 0.58 10*3/MM3 (ref 0–0.4)
EOSINOPHIL NFR BLD AUTO: 6.2 % (ref 0.3–6.2)
ERYTHROCYTE [DISTWIDTH] IN BLOOD BY AUTOMATED COUNT: 16.9 % (ref 12.3–15.4)
GLOBULIN UR ELPH-MCNC: 3.9 GM/DL
GLUCOSE BLDC GLUCOMTR-MCNC: 105 MG/DL (ref 70–130)
GLUCOSE BLDC GLUCOMTR-MCNC: 109 MG/DL (ref 70–130)
GLUCOSE BLDC GLUCOMTR-MCNC: 112 MG/DL (ref 70–130)
GLUCOSE BLDC GLUCOMTR-MCNC: 118 MG/DL (ref 70–130)
GLUCOSE SERPL-MCNC: 103 MG/DL (ref 65–99)
HCT VFR BLD AUTO: 29.8 % (ref 37.5–51)
HGB BLD-MCNC: 9.5 G/DL (ref 13–17.7)
IMM GRANULOCYTES # BLD AUTO: 0.08 10*3/MM3 (ref 0–0.05)
IMM GRANULOCYTES NFR BLD AUTO: 0.9 % (ref 0–0.5)
LYMPHOCYTES # BLD AUTO: 1.56 10*3/MM3 (ref 0.7–3.1)
LYMPHOCYTES NFR BLD AUTO: 16.7 % (ref 19.6–45.3)
MCH RBC QN AUTO: 26.8 PG (ref 26.6–33)
MCHC RBC AUTO-ENTMCNC: 31.9 G/DL (ref 31.5–35.7)
MCV RBC AUTO: 83.9 FL (ref 79–97)
MONOCYTES # BLD AUTO: 0.99 10*3/MM3 (ref 0.1–0.9)
MONOCYTES NFR BLD AUTO: 10.6 % (ref 5–12)
NEUTROPHILS NFR BLD AUTO: 6.04 10*3/MM3 (ref 1.7–7)
NEUTROPHILS NFR BLD AUTO: 64.8 % (ref 42.7–76)
NRBC BLD AUTO-RTO: 0 /100 WBC (ref 0–0.2)
PLATELET # BLD AUTO: 727 10*3/MM3 (ref 140–450)
PMV BLD AUTO: 8.1 FL (ref 6–12)
POTASSIUM SERPL-SCNC: 4.1 MMOL/L (ref 3.5–5.2)
PROT SERPL-MCNC: 6.7 G/DL (ref 6–8.5)
RBC # BLD AUTO: 3.55 10*6/MM3 (ref 4.14–5.8)
SODIUM SERPL-SCNC: 127 MMOL/L (ref 136–145)
URATE SERPL-MCNC: 6.6 MG/DL (ref 3.4–7)
WBC NRBC COR # BLD AUTO: 9.32 10*3/MM3 (ref 3.4–10.8)

## 2024-11-24 PROCEDURE — 25010000002 ENOXAPARIN PER 10 MG: Performed by: INTERNAL MEDICINE

## 2024-11-24 PROCEDURE — 25810000003 SODIUM CHLORIDE 0.9 % SOLUTION: Performed by: INTERNAL MEDICINE

## 2024-11-24 PROCEDURE — 80053 COMPREHEN METABOLIC PANEL: CPT | Performed by: INTERNAL MEDICINE

## 2024-11-24 PROCEDURE — 84550 ASSAY OF BLOOD/URIC ACID: CPT | Performed by: INTERNAL MEDICINE

## 2024-11-24 PROCEDURE — 85025 COMPLETE CBC W/AUTO DIFF WBC: CPT | Performed by: INTERNAL MEDICINE

## 2024-11-24 PROCEDURE — 82948 REAGENT STRIP/BLOOD GLUCOSE: CPT

## 2024-11-24 RX ORDER — SODIUM CHLORIDE 9 MG/ML
100 INJECTION, SOLUTION INTRAVENOUS CONTINUOUS
Status: DISCONTINUED | OUTPATIENT
Start: 2024-11-24 | End: 2024-11-25

## 2024-11-24 RX ADMIN — DICLOFENAC SODIUM 4 G: 10 GEL TOPICAL at 16:18

## 2024-11-24 RX ADMIN — Medication 10 ML: at 22:08

## 2024-11-24 RX ADMIN — DICLOFENAC SODIUM 4 G: 10 GEL TOPICAL at 05:17

## 2024-11-24 RX ADMIN — THIAMINE HCL TAB 100 MG 100 MG: 100 TAB at 08:19

## 2024-11-24 RX ADMIN — DICLOFENAC SODIUM 4 G: 10 GEL TOPICAL at 08:20

## 2024-11-24 RX ADMIN — ENOXAPARIN SODIUM 90 MG: 100 INJECTION SUBCUTANEOUS at 05:16

## 2024-11-24 RX ADMIN — FOLIC ACID 1 MG: 1 TABLET ORAL at 08:19

## 2024-11-24 RX ADMIN — LANSOPRAZOLE 30 MG: 15 TABLET, ORALLY DISINTEGRATING ORAL at 05:17

## 2024-11-24 RX ADMIN — DICLOFENAC SODIUM 4 G: 10 GEL TOPICAL at 21:55

## 2024-11-24 RX ADMIN — SODIUM HYPOCHLORITE: 1.25 SOLUTION TOPICAL at 22:08

## 2024-11-24 RX ADMIN — METOPROLOL TARTRATE 100 MG: 50 TABLET, FILM COATED ORAL at 21:02

## 2024-11-24 RX ADMIN — Medication 10 ML: at 08:21

## 2024-11-24 RX ADMIN — CHLORHEXIDINE GLUCONATE 15 ML: 1.2 RINSE ORAL at 21:02

## 2024-11-24 RX ADMIN — TAMSULOSIN HYDROCHLORIDE 0.4 MG: 0.4 CAPSULE ORAL at 08:19

## 2024-11-24 RX ADMIN — DICLOFENAC SODIUM 4 G: 10 GEL TOPICAL at 12:28

## 2024-11-24 RX ADMIN — SODIUM CHLORIDE 100 ML/HR: 9 INJECTION, SOLUTION INTRAVENOUS at 21:55

## 2024-11-24 RX ADMIN — SODIUM CHLORIDE 100 ML/HR: 9 INJECTION, SOLUTION INTRAVENOUS at 12:28

## 2024-11-24 RX ADMIN — ENOXAPARIN SODIUM 90 MG: 100 INJECTION SUBCUTANEOUS at 16:18

## 2024-11-24 RX ADMIN — ACETAMINOPHEN 325MG 650 MG: 325 TABLET ORAL at 09:23

## 2024-11-24 RX ADMIN — METOPROLOL TARTRATE 100 MG: 50 TABLET, FILM COATED ORAL at 08:19

## 2024-11-24 NOTE — PLAN OF CARE
Goal Outcome Evaluation:  Plan of Care Reviewed With: patient        Progress: improving     Patient resting during the night without any new c/o pain or discomfort. Voltaren gel applied to his right upper and back shoulder + his right elbow for c/o pain. He is currently taking his pills whole and added to applesauce, and he is able to feed himself with good set up. VSS, he is voiding without difficulty via pure wick in place. Colostomy still putting out light brown, creamy and loose stool.

## 2024-11-24 NOTE — CASE MANAGEMENT/SOCIAL WORK
Continued Stay Note  [facility]     Patient Name: Arsh Haynes  MRN: [id number]  Today's Date: 11/24/2024    Admit Date: 10/26/2024    Plan: will need new SNF choices and pt will need [facility] precert   Discharge Plan       Row Name 11/24/24 1210       Plan    Plan will need new SNF choices and pt will need [facility] precert    Patient/Family in Agreement with Plan unable to assess  left VMM for spouse to return call to discuss DC planning.    Plan Comments Spoke with MD today re: DC plans. Chart reviewed. Pt has a pending referral to [facility]. Spoke with [facility] and she stated pt has been declined d/t cost of wound vac. That would apply to all [facility] facilities. Updated [facility] that [facility] was declined. Called to speak with spouse and no answer. Left a generic VMM for a return call to discuss DC planning. CCP to follow...JW                   Discharge Codes    No documentation.                 Expected Discharge Date and Time       Expected Discharge Date Expected Discharge Time    Nov 27, 2024               Melody Davison RN

## 2024-11-24 NOTE — PLAN OF CARE
Problem: Skin Injury Risk Increased  Goal: Skin Health and Integrity  Outcome: Progressing  Intervention: Optimize Skin Protection  Recent Flowsheet Documentation  Taken 11/24/2024 1315 by Gibson Gonzalez RN  Pressure Reduction Techniques:   frequent weight shift encouraged   weight shift assistance provided  Head of Bed (HOB) Positioning: HOB at 30 degrees  Pressure Reduction Devices: pressure-redistributing mattress utilized  Skin Protection: incontinence pads utilized  Taken 11/24/2024 1200 by Gibson Gonzalez RN  Head of Bed ([facility]) Positioning: HOB at 30 degrees  Taken 11/24/2024 1000 by Gibson Gonzalez RN  Head of Bed (HOB) Positioning: HOB at 15 degrees  Taken 11/24/2024 0824 by Gibson Gonzalez RN  Pressure Reduction Techniques:   frequent weight shift encouraged   weight shift assistance provided  Head of Bed (HOB) Positioning: HOB at 15 degrees  Pressure Reduction Devices: pressure-redistributing mattress utilized  Skin Protection: incontinence pads utilized     Problem: Fall Injury Risk  Goal: Absence of Fall and Fall-Related Injury  Outcome: Progressing  Intervention: Promote Injury-Free Environment  Recent Flowsheet Documentation  Taken 11/24/2024 1315 by Gibson Gonzalez RN  Safety Promotion/Fall Prevention:   assistive device/personal items within reach   fall prevention program maintained   nonskid shoes/slippers when out of bed   safety round/check completed  Taken 11/24/2024 1200 by Gibson Gonzalez RN  Safety Promotion/Fall Prevention:   assistive device/personal items within reach   fall prevention program maintained   nonskid shoes/slippers when out of bed   safety round/check completed  Taken 11/24/2024 1000 by Gibson Gonzalez RN  Safety Promotion/Fall Prevention:   assistive device/personal items within [facility]   fall prevention program maintained   nonskid shoes/slippers when out of bed   safety round/check completed  Taken 11/24/2024 0824 by Gibson Gonzalez RN  Safety  Promotion/Fall Prevention:   assistive device/personal items within reach   fall prevention program maintained   nonskid shoes/slippers when out of bed   safety round/check completed     Problem: Adult Inpatient Plan of Care  Goal: Plan of Care Review  Outcome: Progressing  Flowsheets (Taken 11/24/2024 1450)  Progress: improving  Outcome Evaluation: Patient has been pleasant and cooperative during shift. No complaints of nausea or SOA. patient treated for pain, headache and shoulder pain. Nephro consulted for hyponatremia. UA needed. IVFS normal saline started at 100mL/hr. AOx3-4, assist x2/turnQ2, room air, SR. Wound vac in place, CDI. Will continue to monitor and assist patient as needed.  Plan of Care Reviewed With: patient  Goal: Patient-Specific Goal (Individualized)  Outcome: Progressing  Goal: Absence of Hospital-Acquired Illness or Injury  Outcome: Progressing  Intervention: Identify and Manage Fall Risk  Recent Flowsheet Documentation  Taken 11/24/2024 1315 by Gibson Gonzalez RN  Safety Promotion/Fall Prevention:   assistive device/personal items within reach   fall prevention program maintained   nonskid shoes/slippers when out of bed   safety round/check completed  Taken 11/24/2024 1200 by Gibson Gonzalez RN  Safety Promotion/Fall Prevention:   assistive device/personal items within reach   fall prevention program maintained   nonskid shoes/slippers when out of bed   safety round/check completed  Taken 11/24/2024 1000 by Gibson Gonzalez RN  Safety Promotion/Fall Prevention:   assistive device/personal items within reach   fall prevention program maintained   nonskid shoes/slippers when out of bed   safety round/check completed  Taken 11/24/2024 0824 by Gibson Gonzalez RN  Safety Promotion/Fall Prevention:   assistive device/personal items within reach   fall prevention program maintained   nonskid shoes/slippers when out of bed   safety round/check completed  Intervention: Prevent Skin  Injury  Recent Flowsheet Documentation  Taken 11/24/2024 1315 by Gibson Gonzalez RN  Body Position: supine  Skin Protection: incontinence pads utilized  Taken 11/24/2024 1200 by Gibson Gonzalez RN  Body Position:   supine   patient/family refused  Taken 11/24/2024 1000 by Gibson Gonzalez RN  Body Position: supine  Taken 11/24/2024 0824 by Gibson Gonzalez RN  Body Position: supine  Skin Protection: incontinence pads utilized  Goal: Optimal Comfort and Wellbeing  Outcome: Progressing  Goal: Readiness for Transition of Care  Outcome: Progressing     Problem: Malnutrition  Goal: Improved Nutritional Intake  Outcome: Progressing     Problem: Sepsis/Septic Shock  Goal: Optimal Coping  Outcome: Progressing  Goal: Absence of Bleeding  Outcome: Progressing  Goal: Blood Glucose Level Within Target Range  Outcome: Progressing  Goal: Absence of Infection Signs and Symptoms  Outcome: Progressing  Goal: Optimal Nutrition Delivery  Outcome: Progressing     Problem: Alcohol Withdrawal  Goal: Alcohol Withdrawal Symptom Control  Outcome: Progressing  Goal: Optimal Neurologic Function  Outcome: Progressing     Problem: VTE (Venous Thromboembolism)  Goal: Tissue Perfusion  Outcome: Progressing  Goal: Optimal Right Ventricular Function  Outcome: Progressing     Problem: Restraint, Nonviolent  Goal: Absence of Harm or Injury  Outcome: Progressing  Intervention: Protect Skin and Joint Integrity  Recent Flowsheet Documentation  Taken 11/24/2024 1315 by Gibson Gonzalez RN  Body Position: supine  Skin Protection: incontinence pads utilized  Taken 11/24/2024 1200 by Gibson Gonzalez RN  Body Position:   supine   patient/family refused  Taken 11/24/2024 1000 by Gibson Gonzalez RN  Body Position: supine  Taken 11/24/2024 0824 by Gibson Gonzalez RN  Body Position: supine  Skin Protection: incontinence pads utilized     Problem: Mechanical Ventilation Invasive  Goal: Effective Communication  Outcome: Progressing  Goal: Optimal  Device Function  Outcome: Progressing  Goal: Mechanical Ventilation Liberation  Outcome: Progressing  Goal: Optimal Nutrition Delivery  Outcome: Progressing  Goal: Absence of Device-Related Skin and Tissue Injury  Outcome: Progressing  Goal: Absence of Ventilator-Induced Lung Injury  Outcome: Progressing  Intervention: Prevent Ventilator-Associated Pneumonia  Recent Flowsheet Documentation  Taken 11/24/2024 1315 by Gibson Gonzalez RN  Head of Bed ([facility]) Positioning: HOB at 30 degrees  Taken 11/24/2024 1200 by Gibson Gonzalez RN  Head of Bed ([facility]) Positioning: HOB at 30 degrees  Taken 11/24/2024 1000 by Gibson Gonzalez RN  Head of Bed ([facility]) Positioning: HOB at 15 degrees  Taken 11/24/2024 0824 by Gibson Gonzalez RN  Head of Bed ([facility]) Positioning: HOB at 15 degrees     Problem: Enteral Nutrition  Goal: Absence of Aspiration Signs and Symptoms  Outcome: Progressing  Intervention: Minimize Aspiration Risk  Recent Flowsheet Documentation  Taken 11/24/2024 1315 by Gibson Gonzalez RN  Head of Bed ([facility]) Positioning: HOB at 30 degrees  Taken 11/24/2024 1200 by Gibson Gonzalez RN  Head of Bed ([facility]) Positioning: HOB at 30 degrees  Taken 11/24/2024 1000 by Gibson Gonzalez RN  Head of Bed ([facility]) Positioning: HOB at 15 degrees  Taken 11/24/2024 0824 by Gibson Gonzalez RN  Head of Bed ([facility]) Positioning: HOB at 15 degrees  Goal: Safe, Effective Therapy Delivery  Outcome: Progressing  Goal: Feeding Tolerance  Outcome: Progressing   Goal Outcome Evaluation:  Plan of Care Reviewed With: patient        Progress: improving  Outcome Evaluation: Patient has been pleasant and cooperative during shift. No complaints of nausea or SOA. patient treated for pain, headache and shoulder pain. Nephro consulted for hyponatremia. UA needed. IVFS normal saline started at 100mL/hr. AOx3-4, assist x2/turnQ2, room air, SR. Wound vac in place, CDI. Will continue to monitor and assist patient as needed.

## 2024-11-24 NOTE — CONSULTS
[facility] CONSULT NOTE    Referring Provider: Oliver Vargas  Reason for Consultation: Hyponatremia    Chief complaint.  Abdominal pain    History of present illness: Patient is 74 years old male who was initially admitted with perforated bowel and peritonitis with septic shock.  Patient underwent left hemicolectomy and end colostomy on 10/26.  Patient lab workup showed worsening hyponatremia which prompted renal consult.  Patient feeling very tired.  Complaining of pain in the right shoulder and knee.  Complaining of dry mouth.  Denies any chest pain, shortness of breath, nausea or vomiting but feeling very tired    History  Past Medical History:   Diagnosis Date    HTN (hypertension)     Hypercholesterolemia      Past Surgical History:   Procedure Laterality Date    COLON RESECTION N/A 10/26/2024    Procedure: LOW ANTERIOR COLON RESECTION SIGMOID, COLOSTOMY, SPLENIC FLEXURE MOBILIZATION;  Surgeon: Mc Guillaume MD;  Location: [facility];  Service: General;  Laterality: N/A;    EXPLORATORY LAPAROTOMY N/A 10/26/2024    Procedure: LAPAROTOMY EXPLORATORY, LEFT HEMICOLECTOMY;  Surgeon: Mc Guillaume MD;  Location: [facility];  Service: General;  Laterality: N/A;     Social History     Tobacco Use    Smoking status: Never    Smokeless tobacco: Never   Vaping Use    Vaping status: Never Used   Substance Use Topics    Alcohol use: Yes     Alcohol/week: 42.0 standard drinks of alcohol     Types: 42 Cans of beer per week     Comment: 6 beer daily    Drug use: Never     History reviewed. No pertinent family history.    Review of Systems  ROS  As per HPI  Objective     Vital Signs  Temp:  [97.9 °F (36.6 °C)-98 °F (36.7 °C)] 98 °F (36.7 °C)  Heart Rate:  [85-99] 98  Resp:  [16-18] 18  BP: (111-137)/(54-68) 122/54    I/O this shift:  In: 240 [P.O.:240]  Out: -   I/O last 3 completed shifts:  In: 880 [P.O.:880]  Out: 1425 [Urine:1100; Stool:325]    Physical Exam:  Physical  Exam    General Appearance: Chronically ill-appearing, NAD  Skin: warm and dry  HEENT: oral mucosa dry, nonicteric sclera  Neck: supple, no JVD  Lungs: CTA  Heart: RRR, normal S1 and S2  Abdomen: Mild tenderness.  Colostomy bag left lower quadrant  : no palpable bladder  Extremities: no edema, cyanosis or clubbing  Neuro: normal speech and mental status     Results Review:   I reviewed the patient's new clinical results.    Lab Results   Component Value Date    CALCIUM 8.8 11/24/2024    PHOS 3.5 11/18/2024     Results from last 7 days   Lab Units 11/24/24  0337 11/23/24  0507 11/22/24  0427 11/19/24  0443 11/18/24  0532   MAGNESIUM mg/dL  --   --   --   --  1.9   SODIUM mmol/L 127* 129* 131*   < > 133*   POTASSIUM mmol/L 4.1 4.4 4.2   < > 3.9   CHLORIDE mmol/L 93* 96* 99   < > 99   CO2 mmol/L 20.0* 20.4* 20.3*   < > 22.3   BUN mg/dL 7* 7* 6*   < > 16   CREATININE mg/dL 0.54* 0.59* 0.59*   < > 0.50*   GLUCOSE mg/dL 103* 109* 117*   < > 156*   CALCIUM mg/dL 8.8 8.9 8.9   < > 8.7   WBC 10*3/mm3 9.32 10.15 11.93*   < > 13.05*   HEMOGLOBIN g/dL 9.5* 9.7* 9.5*   < > 9.3*   PLATELETS 10*3/mm3 727* 759* 796*   < > 820*   ALT (SGPT) U/L 27 30 28   < > 38   AST (SGOT) U/L 22 23 25   < > 28    < > = values in this interval not displayed.     Lab Results   Component Value Date    TROPONINT 20 10/27/2024     Estimated Creatinine Clearance: 143.9 mL/min (A) (by C-G formula based on SCr of 0.54 mg/dL (L)).  Lab Results   Component Value Date    URICACID 6.6 11/24/2024       Brief Urine Lab Results  (Last result in the past 365 days)        Color   Clarity   Blood   Leuk Est   Nitrite   Protein   CREAT   Urine HCG        11/10/24 0347 Yellow   Cloudy   Small (1+)   Trace   Negative   30 mg/dL (1+)                   Prior to Admission medications    Medication Sig Start Date End Date Taking? Authorizing Provider   amLODIPine-benazepril (LOTREL) 10-20 MG per capsule Take 1 capsule by mouth Daily.   Yes Provider, MD No    metoprolol succinate XL (TOPROL-XL) 100 MG 24 hr tablet Take 1 tablet by mouth Daily.   Yes Provider, MD No       chlorhexidine, 15 mL, Mouth/Throat, Q12H  Diclofenac Sodium, 4 g, Topical, 4x Daily  enoxaparin, 1 mg/kg, Subcutaneous, Q12H  folic acid, 1 mg, Oral, Daily  insulin lispro, 2-9 Units, Subcutaneous, 4x Daily AC & at Bedtime  lansoprazole, 30 mg, Oral, Q AM  Lidocaine, 1 patch, Transdermal, Q24H  Lidocaine, 2 patch, Transdermal, Q24H  metoprolol tartrate, 100 mg, Oral, Q12H  sodium chloride, 10 mL, Intravenous, Q12H  sodium hypochlorite, , Topical, BID  tamsulosin, 0.4 mg, Oral, Daily  thiamine, 100 mg, Oral, Daily      sodium chloride, 100 mL/hr        Assessment & Plan       Hyponatremia.  Seems to be hypovolemic hyponatremia.  Clinically looks dry.  Sodium 127 this morning.  Urine electrolytes were ordered this morning but pending  Perforated rectosigmoid adenocarcinoma.  Status post left hemicolectomy and end colostomy on 10/26.  Right renal mass.  Suspicious of renal cell carcinoma.  Patient evaluated by urology and will follow-up as outpatient  Right lower extremity DVT  Anemia  Thrombocytosis.    Plan:  Start IV hydration with normal saline  Follow urine electrolytes  Surveillance labs    I discussed the patients findings and my recommendations with patient and nursing staff    Zacarias Bazzi MD  11/24/24  11:43 EST

## 2024-11-24 NOTE — PROGRESS NOTES
Name: Arsh Haynes ADMIT: 10/26/2024   : 1950  PCP: Provider, No Known    MRN: [id number] LOS: 29 days   AGE/SEX: 74 y.o. male  ROOM: [facility] [place]     Subjective   Subjective   2024  No overnight events, continues to have right knee and right shoulder pain.  Tolerating PO intake.     2024  Patient seen and examined at bedside.  Still admits to right shoulder and knee pain. Currently afebrile and hemodynamically stable.        Objective   Objective   Vital Signs  Temp:  [97.9 °F (36.6 °C)-98 °F (36.7 °C)] 98 °F (36.7 °C)  Heart Rate:  [85-99] 98  Resp:  [16-18] 18  BP: (111-137)/(54-68) 122/54  SpO2:  [96 %-98 %] 98 %  on   ;   Device (Oxygen Therapy): room air  Body mass index is 24.61 kg/m².  Physical Exam  Constitutional:       General: He is not in acute distress.     Appearance: He is not toxic-appearing.   HENT:      Head: Normocephalic and atraumatic.      Nose: Nose normal. No congestion.      Mouth/Throat:      Pharynx: Oropharynx is clear. No oropharyngeal exudate.   Eyes:      General: No scleral icterus.  Cardiovascular:      Rate and Rhythm: Normal rate and regular rhythm.      Heart sounds: No murmur heard.     No friction rub. No gallop.   Pulmonary:      Effort: No respiratory distress.      Breath sounds: No wheezing or rales.   Abdominal:      General: There is no distension.      Tenderness: There is no abdominal tenderness. There is no guarding.   Musculoskeletal:         General: No swelling or deformity.      Cervical back: Normal range of motion. No rigidity.      Right lower leg: No edema.      Left lower leg: No edema.   Skin:     Coloration: Skin is not jaundiced.      Findings: No bruising or lesion.   Neurological:      General: No focal deficit present.      Mental Status: He is alert and oriented to person, place, and time.      Motor: Weakness present.       Results Review     I reviewed the patient's new clinical results.  Results from last 7 days   Lab Units  11/24/24 0337 11/23/24  0507 11/22/24  0427 11/21/24  0554   WBC 10*3/mm3 9.32 10.15 11.93* 10.54   HEMOGLOBIN g/dL 9.5* 9.7* 9.5* 9.7*   PLATELETS 10*3/mm3 727* 759* 796* 773*     Results from last 7 days   Lab Units 11/24/24 0337 11/23/24  0507 11/22/24 0427 11/21/24  0554   SODIUM mmol/L 127* 129* 131* 129*   POTASSIUM mmol/L 4.1 4.4 4.2 4.3   CHLORIDE mmol/L 93* 96* 99 98   CO2 mmol/L 20.0* 20.4* 20.3* 20.7*   BUN mg/dL 7* 7* 6* 7*   CREATININE mg/dL 0.54* 0.59* 0.59* 0.48*   GLUCOSE mg/dL 103* 109* 117* 124*   EGFR mL/min/1.73 105.9 103.1 103.1 109.7     Results from last 7 days   Lab Units 11/24/24 0337 11/23/24  0507 11/22/24 0427 11/21/24  0554   ALBUMIN g/dL 2.8* 2.8* 2.3* 2.6*   BILIRUBIN mg/dL 0.6 0.6 0.6 0.7   ALK PHOS U/L 142* 144* 154* 160*   AST (SGOT) U/L 22 23 25 28   ALT (SGPT) U/L 27 30 28 32     Results from last 7 days   Lab Units 11/24/24 0337 11/23/24 0507 11/22/24 0427 11/21/24  0554 11/19/24  0443 11/18/24  0532   CALCIUM mg/dL 8.8 8.9 8.9 8.6   < > 8.7   ALBUMIN g/dL 2.8* 2.8* 2.3* 2.6*   < > 2.6*   MAGNESIUM mg/dL  --   --   --   --   --  1.9   PHOSPHORUS mg/dL  --   --   --   --   --  3.5    < > = values in this interval not displayed.       Glucose   Date/Time Value Ref Range Status   11/24/2024 1040 105 70 - 130 mg/dL Final   11/24/2024 0633 112 70 - 130 mg/dL Final   11/23/2024 2016 112 70 - 130 mg/dL Final   11/23/2024 1549 120 70 - 130 mg/dL Final   11/23/2024 1032 134 (H) 70 - 130 mg/dL Final   11/23/2024 0612 111 70 - 130 mg/dL Final   11/22/2024 2011 114 70 - 130 mg/dL Final       No radiology results for the last day    I have personally reviewed all medications:  Scheduled Medications  chlorhexidine, 15 mL, Mouth/Throat, Q12H  Diclofenac Sodium, 4 g, Topical, 4x Daily  enoxaparin, 1 mg/kg, Subcutaneous, Q12H  folic acid, 1 mg, Oral, Daily  insulin lispro, 2-9 Units, Subcutaneous, 4x Daily AC & at Bedtime  lansoprazole, 30 mg, Oral, Q AM  Lidocaine, 1 patch, Transdermal,  Q24H  Lidocaine, 2 patch, Transdermal, Q24H  metoprolol tartrate, 100 mg, Oral, Q12H  sodium chloride, 10 mL, Intravenous, Q12H  sodium hypochlorite, , Topical, BID  tamsulosin, 0.4 mg, Oral, Daily  thiamine, 100 mg, Oral, Daily    Infusions   Diet  Diet: Regular/House, Fluid Restriction (240 mL/tray); 1500 mL/day; No Mixed Consistencies, No Straw; Texture: Mechanical Ground (NDD 2); Fluid Consistency: Nectar Thick    I have personally reviewed:  [x]  Laboratory   [x]  Microbiology   [x]  Radiology   [x]  EKG/Telemetry  [x]  Cardiology/Vascular   []  Pathology    []  Records       Assessment/Plan     Active Hospital Problems    Diagnosis  POA    **Peritonitis [K65.9]  Unknown    Oropharyngeal dysphagia [R13.12]  Unknown    HTN (hypertension) [I10]  Unknown    Thrombocytosis [D75.839]  Unknown    Acute DVT (deep venous thrombosis) [I82.409]  Unknown    Renal mass [N28.89]  Unknown    Hyponatremia [E87.1]  Unknown    Anemia [D64.9]  Unknown    Moderate protein-calorie malnutrition [E44.0]  Yes    Colon cancer [C18.9]  No      Resolved Hospital Problems    Diagnosis Date Resolved POA    Perforation of sigmoid colon [K63.1] 10/27/2024 Yes       74 y.o. male admitted with Peritonitis.    #Septic Shock  #Perforated Rectosigmoid adenocarcinom  #Feculent Peritonitis    - s/p open left hemicolectomy with porsha pouch and end colostomy by surgery on 10/26/2024    - Tolerating PO intake, no g tube placement needed    - Will need otpt colonoscopy    - ID followed, abx stopped    - surgery has signed off    - discussed with CM, plan for SNF at [place], choices needed     #RLE DVT    - Continue Therapeutic Lovenox, plan to transition to Eliquis at [place]    #Anemia  #Thrombocytosis    - stable, follow up with heme/ocn as otpt    #Right renal mass    - urology consulted and signed off    - concerning for RCC    - Patient to follow up with urology after discharge    #Hyponatremia    - sodium 129 > 127 today    - has fluctuated from  129-136 during admission    - Urine studies suggestive of SIADH    - Fluid restrictions    - As continues to downtrend, will have Nephrology establish care    #Hypokalemia    - replace per protocol     #DM    - ISS    #Right knee Pain  #Right shoulder pain    - X-rays reviewed, appear related to degenerative changes    - no intervention per ortho     - Diclofenac started         Therapeutic Lovenox  for DVT prophylaxis.  Limited code (no CPR, no intubation).  Discussed with patient and nursing staff.  Anticipate discharge to SNU facility in 1-2 days.  Expected Discharge Date: 11/27/2024; Expected Discharge Time:       Oliver Bustos DO  [facility]  11/24/24  11:27 EST

## 2024-11-25 ENCOUNTER — APPOINTMENT (OUTPATIENT)
Dept: MRI IMAGING | Facility: HOSPITAL | Age: 74
DRG: 853 | End: 2024-11-25
Payer: MEDICARE

## 2024-11-25 ENCOUNTER — APPOINTMENT (OUTPATIENT)
Dept: CT IMAGING | Facility: HOSPITAL | Age: 74
DRG: 853 | End: 2024-11-25
Payer: MEDICARE

## 2024-11-25 LAB
ALBUMIN SERPL-MCNC: 2.8 G/DL (ref 3.5–5.2)
ALBUMIN/GLOB SERPL: 0.7 G/DL
ALP SERPL-CCNC: 145 U/L (ref 39–117)
ALT SERPL W P-5'-P-CCNC: 28 U/L (ref 1–41)
ANION GAP SERPL CALCULATED.3IONS-SCNC: 11.9 MMOL/L (ref 5–15)
AST SERPL-CCNC: 27 U/L (ref 1–40)
BASOPHILS # BLD AUTO: 0.06 10*3/MM3 (ref 0–0.2)
BASOPHILS NFR BLD AUTO: 0.7 % (ref 0–1.5)
BILIRUB SERPL-MCNC: 0.6 MG/DL (ref 0–1.2)
BILIRUB UR QL STRIP: NEGATIVE
BUN SERPL-MCNC: 6 MG/DL (ref 8–23)
BUN/CREAT SERPL: 11.5 (ref 7–25)
CALCIUM SPEC-SCNC: 8.5 MG/DL (ref 8.6–10.5)
CHLORIDE SERPL-SCNC: 95 MMOL/L (ref 98–107)
CLARITY UR: ABNORMAL
CO2 SERPL-SCNC: 19.1 MMOL/L (ref 22–29)
COLOR UR: YELLOW
CREAT SERPL-MCNC: 0.52 MG/DL (ref 0.76–1.27)
DEPRECATED RDW RBC AUTO: 50.4 FL (ref 37–54)
EGFRCR SERPLBLD CKD-EPI 2021: 107.1 ML/MIN/1.73
EOSINOPHIL # BLD AUTO: 0.43 10*3/MM3 (ref 0–0.4)
EOSINOPHIL NFR BLD AUTO: 4.7 % (ref 0.3–6.2)
ERYTHROCYTE [DISTWIDTH] IN BLOOD BY AUTOMATED COUNT: 17 % (ref 12.3–15.4)
GLOBULIN UR ELPH-MCNC: 3.9 GM/DL
GLUCOSE BLDC GLUCOMTR-MCNC: 103 MG/DL (ref 70–130)
GLUCOSE BLDC GLUCOMTR-MCNC: 120 MG/DL (ref 70–130)
GLUCOSE BLDC GLUCOMTR-MCNC: 122 MG/DL (ref 70–130)
GLUCOSE BLDC GLUCOMTR-MCNC: 135 MG/DL (ref 70–130)
GLUCOSE SERPL-MCNC: 101 MG/DL (ref 65–99)
GLUCOSE UR STRIP-MCNC: NEGATIVE MG/DL
HCT VFR BLD AUTO: 28.7 % (ref 37.5–51)
HGB BLD-MCNC: 9.4 G/DL (ref 13–17.7)
HGB UR QL STRIP.AUTO: NEGATIVE
IMM GRANULOCYTES # BLD AUTO: 0.08 10*3/MM3 (ref 0–0.05)
IMM GRANULOCYTES NFR BLD AUTO: 0.9 % (ref 0–0.5)
KETONES UR QL STRIP: ABNORMAL
LEUKOCYTE ESTERASE UR QL STRIP.AUTO: NEGATIVE
LYMPHOCYTES # BLD AUTO: 1.39 10*3/MM3 (ref 0.7–3.1)
LYMPHOCYTES NFR BLD AUTO: 15.2 % (ref 19.6–45.3)
MCH RBC QN AUTO: 27 PG (ref 26.6–33)
MCHC RBC AUTO-ENTMCNC: 32.8 G/DL (ref 31.5–35.7)
MCV RBC AUTO: 82.5 FL (ref 79–97)
MONOCYTES # BLD AUTO: 0.91 10*3/MM3 (ref 0.1–0.9)
MONOCYTES NFR BLD AUTO: 9.9 % (ref 5–12)
NEUTROPHILS NFR BLD AUTO: 6.28 10*3/MM3 (ref 1.7–7)
NEUTROPHILS NFR BLD AUTO: 68.6 % (ref 42.7–76)
NITRITE UR QL STRIP: NEGATIVE
NRBC BLD AUTO-RTO: 0 /100 WBC (ref 0–0.2)
OSMOLALITY UR: 398 MOSM/KG
PH UR STRIP.AUTO: 5.5 [PH] (ref 5–8)
PHOSPHATE SERPL-MCNC: 3.7 MG/DL (ref 2.5–4.5)
PLATELET # BLD AUTO: 641 10*3/MM3 (ref 140–450)
PMV BLD AUTO: 8 FL (ref 6–12)
POTASSIUM SERPL-SCNC: 4.1 MMOL/L (ref 3.5–5.2)
POTASSIUM UR-SCNC: 22.1 MMOL/L
PROT SERPL-MCNC: 6.7 G/DL (ref 6–8.5)
PROT UR QL STRIP: NEGATIVE
QT INTERVAL: 382 MS
QTC INTERVAL: 476 MS
RBC # BLD AUTO: 3.48 10*6/MM3 (ref 4.14–5.8)
SODIUM SERPL-SCNC: 126 MMOL/L (ref 136–145)
SODIUM UR-SCNC: 114 MMOL/L
SP GR UR STRIP: 1.01 (ref 1–1.03)
URATE SERPL-MCNC: 6.3 MG/DL (ref 3.4–7)
UROBILINOGEN UR QL STRIP: ABNORMAL
WBC NRBC COR # BLD AUTO: 9.15 10*3/MM3 (ref 3.4–10.8)

## 2024-11-25 PROCEDURE — 70450 CT HEAD/BRAIN W/O DYE: CPT

## 2024-11-25 PROCEDURE — 97535 SELF CARE MNGMENT TRAINING: CPT

## 2024-11-25 PROCEDURE — 84133 ASSAY OF URINE POTASSIUM: CPT | Performed by: INTERNAL MEDICINE

## 2024-11-25 PROCEDURE — 82948 REAGENT STRIP/BLOOD GLUCOSE: CPT

## 2024-11-25 PROCEDURE — 81003 URINALYSIS AUTO W/O SCOPE: CPT | Performed by: STUDENT IN AN ORGANIZED HEALTH CARE EDUCATION/TRAINING PROGRAM

## 2024-11-25 PROCEDURE — 25010000002 ENOXAPARIN PER 10 MG: Performed by: INTERNAL MEDICINE

## 2024-11-25 PROCEDURE — A9577 INJ MULTIHANCE: HCPCS | Performed by: STUDENT IN AN ORGANIZED HEALTH CARE EDUCATION/TRAINING PROGRAM

## 2024-11-25 PROCEDURE — 80053 COMPREHEN METABOLIC PANEL: CPT | Performed by: INTERNAL MEDICINE

## 2024-11-25 PROCEDURE — 70553 MRI BRAIN STEM W/O & W/DYE: CPT

## 2024-11-25 PROCEDURE — 84100 ASSAY OF PHOSPHORUS: CPT | Performed by: INTERNAL MEDICINE

## 2024-11-25 PROCEDURE — 84550 ASSAY OF BLOOD/URIC ACID: CPT | Performed by: INTERNAL MEDICINE

## 2024-11-25 PROCEDURE — 93010 ELECTROCARDIOGRAM REPORT: CPT | Performed by: STUDENT IN AN ORGANIZED HEALTH CARE EDUCATION/TRAINING PROGRAM

## 2024-11-25 PROCEDURE — 83935 ASSAY OF URINE OSMOLALITY: CPT | Performed by: INTERNAL MEDICINE

## 2024-11-25 PROCEDURE — 84300 ASSAY OF URINE SODIUM: CPT | Performed by: INTERNAL MEDICINE

## 2024-11-25 PROCEDURE — 85025 COMPLETE CBC W/AUTO DIFF WBC: CPT | Performed by: INTERNAL MEDICINE

## 2024-11-25 PROCEDURE — 93005 ELECTROCARDIOGRAM TRACING: CPT | Performed by: STUDENT IN AN ORGANIZED HEALTH CARE EDUCATION/TRAINING PROGRAM

## 2024-11-25 PROCEDURE — 25510000002 GADOBENATE DIMEGLUMINE 529 MG/ML SOLUTION: Performed by: STUDENT IN AN ORGANIZED HEALTH CARE EDUCATION/TRAINING PROGRAM

## 2024-11-25 RX ADMIN — FOLIC ACID 1 MG: 1 TABLET ORAL at 09:02

## 2024-11-25 RX ADMIN — METOPROLOL TARTRATE 100 MG: 50 TABLET, FILM COATED ORAL at 09:01

## 2024-11-25 RX ADMIN — METOPROLOL TARTRATE 100 MG: 50 TABLET, FILM COATED ORAL at 23:08

## 2024-11-25 RX ADMIN — DICLOFENAC SODIUM 4 G: 10 GEL TOPICAL at 17:38

## 2024-11-25 RX ADMIN — ENOXAPARIN SODIUM 90 MG: 100 INJECTION SUBCUTANEOUS at 09:01

## 2024-11-25 RX ADMIN — DICLOFENAC SODIUM 4 G: 10 GEL TOPICAL at 11:43

## 2024-11-25 RX ADMIN — LANSOPRAZOLE 30 MG: 15 TABLET, ORALLY DISINTEGRATING ORAL at 06:31

## 2024-11-25 RX ADMIN — Medication 10 ML: at 23:10

## 2024-11-25 RX ADMIN — TAMSULOSIN HYDROCHLORIDE 0.4 MG: 0.4 CAPSULE ORAL at 09:02

## 2024-11-25 RX ADMIN — DICLOFENAC SODIUM 4 G: 10 GEL TOPICAL at 09:00

## 2024-11-25 RX ADMIN — GADOBENATE DIMEGLUMINE 16 ML: 529 INJECTION, SOLUTION INTRAVENOUS at 22:26

## 2024-11-25 RX ADMIN — THIAMINE HCL TAB 100 MG 100 MG: 100 TAB at 09:02

## 2024-11-25 RX ADMIN — ENOXAPARIN SODIUM 90 MG: 100 INJECTION SUBCUTANEOUS at 17:38

## 2024-11-25 RX ADMIN — Medication 15 G: at 09:57

## 2024-11-25 RX ADMIN — Medication 10 ML: at 09:03

## 2024-11-25 RX ADMIN — CHLORHEXIDINE GLUCONATE 15 ML: 1.2 RINSE ORAL at 09:01

## 2024-11-25 RX ADMIN — DICLOFENAC SODIUM 4 G: 10 GEL TOPICAL at 23:09

## 2024-11-25 RX ADMIN — Medication 15 G: at 23:08

## 2024-11-25 NOTE — PLAN OF CARE
Goal Outcome Evaluation:      Pt. Alert, oriented x4, v/s stable with tachycardia , no voiced c/o pain, with wound vac connected to abdominal wound without problems, 02 sats 96% on room air, no s/s acute distress noted until this time

## 2024-11-25 NOTE — PROGRESS NOTES
"Nutrition Services    Patient Name:  Arsh Haynes  YOB: 1950  MRN: [id number]  Admit Date:  10/26/2024  Assessment Date:  11/25/24    Summary: Nutrition Follow Up     Awaiting pre-cert for discharge to facility.  Na trending down.  Wound vac dressing change today.  Eating 0-50% at meals.  Does not like Magic Cups, agrees to trying Mighty Shakes - adding TID.  Patient tells me he is not doing as well as yesterday.    Plans/Recommendations:  Discontinue Magic Cups.  Add Mighty Shakes TID.  Encourage PO intake.    RD to continue to follow closely.    CLINICAL NUTRITION ASSESSMENT      Reason for Assessment Follow-up Protocol     Diagnosis/Problem   Peritonitis        Anthropometrics        Current Height  Current Weight  BMI kg/m2 Height: 182.9 cm (72\")  Weight: 81 kg (178 lb 9.2 oz) (11/25/24 0525)  Body mass index is 24.22 kg/m².   Adjusted BMI (if applicable)    BMI Category Normal/Healthy (18.4 - 24.9)   Ideal Body Weight (IBW) 178 lb (80.9 kg)   Usual Body Weight (UBW) 202 lb (9/2023)   Weight Trend Loss, Unknown   --  Estimated/Assessed Needs        Current Weight  Weight: 81 kg (178 lb 9.2 oz) (11/25/24 0525)       Energy Requirements    Weight for Calculation 178 lb (80.9 kg) IBW   Method for Estimation  30-35 kcal/kg   EST Needs (kcal/day) 6302-3989       Protein Requirements    Weight for Calculation 178 lb (80.9 kg) IBW   EST Protein Needs (g/kg) 1.2 - 1.5 gm/kg   EST Daily Needs (g/day)        Fluid Requirements     Method for Estimation 1 mL/kcal    EST Needs (mL/day)      Labs       Pertinent Labs    Results from last 7 days   Lab Units 11/25/24  0549 11/24/24  0337 11/23/24  0507   SODIUM mmol/L 126* 127* 129*   POTASSIUM mmol/L 4.1 4.1 4.4   CHLORIDE mmol/L 95* 93* 96*   CO2 mmol/L 19.1* 20.0* 20.4*   BUN mg/dL 6* 7* 7*   CREATININE mg/dL 0.52* 0.54* 0.59*   CALCIUM mg/dL 8.5* 8.8 8.9   BILIRUBIN mg/dL 0.6 0.6 0.6   ALK PHOS U/L 145* 142* 144*   ALT (SGPT) U/L 28 27 30   AST " "(SGOT) U/L 27 22 23   GLUCOSE mg/dL 101* 103* 109*     Results from last 7 days   Lab Units 11/25/24  0549   PHOSPHORUS mg/dL 3.7   HEMOGLOBIN g/dL 9.4*   HEMATOCRIT % 28.7*   WBC 10*3/mm3 9.15   ALBUMIN g/dL 2.8*     Results from last 7 days   Lab Units 11/25/24  0549 11/24/24  0337 11/23/24  0507 11/22/24  0427 11/21/24  0554   PLATELETS 10*3/mm3 641* 727* 759* 796* 773*     No results found for: \"COVID19\"  Lab Results   Component Value Date    HGBA1C 6.60 (H) 10/26/2024          Medications           Scheduled Medications chlorhexidine, 15 mL, Mouth/Throat, Q12H  Diclofenac Sodium, 4 g, Topical, 4x Daily  enoxaparin, 1 mg/kg, Subcutaneous, Q12H  folic acid, 1 mg, Oral, Daily  insulin lispro, 2-9 Units, Subcutaneous, 4x Daily AC & at Bedtime  lansoprazole, 30 mg, Oral, Q AM  Lidocaine, 1 patch, Transdermal, Q24H  Lidocaine, 2 patch, Transdermal, Q24H  metoprolol tartrate, 100 mg, Oral, Q12H  sodium chloride, 10 mL, Intravenous, Q12H  sodium hypochlorite, , Topical, BID  tamsulosin, 0.4 mg, Oral, Daily  thiamine, 100 mg, Oral, Daily  Urea, 15 g, Oral, BID       Infusions       PRN Medications   acetaminophen **OR** acetaminophen    Calcium Replacement - Follow Nurse / BPA Driven Protocol    dextrose    dextrose    glucagon (human recombinant)    labetalol    Magnesium Standard Dose Replacement - Follow Nurse / BPA Driven Protocol    melatonin    nitroglycerin    ondansetron    Phosphorus Replacement - Follow Nurse / BPA Driven Protocol    Potassium Replacement - Follow Nurse / BPA Driven Protocol    [COMPLETED] Insert Peripheral IV **AND** sodium chloride    sodium chloride    sodium chloride    sodium chloride     Physical Findings          General Findings alert, oriented, room air   Oral/Mouth Cavity tooth or teeth missing   Edema  generalized, lower extremity , upper extremity, 1+ (trace)   Gastrointestinal non-distended , normoactive, last bowel movement: 11/24   Skin  bruising, pressure injury: bilateral " gluteal, surgical incision: midline abdomen, wound VAC   Tubes/Drains/Lines colostomy, Cortrak, NG tube, bridle in place   NFPE Other: follow up to perform   --  Current Nutrition Orders & Evaluation of Intake       Oral Nutrition     Food Allergies NKFA   Current PO Diet Diet: Regular/House, Fluid Restriction (240 mL/tray); 1500 mL/day; No Mixed Consistencies, No Straw; Texture: Mechanical Ground (NDD 2); Fluid Consistency: Nectar Thick   Supplement Magic Cup, TID   PO Evaluation     % PO Intake 0-50%    Factors Affecting Intake: altered GI function, altered respiratory status   --  PES STATEMENT / NUTRITION DIAGNOSIS      Nutrition Dx Problem  Problem: Inadequate Oral Intake  Etiology: Medical Diagnosis - sigmoid perforation and Factors Affecting Nutrition - decreased appetite, seems depressed    Signs/Symptoms: Report/Observation     NUTRITION INTERVENTION / PLAN OF CARE      Intervention Goal(s) Maintain nutrition status, Reduce/improve symptoms, Meet estimated needs, Disease management/therapy, Establish PO intake, Tolerate PO , Increase intake, and No significant weight loss         RD Intervention/Action Encourage intake, Continue to monitor, Care plan reviewed, and Recommend/order: ONS   --      Prescription/Orders:       PO Diet       Supplements Mighty Shakes TID      Enteral Nutrition       Parenteral Nutrition    New Prescription Ordered? Yes   --      Monitor/Evaluation Per protocol, PO intake, Supplement intake, Pertinent labs, Weight, Skin status, GI status, Symptoms, Swallow function   Discharge Plan/Needs Pending clinical course   --    RD to follow per protocol.      Electronically signed by:  Maryann Franklin RD  11/25/24 15:56 EST

## 2024-11-25 NOTE — CASE MANAGEMENT/SOCIAL WORK
Continued Stay Note  [facility]     Patient Name: Arsh Haynes  MRN: [id number]  Today's Date: 11/25/2024    Admit Date: 10/26/2024    Plan: Plan skilled care at accepting facility- pre cert needed.  BENITA Clay RN   Discharge Plan       Row Name 11/25/24 1154       Plan    Plan Plan skilled care at accepting facility- pre cert needed.  BENITA Clay RN    Plan Comments Pt confused.   Awaiting pt to be more alert and able to participate in discharge planning.   Call to pt's spouse ( Melida Haynes [phone])  for updated choices for skilled care.  Plan skilled care at accepting facility- pre cert needed. BENITA Clay RN                   Discharge Codes    No documentation.                 Expected Discharge Date and Time       Expected Discharge Date Expected Discharge Time    Nov 27, 2024               Felecia Clay RN

## 2024-11-25 NOTE — NURSING NOTE
"   11/25/24 1046   Wound 10/26/24 2301 midline abdomen   Placement Date/Time: 10/26/24 2301   Orientation: midline  Location: abdomen  Primary Wound Type: Incision   Dressing Appearance dry;intact  (vac in place)   Base clean;granulating;moist;red   Periwound intact;dry   Edges open   Drainage Characteristics/Odor serosanguineous   Drainage Amount scant   Care, Wound cleansed with;sterile normal saline;negative pressure wound therapy   Dressing Care dressing applied;dressing changed;foam;transparent film   Periwound Care barrier film applied   NPWT (Negative Pressure Wound Therapy) 11/13/24 abd   Placement Date: 11/13/24   Location: abd   Therapy Setting continuous therapy   Dressing foam, black;transparent dressing   Pressure Setting 125 mmHg   Sponges Inserted 1   Sponges Removed 1   Colostomy LLQ   Placement date: If unknown, DO NOT use \"Add Comment\" note: 10/26/24   Inserted by: Dr Guillaume  Location: LLQ   Stomal Appliance 2 piece;Clean;Dry;Intact;Changed;Drainable   Stoma Appearance moist;red  (oval)   Peristomal Assessment Clean;Intact   Accessories/Skin Care cleansed with water   Stoma Function flatus;stool   Stool Color brown, light   Stool Consistency loose   Treatment Bag change;Site care   Output (mL) 150 mL     WOCN: Wound VAC dressing change. Small piece adapt ring placed at umb to assist with seal.   Ostomy appliance changed. Placed in 2 1/4 2 piece pedro with adapt ring. Wound is decreasing in size. Patient had eyes closed thru dressing and ostomy change. Complaints of pain right hand.  Plan to change VAC Wednesday.   "

## 2024-11-25 NOTE — SIGNIFICANT NOTE
11/25/24 1148   OTHER   Discipline physical therapist   Rehab Time/Intention   Session Not Performed patient/family declined, not feeling well  (Pt refusal this AM stating feeling confused, very tired, and he woke up overnight not realizing where he was and tried to get OOB. Pt was oriented to name, place and some of situation, went to sleep when asked about time. PT may follow up later this PM, time permitting, or next service date for tx as appropriate.)   Therapy Assessment/Plan (PT)   Criteria for Skilled Interventions Met (PT) yes   Recommendation   PT - Next Appointment 11/26/24

## 2024-11-25 NOTE — PROGRESS NOTES
Name: Arsh Haynes ADMIT: 10/26/2024   : 1950  PCP: Provider, No Known    MRN: [id number] LOS: 30 days   AGE/SEX: 74 y.o. male  ROOM: [facility]     Subjective   Subjective   2024  No overnight events, continues to have right knee and right shoulder pain.  Tolerating PO intake.     2024  Patient seen and examined at bedside.  Still admits to right shoulder and knee pain. Currently afebrile and hemodynamically stable.     2024  Patient states he had an episode of confusion this morning, still seems lethargic.  Will check EKG due to tachycardia.  Sodium downtrending, nephrology following.        Objective   Objective   Vital Signs  Temp:  [97.5 °F (36.4 °C)-98.1 °F (36.7 °C)] 97.8 °F (36.6 °C)  Heart Rate:  [] 113  Resp:  [16] 16  BP: (103-127)/(58-64) 114/58  SpO2:  [95 %-97 %] 95 %  on  Flow (L/min) (Oxygen Therapy):  [2] 2;   Device (Oxygen Therapy): room air  Body mass index is 24.22 kg/m².  Physical Exam  Constitutional:       General: He is not in acute distress.     Appearance: He is not toxic-appearing.      Comments: AAO x2-3, lethargic   HENT:      Head: Normocephalic and atraumatic.      Nose: Nose normal. No congestion.      Mouth/Throat:      Pharynx: Oropharynx is clear. No oropharyngeal exudate.   Eyes:      General: No scleral icterus.  Cardiovascular:      Rate and Rhythm: Normal rate and regular rhythm.      Heart sounds: No murmur heard.     No friction rub. No gallop.   Pulmonary:      Effort: No respiratory distress.      Breath sounds: No wheezing or rales.   Abdominal:      General: There is no distension.      Tenderness: There is no abdominal tenderness. There is no guarding.   Musculoskeletal:         General: No swelling or deformity.      Cervical back: Normal range of motion. No rigidity.      Right lower leg: No edema.      Left lower leg: No edema.   Skin:     Coloration: Skin is not jaundiced.      Findings: No bruising or lesion.   Neurological:       General: No focal deficit present.      Mental Status: He is alert. He is disoriented.      Motor: Weakness present.       Results Review     I reviewed the patient's new clinical results.  Results from last 7 days   Lab Units 11/25/24  0549 11/24/24 0337 11/23/24 0507 11/22/24 0427   WBC 10*3/mm3 9.15 9.32 10.15 11.93*   HEMOGLOBIN g/dL 9.4* 9.5* 9.7* 9.5*   PLATELETS 10*3/mm3 641* 727* 759* 796*     Results from last 7 days   Lab Units 11/25/24  0549 11/24/24 0337 11/23/24 0507 11/22/24 0427   SODIUM mmol/L 126* 127* 129* 131*   POTASSIUM mmol/L 4.1 4.1 4.4 4.2   CHLORIDE mmol/L 95* 93* 96* 99   CO2 mmol/L 19.1* 20.0* 20.4* 20.3*   BUN mg/dL 6* 7* 7* 6*   CREATININE mg/dL 0.52* 0.54* 0.59* 0.59*   GLUCOSE mg/dL 101* 103* 109* 117*   EGFR mL/min/1.73 107.1 105.9 103.1 103.1     Results from last 7 days   Lab Units 11/25/24  0549 11/24/24 0337 11/23/24 0507 11/22/24 0427   ALBUMIN g/dL 2.8* 2.8* 2.8* 2.3*   BILIRUBIN mg/dL 0.6 0.6 0.6 0.6   ALK PHOS U/L 145* 142* 144* 154*   AST (SGOT) U/L 27 22 23 25   ALT (SGPT) U/L 28 27 30 28     Results from last 7 days   Lab Units 11/25/24  0549 11/24/24 0337 11/23/24 0507 11/22/24 0427   CALCIUM mg/dL 8.5* 8.8 8.9 8.9   ALBUMIN g/dL 2.8* 2.8* 2.8* 2.3*   PHOSPHORUS mg/dL 3.7  --   --   --        Glucose   Date/Time Value Ref Range Status   11/25/2024 0640 103 70 - 130 mg/dL Final   11/24/2024 2040 109 70 - 130 mg/dL Final   11/24/2024 1538 118 70 - 130 mg/dL Final   11/24/2024 1040 105 70 - 130 mg/dL Final   11/24/2024 0633 112 70 - 130 mg/dL Final   11/23/2024 2016 112 70 - 130 mg/dL Final   11/23/2024 1549 120 70 - 130 mg/dL Final       No radiology results for the last day    I have personally reviewed all medications:  Scheduled Medications  chlorhexidine, 15 mL, Mouth/Throat, Q12H  Diclofenac Sodium, 4 g, Topical, 4x Daily  enoxaparin, 1 mg/kg, Subcutaneous, Q12H  folic acid, 1 mg, Oral, Daily  insulin lispro, 2-9 Units, Subcutaneous, 4x Daily AC & at  Bedtime  lansoprazole, 30 mg, Oral, Q AM  Lidocaine, 1 patch, Transdermal, Q24H  Lidocaine, 2 patch, Transdermal, Q24H  metoprolol tartrate, 100 mg, Oral, Q12H  sodium chloride, 10 mL, Intravenous, Q12H  sodium hypochlorite, , Topical, BID  tamsulosin, 0.4 mg, Oral, Daily  thiamine, 100 mg, Oral, Daily  Urea, 15 g, Oral, BID    Infusions   Diet  Diet: Regular/House, Fluid Restriction (240 mL/tray); 1500 mL/day; No Mixed Consistencies, No Straw; Texture: Mechanical Ground (NDD 2); Fluid Consistency: Nectar Thick    I have personally reviewed:  [x]  Laboratory   [x]  Microbiology   [x]  Radiology   [x]  EKG/Telemetry  [x]  Cardiology/Vascular   []  Pathology    []  Records       Assessment/Plan     Active Hospital Problems    Diagnosis  POA    **Peritonitis [K65.9]  Unknown    Oropharyngeal dysphagia [R13.12]  Unknown    HTN (hypertension) [I10]  Unknown    Thrombocytosis [D75.839]  Unknown    Acute DVT (deep venous thrombosis) [I82.409]  Unknown    Renal mass [N28.89]  Unknown    Hyponatremia [E87.1]  Unknown    Anemia [D64.9]  Unknown    Moderate protein-calorie malnutrition [E44.0]  Yes    Colon cancer [C18.9]  No      Resolved Hospital Problems    Diagnosis Date Resolved POA    Perforation of sigmoid colon [K63.1] 10/27/2024 Yes       74 y.o. male admitted with Peritonitis.    #Septic Shock  #Perforated Rectosigmoid adenocarcinom  #Feculent Peritonitis    - s/p open left hemicolectomy with porsha pouch and end colostomy by surgery on 10/26/2024    - Tolerating PO intake, no g tube placement needed    - Will need otpt colonoscopy    - ID followed, abx stopped    - surgery has signed off    - discussed with CM, plan for SNF at [place], choices needed     #RLE DVT    - Continue Therapeutic Lovenox, plan to transition to Eliquis at [place]    #Acute Metabolic encephalopathy   - Patient with an episode of confusion this morning and remains lethargic   - check UA, has had poor intake   - currently on therapeutic Lovenox, check  stat CT head to rule out bleed   - possibly due to hyponatremia    #Anemia  #Thrombocytosis    - stable, follow up with heme/ocn as otpt    #Right renal mass    - urology consulted and signed off    - concerning for RCC    - Patient to follow up with urology after discharge    #Hyponatremia    - sodium 129 > 127 > 126 today    - has fluctuated from 129-136 during admission    - Urine studies suggestive of SIADH, nephrology to repeat    - Fluid restrictions    - As continues to downtrend, will have Nephrology establish care    - sodium decreased after trial of fluids, nephrology monitoring    #Hypokalemia    - replace per protocol     #DM    - ISS    #Right knee Pain  #Right shoulder pain    - X-rays reviewed, appear related to degenerative changes    - no intervention per ortho     - Diclofenac started         Therapeutic Lovenox  for DVT prophylaxis.  Limited code (no CPR, no intubation).  Discussed with patient and nursing staff.  Anticipate discharge to SNU facility in 1-2 days.  Expected Discharge Date: 11/27/2024; Expected Discharge Time:       Oliver Bustos DO  [facility]  11/25/24  10:16 EST

## 2024-11-25 NOTE — PROGRESS NOTES
[facility] Progress Note      Patient Name: Arsh Haynes  : 1950  MRN: [id number]  Primary Care Physician:  Provider, No Known  Date of admission: 10/26/2024    Subjective     Interval History:   F/u hyponatremia    Review of Systems:   Denies n/v  C/o pain in various joints  No dyspnea     Objective     Vitals:   Temp:  [97.5 °F (36.4 °C)-98.1 °F (36.7 °C)] 97.8 °F (36.6 °C)  Heart Rate:  [] 113  Resp:  [16-18] 16  BP: (103-127)/(54-64) 114/58  Flow (L/min) (Oxygen Therapy):  [2] 2    Intake/Output Summary (Last 24 hours) at 2024 0718  Last data filed at 2024 0525  Gross per 24 hour   Intake 530 ml   Output 1300 ml   Net -770 ml       Physical Exam:    General Appearance: frail WM no distress  Neck: supple, no JVD  Lungs: CTA bilat no rales  Heart: RRR, normal S1 and S2  Abdomen: soft, nontender, nondistended  : no palpable bladder  Extremities: no edema, cyanosis or clubbing  Neuro: normal speech and mental status     Scheduled Meds:     chlorhexidine, 15 mL, Mouth/Throat, Q12H  Diclofenac Sodium, 4 g, Topical, 4x Daily  enoxaparin, 1 mg/kg, Subcutaneous, Q12H  folic acid, 1 mg, Oral, Daily  insulin lispro, 2-9 Units, Subcutaneous, 4x Daily AC & at Bedtime  lansoprazole, 30 mg, Oral, Q AM  Lidocaine, 1 patch, Transdermal, Q24H  Lidocaine, 2 patch, Transdermal, Q24H  metoprolol tartrate, 100 mg, Oral, Q12H  sodium chloride, 10 mL, Intravenous, Q12H  sodium hypochlorite, , Topical, BID  tamsulosin, 0.4 mg, Oral, Daily  thiamine, 100 mg, Oral, Daily      IV Meds:   sodium chloride, 100 mL/hr, Last Rate: 100 mL/hr (24 2882)        Results Reviewed:   I have personally reviewed the results from the time of this admission to 2024 07:18 EST     Results from last 7 days   Lab Units 24  0549 24  0337 24  0507   SODIUM mmol/L 126* 127* 129*   POTASSIUM mmol/L 4.1 4.1 4.4   CHLORIDE mmol/L 95* 93* 96*   CO2 mmol/L 19.1* 20.0* 20.4*   BUN  mg/dL 6* 7* 7*   CREATININE mg/dL 0.52* 0.54* 0.59*   CALCIUM mg/dL 8.5* 8.8 8.9   BILIRUBIN mg/dL 0.6 0.6 0.6   ALK PHOS U/L 145* 142* 144*   ALT (SGPT) U/L 28 27 30   AST (SGOT) U/L 27 22 23   GLUCOSE mg/dL 101* 103* 109*     Estimated Creatinine Clearance: 147.1 mL/min (A) (by C-G formula based on SCr of 0.52 mg/dL (L)).  Results from last 7 days   Lab Units 11/25/24  0549   PHOSPHORUS mg/dL 3.7     Results from last 7 days   Lab Units 11/25/24  0549 11/24/24  0337   URIC ACID mg/dL 6.3 6.6     Results from last 7 days   Lab Units 11/25/24  0549 11/24/24  0337 11/23/24  0507 11/22/24  0427 11/21/24  0554   WBC 10*3/mm3 9.15 9.32 10.15 11.93* 10.54   HEMOGLOBIN g/dL 9.4* 9.5* 9.7* 9.5* 9.7*   PLATELETS 10*3/mm3 641* 727* 759* 796* 773*           Assessment / Plan     ASSESSMENT:  Hyponatremia.  Suspected hypovolemic in nature but Na worse despite NS IVF.  Suspect element of malnutrition and low solute intake (BUN/Cr very low 6/0.5).  No culprit meds.  Still need urine studies.  Uric acid not low (6's)  Perforated rectosigmoid adenocarcinoma.  Status post left hemicolectomy and end colostomy on 10/26.  Right renal mass.  Suspicious of renal cell carcinoma.  Patient evaluated by urology and will follow-up as outpatient  Right lower extremity DVT  Anemia + Thrombocytosis.  HTN - BP controlled on metop ; holding lotrel     PLAN:  DC normal saline IVF  Send urine Na/osm  Fluid restriction 1500 cc/day  Start urea packets  D/w ANGEL Romero MD  11/25/24  07:18 [facility]    [facility]  [phone]

## 2024-11-26 LAB
ALBUMIN SERPL-MCNC: 2.8 G/DL (ref 3.5–5.2)
ALBUMIN/GLOB SERPL: 0.7 G/DL
ALP SERPL-CCNC: 143 U/L (ref 39–117)
ALT SERPL W P-5'-P-CCNC: 28 U/L (ref 1–41)
ANION GAP SERPL CALCULATED.3IONS-SCNC: 12 MMOL/L (ref 5–15)
AST SERPL-CCNC: 16 U/L (ref 1–40)
BASOPHILS # BLD AUTO: 0.07 10*3/MM3 (ref 0–0.2)
BASOPHILS NFR BLD AUTO: 0.9 % (ref 0–1.5)
BILIRUB SERPL-MCNC: 0.5 MG/DL (ref 0–1.2)
BUN SERPL-MCNC: 23 MG/DL (ref 8–23)
BUN/CREAT SERPL: 48.9 (ref 7–25)
CALCIUM SPEC-SCNC: 9.3 MG/DL (ref 8.6–10.5)
CHLORIDE SERPL-SCNC: 96 MMOL/L (ref 98–107)
CO2 SERPL-SCNC: 19 MMOL/L (ref 22–29)
CREAT SERPL-MCNC: 0.47 MG/DL (ref 0.76–1.27)
DEPRECATED RDW RBC AUTO: 51.4 FL (ref 37–54)
EGFRCR SERPLBLD CKD-EPI 2021: 110.4 ML/MIN/1.73
EOSINOPHIL # BLD AUTO: 0.48 10*3/MM3 (ref 0–0.4)
EOSINOPHIL NFR BLD AUTO: 6 % (ref 0.3–6.2)
ERYTHROCYTE [DISTWIDTH] IN BLOOD BY AUTOMATED COUNT: 16.9 % (ref 12.3–15.4)
GLOBULIN UR ELPH-MCNC: 4.2 GM/DL
GLUCOSE BLDC GLUCOMTR-MCNC: 110 MG/DL (ref 70–130)
GLUCOSE BLDC GLUCOMTR-MCNC: 118 MG/DL (ref 70–130)
GLUCOSE BLDC GLUCOMTR-MCNC: 131 MG/DL (ref 70–130)
GLUCOSE BLDC GLUCOMTR-MCNC: 131 MG/DL (ref 70–130)
GLUCOSE SERPL-MCNC: 106 MG/DL (ref 65–99)
HCT VFR BLD AUTO: 28 % (ref 37.5–51)
HGB BLD-MCNC: 9.3 G/DL (ref 13–17.7)
IMM GRANULOCYTES # BLD AUTO: 0.06 10*3/MM3 (ref 0–0.05)
IMM GRANULOCYTES NFR BLD AUTO: 0.7 % (ref 0–0.5)
LYMPHOCYTES # BLD AUTO: 1.25 10*3/MM3 (ref 0.7–3.1)
LYMPHOCYTES NFR BLD AUTO: 15.6 % (ref 19.6–45.3)
MCH RBC QN AUTO: 27.7 PG (ref 26.6–33)
MCHC RBC AUTO-ENTMCNC: 33.2 G/DL (ref 31.5–35.7)
MCV RBC AUTO: 83.3 FL (ref 79–97)
MONOCYTES # BLD AUTO: 0.9 10*3/MM3 (ref 0.1–0.9)
MONOCYTES NFR BLD AUTO: 11.2 % (ref 5–12)
NEUTROPHILS NFR BLD AUTO: 5.25 10*3/MM3 (ref 1.7–7)
NEUTROPHILS NFR BLD AUTO: 65.6 % (ref 42.7–76)
NRBC BLD AUTO-RTO: 0 /100 WBC (ref 0–0.2)
PLATELET # BLD AUTO: 640 10*3/MM3 (ref 140–450)
PMV BLD AUTO: 7.9 FL (ref 6–12)
POTASSIUM SERPL-SCNC: 4.2 MMOL/L (ref 3.5–5.2)
PROT SERPL-MCNC: 7 G/DL (ref 6–8.5)
RBC # BLD AUTO: 3.36 10*6/MM3 (ref 4.14–5.8)
SODIUM SERPL-SCNC: 127 MMOL/L (ref 136–145)
TSH SERPL DL<=0.05 MIU/L-ACNC: 2.57 UIU/ML (ref 0.27–4.2)
WBC NRBC COR # BLD AUTO: 8.01 10*3/MM3 (ref 3.4–10.8)

## 2024-11-26 PROCEDURE — 94799 UNLISTED PULMONARY SVC/PX: CPT

## 2024-11-26 PROCEDURE — 97530 THERAPEUTIC ACTIVITIES: CPT

## 2024-11-26 PROCEDURE — 94761 N-INVAS EAR/PLS OXIMETRY MLT: CPT

## 2024-11-26 PROCEDURE — 85025 COMPLETE CBC W/AUTO DIFF WBC: CPT | Performed by: INTERNAL MEDICINE

## 2024-11-26 PROCEDURE — 82948 REAGENT STRIP/BLOOD GLUCOSE: CPT

## 2024-11-26 PROCEDURE — 25010000002 ENOXAPARIN PER 10 MG: Performed by: INTERNAL MEDICINE

## 2024-11-26 PROCEDURE — 84443 ASSAY THYROID STIM HORMONE: CPT | Performed by: STUDENT IN AN ORGANIZED HEALTH CARE EDUCATION/TRAINING PROGRAM

## 2024-11-26 PROCEDURE — 80053 COMPREHEN METABOLIC PANEL: CPT | Performed by: INTERNAL MEDICINE

## 2024-11-26 PROCEDURE — 94760 N-INVAS EAR/PLS OXIMETRY 1: CPT

## 2024-11-26 RX ORDER — IPRATROPIUM BROMIDE AND ALBUTEROL SULFATE 2.5; .5 MG/3ML; MG/3ML
3 SOLUTION RESPIRATORY (INHALATION) EVERY 4 HOURS PRN
Status: DISCONTINUED | OUTPATIENT
Start: 2024-11-26 | End: 2024-11-30 | Stop reason: HOSPADM

## 2024-11-26 RX ADMIN — DICLOFENAC SODIUM 4 G: 10 GEL TOPICAL at 08:42

## 2024-11-26 RX ADMIN — DICLOFENAC SODIUM 4 G: 10 GEL TOPICAL at 11:41

## 2024-11-26 RX ADMIN — ACETAMINOPHEN 325MG 650 MG: 325 TABLET ORAL at 21:03

## 2024-11-26 RX ADMIN — ACETAMINOPHEN 325MG 650 MG: 325 TABLET ORAL at 01:41

## 2024-11-26 RX ADMIN — LANSOPRAZOLE 30 MG: 15 TABLET, ORALLY DISINTEGRATING ORAL at 06:26

## 2024-11-26 RX ADMIN — Medication 15 G: at 08:42

## 2024-11-26 RX ADMIN — Medication 10 ML: at 08:42

## 2024-11-26 RX ADMIN — TAMSULOSIN HYDROCHLORIDE 0.4 MG: 0.4 CAPSULE ORAL at 08:40

## 2024-11-26 RX ADMIN — ENOXAPARIN SODIUM 90 MG: 100 INJECTION SUBCUTANEOUS at 17:36

## 2024-11-26 RX ADMIN — Medication 15 G: at 21:03

## 2024-11-26 RX ADMIN — DICLOFENAC SODIUM 4 G: 10 GEL TOPICAL at 17:36

## 2024-11-26 RX ADMIN — METOPROLOL TARTRATE 100 MG: 50 TABLET, FILM COATED ORAL at 08:40

## 2024-11-26 RX ADMIN — IPRATROPIUM BROMIDE AND ALBUTEROL SULFATE 3 ML: 2.5; .5 SOLUTION RESPIRATORY (INHALATION) at 21:04

## 2024-11-26 RX ADMIN — ENOXAPARIN SODIUM 90 MG: 100 INJECTION SUBCUTANEOUS at 06:26

## 2024-11-26 RX ADMIN — FOLIC ACID 1 MG: 1 TABLET ORAL at 08:40

## 2024-11-26 RX ADMIN — DICLOFENAC SODIUM 4 G: 10 GEL TOPICAL at 21:03

## 2024-11-26 RX ADMIN — METOPROLOL TARTRATE 100 MG: 50 TABLET, FILM COATED ORAL at 23:05

## 2024-11-26 RX ADMIN — THIAMINE HCL TAB 100 MG 100 MG: 100 TAB at 08:40

## 2024-11-26 RX ADMIN — Medication 10 ML: at 21:04

## 2024-11-26 RX ADMIN — ACETAMINOPHEN 325MG 650 MG: 325 TABLET ORAL at 06:26

## 2024-11-26 NOTE — THERAPY TREATMENT NOTE
Patient Name: Arsh Haynes  : 1950    MRN: [id number]                              Today's Date: 2024       Admit Date: 10/26/2024    Visit Dx:     ICD-10-CM ICD-9-CM   1. Perforation of sigmoid colon  K63.1 569.83   2. Hypotension, unspecified hypotension type  I95.9 458.9   3. Increased lactic acid level  R79.89 276.2   4. Bowel perforation  K63.1 569.83     Patient Active Problem List   Diagnosis    Colon cancer    HTN (hypertension)    Thrombocytosis    Acute DVT (deep venous thrombosis)    Renal mass    Hyponatremia    Anemia    Peritonitis    Moderate protein-calorie malnutrition    Oropharyngeal dysphagia     Past Medical History:   Diagnosis Date    HTN (hypertension)     Hypercholesterolemia      Past Surgical History:   Procedure Laterality Date    COLON RESECTION N/A 10/26/2024    Procedure: LOW ANTERIOR COLON RESECTION SIGMOID, COLOSTOMY, SPLENIC FLEXURE MOBILIZATION;  Surgeon: Mc Guillaume MD;  Location: [facility];  Service: General;  Laterality: N/A;    EXPLORATORY LAPAROTOMY N/A 10/26/2024    Procedure: LAPAROTOMY EXPLORATORY, LEFT HEMICOLECTOMY;  Surgeon: Mc Guillaume MD;  Location: [facility];  Service: General;  Laterality: N/A;      General Information       Row Name 24 0756          OT Time and Intention    Document Type therapy note (daily note)  -SM     Mode of Treatment occupational therapy;individual therapy  -SM     Patient Effort fair  -       Row Name 24 0756          General Information    Existing Precautions/Restrictions fall  Abdominal wound vac, colostomy  -SM       Row Name 24 0756          Cognition    Orientation Status (Cognition) oriented x 4  -SM       Row Name 24 0756          Safety Issues/Impairments Affecting Functional Mobility    Safety Issues Affecting Function (Mobility) awareness of need for assistance;insight into deficits/self-awareness  -SM     Impairments Affecting Function (Mobility)  strength;balance;endurance/activity tolerance;pain;postural/trunk control;range of motion (ROM);grasp  -SM               User Key  (r) = Recorded By, (t) = Taken By, (c) = Cosigned By      Initials Name Provider Type    Sarah Chanel OT Occupational Therapist                     Mobility/ADL's       Row Name 11/26/24 0757 11/26/24 0750       Bed Mobility    Scooting/Bridging [place] (Bed Mobility) dependent (less than 25% patient effort);2 person assist  -SM --  -SM    Supine-Sit [place] (Bed Mobility) moderate assist (50% patient effort);verbal cues  -SM --  -SM    Sit-Supine [place] (Bed Mobility) maximum assist (25% patient effort);verbal cues  -SM --  -SM      Row Name 11/26/24 0757 11/26/24 0750       Grooming Assessment/Training    [place] Level (Grooming) oral care regimen;set up;supervision  -SM --  -SM    Position (Grooming) edge of bed sitting  -SM --  -SM              User Key  (r) = Recorded By, (t) = Taken By, (c) = Cosigned By      Initials Name Provider Type    Sarah Chanel OT Occupational Therapist                   Obj/Interventions       Row Name 11/26/24 0751          Balance    Static Sitting Balance supervision  -SM     Position, Sitting Balance sitting edge of bed  -SM               User Key  (r) = Recorded By, (t) = Taken By, (c) = Cosigned By      Initials Name Provider Type    Sarah Chanel OT Occupational Therapist                   Goals/Plan    No documentation.                  Clinical Impression    No documentation.                  Outcome Measures    No documentation.                   Occupational Therapy Education       Title: PT OT SLP Therapies (Done)       Topic: Occupational Therapy (Done)       Point: ADL training (Done)       Description:   Instruct learner(s) on proper safety adaptation and remediation techniques during self care or transfers.   Instruct in proper use of assistive devices.                  Learning Progress  Summary            Patient Acceptance, E, VU by JS at 11/19/2024 1806    Acceptance, E, VU by JS at 11/16/2024 1757    Acceptance, E, VU by JS at 11/15/2024 1824    Acceptance, E, VU by JS at 11/11/2024 1813    Acceptance, E, VU by JS at 11/8/2024 1820    Acceptance, E, VU,NR by MM at 11/2/2024 1328    Comment: role of OT, goals, d/c rec                      Point: Precautions (Done)       Description:   Instruct learner(s) on prescribed precautions during self-care and functional transfers.                  Learning Progress Summary            Patient Acceptance, E, VU by  at 11/19/2024 1806    Acceptance, E, VU by  at 11/16/2024 1757    Acceptance, E, VU by  at 11/15/2024 1824    Acceptance, E, VU by JS at 11/11/2024 1813    Acceptance, E, VU by JS at 11/8/2024 1820    Acceptance, E, VU,NR by MM at 11/2/2024 1328    Comment: role of OT, goals, d/c rec                      Point: Body mechanics (Done)       Description:   Instruct learner(s) on proper positioning and spine alignment during self-care, functional mobility activities and/or exercises.                  Learning Progress Summary            Patient Acceptance, E, VU by  at 11/19/2024 1806    Acceptance, E, VU by  at 11/16/2024 1757    Acceptance, E, VU by  at 11/15/2024 1824    Acceptance, E, VU by  at 11/11/2024 1813    Acceptance, E, VU by  at 11/8/2024 1820    Acceptance, E, VU,NR by MM at 11/2/2024 1328    Comment: role of OT, goals, d/c rec                                      User Key       Initials Effective Dates Name Provider Type Discipline     02/26/24 -  Johnson Morales RN Registered Nurse Nurse     05/31/24 -  Yu Chapman OT Occupational Therapist OT                  OT Recommendation and Plan  Planned Therapy Interventions (OT): activity tolerance training, adaptive equipment training, BADL retraining, functional balance retraining, occupation/activity based interventions, patient/caregiver education/training,  transfer/mobility retraining, strengthening exercise, ROM/therapeutic exercise  Therapy Frequency (OT): 5 times/wk  Plan of Care Review  Plan of Care Reviewed With: patient  Progress: no change  Outcome Evaluation: Pt is agreeable to sitting EOB only 2 minutes today to brush teeth. He quickly requests to return back to supine with all lights off. Poor activity tolerance at this time. He declines ther ex or standing/transfers today.     Time Calculation:          Therapy Charges for Today       Code Description Service Date Service Provider Modifiers Qty    03820352171 HC OT SELF CARE/MGMT/TRAIN EA 15 MIN 11/25/2024 Sarah Abernathy OT GO 1                 Sarah Abernathy OT  11/26/2024

## 2024-11-26 NOTE — PROGRESS NOTES
Name: Arsh Haynes ADMIT: 10/26/2024   : 1950  PCP: Provider, No Known    MRN: [id number] LOS: 31 days   AGE/SEX: 74 y.o. male  ROOM: [facility]     Subjective   Subjective   2024  No overnight events, continues to have right knee and right shoulder pain.  Tolerating PO intake.     2024  Patient seen and examined at bedside.  Still admits to right shoulder and knee pain. Currently afebrile and hemodynamically stable.     2024  Patient states he had an episode of confusion this morning, still seems lethargic.  Will check EKG due to tachycardia.  Sodium downtrending, nephrology following.     2024  Patient AAOx3 without distress of confusion this afternoon.  MRI negative for mets or ischemia.  Appears improved.        Objective   Objective   Vital Signs  Temp:  [98 °F (36.7 °C)-98.5 °F (36.9 °C)] 98.1 °F (36.7 °C)  Heart Rate:  [] 89  Resp:  [16-18] 18  BP: (100-132)/(61-71) 120/71  SpO2:  [97 %-98 %] 98 %  on   ;   Device (Oxygen Therapy): room air  Body mass index is 20.84 kg/m².  Physical Exam  Constitutional:       General: He is not in acute distress.     Appearance: He is not toxic-appearing.      Comments: AAO x3 with intact cognition   HENT:      Head: Normocephalic and atraumatic.      Nose: Nose normal. No congestion.      Mouth/Throat:      Pharynx: Oropharynx is clear. No oropharyngeal exudate.   Eyes:      General: No scleral icterus.  Cardiovascular:      Rate and Rhythm: Normal rate and regular rhythm.      Heart sounds: No murmur heard.     No friction rub. No gallop.   Pulmonary:      Effort: No respiratory distress.      Breath sounds: No wheezing or rales.   Abdominal:      General: There is no distension.      Tenderness: There is no abdominal tenderness. There is no guarding.      Comments: Wound vac present on abdominal wound, appears to be healing well   Musculoskeletal:         General: No swelling or deformity.      Cervical back: Normal range of motion.  No rigidity.      Right lower leg: No edema.      Left lower leg: No edema.   Skin:     Coloration: Skin is not jaundiced.      Findings: No bruising or lesion.   Neurological:      General: No focal deficit present.      Mental Status: He is alert and oriented to person, place, and time.      Motor: Weakness present.       Results Review     I reviewed the patient's new clinical results.  Results from last 7 days   Lab Units 11/26/24  0552 11/25/24  0549 11/24/24 0337 11/23/24  0507   WBC 10*3/mm3 8.01 9.15 9.32 10.15   HEMOGLOBIN g/dL 9.3* 9.4* 9.5* 9.7*   PLATELETS 10*3/mm3 640* 641* 727* 759*     Results from last 7 days   Lab Units 11/26/24  0552 11/25/24  0549 11/24/24 0337 11/23/24  0507   SODIUM mmol/L 127* 126* 127* 129*   POTASSIUM mmol/L 4.2 4.1 4.1 4.4   CHLORIDE mmol/L 96* 95* 93* 96*   CO2 mmol/L 19.0* 19.1* 20.0* 20.4*   BUN mg/dL 23 6* 7* 7*   CREATININE mg/dL 0.47* 0.52* 0.54* 0.59*   GLUCOSE mg/dL 106* 101* 103* 109*   EGFR mL/min/1.73 110.4 107.1 105.9 103.1     Results from last 7 days   Lab Units 11/26/24  0552 11/25/24  0549 11/24/24 0337 11/23/24  0507   ALBUMIN g/dL 2.8* 2.8* 2.8* 2.8*   BILIRUBIN mg/dL 0.5 0.6 0.6 0.6   ALK PHOS U/L 143* 145* 142* 144*   AST (SGOT) U/L 16 27 22 23   ALT (SGPT) U/L 28 28 27 30     Results from last 7 days   Lab Units 11/26/24  0552 11/25/24  0549 11/24/24 0337 11/23/24  0507   CALCIUM mg/dL 9.3 8.5* 8.8 8.9   ALBUMIN g/dL 2.8* 2.8* 2.8* 2.8*   PHOSPHORUS mg/dL  --  3.7  --   --        Glucose   Date/Time Value Ref Range Status   11/26/2024 1040 118 70 - 130 mg/dL Final   11/26/2024 0638 110 70 - 130 mg/dL Final   11/25/2024 2004 135 (H) 70 - 130 mg/dL Final   11/25/2024 1556 120 70 - 130 mg/dL Final   11/25/2024 1038 122 70 - 130 mg/dL Final   11/25/2024 0640 103 70 - 130 mg/dL Final   11/24/2024 2040 109 70 - 130 mg/dL Final       CT Head Without Contrast    Result Date: 11/26/2024  1. No discernible acute intracranial abnormality is identified.  2.  There is mild small vessel disease in the cerebral white matter. 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 The remainder of the head CT is within normal limits and the etiology of this patient's altered mental status is not established on this exam. If neurologic symptoms warrant an MRI of the brain could be obtained for a more complete assessment. The findings and recommendations were communicated to Oliver Remy taking care of the patient by telephone on 11/25/2024 at 12:50 p.m.  Radiation dose reduction techniques were utilized, including automated exposure control and exposure modulation based on body size.   This report was finalized on 11/26/2024 6:02 AM by Dr. Emiliano Villar M.D on Workstation: [id number]      MRI Brain With & Without Contrast    Result Date: 11/26/2024  1. No acute intracranial abnormality.  No abnormal enhancement.   This report was finalized on 11/26/2024 4:19 AM by Dr. Lori Belle M.D on Workstation: [id number]       I have personally reviewed all medications:  Scheduled Medications  chlorhexidine, 15 mL, Mouth/Throat, Q12H  Diclofenac Sodium, 4 g, Topical, 4x Daily  enoxaparin, 1 mg/kg, Subcutaneous, Q12H  folic acid, 1 mg, Oral, Daily  insulin lispro, 2-9 Units, Subcutaneous, 4x Daily AC & at Bedtime  lansoprazole, 30 mg, Oral, Q AM  Lidocaine, 1 patch, Transdermal, Q24H  Lidocaine, 2 patch, Transdermal, Q24H  metoprolol tartrate, 100 mg, Oral, Q12H  sodium chloride, 10 mL, Intravenous, Q12H  sodium hypochlorite, , Topical, BID  tamsulosin, 0.4 mg, Oral, Daily  thiamine, 100 mg, Oral, Daily  Urea, 15 g, Oral, BID    Infusions   Diet  Diet: Regular/House, Fluid  Restriction (240 mL/tray); 1500 mL/day; No Mixed Consistencies, No Straw; Texture: Mechanical Ground (NDD 2); Fluid Consistency: Nectar Thick    I have personally reviewed:  [x]  Laboratory   [x]  Microbiology   [x]  Radiology   [x]  EKG/Telemetry  [x]  Cardiology/Vascular   []  Pathology    []  Records       Assessment/Plan     Active Hospital Problems    Diagnosis  POA    **Peritonitis [K65.9]  Unknown    Oropharyngeal dysphagia [R13.12]  Unknown    HTN (hypertension) [I10]  Unknown    Thrombocytosis [D75.839]  Unknown    Acute DVT (deep venous thrombosis) [I82.409]  Unknown    Renal mass [N28.89]  Unknown    Hyponatremia [E87.1]  Unknown    Anemia [D64.9]  Unknown    Moderate protein-calorie malnutrition [E44.0]  Yes    Colon cancer [C18.9]  No      Resolved Hospital Problems    Diagnosis Date Resolved POA    Perforation of sigmoid colon [K63.1] 10/27/2024 Yes       74 y.o. male admitted with Peritonitis.    #Septic Shock  #Perforated Rectosigmoid adenocarcinom  #Feculent Peritonitis    - s/p open left hemicolectomy with porsha pouch and end colostomy by surgery on 10/26/2024    - Tolerating PO intake, no g tube placement needed    - Will need otpt colonoscopy    - ID followed, abx stopped    - surgery has signed off    - discussed with CM, plan for SNF at [place], choices needed     #RLE DVT    - Continue Therapeutic Lovenox, plan to transition to Eliquis at [place]    #Acute Metabolic encephalopathy   - Patient with an episode of confusion this morning and remains lethargic   - check UA, has had poor intake, UA negative   - currently on therapeutic Lovenox, check stat CT head to rule out bleed > CT head negative for mass, shift, hemorrhage    - MRI brain without mets or infarct   - possibly due to hyponatremia   - Appears to be back at baseline, possible sundowning    #Anemia  #Thrombocytosis    - stable, follow up with heme/ocn as otpt    #Right renal mass    - urology consulted and signed off    - concerning for  RCC    - Patient to follow up with urology after discharge    #Hyponatremia    - sodium 129 > 127 > 126 > 127 today    - has fluctuated from 129-136 during admission    - Urine studies suggestive of SIADH, nephrology to repeat    - Fluid restrictions    - Urea started    - As continues to downtrend, will have Nephrology establish care    - sodium decreased after trial of fluids, nephrology monitoring    #Hypokalemia    - replace per protocol     #DM    - ISS    #Right knee Pain  #Right shoulder pain    - X-rays reviewed, appear related to degenerative changes    - no intervention per ortho     - Diclofenac started         Therapeutic Lovenox  for DVT prophylaxis.  Limited code (no CPR, no intubation).  Discussed with patient and nursing staff.  Anticipate discharge to SNU facility in 1-2 days.  Expected Discharge Date: 11/27/2024; Expected Discharge Time:       Oliver Bustos DO  [facility]  11/26/24  14:08 EST

## 2024-11-26 NOTE — CASE MANAGEMENT/SOCIAL WORK
Continued Stay Note  [facility]     Patient Name: Arsh Haynes  MRN: [id number]  Today's Date: 11/26/2024    Admit Date: 10/26/2024    Plan: Plan skilled care at accepting faciliy- pre cert needed.  BENITA Clay RN   Discharge Plan       Row Name 11/26/24 1627       Plan    Plan Plan skilled care at accepting facility- pre cert needed.  BENITA Clay RN    Patient/Family in Agreement with Plan yes    Plan Comments Called and spoke with pt's spouse (Melida [phone]) and notified her [facility] cannot [facility].   Pt's spouse informed choices are needed for skilled care.  Spouse to speak with pt's son for skilled care choices.  Plan skilled care at accepting facility.  BENITA Clay RN                   Discharge Codes    No documentation.                 Expected Discharge Date and Time       Expected Discharge Date Expected Discharge Time    Nov 27, 2024               Felecia Clay RN

## 2024-11-26 NOTE — PLAN OF CARE
Goal Outcome Evaluation:  Plan of Care Reviewed With: patient        Progress: no change  Outcome Evaluation: Pt is agreeable to sitting EOB only 2 minutes today to brush teeth. He quickly requests to return back to supine with all lights off. Poor activity tolerance at this time. He declines ther ex or standing/transfers today.    Anticipated Discharge Disposition (OT): skilled nursing facility

## 2024-11-26 NOTE — PROGRESS NOTES
[facility] Progress Note      Patient Name: Arsh Haynes  : 1950  MRN: [id number]  Primary Care Physician:  Provider, No Known  Date of admission: 10/26/2024    Subjective     Interval History:   Follow-up hyponatremia.  Urine studies consistent with SIADH.  Weight not correct.  Urine output not all recorded.  Ostomy functioning.  Eating a little.  Blood pressure too controlled at times.  Review of Systems:   As noted above    Objective     Vitals:   Temp:  [98 °F (36.7 °C)-98.5 °F (36.9 °C)] 98 °F (36.7 °C)  Heart Rate:  [] 96  Resp:  [16-17] 16  BP: (100-132)/(56-68) 100/61    Intake/Output Summary (Last 24 hours) at 2024 1118  Last data filed at 2024 0900  Gross per 24 hour   Intake 240 ml   Output 1200 ml   Net -960 ml       Physical Exam:    General Appearance: Sleeping but awakens easily.  Chronically ill.  Skin: warm and dry  HEENT: oral mucosa normal, nonicteric sclera  Neck: supple, no JVD  Lungs: Clear to auscultation bilaterally.  Heart: RRR, normal S1 and S2  Abdomen: soft, positive bowel sounds.  Ostomy with light brown stool.  Wound VAC midline  : no palpable bladder  Extremities: no edema.  Neuro: Speech a little slow.  Conversant but not oriented to situation.    Scheduled Meds:     chlorhexidine, 15 mL, Mouth/Throat, Q12H  Diclofenac Sodium, 4 g, Topical, 4x Daily  enoxaparin, 1 mg/kg, Subcutaneous, Q12H  folic acid, 1 mg, Oral, Daily  insulin lispro, 2-9 Units, Subcutaneous, 4x Daily AC & at Bedtime  lansoprazole, 30 mg, Oral, Q AM  Lidocaine, 1 patch, Transdermal, Q24H  Lidocaine, 2 patch, Transdermal, Q24H  metoprolol tartrate, 100 mg, Oral, Q12H  sodium chloride, 10 mL, Intravenous, Q12H  sodium hypochlorite, , Topical, BID  tamsulosin, 0.4 mg, Oral, Daily  thiamine, 100 mg, Oral, Daily  Urea, 15 g, Oral, BID      IV Meds:        Results Reviewed:   I have personally reviewed the results from the time of this admission to 2024  11:18 EST     Results from last 7 days   Lab Units 11/26/24  0552 11/25/24  0549 11/24/24  0337   SODIUM mmol/L 127* 126* 127*   POTASSIUM mmol/L 4.2 4.1 4.1   CHLORIDE mmol/L 96* 95* 93*   CO2 mmol/L 19.0* 19.1* 20.0*   BUN mg/dL 23 6* 7*   CREATININE mg/dL 0.47* 0.52* 0.54*   CALCIUM mg/dL 9.3 8.5* 8.8   BILIRUBIN mg/dL 0.5 0.6 0.6   ALK PHOS U/L 143* 145* 142*   ALT (SGPT) U/L 28 28 27   AST (SGOT) U/L 16 27 22   GLUCOSE mg/dL 106* 101* 103*       Estimated Creatinine Clearance: 140.1 mL/min (A) (by C-G formula based on SCr of 0.47 mg/dL (L)).    Results from last 7 days   Lab Units 11/25/24  0549   PHOSPHORUS mg/dL 3.7       Results from last 7 days   Lab Units 11/25/24  0549 11/24/24  0337   URIC ACID mg/dL 6.3 6.6       Results from last 7 days   Lab Units 11/26/24  0552 11/25/24  0549 11/24/24  0337 11/23/24  0507 11/22/24  0427   WBC 10*3/mm3 8.01 9.15 9.32 10.15 11.93*   HEMOGLOBIN g/dL 9.3* 9.4* 9.5* 9.7* 9.5*   PLATELETS 10*3/mm3 640* 641* 727* 759* 796*             Assessment / Plan     ASSESSMENT:  Hyponatremia.  Urine studies consistent with SIADH.  Now on urea.  Fluid restriction 1500 cc daily.  Sodium 127 today.  2.  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  Fecal peritonitis.  3.  Right renal mass concerning for renal cell carcinoma.  Urology plans to follow-up as an outpatient.  4.  Right lower extremity DVT.  Currently on Lovenox.  Plan for Eliquis on discharge.  5.  Anemia.  6.  Hypertension.  7.  Metabolic encephalopathy.  Improving.  8.  Thrombocytosis.    PLAN:  Continue urea.  P.o. fluid restriction 1500 cc daily.    Thank you for involving us in the care of Arsh Haynes.  Please feel free to call with any questions.    Alannah Rosales MD  11/26/24  11:18 [facility]    [facility]  [phone]    Please note that portions of this note were completed with a voice recognition program.

## 2024-11-26 NOTE — THERAPY TREATMENT NOTE
Patient Name: Arsh Haynes  : 1950    MRN: [id number]                              Today's Date: 2024       Admit Date: 10/26/2024    Visit Dx:     ICD-10-CM ICD-9-CM   1. Perforation of sigmoid colon  K63.1 569.83   2. Hypotension, unspecified hypotension type  I95.9 458.9   3. Increased lactic acid level  R79.89 276.2   4. Bowel perforation  K63.1 569.83     Patient Active Problem List   Diagnosis    Colon cancer    HTN (hypertension)    Thrombocytosis    Acute DVT (deep venous thrombosis)    Renal mass    Hyponatremia    Anemia    Peritonitis    Moderate protein-calorie malnutrition    Oropharyngeal dysphagia     Past Medical History:   Diagnosis Date    HTN (hypertension)     Hypercholesterolemia      Past Surgical History:   Procedure Laterality Date    COLON RESECTION N/A 10/26/2024    Procedure: LOW ANTERIOR COLON RESECTION SIGMOID, COLOSTOMY, SPLENIC FLEXURE MOBILIZATION;  Surgeon: Mc Guillaume MD;  Location: [facility];  Service: General;  Laterality: N/A;    EXPLORATORY LAPAROTOMY N/A 10/26/2024    Procedure: LAPAROTOMY EXPLORATORY, LEFT HEMICOLECTOMY;  Surgeon: Mc Guillaume MD;  Location: [facility];  Service: General;  Laterality: N/A;      General Information       Row Name 24 1505 24 0756       OT Time and Intention    Subjective Information complains of;weakness;fatigue  -BC --    Document Type therapy note (daily note)  -BC therapy note (daily note)  -    Mode of Treatment occupational therapy;individual therapy  -BC occupational therapy;individual therapy  -SM    Patient Effort fair  -BC fair  -SM      Row Name 24 1505 24 0756       General Information    Patient Profile Reviewed yes  -BC --    Existing Precautions/Restrictions fall  abd wound vac, colostomy  -BC fall  Abdominal wound vac, colostomy  -SM      Row Name 24 1505 24 0756       Cognition    Orientation Status (Cognition) oriented x 3  -BC oriented x 4   -      Row Name 11/26/24 1505 11/26/24 0756       Safety Issues/Impairments Affecting Functional Mobility    Safety Issues Affecting Function (Mobility) -- awareness of need for assistance;insight into deficits/self-awareness  -    Impairments Affecting Function (Mobility) strength;balance;endurance/activity tolerance;pain;postural/trunk control;range of motion (ROM);grasp  -BC strength;balance;endurance/activity tolerance;pain;postural/trunk control;range of motion (ROM);grasp  -    Cognitive Impairments, Mobility Safety/Performance problem-solving/reasoning;insight into deficits/self-awareness;judgment;sequencing abilities  -BC --              User Key  (r) = Recorded By, (t) = Taken By, (c) = Cosigned By      Initials Name Provider Type     Sarah Abernathy, MARU Occupational Therapist    BC Ricky Hernandez OT Occupational Therapist                     Mobility/ADL's       Row Name 11/26/24 1506 11/26/24 0757       Bed Mobility    Rolling Right [place] (Bed Mobility) moderate assist (50% patient effort)  -BC --    Scooting/Bridging [place] (Bed Mobility) maximum assist (25% patient effort)  -BC dependent (less than 25% patient effort);2 person assist  -    Supine-Sit [place] (Bed Mobility) moderate assist (50% patient effort);verbal cues  -BC moderate assist (50% patient effort);verbal cues  -    Sit-Supine [place] (Bed Mobility) maximum assist (25% patient effort);verbal cues  -BC maximum assist (25% patient effort);verbal cues  -    Bed Mobility, Safety Issues decreased use of arms for pushing/pulling  -BC --    Assistive Device (Bed Mobility) head of bed elevated;bed rails;repositioning sheet  -BC --    Comment, (Bed Mobility) Cues for techniques and rolling toward R side for increased participation w/ bed mobility, mod A with help at trunk. Max A for trunk guide and BLEs back to bed and trendelenburg with draw sheet, Pt was able to assist w/ bridge BLEs to scoot up and LUE to  pull up on bedrail w/ max A  -BC --      Row Name 11/26/24 0750          Bed Mobility    Scooting/Bridging [place] (Bed Mobility) --  -     Supine-Sit [place] (Bed Mobility) --  -     Sit-Supine [place] (Bed Mobility) --  -       Row Name 11/26/24 1506          Functional Mobility    Patient was able to Ambulate no, other medical factors prevent ambulation  -BC       Row Name 11/26/24 0757 11/26/24 0750       Grooming Assessment/Training    [place] Level (Grooming) oral care regimen;set up;supervision  - --  -    Position (Grooming) edge of bed sitting  - --  -              User Key  (r) = Recorded By, (t) = Taken By, (c) = Cosigned By      Initials Name Provider Type    Sarah Chanel OT Occupational Therapist    Ricky Stauffer OT Occupational Therapist                   Obj/Interventions       Row Name 11/26/24 1508 11/26/24 0751       Balance    Balance Assessment sitting static balance  -BC --    Static Sitting Balance supervision  -BC supervision  -    Dynamic Sitting Balance standby assist  -BC --    Position, Sitting Balance unsupported;sitting edge of bed  -BC sitting edge of bed  -    Balance Interventions sitting  -BC --    Comment, Balance Cues for trunk and c spine postural control with sitting.  -BC --              User Key  (r) = Recorded By, (t) = Taken By, (c) = Cosigned By      Initials Name Provider Type    Sarah Chanel OT Occupational Therapist    Ricky Stauffer OT Occupational Therapist                   Goals/Plan    No documentation.                  Clinical Impression       Row Name 11/26/24 1509          Pain Assessment    Pre/Posttreatment Pain Comment did not c/o pain during session just fatigue/weakness.  -BC       Row Name 11/26/24 1509          Plan of Care Review    Plan of Care Reviewed With patient  -BC     Progress no change  -BC     Outcome Evaluation Pt agreeable to OT services this PM with encouragement. When  provided options to work on standing vs chair transfer pt deffered and only agreeable to sitting up EOB. Once seated ~4 mins after scooting out c/o dizziness and worsening 'weakness' requiring lay back down, encouraged Pt to stay sitting up in hoeps to improve function to which pt declined. Pt verb'd he is too 'weak' to which OT educated Pt on importance of therapy participation and activity tolerance training. Pt appears very depressed and declined any/all ADls EOB/bed level today, notified RN. Music of pts interest played in hopes to increase mentation. Cont IP OT services w/ DC REC: PÉREZ when stable.  -BC       Row Name 11/26/24 1508          Therapy Assessment/Plan (OT)    Rehab Potential (OT) fair  -BC     Criteria for Skilled Therapeutic Interventions Met (OT) yes;meets criteria;skilled treatment is necessary  -BC       Row Name 11/26/24 1509          Therapy Plan Review/Discharge Plan (OT)    Anticipated Discharge Disposition (OT) skilled nursing facility  -BC       Row Name 11/26/24 1509          Vital Signs    Pre Patient Position Supine  -BC     Intra Patient Position Sitting  -BC     Post Patient Position Supine  -BC       Row Name 11/26/24 1503          Positioning and Restraints    Pre-Treatment Position in bed  -BC     Post Treatment Position bed  -BC     In Bed notified nsg;fowlers;call light within reach;encouraged to call for assist;exit alarm on  -BC               User Key  (r) = Recorded By, (t) = Taken By, (c) = Cosigned By      Initials Name Provider Type    Ricky Stauffer, OT Occupational Therapist                   Outcome Measures       Row Name 11/26/24 3769          How much help from another is currently needed...    Putting on and taking off regular lower body clothing? 1  -BC     Bathing (including washing, rinsing, and drying) 2  -BC     Toileting (which includes using toilet bed pan or urinal) 2  -BC     Putting on and taking off regular upper body clothing 2  -BC     Taking care of  personal grooming (such as brushing teeth) 3  -BC     Eating meals 3  -BC     AM-PAC 6 Clicks Score (OT) 13  -BC       Row Name 11/26/24 1400 11/26/24 0800       How much help from another person do you currently need...    Turning from your back to your side while in flat bed without using bedrails? 1  -SY 2  -SY    Moving from lying on back to sitting on the side of a flat bed without bedrails? 1  -SY 2  -SY    Moving to and from a bed to a chair (including a wheelchair)? 1  -SY 2  -SY    Standing up from a chair using your arms (e.g., wheelchair, bedside chair)? 1  -SY 2  -SY    Climbing 3-5 steps with a railing? 1  -SY 2  -SY    To walk in hospital room? 1  -SY 2  -SY    AM-PAC 6 Clicks Score (PT) 6  -SY 12  -SY    Highest Level of Mobility Goal 2 --> Bed activities/dependent transfer  -SY 4 --> Transfer to chair/commode  -SY      Row Name 11/26/24 1514          Functional Assessment    Outcome Measure Options AM-PAC 6 Clicks Daily Activity (OT)  -BC               User Key  (r) = Recorded By, (t) = Taken By, (c) = Cosigned By      Initials Name Provider Type    Sarah Correa, RN Registered Nurse    Ricky Stauffer OT Occupational Therapist                    Occupational Therapy Education       Title: PT OT SLP Therapies (Done)       Topic: Occupational Therapy (Done)       Point: ADL training (Done)       Description:   Instruct learner(s) on proper safety adaptation and remediation techniques during self care or transfers.   Instruct in proper use of assistive devices.                  Learning Progress Summary            Patient Acceptance, E, VU by JS at 11/19/2024 1806    Acceptance, E, VU by JS at 11/16/2024 1757    Acceptance, E, VU by JS at 11/15/2024 1824    Acceptance, E, VU by JS at 11/11/2024 1813    Acceptance, E, VU by JS at 11/8/2024 1820    Acceptance, E, VU,NR by MM at 11/2/2024 1328    Comment: role of OT, goals, d/c rec                      Point: Precautions (Done)       Description:    Instruct learner(s) on prescribed precautions during self-care and functional transfers.                  Learning Progress Summary            Patient Acceptance, E, VU by  at 11/19/2024 1806    Acceptance, E, VU by JS at 11/16/2024 1757    Acceptance, E, VU by JS at 11/15/2024 1824    Acceptance, E, VU by JS at 11/11/2024 1813    Acceptance, E, VU by JS at 11/8/2024 1820    Acceptance, E, VU,NR by MM at 11/2/2024 1328    Comment: role of OT, goals, d/c rec                      Point: Body mechanics (Done)       Description:   Instruct learner(s) on proper positioning and spine alignment during self-care, functional mobility activities and/or exercises.                  Learning Progress Summary            Patient Acceptance, E, VU by  at 11/19/2024 1806    Acceptance, E, VU by JS at 11/16/2024 1757    Acceptance, E, VU by JS at 11/15/2024 1824    Acceptance, E, VU by  at 11/11/2024 1813    Acceptance, E, VU by JS at 11/8/2024 1820    Acceptance, E, VU,NR by MM at 11/2/2024 1328    Comment: role of OT, goals, d/c rec                                      User Key       Initials Effective Dates Name Provider Type Discipline     02/26/24 -  Johnson Morales RN Registered Nurse Nurse     05/31/24 -  Yu Chapman OT Occupational Therapist OT                  OT Recommendation and Plan     Plan of Care Review  Plan of Care Reviewed With: patient  Progress: no change  Outcome Evaluation: Pt agreeable to OT services this PM with encouragement. When provided options to work on standing vs chair transfer pt deffered and only agreeable to sitting up EOB. Once seated ~4 mins after scooting out c/o dizziness and worsening 'weakness' requiring lay back down, encouraged Pt to stay sitting up in hoeps to improve function to which pt declined. Pt verb'd he is too 'weak' to which OT educated Pt on importance of therapy participation and activity tolerance training. Pt appears very depressed and declined any/all ADls  EOB/bed level today, notified RN. Music of pts interest played in hopes to increase mentation. Cont IP OT services w/ DC REC: PÉREZ when stable.     Time Calculation:         Time Calculation- OT       Row Name 11/26/24 1515             Time Calculation- OT    OT Start Time 1412  -BC      OT Stop Time 1430  -BC      OT Time Calculation (min) 18 min  -BC      Total Timed Code Minutes- OT 14 minute(s)  -BC      OT Received On 11/26/24  -BC      OT - Next Appointment 11/27/24  -BC         Timed Charges    57719 - OT Therapeutic Activity Minutes 14  -BC         Total Minutes    Timed Charges Total Minutes 14  -BC       Total Minutes 14  -BC                User Key  (r) = Recorded By, (t) = Taken By, (c) = Cosigned By      Initials Name Provider Type    BC Ricky Hernandez OT Occupational Therapist                  Therapy Charges for Today       Code Description Service Date Service Provider Modifiers Qty    [id number] HC OT THERAPEUTIC ACT EA 15 MIN 11/26/2024 Ricky Hernandez OT GO 1                 Ricky Hernandez OT  11/26/2024

## 2024-11-26 NOTE — PLAN OF CARE
Goal Outcome Evaluation:  Plan of Care Reviewed With: patient        Progress: no change  Outcome Evaluation: Pt agreeable to OT services this PM with encouragement. When provided options to work on standing vs chair transfer pt deffered and only agreeable to sitting up EOB. Once seated ~4 mins after scooting out c/o dizziness and worsening 'weakness' requiring lay back down, encouraged Pt to stay sitting up in hoeps to improve function to which pt declined. Pt verb'd he is too 'weak' to which OT educated Pt on importance of therapy participation and activity tolerance training. Pt appears very depressed and declined any/all ADls EOB/bed level today, notified RN. Music of pts interest played in hopes to increase mentation. Cont IP OT services w/ DC REC: PÉREZ when stable.    Anticipated Discharge Disposition (OT): skilled nursing facility

## 2024-11-26 NOTE — PLAN OF CARE
Goal Outcome Evaluation:      Pt is A&Ox4 today; complains of right shoulder pain;VSS; medicated per orders; Q2 turned in bed; medicated per orders;; plan of care on going.

## 2024-11-26 NOTE — PLAN OF CARE
Goal Outcome Evaluation:  Plan of Care Reviewed With: patient      Alert with some confusion at times.Patient resting in bed.Medicated R shoulder pain.VSS.Room air.Wound vac intact.MRI of brain competed.Turned in bed.

## 2024-11-26 NOTE — THERAPY TREATMENT NOTE
Patient Name: Arsh Haynes  : 1950    MRN: [id number]                              Today's Date: 2024       Admit Date: 10/26/2024    Visit Dx:     ICD-10-CM ICD-9-CM   1. Perforation of sigmoid colon  K63.1 569.83   2. Hypotension, unspecified hypotension type  I95.9 458.9   3. Increased lactic acid level  R79.89 276.2   4. Bowel perforation  K63.1 569.83     Patient Active Problem List   Diagnosis    Colon cancer    HTN (hypertension)    Thrombocytosis    Acute DVT (deep venous thrombosis)    Renal mass    Hyponatremia    Anemia    Peritonitis    Moderate protein-calorie malnutrition    Oropharyngeal dysphagia     Past Medical History:   Diagnosis Date    HTN (hypertension)     Hypercholesterolemia      Past Surgical History:   Procedure Laterality Date    COLON RESECTION N/A 10/26/2024    Procedure: LOW ANTERIOR COLON RESECTION SIGMOID, COLOSTOMY, SPLENIC FLEXURE MOBILIZATION;  Surgeon: Mc Guillaume MD;  Location: [facility];  Service: General;  Laterality: N/A;    EXPLORATORY LAPAROTOMY N/A 10/26/2024    Procedure: LAPAROTOMY EXPLORATORY, LEFT HEMICOLECTOMY;  Surgeon: Mc Guillaume MD;  Location: [facility];  Service: General;  Laterality: N/A;      General Information       Row Name 24 0756          OT Time and Intention    Document Type therapy note (daily note)  -SM     Mode of Treatment occupational therapy;individual therapy  -SM     Patient Effort fair  -       Row Name 24 0756          General Information    Existing Precautions/Restrictions fall  Abdominal wound vac, colostomy  -SM       Row Name 24 0756          Cognition    Orientation Status (Cognition) oriented x 4  -SM       Row Name 24 0756          Safety Issues/Impairments Affecting Functional Mobility    Safety Issues Affecting Function (Mobility) awareness of need for assistance;insight into deficits/self-awareness  -SM     Impairments Affecting Function (Mobility)  strength;balance;endurance/activity tolerance;pain;postural/trunk control;range of motion (ROM);grasp  -SM               User Key  (r) = Recorded By, (t) = Taken By, (c) = Cosigned By      Initials Name Provider Type    Sarah Chanel OT Occupational Therapist                     Mobility/ADL's       Row Name 11/26/24 0757 11/26/24 0750       Bed Mobility    Scooting/Bridging Boyd (Bed Mobility) dependent (less than 25% patient effort);2 person assist  -SM --  -SM    Supine-Sit Boyd (Bed Mobility) moderate assist (50% patient effort);verbal cues  -SM --  -SM    Sit-Supine Boyd (Bed Mobility) maximum assist (25% patient effort);verbal cues  -SM --  -SM      Row Name 11/26/24 0757 11/26/24 0750       Grooming Assessment/Training    Boyd Level (Grooming) oral care regimen;set up;supervision  -SM --  -SM    Position (Grooming) edge of bed sitting  -SM --  -SM              User Key  (r) = Recorded By, (t) = Taken By, (c) = Cosigned By      Initials Name Provider Type    Sarah Chanel OT Occupational Therapist                   Obj/Interventions       Row Name 11/26/24 0751          Balance    Static Sitting Balance supervision  -SM     Position, Sitting Balance sitting edge of bed  -SM               User Key  (r) = Recorded By, (t) = Taken By, (c) = Cosigned By      Initials Name Provider Type    Sarah Chanel OT Occupational Therapist                   Goals/Plan    No documentation.                  Clinical Impression    No documentation.                  Outcome Measures    No documentation.                   Occupational Therapy Education       Title: PT OT SLP Therapies (Done)       Topic: Occupational Therapy (Done)       Point: ADL training (Done)       Description:   Instruct learner(s) on proper safety adaptation and remediation techniques during self care or transfers.   Instruct in proper use of assistive devices.                  Learning Progress  Summary            Patient Acceptance, E, VU by JS at 11/19/2024 1806    Acceptance, E, VU by JS at 11/16/2024 1757    Acceptance, E, VU by JS at 11/15/2024 1824    Acceptance, E, VU by JS at 11/11/2024 1813    Acceptance, E, VU by JS at 11/8/2024 1820    Acceptance, E, VU,NR by MM at 11/2/2024 1328    Comment: role of OT, goals, d/c rec                      Point: Precautions (Done)       Description:   Instruct learner(s) on prescribed precautions during self-care and functional transfers.                  Learning Progress Summary            Patient Acceptance, E, VU by  at 11/19/2024 1806    Acceptance, E, VU by  at 11/16/2024 1757    Acceptance, E, VU by  at 11/15/2024 1824    Acceptance, E, VU by JS at 11/11/2024 1813    Acceptance, E, VU by JS at 11/8/2024 1820    Acceptance, E, VU,NR by MM at 11/2/2024 1328    Comment: role of OT, goals, d/c rec                      Point: Body mechanics (Done)       Description:   Instruct learner(s) on proper positioning and spine alignment during self-care, functional mobility activities and/or exercises.                  Learning Progress Summary            Patient Acceptance, E, VU by  at 11/19/2024 1806    Acceptance, E, VU by  at 11/16/2024 1757    Acceptance, E, VU by  at 11/15/2024 1824    Acceptance, E, VU by  at 11/11/2024 1813    Acceptance, E, VU by  at 11/8/2024 1820    Acceptance, E, VU,NR by MM at 11/2/2024 1328    Comment: role of OT, goals, d/c rec                                      User Key       Initials Effective Dates Name Provider Type Discipline     02/26/24 -  Johnson Morales RN Registered Nurse Nurse     05/31/24 -  Yu Chapman OT Occupational Therapist OT                  OT Recommendation and Plan  Planned Therapy Interventions (OT): activity tolerance training, adaptive equipment training, BADL retraining, functional balance retraining, occupation/activity based interventions, patient/caregiver education/training,  transfer/mobility retraining, strengthening exercise, ROM/therapeutic exercise  Therapy Frequency (OT): 5 times/wk  Plan of Care Review  Plan of Care Reviewed With: patient  Progress: no change  Outcome Evaluation: Pt is agreeable to sitting EOB only 2 minutes today to brush teeth. He quickly requests to return back to supine with all lights off. Poor activity tolerance at this time. He declines ther ex or standing/transfers today.     Time Calculation:          Therapy Charges for Today       Code Description Service Date Service Provider Modifiers Qty    12506722932 HC OT SELF CARE/MGMT/TRAIN EA 15 MIN 11/25/2024 Sarah Abernathy OT GO 1                 Sarah Abernathy OT  11/26/2024

## 2024-11-26 NOTE — SIGNIFICANT NOTE
11/26/24 1159   OTHER   Discipline physical therapist   Rehab Time/Intention   Session Not Performed patient/family declined treatment;other (see comments)  (Pt declined PT session this AM, in bed upon arrival, did not open eyes but verbally communicating with PT. PT likely will have to follow up next service date)   Recommendation   PT - Next Appointment 11/27/24

## 2024-11-27 LAB
ALBUMIN SERPL-MCNC: 2.1 G/DL (ref 3.5–5.2)
ALBUMIN/GLOB SERPL: 0.4 G/DL
ALP SERPL-CCNC: 142 U/L (ref 39–117)
ALT SERPL W P-5'-P-CCNC: 24 U/L (ref 1–41)
ANION GAP SERPL CALCULATED.3IONS-SCNC: 15.3 MMOL/L (ref 5–15)
AST SERPL-CCNC: 18 U/L (ref 1–40)
BASOPHILS # BLD AUTO: 0.07 10*3/MM3 (ref 0–0.2)
BASOPHILS NFR BLD AUTO: 0.9 % (ref 0–1.5)
BILIRUB SERPL-MCNC: 0.5 MG/DL (ref 0–1.2)
BUN SERPL-MCNC: 27 MG/DL (ref 8–23)
BUN/CREAT SERPL: 54 (ref 7–25)
CALCIUM SPEC-SCNC: 9.5 MG/DL (ref 8.6–10.5)
CHLORIDE SERPL-SCNC: 96 MMOL/L (ref 98–107)
CO2 SERPL-SCNC: 20.7 MMOL/L (ref 22–29)
CREAT SERPL-MCNC: 0.5 MG/DL (ref 0.76–1.27)
DEPRECATED RDW RBC AUTO: 50.4 FL (ref 37–54)
EGFRCR SERPLBLD CKD-EPI 2021: 108.4 ML/MIN/1.73
EOSINOPHIL # BLD AUTO: 0.6 10*3/MM3 (ref 0–0.4)
EOSINOPHIL NFR BLD AUTO: 8 % (ref 0.3–6.2)
ERYTHROCYTE [DISTWIDTH] IN BLOOD BY AUTOMATED COUNT: 16.8 % (ref 12.3–15.4)
GLOBULIN UR ELPH-MCNC: 5.2 GM/DL
GLUCOSE BLDC GLUCOMTR-MCNC: 110 MG/DL (ref 70–130)
GLUCOSE BLDC GLUCOMTR-MCNC: 116 MG/DL (ref 70–130)
GLUCOSE BLDC GLUCOMTR-MCNC: 126 MG/DL (ref 70–130)
GLUCOSE BLDC GLUCOMTR-MCNC: 132 MG/DL (ref 70–130)
GLUCOSE SERPL-MCNC: 115 MG/DL (ref 65–99)
HCT VFR BLD AUTO: 30.4 % (ref 37.5–51)
HGB BLD-MCNC: 9.8 G/DL (ref 13–17.7)
IMM GRANULOCYTES # BLD AUTO: 0.05 10*3/MM3 (ref 0–0.05)
IMM GRANULOCYTES NFR BLD AUTO: 0.7 % (ref 0–0.5)
LYMPHOCYTES # BLD AUTO: 1.25 10*3/MM3 (ref 0.7–3.1)
LYMPHOCYTES NFR BLD AUTO: 16.8 % (ref 19.6–45.3)
MAGNESIUM SERPL-MCNC: 1.9 MG/DL (ref 1.6–2.4)
MCH RBC QN AUTO: 26.9 PG (ref 26.6–33)
MCHC RBC AUTO-ENTMCNC: 32.2 G/DL (ref 31.5–35.7)
MCV RBC AUTO: 83.5 FL (ref 79–97)
MONOCYTES # BLD AUTO: 0.9 10*3/MM3 (ref 0.1–0.9)
MONOCYTES NFR BLD AUTO: 12.1 % (ref 5–12)
NEUTROPHILS NFR BLD AUTO: 4.59 10*3/MM3 (ref 1.7–7)
NEUTROPHILS NFR BLD AUTO: 61.5 % (ref 42.7–76)
NRBC BLD AUTO-RTO: 0 /100 WBC (ref 0–0.2)
PHOSPHATE SERPL-MCNC: 4.5 MG/DL (ref 2.5–4.5)
PLATELET # BLD AUTO: 640 10*3/MM3 (ref 140–450)
PMV BLD AUTO: 8 FL (ref 6–12)
POTASSIUM SERPL-SCNC: 4.1 MMOL/L (ref 3.5–5.2)
PROT SERPL-MCNC: 7.3 G/DL (ref 6–8.5)
RBC # BLD AUTO: 3.64 10*6/MM3 (ref 4.14–5.8)
SODIUM SERPL-SCNC: 132 MMOL/L (ref 136–145)
WBC NRBC COR # BLD AUTO: 7.46 10*3/MM3 (ref 3.4–10.8)

## 2024-11-27 PROCEDURE — 25010000002 ENOXAPARIN PER 10 MG: Performed by: INTERNAL MEDICINE

## 2024-11-27 PROCEDURE — 85025 COMPLETE CBC W/AUTO DIFF WBC: CPT | Performed by: INTERNAL MEDICINE

## 2024-11-27 PROCEDURE — 92526 ORAL FUNCTION THERAPY: CPT

## 2024-11-27 PROCEDURE — 80053 COMPREHEN METABOLIC PANEL: CPT | Performed by: INTERNAL MEDICINE

## 2024-11-27 PROCEDURE — 84100 ASSAY OF PHOSPHORUS: CPT | Performed by: INTERNAL MEDICINE

## 2024-11-27 PROCEDURE — 97530 THERAPEUTIC ACTIVITIES: CPT

## 2024-11-27 PROCEDURE — 82948 REAGENT STRIP/BLOOD GLUCOSE: CPT

## 2024-11-27 PROCEDURE — 83735 ASSAY OF MAGNESIUM: CPT | Performed by: INTERNAL MEDICINE

## 2024-11-27 PROCEDURE — 99024 POSTOP FOLLOW-UP VISIT: CPT | Performed by: SURGERY

## 2024-11-27 RX ADMIN — LANSOPRAZOLE 30 MG: 15 TABLET, ORALLY DISINTEGRATING ORAL at 06:23

## 2024-11-27 RX ADMIN — ENOXAPARIN SODIUM 90 MG: 100 INJECTION SUBCUTANEOUS at 06:22

## 2024-11-27 RX ADMIN — THIAMINE HCL TAB 100 MG 100 MG: 100 TAB at 09:01

## 2024-11-27 RX ADMIN — Medication 15 G: at 09:02

## 2024-11-27 RX ADMIN — FOLIC ACID 1 MG: 1 TABLET ORAL at 09:01

## 2024-11-27 RX ADMIN — DICLOFENAC SODIUM 4 G: 10 GEL TOPICAL at 17:22

## 2024-11-27 RX ADMIN — DICLOFENAC SODIUM 4 G: 10 GEL TOPICAL at 09:02

## 2024-11-27 RX ADMIN — DICLOFENAC SODIUM 4 G: 10 GEL TOPICAL at 21:19

## 2024-11-27 RX ADMIN — ENOXAPARIN SODIUM 90 MG: 100 INJECTION SUBCUTANEOUS at 17:21

## 2024-11-27 RX ADMIN — Medication 10 ML: at 09:02

## 2024-11-27 RX ADMIN — ACETAMINOPHEN 325MG 650 MG: 325 TABLET ORAL at 06:22

## 2024-11-27 RX ADMIN — METOPROLOL TARTRATE 100 MG: 50 TABLET, FILM COATED ORAL at 21:18

## 2024-11-27 RX ADMIN — TAMSULOSIN HYDROCHLORIDE 0.4 MG: 0.4 CAPSULE ORAL at 09:01

## 2024-11-27 RX ADMIN — Medication 15 G: at 21:19

## 2024-11-27 RX ADMIN — DICLOFENAC SODIUM 4 G: 10 GEL TOPICAL at 12:07

## 2024-11-27 RX ADMIN — ACETAMINOPHEN 325MG 650 MG: 325 TABLET ORAL at 10:55

## 2024-11-27 NOTE — PLAN OF CARE
Goal Outcome Evaluation:  Plan of Care Reviewed With: patient      Patient alert and forgetful at times.Medicated R shoulder pain.VSS.Placed on 0.5L while asleep.Patient refusing turns.Wound vac.Colostomy.Education provided.Awaiting placement.

## 2024-11-27 NOTE — CASE MANAGEMENT/SOCIAL WORK
Continued Stay Note  [facility]     Patient Name: Arsh Haynes  MRN: [id number]  Today's Date: 11/27/2024    Admit Date: 10/26/2024    Plan: Plan [facility]- pre cert initiated.  BENITA Clay RN   Discharge Plan       Row Name 11/27/24 8329       Plan    Plan Plan [facility]- pre cert initiated.  BENITA Clay RN    Patient/Family in Agreement with Plan yes    Plan Comments Per pt can be accepted at [facility].  [facility]  ( [phone]) is following. Pt worked with PT today.  Pre cert information sent to  [facility] to start pre cert.  Plan [facility] pre cert initiated.   BENITA Clay RN      Row Name 11/27/24 7296       Plan    Plan Plan [facility] for skilled care- pending pre cert.  BENITA Clay RN    Patient/Family in Agreement with Plan yes    Plan Comments Pt's wound vac discontinued.  Called Valentina ( [phone]) and requested she re-evaluate pt for [facility].  Per Valentina  ( [phone]) [facility] can accept pt.  CCP will initiate pre cert once updated PT note is entered. PT aware notes are needed.  Called pt's son ( Maynor [phone]) and informed him [facility] can accept pt pending pre cert.  Plan [facility] for skilled care- pending pre cert. BENITA Clay RN                   Discharge Codes    No documentation.                 Expected Discharge Date and Time       Expected Discharge Date Expected Discharge Time    Dec 2, 2024               Felecia Clay RN

## 2024-11-27 NOTE — NURSING NOTE
"WOCN: Ostomy appliance changed, can be changed Monday. Supplies at bedside.     11/27/24 0816   Wound 10/26/24 2301 midline abdomen   Placement Date/Time: 10/26/24 2301   Orientation: midline  Location: abdomen  Primary Wound Type: Incision   Dressing Appearance dry;intact   Base clean;moist;granulating;red   Periwound intact;dry   Edges open   Wound Length (cm) 7 cm   Wound Width (cm) 1 cm   Wound Depth (cm) 0.4 cm   Wound Surface Area (cm^2) 7 cm^2   Wound Volume (cm^3) 2.8 cm^3   Drainage Characteristics/Odor serosanguineous   Drainage Amount scant   Care, Wound cleansed with;sterile normal saline   Dressing Care dressing applied;dressing changed  (impregnanted silvasorb gauze, cover with gauze)   Colostomy LLQ   Placement date: If unknown, DO NOT use \"Add Comment\" note: 10/26/24   Inserted by: Dr Guillaume  Location: LLQ   Stomal Appliance 2 piece;Clean;Dry;Intact;Changed;Drainable   Stoma Appearance moist;red  (oval)   Peristomal Assessment Clean;Intact   Accessories/Skin Care cleansed with water;skin barrier ring   Stoma Function stool;flatus   Stool Color brown, light   Stool Consistency loose   Treatment Bag change;Site care   Output (mL) 100 mL     Wound is decreasing in size, Will discontinue VAC and begin impregnated silvasorb gel dressing,  Daily . Applied 2 2x2 gauze covered with 4x4 gauze and secure with tape.   "

## 2024-11-27 NOTE — CASE MANAGEMENT/SOCIAL WORK
Continued Stay Note  [facility]     Patient Name: Arsh Haynes  MRN: [id number]  Today's Date: 11/27/2024    Admit Date: 10/26/2024    Plan: Plan [facility] for skilled care- pending pre cert.  BENITA Clay RN   Discharge Plan       Row Name 11/27/24 1028       Plan    Plan Plan [facility] for skilled care- pending pre cert.  BENITA Clay RN    Patient/Family in Agreement with Plan yes    Plan Comments Pt's wound vac discontinued.  Called Valentina ( [phone]) and requested she re-evaluate pt for [facility].  Per Valentina  ( [phone]) [facility] can accept pt.  CCP will initiate pre cert once updated PT note is entered. PT aware notes are needed.  Called pt's son ( Maynor [phone]) and informed him [facility] can accept pt pending pre cert.  Plan [facility] for skilled care- pending pre cert. BENITA Clay RN                   Discharge Codes    No documentation.                 Expected Discharge Date and Time       Expected Discharge Date Expected Discharge Time    Nov 27, 2024               Felecia Clay, RN

## 2024-11-27 NOTE — THERAPY TREATMENT NOTE
Acute Care - Speech Language Pathology   Swallow Treatment Note [facility]     Patient Name: Arsh Haynes  : 1950  MRN: [id number]  Today's Date: 2024               Admit Date: 10/26/2024    Visit Dx:     ICD-10-CM ICD-9-CM   1. Perforation of sigmoid colon  K63.1 569.83   2. Hypotension, unspecified hypotension type  I95.9 458.9   3. Increased lactic acid level  R79.89 276.2   4. Bowel perforation  K63.1 569.83     Patient Active Problem List   Diagnosis    Colon cancer    HTN (hypertension)    Thrombocytosis    Acute DVT (deep venous thrombosis)    Renal mass    Hyponatremia    Anemia    Peritonitis    Moderate protein-calorie malnutrition    Oropharyngeal dysphagia     Past Medical History:   Diagnosis Date    HTN (hypertension)     Hypercholesterolemia      Past Surgical History:   Procedure Laterality Date    COLON RESECTION N/A 10/26/2024    Procedure: LOW ANTERIOR COLON RESECTION SIGMOID, COLOSTOMY, SPLENIC FLEXURE MOBILIZATION;  Surgeon: Mc Guillaume MD;  Location: [facility];  Service: General;  Laterality: N/A;    EXPLORATORY LAPAROTOMY N/A 10/26/2024    Procedure: LAPAROTOMY EXPLORATORY, LEFT HEMICOLECTOMY;  Surgeon: Mc Guillaume MD;  Location: [facility];  Service: General;  Laterality: N/A;       SLP Recommendation and Plan                                                                               Outcome Evaluation: Pt seen for meal f/u at lunch time and dysphagia therapy. Overall, fair effort and limited tx session. Eventually pt asked to end session d/t feeling lightheaded. Pt appears to be tolerating modified diet. Reviewed water protocol: pt may have ie chips and small sips of water in between meals (30 minutes after eating) and after oral care. SLP to follow for exercises, suspect pt would improve from more tx sessions prior to completed repeat VFSS. Pt and RN v/u of recs.      SWALLOW EVALUATION (Last 72 Hours)       SLP Adult Swallow  "Evaluation       Row Name 11/27/24 1151                   Rehab Evaluation    Document Type therapy note (daily note)  -HF        Subjective Information no complaints  -HF        Patient Observations alert  -HF        Patient Effort fair  -HF        Symptoms Noted During/After Treatment none  -HF           General Information    Patient Profile Reviewed yes  -HF        Pertinent History Of Current Problem VFSS completed 11/18: \"Patient presents with mild-moderate oropharyngeal dysphagia characterized by decreased coordination and generalized pharyngeal weakness. \" Silent aspiration of thin liquid wash noted. SLP recommended mechanical ground, nectar thick liquids, no straws, and no mixed consistencies.  -HF                  User Key  (r) = Recorded By, (t) = Taken By, (c) = Cosigned By      Initials Name Effective Dates    HF Dina Chamorro, YESICA 08/01/23 -                     EDUCATION  The patient has been educated in the following areas:   Dysphagia (Swallowing Impairment) Oral Care/Hydration Modified Diet Instruction.        SLP GOALS       Row Name 11/27/24 1100       (LTG) Swallow    (LTG) Swallow Tolerate least restrictive diet with no overt s/s aspiration.  -HF    [place] (Swallow Long Term Goal) independently (over 90% accuracy)  -HF    Time Frame (Swallow Long Term Goal) by discharge  -HF    Progress/Outcomes (Swallow Long Term Goal) goal ongoing  -HF    Comment (Swallow Long Term Goal) Pt seen for diet tolerance and dysphagia therapy. Lunch tray at bedside in which pt politely declined food on it. Pt did accepted x3 trials of mech soft and x4 trials NTL. Adequate mastication, no oral residue. x1 delayed throat clear with NTL. Swallow appears effortful. Pt reports swallow solids is still difficult for him to initiate. Recommend continue current diet with precautions. Discussed water protocol with RN, pt may have ice chips and small sips of water in between meals and after oral care. RN and pt v/u.  -HF "       (STG) Pharyngeal Strengthening Exercise Goal 1 (SLP)    Activity (Pharyngeal Strengthening Goal 1, SLP) increase superior movement of the hyolaryngeal complex;increase anterior movement of the hyolaryngeal complex;increase epiglottic inversion and retroflexion;increase squeeze/positive pressure generation;increase tongue base retraction  -HF    Increase Squeeze/Positive Pressure Generation effortful pitch glide (falsetto + pharyngeal squeeze)  -HF    Increase Tongue Base Retraction medina  -HF    [place]/Accuracy (Pharyngeal Strengthening Goal 1, SLP) with minimal cues (75-90% accuracy)  -HF    Time Frame (Pharyngeal Strengthening Goal 1, SLP) by discharge  -HF    Progress/Outcomes (Pharyngeal Strengthening Goal 1, SLP) continuing progress toward goal  -HF    Comment (Pharyngeal Strengthening Goal 1, SLP) Pt unable to recall dysphagia exercises. SLP reviewed exercises with pt. Pt completed the following exercises:  -[place] completed reluctantly, x5 reps, x1 set, good accuracy, no cues.  -Effortful swallows, x6 swallows, x1 set, fair-good effort and accuracy, min verbal cues.    No further exercises completed this date as pt reports he is lightheaded and wants to end tx session.    -HF              User Key  (r) = Recorded By, (t) = Taken By, (c) = Cosigned By      Initials Name Provider Type    Dina Cuba SLP Speech and Language Pathologist                         Time Calculation:    Time Calculation- SLP       Row Name 11/27/24 1159             Time Calculation- SLP    SLP Start Time 1115  -HF      SLP Received On 11/27/24  -HF                User Key  (r) = Recorded By, (t) = Taken By, (c) = Cosigned By      Initials Name Provider Type    Dina Cuba SLP Speech and Language Pathologist                    Therapy Charges for Today       Code Description Service Date Service Provider Modifiers Qty    [id number]  ST TREATMENT SWALLOW 3 11/27/2024 Dina Chamorro SLP GN 1                  Dina Chamorro, YESICA  11/27/2024

## 2024-11-27 NOTE — PLAN OF CARE
Goal Outcome Evaluation:      Pt is A&Ox4; Q2 turned in bed; pt worked with PT today; Wound Vac removed and new orders for daily dressing changes; complains of right shoulder pain; medicated per orders; plan of care on going.

## 2024-11-27 NOTE — PROGRESS NOTES
Status post sigmoid colectomy with colostomy on 10/26/2024    Patient continues to be weak.  Denies any complaint, tolerating diet.  No much p.o. intake  Wound VAC was discontinued and he started wet-to-dry dressing changes    Abdomen soft and nontender,  Midline incision the measured approximately 12 cm.  There is granulation tissue at the base.  Small area of fibrinous exudate.  Ostomy pink and viable with stool    Assessment and plan    Status post sigmoid colectomy with colostomy.  His main issue is weakness and fatigue.  He is accepted for rehab.  Continue wet-to-dry dressing changes daily as per wound care recommendations    Patient to follow-up in my office whenever out of rehab  He will need to have colonoscopy to evaluate the reminder of the colon    Mc Guillaume MD, FACS  General, Minimally Invasive and Endoscopic Surgery  [facility]    [address] [place] [address], [address]  [place], [place], [address]  P: [phone]  F: [phone]

## 2024-11-27 NOTE — THERAPY TREATMENT NOTE
Patient Name: Arsh Haynes  : 1950    MRN: [id number]                              Today's Date: 2024       Admit Date: 10/26/2024    Visit Dx:     ICD-10-CM ICD-9-CM   1. Perforation of sigmoid colon  K63.1 569.83   2. Hypotension, unspecified hypotension type  I95.9 458.9   3. Increased lactic acid level  R79.89 276.2   4. Bowel perforation  K63.1 569.83     Patient Active Problem List   Diagnosis    Colon cancer    HTN (hypertension)    Thrombocytosis    Acute DVT (deep venous thrombosis)    Renal mass    Hyponatremia    Anemia    Peritonitis    Moderate protein-calorie malnutrition    Oropharyngeal dysphagia     Past Medical History:   Diagnosis Date    HTN (hypertension)     Hypercholesterolemia      Past Surgical History:   Procedure Laterality Date    COLON RESECTION N/A 10/26/2024    Procedure: LOW ANTERIOR COLON RESECTION SIGMOID, COLOSTOMY, SPLENIC FLEXURE MOBILIZATION;  Surgeon: Mc Guillaume MD;  Location: [facility];  Service: General;  Laterality: N/A;    EXPLORATORY LAPAROTOMY N/A 10/26/2024    Procedure: LAPAROTOMY EXPLORATORY, LEFT HEMICOLECTOMY;  Surgeon: Mc Guillaume MD;  Location: [facility];  Service: General;  Laterality: N/A;      General Information       Row Name 24 1123          Physical Therapy Time and Intention    Document Type therapy note (daily note)  -MG     Mode of Treatment individual therapy;physical therapy  -MG       Row Name 24 1123          General Information    Patient Profile Reviewed yes  -MG     Existing Precautions/Restrictions fall  colostomy  -MG     Barriers to Rehab medically complex  -MG       Row Name 24 1123          Cognition    Orientation Status (Cognition) oriented x 4  -MG       Row Name 24 1123          Safety Issues/Impairments Affecting Functional Mobility    Safety Issues Affecting Function (Mobility) insight into deficits/self-awareness;awareness of need for assistance;safety  precaution awareness;sequencing abilities;judgment  -MG     Impairments Affecting Function (Mobility) strength;balance;endurance/activity tolerance;pain;postural/trunk control;range of motion (ROM);grasp  -MG     Comment, Safety Issues/Impairments (Mobility) Gait belt and non-skid socks donned.  -MG               User Key  (r) = Recorded By, (t) = Taken By, (c) = Cosigned By      Initials Name Provider Type    MG Liv Cruz, PT Physical Therapist                   Mobility       Row Name 11/27/24 1124          Bed Mobility    Rolling Left [place] (Bed Mobility) moderate assist (50% patient effort);verbal cues  -MG     Supine-Sit [place] (Bed Mobility) moderate assist (50% patient effort);verbal cues  -MG     Sit-Supine [place] (Bed Mobility) contact guard;verbal cues  -MG     Assistive Device (Bed Mobility) head of bed elevated;bed rails  -MG     Comment, (Bed Mobility) L log roll to sitting EOB. Assist w/ RUE d/t limited shoulder mvmt. Pt attempting to return to supine x2 during session w/ Lory to tsf back into sitting from down on L elbow. Pt then declining additional therapy and returned himself to supine w/ CGA at his trunk to guide and brought his LEs in himself. Pt able to bridge and scoot up in bed x4 w/ increased time and cues.  -MG       Row Name 11/27/24 1124          Transfers    Comment, (Transfers) Declined d/t fatigue, dizziness and nausea despite encouragement and education.  -MG               User Key  (r) = Recorded By, (t) = Taken By, (c) = Cosigned By      Initials Name Provider Type    MG Liv Cruz, PT Physical Therapist                   Obj/Interventions       Row Name 11/27/24 1125          Motor Skills    Therapeutic Exercise other (see comments)  AP, LAQ x5  -MG       Row Name 11/27/24 1125          Balance    Static Sitting Balance supervision  -MG     Dynamic Sitting Balance standby assist  -MG     Position, Sitting Balance sitting edge of bed;unsupported  -MG      Comment, Balance Tolerated sitting EOB approx 10 min w/ cues for upright posture. Dizziness upon sitting EOB that remained the same throughout.  -MG               User Key  (r) = Recorded By, (t) = Taken By, (c) = Cosigned By      Initials Name Provider Type    MG Liv Cruz, PT Physical Therapist                   Goals/Plan    No documentation.                  Clinical Impression       Row Name 11/27/24 1126          Pain    Pretreatment Pain Rating 2/10  -MG     Posttreatment Pain Rating 2/10  -MG     Pain Location knee;shoulder;abdomen  -MG     Pain Side/Orientation right  -MG     Pain Management Interventions nursing notified;positioning techniques utilized;exercise or physical activity utilized  -MG     Response to Pain Interventions activity participation with tolerable pain  -MG       Row Name 11/27/24 1126          Plan of Care Review    Plan of Care Reviewed With patient  -MG     Progress improving  -MG     Outcome Evaluation Pt seen for PT tx this AM and tolerated the session fairly. Per RN pt had abdominal wound vac removed earlier this AM. Today, pt was ModA for R log roll to sitting EOB where he tolerated sitting approx 10 minutes w/ SBA/SV for sitting balance. While EOB pt perform LE ther-ex, posture correction and down onto L elbow then up into sitting x2 with Lory. Pt had c/o dizziness and nausea upon sitting EOB, declining standing or tsf to the chair. Pt attempting to return to supine x2 requiring Lory to tsf back to sitting EOB, provided education and encouargement, but cont to decline. Pt was returned to supine with CGA and performed 4 bridges to scoot up in bed w/ SBA and cues. Pt placed into chair position in the bed, encouarged to cont AP and LAQ while in this position and to attempt chair tsf for dinner; he v/u. Discussed expectations of SNF with pt stating he believes he can tolerate an hour of therapy a day. SBAR w/ RN/CCP. PT will cont to follow and rec rehab at [place].  -MG       Row  Name 11/27/24 1126          Therapy Assessment/Plan (PT)    Rehab Potential (PT) fair  -MG     Criteria for Skilled Interventions Met (PT) yes  -MG     Therapy Frequency (PT) 5 times/wk  -MG       Row Name 11/27/24 1126          Vital Signs    Pretreatment Heart Rate (beats/min) 96  -MG     Pre SpO2 (%) 99  -MG     O2 Delivery Pre Treatment room air  -MG     O2 Delivery Intra Treatment room air  -MG     O2 Delivery Post Treatment room air  -MG     Pre Patient Position Supine  -MG     Intra Patient Position Sitting  -MG     Post Patient Position Supine  -MG       Row Name 11/27/24 1126          Positioning and Restraints    Pre-Treatment Position in bed  -MG     Post Treatment Position bed  -MG     In Bed notified nsg;supine;fowlers;call light within reach;encouraged to call for assist;exit alarm on;side rails up x3  Chair position  -MG               User Key  (r) = Recorded By, (t) = Taken By, (c) = Cosigned By      Initials Name Provider Type    Liv Sandoval PT Physical Therapist                   Outcome Measures       Row Name 11/27/24 1134          How much help from another person do you currently need...    Turning from your back to your side while in flat bed without using bedrails? 3  -MG     Moving from lying on back to sitting on the side of a flat bed without bedrails? 2  -MG     Moving to and from a bed to a chair (including a wheelchair)? 2  -MG     Standing up from a chair using your arms (e.g., wheelchair, bedside chair)? 2  -MG     Climbing 3-5 steps with a railing? 1  -MG     To walk in hospital room? 1  -MG     AM-PAC 6 Clicks Score (PT) 11  -MG     Highest Level of Mobility Goal 4 --> Transfer to chair/commode  -MG               User Key  (r) = Recorded By, (t) = Taken By, (c) = Cosigned By      Initials Name Provider Type    Liv Sandoval PT Physical Therapist                                 Physical Therapy Education       Title: PT OT SLP Therapies (Done)       Topic: Physical  Therapy (Done)       Point: Mobility training (Done)       Learning Progress Summary            Patient Acceptance, E,TB,D, VU,NR by MG at 11/27/2024 1134    Acceptance, E,D, VU,NR by MG at 11/22/2024 1340    Acceptance, E,D, VU,NR by MG at 11/21/2024 1659    Acceptance, E, VU by JS at 11/19/2024 1806    Acceptance, E,D, VU,NR by MG at 11/19/2024 1438    Acceptance, E, VU by JS at 11/16/2024 1757    Acceptance, E, VU by JS at 11/15/2024 1824    Acceptance, E,D,TB, VU,NR by MG at 11/15/2024 1218    Acceptance, E, VU,NR by MG at 11/12/2024 1559    Acceptance, E, VU by JS at 11/11/2024 1813    Acceptance, E, VU,NR by MG at 11/11/2024 1629    Acceptance, E, NR,VU by EF at 11/10/2024 1127    Acceptance, E, VU by JS at 11/8/2024 1820    Acceptance, E,D, VU,NR by MG at 11/8/2024 1459    Acceptance, E, VU by CW at 11/2/2024 1622                      Point: Home exercise program (Done)       Learning Progress Summary            Patient Acceptance, E,TB,D, VU,NR by MG at 11/27/2024 1134    Acceptance, E,D, VU,NR by MG at 11/22/2024 1340    Acceptance, E, VU by JS at 11/19/2024 1806    Acceptance, E,D, VU,NR by MG at 11/19/2024 1438    Acceptance, E, VU by JS at 11/16/2024 1757    Acceptance, E, VU by JS at 11/15/2024 1824    Acceptance, E,D,TB, VU,NR by MG at 11/15/2024 1218    Acceptance, E, VU,NR by MG at 11/12/2024 1559    Acceptance, E, VU by JS at 11/11/2024 1813    Acceptance, E, VU,NR by MG at 11/11/2024 1629    Acceptance, E, VU by JS at 11/8/2024 1820                      Point: Body mechanics (Done)       Learning Progress Summary            Patient Acceptance, E,TB,D, VU,NR by MG at 11/27/2024 1134    Acceptance, E,D, VU,NR by MG at 11/22/2024 1340    Acceptance, E,D, VU,NR by MG at 11/21/2024 1659    Acceptance, E, VU by JS at 11/19/2024 1806    Acceptance, E,D, VU,NR by MG at 11/19/2024 1438    Acceptance, E, VU by JS at 11/16/2024 1757    Acceptance, E, VU by JS at 11/15/2024 1824    Acceptance, E,D,TB, VU,NR by  MG at 11/15/2024 1218    Acceptance, E, VU,NR by MG at 11/12/2024 1559    Acceptance, E, VU by JS at 11/11/2024 1813    Acceptance, E, VU,NR by MG at 11/11/2024 1629    Acceptance, E, NR,VU by EF at 11/10/2024 1127    Acceptance, E, VU by JS at 11/8/2024 1820    Acceptance, E,D, VU,NR by MG at 11/8/2024 1459    Acceptance, E, VU by CW at 11/2/2024 1622                      Point: Precautions (Done)       Learning Progress Summary            Patient Acceptance, E,TB,D, VU,NR by MG at 11/27/2024 1134    Acceptance, E,D, VU,NR by MG at 11/22/2024 1340    Acceptance, E,D, VU,NR by MG at 11/21/2024 1659    Acceptance, E, VU by JS at 11/19/2024 1806    Acceptance, E,D, VU,NR by MG at 11/19/2024 1438    Acceptance, E, VU by JS at 11/16/2024 1757    Acceptance, E, VU by JS at 11/15/2024 1824    Acceptance, E,D,TB, VU,NR by MG at 11/15/2024 1218    Acceptance, E, VU,NR by MG at 11/12/2024 1559    Acceptance, E, VU by JS at 11/11/2024 1813    Acceptance, E, VU,NR by MG at 11/11/2024 1629    Acceptance, E, VU by JS at 11/8/2024 1820    Acceptance, E,D, VU,NR by MG at 11/8/2024 1459    Acceptance, E, VU by CW at 11/2/2024 1622                                      User Key       Initials Effective Dates Name Provider Type Discipline    EF 05/31/24 -  Sabine Tineo, PT Physical Therapist PT    MG 05/24/22 -  Liv Cruz, PT Physical Therapist PT    CW 12/13/22 -  Lisa Paulson, PT Physical Therapist PT    JS 02/26/24 -  Johnson Morales, RN Registered Nurse Nurse                  PT Recommendation and Plan     Progress: improving  Outcome Evaluation: Pt seen for PT tx this AM and tolerated the session fairly. Per RN pt had abdominal wound vac removed earlier this AM. Today, pt was ModA for R log roll to sitting EOB where he tolerated sitting approx 10 minutes w/ SBA/SV for sitting balance. While EOB pt perform LE ther-ex, posture correction and down onto L elbow then up into sitting x2 with Lory. Pt had c/o dizziness  and nausea upon sitting EOB, declining standing or tsf to the chair. Pt attempting to return to supine x2 requiring Lory to tsf back to sitting EOB, provided education and encouargement, but cont to decline. Pt was returned to supine with CGA and performed 4 bridges to scoot up in bed w/ SBA and cues. Pt placed into chair position in the bed, encouarged to cont AP and LAQ while in this position and to attempt chair tsf for dinner; he v/u. Discussed expectations of SNF with pt stating he believes he can tolerate an hour of therapy a day. SBAR w/ RN/CCP. PT will cont to follow and rec rehab at [place].     Time Calculation:         PT Charges       Row Name 11/27/24 1134             Time Calculation    Start Time 1032  -MG      Stop Time 1050  -MG      Time Calculation (min) 18 min  -MG      PT Received On 11/27/24  -MG      PT - Next Appointment 11/28/24  -MG                User Key  (r) = Recorded By, (t) = Taken By, (c) = Cosigned By      Initials Name Provider Type    Liv Sandoval PT Physical Therapist                  Therapy Charges for Today       Code Description Service Date Service Provider Modifiers Qty    [id number]  PT THERAPEUTIC ACT EA 15 MIN 11/27/2024 Liv Cruz, PT GP 1            PT G-Codes  Outcome Measure Options: AM-PAC 6 Clicks Daily Activity (OT)  AM-PAC 6 Clicks Score (PT): 11  AM-PAC 6 Clicks Score (OT): 13  Modified Fabian Scale: 5 - Severe disability.  Bedridden, incontinent, and requiring constant nursing care and attention.  PT Discharge Summary  Anticipated Discharge Disposition (PT): skilled nursing facility    Liv Cruz PT  11/27/2024

## 2024-11-27 NOTE — PROGRESS NOTES
Name: Arsh Haynes ADMIT: 10/26/2024   : 1950  PCP: Provider, No Known    MRN: [id number] LOS: 32 days   AGE/SEX: 74 y.o. male  ROOM: [facility]     Subjective   Subjective   2024  No overnight events, continues to have right knee and right shoulder pain.  Tolerating PO intake.     2024  Patient seen and examined at bedside.  Still admits to right shoulder and knee pain. Currently afebrile and hemodynamically stable.     2024  Patient states he had an episode of confusion this morning, still seems lethargic.  Will check EKG due to tachycardia.  Sodium downtrending, nephrology following.     2024  Patient AAOx3 without distress of confusion this afternoon.  MRI negative for mets or ischemia.  Appears improved.     2024  Wound vac removed today, patient without distress, due to urinary retention he did have a straight cath. Precert started by CM as wound vac removed.        Objective   Objective   Vital Signs  Temp:  [97.6 °F (36.4 °C)-98.3 °F (36.8 °C)] 98 °F (36.7 °C)  Heart Rate:  [] 94  Resp:  [16-20] 16  BP: ()/(54-64) 109/60  SpO2:  [87 %-99 %] 98 %  on  Flow (L/min) (Oxygen Therapy):  [0.5-1] 0.5;   Device (Oxygen Therapy): room air  Body mass index is 20.84 kg/m².  Physical Exam  Constitutional:       General: He is not in acute distress.     Appearance: He is not toxic-appearing.      Comments: AAO x3 with intact cognition   HENT:      Head: Normocephalic and atraumatic.      Nose: Nose normal. No congestion.      Mouth/Throat:      Pharynx: Oropharynx is clear. No oropharyngeal exudate.   Eyes:      General: No scleral icterus.  Cardiovascular:      Rate and Rhythm: Normal rate and regular rhythm.      Heart sounds: No murmur heard.     No friction rub. No gallop.   Pulmonary:      Effort: No respiratory distress.      Breath sounds: No wheezing or rales.   Abdominal:      General: There is no distension.      Tenderness: There is no abdominal tenderness.  There is no guarding.      Comments: Wound vac removed, incision healing well   Musculoskeletal:         General: No swelling or deformity.      Cervical back: Normal range of motion. No rigidity.      Right lower leg: No edema.      Left lower leg: No edema.   Skin:     Coloration: Skin is not jaundiced.      Findings: No bruising or lesion.   Neurological:      General: No focal deficit present.      Mental Status: He is alert and oriented to person, place, and time.      Motor: Weakness present.       Results Review     I reviewed the patient's new clinical results.  Results from last 7 days   Lab Units 11/27/24 0521 11/26/24 0552 11/25/24  0549 11/24/24  0337   WBC 10*3/mm3 7.46 8.01 9.15 9.32   HEMOGLOBIN g/dL 9.8* 9.3* 9.4* 9.5*   PLATELETS 10*3/mm3 640* 640* 641* 727*     Results from last 7 days   Lab Units 11/27/24 0521 11/26/24 0552 11/25/24  0549 11/24/24  0337   SODIUM mmol/L 132* 127* 126* 127*   POTASSIUM mmol/L 4.1 4.2 4.1 4.1   CHLORIDE mmol/L 96* 96* 95* 93*   CO2 mmol/L 20.7* 19.0* 19.1* 20.0*   BUN mg/dL 27* 23 6* 7*   CREATININE mg/dL 0.50* 0.47* 0.52* 0.54*   GLUCOSE mg/dL 115* 106* 101* 103*   EGFR mL/min/1.73 108.4 110.4 107.1 105.9     Results from last 7 days   Lab Units 11/27/24 0521 11/26/24 0552 11/25/24  0549 11/24/24  0337   ALBUMIN g/dL 2.1* 2.8* 2.8* 2.8*   BILIRUBIN mg/dL 0.5 0.5 0.6 0.6   ALK PHOS U/L 142* 143* 145* 142*   AST (SGOT) U/L 18 16 27 22   ALT (SGPT) U/L 24 28 28 27     Results from last 7 days   Lab Units 11/27/24 0521 11/26/24  0552 11/25/24  0549 11/24/24  0337   CALCIUM mg/dL 9.5 9.3 8.5* 8.8   ALBUMIN g/dL 2.1* 2.8* 2.8* 2.8*   MAGNESIUM mg/dL 1.9  --   --   --    PHOSPHORUS mg/dL 4.5  --  3.7  --        Glucose   Date/Time Value Ref Range Status   11/27/2024 1555 110 70 - 130 mg/dL Final   11/27/2024 1046 126 70 - 130 mg/dL Final   11/27/2024 0619 116 70 - 130 mg/dL Final   11/26/2024 2004 131 (H) 70 - 130 mg/dL Final   11/26/2024 1532 131 (H) 70 - 130 mg/dL  Final   11/26/2024 1040 118 70 - 130 mg/dL Final   11/26/2024 0638 110 70 - 130 mg/dL Final       MRI Brain With & Without Contrast    Result Date: 11/26/2024  1. No acute intracranial abnormality.  No abnormal enhancement.   This report was finalized on 11/26/2024 4:19 AM by Dr. Lori Belle M.D on Workstation: [id number]       I have personally reviewed all medications:  Scheduled Medications  chlorhexidine, 15 mL, Mouth/Throat, Q12H  Diclofenac Sodium, 4 g, Topical, 4x Daily  enoxaparin, 1 mg/kg, Subcutaneous, Q12H  folic acid, 1 mg, Oral, Daily  insulin lispro, 2-9 Units, Subcutaneous, 4x Daily AC & at Bedtime  lansoprazole, 30 mg, Oral, Q AM  Lidocaine, 1 patch, Transdermal, Q24H  Lidocaine, 2 patch, Transdermal, Q24H  metoprolol tartrate, 100 mg, Oral, Q12H  sodium chloride, 10 mL, Intravenous, Q12H  sodium hypochlorite, , Topical, BID  tamsulosin, 0.4 mg, Oral, Daily  thiamine, 100 mg, Oral, Daily  Urea, 15 g, Oral, BID    Infusions   Diet  Diet: Regular/House, Fluid Restriction (240 mL/tray); 1500 mL/day; No Mixed Consistencies, No Straw; Texture: Mechanical Ground (NDD 2); Fluid Consistency: Nectar Thick    I have personally reviewed:  [x]  Laboratory   [x]  Microbiology   [x]  Radiology   [x]  EKG/Telemetry  [x]  Cardiology/Vascular   []  Pathology    []  Records       Assessment/Plan     Active Hospital Problems    Diagnosis  POA    **Peritonitis [K65.9]  Unknown    Oropharyngeal dysphagia [R13.12]  Unknown    HTN (hypertension) [I10]  Unknown    Thrombocytosis [D75.839]  Unknown    Acute DVT (deep venous thrombosis) [I82.409]  Unknown    Renal mass [N28.89]  Unknown    Hyponatremia [E87.1]  Unknown    Anemia [D64.9]  Unknown    Moderate protein-calorie malnutrition [E44.0]  Yes    Colon cancer [C18.9]  No      Resolved Hospital Problems    Diagnosis Date Resolved POA    Perforation of sigmoid colon [K63.1] 10/27/2024 Yes       74 y.o. male admitted with Peritonitis.    #Septic Shock  #Perforated  Rectosigmoid adenocarcinom  #Feculent Peritonitis    - s/p open left hemicolectomy with porsha pouch and end colostomy by surgery on 10/26/2024    - Tolerating PO intake, no g tube placement needed    - Will need otpt colonoscopy    - ID followed, abx stopped    - surgery has signed off    - discussed with CM, precert pending now that wound vac removed     #RLE DVT    - Continue Therapeutic Lovenox, plan to transition to Eliquis at dc    #Acute Metabolic encephalopathy   - Patient with an episode of confusion this morning and remains lethargic   - check UA, has had poor intake, UA negative   - currently on therapeutic Lovenox, check stat CT head to rule out bleed > CT head negative for mass, shift, hemorrhage    - MRI brain without mets or infarct   - possibly due to hyponatremia   - Appears to be back at baseline, possible sundowning    #Urinary retention    - straight cath got 800ml out this morning    - monitor output, may need vazquez    #Anemia  #Thrombocytosis    - stable, follow up with heme/ocn as otpt    #Right renal mass    - urology consulted and signed off    - concerning for RCC    - Patient to follow up with urology after discharge    #Hyponatremia    - sodium 129 > 127 > 126 > 127 > 132 today    - has fluctuated from 129-136 during admission    - Urine studies suggestive of SIADH, nephrology to repeat    - Fluid restrictions    - Urea started    - As continues to downtrend, will have Nephrology establish care    - sodium decreased after trial of fluids, nephrology monitoring    #Hypokalemia    - replace per protocol     #DM    - ISS    #Right knee Pain  #Right shoulder pain    - X-rays reviewed, appear related to degenerative changes    - no intervention per ortho     - Diclofenac started         Therapeutic Lovenox  for DVT prophylaxis.  Limited code (no CPR, no intubation).  Discussed with patient and nursing staff.  Anticipate discharge to SNU facility in 1-2 days.  Expected Discharge Date:  12/2/2024; Expected Discharge Time:       DO [facility]  11/27/24  16:52 EST

## 2024-11-27 NOTE — PLAN OF CARE
Goal Outcome Evaluation:  Plan of Care Reviewed With: patient           Outcome Evaluation: Pt seen for meal f/u at lunch time and dysphagia therapy. Overall, fair effort and limited tx session. Eventually pt asked to end session d/t feeling lightheaded. Pt appears to be tolerating modified diet. Reviewed water protocol: pt may have ie chips and small sips of water in between meals (30 minutes after eating) and after oral care. SLP to follow for exercises, suspect pt would improve from more tx sessions prior to completed repeat VFSS. Pt and RN v/u of recs.

## 2024-11-27 NOTE — PLAN OF CARE
Goal Outcome Evaluation:  Plan of Care Reviewed With: patient        Progress: improving  Outcome Evaluation: Pt seen for PT tx this AM and tolerated the session fairly. Per RN pt had abdominal wound vac removed earlier this AM. Today, pt was ModA for R log roll to sitting EOB where he tolerated sitting approx 10 minutes w/ SBA/SV for sitting balance. While EOB pt perform LE ther-ex, posture correction and down onto L elbow then up into sitting x2 with Lory. Pt had c/o dizziness and nausea upon sitting EOB, declining standing or tsf to the chair. Pt attempting to return to supine x2 requiring Lory to tsf back to sitting EOB, provided education and encouargement, but cont to decline. Pt was returned to supine with CGA and performed 4 bridges to scoot up in bed w/ SBA and cues. Pt placed into chair position in the bed, encouarged to cont AP and LAQ while in this position and to attempt chair tsf for dinner; he v/u. Discussed expectations of SNF with pt stating he believes he can tolerate an hour of therapy a day. KHLOE w/ RN/CCP. PT will cont to follow and rec rehab at [place].    Anticipated Discharge Disposition (PT): skilled nursing facility

## 2024-11-27 NOTE — PROGRESS NOTES
[facility] Progress Note      Patient Name: Arsh Haynes  : 1950  MRN: [id number]  Primary Care Physician:  Provider, No Known  Date of admission: 10/26/2024    Subjective     Interval History:   Follow-up hyponatremia.  Urine studies consistent with SIADH.  Weight not correct.  Urine output not all recorded.  Ostomy functioning.  Eating a little.  Blood pressure too controlled at times.  Required straight cath this morning for retention.  Fatigued after sitting on the side of the bed with OT for 10 minutes.  Felt lightheaded.  Review of Systems:   As noted above    Objective     Vitals:   Temp:  [97.6 °F (36.4 °C)-98.3 °F (36.8 °C)] 98.3 °F (36.8 °C)  Heart Rate:  [] 88  Resp:  [16-20] 16  BP: ()/(54-71) 104/57  Flow (L/min) (Oxygen Therapy):  [0.5-1] 0.5    Intake/Output Summary (Last 24 hours) at 2024 1037  Last data filed at 2024 0816  Gross per 24 hour   Intake 480 ml   Output 1850 ml   Net -1370 ml       Physical Exam:    General Appearance: Fatigued.  Sitting upright in bed.  Voice very weak.  Skin: warm and dry  HEENT: oral mucosa normal, nonicteric sclera  Neck: supple, no JVD  Lungs: Clear to auscultation bilaterally.  Heart: RRR, normal S1 and S2  Abdomen: soft, positive bowel sounds.  Ostomy with light brown stool.    : no palpable bladder  Extremities: no edema.  Neuro: Speech normal today but voice extremely weak.      Scheduled Meds:     chlorhexidine, 15 mL, Mouth/Throat, Q12H  Diclofenac Sodium, 4 g, Topical, 4x Daily  enoxaparin, 1 mg/kg, Subcutaneous, Q12H  folic acid, 1 mg, Oral, Daily  insulin lispro, 2-9 Units, Subcutaneous, 4x Daily AC & at Bedtime  lansoprazole, 30 mg, Oral, Q AM  Lidocaine, 1 patch, Transdermal, Q24H  Lidocaine, 2 patch, Transdermal, Q24H  metoprolol tartrate, 100 mg, Oral, Q12H  sodium chloride, 10 mL, Intravenous, Q12H  sodium hypochlorite, , Topical, BID  tamsulosin, 0.4 mg, Oral, Daily  thiamine, 100 mg,  Oral, Daily  Urea, 15 g, Oral, BID      IV Meds:        Results Reviewed:   I have personally reviewed the results from the time of this admission to 11/27/2024 10:37 EST     Results from last 7 days   Lab Units 11/27/24  0521 11/26/24  0552 11/25/24  0549   SODIUM mmol/L 132* 127* 126*   POTASSIUM mmol/L 4.1 4.2 4.1   CHLORIDE mmol/L 96* 96* 95*   CO2 mmol/L 20.7* 19.0* 19.1*   BUN mg/dL 27* 23 6*   CREATININE mg/dL 0.50* 0.47* 0.52*   CALCIUM mg/dL 9.5 9.3 8.5*   BILIRUBIN mg/dL 0.5 0.5 0.6   ALK PHOS U/L 142* 143* 145*   ALT (SGPT) U/L 24 28 28   AST (SGOT) U/L 18 16 27   GLUCOSE mg/dL 115* 106* 101*       Estimated Creatinine Clearance: 131.7 mL/min (A) (by C-G formula based on SCr of 0.5 mg/dL (L)).    Results from last 7 days   Lab Units 11/27/24  0521 11/25/24  0549   MAGNESIUM mg/dL 1.9  --    PHOSPHORUS mg/dL 4.5 3.7       Results from last 7 days   Lab Units 11/25/24  0549 11/24/24  0337   URIC ACID mg/dL 6.3 6.6       Results from last 7 days   Lab Units 11/27/24  0521 11/26/24  0552 11/25/24  0549 11/24/24  0337 11/23/24  0507   WBC 10*3/mm3 7.46 8.01 9.15 9.32 10.15   HEMOGLOBIN g/dL 9.8* 9.3* 9.4* 9.5* 9.7*   PLATELETS 10*3/mm3 640* 640* 641* 727* 759*             Assessment / Plan     ASSESSMENT:  Hyponatremia.  Urine studies consistent with SIADH.  Now on urea.  Fluid restriction 1500 cc daily.  Sodium 132 today. BUN up some due to po urea.   2.  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  Fecal peritonitis.  3.  Right renal mass concerning for renal cell carcinoma.  Urology plans to follow-up as an outpatient.  4.  Right lower extremity DVT.  Currently on Lovenox.  Plan for Eliquis on discharge.  5.  Anemia. HG stable .  6.  Hypertension. Controlled. At times a little low, but remains tachycardic so continue beta blocker.  7.  Metabolic encephalopathy.  Improving.  8.  Thrombocytosis.  9.  Urinary retention.  Required straight cath this  morning.  On Flomax.  No real room to increase given his lightheadedness on sitting up marginal blood pressure.  PLAN:  Continue urea.  P.o. fluid restriction 1500 cc daily.  3.  RN Knows to hold metoprolol for blood pressure parameters.  4. [facility] follow up THOMAS Sanchez Dec. 30 at 9:30 am.   5.  Add boost.  It does not have to count in his fluid restriction.  Thank you for involving us in the care of Arsh Haynes.  Please feel free to call with any questions.    Alannah Rosales MD  11/27/24  10:37 EST    [facility]  [phone]    Please note that portions of this note were completed with a voice recognition program.

## 2024-11-27 NOTE — CASE MANAGEMENT/SOCIAL WORK
Post-Acute Authorization Submission      Post Acute Pre-Cert Documentation  Request Submitted by Facility - Type:: Hospital  Post-Acute Authorization Type Submitted:: SNF  Date Post Acute Pre-Cert Inititated per Facility: 11/27/24  Date Post Acute Pre-Cert Completed: 11/27/24  Accepting Facility: [facility] Discharge Date Requested: 11/29/24  All Clinicals Submitted?: Yes  Had Accepting Facility at Time of Submission: Yes  Response Received from Insurance?: Approval  Response Communicated to:: , Accepting Facility Liaison, Accepting Facility Auth Department  Authorization Number:: APPROVED [id number]  Post Acute Pre-Cert Initiated Comment: VALID TO ADMIT TO 12/5/2024.              Randy Dhillon, PCT

## 2024-11-28 LAB
ALBUMIN SERPL-MCNC: 2.7 G/DL (ref 3.5–5.2)
ALBUMIN/GLOB SERPL: 0.6 G/DL
ALP SERPL-CCNC: 143 U/L (ref 39–117)
ALT SERPL W P-5'-P-CCNC: 25 U/L (ref 1–41)
ANION GAP SERPL CALCULATED.3IONS-SCNC: 12 MMOL/L (ref 5–15)
AST SERPL-CCNC: 15 U/L (ref 1–40)
BASOPHILS # BLD AUTO: 0.07 10*3/MM3 (ref 0–0.2)
BASOPHILS NFR BLD AUTO: 0.9 % (ref 0–1.5)
BILIRUB SERPL-MCNC: 0.4 MG/DL (ref 0–1.2)
BUN SERPL-MCNC: 29 MG/DL (ref 8–23)
BUN/CREAT SERPL: 63 (ref 7–25)
CALCIUM SPEC-SCNC: 9.3 MG/DL (ref 8.6–10.5)
CHLORIDE SERPL-SCNC: 97 MMOL/L (ref 98–107)
CO2 SERPL-SCNC: 21 MMOL/L (ref 22–29)
CREAT SERPL-MCNC: 0.46 MG/DL (ref 0.76–1.27)
DEPRECATED RDW RBC AUTO: 52 FL (ref 37–54)
EGFRCR SERPLBLD CKD-EPI 2021: 111.1 ML/MIN/1.73
EOSINOPHIL # BLD AUTO: 0.64 10*3/MM3 (ref 0–0.4)
EOSINOPHIL NFR BLD AUTO: 8.4 % (ref 0.3–6.2)
ERYTHROCYTE [DISTWIDTH] IN BLOOD BY AUTOMATED COUNT: 16.8 % (ref 12.3–15.4)
GLOBULIN UR ELPH-MCNC: 4.2 GM/DL
GLUCOSE BLDC GLUCOMTR-MCNC: 103 MG/DL (ref 70–130)
GLUCOSE BLDC GLUCOMTR-MCNC: 106 MG/DL (ref 70–130)
GLUCOSE BLDC GLUCOMTR-MCNC: 107 MG/DL (ref 70–130)
GLUCOSE BLDC GLUCOMTR-MCNC: 112 MG/DL (ref 70–130)
GLUCOSE SERPL-MCNC: 100 MG/DL (ref 65–99)
HCT VFR BLD AUTO: 30.9 % (ref 37.5–51)
HGB BLD-MCNC: 9.7 G/DL (ref 13–17.7)
IMM GRANULOCYTES # BLD AUTO: 0.06 10*3/MM3 (ref 0–0.05)
IMM GRANULOCYTES NFR BLD AUTO: 0.8 % (ref 0–0.5)
LYMPHOCYTES # BLD AUTO: 1.41 10*3/MM3 (ref 0.7–3.1)
LYMPHOCYTES NFR BLD AUTO: 18.6 % (ref 19.6–45.3)
MCH RBC QN AUTO: 26.6 PG (ref 26.6–33)
MCHC RBC AUTO-ENTMCNC: 31.4 G/DL (ref 31.5–35.7)
MCV RBC AUTO: 84.7 FL (ref 79–97)
MONOCYTES # BLD AUTO: 0.76 10*3/MM3 (ref 0.1–0.9)
MONOCYTES NFR BLD AUTO: 10 % (ref 5–12)
NEUTROPHILS NFR BLD AUTO: 4.64 10*3/MM3 (ref 1.7–7)
NEUTROPHILS NFR BLD AUTO: 61.3 % (ref 42.7–76)
NRBC BLD AUTO-RTO: 0 /100 WBC (ref 0–0.2)
PHOSPHATE SERPL-MCNC: 3.8 MG/DL (ref 2.5–4.5)
PLATELET # BLD AUTO: 624 10*3/MM3 (ref 140–450)
PMV BLD AUTO: 8.3 FL (ref 6–12)
POTASSIUM SERPL-SCNC: 4.1 MMOL/L (ref 3.5–5.2)
PROT SERPL-MCNC: 6.9 G/DL (ref 6–8.5)
RBC # BLD AUTO: 3.65 10*6/MM3 (ref 4.14–5.8)
SODIUM SERPL-SCNC: 130 MMOL/L (ref 136–145)
WBC NRBC COR # BLD AUTO: 7.58 10*3/MM3 (ref 3.4–10.8)

## 2024-11-28 PROCEDURE — 85025 COMPLETE CBC W/AUTO DIFF WBC: CPT | Performed by: INTERNAL MEDICINE

## 2024-11-28 PROCEDURE — 80053 COMPREHEN METABOLIC PANEL: CPT | Performed by: INTERNAL MEDICINE

## 2024-11-28 PROCEDURE — 84100 ASSAY OF PHOSPHORUS: CPT | Performed by: INTERNAL MEDICINE

## 2024-11-28 PROCEDURE — 82948 REAGENT STRIP/BLOOD GLUCOSE: CPT

## 2024-11-28 PROCEDURE — 25010000002 ENOXAPARIN PER 10 MG: Performed by: INTERNAL MEDICINE

## 2024-11-28 RX ADMIN — METOPROLOL TARTRATE 100 MG: 50 TABLET, FILM COATED ORAL at 21:19

## 2024-11-28 RX ADMIN — Medication 10 ML: at 21:20

## 2024-11-28 RX ADMIN — THIAMINE HCL TAB 100 MG 100 MG: 100 TAB at 10:18

## 2024-11-28 RX ADMIN — Medication 15 G: at 10:16

## 2024-11-28 RX ADMIN — DICLOFENAC SODIUM 4 G: 10 GEL TOPICAL at 17:28

## 2024-11-28 RX ADMIN — ENOXAPARIN SODIUM 90 MG: 100 INJECTION SUBCUTANEOUS at 17:28

## 2024-11-28 RX ADMIN — Medication 15 G: at 21:19

## 2024-11-28 RX ADMIN — FOLIC ACID 1 MG: 1 TABLET ORAL at 10:18

## 2024-11-28 RX ADMIN — TAMSULOSIN HYDROCHLORIDE 0.4 MG: 0.4 CAPSULE ORAL at 10:18

## 2024-11-28 RX ADMIN — LANSOPRAZOLE 30 MG: 15 TABLET, ORALLY DISINTEGRATING ORAL at 06:57

## 2024-11-28 RX ADMIN — Medication 10 ML: at 10:20

## 2024-11-28 RX ADMIN — DICLOFENAC SODIUM 4 G: 10 GEL TOPICAL at 10:20

## 2024-11-28 RX ADMIN — ENOXAPARIN SODIUM 90 MG: 100 INJECTION SUBCUTANEOUS at 06:57

## 2024-11-28 RX ADMIN — DICLOFENAC SODIUM 4 G: 10 GEL TOPICAL at 21:19

## 2024-11-28 RX ADMIN — CHLORHEXIDINE GLUCONATE 15 ML: 1.2 RINSE ORAL at 10:17

## 2024-11-28 RX ADMIN — METOPROLOL TARTRATE 100 MG: 50 TABLET, FILM COATED ORAL at 10:17

## 2024-11-28 NOTE — PLAN OF CARE
Goal Outcome Evaluation:  Plan of Care Reviewed With: patient        Progress: no change  Outcome Evaluation: Pt resting in bed, family members came for visit today, bed bath provided, refuses to turn for pressure releif, educ pt on hi risk for skin breakdown, spec matress in use, heel offload, sr on tele, room air, intake poor, encourage to drink hi calorie hi protein drink , drank shake at lunch. wound/inc mid abd cleansed per order, new dsg placed, pt isaias cannon.. nad,

## 2024-11-28 NOTE — PROGRESS NOTES
Name: Arsh Haynes ADMIT: 10/26/2024   : 1950  PCP: Provider, No Known    MRN: [id number] LOS: 33 days   AGE/SEX: 74 y.o. male  ROOM: [facility]     Subjective   Subjective   Doing fair  Fair intake         Objective   Objective   Vital Signs  Temp:  [97 °F (36.1 °C)-98 °F (36.7 °C)] 97.6 °F (36.4 °C)  Heart Rate:  [] 99  Resp:  [16] 16  BP: (107-118)/(58-70) 109/62  SpO2:  [98 %-100 %] 98 %  on   ;   Device (Oxygen Therapy): room air  Body mass index is 22.01 kg/m².  Physical Exam  HENT:      Head: Normocephalic and atraumatic.   Eyes:      General: No scleral icterus.  Cardiovascular:      Rate and Rhythm: Normal rate and regular rhythm.      Heart sounds: Normal heart sounds.   Pulmonary:      Effort: Pulmonary effort is normal. No respiratory distress.      Breath sounds: Normal breath sounds.   Abdominal:      General: There is no distension.      Palpations: Abdomen is soft.      Tenderness: There is no abdominal tenderness.   Musculoskeletal:      Cervical back: Neck supple.   Neurological:      Mental Status: He is alert.   Psychiatric:         Behavior: Behavior normal.     Chronically ill     Results Review     I reviewed the patient's new clinical results.  Results from last 7 days   Lab Units 24  0552 24  0549   WBC 10*3/mm3 7.58 7.46 8.01 9.15   HEMOGLOBIN g/dL 9.7* 9.8* 9.3* 9.4*   PLATELETS 10*3/mm3 624* 640* 640* 641*     Results from last 7 days   Lab Units 24  0353 11/27/24  0524  0552 24  0549   SODIUM mmol/L 130* 132* 127* 126*   POTASSIUM mmol/L 4.1 4.1 4.2 4.1   CHLORIDE mmol/L 97* 96* 96* 95*   CO2 mmol/L 21.0* 20.7* 19.0* 19.1*   BUN mg/dL 29* 27* 23 6*   CREATININE mg/dL 0.46* 0.50* 0.47* 0.52*   GLUCOSE mg/dL 100* 115* 106* 101*   EGFR mL/min/1.73 111.1 108.4 110.4 107.1     Results from last 7 days   Lab Units 24  0353 24  0521 24  0552 24  0549   ALBUMIN g/dL 2.7* 2.1* 2.8* 2.8*    BILIRUBIN mg/dL 0.4 0.5 0.5 0.6   ALK PHOS U/L 143* 142* 143* 145*   AST (SGOT) U/L 15 18 16 27   ALT (SGPT) U/L 25 24 28 28     Results from last 7 days   Lab Units 11/28/24  0353 11/27/24  0521 11/26/24  0552 11/25/24  0549   CALCIUM mg/dL 9.3 9.5 9.3 8.5*   ALBUMIN g/dL 2.7* 2.1* 2.8* 2.8*   MAGNESIUM mg/dL  --  1.9  --   --    PHOSPHORUS mg/dL 3.8 4.5  --  3.7       Glucose   Date/Time Value Ref Range Status   11/28/2024 1034 107 70 - 130 mg/dL Final   11/28/2024 0611 106 70 - 130 mg/dL Final   11/27/2024 2015 132 (H) 70 - 130 mg/dL Final   11/27/2024 1555 110 70 - 130 mg/dL Final   11/27/2024 1046 126 70 - 130 mg/dL Final   11/27/2024 0619 116 70 - 130 mg/dL Final   11/26/2024 2004 131 (H) 70 - 130 mg/dL Final       No radiology results for the last day    I have personally reviewed all medications:  Scheduled Medications  chlorhexidine, 15 mL, Mouth/Throat, Q12H  Diclofenac Sodium, 4 g, Topical, 4x Daily  enoxaparin, 1 mg/kg, Subcutaneous, Q12H  folic acid, 1 mg, Oral, Daily  insulin lispro, 2-9 Units, Subcutaneous, 4x Daily AC & at Bedtime  lansoprazole, 30 mg, Oral, Q AM  Lidocaine, 1 patch, Transdermal, Q24H  Lidocaine, 2 patch, Transdermal, Q24H  metoprolol tartrate, 100 mg, Oral, Q12H  sodium chloride, 10 mL, Intravenous, Q12H  tamsulosin, 0.4 mg, Oral, Daily  thiamine, 100 mg, Oral, Daily  Urea, 15 g, Oral, BID    Infusions   Diet  Diet: Regular/House, Fluid Restriction (240 mL/tray); 1500 mL/day; No Mixed Consistencies, No Straw; Texture: Mechanical Ground (NDD 2); Fluid Consistency: Nectar Thick    I have personally reviewed:  [x]  Laboratory   [x]  Microbiology   [x]  Radiology   [x]  EKG/Telemetry  [x]  Cardiology/Vascular   []  Pathology    []  Records       Assessment/Plan     Active Hospital Problems    Diagnosis  POA    **Peritonitis [K65.9]  Unknown    Oropharyngeal dysphagia [R13.12]  Unknown    HTN (hypertension) [I10]  Unknown    Thrombocytosis [D75.839]  Unknown    Acute DVT (deep venous  thrombosis) [I82.409]  Unknown    Renal mass [N28.89]  Unknown    Hyponatremia [E87.1]  Unknown    Anemia [D64.9]  Unknown    Moderate protein-calorie malnutrition [E44.0]  Yes    Colon cancer [C18.9]  No      Resolved Hospital Problems    Diagnosis Date Resolved POA    Perforation of sigmoid colon [K63.1] 10/27/2024 Yes       74 y.o. male admitted with Peritonitis.    #Septic Shock  #Perforated Rectosigmoid adenocarcinom  #Feculent Peritonitis    - s/p open left hemicolectomy with porsha pouch and end colostomy by surgery on 10/26/2024    - Tolerating PO intake, no g tube placement needed    - Will need otpt colonoscopy    - ID followed, abx stopped    - surgery has signed off  Continue wet-to-dry dressing changes daily as per wound care recommendations      #RLE DVT    - Continue Therapeutic Lovenox, plan to transition to Eliquis at dc    #Acute Metabolic encephalopathy   - Patient with an episode of confusion this morning and remains lethargic   - check UA, has had poor intake, UA negative   - currently on therapeutic Lovenox, check stat CT head to rule out bleed > CT head negative for mass, shift, hemorrhage    - MRI brain without mets or infarct   - possibly due to hyponatremia   - Appears to be back at baseline, possible sundowning    #Urinary retention    - monitor output, may need vazquez    #Anemia  #Thrombocytosis    - stable, follow up with heme/ocn as otpt    #Right renal mass    - urology consulted and signed off    - concerning for RCC    - Patient to follow up with urology after discharge    #Hyponatremia    - has fluctuated from 129-136 during admission    - Urine studies suggestive of SIADH    - Fluid restrictions    - Urea started      #Hypokalemia    - replace per protocol     #DM    - ISS    #Right knee Pain  #Right shoulder pain    - X-rays reviewed, appear related to degenerative changes    - no intervention per ortho     - Diclofenac started, probably would not continue this long term          Therapeutic Lovenox  for DVT prophylaxis.  Limited code (no CPR, no intubation).  Discussed with patient and nursing staff.  Anticipate discharge to SNU facility in 1-2 days.  Expected Discharge Date: 12/2/2024; Expected Discharge Time:   TO SNF soon, potentially 11/29 if get precert if not may stretch into next week     Antonino Poon MD  [facility]  11/28/24  11:19 EST

## 2024-11-29 LAB
ALBUMIN SERPL-MCNC: 2.8 G/DL (ref 3.5–5.2)
ALBUMIN/GLOB SERPL: 0.7 G/DL
ALP SERPL-CCNC: 144 U/L (ref 39–117)
ALT SERPL W P-5'-P-CCNC: 25 U/L (ref 1–41)
ANION GAP SERPL CALCULATED.3IONS-SCNC: 11 MMOL/L (ref 5–15)
AST SERPL-CCNC: 19 U/L (ref 1–40)
BASOPHILS # BLD AUTO: 0.08 10*3/MM3 (ref 0–0.2)
BASOPHILS NFR BLD AUTO: 1.1 % (ref 0–1.5)
BILIRUB SERPL-MCNC: 0.4 MG/DL (ref 0–1.2)
BUN SERPL-MCNC: 30 MG/DL (ref 8–23)
BUN/CREAT SERPL: 65.2 (ref 7–25)
CALCIUM SPEC-SCNC: 9.5 MG/DL (ref 8.6–10.5)
CHLORIDE SERPL-SCNC: 97 MMOL/L (ref 98–107)
CO2 SERPL-SCNC: 21 MMOL/L (ref 22–29)
CREAT SERPL-MCNC: 0.46 MG/DL (ref 0.76–1.27)
DEPRECATED RDW RBC AUTO: 49.6 FL (ref 37–54)
EGFRCR SERPLBLD CKD-EPI 2021: 111.1 ML/MIN/1.73
EOSINOPHIL # BLD AUTO: 0.62 10*3/MM3 (ref 0–0.4)
EOSINOPHIL NFR BLD AUTO: 8.3 % (ref 0.3–6.2)
ERYTHROCYTE [DISTWIDTH] IN BLOOD BY AUTOMATED COUNT: 16.5 % (ref 12.3–15.4)
GLOBULIN UR ELPH-MCNC: 4.1 GM/DL
GLUCOSE BLDC GLUCOMTR-MCNC: 117 MG/DL (ref 70–130)
GLUCOSE BLDC GLUCOMTR-MCNC: 129 MG/DL (ref 70–130)
GLUCOSE BLDC GLUCOMTR-MCNC: 132 MG/DL (ref 70–130)
GLUCOSE BLDC GLUCOMTR-MCNC: 99 MG/DL (ref 70–130)
GLUCOSE SERPL-MCNC: 98 MG/DL (ref 65–99)
HCT VFR BLD AUTO: 31.4 % (ref 37.5–51)
HGB BLD-MCNC: 10.2 G/DL (ref 13–17.7)
IMM GRANULOCYTES # BLD AUTO: 0.04 10*3/MM3 (ref 0–0.05)
IMM GRANULOCYTES NFR BLD AUTO: 0.5 % (ref 0–0.5)
LYMPHOCYTES # BLD AUTO: 1.82 10*3/MM3 (ref 0.7–3.1)
LYMPHOCYTES NFR BLD AUTO: 24.3 % (ref 19.6–45.3)
MCH RBC QN AUTO: 27 PG (ref 26.6–33)
MCHC RBC AUTO-ENTMCNC: 32.5 G/DL (ref 31.5–35.7)
MCV RBC AUTO: 83.1 FL (ref 79–97)
MONOCYTES # BLD AUTO: 0.83 10*3/MM3 (ref 0.1–0.9)
MONOCYTES NFR BLD AUTO: 11.1 % (ref 5–12)
NEUTROPHILS NFR BLD AUTO: 4.11 10*3/MM3 (ref 1.7–7)
NEUTROPHILS NFR BLD AUTO: 54.7 % (ref 42.7–76)
NRBC BLD AUTO-RTO: 0 /100 WBC (ref 0–0.2)
PHOSPHATE SERPL-MCNC: 3.5 MG/DL (ref 2.5–4.5)
PLATELET # BLD AUTO: 644 10*3/MM3 (ref 140–450)
PMV BLD AUTO: 8 FL (ref 6–12)
POTASSIUM SERPL-SCNC: 4 MMOL/L (ref 3.5–5.2)
PROT SERPL-MCNC: 6.9 G/DL (ref 6–8.5)
QT INTERVAL: 353 MS
QTC INTERVAL: 453 MS
RBC # BLD AUTO: 3.78 10*6/MM3 (ref 4.14–5.8)
SODIUM SERPL-SCNC: 129 MMOL/L (ref 136–145)
WBC NRBC COR # BLD AUTO: 7.5 10*3/MM3 (ref 3.4–10.8)

## 2024-11-29 PROCEDURE — 99222 1ST HOSP IP/OBS MODERATE 55: CPT | Performed by: STUDENT IN AN ORGANIZED HEALTH CARE EDUCATION/TRAINING PROGRAM

## 2024-11-29 PROCEDURE — 93010 ELECTROCARDIOGRAM REPORT: CPT | Performed by: INTERNAL MEDICINE

## 2024-11-29 PROCEDURE — 25010000002 ENOXAPARIN PER 10 MG: Performed by: INTERNAL MEDICINE

## 2024-11-29 PROCEDURE — 97110 THERAPEUTIC EXERCISES: CPT

## 2024-11-29 PROCEDURE — 80053 COMPREHEN METABOLIC PANEL: CPT | Performed by: INTERNAL MEDICINE

## 2024-11-29 PROCEDURE — 93005 ELECTROCARDIOGRAM TRACING: CPT | Performed by: INTERNAL MEDICINE

## 2024-11-29 PROCEDURE — 97530 THERAPEUTIC ACTIVITIES: CPT

## 2024-11-29 PROCEDURE — 82948 REAGENT STRIP/BLOOD GLUCOSE: CPT

## 2024-11-29 PROCEDURE — 84100 ASSAY OF PHOSPHORUS: CPT | Performed by: INTERNAL MEDICINE

## 2024-11-29 PROCEDURE — 25010000002 ONDANSETRON PER 1 MG: Performed by: SURGERY

## 2024-11-29 PROCEDURE — 85025 COMPLETE CBC W/AUTO DIFF WBC: CPT | Performed by: INTERNAL MEDICINE

## 2024-11-29 RX ORDER — SODIUM CHLORIDE 1 G/1
1 TABLET ORAL
Status: DISCONTINUED | OUTPATIENT
Start: 2024-11-29 | End: 2024-11-30

## 2024-11-29 RX ORDER — HYDROCODONE BITARTRATE AND ACETAMINOPHEN 5; 325 MG/1; MG/1
1 TABLET ORAL EVERY 6 HOURS PRN
Status: DISCONTINUED | OUTPATIENT
Start: 2024-11-29 | End: 2024-11-30 | Stop reason: HOSPADM

## 2024-11-29 RX ADMIN — ENOXAPARIN SODIUM 90 MG: 100 INJECTION SUBCUTANEOUS at 18:28

## 2024-11-29 RX ADMIN — SODIUM CHLORIDE TAB 1 GM 1 G: 1 TAB at 09:48

## 2024-11-29 RX ADMIN — FOLIC ACID 1 MG: 1 TABLET ORAL at 08:44

## 2024-11-29 RX ADMIN — Medication 10 ML: at 21:28

## 2024-11-29 RX ADMIN — HYDROCODONE BITARTRATE AND ACETAMINOPHEN 1 TABLET: 5; 325 TABLET ORAL at 09:48

## 2024-11-29 RX ADMIN — THIAMINE HCL TAB 100 MG 100 MG: 100 TAB at 08:44

## 2024-11-29 RX ADMIN — CHLORHEXIDINE GLUCONATE 15 ML: 1.2 RINSE ORAL at 08:45

## 2024-11-29 RX ADMIN — Medication 10 ML: at 08:45

## 2024-11-29 RX ADMIN — SODIUM CHLORIDE TAB 1 GM 1 G: 1 TAB at 14:21

## 2024-11-29 RX ADMIN — DICLOFENAC SODIUM 4 G: 10 GEL TOPICAL at 14:22

## 2024-11-29 RX ADMIN — ENOXAPARIN SODIUM 90 MG: 100 INJECTION SUBCUTANEOUS at 06:43

## 2024-11-29 RX ADMIN — SODIUM CHLORIDE TAB 1 GM 1 G: 1 TAB at 21:26

## 2024-11-29 RX ADMIN — DICLOFENAC SODIUM 4 G: 10 GEL TOPICAL at 08:45

## 2024-11-29 RX ADMIN — LANSOPRAZOLE 30 MG: 15 TABLET, ORALLY DISINTEGRATING ORAL at 06:43

## 2024-11-29 RX ADMIN — HYDROCODONE BITARTRATE AND ACETAMINOPHEN 1 TABLET: 5; 325 TABLET ORAL at 15:58

## 2024-11-29 RX ADMIN — TAMSULOSIN HYDROCHLORIDE 0.4 MG: 0.4 CAPSULE ORAL at 08:44

## 2024-11-29 RX ADMIN — ONDANSETRON 4 MG: 2 INJECTION, SOLUTION INTRAMUSCULAR; INTRAVENOUS at 10:13

## 2024-11-29 RX ADMIN — DICLOFENAC SODIUM 4 G: 10 GEL TOPICAL at 18:28

## 2024-11-29 RX ADMIN — DICLOFENAC SODIUM 4 G: 10 GEL TOPICAL at 21:28

## 2024-11-29 NOTE — PLAN OF CARE
Goal Outcome Evaluation:  Plan of Care Reviewed With: patient      Patient resting in bed.VSS.Remains on room air.Medicated per MAR.Refusing turns at times.No acute distress noted.NSR

## 2024-11-29 NOTE — NURSING NOTE
Bladder scanned patient at this time; 413 ml. Pt refusing straight cath at this time. Would like a few minutes to try to urinate.

## 2024-11-29 NOTE — PLAN OF CARE
Goal Outcome Evaluation:  Plan of Care Reviewed With: patient        Progress: improving  Outcome Evaluation: Pt seen for PT tx this PM and tolerated the session well, demoing some progress. Today, pt was Min/ModA for L log roll to sitting EOB, ModA for STS to RW and ModA for 5 L side steps from EOB to the chair w/ a RW. No knee buckling, SOB, dizziness or LOB. RN present to assist with mobility and for safety, but did not need second assist today. Noted pts HR highest at 131bpm during mobility. Once in the chair pt performed LE ther-ex for 10 reps. Encouraged pt to perform ther-ex whlie in the chair or bed and to increase the amount of times he tsfs to the chair during the day, as of now he is only tolerating 1x/day if that; pt v/u. Further discussed rehab and expectations. Encouragement provided to pt on his current mobility and medical status now compared to 3-4 weeks ago. KHLOE w/ RN on how to safely tsf pt w/ RW and assisting with placing his RUE. PT will cont to follow and rec rehab at [place].    Anticipated Discharge Disposition (PT): skilled nursing facility

## 2024-11-29 NOTE — NURSING NOTE
"   11/29/24 0815   Colostomy LLQ   Placement date: If unknown, DO NOT use \"Add Comment\" note: 10/26/24   Inserted by: Dr Guillaume  Location: LLQ   Stomal Appliance 2 piece;Clean;Dry;Intact;Drainable   Stoma Appearance round;moist;red;protruding above skin level   Peristomal Assessment CARLOS EDUARDO   Stoma Function flatus;stool   Stool Color yellow   Stool Consistency loose     Wound/Ostomy: Follow up Ostomy care. Existing appliance remain intact, no leaking, Geronimo 2-piece 2 3/4 placed, good seal noted. Supplies left in the room.   Patient is alert, stated ostomy appliance change yesterday, pouching system and skin care tips provided, no questions at this time  WO Team will follow up him by Monday.      "

## 2024-11-29 NOTE — CASE MANAGEMENT/SOCIAL WORK
Continued Stay Note  [facility]     Patient Name: Arsh Haynes  MRN: [id number]  Today's Date: 11/29/2024    Admit Date: 10/26/2024    Plan: [facility] Arciniega precert approved. Bed available when pt is ready   Discharge Plan       Row Name 11/29/24 0749       Plan    Plan [facility] Arciniega precert approved. Bed available when pt is ready    Plan Comments Spoke with Jessica lester has bed when pt is ready. Precert approved until 12/5. Packet on chart. CCP to follow.                   Discharge Codes    No documentation.                 Expected Discharge Date and Time       Expected Discharge Date Expected Discharge Time    Dec 2, 2024               LUCRETIA Torres

## 2024-11-29 NOTE — PROGRESS NOTES
[facility] Progress Note      Patient Name: Arsh Haynes  : 1950  MRN: [id number]  Primary Care Physician:  Provider, No Known  Date of admission: 10/26/2024    Subjective     Interval History:   F/u hyponatremia    Review of Systems:   No c/o     Objective     Vitals:   Temp:  [97.5 °F (36.4 °C)-97.8 °F (36.6 °C)] 97.5 °F (36.4 °C)  Heart Rate:  [85-99] 91  Resp:  [16] 16  BP: (109-116)/(60-70) 115/60    Intake/Output Summary (Last 24 hours) at 2024 0700  Last data filed at 2024 0420  Gross per 24 hour   Intake 240 ml   Output 1425 ml   Net -1185 ml       Physical Exam:    General Appearance: frail WM , voice weak, no distress  Neck: supple, no JVD  Lungs: CTA bilat no rales  Heart: RRR, normal S1 and S2  Abdomen: soft, nontender, nondistended  Extremities: no edema, cyanosis or clubbing    Scheduled Meds:     chlorhexidine, 15 mL, Mouth/Throat, Q12H  Diclofenac Sodium, 4 g, Topical, 4x Daily  enoxaparin, 1 mg/kg, Subcutaneous, Q12H  folic acid, 1 mg, Oral, Daily  insulin lispro, 2-9 Units, Subcutaneous, 4x Daily AC & at Bedtime  lansoprazole, 30 mg, Oral, Q AM  Lidocaine, 1 patch, Transdermal, Q24H  Lidocaine, 2 patch, Transdermal, Q24H  metoprolol tartrate, 100 mg, Oral, Q12H  sodium chloride, 10 mL, Intravenous, Q12H  tamsulosin, 0.4 mg, Oral, Daily  thiamine, 100 mg, Oral, Daily  Urea, 15 g, Oral, BID      IV Meds:        Results Reviewed:   I have personally reviewed the results from the time of this admission to 2024 07:00 EST     Results from last 7 days   Lab Units 24  0609 24  0353 24  0521   SODIUM mmol/L 129* 130* 132*   POTASSIUM mmol/L 4.0 4.1 4.1   CHLORIDE mmol/L 97* 97* 96*   CO2 mmol/L 21.0* 21.0* 20.7*   BUN mg/dL 30* 29* 27*   CREATININE mg/dL 0.46* 0.46* 0.50*   CALCIUM mg/dL 9.5 9.3 9.5   BILIRUBIN mg/dL 0.4 0.4 0.5   ALK PHOS U/L 144* 143* 142*   ALT (SGPT) U/L 25 25 24   AST (SGOT) U/L 19 15 18   GLUCOSE mg/dL 98 100*  115*     Estimated Creatinine Clearance: 161.4 mL/min (A) (by C-G formula based on SCr of 0.46 mg/dL (L)).  Results from last 7 days   Lab Units 11/29/24  0609 11/28/24 0353 11/27/24  0521   MAGNESIUM mg/dL  --   --  1.9   PHOSPHORUS mg/dL 3.5 3.8 4.5     Results from last 7 days   Lab Units 11/25/24  0549 11/24/24  0337   URIC ACID mg/dL 6.3 6.6     Results from last 7 days   Lab Units 11/29/24  0609 11/28/24  0353 11/27/24  0521 11/26/24  0552 11/25/24  0549   WBC 10*3/mm3 7.50 7.58 7.46 8.01 9.15   HEMOGLOBIN g/dL 10.2* 9.7* 9.8* 9.3* 9.4*   PLATELETS 10*3/mm3 644* 624* 640* 640* 641*           Assessment / Plan     ASSESSMENT:  Hyponatremia.  Euvolemic.  Urine studies consistent with SIADH.  Been on urea.  Fluid restriction 1500 cc daily.  Na trending down some 129 today.  BUN up to 30 due to po urea.   2.  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  Fecal peritonitis.  3.  Right renal mass concerning for renal cell carcinoma.  Urology plans to follow-up as an outpatient.  4.  Right lower extremity DVT.  Currently on Lovenox.  Plan for Eliquis on discharge.  5.  Anemia. Hb stable .  6.  Hypertension. Controlled on metop 100 BID also for rate control   7.  Metabolic encephalopathy.  Improving.  8.  Thrombocytosis.  9.  Urinary retention.  On flomax     PLAN:  Try salt tabs in place of urea packets, 1g TID  Continue 1500 cc fluid restriction for now, will tighten tomorrow if Na lower   Note plan for rehab soon, no objection from nephrology standpoint      Gibson Romero MD  11/29/24  07:00 [facility]    [facility]  [phone]

## 2024-11-29 NOTE — PROGRESS NOTES
"Nutrition Services    Patient Name:  Arsh Haynes  YOB: 1950  MRN: [id number]  Admit Date:  10/26/2024    Nutrition Services    Patient Name: Arsh Haynes  YOB: 1950  MRN: [id number]  Admission date: 10/26/2024    PROGRESS NOTE      Encounter Information: Follow up:    Poor intake per EMR. Noted boost plus ordered. Pt on nectar thick. Will discontinue this and RD will try to order pudding with protein to help with PO intake.        PO Diet: Diet: Regular/House, Fluid Restriction (240 mL/tray); 1500 mL/day; No Mixed Consistencies, No Straw; Texture: Mechanical Ground (NDD 2); Fluid Consistency: Nectar Thick   PO Supplements: Boost plus BID, mighty shakes TID   PO Intake:  0-50%       Current nutrition support: --   Nutrition support review: --       Labs (reviewed below): Na 129, BUN 30, Cr 0.46,        GI Function:  Last BM: 11/28       Nutrition Intervention Updates: - Discontinue boost plus. Pt on nectar thick.  - Will add pudding mixed with beneprotein  - Continue mighty shakes TID  - Encourage PO intakes       Results from last 7 days   Lab Units 11/29/24  0609 11/28/24 0353 11/27/24  0521   SODIUM mmol/L 129* 130* 132*   POTASSIUM mmol/L 4.0 4.1 4.1   CHLORIDE mmol/L 97* 97* 96*   CO2 mmol/L 21.0* 21.0* 20.7*   BUN mg/dL 30* 29* 27*   CREATININE mg/dL 0.46* 0.46* 0.50*   CALCIUM mg/dL 9.5 9.3 9.5   BILIRUBIN mg/dL 0.4 0.4 0.5   ALK PHOS U/L 144* 143* 142*   ALT (SGPT) U/L 25 25 24   AST (SGOT) U/L 19 15 18   GLUCOSE mg/dL 98 100* 115*     Results from last 7 days   Lab Units 11/29/24  0609 11/28/24 0353 11/27/24  0521   MAGNESIUM mg/dL  --   --  1.9   PHOSPHORUS mg/dL 3.5   < > 4.5   HEMOGLOBIN g/dL 10.2*   < > 9.8*   HEMATOCRIT % 31.4*   < > 30.4*    < > = values in this interval not displayed.     No results found for: \"COVID19\"  Lab Results   Component Value Date    HGBA1C 6.60 (H) 10/26/2024       Wt Readings from Last 10 Encounters:   11/29/24 0503 78.6 kg (173 lb " 4.5 oz)   11/28/24 0300 73.6 kg (162 lb 4.1 oz)   11/26/24 0346 69.7 kg (153 lb 10.6 oz)   11/25/24 0525 81 kg (178 lb 9.2 oz)   11/24/24 0700 82.3 kg (181 lb 7 oz)   11/23/24 0528 81.5 kg (179 lb 10.8 oz)   11/22/24 0604 82.3 kg (181 lb 7 oz)   11/08/24 0357 85.5 kg (188 lb 7.9 oz)   11/07/24 0500 85.1 kg (187 lb 9.8 oz)   11/06/24 0600 85.1 kg (187 lb 9.8 oz)   11/06/24 0100 84.2 kg (185 lb 10 oz)   11/04/24 0415 87 kg (191 lb 12.8 oz)   11/04/24 0152 87 kg (191 lb 12.8 oz)   11/02/24 0543 89.8 kg (197 lb 15.6 oz)   11/01/24 0548 92.6 kg (204 lb 2.3 oz)   10/31/24 0448 96 kg (211 lb 10.3 oz)   10/30/24 0300 98.4 kg (216 lb 14.9 oz)   10/29/24 0431 92.5 kg (203 lb 14.8 oz)   10/28/24 0436 90.2 kg (198 lb 13.7 oz)   10/27/24 0404 83.4 kg (183 lb 13.8 oz)   10/26/24 1933 83 kg (183 lb)         RD to follow up per protocol.    Electronically signed by:  Ifeoma Thorne RD  11/29/24 16:02 EST

## 2024-11-29 NOTE — CONSULTS
Patient Name: Arsh Haynes  :1950  74 y.o.    Date of Admission: 10/26/2024  Date of Consultation:  24  Encounter Provider: Candy Mace RN  Place of Service: [facility]  Referring Provider: No ref. provider found  Patient Care Team:  Provider, No Known as PCP - Gibson Cortes MD as Consulting Physician (Hematology and Oncology)      Chief complaint: abdominal pain    History of Present Illness:    Arsh Haynes is a 74 year-old male with a past medical history of hypertension hypercholesteremia.    He came to the emergency department on 10/26/2024.  He has had a extended hospitalization with feculent peritonitis status post open left hemicolectomy with Juliana pouch and end colostomy on 10/26/2024.  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  He was also found to be in septic shock.  He was also found to have a right lower extremity DVT he is currently on therapeutic Lovenox with a plan to transition to Eliquis at discharge.    During this day he was diagnosed with acute metabolic encephalopathy.  He did undergo an MRI that did not show mets or infarct.  His CT was without hemorrhage.  It is noted that his confusion may be .  Throughout the stay he has been anemic, hypokalemic, hyponatremic, and retaining urine.    His lab work this morning was notable for sodium of 129, chloride 97, bicarb 21, BUN 30, creatinine 0.46, alkaline phosphatase 144, albumin 2.8, hemoglobin 10.2.  He was noted on telemetry today to be in a complete heart block with a rate of 28.  He was asymptomatic with this and is currently in sinus rhythm.            Cardiology has been asked to see for this new complete heart block.    Past Medical History:   Diagnosis Date    HTN (hypertension)     Hypercholesterolemia        Past Surgical History:   Procedure Laterality Date    COLON RESECTION  N/A 10/26/2024    Procedure: LOW ANTERIOR COLON RESECTION SIGMOID, COLOSTOMY, SPLENIC FLEXURE MOBILIZATION;  Surgeon: Mc Guillaume MD;  [place] [facility] [place] [facility] [place];  Service: General;  Laterality: N/A;    EXPLORATORY LAPAROTOMY N/A 10/26/2024    Procedure: LAPAROTOMY EXPLORATORY, LEFT HEMICOLECTOMY;  Surgeon: Mc Guillaume MD;  [place] [facility] [place] [facility];  Service: General;  Laterality: N/A;         Prior to Admission medications    Medication Sig Start Date End Date Taking? Authorizing Provider   amLODIPine-benazepril (LOTREL) 10-20 MG per capsule Take 1 capsule by mouth Daily.   Yes Provider, MD No   metoprolol succinate XL (TOPROL-XL) 100 MG 24 hr tablet Take 1 tablet by mouth Daily.   Yes ProviderNo MD       No Known Allergies    Social History     Socioeconomic History    Marital status:      Spouse name: Melida   Tobacco Use    Smoking status: Never    Smokeless tobacco: Never   Vaping Use    Vaping status: Never Used   Substance and Sexual Activity    Alcohol use: Yes     Alcohol/week: 42.0 standard drinks of alcohol     Types: 42 Cans of beer per week     Comment: 6 beer daily    Drug use: Never    Sexual activity: Defer       History reviewed. No pertinent family history.    REVIEW OF SYSTEMS:   All systems reviewed.  Pertinent positives identified in HPI.  All other systems are negative.      Objective:     Vitals:    11/28/24 1909 11/28/24 2257 11/29/24 0503 11/29/24 0730   BP: 110/61 115/60  111/57   BP Location: Left arm Left arm  Left arm   Patient Position: Lying Lying  Lying   Pulse: 92 91     Resp: 16 16  16   Temp: 97.8 °F (36.6 °C) 97.5 °F (36.4 °C)  97.9 °F (36.6 °C)   TempSrc: Oral Oral  Oral   SpO2: 97% 98%     Weight:   78.6 kg (173 lb 4.5 oz)    Height:         Body mass index is 23.5 kg/m².    Physical Exam:  Gen: Well nourished; Alert  Skin: no visible rashes and no abnormalities with palpation  Eyes: no evidence of visual defects and normal  sclera  Ears: no abnormalities.  Mouth: normal teeth and appropriately moist mucous membranes  Chest: clear to auscultation. There is normal respiratory effort and expansion.  CV: examination showed a regular rhythm. S1 and S2 were normal.   Abd: no visible distension.   Neurologic:Cranial nerves appear intact; Sensation normal; no localizing signs      Lab Review:     Results from last 7 days   Lab Units 11/29/24  0609   SODIUM mmol/L 129*   POTASSIUM mmol/L 4.0   CHLORIDE mmol/L 97*   CO2 mmol/L 21.0*   BUN mg/dL 30*   CREATININE mg/dL 0.46*   CALCIUM mg/dL 9.5   BILIRUBIN mg/dL 0.4   ALK PHOS U/L 144*   ALT (SGPT) U/L 25   AST (SGOT) U/L 19   GLUCOSE mg/dL 98         Results from last 7 days   Lab Units 11/29/24  0609   WBC 10*3/mm3 7.50   HEMOGLOBIN g/dL 10.2*   HEMATOCRIT % 31.4*   PLATELETS 10*3/mm3 644*         Results from last 7 days   Lab Units 11/27/24  0521   MAGNESIUM mg/dL 1.9                             I personally viewed and interpreted the patient's EKG/Telemetry data.      Current Facility-Administered Medications:     acetaminophen (TYLENOL) tablet 650 mg, 650 mg, Nasogastric, Q4H PRN, 650 mg at 11/27/24 1055 **OR** acetaminophen (TYLENOL) suppository 650 mg, 650 mg, Rectal, Q4H PRN, Mc Guillaume MD    Calcium Replacement - Follow Nurse / BPA Driven Protocol, , Not Applicable, PRN, Mc Guillaume MD    chlorhexidine (PERIDEX) 0.12 % solution 15 mL, 15 mL, Mouth/Throat, Q12H, Alberto Lerner MD, 15 mL at 11/29/24 0845    dextrose (D50W) (25 g/50 mL) IV injection 25 g, 25 g, Intravenous, Q15 Min PRN, José Manuel Ac MD    dextrose (GLUTOSE) oral gel 15 g, 15 g, Oral, Q15 Min PRN, José Manuel Ac MD    Diclofenac Sodium (VOLTAREN) 1 % gel 4 g, 4 g, Topical, 4x Daily, Oliver Bustos DO, 4 g at 11/29/24 0845    Enoxaparin Sodium (LOVENOX) syringe 90 mg, 1 mg/kg, Subcutaneous, Q12H, Darek Lerner MD, 90 mg at 11/29/24 0643    folic acid (FOLVITE)  tablet 1 mg, 1 mg, Oral, Daily, Mc Guillaume MD, 1 mg at 11/29/24 0844    glucagon (GLUCAGEN) injection 1 mg, 1 mg, Intramuscular, Q15 Min PRN, José Manuel Ac MD    HYDROcodone-acetaminophen (NORCO) 5-325 MG per tablet 1 tablet, 1 tablet, Oral, Q6H PRN, Antonino Poon MD, 1 tablet at 11/29/24 0948    insulin lispro (HUMALOG/ADMELOG) injection 2-9 Units, 2-9 Units, Subcutaneous, 4x Daily AC & at Bedtime, José Manuel Ac MD, 2 Units at 11/21/24 1151    ipratropium-albuterol (DUO-NEB) nebulizer solution 3 mL, 3 mL, Nebulization, Q4H PRN, Oliver Bustos DO, 3 mL at 11/26/24 2104    labetalol (NORMODYNE,TRANDATE) injection 20 mg, 20 mg, Intravenous, Q10 Min PRN, Mc Guillaume MD, 20 mg at 11/03/24 2217    lansoprazole (PREVACID SOLUTAB) disintegrating tablet Tablet Delayed Release Dispersible 30 mg, 30 mg, Oral, Q AM, Mc Guillaume MD, 30 mg at 11/29/24 0643    Lidocaine 4 % 1 patch, 1 patch, Transdermal, Q24H, Sarah Segura MD, 1 patch at 11/20/24 0835    Lidocaine 4 % 2 patch, 2 patch, Transdermal, Q24H, Nayana Whitaker MD, 2 patch at 11/20/24 0835    Magnesium Standard Dose Replacement - Follow Nurse / BPA Driven Protocol, , Not Applicable, PRN, Mc Guillaume MD    melatonin tablet 2.5 mg, 2.5 mg, Oral, Nightly PRN, Brenda Beckham, APRN, 2.5 mg at 11/20/24 2137    metoprolol tartrate (LOPRESSOR) tablet 100 mg, 100 mg, Oral, Q12H, Mc Guillaume MD, 100 mg at 11/28/24 2119    nitroglycerin (NITROSTAT) SL tablet 0.4 mg, 0.4 mg, Sublingual, Q5 Min PRN, Mc Guillaume MD    ondansetron (ZOFRAN) injection 4 mg, 4 mg, Intravenous, Q6H PRN, Mc Guillaume MD, 4 mg at 11/29/24 1013    Phosphorus Replacement - Follow Nurse / BPA Driven Protocol, , Not Applicable, PRMarkell BECKER Alberto Sebastian, MD    Potassium Replacement - Follow Nurse / BPA Driven Protocol, , Not Applicable, Markell SERNA Alberto Sebastian, MD     [COMPLETED] Insert Peripheral IV, , , Once **AND** sodium chloride 0.9 % flush 10 mL, 10 mL, Intravenous, PRN, Mc Guillaume MD    sodium chloride 0.9 % flush 10 mL, 10 mL, Intravenous, Q12H, Mc Guillaume MD, 10 mL at 11/29/24 0845    sodium chloride 0.9 % flush 10 mL, 10 mL, Intravenous, PRN, Mc Guillaume MD    sodium chloride 0.9 % flush 10 mL, 10 mL, Intravenous, PRN, Arnulfo Lynch Jr., MD    sodium chloride 0.9 % flush 20 mL, 20 mL, Intravenous, PRN, Arnulfo Lynch Jr., MD    sodium chloride tablet 1 g, 1 g, Oral, TID With Meals, Gibson Romero MD, 1 g at 11/29/24 0948    tamsulosin (FLOMAX) 24 hr capsule 0.4 mg, 0.4 mg, Oral, Daily, José Manuel Ac MD, 0.4 mg at 11/29/24 0844    [COMPLETED] thiamine (B-1) 500 mg in sodium chloride 0.9 % 100 mL IVPB, 500 mg, Intravenous, Q8H, Last Rate: 200 mL/hr at 10/29/24 1320, 500 mg at 10/29/24 1320 **FOLLOWED BY** [COMPLETED] thiamine (B-1) injection 200 mg, 200 mg, Intravenous, Q8H, 200 mg at 11/02/24 1452 **FOLLOWED BY** thiamine (VITAMIN B-1) tablet 100 mg, 100 mg, Oral, Daily, Mc Guillaume MD, 100 mg at 11/29/24 0844    Assessment and Plan:       Active Hospital Problems    Diagnosis  POA    **Peritonitis [K65.9]  Unknown    Oropharyngeal dysphagia [R13.12]  Unknown    HTN (hypertension) [I10]  Unknown    Thrombocytosis [D75.839]  Unknown    Acute DVT (deep venous thrombosis) [I82.409]  Unknown    Renal mass [N28.89]  Unknown    Hyponatremia [E87.1]  Unknown    Anemia [D64.9]  Unknown    Moderate protein-calorie malnutrition [E44.0]  Yes    Colon cancer [C18.9]  No      Resolved Hospital Problems    Diagnosis Date Resolved POA    Perforation of sigmoid colon [K63.1] 10/27/2024 Yes     # Complete heart block, patient does not recall episode.  - I discussed with patient risk/benefit of implantation of a permanent pacemaker.  - Given his recent diagnosis of peritonitis and surgery, he would  most likely benefit from a Micra pacemaker implantation.  - I discussed with him the risks/benefits of not implanting the pacemaker.  - After considering the options, patient currently declines pacemaker implantation.  - I did discuss with him that given that he is has episode of complete heart block, he may have more episodes once he leaves the hospital that may cause lightheadedness/dizziness/syncope.  He voiced understanding of the above.  Continues to decline pacemaker at this time.    Candy Mace RN  11/29/24  10:35 EST

## 2024-11-29 NOTE — NURSING NOTE
Pt tele strip reading 3rd degree heart block and jacqueline into the 20's. Pt asymptomatic; denies any chest pain. Now in NSR. Cleve Quigley aware. EKG order and cardiology consult placed at this time.     Cards consult placed; spoke to Linda.     1044: Cardiology, Dr. Loza, reviewed tele strip and is at bedside.

## 2024-11-29 NOTE — PROGRESS NOTES
Name: Arsh Haynes ADMIT: 10/26/2024   : 1950  PCP: Provider, No Known    MRN: [id number] LOS: 34 days   AGE/SEX: 74 y.o. male  ROOM: [facility]     Subjective   Subjective   Doing fair  Fair intake  Working with therapy    After I had seen the patient d/w RN that patient having bradycardia and heart block       Objective   Objective   Vital Signs  Temp:  [97.5 °F (36.4 °C)-97.9 °F (36.6 °C)] 97.9 °F (36.6 °C)  Heart Rate:  [85-92] 91  Resp:  [16] 16  BP: (110-115)/(57-65) 111/57  SpO2:  [97 %-99 %] 98 %  on   ;   Device (Oxygen Therapy): room air  Body mass index is 23.5 kg/m².  Physical Exam  HENT:      Head: Normocephalic and atraumatic.   Eyes:      General: No scleral icterus.  Cardiovascular:      Rate and Rhythm: Normal rate and regular rhythm.      Heart sounds: Normal heart sounds.   Pulmonary:      Effort: Pulmonary effort is normal. No respiratory distress.      Breath sounds: Normal breath sounds.   Abdominal:      General: There is no distension.      Palpations: Abdomen is soft.      Tenderness: There is no abdominal tenderness.   Musculoskeletal:      Cervical back: Neck supple.   Neurological:      Mental Status: He is alert.   Psychiatric:         Behavior: Behavior normal.     Chronically ill   Post op changes to abdomen  Similar exam to yesterday     Results Review     I reviewed the patient's new clinical results.  Results from last 7 days   Lab Units 24  0609 24  0552   WBC 10*3/mm3 7.50 7.58 7.46 8.01   HEMOGLOBIN g/dL 10.2* 9.7* 9.8* 9.3*   PLATELETS 10*3/mm3 644* 624* 640* 640*     Results from last 7 days   Lab Units 24  0624  0552   SODIUM mmol/L 129* 130* 132* 127*   POTASSIUM mmol/L 4.0 4.1 4.1 4.2   CHLORIDE mmol/L 97* 97* 96* 96*   CO2 mmol/L 21.0* 21.0* 20.7* 19.0*   BUN mg/dL 30* 29* 27* 23   CREATININE mg/dL 0.46* 0.46* 0.50* 0.47*   GLUCOSE mg/dL 98 100* 115* 106*   EGFR mL/min/1.73 111.1  111.1 108.4 110.4     Results from last 7 days   Lab Units 11/29/24  0609 11/28/24  0353 11/27/24  0521 11/26/24  0552   ALBUMIN g/dL 2.8* 2.7* 2.1* 2.8*   BILIRUBIN mg/dL 0.4 0.4 0.5 0.5   ALK PHOS U/L 144* 143* 142* 143*   AST (SGOT) U/L 19 15 18 16   ALT (SGPT) U/L 25 25 24 28     Results from last 7 days   Lab Units 11/29/24  0609 11/28/24  0353 11/27/24  0521 11/26/24  0552 11/25/24  0549   CALCIUM mg/dL 9.5 9.3 9.5 9.3 8.5*   ALBUMIN g/dL 2.8* 2.7* 2.1* 2.8* 2.8*   MAGNESIUM mg/dL  --   --  1.9  --   --    PHOSPHORUS mg/dL 3.5 3.8 4.5  --  3.7       Glucose   Date/Time Value Ref Range Status   11/29/2024 1047 132 (H) 70 - 130 mg/dL Final   11/29/2024 0641 99 70 - 130 mg/dL Final   11/28/2024 2044 103 70 - 130 mg/dL Final   11/28/2024 1548 112 70 - 130 mg/dL Final   11/28/2024 1034 107 70 - 130 mg/dL Final   11/28/2024 0611 106 70 - 130 mg/dL Final   11/27/2024 2015 132 (H) 70 - 130 mg/dL Final       No radiology results for the last day    I have personally reviewed all medications:  Scheduled Medications  chlorhexidine, 15 mL, Mouth/Throat, Q12H  Diclofenac Sodium, 4 g, Topical, 4x Daily  enoxaparin, 1 mg/kg, Subcutaneous, Q12H  folic acid, 1 mg, Oral, Daily  insulin lispro, 2-9 Units, Subcutaneous, 4x Daily AC & at Bedtime  lansoprazole, 30 mg, Oral, Q AM  Lidocaine, 1 patch, Transdermal, Q24H  Lidocaine, 2 patch, Transdermal, Q24H  metoprolol tartrate, 100 mg, Oral, Q12H  sodium chloride, 10 mL, Intravenous, Q12H  sodium chloride, 1 g, Oral, TID With Meals  tamsulosin, 0.4 mg, Oral, Daily  thiamine, 100 mg, Oral, Daily    Infusions   Diet  Diet: Regular/House, Fluid Restriction (240 mL/tray); 1500 mL/day; No Mixed Consistencies, No Straw; Texture: Mechanical Ground (NDD 2); Fluid Consistency: Nectar Thick    I have personally reviewed:  [x]  Laboratory   [x]  Microbiology   [x]  Radiology   [x]  EKG/Telemetry  [x]  Cardiology/Vascular   []  Pathology    []  Records       Assessment/Plan     Active Hospital  Problems    Diagnosis  POA    **Peritonitis [K65.9]  Unknown    Oropharyngeal dysphagia [R13.12]  Unknown    HTN (hypertension) [I10]  Unknown    Thrombocytosis [D75.839]  Unknown    Acute DVT (deep venous thrombosis) [I82.409]  Unknown    Renal mass [N28.89]  Unknown    Hyponatremia [E87.1]  Unknown    Anemia [D64.9]  Unknown    Moderate protein-calorie malnutrition [E44.0]  Yes    Colon cancer [C18.9]  No      Resolved Hospital Problems    Diagnosis Date Resolved POA    Perforation of sigmoid colon [K63.1] 10/27/2024 Yes       74 y.o. male admitted with Peritonitis.    #Septic Shock  #Perforated Rectosigmoid adenocarcinom  #Feculent Peritonitis    - s/p open left hemicolectomy with porsha pouch and end colostomy by surgery on 10/26/2024    - Tolerating PO intake, no g tube placement needed    - Will need otpt colonoscopy    - ID followed, abx stopped    - surgery has signed off  Continue wet-to-dry dressing changes daily as per wound care recommendations      #RLE DVT    - Continue Therapeutic Lovenox, plan to transition to Eliquis at dc    #Acute Metabolic encephalopathy, improved    - MRI brain without mets or infarct   - possibly due to hyponatremia   - Appears to be back at baseline    #Urinary retention    - monitor output, may need vazquez    #Anemia  #Thrombocytosis    - stable, follow up with heme/ocn as otpt    #Right renal mass    - urology consulted and signed off    - concerning for RCC    - Patient to follow up with urology after discharge    #Hyponatremia    - has fluctuated from 129-136 during admission    - Urine studies suggestive of SIADH    - Fluid restrictions    - Urea started      #Hypokalemia    - replace per protocol     #DM    - ISS    #Right knee Pain  #Right shoulder pain    - X-rays reviewed, appear related to degenerative changes    - no intervention per ortho     - Diclofenac topical, other prn agents     #heart block    -Cardiology consulted, may need pacemaker. Hold metoprolol      Therapeutic Lovenox  for DVT prophylaxis.  Limited code (no CPR, no intubation).  Discussed with patient and nursing staff.  Anticipate discharge to SNU facility in 1-2 days.  Expected Discharge Date: 12/2/2024; Expected Discharge Time:  3:00 PM  TBD. Precert approved through 12/5 but need to figure out the Cardiology plans now with new heart block and bradycardia.     Antonino Poon MD  [facility]  11/29/24  10:52 EST

## 2024-11-29 NOTE — THERAPY TREATMENT NOTE
Patient Name: Arsh Haynes  : 1950    MRN: [id number]                              Today's Date: 2024       Admit Date: 10/26/2024    Visit Dx:     ICD-10-CM ICD-9-CM   1. Perforation of sigmoid colon  K63.1 569.83   2. Hypotension, unspecified hypotension type  I95.9 458.9   3. Increased lactic acid level  R79.89 276.2   4. Bowel perforation  K63.1 569.83     Patient Active Problem List   Diagnosis    Colon cancer    HTN (hypertension)    Thrombocytosis    Acute DVT (deep venous thrombosis)    Renal mass    Hyponatremia    Anemia    Peritonitis    Moderate protein-calorie malnutrition    Oropharyngeal dysphagia     Past Medical History:   Diagnosis Date    HTN (hypertension)     Hypercholesterolemia      Past Surgical History:   Procedure Laterality Date    COLON RESECTION N/A 10/26/2024    Procedure: LOW ANTERIOR COLON RESECTION SIGMOID, COLOSTOMY, SPLENIC FLEXURE MOBILIZATION;  Surgeon: Mc Guillaume MD;  Location: [facility];  Service: General;  Laterality: N/A;    EXPLORATORY LAPAROTOMY N/A 10/26/2024    Procedure: LAPAROTOMY EXPLORATORY, LEFT HEMICOLECTOMY;  Surgeon: Mc Guillaume MD;  Location: [facility];  Service: General;  Laterality: N/A;      General Information       Row Name 24 1543          Physical Therapy Time and Intention    Document Type therapy note (daily note)  -MG     Mode of Treatment individual therapy;physical therapy  -MG       Row Name 24 1543          General Information    Patient Profile Reviewed yes  -MG     Existing Precautions/Restrictions fall;swallow precautions  colostomy  -MG     Barriers to Rehab medically complex  -MG       Row Name 24 1543          Cognition    Orientation Status (Cognition) oriented x 4  -MG       Row Name 24 154          Safety Issues/Impairments Affecting Functional Mobility    Safety Issues Affecting Function (Mobility) insight into deficits/self-awareness;sequencing abilities;safety  precaution awareness;judgment  -MG     Impairments Affecting Function (Mobility) strength;balance;endurance/activity tolerance;pain;postural/trunk control;range of motion (ROM);grasp  -MG     Comment, Safety Issues/Impairments (Mobility) Gait belt and non-skid socks donned. RN present to help assist if needed.  -MG               User Key  (r) = Recorded By, (t) = Taken By, (c) = Cosigned By      Initials Name Provider Type    MG Liv Cruz, PT Physical Therapist                   Mobility       Row Name 11/29/24 1544          Bed Mobility    Rolling Left [place] (Bed Mobility) verbal cues;minimum assist (75% patient effort)  -MG     Supine-Sit [place] (Bed Mobility) moderate assist (50% patient effort);verbal cues  -MG     Assistive Device (Bed Mobility) head of bed elevated;bed rails  -MG     Comment, (Bed Mobility) Cont to require A at R shoulder d/t limited mvmt and pain. Moves LE off EOB himself. Primary assist at trunk to tsf to sitting.  -MG       Row Name 11/29/24 1544          Sit-Stand Transfer    Sit-Stand [place] (Transfers) moderate assist (50% patient effort);verbal cues  -MG     Assistive Device (Sit-Stand Transfers) walker, front-wheeled  -MG     Comment, (Sit-Stand Transfer) x1 elevated EOB. Assist w/ RUE placement on/off RW. Rocks fwd x3 prior to stand. Able to scoot out on EOB and place his feet w/ cues only today.  -MG       Row Name 11/29/24 1544          Gait/Stairs (Locomotion)    [place] Level (Gait) moderate assist (50% patient effort);verbal cues  -MG     Assistive Device (Gait) walker, front-wheeled  -MG     Distance in Feet (Gait) --  5 L side steps  -MG     Deviations/Abnormal Patterns (Gait) stride length decreased;gait speed decreased  -MG     Bilateral Gait Deviations forward flexed posture;heel strike decreased  -MG     Comment, (Gait/Stairs) 5 L side steps to the recliner. Did not require assist w/ weight shifting this date, only increased time to initiate  steps. PT in front for pt comfort as he is afraid of his feet sliding fwd. No overt LOB or knee buckling. HR increased to 131bpm at the highest.  -MG               User Key  (r) = Recorded By, (t) = Taken By, (c) = Cosigned By      Initials Name Provider Type    MG Liv Cruz, DOTTIE Physical Therapist                   Obj/Interventions       Row Name 11/29/24 1549          Shoulder (Therapeutic Exercise)    Shoulder (Therapeutic Exercise) other (see comments)  AP, LAQ, HF x10. Cues for full ROM.  -MG               User Key  (r) = Recorded By, (t) = Taken By, (c) = Cosigned By      Initials Name Provider Type    MG Liv Cruz, PT Physical Therapist                   Goals/Plan    No documentation.                  Clinical Impression       Row Name 11/29/24 1550          Pain    Pretreatment Pain Rating 3/10  -MG     Posttreatment Pain Rating 3/10  -MG     Pain Location knee;shoulder  -MG     Pain Side/Orientation right  -MG       Row Name 11/29/24 1550          Plan of Care Review    Plan of Care Reviewed With patient  -MG     Progress improving  -MG     Outcome Evaluation Pt seen for PT tx this PM and tolerated the session well, demoing some progress. Today, pt was Min/ModA for L log roll to sitting EOB, ModA for STS to RW and ModA for 5 L side steps from EOB to the chair w/ a RW. No knee buckling, SOB, dizziness or LOB. RN present to assist with mobility and for safety, but did not need second assist today. Noted pts HR highest at 131bpm during mobility. Once in the chair pt performed LE ther-ex for 10 reps. Encouraged pt to perform ther-ex whlie in the chair or bed and to increase the amount of times he tsfs to the chair during the day, as of now he is only tolerating 1x/day if that; pt v/u. Further discussed rehab and expectations. Encouragement provided to pt on his current mobility and medical status now compared to 3-4 weeks ago. KHLOE w/ RN on how to safely tsf pt w/ RW and assisting with placing his  RUE. PT will cont to follow and rec rehab at [place].  -MG       Row Name 11/29/24 1550          Therapy Assessment/Plan (PT)    Rehab Potential (PT) fair  -MG     Criteria for Skilled Interventions Met (PT) yes  -MG     Therapy Frequency (PT) 5 times/wk  -MG       Row Name 11/29/24 1550          Vital Signs    Pretreatment Heart Rate (beats/min) 104  -MG     Intratreatment Heart Rate (beats/min) 131  -MG     Posttreatment Heart Rate (beats/min) 113  -MG     Pre SpO2 (%) 100  -MG     O2 Delivery Pre Treatment room air  -MG     O2 Delivery Intra Treatment room air  -MG     Post SpO2 (%) 98  -MG     O2 Delivery Post Treatment room air  -MG     Pre Patient Position Supine  -MG     Intra Patient Position Standing  -MG     Post Patient Position Sitting  -MG       Row Name 11/29/24 4330          Positioning and Restraints    Pre-Treatment Position in bed  -MG     Post Treatment Position chair  -MG     In Chair notified nsg;reclined;call light within reach;encouraged to call for assist;exit alarm on;RUE elevated;legs elevated  Declined pillow under the legs to float his heels.  -MG               User Key  (r) = Recorded By, (t) = Taken By, (c) = Cosigned By      Initials Name Provider Type    MG Liv Cruz, PT Physical Therapist                   Outcome Measures       Row Name 11/29/24 1555 11/29/24 0845       How much help from another person do you currently need...    Turning from your back to your side while in flat bed without using bedrails? 3  -MG 3  -TF    Moving from lying on back to sitting on the side of a flat bed without bedrails? 2  -MG 3  -TF    Moving to and from a bed to a chair (including a wheelchair)? 2  -MG 2  -TF    Standing up from a chair using your arms (e.g., wheelchair, bedside chair)? 2  -MG 2  -TF    Climbing 3-5 steps with a railing? 1  -MG 2  -TF    To walk in hospital room? 2  -MG 2  -TF    AM-PAC 6 Clicks Score (PT) 12  -MG 14  -TF    Highest Level of Mobility Goal 4 --> Transfer to  chair/commode  -MG 4 --> Transfer to chair/commode  -TF              User Key  (r) = Recorded By, (t) = Taken By, (c) = Cosigned By      Initials Name Provider Type    Liv Sandoval, PT Physical Therapist    Autumn Cheatham, RN Registered Nurse                                 Physical Therapy Education       Title: PT OT SLP Therapies (Done)       Topic: Physical Therapy (Done)       Point: Mobility training (Done)       Learning Progress Summary            Patient Acceptance, E,TB,D, VU,NR by MG at 11/29/2024 1556    Acceptance, E,TB,D, VU,NR by MG at 11/27/2024 1134    Acceptance, E,D, VU,NR by MG at 11/22/2024 1340    Acceptance, E,D, VU,NR by MG at 11/21/2024 1659    Acceptance, E, VU by JS at 11/19/2024 1806    Acceptance, E,D, VU,NR by MG at 11/19/2024 1438    Acceptance, E, VU by JS at 11/16/2024 1757    Acceptance, E, VU by JS at 11/15/2024 1824    Acceptance, E,D,TB, VU,NR by MG at 11/15/2024 1218    Acceptance, E, VU,NR by MG at 11/12/2024 1559    Acceptance, E, VU by JS at 11/11/2024 1813    Acceptance, E, VU,NR by MG at 11/11/2024 1629    Acceptance, E, NR,VU by EF at 11/10/2024 1127    Acceptance, E, VU by JS at 11/8/2024 1820    Acceptance, E,D, VU,NR by MG at 11/8/2024 1459    Acceptance, E, VU by CW at 11/2/2024 1622                      Point: Home exercise program (Done)       Learning Progress Summary            Patient Acceptance, E,TB,D, VU,NR by MG at 11/29/2024 1556    Acceptance, E,TB,D, VU,NR by MG at 11/27/2024 1134    Acceptance, E,D, VU,NR by MG at 11/22/2024 1340    Acceptance, E, VU by JS at 11/19/2024 1806    Acceptance, E,D, VU,NR by MG at 11/19/2024 1438    Acceptance, E, VU by JS at 11/16/2024 1757    Acceptance, E, VU by JS at 11/15/2024 1824    Acceptance, E,D,TB, VU,NR by MG at 11/15/2024 1218    Acceptance, E, VU,NR by MG at 11/12/2024 1559    Acceptance, E, VU by JS at 11/11/2024 1813    Acceptance, E, VU,NR by MG at 11/11/2024 1629    Acceptance, E, VU by JS at 11/8/2024 1820                       Point: Body mechanics (Done)       Learning Progress Summary            Patient Acceptance, E,TB,D, VU,NR by MG at 11/29/2024 1556    Acceptance, E,TB,D, VU,NR by MG at 11/27/2024 1134    Acceptance, E,D, VU,NR by MG at 11/22/2024 1340    Acceptance, E,D, VU,NR by MG at 11/21/2024 1659    Acceptance, E, VU by JS at 11/19/2024 1806    Acceptance, E,D, VU,NR by MG at 11/19/2024 1438    Acceptance, E, VU by JS at 11/16/2024 1757    Acceptance, E, VU by JS at 11/15/2024 1824    Acceptance, E,D,TB, VU,NR by MG at 11/15/2024 1218    Acceptance, E, VU,NR by MG at 11/12/2024 1559    Acceptance, E, VU by JS at 11/11/2024 1813    Acceptance, E, VU,NR by MG at 11/11/2024 1629    Acceptance, E, NR,VU by EF at 11/10/2024 1127    Acceptance, E, VU by JS at 11/8/2024 1820    Acceptance, E,D, VU,NR by MG at 11/8/2024 1459    Acceptance, E, VU by CW at 11/2/2024 1622                      Point: Precautions (Done)       Learning Progress Summary            Patient Acceptance, E,TB,D, VU,NR by MG at 11/29/2024 1556    Acceptance, E,TB,D, VU,NR by MG at 11/27/2024 1134    Acceptance, E,D, VU,NR by MG at 11/22/2024 1340    Acceptance, E,D, VU,NR by MG at 11/21/2024 1659    Acceptance, E, VU by JS at 11/19/2024 1806    Acceptance, E,D, VU,NR by MG at 11/19/2024 1438    Acceptance, E, VU by JS at 11/16/2024 1757    Acceptance, E, VU by JS at 11/15/2024 1824    Acceptance, E,D,TB, VU,NR by MG at 11/15/2024 1218    Acceptance, E, VU,NR by MG at 11/12/2024 1559    Acceptance, E, VU by JS at 11/11/2024 1813    Acceptance, E, VU,NR by MG at 11/11/2024 1629    Acceptance, E, VU by JS at 11/8/2024 1820    Acceptance, E,D, VU,NR by MG at 11/8/2024 1459    Acceptance, E, VU by CW at 11/2/2024 1622                                      User Key       Initials Effective Dates Name Provider Type Discipline    EF 05/31/24 -  Sabine Tineo, PT Physical Therapist PT    MG 05/24/22 -  Liv Cruz, PT Physical Therapist PT     CW 12/13/22 -  Lisa Paulson, PT Physical Therapist PT    JS 02/26/24 -  Johnson Morales, RN Registered Nurse Nurse                  PT Recommendation and Plan     Progress: improving  Outcome Evaluation: Pt seen for PT tx this PM and tolerated the session well, demoing some progress. Today, pt was Min/ModA for L log roll to sitting EOB, ModA for STS to RW and ModA for 5 L side steps from EOB to the chair w/ a RW. No knee buckling, SOB, dizziness or LOB. RN present to assist with mobility and for safety, but did not need second assist today. Noted pts HR highest at 131bpm during mobility. Once in the chair pt performed LE ther-ex for 10 reps. Encouraged pt to perform ther-ex whlie in the chair or bed and to increase the amount of times he tsfs to the chair during the day, as of now he is only tolerating 1x/day if that; pt v/u. Further discussed rehab and expectations. Encouragement provided to pt on his current mobility and medical status now compared to 3-4 weeks ago. KHLOE w/ RN on how to safely tsf pt w/ RW and assisting with placing his RUE. PT will cont to follow and rec rehab at [place].     Time Calculation:         PT Charges       Row Name 11/29/24 1556             Time Calculation    Start Time 1454  -MG      Stop Time 1520  -MG      Time Calculation (min) 26 min  -MG      PT Received On 11/29/24  -MG      PT - Next Appointment 12/02/24  -MG                User Key  (r) = Recorded By, (t) = Taken By, (c) = Cosigned By      Initials Name Provider Type    MG Liv Cruz PT Physical Therapist                  Therapy Charges for Today       Code Description Service Date Service Provider Modifiers Qty    47499787036 HC PT THERAPEUTIC ACT EA 15 MIN 11/29/2024 Liv Cruz, PT GP 1    99982522738 HC PT THER PROC EA 15 MIN 11/29/2024 Liv Cruz, PT GP 1            PT G-Codes  Outcome Measure Options: AM-PAC 6 Clicks Daily Activity (OT)  AM-PAC 6 Clicks Score (PT): 12  AM-PAC 6 Clicks Score (OT):  13  Modified Bienville Scale: 5 - Severe disability.  Bedridden, incontinent, and requiring constant nursing care and attention.  PT Discharge Summary  Anticipated Discharge Disposition (PT): skilled nursing facility    Liv Cruz, PT  11/29/2024

## 2024-11-30 VITALS
RESPIRATION RATE: 16 BRPM | BODY MASS INDEX: 23.47 KG/M2 | DIASTOLIC BLOOD PRESSURE: 63 MMHG | TEMPERATURE: 98 F | HEIGHT: 72 IN | HEART RATE: 101 BPM | OXYGEN SATURATION: 100 % | WEIGHT: 173.28 LBS | SYSTOLIC BLOOD PRESSURE: 119 MMHG

## 2024-11-30 LAB
ALBUMIN SERPL-MCNC: 3 G/DL (ref 3.5–5.2)
ALBUMIN/GLOB SERPL: 0.8 G/DL
ALP SERPL-CCNC: 139 U/L (ref 39–117)
ALT SERPL W P-5'-P-CCNC: 24 U/L (ref 1–41)
ANION GAP SERPL CALCULATED.3IONS-SCNC: 14.1 MMOL/L (ref 5–15)
AST SERPL-CCNC: 19 U/L (ref 1–40)
BASOPHILS # BLD AUTO: 0.07 10*3/MM3 (ref 0–0.2)
BASOPHILS NFR BLD AUTO: 1.1 % (ref 0–1.5)
BILIRUB SERPL-MCNC: 0.4 MG/DL (ref 0–1.2)
BUN SERPL-MCNC: 17 MG/DL (ref 8–23)
BUN/CREAT SERPL: 30.9 (ref 7–25)
CALCIUM SPEC-SCNC: 9.3 MG/DL (ref 8.6–10.5)
CHLORIDE SERPL-SCNC: 98 MMOL/L (ref 98–107)
CO2 SERPL-SCNC: 21.9 MMOL/L (ref 22–29)
CREAT SERPL-MCNC: 0.55 MG/DL (ref 0.76–1.27)
DEPRECATED RDW RBC AUTO: 50.1 FL (ref 37–54)
EGFRCR SERPLBLD CKD-EPI 2021: 105.3 ML/MIN/1.73
EOSINOPHIL # BLD AUTO: 0.53 10*3/MM3 (ref 0–0.4)
EOSINOPHIL NFR BLD AUTO: 8.3 % (ref 0.3–6.2)
ERYTHROCYTE [DISTWIDTH] IN BLOOD BY AUTOMATED COUNT: 16.3 % (ref 12.3–15.4)
GLOBULIN UR ELPH-MCNC: 4 GM/DL
GLUCOSE BLDC GLUCOMTR-MCNC: 108 MG/DL (ref 70–130)
GLUCOSE BLDC GLUCOMTR-MCNC: 108 MG/DL (ref 70–130)
GLUCOSE BLDC GLUCOMTR-MCNC: 117 MG/DL (ref 70–130)
GLUCOSE SERPL-MCNC: 104 MG/DL (ref 65–99)
HCT VFR BLD AUTO: 31.9 % (ref 37.5–51)
HGB BLD-MCNC: 10.3 G/DL (ref 13–17.7)
IMM GRANULOCYTES # BLD AUTO: 0.04 10*3/MM3 (ref 0–0.05)
IMM GRANULOCYTES NFR BLD AUTO: 0.6 % (ref 0–0.5)
LYMPHOCYTES # BLD AUTO: 1.4 10*3/MM3 (ref 0.7–3.1)
LYMPHOCYTES NFR BLD AUTO: 21.9 % (ref 19.6–45.3)
MCH RBC QN AUTO: 27.2 PG (ref 26.6–33)
MCHC RBC AUTO-ENTMCNC: 32.3 G/DL (ref 31.5–35.7)
MCV RBC AUTO: 84.2 FL (ref 79–97)
MONOCYTES # BLD AUTO: 0.67 10*3/MM3 (ref 0.1–0.9)
MONOCYTES NFR BLD AUTO: 10.5 % (ref 5–12)
NEUTROPHILS NFR BLD AUTO: 3.69 10*3/MM3 (ref 1.7–7)
NEUTROPHILS NFR BLD AUTO: 57.6 % (ref 42.7–76)
NRBC BLD AUTO-RTO: 0 /100 WBC (ref 0–0.2)
PHOSPHATE SERPL-MCNC: 4.3 MG/DL (ref 2.5–4.5)
PLATELET # BLD AUTO: 607 10*3/MM3 (ref 140–450)
PMV BLD AUTO: 8.1 FL (ref 6–12)
POTASSIUM SERPL-SCNC: 4.1 MMOL/L (ref 3.5–5.2)
PROT SERPL-MCNC: 7 G/DL (ref 6–8.5)
RBC # BLD AUTO: 3.79 10*6/MM3 (ref 4.14–5.8)
SODIUM SERPL-SCNC: 134 MMOL/L (ref 136–145)
WBC NRBC COR # BLD AUTO: 6.4 10*3/MM3 (ref 3.4–10.8)

## 2024-11-30 PROCEDURE — 82948 REAGENT STRIP/BLOOD GLUCOSE: CPT

## 2024-11-30 PROCEDURE — 85025 COMPLETE CBC W/AUTO DIFF WBC: CPT | Performed by: INTERNAL MEDICINE

## 2024-11-30 PROCEDURE — 25010000002 ENOXAPARIN PER 10 MG: Performed by: INTERNAL MEDICINE

## 2024-11-30 PROCEDURE — 84100 ASSAY OF PHOSPHORUS: CPT | Performed by: INTERNAL MEDICINE

## 2024-11-30 PROCEDURE — 80053 COMPREHEN METABOLIC PANEL: CPT | Performed by: INTERNAL MEDICINE

## 2024-11-30 RX ORDER — LIDOCAINE 4 G/G
2 PATCH TOPICAL
Start: 2024-12-01

## 2024-11-30 RX ORDER — SODIUM CHLORIDE 1 G/1
1 TABLET ORAL 2 TIMES DAILY WITH MEALS
Start: 2024-11-30

## 2024-11-30 RX ORDER — CHLORHEXIDINE GLUCONATE ORAL RINSE 1.2 MG/ML
15 SOLUTION DENTAL EVERY 12 HOURS SCHEDULED
Start: 2024-11-30

## 2024-11-30 RX ORDER — FOLIC ACID 1 MG/1
1 TABLET ORAL DAILY
Start: 2024-12-01

## 2024-11-30 RX ORDER — INSULIN LISPRO 100 [IU]/ML
2-9 INJECTION, SOLUTION INTRAVENOUS; SUBCUTANEOUS
Start: 2024-11-30

## 2024-11-30 RX ORDER — ACETAMINOPHEN 325 MG/1
650 TABLET ORAL EVERY 4 HOURS PRN
Start: 2024-11-30

## 2024-11-30 RX ORDER — HYDROCODONE BITARTRATE AND ACETAMINOPHEN 5; 325 MG/1; MG/1
1 TABLET ORAL EVERY 6 HOURS PRN
Qty: 8 TABLET | Refills: 0 | Status: SHIPPED | OUTPATIENT
Start: 2024-11-30

## 2024-11-30 RX ORDER — IPRATROPIUM BROMIDE AND ALBUTEROL SULFATE 2.5; .5 MG/3ML; MG/3ML
3 SOLUTION RESPIRATORY (INHALATION) EVERY 4 HOURS PRN
Start: 2024-11-30

## 2024-11-30 RX ORDER — SODIUM CHLORIDE 1 G/1
1 TABLET ORAL 2 TIMES DAILY WITH MEALS
Status: DISCONTINUED | OUTPATIENT
Start: 2024-11-30 | End: 2024-11-30 | Stop reason: HOSPADM

## 2024-11-30 RX ORDER — LANOLIN ALCOHOL/MO/W.PET/CERES
100 CREAM (GRAM) TOPICAL DAILY
Start: 2024-12-01

## 2024-11-30 RX ORDER — LANSOPRAZOLE 30 MG/1
30 TABLET, ORALLY DISINTEGRATING, DELAYED RELEASE ORAL
Start: 2024-12-01

## 2024-11-30 RX ORDER — TAMSULOSIN HYDROCHLORIDE 0.4 MG/1
0.4 CAPSULE ORAL DAILY
Start: 2024-12-01

## 2024-11-30 RX ADMIN — FOLIC ACID 1 MG: 1 TABLET ORAL at 09:10

## 2024-11-30 RX ADMIN — DICLOFENAC SODIUM 4 G: 10 GEL TOPICAL at 12:00

## 2024-11-30 RX ADMIN — HYDROCODONE BITARTRATE AND ACETAMINOPHEN 1 TABLET: 5; 325 TABLET ORAL at 19:20

## 2024-11-30 RX ADMIN — DICLOFENAC SODIUM 4 G: 10 GEL TOPICAL at 19:20

## 2024-11-30 RX ADMIN — THIAMINE HCL TAB 100 MG 100 MG: 100 TAB at 09:10

## 2024-11-30 RX ADMIN — SODIUM CHLORIDE 1 G: 1 TABLET ORAL at 19:20

## 2024-11-30 RX ADMIN — DICLOFENAC SODIUM 4 G: 10 GEL TOPICAL at 09:10

## 2024-11-30 RX ADMIN — ENOXAPARIN SODIUM 90 MG: 100 INJECTION SUBCUTANEOUS at 06:23

## 2024-11-30 RX ADMIN — Medication 10 ML: at 09:10

## 2024-11-30 RX ADMIN — TAMSULOSIN HYDROCHLORIDE 0.4 MG: 0.4 CAPSULE ORAL at 09:10

## 2024-11-30 RX ADMIN — LANSOPRAZOLE 30 MG: 15 TABLET, ORALLY DISINTEGRATING ORAL at 06:23

## 2024-11-30 NOTE — CASE MANAGEMENT/SOCIAL WORK
"Physicians Statement of Medical Necessity for  Ambulance Transportation    GENERAL INFORMATION     Name: Arsh Haynes  YOB: 1950  Medicare #: [place] [facility] [id number]  Transport Date: 11/30/24 (Valid for round trips this date, or for scheduled repetitive trips for 60 days from the date signed below.)  Origin: [facility]    Destination: [facility], [address] [place] [address], [facility]  Is the Patient's stay covered under Medicare Part A (PPS/DRG?)Yes  Closest appropriate facility? Yes  If this a hosp-hosp transfer? No  Is this a hospice patient? No    MEDICAL NECESSITY QUESTIONAIRE    Ambulance Transportation is medically necessary only if other means of transportation are contraindicated or would be potentially harmful to the patient.  To meet this requirement, the patient must be either \"bed confined\" or suffer from a condition such that transport by means other than an ambulance is contraindicated by the patient's condition.  The following questions must be answered by the healthcare professional signing below for this form to be valid:     1) Describe the MEDICAL CONDITION (physical and/or mental) of this patient AT THE TIME OF AMBULANCE TRANSPORT that requires the patient to be transported in an ambulance, and why transport by other means is contraindicated by the patient's condition: unable to ambulate, confused  Past Medical History:   Diagnosis Date    HTN (hypertension)     Hypercholesterolemia       Past Surgical History:   Procedure Laterality Date    COLON RESECTION N/A 10/26/2024    Procedure: LOW ANTERIOR COLON RESECTION SIGMOID, COLOSTOMY, SPLENIC FLEXURE MOBILIZATION;  Surgeon: Mc Guillaume MD;  Location: [facility] OR;  Service: General;  Laterality: N/A;    EXPLORATORY LAPAROTOMY N/A 10/26/2024    Procedure: LAPAROTOMY EXPLORATORY, LEFT HEMICOLECTOMY;  Surgeon: Mc Guillaume MD;  Location: [facility] OR;  Service: General;  Laterality: N/A;      2) Is this patient " "\"bed confined\" as defined below?Yes   To be \"bed confined\" the patient must satisfy all three of the following criteria:  (1) unable to get up from bed without assistance; AND (2) unable to ambulate;  AND (3) unable to sit in a chair or wheelchair.  3) Can this patient safely be transported by car or wheelchair van (I.e., may safely sit during transport, without an attendant or monitoring?)No   4. In addition to completing questions 1-3 above, please check any of the following conditions that apply*:          *Note: supporting documentation for any boxes checked must be maintained in the patient's medical records Patient is confused, Medical attendant required, and Unable to tolerate seated position for time needed to transport      SIGNATURE OF PHYSICIAN OR OTHER AUTHORIZED HEALTHCARE PROFESSIONAL    I certify that the above information is true and correct based on my evaluation of this patient, and represent that the patient requires transport by ambulance and that other forms of transport are contraindicated.  I understand that this information will be used by the Centers for Medicare and Medicaid Services (CMS) to support the determiniation of medical necessity for ambulance services, and I represent that I have personal knowledge of the patient's condition at the time of transport.     X   If this box is checked, I also certify that the patient is physically or mentally incapable of signing the ambulance service's claim form and that the institution with which I am affiliated has furnished care, services or assistance to the patient.  My signature below is made on behalf of the patient pursuant to 42 .36(b)(4). In accordance with 42 .37, the specific reason(s) that the patient is physically or mentally incapable of signing the claim for is as follows: confused    Signature of Physician or Healthcare Professional  Date/Time:   11/30/24  0318     (For Scheduled repetitive transport, this form is not " valid for transports performed more than 60 days after this date).                                                                                                                                            --------------------------------------------------------------------------------------------  Printed Name and Credentials of Physician or Authorized Healthcare Professional     *Form must be signed by patient's attending physician for scheduled, repetitive transports,.  For non-repetitive ambulance transports, if unable to obtain the signature of the attending physician, any of the following may sign (please select below):     Physician  Clinical Nurse Specialist  Registered Nurse     Physician Assistant  Discharge Planner  Licensed Practical Nurse     Nurse Practitioner

## 2024-11-30 NOTE — PLAN OF CARE
Goal Outcome Evaluation:      Pt resting in bed at this time. VSS. On RA. SR/ST on telemetry. No c/o pain this shift. Plan of care ongoing.

## 2024-11-30 NOTE — PLAN OF CARE
Pt resting most of morning, bs mgmt, turn Q2      Problem: Skin Injury Risk Increased  Goal: Skin Health and Integrity  Outcome: Progressing  Intervention: Optimize Skin Protection  Recent Flowsheet Documentation  Taken 11/30/2024 1400 by Adeola Moralez RN  Activity Management: activity encouraged  Head of Bed (HOB) Positioning: HOB elevated  Taken 11/30/2024 1200 by Adeoal Moralez RN  Head of Bed (HOB) Positioning: HOB elevated  Taken 11/30/2024 1000 by Adeola Moralez RN  Head of Bed (HOB) Positioning: HOB elevated  Taken 11/30/2024 0910 by Adeola Moralez RN  Activity Management: activity encouraged  Pressure Reduction Techniques: frequent weight shift encouraged  Head of Bed (HOB) Positioning: HOB elevated  Pressure Reduction Devices: specialty bed utilized  Skin Protection: incontinence pads utilized     Problem: Fall Injury Risk  Goal: Absence of Fall and Fall-Related Injury  Outcome: Progressing  Intervention: Identify and Manage Contributors  Recent Flowsheet Documentation  Taken 11/30/2024 1400 by Adeola Moralez RN  Medication Review/Management: medications reviewed  Self-Care Promotion:   independence encouraged   BADL personal objects within reach   BADL personal routines maintained  Taken 11/30/2024 0910 by Adeola Moralez RN  Medication Review/Management: medications reviewed  Self-Care Promotion:   independence encouraged   BADL personal objects within reach   BADL personal routines maintained  Intervention: Promote Injury-Free Environment  Recent Flowsheet Documentation  Taken 11/30/2024 1400 by dAeola Moralez RN  Safety Promotion/Fall Prevention:   activity supervised   assistive device/personal items within reach   clutter free environment maintained   fall prevention program maintained   nonskid shoes/slippers when out of bed   room organization consistent   safety round/check completed  Taken 11/30/2024 0910 by Adeola Moralez RN  Safety Promotion/Fall Prevention:   activity  supervised   assistive device/personal items within reach   clutter free environment maintained   fall prevention program maintained   nonskid shoes/slippers when out of bed   room organization consistent   safety round/check completed     Problem: Adult Inpatient Plan of Care  Goal: Plan of Care Review  Outcome: Progressing  Goal: Patient-Specific Goal (Individualized)  Outcome: Progressing  Goal: Absence of Hospital-Acquired Illness or Injury  Outcome: Progressing  Intervention: Identify and Manage Fall Risk  Recent Flowsheet Documentation  Taken 11/30/2024 1400 by Adeola Moralez RN  Safety Promotion/Fall Prevention:   activity supervised   assistive device/personal items within reach   clutter free environment maintained   fall prevention program maintained   nonskid shoes/slippers when out of bed   room organization consistent   safety round/check completed  Taken 11/30/2024 0910 by Adeola Moralez RN  Safety Promotion/Fall Prevention:   activity supervised   assistive device/personal items within reach   clutter free environment maintained   fall prevention program maintained   nonskid shoes/slippers when out of bed   room organization consistent   safety round/check completed  Intervention: Prevent Skin Injury  Recent Flowsheet Documentation  Taken 11/30/2024 1400 by Adeola Moralez RN  Body Position: sitting up in bed  Taken 11/30/2024 1200 by Adeola Moralez RN  Body Position: turned  Taken 11/30/2024 1000 by Adeola Moralez RN  Body Position: sitting up in bed  Taken 11/30/2024 0910 by Adeola Moralez RN  Body Position: supine  Skin Protection: incontinence pads utilized  Intervention: Prevent and Manage VTE (Venous Thromboembolism) Risk  Recent Flowsheet Documentation  Taken 11/30/2024 0910 by Adeola Moralez RN  VTE Prevention/Management: (Lovenox) other (see comments)  Intervention: Prevent Infection  Recent Flowsheet Documentation  Taken 11/30/2024 0910 by Adeola Moralez RN  Infection  Prevention:   rest/sleep promoted   single patient room provided  Goal: Optimal Comfort and Wellbeing  Outcome: Progressing  Intervention: Monitor Pain and Promote Comfort  Recent Flowsheet Documentation  Taken 11/30/2024 0910 by Adeola Moralez RN  Pain Management Interventions: pain management plan reviewed with patient/caregiver  Intervention: Provide Person-Centered Care  Recent Flowsheet Documentation  Taken 11/30/2024 1400 by Adeola Moralez RN  Trust Relationship/Rapport: care explained  Taken 11/30/2024 0910 by Adeola Moralez RN  Trust Relationship/Rapport:   care explained   thoughts/feelings acknowledged   reassurance provided   questions encouraged   questions answered   emotional support provided  Goal: Readiness for Transition of Care  Outcome: Progressing     Problem: Malnutrition  Goal: Improved Nutritional Intake  Outcome: Progressing     Problem: Sepsis/Septic Shock  Goal: Optimal Coping  Outcome: Progressing  Intervention: Support Patient and Family Response  Recent Flowsheet Documentation  Taken 11/30/2024 1400 by Adeola Moralez RN  Family/Support System Care:   self-care encouraged   presence promoted  Taken 11/30/2024 0910 by Adeola Moralez RN  Family/Support System Care:   self-care encouraged   presence promoted  Goal: Absence of Bleeding  Outcome: Progressing  Goal: Blood Glucose Level Within Target Range  Outcome: Progressing  Intervention: Optimize Glycemic Control  Recent Flowsheet Documentation  Taken 11/30/2024 0910 by Adeola Moralez RN  Hyperglycemia Management: blood glucose monitored  Goal: Absence of Infection Signs and Symptoms  Outcome: Progressing  Intervention: Initiate Sepsis Management  Recent Flowsheet Documentation  Taken 11/30/2024 0910 by Adeola Moralez RN  Infection Prevention:   rest/sleep promoted   single patient room provided  Intervention: Promote Recovery  Recent Flowsheet Documentation  Taken 11/30/2024 1400 by Adeola Moralez RN  Activity Management:  activity encouraged  Taken 11/30/2024 0910 by Adeola Moralez RN  Activity Management: activity encouraged  Goal: Optimal Nutrition Delivery  Outcome: Progressing     Problem: Alcohol Withdrawal  Goal: Alcohol Withdrawal Symptom Control  Outcome: Progressing  Goal: Optimal Neurologic Function  Outcome: Progressing     Problem: VTE (Venous Thromboembolism)  Goal: Tissue Perfusion  Outcome: Progressing  Intervention: Optimize Tissue Perfusion  Recent Flowsheet Documentation  Taken 11/30/2024 0910 by Adeola Moralez RN  VTE Prevention/Management: (Lovenox) other (see comments)  Goal: Optimal Right Ventricular Function  Outcome: Progressing     Problem: Restraint, Nonviolent  Goal: Absence of Harm or Injury  Outcome: Progressing  Intervention: Implement Least Restrictive Safety Strategies  Recent Flowsheet Documentation  Taken 11/30/2024 1400 by Adeola Moralez RN  Diversional Activities:   television   smartphone  Taken 11/30/2024 0910 by Adeola Moralez RN  Diversional Activities: television  Intervention: Protect Dignity, Rights and Personal Wellbeing  Recent Flowsheet Documentation  Taken 11/30/2024 1400 by Adeola Moralez RN  Trust Relationship/Rapport: care explained  Taken 11/30/2024 0910 by Adeola Moralez RN  Trust Relationship/Rapport:   care explained   thoughts/feelings acknowledged   reassurance provided   questions encouraged   questions answered   emotional support provided  Intervention: Protect Skin and Joint Integrity  Recent Flowsheet Documentation  Taken 11/30/2024 1400 by Adeola Moralez RN  Body Position: sitting up in bed  Taken 11/30/2024 1200 by Adeola Moralez RN  Body Position: turned  Taken 11/30/2024 1000 by Adeola Moralez RN  Body Position: sitting up in bed  Taken 11/30/2024 0910 by Adeola Moralez RN  Body Position: supine  Skin Protection: incontinence pads utilized  Range of Motion: active ROM (range of motion) encouraged     Problem: Mechanical Ventilation Invasive  Goal:  Effective Communication  Outcome: Progressing  Intervention: Ensure Effective Communication  Recent Flowsheet Documentation  Taken 11/30/2024 1400 by Adeola Moralez RN  Trust Relationship/Rapport: care explained  Diversional Activities:   television   smartphone  Family/Support System Care:   self-care encouraged   presence promoted  Taken 11/30/2024 0910 by Adeola Moralez RN  Trust Relationship/Rapport:   care explained   thoughts/feelings acknowledged   reassurance provided   questions encouraged   questions answered   emotional support provided  Diversional Activities: television  Family/Support System Care:   self-care encouraged   presence promoted  Goal: Optimal Device Function  Outcome: Progressing  Goal: Mechanical Ventilation Liberation  Outcome: Progressing  Intervention: Promote Extubation and Mechanical Ventilation Liberation  Recent Flowsheet Documentation  Taken 11/30/2024 1400 by Adeola Moralez RN  Medication Review/Management: medications reviewed  Taken 11/30/2024 0910 by Adeola Moralez RN  Medication Review/Management: medications reviewed  Goal: Optimal Nutrition Delivery  Outcome: Progressing  Goal: Absence of Device-Related Skin and Tissue Injury  Outcome: Progressing  Goal: Absence of Ventilator-Induced Lung Injury  Outcome: Progressing  Intervention: Prevent Ventilator-Associated Pneumonia  Recent Flowsheet Documentation  Taken 11/30/2024 1400 by Adeola Moralez RN  Head of Bed (HOB) Positioning: HOB elevated  Taken 11/30/2024 1200 by Adeola Moralez RN  Head of Bed (HOB) Positioning: HOB elevated  Taken 11/30/2024 1000 by Adeola Moralez RN  Head of Bed (HOB) Positioning: HOB elevated  Taken 11/30/2024 0910 by Adeola Moralez RN  Head of Bed (HOB) Positioning: HOB elevated     Problem: Enteral Nutrition  Goal: Absence of Aspiration Signs and Symptoms  Outcome: Progressing  Intervention: Minimize Aspiration Risk  Recent Flowsheet Documentation  Taken 11/30/2024 1400 by Kirt  Adeola BECKER RN  Head of Bed (HOB) Positioning: HOB elevated  Taken 11/30/2024 1200 by Adeola Moralez RN  Head of Bed (HOB) Positioning: HOB elevated  Taken 11/30/2024 1000 by Adeola Moralez RN  Head of Bed (HOB) Positioning: HOB elevated  Taken 11/30/2024 0910 by Adeola Moralez RN  Head of Bed (HOB) Positioning: HOB elevated  Goal: Safe, Effective Therapy Delivery  Outcome: Progressing  Goal: Feeding Tolerance  Outcome: Progressing   Goal Outcome Evaluation:

## 2024-11-30 NOTE — CASE MANAGEMENT/SOCIAL WORK
Continued Stay Note  [facility]     Patient Name: Arsh Haynes  MRN: [id number]  Today's Date: 11/30/2024    Admit Date: 10/26/2024    Plan: SNF at [facility] scheduled for this evening at 8PM   Discharge Plan       Row Name 11/30/24 1252       Plan    Plan SNF at [facility] scheduled for this evening at 8PM    Patient/Family in Agreement with Plan unable to assess    Plan Comments Inbound call from staff RN stating pt has DC orders for today and needs transport to [facility]. Called [facility] to make arrangements. Run scheduled for 8PM. Called and updated staff RN. Generic VMM for pt's spouse to update her.......JW                   Discharge Codes    No documentation.                 Expected Discharge Date and Time       Expected Discharge Date Expected Discharge Time    Nov 30, 2024               Melody Davison, RN

## 2024-11-30 NOTE — DISCHARGE SUMMARY
NAME: Arsh Haynes ADMIT: 10/26/2024   : 1950  PCP: Provider, No Known    MRN: [id number] LOS: 35 days   AGE/SEX: 74 y.o. male  ROOM: [facility]     Date of Admission:  10/26/2024  Date of Discharge:  2024    PCP: Provider, No Known    CHIEF COMPLAINT  Abdominal Pain and Hypotension      DISCHARGE DIAGNOSIS  Active Hospital Problems    Diagnosis  POA    **Peritonitis [K65.9]  Unknown    Oropharyngeal dysphagia [R13.12]  Unknown    HTN (hypertension) [I10]  Unknown    Thrombocytosis [D75.839]  Unknown    Acute DVT (deep venous thrombosis) [I82.409]  Unknown    Renal mass [N28.89]  Unknown    Hyponatremia [E87.1]  Unknown    Anemia [D64.9]  Unknown    Moderate protein-calorie malnutrition [E44.0]  Yes    Colon cancer [C18.9]  No      Resolved Hospital Problems    Diagnosis Date Resolved POA    Perforation of sigmoid colon [K63.1] 10/27/2024 Yes       SECONDARY DIAGNOSES  Past Medical History:   Diagnosis Date    HTN (hypertension)     Hypercholesterolemia        CONSULTS   Pulmonary/CC  Surgery  Urology  ID  Oncology  Orthopedics  Nephrology  Cardiology    HOSPITAL COURSE  Patient is a 74 y.o. male who was initially admitted to the ICU for peritonitis and septic shock from perforated rectosigmoid colon cancer. He has had a extended hospitalization with feculent peritonitis status post open left hemicolectomy with Juliana pouch and end colostomy on 10/26/2024.  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 He had prolonged stay in the ICU.     He has had a lot of complications including leukocytosis, tachycardia, DVT, anemia, thrombocytosis, electrolyte abnormalities.  CT scan revealed likely renal cell carcinoma.  Urology is consulted and they will follow-up on the outpatient setting.  Oncology already following.  He did changes his code status to DNR/DNI. He had evidence for heart block. Cardiology recommended pacemaker placement but  patient declined this at this time. Have stopped metoprolol.     He's had a rough month + hospitalization but has stabilized and can be discharged to rehab and outpatient follow up with many specialists. Obviously his health is poor and he will have some rehab to do as well as risk for worsening status in the future but he is ready to get to rehab today. Continue wet-to-dry dressing changes daily as per wound care recommendations         11/29/24 1800   Dietary Nutrition Supplements Beneprotein  Daily With Lunch & Dinner      Comments: Mix pudding with 2 scoops of beneprotein.   Question:  Select Supplement:  Answer:  Beneprotein    11/29/24 1608      11/25/24 1800   Dietary Nutrition Supplements Mighty Shake  Daily With Breakfast, Lunch & Dinner      Question:  Select Supplement:  Answer:  Mighty Shake    11/25/24 1604     11/25/24 0720   Diet: Regular/House, Fluid Restriction (240 mL/tray); 1500 mL/day; No Mixed Consistencies, No Straw; Texture: Mechanical Ground (NDD 2); Fluid Consistency: Nectar Thick  Diet Effective Now        References:    Diet Order [place]   Question Answer Comment   Diets: Regular/House     Diets: Fluid Restriction (240 mL/tray)     Fluid Restriction Diet (240 mL/tray): 1500 mL/day     Diet Modifiers / Additional Diets: No Mixed Consistencies     Diet Modifiers / Additional Diets: No Straw     Texture: Mechanical Ground (NDD 2)     Fluid Consistency: Nectar Thick                 DIAGNOSTICS    11/30/2024 0506 11/30/2024 0703 Comprehensive Metabolic Panel [[id number]]    (Abnormal)   Blood    Final result Component Value Units   Glucose 104 High  mg/dL   BUN 17 mg/dL   Creatinine 0.55 Low  mg/dL   Sodium 134 Low  mmol/L   Potassium 4.1 mmol/L   Chloride 98 mmol/L   CO2 21.9 Low  mmol/L   Calcium 9.3 mg/dL   Total Protein 7.0 g/dL   Albumin 3.0 Low  g/dL   ALT (SGPT) 24 U/L   AST (SGOT) 19 U/L   Alkaline Phosphatase 139 High  U/L   Total Bilirubin 0.4 mg/dL   Globulin 4.0 gm/dL   A/G Ratio  0.8 g/dL   BUN/Creatinine Ratio 30.9 High     Anion Gap 14.1 mmol/L   eGFR 105.3 mL/min/1.73          11/30/2024 0506 11/30/2024 0550 CBC Auto Differential [[id number]]   (Abnormal)   Blood    Final result Component Value Units   WBC 6.40 10*3/mm3   RBC 3.79 Low  10*6/mm3   Hemoglobin 10.3 Low  g/dL   Hematocrit 31.9 Low  %   MCV 84.2 fL   MCH 27.2 pg   MCHC 32.3 g/dL   RDW 16.3 High  %   RDW-SD 50.1 fl   MPV 8.1 fL   Platelets 607 High  10*3/mm3   Neutrophil % 57.6 %   Lymphocyte % 21.9 %   Monocyte % 10.5 %   Eosinophil % 8.3 High  %   Basophil % 1.1 %   Immature Grans % 0.6 High  %   Neutrophils, Absolute 3.69 10*3/mm3         MRI Brain With & Without Contrast [[id number]] James as Reviewed   Order Status: Completed Collected: 11/26/24 0414    Updated: 11/26/24 0423   Narrative:     BRAIN MRI WITH AND WITHOUT CONTRAST     HISTORY: Altered mental status with history of adenocarcinoma, rule out  mets; K63.1-Perforation of intestine (nontraumatic); I95.9-Hypotension,  unspecified; R79.89-Other specified abnormal findings of blood  chemistry; K63.1-Perforation of intestine (nontraumatic)     COMPARISON: November 25, 2024.     FINDINGS:  Multiplanar images of the head were obtained without and with  gadolinium. No areas of restricted diffusion are seen to suggest acute  infarct. There is atrophy. There is periventricular and deep white  matter microangiopathic change. Intracranial flow voids appear intact.  No abnormality is seen on susceptibility weighted imaging. No definite  abnormal enhancement is seen. Mucous retention cyst is noted within the  left maxillary sinus. There is some minimal mucosal thickening within  the ethmoid sinuses, and there is also an additional small mucous  retention cyst within the right maxillary sinus prior study demonstrated  a small ovoid mixed lucent and sclerotic focus in the lateral left  frontal bone. This is again seen. There may be a tiny focus of internal  enhancement, but it is  still favored to be benign.      Impression:     1. No acute intracranial abnormality.  No abnormal enhancement.        This report was finalized on 11/26/2024 4:19 AM by Dr. Lori Belle M.D on Workstation: [id number]       CT Head Without Contrast [[id number]] James as Reviewed   Order Status: Completed Collected: 11/25/24 1340    Updated: 11/26/24 0605   Narrative:     STAT CT SCAN OF THE HEAD WITHOUT CONTRAST ON 11/25/2024     CLINICAL HISTORY: This is a 74-year-old male patient with a history of  altered mental status and the patient is on blood thinners.     TECHNIQUE: Spiral CT images were obtained from the base of the skull to  the vertex without intravenous contrast. The images were reformatted and  are submitted in 3 mm thick axial, sagittal and coronal CT sections with  brain algorithm.     There are no prior head CTs or MRIs of the brain from [facility] for comparison.     FINDINGS: There is some mild low-density in the periventricular white  matter consistent with mild small vessel disease. The remainder of the  brain parenchyma is normal in attenuation. The ventricles are upper  limits of normal in size. I see no focal mass effect. There is no  midline shift. No extra-axial fluid collections are identified. There is  no evidence of acute intracranial hemorrhage. There is an ovoid mixed  lucent sclerotic focus in the inferior lateral left frontal bone just  above the orbital roof that is likely a benign fibro-osseous lesion. The  remainder of the calvarium and the skull base are normal in appearance.  There is a 10 mm calcific density in the inferior medial right frontal  sinus likely an osteoma. The remainder of the paranasal sinuses, mastoid  air cells and middle ear cavities are clear. The orbits are normal in  appearance. There is a partially empty sella turcica.      Impression:     1. No discernible acute intracranial abnormality is identified.     2. There is mild small  vessel disease in the cerebral white matter.  There is an 8 mm ovoid mixed lucent sclerotic focus in the inferior  lateral left frontal bone just superior to the left orbital roof that is  likely a benign fibro-osseous lesion of no significance and there is a  10 mm sclerotic lesion in the inferior medial right frontal sinuses  which is felt to be a medial right frontal sinus osteoma, a benign  finding. The remainder of the head CT is within normal limits and the  etiology of this patient's altered mental status is not established on  this exam. If neurologic symptoms warrant an MRI of the brain could be  obtained for a more complete assessment. The findings and  recommendations were communicated to Oliver Remy taking care  of the patient by telephone on 11/25/2024 at 12:50 p.m.     Radiation dose reduction techniques were utilized, including automated  exposure control and exposure modulation based on body size.        This report was finalized on 11/26/2024 6:02 AM by Dr. Emiliano Villar M.D  on Workstation: [id number]       FL Video Swallow Single Contrast [[id number]] James as Reviewed   Order Status: Completed Collected: 11/18/24 1046    Updated: 11/18/24 1051   Narrative:     VIDEO SWALLOWING EXAMINATION BY SPEECH PATHOLOGY     Clinical: Dysphasia     Video swallowing examination performed under the direction of speech  pathology. Imaging reviewed by radiologist who concurs with the  findings.     Speech pathology summary:VFSS completed in conjunction with Carolina GUZMAN. Patient presents with mild-moderate oropharyngeal dysphagia  characterized by decreased coordination and generalized pharyngeal  weakness. Adequate acceptance of all textures. Timely swallow with honey  thick via cup, nectar thick via cup, and small single sips of thin via  cup. Minimal preseptal thin large sips via cup, penetration with  consecutive swallows. Piecemeal deglutition with pur?e and mechanical  soft. Mild to moderate  diffuse pharyngeal residue with pur?e,  penetration occurred x 1 secondary to residue on the aryepiglottic  folds. Cued throat clear expelled penetrated pur?e material from  vestibule. Able to clear pur?e residue with multiple swallows and liquid  wash, trace silent aspiration of thin liquid wash. Prolonged but  functional mastication soft solids, no penetration or aspiration mild  lingual residue able to clear with double swallow and nectar thick  liquid wash.         FLUOROSCOPY TIME: 2 minutes 45 seconds, 4900 images.     This report was finalized on 11/18/2024 10:47 AM by Dr. Mateo Britton M.D on Workstation: [id number]       XR Hand 2 View Left [[id number]] James as Reviewed   Order Status: Completed Collected: 11/13/24 1705    Updated: 11/13/24 1710   Narrative:     LEFT HAND, 2 VIEWS     HISTORY: Left hand pain and swelling     COMPARISON: None available     FINDINGS: There is no appreciable fracture. Osteoarthritic changes are  seen involving multiple IP joints and the base of the thumb. No evidence  for dislocation      Impression:     Degenerative change as described           This report was finalized on 11/13/2024 5:06 PM by Dr. Virgil Conley M.D on Workstation: [id number]       CT Abdomen Kidney With & Without Contrast [[id number]] James as Reviewed   Order Status: Completed Collected: 11/11/24 1251    Updated: 11/12/24 0725   Narrative:     CT ABDOMEN WITHOUT AND WITH IV CONTRAST     HISTORY: 74-year-old male with a right renal mass. Exploratory  laparotomy and left hemicolectomy with colostomy on 10/26/2024 for  perforated sigmoid colon.     TECHNIQUE: Radiation dose reduction techniques were utilized, including  automated exposure control and exposure modulation based on body size. 2  mm images were obtained through the abdomen without contrast.  Subsequently, IV contrast was administered and 3 mm images were obtained  through the abdomen and a delayed cut series through the abdomen was  obtained as  well. Compared with noncontrasted CT abdomen pelvis  11/04/2024.     FINDINGS:  1. The mass partially exophytic from the posterior aspect of the mid  portion of the right kidney enhances heterogeneously and the anterior  margin extends to the medullary sinus fat. Appearance is very suspicious  for renal cell carcinoma. Right renal vein is patent. There is no  suspicious retroperitoneal lymphadenopathy.     There is a subtle heterogeneous indeterminate nodule within the  parenchyma at the upper pole of the right kidney measuring 2.0 x 1.8 cm,  delayed sequence series 6 image 46. There is also an indeterminant 1 cm  low-attenuation focus posteriorly at the upper pole of the right kidney,  image 52.     2. Left kidney has nonobstructing stones at the lower pole and a few  subcentimeter low-attenuation foci, parenchymal scars and a 1.2 cm  low-attenuation focus which is suspected to represent a benign cyst,  image 64. There is also a 1.2 cm focus slightly inferiorly which is  indeterminate, image 66.  Renal mass protocol CT of the abdomen is recommended in 6 months for  followup of these.     3. There are expected postsurgical lung changes following exploratory  laparotomy and left hemicolectomy. Unremarkable appearance of the left  mid abdominal colostomy. No evidence for bowel ileus or obstruction.  There is a feeding tube with the distal tip within the proximal  duodenum.     4. The liver appears unremarkable other than a tiny sliver of  subcapsular fluid along the posterior hepatic segment. Gallbladder  appears unremarkable and there is no biliary dilatation. Pancreas,  spleen, and adrenals appear unremarkable.     5. Since the 11/04/2024 CT, there has been no significant change in the  moderate size right pleural effusion and near complete collapse of the  right lower lobe. There is a small left pleural effusion with slightly  less atelectasis at the left lower lobe.     This report was finalized on 11/12/2024 7:22  "AM by Dr. Jessica Bowling M.D  on Workstation: [id number]       XR Shoulder 2+ View Right [[id number]] James as Reviewed   Order Status: Completed Collected: 11/09/24 1957    Updated: 11/09/24 2003   Narrative:     XR SHOULDER 2+ VW RIGHT-     DATE OF EXAM: 11/9/2024 5:46 PM     INDICATION: Pain for 2 years.     COMPARISON: Chest radiographs 11/4/2024, 10/30/2024, 10/29/2024, and  10/26/2024.     TECHNIQUE: 2 views of the right shoulder were obtained.     FINDINGS:     Diffuse osteopenia. No evidence of acute fracture or dislocation but  severe arthropathic changes at the glenohumeral joint with chronic  deformity of the humeral head and glenoid, loss of glenoid bone stock,  and large osteophyte formation at the inferior medial humeral head.  Moderate hypertrophic degenerative changes at the acromioclavicular  joint and small anterior/inferior acromial enthesophyte. Partially  imaged ill-defined right perihilar and right basilar opacities, likely a  pleural effusion with underlying atelectasis and/or pneumonia.      Impression:        1. Diffuse osteopenia, without radiographic evidence of acute fracture  or dislocation.  2. Severe arthropathic changes at the glenohumeral joint, which could  reflect chronic DJD or neuropathic arthropathy.  3. Moderate DJD at the AC joint and small anterior/inferior acromial  enthesophyte.  4. Partially imaged right perihilar and right basilar opacities, likely  a pleural effusion with underlying atelectasis and/or pneumonia.         PHYSICAL EXAM  Objective:  Vital signs: (most recent): Blood pressure 100/61, pulse 110, temperature 98.1 °F (36.7 °C), temperature source Oral, resp. rate 18, height 182.9 cm (72\"), weight 78.6 kg (173 lb 4.5 oz), SpO2 98%.                Alert  nad  No resp distress  RRR  Post op changes  Chronically ill     CONDITION ON DISCHARGE  Stable.      DISCHARGE DISPOSITION   Skilled Nursing Facility (DC - External)      DISCHARGE MEDICATIONS       Your " medication list        START taking these medications        Instructions Last Dose Given Next Dose Due   acetaminophen 325 MG tablet  Commonly known as: TYLENOL      Administer 2 tablets per G tube Every 4 (Four) Hours As Needed for Fever (fever greater than 100.4 °F or headache).       apixaban 5 MG tablet tablet  Commonly known as: ELIQUIS      Take 1 tablet by mouth 2 (Two) Times a Day.       chlorhexidine 0.12 % solution  Commonly known as: PERIDEX      Apply 15 mL to the mouth or throat Every 12 (Twelve) Hours.       Diclofenac Sodium 1 % gel gel  Commonly known as: VOLTAREN      Apply 4 g topically to the appropriate area as directed 3 (Three) Times a Day As Needed (pain).       folic acid 1 MG tablet  Commonly known as: FOLVITE  Start taking on: December 1, 2024      Administer 1 tablet per G tube Daily.       HYDROcodone-acetaminophen 5-325 MG per tablet  Commonly known as: NORCO      Administer 1 tablet per G tube Every 6 (Six) Hours As Needed for Moderate Pain.       insulin lispro 100 UNIT/ML injection  Commonly known as: HUMALOG/ADMELOG      Inject 2-9 Units under the skin into the appropriate area as directed 4 (Four) Times a Day Before Meals & at Bedtime.       ipratropium-albuterol 0.5-2.5 mg/3 ml nebulizer  Commonly known as: DUO-NEB      Take 3 mL by nebulization Every 4 (Four) Hours As Needed for Wheezing or Shortness of Air.       lansoprazole 30 MG Tablet Delayed Release Dispersible disintegrating tablet  Commonly known as: PREVACID SOLUTAB  Start taking on: December 1, 2024      Take 1 tablet by mouth Every Morning.       Lidocaine 4 %  Start taking on: December 1, 2024      Place 2 patches on the skin as directed by provider Daily. Remove & Discard patch within 12 hours or as directed by MD       melatonin 5 MG tablet tablet      Take 0.5 tablets by mouth At Night As Needed (sleep).       sodium chloride 1 g tablet      Take 1 tablet by mouth 2 (Two) Times a Day With Meals.       tamsulosin  0.4 MG capsule 24 hr capsule  Commonly known as: FLOMAX  Start taking on: December 1, 2024      Take 1 capsule by mouth Daily.       thiamine 100 MG tablet  Commonly known as: VITAMIN B1  Start taking on: December 1, 2024      Take 1 tablet by mouth Daily.              STOP taking these medications      amLODIPine-benazepril 10-20 MG per capsule  Commonly known as: LOTREL        metoprolol succinate  MG 24 hr tablet  Commonly known as: TOPROL-XL                  Where to Get Your Medications        These medications were sent to [facility] - [place], [place] - [address] - [phone] [facility] [phone]   [address] [address]      Phone: [phone]   HYDROcodone-acetaminophen 5-325 MG per tablet       Information about where to get these medications is not yet available    Ask your nurse or doctor about these medications  acetaminophen 325 MG tablet  apixaban 5 MG tablet tablet  chlorhexidine 0.12 % solution  Diclofenac Sodium 1 % gel gel  folic acid 1 MG tablet  insulin lispro 100 UNIT/ML injection  ipratropium-albuterol 0.5-2.5 mg/3 ml nebulizer  lansoprazole 30 MG Tablet Delayed Release Dispersible disintegrating tablet  Lidocaine 4 %  melatonin 5 MG tablet tablet  sodium chloride 1 g tablet  tamsulosin 0.4 MG capsule 24 hr capsule  thiamine 100 MG tablet          Future Appointments   Date Time Provider Department Center   1/8/2025  2:30 PM LAB CHAIR [address]  [facility]   1/8/2025  3:00 PM Gibson Serra MD [facility]     Additional Instructions for the Follow-ups that You Need to Schedule       Discharge Follow-up with Specialty: Oncology in 2-3 weeks, Urology in 2-3 weeks   As directed      Specialty: Oncology in 2-3 weeks, Urology in 2-3 weeks        Discharge Follow-up with Specialty: Rehab physician this week, PCP after dc from rehab, General Surgery in 2 weeks, Cardiology in 2 weeks, other specialists as previously  recomended   As directed      Specialty: Rehab physician this week, PCP after dc from rehab, General Surgery in 2 weeks, Cardiology in 2 weeks, other specialists as previously recomended               Contact information for follow-up providers       Mc Guillaume MD. Schedule an appointment as soon as possible for a visit.    Specialty: General Surgery  Why: For wound re-check after discharge from rehab  Contact information:  [address]  [facility] [address]  [facility] [id number] [phone]               Candy Sanchez APRN. Go on 1/30/2025.    Specialties: Nephrology, Nurse Practitioner  Why: has appointment with THOMAS Sanchez Dec. 30 at 9:30 am.  Contact information:  [address]  [facility] [address]  [facility] [id number] [phone]               Provider, No Known .    Contact information:  [facility] [address] [phone]                       Contact information for after-discharge care       Destination       [facility] .    Service: Skilled Nursing  Contact information:  [address] [facility] [facility], [place] [address] [phone]                                   TEST  RESULTS PENDING AT DISCHARGE         Antonino Poon MD  [facility]  11/30/24  13:11 EST      Time: greater than 32 minutes on discharge  It was a pleasure taking care of this patient while in the hospital.

## 2024-11-30 NOTE — PLAN OF CARE
Goal Outcome Evaluation:     Report called to Nargis at [facility] scheduled at 2000

## 2024-11-30 NOTE — PROGRESS NOTES
[facility] Progress Note      Patient Name: Arsh Haynes  : 1950  MRN: [id number]  Primary Care Physician:  Provider, No Known  Date of admission: 10/26/2024    Subjective     Interval History:   F/u hyponatremia    Review of Systems:   C/o thickened liq restriction in diet    Objective     Vitals:   Temp:  [97.7 °F (36.5 °C)-98.3 °F (36.8 °C)] 98.3 °F (36.8 °C)  Heart Rate:  [106-112] 106  Resp:  [18] 18  BP: (105-117)/(53-65) 105/53    Intake/Output Summary (Last 24 hours) at 2024 0844  Last data filed at 2024 0345  Gross per 24 hour   Intake 480 ml   Output 925 ml   Net -445 ml       Physical Exam:    General Appearance: frail WM, voice less soft today  Neck: supple, no JVD  Lungs: CTA bilat no rales  Heart: RRR, normal S1 and S2  Abdomen: soft, nontender, nondistended  Extremities: no edema, cyanosis or clubbing       Scheduled Meds:     chlorhexidine, 15 mL, Mouth/Throat, Q12H  Diclofenac Sodium, 4 g, Topical, 4x Daily  enoxaparin, 1 mg/kg, Subcutaneous, Q12H  folic acid, 1 mg, Oral, Daily  insulin lispro, 2-9 Units, Subcutaneous, 4x Daily AC & at Bedtime  lansoprazole, 30 mg, Oral, Q AM  Lidocaine, 1 patch, Transdermal, Q24H  Lidocaine, 2 patch, Transdermal, Q24H  [Held by provider] metoprolol tartrate, 100 mg, Oral, Q12H  sodium chloride, 10 mL, Intravenous, Q12H  sodium chloride, 1 g, Oral, TID With Meals  tamsulosin, 0.4 mg, Oral, Daily  thiamine, 100 mg, Oral, Daily      IV Meds:        Results Reviewed:   I have personally reviewed the results from the time of this admission to 2024 08:44 EST     Results from last 7 days   Lab Units 24  0506 24  0609 24  0353   SODIUM mmol/L 134* 129* 130*   POTASSIUM mmol/L 4.1 4.0 4.1   CHLORIDE mmol/L 98 97* 97*   CO2 mmol/L 21.9* 21.0* 21.0*   BUN mg/dL 17 30* 29*   CREATININE mg/dL 0.55* 0.46* 0.46*   CALCIUM mg/dL 9.3 9.5 9.3   BILIRUBIN mg/dL 0.4 0.4 0.4   ALK PHOS U/L 139* 144* 143*   ALT  (SGPT) U/L 24 25 25   AST (SGOT) U/L 19 19 15   GLUCOSE mg/dL 104* 98 100*     Estimated Creatinine Clearance: 135 mL/min (A) (by C-G formula based on SCr of 0.55 mg/dL (L)).  Results from last 7 days   Lab Units 11/30/24  0506 11/29/24  0609 11/28/24  0353 11/27/24  0521   MAGNESIUM mg/dL  --   --   --  1.9   PHOSPHORUS mg/dL 4.3 3.5 3.8 4.5     Results from last 7 days   Lab Units 11/25/24  0549 11/24/24  0337   URIC ACID mg/dL 6.3 6.6     Results from last 7 days   Lab Units 11/30/24  0506 11/29/24  0609 11/28/24  0353 11/27/24  0521 11/26/24  0552   WBC 10*3/mm3 6.40 7.50 7.58 7.46 8.01   HEMOGLOBIN g/dL 10.3* 10.2* 9.7* 9.8* 9.3*   PLATELETS 10*3/mm3 607* 644* 624* 640* 640*           Assessment / Plan     ASSESSMENT:  Hyponatremia.  Euvolemic.  Urine studies consistent with SIADH.   Switched urea packets to salt tabs 11/29 due to climbing BUN (better 30 -> 17).  Na improved to 134.    2.  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  Fecal peritonitis.  3.  Right renal mass concerning for renal cell carcinoma.  Plan outpatient UROL follow up  4.  Right lower extremity DVT.  Currently on Lovenox.  Plan for Eliquis on discharge.  5.  Anemia. Hb stable .  6.  Hypertension. Controlled on metop 100 BID also for rate control   7.  Metabolic encephalopathy.  Improving.  8.  Thrombocytosis.  9.  Urinary retention.  On flomax  10.Complete heart block - cardiology eval noted.  Declines pacemaker     PLAN:  Wean salt tabs 1g BID  Continue fluid restriction 1500 cc/day  No objection to discharge to rehab on this regimen (declining PPM)      Gibson Romero MD  11/30/24  08:44 EST    [facility]  [phone]

## 2024-12-01 NOTE — PROGRESS NOTES
"Enter Query Response Below      Query Response: acute ischemia duo to colonic obstruction and malignancy             If applicable, please update the problem list.     Patient: Arsh Haynes        : 1950  Account: [id number]           Admit Date:         How to Respond to this query:       a. Click New Note     b. Answer query within the yellow box.                c. Update the Problem List, if applicable.      If you have any questions about this query contact me at: [email or web address]       Dr. Guillaume,     Risk Factors: 74-year-old male admitted 10/26 for \"colonic perforation due to cancer and ischemia\" per surgery progress note 10/27.  Clinical Indicators: OP note 10/26 includes \"Ischemic colon with perforation at the descending sigmoid colon junction\" in the findings, and states \"We explored the abdominal cavity and there was evidence of ischemia, thickening as well as perforation of the distal descending and proximal sigmoid colon junction.\"  Treatment: \"Open left hemicolectomy with Juliana pouch and end colostomy and Splenic flexure mobilization\" (OP Jaskaran 10/27)    Please clarify if patient treated/monitored for one or more of the following:    Acute ischemic colon  Other- specify_____    By submitting this query, we are merely seeking further clarification of documentation to accurately reflect all conditions that you are monitoring, evaluating, treating or that extend the hospitalization or utilize additional resources of care. Please utilize your independent clinical judgment when addressing the question(s) above.     This query and your response, once completed, will be entered into the legal medical record.    Sincerely,  Monique Blunt RN  Clinical Documentation Integrity Program     "

## 2024-12-02 NOTE — CASE MANAGEMENT/SOCIAL WORK
Case Management Discharge Note      Final Note: SNF at [facility]    Provided Post Acute Provider List?: Yes  Post Acute Provider List: Nursing Home  Provided Post Acute Provider Quality & Resource List?: Yes  Post Acute Provider Quality and Resource List: Nursing Home  Delivered To: Support Person  Support Person: Melida-spouse  Method of Delivery: Telephone    Selected Continued Care - Discharged on 11/30/2024 Admission date: 10/26/2024 - Discharge disposition: Skilled Nursing Facility (DC - External)      Destination Coordination complete.      Service Provider Services Address Phone Fax Patient Preferred    [facility] [address] [address] [phone] --       Internal Comment last updated by Melody Davison, RN 11/24/2024 1204    Unable to accept d/t wound vac at any [facility] facilities                         Durable Medical Equipment    No services have been selected for the patient.                Dialysis/Infusion    No services have been selected for the patient.                Home Medical Care    No services have been selected for the patient.                Therapy    No services have been selected for the patient.                Community Resources    No services have been selected for the patient.                Community & DME    No services have been selected for the patient.                    Transportation Services  Ambulance: [facility]    Final Discharge Disposition Code: 03 - skilled nursing facility (SNF)

## 2025-05-23 ENCOUNTER — OFFICE VISIT (OUTPATIENT)
Dept: SURGERY | Facility: CLINIC | Age: 75
End: 2025-05-23
Payer: MEDICARE

## 2025-05-23 VITALS
OXYGEN SATURATION: 100 % | DIASTOLIC BLOOD PRESSURE: 64 MMHG | BODY MASS INDEX: 23.5 KG/M2 | HEIGHT: 72 IN | SYSTOLIC BLOOD PRESSURE: 114 MMHG | HEART RATE: 97 BPM

## 2025-05-23 DIAGNOSIS — C18.7 MALIGNANT NEOPLASM OF SIGMOID COLON: ICD-10-CM

## 2025-05-23 DIAGNOSIS — M25.519 SHOULDER PAIN, UNSPECIFIED CHRONICITY, UNSPECIFIED LATERALITY: ICD-10-CM

## 2025-05-23 DIAGNOSIS — N28.89 KIDNEY MASS: ICD-10-CM

## 2025-05-23 DIAGNOSIS — I49.9 CARDIAC ARRHYTHMIA, UNSPECIFIED CARDIAC ARRHYTHMIA TYPE: Primary | ICD-10-CM

## 2025-05-23 DIAGNOSIS — N18.9 CHRONIC KIDNEY DISEASE, UNSPECIFIED CKD STAGE: ICD-10-CM

## 2025-05-23 RX ORDER — GINSENG 100 MG
1 CAPSULE ORAL 2 TIMES DAILY
COMMUNITY

## 2025-05-23 RX ORDER — METOPROLOL TARTRATE 50 MG
50 TABLET ORAL 2 TIMES DAILY
COMMUNITY

## 2025-05-23 RX ORDER — GABAPENTIN 100 MG/1
100 CAPSULE ORAL 3 TIMES DAILY
COMMUNITY

## 2025-05-23 RX ORDER — POLYETHYLENE GLYCOL 3350 17 G/17G
17 POWDER, FOR SOLUTION ORAL DAILY
COMMUNITY

## 2025-05-23 NOTE — PROGRESS NOTES
"CC: Follow-up after colectomy     HPI: The patient is a very pleasant 75-year-old male that is here today for follow-up after undergoing open left hemicolectomy with Larson pouch and end colostomy with splenic flexure mobilization on 10/27/2024.  The patient has been in nursing home since then.  He has not follow-up in my office since surgery.  He is complaining of weakness mostly in the upper extremities and he is unable to raise his elbows.  He has been having mostly liquid bowel movements from his ostomy but has been taking MiraLAX as needed for constipation.  He reports poor p.o. intake.  He has been losing weight since admitted to the nursing home.  He reports that initially he was working with physical therapy but that has now stopped.  He feels that he is not progressing.  He is concerned about getting stronger.  He was diagnosed in the hospital with T3 N0 M0 colon cancer.  He was found to have a exophytic mass at the posterior aspect of the right kidney concerning for carcinoma.  He has not follow-up with any of the specialist.   During the hospital stay he was found to have chronic kidney disease, renal mass, DVT, cardiac arrhythmias   And dysphagia     PMH: Hypertension, DVT, oropharyngeal dysphagia, right renal mass, colon cancer, thrombocytosis and anemia     PSH:   - Open left hemicolectomy with splenic flexure mobilization 10/26/2024.  Dr. Guillaume     MEDS: Tylenol, Voltaren gel, gabapentin, metoprolol, MiraLAX     ALL: No known drug allergies    FH and SH: Family history is noncontributory to the current issue.  No family history of colon cancer or inflammatory bowel disease.  Denies smoking drinking or taking any drugs     ROS:   Reports weakness     PE:   Vitals:    05/23/25 1113   BP: 114/64   BP Location: Left arm   Patient Position: Sitting   Cuff Size: Adult   Pulse: 97   SpO2: 100%   Height: 182.9 cm (72\")     Body mass index is 23.5 kg/m².   Alert and oriented ×3, no acute distress.  Head is " normocephalic and atraumatic.  Neck is supple   Breathing comfortable  Abdomen is soft and nontender, is nondistended, bowel sounds are positive.  There is a well-healed midline incision.  There is left mid abdomen ostomy in place that is pink and viable with stool.  No clubbing cyanosis or edema in lower or upper extremities.  There is weakness on bilateral upper extremities and cannot raise elbow above shoulders.  BMI is within normal parameters. No other follow-up for BMI required.    Diagnostic studies:   Pathology report from surgery:   Final Diagnosis   1.  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               -Tumor sizes: 4.8 cm and 6.0 cm.               -Tumor extent: Both tumors invade through the muscularis propria into pericolorectal adipose.               -All margins are free of tumor (closest 2.5 cm from the mesenteric margin).               -Uncomplicated diverticulosis.               -No lymphovascular nor perineural invasion identified.               -16 negative lymph nodes (0/16).               -Pathologic stage: mpT3, N0.     2.  Sigmoid colon, sigmoidectomy: INVASIVE MODERATELY DIFFERENTIATED ADENOCARCINOMA.               -Tumor size: 5.5 cm.               -Tumor extent: Tumor invades muscularis propria.               -All margins are free of tumor (closest 1.0 cm from the distal mucosal margin).               -16 negative lymph nodes (0/16).               -Pathologic stage: mpT3, N0.     CT scan of the kidney showing partially exophytic mass from the posterior aspect of the midportion of the right kidney.  Left pleural effusion     Assessment and plan    The patient is a very pleasant 75-year-old male status post open left colectomy with colostomy.  He is weak, he is not working with physical therapy.  He is having issues raising his arms because of pain.  He has not follow-up with any of the consultants that have seen him  in the hospital.  He has not follow-up with his primary care.  I discussed with the patient about the need to start physical therapy and add nutritional supplements with every meals.  Regarding his liquid ostomy output I recommend that he start taking fiber supplementation with Metamucil 1 tablespoon every day to try to bulk the stool.  He should stop MiraLAX unless he is having constipation.    He should follow-up with oncology regarding follow-up from his colon cancer as well as possible kidney cancer, referral was placed    Follow-up with Dr. Oshea from urology regarding the kidney mass  Follow-up with cardiology regarding arrhythmia, was offered pacemaker during his hospital stay that he refused  Referral for orthopedic surgery regarding bilateral shoulder pain and weakness  Should follow-up with primary care since he has multiple medical problems he will need to work out with him.  He will follow-up in my office in 2 months for recheck  I discussed with him that he will need to wait at least 1 year before his colostomy can be taken down.  He is not in any shape or form close to being able to have his colostomy takedown at this moment.    He understood     Mc Guillaume MD  General, Minimally Invasive and Endoscopic Surgery  [facility]     [address], [address]  [place], [place], [address]  P: [phone]  F: [phone]

## 2025-06-20 ENCOUNTER — TELEPHONE (OUTPATIENT)
Dept: SURGERY | Facility: CLINIC | Age: 75
End: 2025-06-20
Payer: MEDICARE

## 2025-06-20 NOTE — TELEPHONE ENCOUNTER
Patients nurse called regarding referral/appointment to [facility]. She stated that the patient was a no show for a previous appointment and that the son requested we reschedule for a Friday since that is his off day.   Patients appointment has been rescheduled for 8/1/25 @ 11:30 am arrv 11:00 am, [address], [phone].  Voicemail left for son with appointment details. I spoke with Lori and she is also aware of appointment.  Patient has a f/u appointment with Dr. Guillaume on 7/18. I spoke with Dr. Guillaume and he is aware of Nephrology appointment and ok with that date.

## 2025-07-02 DIAGNOSIS — D75.839 THROMBOCYTOSIS: Primary | ICD-10-CM

## 2025-08-01 ENCOUNTER — TRANSCRIBE ORDERS (OUTPATIENT)
Dept: ADMINISTRATIVE | Facility: HOSPITAL | Age: 75
End: 2025-08-01
Payer: MEDICARE

## 2025-08-01 DIAGNOSIS — N28.89 RENAL MASS: Primary | ICD-10-CM

## 2025-08-08 ENCOUNTER — HOSPITAL ENCOUNTER (OUTPATIENT)
Dept: CT IMAGING | Facility: HOSPITAL | Age: 75
Discharge: HOME | End: 2025-08-08
Payer: MEDICARE

## 2025-08-08 DIAGNOSIS — N28.89 RENAL MASS: ICD-10-CM

## 2025-08-08 LAB — CREAT BLDA-MCNC: 1 MG/DL (ref 0.6–1.3)

## 2025-08-08 PROCEDURE — 82565 ASSAY OF CREATININE: CPT

## 2025-08-08 PROCEDURE — 25510000001 IOPAMIDOL 61 % SOLUTION: Performed by: INTERNAL MEDICINE

## 2025-08-08 PROCEDURE — 74178 CT ABD&PLV WO CNTR FLWD CNTR: CPT

## 2025-08-08 RX ORDER — ALLOPURINOL 100 MG/1
100 TABLET ORAL EVERY MORNING
COMMUNITY

## 2025-08-08 RX ORDER — IOPAMIDOL 612 MG/ML
100 INJECTION, SOLUTION INTRAVASCULAR
Status: COMPLETED | OUTPATIENT
Start: 2025-08-08 | End: 2025-08-08

## 2025-08-08 RX ADMIN — IOPAMIDOL 85 ML: 612 INJECTION, SOLUTION INTRAVENOUS at 10:40

## (undated) DEVICE — LEGGINGS, PAIR, CLEAR, STERILE: Brand: MEDLINE

## (undated) DEVICE — ANTIBACTERIAL UNDYED BRAIDED (POLYGLACTIN 910), SYNTHETIC ABSORBABLE SUTURE: Brand: COATED VICRYL

## (undated) DEVICE — SUT SILK 2/0 SH 30IN K833H

## (undated) DEVICE — TOTAL TRAY, 16FR 10ML SIL FOLEY, URN: Brand: MEDLINE

## (undated) DEVICE — SUT PDS 1 CT1 36IN Z347H

## (undated) DEVICE — PK PROC MAJ 40

## (undated) DEVICE — JELLY,LUBE,STERILE,FLIP TOP,TUBE,4-OZ: Brand: MEDLINE

## (undated) DEVICE — SUT SILK 2/0 TIES 18IN A185H

## (undated) DEVICE — STPLR SKIN VISISTAT WD 35CT

## (undated) DEVICE — APPL CHLORAPREP HI/LITE 26ML ORNG

## (undated) DEVICE — SYR LL TP 10ML STRL

## (undated) DEVICE — SUT SILK 3/0 TIES 18IN A184H

## (undated) DEVICE — WOUND RETRACTOR AND PROTECTOR: Brand: ALEXIS WOUND PROTECTOR-RETRACTOR

## (undated) DEVICE — SUT SILK 2/0 SH CR8 18IN CR8 C012D

## (undated) DEVICE — GLV SURG BIOGEL LTX PF 7 1/2

## (undated) DEVICE — ENSEAL TEMPERATURE CONTROLLED TISSUE SEALING TECHNOLOGY DISPOSABLE TISSUE SEALING DEVICE TAPTRONIC TRIGGER ACTIVATED POWER 5MM JAW STYLE: Brand: ENSEAL

## (undated) DEVICE — PENCL SMOKE/EVAC MEGADYNE TELESCP 10FT

## (undated) DEVICE — SUT GUT CHRM 3/0 SH 36IN G172H

## (undated) DEVICE — BANDAGE,GAUZE,BULKEE II,4.5"X4.1YD,STRL: Brand: MEDLINE

## (undated) DEVICE — ECHELON FLEX 60 ARTICULATING ENDOSCOPIC LINEAR CUTTER (NO CARTRIDGE): Brand: ECHELON FLEX ENDOPATH

## (undated) DEVICE — SUT SILK 0 TIES 18IN SA66G

## (undated) DEVICE — POOLE SUCTION HANDLE: Brand: CARDINAL HEALTH

## (undated) DEVICE — DRP SLUSH WARMR MACH CIR 44X44IN

## (undated) DEVICE — PATIENT RETURN ELECTRODE, SINGLE-USE, CONTACT QUALITY MONITORING, ADULT, WITH 9FT CORD, FOR PATIENTS WEIGING OVER 33LBS. (15KG): Brand: MEGADYNE

## (undated) DEVICE — ELECTRD BLD EZ CLN MOD XLNG 2.75IN

## (undated) DEVICE — MEDI-VAC YANKAUER SUCTION HANDLE W/BULBOUS TIP: Brand: CARDINAL HEALTH

## (undated) DEVICE — SUT SILK 3/0 SH CR8 18IN C013D

## (undated) DEVICE — COVER,MAYO STAND,STERILE: Brand: MEDLINE